# Patient Record
Sex: MALE | Race: WHITE | Employment: OTHER | URBAN - METROPOLITAN AREA
[De-identification: names, ages, dates, MRNs, and addresses within clinical notes are randomized per-mention and may not be internally consistent; named-entity substitution may affect disease eponyms.]

---

## 2017-01-01 ENCOUNTER — ALLIED HEALTH/NURSE VISIT (OUTPATIENT)
Dept: CARDIOLOGY | Facility: CLINIC | Age: 82
End: 2017-01-01
Payer: MEDICARE

## 2017-01-01 ENCOUNTER — DOCUMENTATION ONLY (OUTPATIENT)
Dept: CARDIOLOGY | Facility: CLINIC | Age: 82
End: 2017-01-01

## 2017-01-01 ENCOUNTER — OFFICE VISIT (OUTPATIENT)
Dept: UROLOGY | Facility: CLINIC | Age: 82
End: 2017-01-01
Payer: MEDICARE

## 2017-01-01 ENCOUNTER — OFFICE VISIT (OUTPATIENT)
Dept: CARDIOLOGY | Facility: CLINIC | Age: 82
End: 2017-01-01
Payer: MEDICARE

## 2017-01-01 VITALS
BODY MASS INDEX: 24.62 KG/M2 | HEIGHT: 70 IN | SYSTOLIC BLOOD PRESSURE: 112 MMHG | DIASTOLIC BLOOD PRESSURE: 60 MMHG | WEIGHT: 172 LBS | HEART RATE: 60 BPM

## 2017-01-01 DIAGNOSIS — I49.5 SINOATRIAL NODE DYSFUNCTION (H): Primary | ICD-10-CM

## 2017-01-01 DIAGNOSIS — R33.9 URINARY RETENTION: Primary | ICD-10-CM

## 2017-01-01 DIAGNOSIS — Z95.0 CARDIAC PACEMAKER IN SITU: Primary | ICD-10-CM

## 2017-01-01 PROCEDURE — 99212 OFFICE O/P EST SF 10 MIN: CPT | Mod: 25 | Performed by: UROLOGY

## 2017-01-01 PROCEDURE — 99214 OFFICE O/P EST MOD 30 MIN: CPT | Performed by: INTERNAL MEDICINE

## 2017-01-01 PROCEDURE — 51702 INSERT TEMP BLADDER CATH: CPT | Performed by: UROLOGY

## 2017-01-01 PROCEDURE — 93293 PM PHONE R-STRIP DEVICE EVAL: CPT | Performed by: INTERNAL MEDICINE

## 2017-01-01 RX ORDER — CLINDAMYCIN PHOSPHATE 900 MG/50ML
900 INJECTION, SOLUTION INTRAVENOUS
Status: CANCELLED | OUTPATIENT
Start: 2017-01-01

## 2017-01-01 RX ORDER — SODIUM CHLORIDE 450 MG/100ML
INJECTION, SOLUTION INTRAVENOUS CONTINUOUS
Status: CANCELLED | OUTPATIENT
Start: 2017-01-01

## 2017-01-01 RX ORDER — LIDOCAINE 40 MG/G
CREAM TOPICAL
Status: CANCELLED | OUTPATIENT
Start: 2017-01-01

## 2017-01-05 ENCOUNTER — OFFICE VISIT (OUTPATIENT)
Dept: UROLOGY | Facility: CLINIC | Age: 82
End: 2017-01-05
Payer: MEDICARE

## 2017-01-05 DIAGNOSIS — N39.41 URGE INCONTINENCE OF URINE: Primary | ICD-10-CM

## 2017-01-05 PROCEDURE — 99212 OFFICE O/P EST SF 10 MIN: CPT | Mod: 25 | Performed by: UROLOGY

## 2017-01-05 PROCEDURE — 51702 INSERT TEMP BLADDER CATH: CPT | Performed by: UROLOGY

## 2017-01-05 NOTE — PROGRESS NOTES
Edison is a 89-year-old gentleman with severe urgency incontinence and a spastic bladder.  His bladder is managed with an indwelling Christianson that is changed monthly.  He has a history of prostate cancer and was treated with combination radiation therapy many years ago.  This was the cause of his bladder spasticity and urge incontinence.patient underwent video cystoscopy and injection of Botox several weeks ago and has had good relief of his bladder spasticity.  Past medical history:prostate cancer, osteoporosis, esophageal reflux, hypertension, CVA, sleep apnea, asthma, arthritis, atrial fibrillation, hyperlipidemia, pacemaker, former smoker  Medications:reviewed  Allergies: Ceftriaxone, sulfa, Levaquin, Zithromax, Macrodantin  Exam: Normal appearance, normal vital signs.  Alert and oriented, normocephalic.  Uncircumcised phallus with no lesions.  Christianson catheter change with sterile technique.  Bladder irrigates clear.  4 cc of saline and Christianson balloon.  Christianson placed to close gravity drainage  Assessment: Spastic bladder with severe urge incontinence, prostate cancer  Plan: See me monthly for catheter change.  Periodic Botox injections..  Patient wishes to have no further PSA testing

## 2017-01-05 NOTE — Clinical Note
1/5/2017       RE: Edison Boss  [address]    [facility] [address]     Dear Colleague,    Thank you for referring your patient, Edison Boss, to the [facility] UROLOGY CLINIC [place] at [facility]. Please see a copy of my visit note below.    Edison is a 89-year-old gentleman with severe urgency incontinence and a spastic bladder.  His bladder is managed with an indwelling Christianson that is changed monthly.  He has a history of prostate cancer and was treated with combination radiation therapy many years ago.  This was the cause of his bladder spasticity and urge incontinence.patient underwent video cystoscopy and injection of Botox several weeks ago and has had good relief of his bladder spasticity.  Past medical history:prostate cancer, osteoporosis, esophageal reflux, hypertension, CVA, sleep apnea, asthma, arthritis, atrial fibrillation, hyperlipidemia, pacemaker, former smoker  Medications:reviewed  Allergies: Ceftriaxone, sulfa, Levaquin, Zithromax, Macrodantin  Exam: Normal appearance, normal vital signs.  Alert and oriented, normocephalic.  Uncircumcised phallus with no lesions.  Christianson catheter change with sterile technique.  Bladder irrigates clear.  4 cc of saline and Christianson balloon.  Christianson placed to close gravity drainage  Assessment: Spastic bladder with severe urge incontinence, prostate cancer  Plan: See me monthly for catheter change.  Periodic Botox injections..  Patient wishes to have no further PSA testing    Again, thank you for allowing me to participate in the care of your patient.      Sincerely,    Michael Lilly MD

## 2017-01-17 DIAGNOSIS — F41.1 GAD (GENERALIZED ANXIETY DISORDER): Primary | ICD-10-CM

## 2017-01-17 RX ORDER — PAROXETINE 30 MG/1
15 TABLET, FILM COATED ORAL AT BEDTIME
Qty: 15 TABLET | Refills: 1 | Status: CANCELLED | OUTPATIENT
Start: 2017-01-17

## 2017-01-17 NOTE — TELEPHONE ENCOUNTER
paroxetine     Last Written Prescription Date: 10/26/16  Last Fill Quantity: 15, # refills: 1  Last Office Visit with [facility] primary care provider:  10/20/16        Last PHQ-9 score on record=   PHQ-9 SCORE 11/23/2016   Total Score -   Total Score 8

## 2017-01-19 DIAGNOSIS — F41.1 GAD (GENERALIZED ANXIETY DISORDER): Primary | ICD-10-CM

## 2017-01-19 RX ORDER — PAROXETINE 30 MG/1
15 TABLET, FILM COATED ORAL AT BEDTIME
Qty: 15 TABLET | Refills: 3 | Status: SHIPPED | OUTPATIENT
Start: 2017-01-19 | End: 2017-05-16

## 2017-01-19 NOTE — PROGRESS NOTES
Daughter, Red, requesting refill.  Refill auth for this month plus 3 refills sent to pharmacy.    Future refill authorization should go through PCP, , who originally prescribed this.  Thank you,    Viridiana Morales, Pharm.D.,CGP

## 2017-01-26 ENCOUNTER — OFFICE VISIT (OUTPATIENT)
Dept: CARDIOLOGY | Facility: CLINIC | Age: 82
End: 2017-01-26

## 2017-01-26 DIAGNOSIS — Z95.0 CARDIAC PACEMAKER IN SITU: Primary | ICD-10-CM

## 2017-01-26 NOTE — PROGRESS NOTES
Phone Teletrace    Intrinsic Rhythm at time of check. Magnet response WNL.  F/U scheduled q 1 month.   Courtesy check, no charge.

## 2017-02-02 ENCOUNTER — OFFICE VISIT (OUTPATIENT)
Dept: UROLOGY | Facility: CLINIC | Age: 82
End: 2017-02-02
Payer: MEDICARE

## 2017-02-02 DIAGNOSIS — N39.41 URGENCY INCONTINENCE: Primary | ICD-10-CM

## 2017-02-02 PROCEDURE — 99212 OFFICE O/P EST SF 10 MIN: CPT | Mod: 25 | Performed by: UROLOGY

## 2017-02-02 PROCEDURE — 51702 INSERT TEMP BLADDER CATH: CPT | Performed by: UROLOGY

## 2017-02-02 NOTE — PROGRESS NOTES
Edison is a 89-year-old gentleman with a   History of prostate cancer treated with radiation therapy and resulting urgency incontinence.  His bladder is managed with a  Christianson catheter that is changed monthly.  Patient does not wish to have any  Further PSA testing.    He occasionally requires Injections of Botox to control his severe bladder spasms.  His last injection was 2 months ago.  Exam: Normocephalic, alert and oriented             Normal external genitalia  Christianson catheter change with sterile technique  Assessment: History of prostate cancer, severe bladder urgency  Plan: Follow-up every 4 weeks for catheter  change

## 2017-02-02 NOTE — Clinical Note
2/2/2017       RE: Edison Boss  [address]    [facility] [address]     Dear Colleague,    Thank you for referring your patient, Edison Boss, to the [facility] UROLOGY CLINIC [place] at [facility]. Please see a copy of my visit note below.    Edison is a 89-year-old gentleman with a   History of prostate cancer treated with radiation therapy and resulting urgency incontinence.  His bladder is managed with a  Christianson catheter that is changed monthly.  Patient does not wish to have any  Further PSA testing.    He occasionally requires Injections of Botox to control his severe bladder spasms.  His last injection was 2 months ago.  Exam: Normocephalic, alert and oriented             Normal external genitalia  Christianson catheter change with sterile technique  Assessment: History of prostate cancer, severe bladder urgency  Plan: Follow-up every 4 weeks for catheter  change    Again, thank you for allowing me to participate in the care of your patient.      Sincerely,    Michael Lilly MD

## 2017-02-02 NOTE — NURSING NOTE
UCO with ease. Betadine prep done and sterile draped. New catheter was connected to leg bag. Twila Rocha LPN

## 2017-03-02 ENCOUNTER — ALLIED HEALTH/NURSE VISIT (OUTPATIENT)
Dept: CARDIOLOGY | Facility: CLINIC | Age: 82
End: 2017-03-02
Payer: MEDICARE

## 2017-03-02 DIAGNOSIS — Z95.0 CARDIAC PACEMAKER IN SITU: Primary | ICD-10-CM

## 2017-03-02 PROCEDURE — 93293 PM PHONE R-STRIP DEVICE EVAL: CPT | Performed by: INTERNAL MEDICINE

## 2017-03-02 NOTE — MR AVS SNAPSHOT
After Visit Summary   3/2/2017    Edison Boss    MRN: [id number]           Patient Information     Date Of Birth          10/19/1924        Visit Information        Provider Department      3/2/2017 1:30 PM JOCY BALDWIN [facility]        Today's Diagnoses     Cardiac pacemaker in situ    -  1       Follow-ups after your visit        Your next 10 appointments already scheduled     Mar 07, 2017 10:40 AM CST   Return Visit with Michael Lilly MD   [facility] [place] ([facility] [place])    [address]  [address]  [facility] [address]   [phone]            Apr 04, 2017 11:00 AM CDT   Return Visit with Michael Lilly MD   [facility] ([facility] [place])    [address]  [address]  [facility] [address]   [phone]            Apr 06, 2017  2:30 PM CDT   Phone Device Check with JOCY BALDWIN   [facility] ([facility])    [address] [place], [place] [address]  [facility] [id number] [phone]              Who to contact     If you have questions or need follow up information about today's clinic visit or your schedule please contact [facility] directly at [phone].  Normal or non-critical lab and imaging results will be communicated to you by MyChart, letter or phone within 4 business days after the clinic has received the results. If you do not hear from us within 7 days, please contact the clinic through MyChart or phone. If you have a critical or abnormal lab result, we will notify you by phone as soon as possible.  Submit refill requests through Spyder Lynk or call your pharmacy and they will forward the refill request to us. Please allow 3 business days for your refill to be completed.          Additional Information About Your  Visit        Kumohar Information     BlueSprig gives you secure access to your electronic health record. If you see a primary care provider, you can also send messages to your care team and make appointments. If you have questions, please call your primary care clinic.  If you do not have a primary care provider, please call [phone] and they will assist you.        Care EveryWhere ID     This is your Care EveryWhere ID. This could be used by other organizations to access your [place] medical records  [id number]         Blood Pressure from Last 3 Encounters:   12/08/16 147/88   12/06/16 128/70   10/20/16 112/60    Weight from Last 3 Encounters:   12/08/16 74.8 kg (165 lb)   12/06/16 75.3 kg (166 lb)   11/14/16 72.6 kg (160 lb)              We Performed the Following     PM PHONE R STRIP EVAL UP TO 90 DAYS ([id number])        Primary Care Provider Office Phone # Fax #    Porfirio Terrell [phone]       [facility] [address]  [facility] [address]        Thank you!     Thank you for choosing [facility] [place]  for your care. Our goal is always to provide you with excellent care. Hearing back from our patients is one way we can continue to improve our services. Please take a few minutes to complete the written survey that you may receive in the mail after your visit with us. Thank you!             Your Updated Medication List - Protect others around you: Learn how to safely use, store and throw away your medicines at www.disposemymeds.org.          This list is accurate as of: 3/2/17  1:38 PM.  Always use your most recent med list.                   Brand Name Dispense Instructions for use    albuterol 108 (90 BASE) MCG/ACT Inhaler    PROAIR HFA/PROVENTIL HFA/VENTOLIN HFA    1 Inhaler    Inhale 2 puffs into the lungs every 6 hours as needed for shortness of breath / dyspnea.       amLODIPine 5 MG tablet    NORVASC    30 tablet    Take 1 tablet  (5 mg) by mouth daily       AVEENO ECZEMA THERAPY 1 % Crea   Generic drug:  Colloidal Oatmeal      Externally apply topically daily       cetirizine 10 MG tablet    zyrTEC     Take 10 mg by mouth daily as needed       cholecalciferol 5000 UNITS Caps      Take 1 capsule by mouth daily. Takes 5000 units in the summer and 27326 units in the winter       * ciprofloxacin 500 MG tablet    CIPRO    12 tablet    Take 1 tablet (500 mg) by mouth once as needed Take one tablet after each procedure.       * ciprofloxacin 500 MG tablet    CIPRO    3 tablet    Take 1 tablet (500 mg) by mouth every 12 hours       clobetasol 0.05 % ointment    TEMOVATE         COLON CLEANSER PO      Take 1 capsule by mouth daily Advanced Colon Care II       D-Mannose Powd      500 mg Pt takes 2 pills daily.       EYE VITAMINS Caps      I cap 2 tabs in am, 2 tabs in pm       lidocaine 4 % Crea cream    LMX4     Apply topically daily as needed       nebulizer nebulization     1    Use as directed       OMEPRAZOLE PO      Take 20 mg by mouth every morning       oxyCODONE-acetaminophen 5-325 MG per tablet    PERCOCET    30 tablet    Take 1 tablet by mouth every 4 hours as needed for pain for pain.       PARoxetine 30 MG tablet    PAXIL    15 tablet    Take 0.5 tablets (15 mg) by mouth At Bedtime       VITAMIN C PO      Take by mouth 2 times daily 500 mg take 1 daily       * Notice:  This list has 2 medication(s) that are the same as other medications prescribed for you. Read the directions carefully, and ask your doctor or other care provider to review them with you.

## 2017-03-02 NOTE — NURSING NOTE
PHONE TELETRACE    Intrinsic Rhythm at time of check. Magnet response WNL.  F/U scheduled q 1 month.  Bill this time.     I

## 2017-03-07 ENCOUNTER — OFFICE VISIT (OUTPATIENT)
Dept: UROLOGY | Facility: CLINIC | Age: 82
End: 2017-03-07
Payer: MEDICARE

## 2017-03-07 DIAGNOSIS — N39.41 URGENCY INCONTINENCE: Primary | ICD-10-CM

## 2017-03-07 PROCEDURE — 51702 INSERT TEMP BLADDER CATH: CPT | Performed by: UROLOGY

## 2017-03-07 NOTE — PROGRESS NOTES
Edison is here for his monthly catheter change. He has been having no bladder spasms.  16 [place] coudé placed with sterile technique. 4 cc in Christianson balloon and irrigates clear.  Assessment: History of prostate cancer, severe urinary urgency with urge incontinence  Plan: Follow up one month for catheter change. No PSAs in the future

## 2017-03-07 NOTE — LETTER
3/7/2017       RE: Edison Boss  [address]    [place] [facility] [address]     Dear Colleague,    Thank you for referring your patient, Edison Boss, to the [facility] [place] at [facility]. Please see a copy of my visit note below.    Edison is here for his monthly catheter change. He has been having no bladder spasms.  16 Burundian coudé placed with sterile technique. 4 cc in Christianson balloon and irrigates clear.  Assessment: History of prostate cancer, severe urinary urgency with urge incontinence  Plan: Follow up one month for catheter change. No PSAs in the future    Again, thank you for allowing me to participate in the care of your patient.      Sincerely,    Michael Lilly MD

## 2017-03-07 NOTE — MR AVS SNAPSHOT
After Visit Summary   3/7/2017    Edison Boss    MRN: [id number]           Patient Information     Date Of Birth          10/19/1924        Visit Information        Provider Department      3/7/2017 10:40 AM Michael Lilly MD [facility]        Today's Diagnoses     Urgency incontinence    -  1       Follow-ups after your visit        Your next 10 appointments already scheduled     Apr 04, 2017 11:00 AM CDT   Return Visit with Michael Lilly MD   [facility] [place] ([facility] [place])    [address]  [address]  [facility] [address]   [phone]            Apr 06, 2017  2:30 PM CDT   Phone Device Check with SANDRA TECH2   [facility] [place] ([facility])    [address] [place] [address] [address]  [facility] [address] [phone]            May 02, 2017 10:30 AM CDT   Return Visit with Michael Lilly MD   [facility] ([facility] [place])    [address]  [address]  [facility] [address] [phone]              Who to contact     If you have questions or need follow up information about today's clinic visit or your schedule please contact [facility] directly at [phone].  Normal or non-critical lab and imaging results will be communicated to you by MyChart, letter or phone within 4 business days after the clinic has received the results. If you do not hear from us within 7 days, please contact the clinic through MyChart or phone. If you have a critical or abnormal lab result, we will notify you by phone as soon as possible.  Submit refill requests through Barcheyacht or call your pharmacy and they will forward the refill request to us. Please allow 3 business days for your refill to be completed.          Additional Information  About Your Visit        Helpful TechnologiesharWorldEscape Information     besomebody. gives you secure access to your electronic health record. If you see a primary care provider, you can also send messages to your care team and make appointments. If you have questions, please call your primary care clinic.  If you do not have a primary care provider, please call [phone] and they will assist you.        Care EveryWhere ID     This is your Care EveryWhere ID. This could be used by other organizations to access your [place] medical records  [id number]         Blood Pressure from Last 3 Encounters:   12/08/16 147/88   12/06/16 128/70   10/20/16 112/60    Weight from Last 3 Encounters:   12/08/16 74.8 kg (165 lb)   12/06/16 75.3 kg (166 lb)   11/14/16 72.6 kg (160 lb)              We Performed the Following     INSERT BLADDER CATH, TEMP INDWELL SIMPLE (ARAMBULA) ([id number])        Primary Care Provider Office Phone # Fax #    Porfirio Terrell [phone]       [facility] [address] [facility] [address]  [facility] [address]        Thank you!     Thank you for choosing [facility]OLOG[facility] CLINIC [place]  for your care. Our goal is always to provide you with excellent care. Hearing back from our patients is one way we can continue to improve our services. Please take a few minutes to complete the written survey that you may receive in the mail after your visit with us. Thank you!             Your Updated Medication List - Protect others around you: Learn how to safely use, store and throw away your medicines at www.disposemymeds.org.          This list is accurate as of: 3/7/17 11:58 AM.  Always use your most recent med list.                   Brand Name Dispense Instructions for use    albuterol 108 (90 BASE) MCG/ACT Inhaler    PROAIR HFA/PROVENTIL HFA/VENTOLIN HFA    1 Inhaler    Inhale 2 puffs into the lungs every 6 hours as needed for shortness of breath / dyspnea.       amLODIPine 5 MG tablet    NORVASC     30 tablet    Take 1 tablet (5 mg) by mouth daily       AVEENO ECZEMA THERAPY 1 % Crea   Generic drug:  Colloidal Oatmeal      Externally apply topically daily       cetirizine 10 MG tablet    zyrTEC     Take 10 mg by mouth daily as needed       cholecalciferol 5000 UNITS Caps      Take 1 capsule by mouth daily. Takes 5000 units in the summer and 89972 units in the winter       * ciprofloxacin 500 MG tablet    CIPRO    12 tablet    Take 1 tablet (500 mg) by mouth once as needed Take one tablet after each procedure.       * ciprofloxacin 500 MG tablet    CIPRO    3 tablet    Take 1 tablet (500 mg) by mouth every 12 hours       clobetasol 0.05 % ointment    TEMOVATE         COLON CLEANSER PO      Take 1 capsule by mouth daily Advanced Colon Care II       D-Mannose Powd      500 mg Pt takes 2 pills daily.       EYE VITAMINS Caps      I cap 2 tabs in am, 2 tabs in pm       lidocaine 4 % Crea cream    LMX4     Apply topically daily as needed       nebulizer nebulization     1    Use as directed       OMEPRAZOLE PO      Take 20 mg by mouth every morning       oxyCODONE-acetaminophen 5-325 MG per tablet    PERCOCET    30 tablet    Take 1 tablet by mouth every 4 hours as needed for pain for pain.       PARoxetine 30 MG tablet    PAXIL    15 tablet    Take 0.5 tablets (15 mg) by mouth At Bedtime       VITAMIN C PO      Take by mouth 2 times daily 500 mg take 1 daily       * Notice:  This list has 2 medication(s) that are the same as other medications prescribed for you. Read the directions carefully, and ask your doctor or other care provider to review them with you.

## 2017-04-04 ENCOUNTER — OFFICE VISIT (OUTPATIENT)
Dept: UROLOGY | Facility: CLINIC | Age: 82
End: 2017-04-04
Payer: MEDICARE

## 2017-04-04 DIAGNOSIS — N39.41 URGENCY INCONTINENCE: Primary | ICD-10-CM

## 2017-04-04 PROCEDURE — 99211 OFF/OP EST MAY X REQ PHY/QHP: CPT | Mod: 25 | Performed by: UROLOGY

## 2017-04-04 PROCEDURE — 51702 INSERT TEMP BLADDER CATH: CPT | Performed by: UROLOGY

## 2017-04-04 NOTE — MR AVS SNAPSHOT
After Visit Summary   4/4/2017    Edison Boss    MRN: [id number]           Patient Information     Date Of Birth          10/19/1924        Visit Information        Provider Department      4/4/2017 11:00 AM Michael Lilly MD [facility]        Today's Diagnoses     Urgency incontinence    -  1       Follow-ups after your visit        Your next 10 appointments already scheduled     Apr 06, 2017  2:30 PM CDT   Phone Device Check with SANDRA TECH2   [facility] [place] ([facility] Clin[facility])    [address] [facility] [address]  [facility] [address] [phone]            May 02, 2017 10:30 AM CDT   Return Visit with Michael Lilly MD   [facility] ([facility] [place])    [address]  [address]  [facility] [address] [phone]              Who to contact     If you have questions or need follow up information about today's clinic visit or your schedule please contact [facility] directly at [phone].  Normal or non-critical lab and imaging results will be communicated to you by MedPAC Technologieshart, letter or phone within 4 business days after the clinic has received the results. If you do not hear from us within 7 days, please contact the clinic through MedPAC Technologieshart or phone. If you have a critical or abnormal lab result, we will notify you by phone as soon as possible.  Submit refill requests through Aicent or call your pharmacy and they will forward the refill request to us. Please allow 3 business days for your refill to be completed.          Additional Information About Your Visit        MyChart Information     Aicent gives you secure access to your electronic health record. If you see a primary care provider, you can also send messages to your care team and make appointments. If you have questions,  please call your primary care clinic.  If you do not have a primary care provider, please call [phone] and they will assist you.        Care EveryWhere ID     This is your Care EveryWhere ID. This could be used by other organizations to access your [place] medical records  [id number]         Blood Pressure from Last 3 Encounters:   12/08/16 147/88   12/06/16 128/70   10/20/16 112/60    Weight from Last 3 Encounters:   12/08/16 74.8 kg (165 lb)   12/06/16 75.3 kg (166 lb)   11/14/16 72.6 kg (160 lb)              We Performed the Following     INSERT BLADDER CATH, TEMP INDWELL SIMPLE (ARAMBULA) ([id number])        Primary Care Provider Office Phone # Fax #    Porfirio Terrell [phone]       [facility] [address]   [facility] [address]        Thank you!     Thank you for choosing [facility]IC [place]  for your care. Our goal is always to provide you with excellent care. Hearing back from our patients is one way we can continue to improve our services. Please take a few minutes to complete the written survey that you may receive in the mail after your visit with us. Thank you!             Your Updated Medication List - Protect others around you: Learn how to safely use, store and throw away your medicines at www.disposemymeds.org.          This list is accurate as of: 4/4/17 11:24 AM.  Always use your most recent med list.                   Brand Name Dispense Instructions for use    albuterol 108 (90 BASE) MCG/ACT Inhaler    PROAIR HFA/PROVENTIL HFA/VENTOLIN HFA    1 Inhaler    Inhale 2 puffs into the lungs every 6 hours as needed for shortness of breath / dyspnea.       amLODIPine 5 MG tablet    NORVASC    30 tablet    Take 1 tablet (5 mg) by mouth daily       AVEENO ECZEMA THERAPY 1 % Crea   Generic drug:  Colloidal Oatmeal      Externally apply topically daily       cetirizine 10 MG tablet    zyrTEC     Take 10 mg by mouth daily as needed        cholecalciferol 5000 UNITS Caps      Take 1 capsule by mouth daily. Takes 5000 units in the summer and [address]16 units in the winter       * ciprofloxacin 500 MG tablet    CIPRO    12 tablet    Take 1 tablet (500 mg) by mouth once as needed Take one tablet after each procedure.       * ciprofloxacin 500 MG tablet    CIPRO    3 tablet    Take 1 tablet (500 mg) by mouth every 12 hours       clobetasol 0.05 % ointment    TEMOVATE         COLON CLEANSER PO      Take 1 capsule by mouth daily Advanced Colon Care II       D-Mannose Powd      500 mg Pt takes 2 pills daily.       EYE VITAMINS Caps      I cap 2 tabs in am, 2 tabs in pm       lidocaine 4 % Crea cream    LMX4     Apply topically daily as needed       nebulizer nebulization     1    Use as directed       OMEPRAZOLE PO      Take 20 mg by mouth every morning       oxyCODONE-acetaminophen 5-325 MG per tablet    PERCOCET    30 tablet    Take 1 tablet by mouth every 4 hours as needed for pain for pain.       PARoxetine 30 MG tablet    PAXIL    15 tablet    Take 0.5 tablets (15 mg) by mouth At Bedtime       VITAMIN C PO      Take by mouth 2 times daily 500 mg take 1 daily       * Notice:  This list has 2 medication(s) that are the same as other medications prescribed for you. Read the directions carefully, and ask your doctor or other care provider to review them with you.

## 2017-04-04 NOTE — NURSING NOTE
UCO with ease , Betadine prep done and draped with sterile drape. 2% Lidocaine jelly instilled into urethra. Twila Rocha LPN

## 2017-04-04 NOTE — LETTER
4/4/2017       RE: Edison Boss  [address]    [facility] [address]     Dear Colleague,    Thank you for referring your patient, Edison Boss, to the [facility] UROLOGY CLINIC [place] at [facility]. Please see a copy of my visit note below.    Edison Teague is a 89-year-old gentleman who has severe urinary incontinence after radiation for prostate cancer. PSA receives periodic Botox for spastic bladder-this is been well controlled.  Past medical history, medications and allergies reviewed  Assessment: Prostate cancer, urgency incontinence, severe bladder spasticity  16 English coudé Christianson changed under sterile technique and irrigated with clear returns.  4-1/2 cc in Christianson balloon  Plan: See me monthly for Christianson catheter change, Botox injections p.r.n. No further PSA testing    Again, thank you for allowing me to participate in the care of your patient.      Sincerely,    Michael Lilly MD

## 2017-04-04 NOTE — PROGRESS NOTES
Edison Teague is a 89-year-old gentleman who has severe urinary incontinence after radiation for prostate cancer. PSA receives periodic Botox for spastic bladder-this is been well controlled.  Past medical history, medications and allergies reviewed  Assessment: Prostate cancer, urgency incontinence, severe bladder spasticity  16 Upper sorbian coudé Christianson changed under sterile technique and irrigated with clear returns.  4-1/2 cc in Christianson balloon  Plan: See me monthly for Christianson catheter change, Botox injections p.r.n. No further PSA testing

## 2017-04-06 ENCOUNTER — ALLIED HEALTH/NURSE VISIT (OUTPATIENT)
Dept: CARDIOLOGY | Facility: CLINIC | Age: 82
End: 2017-04-06
Payer: MEDICARE

## 2017-04-06 DIAGNOSIS — Z95.0 CARDIAC PACEMAKER IN SITU: Primary | ICD-10-CM

## 2017-04-06 PROCEDURE — 99207 ZZC NO CHARGE LOS: CPT

## 2017-04-06 NOTE — MR AVS SNAPSHOT
After Visit Summary   4/6/2017    Edison Boss    MRN: [id number]           Patient Information     Date Of Birth          10/19/1924        Visit Information        Provider Department      4/6/2017 2:30 PM SANDRA TECH2 [facility] HEART AT [place]        Today's Diagnoses     Cardiac pacemaker in situ    -  1       Follow-ups after your visit        Your next 10 appointments already scheduled     May 02, 2017 10:30 AM CDT   Return Visit with Michael Lilly MD   [facility])    [address]  [address]  [facility] [id number] [phone]            May 11, 2017  2:30 PM CDT   Phone Device Check with SANDRA TECH2   [facility] ([facility])    [address] [place] [address] [address]  [facility] [id number] [phone]            May 30, 2017 10:30 AM CDT   Return Visit with Michael Lilly MD   [facility] ([facility])    [address]  [address]  [facility] [id number] [phone]              Who to contact     If you have questions or need follow up information about today's clinic visit or your schedule please contact [facility] directly at [phone].  Normal or non-critical lab and imaging results will be communicated to you by MyChart, letter or phone within 4 business days after the clinic has received the results. If you do not hear from us within 7 days, please contact the clinic through MyChart or phone. If you have a critical or abnormal lab result, we will notify you by phone as soon as possible.  Submit refill requests through [employer] or call your pharmacy and they will forward the refill request to us. Please allow 3 business days for your refill to be completed.          Additional Information About Your  Visit        [employer] Information     [employer] gives you secure access to your electronic health record. If you see a primary care provider, you can also send messages to your care team and make appointments. If you have questions, please call your primary care clinic.  If you do not have a primary care provider, please call [phone] and they will assist you.        Care EveryWhere ID     This is your Care EveryWhere ID. This could be used by other organizations to access your [place] medical records  [id number]         Blood Pressure from Last 3 Encounters:   12/08/16 147/88   12/06/16 128/70   10/20/16 112/60    Weight from Last 3 Encounters:   12/08/16 74.8 kg (165 lb)   12/06/16 75.3 kg (166 lb)   11/14/16 72.6 kg (160 lb)              Today, you had the following     No orders found for display       Primary Care Provider Office Phone # Fax #    Porfirio Terrell [phone]       [facility] [address] [place] [address]        Thank you!     Thank you for choosing [facility] PHYSICIANS HEART AT [place]  for your care. Our goal is always to provide you with excellent care. Hearing back from our patients is one way we can continue to improve our services. Please take a few minutes to complete the written survey that you may receive in the mail after your visit with us. Thank you!             Your Updated Medication List - Protect others around you: Learn how to safely use, store and throw away your medicines at www.disposemymeds.org.          This list is accurate as of: 4/6/17  2:34 PM.  Always use your most recent med list.                   Brand Name Dispense Instructions for use    albuterol 108 (90 BASE) MCG/ACT Inhaler    PROAIR HFA/PROVENTIL HFA/VENTOLIN HFA    1 Inhaler    Inhale 2 puffs into the lungs every 6 hours as needed for shortness of breath / dyspnea.       amLODIPine 5 MG tablet    NORVASC    30 tablet    Take 1 tablet (5 mg) by  mouth daily       AVEENO ECZEMA THERAPY 1 % Crea   Generic drug:  Colloidal Oatmeal      Externally apply topically daily       cetirizine 10 MG tablet    zyrTEC     Take 10 mg by mouth daily as needed       cholecalciferol 5000 UNITS Caps      Take 1 capsule by mouth daily. Takes 5000 units in the summer and 10389 units in the winter       * ciprofloxacin 500 MG tablet    CIPRO    12 tablet    Take 1 tablet (500 mg) by mouth once as needed Take one tablet after each procedure.       * ciprofloxacin 500 MG tablet    CIPRO    3 tablet    Take 1 tablet (500 mg) by mouth every 12 hours       clobetasol 0.05 % ointment    TEMOVATE         COLON CLEANSER PO      Take 1 capsule by mouth daily Advanced Colon Care II       D-Mannose Powd      500 mg Pt takes 2 pills daily.       EYE VITAMINS Caps      I cap 2 tabs in am, 2 tabs in pm       lidocaine 4 % Crea cream    LMX4     Apply topically daily as needed       nebulizer nebulization     1    Use as directed       OMEPRAZOLE PO      Take 20 mg by mouth every morning       oxyCODONE-acetaminophen 5-325 MG per tablet    PERCOCET    30 tablet    Take 1 tablet by mouth every 4 hours as needed for pain for pain.       PARoxetine 30 MG tablet    PAXIL    15 tablet    Take 0.5 tablets (15 mg) by mouth At Bedtime       VITAMIN C PO      Take by mouth 2 times daily 500 mg take 1 daily       * Notice:  This list has 2 medication(s) that are the same as other medications prescribed for you. Read the directions carefully, and ask your doctor or other care provider to review them with you.

## 2017-04-26 ENCOUNTER — FCC EXTENDED DOCUMENTATION (OUTPATIENT)
Dept: BEHAVIORAL HEALTH | Facility: CLINIC | Age: 82
End: 2017-04-26

## 2017-04-26 NOTE — Clinical Note
FYI, Client has been discharged.  Goals were met and they have not utilized services for last five months.  JENNA Parra, [facility] Outpatient Clinic Therapist [facility] [phone]

## 2017-04-26 NOTE — LETTER
4/26/2017      Edison Boss  [address]    [facility] [address]        Greetings Edison and daughter, Red,    Your last date of service at [facility] was 11/23/17.  Per from our last visit, if you are not needing to access services for the 90 days, you will be discharged.  In the future, should you desire to seek services again through our clinic, please do not hesitate to call us.      Sincerely,      JENNA Parra, [facility]  Outpatient Clinic Therapist  [facility]  [phone]

## 2017-04-26 NOTE — PROGRESS NOTES
Discharge Summary  Multiple Sessions    Client Name: Edison Boss MRN#: [id number] YOB: 1924      Intake / Discharge Date: 8/10/16-  11/23/16      DSM5 Diagnoses: (Sustained by DSM5 Criteria Listed Above)  Diagnoses: 300.02 (F41.1) Generalized Anxiety Disorder; Mild Cognitive Impairment  Psychosocial & Contextual Factors: Moderate-Health issues, Limited mobility  WHODAS 2.0 (12 item) Score: 40          Presenting Concern:  Client was brought in by daughter to address a flareup with anxiety symptoms as a result of a fall.        Reason for Discharge:  Client is satisfied with progress and Goals completed      Disposition at Time of Last Encounter:   Comments:   Client was doing well at least session.  Anxiety had decreased, client was more mobile and cheerful.  Family was pleased with how client was doing.       Risk Management:   Client denies a history of suicidal ideation, suicide attempts, self-injurious behavior, homicidal ideation, homicidal behavior and and other safety concerns  A safety and risk management plan has not been developed at this time, however client was given the after-hours number / 911 should there be a change in any of these risk factors.      Referred To:  SIMRAN Clemons, [facility]   4/26/2017

## 2017-05-02 ENCOUNTER — OFFICE VISIT (OUTPATIENT)
Dept: UROLOGY | Facility: CLINIC | Age: 82
End: 2017-05-02
Payer: MEDICARE

## 2017-05-02 VITALS — WEIGHT: 165.6 LBS | HEIGHT: 70 IN | BODY MASS INDEX: 23.71 KG/M2

## 2017-05-02 DIAGNOSIS — R33.9 URINARY RETENTION: Primary | ICD-10-CM

## 2017-05-02 PROCEDURE — 99211 OFF/OP EST MAY X REQ PHY/QHP: CPT | Mod: 25 | Performed by: UROLOGY

## 2017-05-02 PROCEDURE — 51702 INSERT TEMP BLADDER CATH: CPT | Performed by: UROLOGY

## 2017-05-02 NOTE — LETTER
5/2/2017       RE: Edison Boss  [address]    [facility] [address]     Dear Colleague,    Thank you for referring your patient, Edison Boss, to the [facility] UROLOGY CLINIC [place] at [facility]. Please see a copy of my visit note below.    Edison Boss is a pleasant 89-year-old male with a history of prostate cancer. His bladder is managed monthly with Christianson catheter changes. Foleys are indicated for urinary retention. Patient also has severe bladder spasticity and every 6-12 months he needs injection of the bladder with Botox. Patient's had no recurrence of his prostate cancer and wishes no further PSAs.  Medications: Reviewed  Allergies: Ceftriaxone, sulfa, Levaquin, Zithromax, Macrodantin  Exam: Normal appearance, normal vital signs, alert and oriented, normocephalic, normal respirations. Christianson catheter changed easily with sterile technique and bladder irrigated with clear returns. Patient has very small bladder capacity  Assessment: History of prostate cancer, urinary retention, severe bladder spasticity  Plan: Monthly catheter change, Botox p.r.n.    Again, thank you for allowing me to participate in the care of your patient.      Sincerely,    Michael Lilly MD

## 2017-05-02 NOTE — MR AVS SNAPSHOT
After Visit Summary   5/2/2017    Edison Boss    MRN: [id number]           Patient Information     Date Of Birth          10/19/1924        Visit Information        Provider Department      5/2/2017 10:30 AM Michael Lilly MD [facility] Urology Clinic [place]        Today's Diagnoses     Urinary retention    -  1       Follow-ups after your visit        Follow-up notes from your care team     Return in about 4 weeks (around 5/30/2017) for tolliver.      Your next 10 appointments already scheduled     May 11, 2017  2:30 PM CDT   Phone Device Check with SANDRA TECH2   [facility] PHYSICIANS [facility] AT [place] ([facility] PSA Clinics)    [address] [facility] [address]  [facility] [id number] [phone]            May 30, 2017 10:30 AM CDT   Return Visit with Michael Lilly MD   [facility] Urology [facility] ([facility] [place])    [address]  [address]  [facility] [address] [phone]              Who to contact     If you have questions or need follow up information about today's clinic visit or your schedule please contact [facility] UROLOGY [facility] directly at [phone].  Normal or non-critical lab and imaging results will be communicated to you by MyChart, letter or phone within 4 business days after the clinic has received the results. If you do not hear from us within 7 days, please contact the clinic through [email or web address] or phone. If you have a critical or abnormal lab result, we will notify you by phone as soon as possible.  Submit refill requests through [employer] or call your pharmacy and they will forward the refill request to us. Please allow 3 business days for your refill to be completed.          Additional Information About Your Visit        MyChart Information     [employer] gives you secure access to your electronic health record. If you see a primary care provider,  "you can also send messages to your care team and make appointments. If you have questions, please call your primary care clinic.  If you do not have a primary care provider, please call [phone] and they will assist you.        Care EveryWhere ID     This is your Care EveryWhere ID. This could be used by other organizations to access your [place] medical records  [id number]        Your Vitals Were     Height BMI (Body Mass Index)                1.765 m (5' 9.5\") 24.1 kg/m2           Blood Pressure from Last 3 Encounters:   12/08/16 147/88   12/06/16 128/70   10/20/16 112/60    Weight from Last 3 Encounters:   05/02/17 75.1 kg (165 lb 9.6 oz)   12/08/16 74.8 kg (165 lb)   12/06/16 75.3 kg (166 lb)              We Performed the Following     INSERT BLADDER CATH, TEMP INDWELL SIMPLE (ARAMBULA) ([id number])        Primary Care Provider Office Phone # Fax #    Porfirio Terrell [phone]       [facility] [address]  [facility] [address]        Thank you!     Thank you for choosing [facility] UROLOGY CLINIC [place]  for your care. Our goal is always to provide you with excellent care. Hearing back from our patients is one way we can continue to improve our services. Please take a few minutes to complete the written survey that you may receive in the mail after your visit with us. Thank you!             Your Updated Medication List - Protect others around you: Learn how to safely use, store and throw away your medicines at www.disposemymeds.org.          This list is accurate as of: 5/2/17 11:22 AM.  Always use your most recent med list.                   Brand Name Dispense Instructions for use    albuterol 108 (90 BASE) MCG/ACT Inhaler    PROAIR HFA/PROVENTIL HFA/VENTOLIN HFA    1 Inhaler    Inhale 2 puffs into the lungs every 6 hours as needed for shortness of breath / dyspnea.       amLODIPine 5 MG tablet    NORVASC    30 tablet    Take 1 tablet (5 mg) by " mouth daily       AVEENO ECZEMA THERAPY 1 % Crea   Generic drug:  Colloidal Oatmeal      Externally apply topically daily       cetirizine 10 MG tablet    zyrTEC     Take 10 mg by mouth daily as needed       cholecalciferol 5000 UNITS Caps      Take 1 capsule by mouth daily. Takes 5000 units in the summer and 37390 units in the winter       * ciprofloxacin 500 MG tablet    CIPRO    12 tablet    Take 1 tablet (500 mg) by mouth once as needed Take one tablet after each procedure.       * ciprofloxacin 500 MG tablet    CIPRO    3 tablet    Take 1 tablet (500 mg) by mouth every 12 hours       clobetasol 0.05 % ointment    TEMOVATE         COLON CLEANSER PO      Take 1 capsule by mouth daily Advanced Colon Care II       D-Mannose Powd      500 mg Pt takes 2 pills daily.       EYE VITAMINS Caps      I cap 2 tabs in am, 2 tabs in pm       lidocaine 4 % Crea cream    LMX4     Apply topically daily as needed       nebulizer nebulization     1    Use as directed       OMEPRAZOLE PO      Take 20 mg by mouth every morning       oxyCODONE-acetaminophen 5-325 MG per tablet    PERCOCET    30 tablet    Take 1 tablet by mouth every 4 hours as needed for pain for pain.       PARoxetine 30 MG tablet    PAXIL    15 tablet    Take 0.5 tablets (15 mg) by mouth At Bedtime       VITAMIN C PO      Take by mouth 2 times daily 500 mg take 1 daily       * Notice:  This list has 2 medication(s) that are the same as other medications prescribed for you. Read the directions carefully, and ask your doctor or other care provider to review them with you.

## 2017-05-02 NOTE — PROGRESS NOTES
Edison Boss is a pleasant 89-year-old male with a history of prostate cancer. His bladder is managed monthly with Christianson catheter changes. Foleys are indicated for urinary retention. Patient also has severe bladder spasticity and every 6-12 months he needs injection of the bladder with Botox. Patient's had no recurrence of his prostate cancer and wishes no further PSAs.  Medications: Reviewed  Allergies: Ceftriaxone, sulfa, Levaquin, Zithromax, Macrodantin  Exam: Normal appearance, normal vital signs, alert and oriented, normocephalic, normal respirations. Christianson catheter changed easily with sterile technique and bladder irrigated with clear returns. Patient has very small bladder capacity  Assessment: History of prostate cancer, urinary retention, severe bladder spasticity  Plan: Monthly catheter change, Botox p.r.n.

## 2017-05-11 ENCOUNTER — ALLIED HEALTH/NURSE VISIT (OUTPATIENT)
Dept: CARDIOLOGY | Facility: CLINIC | Age: 82
End: 2017-05-11
Payer: MEDICARE

## 2017-05-11 DIAGNOSIS — Z95.0 CARDIAC PACEMAKER IN SITU: Primary | ICD-10-CM

## 2017-05-11 PROCEDURE — 99207 ZZC NO CHARGE LOS: CPT | Performed by: INTERNAL MEDICINE

## 2017-05-11 NOTE — MR AVS SNAPSHOT
After Visit Summary   5/11/2017    Edison Boss    MRN: [id number]           Patient Information     Date Of Birth          10/19/1924        Visit Information        Provider Department      5/11/2017 2:30 PM SANDRA TECH2 [facility] HEART [facility]        Today's Diagnoses     Cardiac pacemaker in situ    -  1       Follow-ups after your visit        Your next 10 appointments already scheduled     May 30, 2017 10:30 AM CDT   Return Visit with Michael Lilly MD   [facility]rology [facility] [place] ([facility] [place])    [address]  [address]  [facility] [id number] [phone]            Daniel 15, 2017  2:30 PM CDT   Phone Device Check with SANDRA TECH2   [facility] ([facility])    [address] [place] [address] [address]  [facility] [address] [phone]            Jun 27, 2017 10:30 AM CDT   Return Visit with Michael Lilly MD   [facility] [place] ([facility] [place])    [address]  [address]  [facility] [address] [phone]              Who to contact     If you have questions or need follow up information about today's clinic visit or your schedule please contact [facility] directly at [phone].  Normal or non-critical lab and imaging results will be communicated to you by MyChart, letter or phone within 4 business days after the clinic has received the results. If you do not hear from us within 7 days, please contact the clinic through MyChart or phone. If you have a critical or abnormal lab result, we will notify you by phone as soon as possible.  Submit refill requests through [employer] or call your pharmacy and they will forward the refill request to us. Please allow 3 business days for your refill to be completed.          Additional Information About Your  Visit        [employer] Information     [employer] gives you secure access to your electronic health record. If you see a primary care provider, you can also send messages to your care team and make appointments. If you have questions, please call your primary care clinic.  If you do not have a primary care provider, please call [phone] and they will assist you.        Care EveryWhere ID     This is your Care EveryWhere ID. This could be used by other organizations to access your [place] medical records  [id number]         Blood Pressure from Last 3 Encounters:   12/08/16 147/88   12/06/16 128/70   10/20/16 112/60    Weight from Last 3 Encounters:   05/02/17 75.1 kg (165 lb 9.6 oz)   12/08/16 74.8 kg (165 lb)   12/06/16 75.3 kg (166 lb)              Today, you had the following     No orders found for display       Primary Care Provider Office Phone # Fax #    Porfirio Terrell [phone]       [facility] [address] [place] [address]        Thank you!     Thank you for choosing [facility] PHYSICIANS HEART AT [place]  for your care. Our goal is always to provide you with excellent care. Hearing back from our patients is one way we can continue to improve our services. Please take a few minutes to complete the written survey that you may receive in the mail after your visit with us. Thank you!             Your Updated Medication List - Protect others around you: Learn how to safely use, store and throw away your medicines at www.disposemymeds.org.          This list is accurate as of: 5/11/17  2:41 PM.  Always use your most recent med list.                   Brand Name Dispense Instructions for use    albuterol 108 (90 BASE) MCG/ACT Inhaler    PROAIR HFA/PROVENTIL HFA/VENTOLIN HFA    1 Inhaler    Inhale 2 puffs into the lungs every 6 hours as needed for shortness of breath / dyspnea.       amLODIPine 5 MG tablet    NORVASC    30 tablet    Take 1 tablet (5 mg)  by mouth daily       AVEENO ECZEMA THERAPY 1 % Crea   Generic drug:  Colloidal Oatmeal      Externally apply topically daily       cetirizine 10 MG tablet    zyrTEC     Take 10 mg by mouth daily as needed       cholecalciferol 5000 UNITS Caps      Take 1 capsule by mouth daily. Takes 5000 units in the summer and 73848 units in the winter       * ciprofloxacin 500 MG tablet    CIPRO    12 tablet    Take 1 tablet (500 mg) by mouth once as needed Take one tablet after each procedure.       * ciprofloxacin 500 MG tablet    CIPRO    3 tablet    Take 1 tablet (500 mg) by mouth every 12 hours       clobetasol 0.05 % ointment    TEMOVATE         COLON CLEANSER PO      Take 1 capsule by mouth daily Advanced Colon Care II       D-Mannose Powd      500 mg Pt takes 2 pills daily.       EYE VITAMINS Caps      I cap 2 tabs in am, 2 tabs in pm       lidocaine 4 % Crea cream    LMX4     Apply topically daily as needed       nebulizer nebulization     1    Use as directed       OMEPRAZOLE PO      Take 20 mg by mouth every morning       oxyCODONE-acetaminophen 5-325 MG per tablet    PERCOCET    30 tablet    Take 1 tablet by mouth every 4 hours as needed for pain for pain.       PARoxetine 30 MG tablet    PAXIL    15 tablet    Take 0.5 tablets (15 mg) by mouth At Bedtime       VITAMIN C PO      Take by mouth 2 times daily 500 mg take 1 daily       * Notice:  This list has 2 medication(s) that are the same as other medications prescribed for you. Read the directions carefully, and ask your doctor or other care provider to review them with you.

## 2017-05-16 DIAGNOSIS — F41.1 GAD (GENERALIZED ANXIETY DISORDER): ICD-10-CM

## 2017-05-16 RX ORDER — PAROXETINE 30 MG/1
15 TABLET, FILM COATED ORAL AT BEDTIME
Qty: 15 TABLET | Refills: 2 | Status: SHIPPED | OUTPATIENT
Start: 2017-05-16 | End: 2017-08-08

## 2017-05-16 RX ORDER — PAROXETINE 30 MG/1
TABLET, FILM COATED ORAL
Qty: 45 TABLET | Refills: 2 | OUTPATIENT
Start: 2017-05-16

## 2017-05-16 NOTE — PROGRESS NOTES
Daughter, Red called to request refill for Paroxetine.  Paroxetine was originally prescribed by , so ok'd one month plus 2 refills per CPA.  Future refill requests for Paroxetine should go to .    Viridiana Morales Pharm.D.,[facility]  Board Certified Geriatric Pharmacist  Medication Therapy Management Pharmacist  [phone]

## 2017-05-30 ENCOUNTER — OFFICE VISIT (OUTPATIENT)
Dept: UROLOGY | Facility: CLINIC | Age: 82
End: 2017-05-30
Payer: MEDICARE

## 2017-05-30 DIAGNOSIS — R33.9 URINARY RETENTION: Primary | ICD-10-CM

## 2017-05-30 PROCEDURE — 51702 INSERT TEMP BLADDER CATH: CPT | Performed by: UROLOGY

## 2017-05-30 PROCEDURE — 99212 OFFICE O/P EST SF 10 MIN: CPT | Mod: 25 | Performed by: UROLOGY

## 2017-05-30 NOTE — MR AVS SNAPSHOT
After Visit Summary   5/30/2017    Edison Boss    MRN: [id number]           Patient Information     Date Of Birth          10/19/1924        Visit Information        Provider Department      5/30/2017 10:30 AM Michael Lilly MD [facility]        Today's Diagnoses     Urinary retention    -  1       Follow-ups after your visit        Follow-up notes from your care team     Return in about 4 weeks (around 6/27/2017) for tolliver change.      Your next 10 appointments already scheduled     Daniel 15, 2017  2:30 PM CDT   Phone Device Check with SANDRA TECH2   [facility] ([facility])    [address]  [facility] [address]   [phone]            Jun 27, 2017 10:40 AM CDT   Return Visit with Michael Lilly MD   [facility] ([facility])    [address]  [address]  [facility] [address] [phone]            Jul 25, 2017 10:10 AM CDT   Return Visit with Michael Lilly MD   [facility] ([facility])    [address]  [address]  [facility] [address] [phone]            Aug 04, 2017  1:10 PM CDT   [facility] EP RETURN with JYOTI Kimbrough CNP   [facility] ([facility])    [address]  [facility] [address] [phone]            Aug 22, 2017 11:00 AM CDT   Return Visit with Michael Lilly MD   [facility] ([facility])    [address]  [address]  [facility] [address] [phone]              Who to contact     If you have questions or need follow up information about today's clinic visit or your schedule please contact [facility]OLOGY [facility] directly  at [phone].  Normal or non-critical lab and imaging results will be communicated to you by MyChart, letter or phone within 4 business days after the clinic has received the results. If you do not hear from us within 7 days, please contact the clinic through ColdSparkhart or phone. If you have a critical or abnormal lab result, we will notify you by phone as soon as possible.  Submit refill requests through Weimi or call your pharmacy and they will forward the refill request to us. Please allow 3 business days for your refill to be completed.          Additional Information About Your Visit        ColdSparkhart Information     Weimi gives you secure access to your electronic health record. If you see a primary care provider, you can also send messages to your care team and make appointments. If you have questions, please call your primary care clinic.  If you do not have a primary care provider, please call [phone] and they will assist you.        Care EveryWhere ID     This is your Care EveryWhere ID. This could be used by other organizations to access your [place] medical records  [id number]         Blood Pressure from Last 3 Encounters:   12/08/16 147/88   12/06/16 128/70   10/20/16 112/60    Weight from Last 3 Encounters:   05/02/17 75.1 kg (165 lb 9.6 oz)   12/08/16 74.8 kg (165 lb)   12/06/16 75.3 kg (166 lb)              We Performed the Following     INSERT BLADDER CATH, TEMP INDWELL SIMPLE (ARAMBULA) ([id number])        Primary Care Provider Office Phone # Fax #    Porfirio Terrell [phone]       [facility] [address] [place] [address]        Thank you!     Thank you for choosing [facility]OLOGY CLINIC [place]  for your care. Our goal is always to provide you with excellent care. Hearing back from our patients is one way we can continue to improve our services. Please take a few minutes to complete the written survey that you may receive  in the mail after your visit with us. Thank you!             Your Updated Medication List - Protect others around you: Learn how to safely use, store and throw away your medicines at www.disposemymeds.org.          This list is accurate as of: 5/30/17 11:19 AM.  Always use your most recent med list.                   Brand Name Dispense Instructions for use    albuterol 108 (90 BASE) MCG/ACT Inhaler    PROAIR HFA/PROVENTIL HFA/VENTOLIN HFA    1 Inhaler    Inhale 2 puffs into the lungs every 6 hours as needed for shortness of breath / dyspnea.       amLODIPine 5 MG tablet    NORVASC    30 tablet    Take 1 tablet (5 mg) by mouth daily       AVEENO ECZEMA THERAPY 1 % Crea   Generic drug:  Colloidal Oatmeal      Externally apply topically daily       cetirizine 10 MG tablet    zyrTEC     Take 10 mg by mouth daily as needed       cholecalciferol 5000 UNITS Caps      Take 1 capsule by mouth daily. Takes 5000 units in the summer and 73485 units in the winter       * ciprofloxacin 500 MG tablet    CIPRO    12 tablet    Take 1 tablet (500 mg) by mouth once as needed Take one tablet after each procedure.       * ciprofloxacin 500 MG tablet    CIPRO    3 tablet    Take 1 tablet (500 mg) by mouth every 12 hours       clobetasol 0.05 % ointment    TEMOVATE         COLON CLEANSER PO      Take 1 capsule by mouth daily Advanced Colon Care II       D-Mannose Powd      500 mg Pt takes 2 pills daily.       EYE VITAMINS Caps      I cap 2 tabs in am, 2 tabs in pm       lidocaine 4 % Crea cream    LMX4     Apply topically daily as needed       nebulizer nebulization     1    Use as directed       OMEPRAZOLE PO      Take 20 mg by mouth every morning       oxyCODONE-acetaminophen 5-325 MG per tablet    PERCOCET    30 tablet    Take 1 tablet by mouth every 4 hours as needed for pain for pain.       PARoxetine 30 MG tablet    PAXIL    15 tablet    Take 0.5 tablets (15 mg) by mouth At Bedtime       VITAMIN C PO      Take by mouth 2 times  daily 500 mg take 1 daily       * Notice:  This list has 2 medication(s) that are the same as other medications prescribed for you. Read the directions carefully, and ask your doctor or other care provider to review them with you.

## 2017-05-30 NOTE — PROGRESS NOTES
Edison Boss is a 89-year-old male with a history of prostate cancer and urinary retention. He also has severe bladder spasticity and is treated occasionally with Botox injections. He does not wish to have his PSA followed in the future  Other past medical history reviewed elsewhere  Medications: No new medications  Allergies: Cephalosporin, sulfa, Levaquin, Zithromax, Macrodantin  Review of systems: Chronic discomfort at tip of penis, no severe bladder spasms  Christianson catheter change with sterile technique, very small bladder capacity  Assessment: History of prostate cancer, urinary retention  Plan: Change Christianson monthly, Botox injections every 9-12 months p.r.n.

## 2017-05-30 NOTE — LETTER
5/30/2017       RE: Edison Boss  [address]    [facility] [address]     Dear Colleague,    Thank you for referring your patient, Edison Boss, to the [facility] [place] at [facility]. Please see a copy of my visit note below.    Edison Boss is a 89-year-old male with a history of prostate cancer and urinary retention. He also has severe bladder spasticity and is treated occasionally with Botox injections. He does not wish to have his PSA followed in the future  Other past medical history reviewed elsewhere  Medications: No new medications  Allergies: Cephalosporin, sulfa, Levaquin, Zithromax, Macrodantin  Review of systems: Chronic discomfort at tip of penis, no severe bladder spasms  Christianson catheter change with sterile technique, very small bladder capacity  Assessment: History of prostate cancer, urinary retention  Plan: Change Christianson monthly, Botox injections every 9-12 months p.r.n.    Again, thank you for allowing me to participate in the care of your patient.      Sincerely,    Michael Lilly MD

## 2017-06-15 ENCOUNTER — ALLIED HEALTH/NURSE VISIT (OUTPATIENT)
Dept: CARDIOLOGY | Facility: CLINIC | Age: 82
End: 2017-06-15
Payer: MEDICARE

## 2017-06-15 DIAGNOSIS — Z95.0 CARDIAC PACEMAKER IN SITU: Primary | ICD-10-CM

## 2017-06-15 PROCEDURE — 93293 PM PHONE R-STRIP DEVICE EVAL: CPT | Performed by: INTERNAL MEDICINE

## 2017-06-15 NOTE — MR AVS SNAPSHOT
After Visit Summary   6/15/2017    Edison Boss    MRN: [id number]           Patient Information     Date Of Birth          10/19/1924        Visit Information        Provider Department      6/15/2017 2:30 PM SANDRA TECH2 [facility]ART [facility]        Today's Diagnoses     Cardiac pacemaker in situ    -  1       Follow-ups after your visit        Your next 10 appointments already scheduled     Jun 27, 2017 10:40 AM CDT   Return Visit with Michael Lilly MD   [facility] ([facility] [place])    [address] [facility] [address]  [address]  [facility] [address]   [phone]            Jul 21, 2017  2:30 PM CDT   Phone Device Check with SANDRA TECH1   [facility] ([facility])    [address] [place] [address] [address]  [facility] [address] [phone]            Jul 25, 2017 10:10 AM CDT   Return Visit with Michael Lilly MD   [facility] ([facility] [place])    [address] [facility]  [address]  [facility] [address]   [phone]            Aug 04, 2017  1:10 PM CDT   [facility] EP RETURN with JYOTI Kimbrough CNP   [facility] ([facility])    [address]  [facility] [address] [phone]            Aug 09, 2017 10:20 AM CDT   Office Visit with Porfirio Terrell MD   [facility] ([facility])    [address] [place]  [facility] [address]   [phone]           Bring a current list of meds and any records pertaining to this visit.  For Physicals, please bring immunization records and any forms needing to be filled out.  Please arrive 10 minutes early to complete paperwork.            Aug 22, 2017 11:00 AM CDT   Return Visit with Michael Lilly MD   [facility]  ([facility] [place])    [address]  [address]  [facility] [address] [phone]              Who to contact     If you have questions or need follow up information about today's clinic visit or your schedule please contact [facility] [place] directly at [phone].  Normal or non-critical lab and imaging results will be communicated to you by MyChart, letter or phone within 4 business days after the clinic has received the results. If you do not hear from us within 7 days, please contact the clinic through MyChart or phone. If you have a critical or abnormal lab result, we will notify you by phone as soon as possible.  Submit refill requests through Beatpacking or call your pharmacy and they will forward the refill request to us. Please allow 3 business days for your refill to be completed.          Additional Information About Your Visit        YoPro Globalhart Information     Beatpacking gives you secure access to your electronic health record. If you see a primary care provider, you can also send messages to your care team and make appointments. If you have questions, please call your primary care clinic.  If you do not have a primary care provider, please call [phone] and they will assist you.        Care EveryWhere ID     This is your Care EveryWhere ID. This could be used by other organizations to access your [place] medical records  [id number]         Blood Pressure from Last 3 Encounters:   12/08/16 147/88   12/06/16 128/70   10/20/16 112/60    Weight from Last 3 Encounters:   05/02/17 75.1 kg (165 lb 9.6 oz)   12/08/16 74.8 kg (165 lb)   12/06/16 75.3 kg (166 lb)              We Performed the Following     PM PHONE R STRIP EVAL UP TO 90 DAYS ([id number])        Primary Care Provider Office Phone # Fax #    Porfirio Terrell [phone]       [facility] [address]  [facility] [address]        Thank you!     Thank you for  choosing [facility] [place]  for your care. Our goal is always to provide you with excellent care. Hearing back from our patients is one way we can continue to improve our services. Please take a few minutes to complete the written survey that you may receive in the mail after your visit with us. Thank you!             Your Updated Medication List - Protect others around you: Learn how to safely use, store and throw away your medicines at www.disposemymeds.org.          This list is accurate as of: 6/15/17  2:36 PM.  Always use your most recent med list.                   Brand Name Dispense Instructions for use    albuterol 108 (90 BASE) MCG/ACT Inhaler    PROAIR HFA/PROVENTIL HFA/VENTOLIN HFA    1 Inhaler    Inhale 2 puffs into the lungs every 6 hours as needed for shortness of breath / dyspnea.       amLODIPine 5 MG tablet    NORVASC    30 tablet    Take 1 tablet (5 mg) by mouth daily       AVEENO ECZEMA THERAPY 1 % Crea   Generic drug:  Colloidal Oatmeal      Externally apply topically daily       cetirizine 10 MG tablet    zyrTEC     Take 10 mg by mouth daily as needed       cholecalciferol 5000 UNITS Caps      Take 1 capsule by mouth daily. Takes 5000 units in the summer and 41867 units in the winter       * ciprofloxacin 500 MG tablet    CIPRO    12 tablet    Take 1 tablet (500 mg) by mouth once as needed Take one tablet after each procedure.       * ciprofloxacin 500 MG tablet    CIPRO    3 tablet    Take 1 tablet (500 mg) by mouth every 12 hours       clobetasol 0.05 % ointment    TEMOVATE         COLON CLEANSER PO      Take 1 capsule by mouth daily Advanced Colon Care II       D-Mannose Powd      500 mg Pt takes 2 pills daily.       EYE VITAMINS Caps      I cap 2 tabs in am, 2 tabs in pm       lidocaine 4 % Crea cream    LMX4     Apply topically daily as needed       nebulizer nebulization     1    Use as directed       OMEPRAZOLE PO      Take 20 mg by mouth every morning        oxyCODONE-acetaminophen 5-325 MG per tablet    PERCOCET    30 tablet    Take 1 tablet by mouth every 4 hours as needed for pain for pain.       PARoxetine 30 MG tablet    PAXIL    15 tablet    Take 0.5 tablets (15 mg) by mouth At Bedtime       VITAMIN C PO      Take by mouth 2 times daily 500 mg take 1 daily       * Notice:  This list has 2 medication(s) that are the same as other medications prescribed for you. Read the directions carefully, and ask your doctor or other care provider to review them with you.

## 2017-06-15 NOTE — NURSING NOTE
PHONE TELETRACE    Intrinsic Rhythm at time of check. Magnet response WNL.  F/U scheduled q 1 month.  Bill today

## 2017-06-27 ENCOUNTER — OFFICE VISIT (OUTPATIENT)
Dept: UROLOGY | Facility: CLINIC | Age: 82
End: 2017-06-27
Payer: MEDICARE

## 2017-06-27 DIAGNOSIS — R33.9 URINARY RETENTION: Primary | ICD-10-CM

## 2017-06-27 PROCEDURE — 99211 OFF/OP EST MAY X REQ PHY/QHP: CPT | Mod: 25 | Performed by: UROLOGY

## 2017-06-27 PROCEDURE — 51702 INSERT TEMP BLADDER CATH: CPT | Performed by: UROLOGY

## 2017-06-27 NOTE — LETTER
6/27/2017       RE: Edison Boss  [address]    [place] [address]     Dear Colleague,    Thank you for referring your patient, Edison Boss, to the [facility]OLOGY CLINIC [place] at [facility]. Please see a copy of my visit note below.    Edison Boss is a 89-year-old gentleman with a history of prostate cancer and urinary retention. He also has severe bladder spasticity. His bladder is managed with an indwelling Christianson that is changed monthly  Exam: Normal external genitalia, urine clear.  Christianson catheter change was sterile technique, 4 cc  In Christianson balloon  Past medical history: Previously reviewed, no changes  Assessment: Urinary retention, history of prostate cancer, bladder wall spasticity-will need periodic Botox injections  Plan: See me monthly for catheter change    Again, thank you for allowing me to participate in the care of your patient.      Sincerely,    Michael Lilly MD

## 2017-06-27 NOTE — PROGRESS NOTES
Edison Boss is a 89-year-old gentleman with a history of prostate cancer and urinary retention. He also has severe bladder spasticity. His bladder is managed with an indwelling Christianson that is changed monthly  Exam: Normal external genitalia, urine clear.  Christianson catheter change was sterile technique, 4 cc  In Christianson balloon  Past medical history: Previously reviewed, no changes  Assessment: Urinary retention, history of prostate cancer, bladder wall spasticity-will need periodic Botox injections  Plan: See me monthly for catheter change

## 2017-06-27 NOTE — MR AVS SNAPSHOT
After Visit Summary   6/27/2017    Edison Boss    MRN: [id number]           Patient Information     Date Of Birth          10/19/1924        Visit Information        Provider Department      6/27/2017 10:40 AM Michael Lilly MD [facility]        Today's Diagnoses     Urinary retention    -  1       Follow-ups after your visit        Follow-up notes from your care team     Return in about 4 weeks (around 7/25/2017) for tolliver.      Your next 10 appointments already scheduled     Jul 21, 2017  2:30 PM CDT   Phone Device Check with SANDRA TECH1   [facility] ([facility])    [address]  [facility] [address]   [phone]            Jul 25, 2017 10:20 AM CDT   Return Visit with Michael Lilly MD   [facility] ([facility] [place])    [address] [employer]  [address] [place] [id number] [phone]            Aug 04, 2017  1:10 PM CDT   [facility] EP RETURN with JYOTI Kimbrough CNP   [facility] ([facility])    [address]  [facility] [address]   [phone]            Aug 09, 2017 10:20 AM CDT   Office Visit with Porfirio Terrell MD   [facility] ([facility])    [address]  [facility] [address] [phone]           Bring a current list of meds and any records pertaining to this visit.  For Physicals, please bring immunization records and any forms needing to be filled out.  Please arrive 10 minutes early to complete paperwork.            Aug 22, 2017 11:00 AM CDT   Return Visit with Michael Lilly MD   [facility] ([facility] [place])    [address]  [address]  [facility] [address] [phone]              Who to contact      If you have questions or need follow up information about today's clinic visit or your schedule please contact [facility]OLOG[facility] CLINIC [place] directly at [phone].  Normal or non-critical lab and imaging results will be communicated to you by MyChart, letter or phone within 4 business days after the clinic has received the results. If you do not hear from us within 7 days, please contact the clinic through Manpackshart or phone. If you have a critical or abnormal lab result, we will notify you by phone as soon as possible.  Submit refill requests through BabyGlowz or call your pharmacy and they will forward the refill request to us. Please allow 3 business days for your refill to be completed.          Additional Information About Your Visit        [email or web address] Information     BabyGlowz gives you secure access to your electronic health record. If you see a primary care provider, you can also send messages to your care team and make appointments. If you have questions, please call your primary care clinic.  If you do not have a primary care provider, please call [phone] and they will assist you.        Care EveryWhere ID     This is your Care EveryWhere ID. This could be used by other organizations to access your [place] medical records  [id number]         Blood Pressure from Last 3 Encounters:   12/08/16 147/88   12/06/16 128/70   10/20/16 112/60    Weight from Last 3 Encounters:   05/02/17 75.1 kg (165 lb 9.6 oz)   12/08/16 74.8 kg (165 lb)   12/06/16 75.3 kg (166 lb)              We Performed the Following     INSERT BLADDER CATH, TEMP INDWELL SIMPLE (ARAMBULA) ([id number])        Primary Care Provider Office Phone # Fax #    Porfirio Terrell [phone]       [facility] [address]   [facility] [address]        Equal Access to Services     EMERITA CADE : Shellie Denson, wajeannetteda gladis, qaybta kaalmiguel chamberlain, yony ibrahim  laluis e nunn. So [facility] [phone].    ATENCIÓN: Si habla eduardo, tiene a lauren disposición servicios gratuitos de asistencia lingüística. Lesley cervantes [phone].    We comply with applicable federal civil rights laws and [place] laws. We do not discriminate on the basis of race, color, national origin, age, disability sex, sexual orientation or gender identity.            Thank you!     Thank you for choosing [facility] UROLOGY CLINIC [place]  for your care. Our goal is always to provide you with excellent care. Hearing back from our patients is one way we can continue to improve our services. Please take a few minutes to complete the written survey that you may receive in the mail after your visit with us. Thank you!             Your Updated Medication List - Protect others around you: Learn how to safely use, store and throw away your medicines at www.disposemymeds.org.          This list is accurate as of: 6/27/17 11:36 AM.  Always use your most recent med list.                   Brand Name Dispense Instructions for use Diagnosis    albuterol 108 (90 BASE) MCG/ACT Inhaler    PROAIR HFA/PROVENTIL HFA/VENTOLIN HFA    1 Inhaler    Inhale 2 puffs into the lungs every 6 hours as needed for shortness of breath / dyspnea.    Mild persistent asthma       amLODIPine 5 MG tablet    NORVASC    30 tablet    Take 1 tablet (5 mg) by mouth daily    Essential hypertension with goal blood pressure less than 140/90       AVEENO ECZEMA THERAPY 1 % Crea   Generic drug:  Colloidal Oatmeal      Externally apply topically daily        cetirizine 10 MG tablet    zyrTEC     Take 10 mg by mouth daily as needed        cholecalciferol 5000 UNITS Caps      Take 1 capsule by mouth daily. Takes 5000 units in the summer and 33566 units in the winter        * ciprofloxacin 500 MG tablet    CIPRO    12 tablet    Take 1 tablet (500 mg) by mouth once as needed Take one tablet after each procedure.    Urinary catheter (Christianson)  change required       * ciprofloxacin 500 MG tablet    CIPRO    3 tablet    Take 1 tablet (500 mg) by mouth every 12 hours    Urge incontinence       clobetasol 0.05 % ointment    TEMOVATE          COLON CLEANSER PO      Take 1 capsule by mouth daily Advanced Colon Care II        D-Mannose Powd      500 mg Pt takes 2 pills daily.        EYE VITAMINS Caps      I cap 2 tabs in am, 2 tabs in pm        lidocaine 4 % Crea cream    LMX4     Apply topically daily as needed        nebulizer nebulization     1    Use as directed    Mild persistent asthma       OMEPRAZOLE PO      Take 20 mg by mouth every morning        oxyCODONE-acetaminophen 5-325 MG per tablet    PERCOCET    30 tablet    Take 1 tablet by mouth every 4 hours as needed for pain for pain.    Cervicalgia, Acute upper back pain       PARoxetine 30 MG tablet    PAXIL    15 tablet    Take 0.5 tablets (15 mg) by mouth At Bedtime    RAMÓN (generalized anxiety disorder)       VITAMIN C PO      Take by mouth 2 times daily 500 mg take 1 daily        * Notice:  This list has 2 medication(s) that are the same as other medications prescribed for you. Read the directions carefully, and ask your doctor or other care provider to review them with you.

## 2017-07-21 ENCOUNTER — ALLIED HEALTH/NURSE VISIT (OUTPATIENT)
Dept: CARDIOLOGY | Facility: CLINIC | Age: 82
End: 2017-07-21

## 2017-07-21 DIAGNOSIS — Z95.0 CARDIAC PACEMAKER IN SITU: Primary | ICD-10-CM

## 2017-07-21 NOTE — MR AVS SNAPSHOT
After Visit Summary   7/21/2017    Edison Boss    MRN: [id number]           Patient Information     Date Of Birth          10/19/1924        Visit Information        Provider Department      7/21/2017 2:30 PM SANDRA TECH1 [facility] HEART AT [place]        Today's Diagnoses     Cardiac pacemaker in situ    -  1       Follow-ups after your visit        Your next 10 appointments already scheduled     Jul 25, 2017 10:20 AM CDT   Return Visit with Michael Lilly MD   [facility] ([facility])    [address]  [address]  [facility] [address]   [phone]            Aug 09, 2017 10:20 AM CDT   Office Visit with Porfirio Terrell MD   [facility]    [address] [facility] [id number] [phone]           Bring a current list of meds and any records pertaining to this visit.  For Physicals, please bring immunization records and any forms needing to be filled out.  Please arrive 10 minutes early to complete paperwork.            Aug 22, 2017 11:00 AM CDT   Return Visit with Michael Lilly MD   [facility] ([facility])    [address]  [address]  [facility] [id number] [phone]            Aug 30, 2017  1:00 PM CDT   Pacemaker Check with SANDRA DCR2   [facility] ([facility])    [address]  [facility] [id number] [phone]            Aug 30, 2017  1:50 PM CDT   [facility] EP RETURN with JYOTI Kimbrough CNP   [facility] ([facility])    [address]  [facility] [id number] [phone]              Who to contact     If you have questions or need follow up information about today's clinic visit or your schedule please contact  [facility] AT [place] directly at [phone].  Normal or non-critical lab and imaging results will be communicated to you by MyChart, letter or phone within 4 business days after the clinic has received the results. If you do not hear from us within 7 days, please contact the clinic through Briefcasehart or phone. If you have a critical or abnormal lab result, we will notify you by phone as soon as possible.  Submit refill requests through Message[employer]er or call your pharmacy and they will forward the refill request to us. Please allow 3 business days for your refill to be completed.          Additional Information About Your Visit        Briefcasehart Information     Message[employer] gives you secure access to your electronic health record. If you see a primary care provider, you can also send messages to your care team and make appointments. If you have questions, please call your primary care clinic.  If you do not have a primary care provider, please call [phone] and they will assist you.        Care EveryWhere ID     This is your Care EveryWhere ID. This could be used by other organizations to access your [place] medical records  [id number]         Blood Pressure from Last 3 Encounters:   12/08/16 147/88   12/06/16 128/70   10/20/16 112/60    Weight from Last 3 Encounters:   05/02/17 75.1 kg (165 lb 9.6 oz)   12/08/16 74.8 kg (165 lb)   12/06/16 75.3 kg (166 lb)              Today, you had the following     No orders found for display       Primary Care Provider Office Phone # Fax #    Porfirio Terrell [phone]       [facility] [address]  [facility] [address]        Equal Access to Services     [facility] : Hadii vanessa jennings hadashdione Soiliana, waaxda luqadaha, qaybta kaalmada yony chamberlain. So [facility] [phone].    ATENCIÓN: Si habla español, tiene a lauren disposición servicios gratuitos de asistencia lingüística. Llame al  [phone].    We comply with applicable federal civil rights laws and [place] laws. We do not discriminate on the basis of race, color, national origin, age, disability sex, sexual orientation or gender identity.            Thank you!     Thank you for choosing [facility] PHYSICIANS HEART AT [place]  for your care. Our goal is always to provide you with excellent care. Hearing back from our patients is one way we can continue to improve our services. Please take a few minutes to complete the written survey that you may receive in the mail after your visit with us. Thank you!             Your Updated Medication List - Protect others around you: Learn how to safely use, store and throw away your medicines at www.disposemymeds.org.          This list is accurate as of: 7/21/17  2:41 PM.  Always use your most recent med list.                   Brand Name Dispense Instructions for use Diagnosis    albuterol 108 (90 BASE) MCG/ACT Inhaler    PROAIR HFA/PROVENTIL HFA/VENTOLIN HFA    1 Inhaler    Inhale 2 puffs into the lungs every 6 hours as needed for shortness of breath / dyspnea.    Mild persistent asthma       amLODIPine 5 MG tablet    NORVASC    30 tablet    Take 1 tablet (5 mg) by mouth daily    Essential hypertension with goal blood pressure less than 140/90       AVEENO ECZEMA THERAPY 1 % Crea   Generic drug:  Colloidal Oatmeal      Externally apply topically daily        cetirizine 10 MG tablet    zyrTEC     Take 10 mg by mouth daily as needed        cholecalciferol 5000 UNITS Caps      Take 1 capsule by mouth daily. Takes 5000 units in the summer and 42952 units in the winter        * ciprofloxacin 500 MG tablet    CIPRO    12 tablet    Take 1 tablet (500 mg) by mouth once as needed Take one tablet after each procedure.    Urinary catheter (Christianson) change required       * ciprofloxacin 500 MG tablet    CIPRO    3 tablet    Take 1 tablet (500 mg) by mouth every 12 hours    Urge incontinence        clobetasol 0.05 % ointment    TEMOVATE          COLON CLEANSER PO      Take 1 capsule by mouth daily Advanced Colon Care II        D-Mannose Powd      500 mg Pt takes 2 pills daily.        EYE VITAMINS Caps      I cap 2 tabs in am, 2 tabs in pm        lidocaine 4 % Crea cream    LMX4     Apply topically daily as needed        nebulizer nebulization     1    Use as directed    Mild persistent asthma       OMEPRAZOLE PO      Take 20 mg by mouth every morning        oxyCODONE-acetaminophen 5-325 MG per tablet    PERCOCET    30 tablet    Take 1 tablet by mouth every 4 hours as needed for pain for pain.    Cervicalgia, Acute upper back pain       PARoxetine 30 MG tablet    PAXIL    15 tablet    Take 0.5 tablets (15 mg) by mouth At Bedtime    RAMÓN (generalized anxiety disorder)       VITAMIN C PO      Take by mouth 2 times daily 500 mg take 1 daily        * Notice:  This list has 2 medication(s) that are the same as other medications prescribed for you. Read the directions carefully, and ask your doctor or other care provider to review them with you.

## 2017-07-21 NOTE — PROGRESS NOTES
~ 30 day phone teletrace / NO CHARGE ~  AP/VS at time of check. Magnet response WNL. Annual threshold and OV scheduled in August. Constance CHINCHILLA

## 2017-07-25 ENCOUNTER — OFFICE VISIT (OUTPATIENT)
Dept: UROLOGY | Facility: CLINIC | Age: 82
End: 2017-07-25
Payer: MEDICARE

## 2017-07-25 DIAGNOSIS — R33.9 URINARY RETENTION: Primary | ICD-10-CM

## 2017-07-25 PROCEDURE — 99211 OFF/OP EST MAY X REQ PHY/QHP: CPT | Mod: 25 | Performed by: UROLOGY

## 2017-07-25 PROCEDURE — 51702 INSERT TEMP BLADDER CATH: CPT | Performed by: UROLOGY

## 2017-07-25 NOTE — MR AVS SNAPSHOT
After Visit Summary   7/25/2017    Edison Boss    MRN: [id number]           Patient Information     Date Of Birth          10/19/1924        Visit Information        Provider Department      7/25/2017 10:20 AM Michael Lilly MD [facility]        Today's Diagnoses     Urinary retention    -  1       Follow-ups after your visit        Follow-up notes from your care team     Return in about 4 weeks (around 8/22/2017) for sharee.      Your next 10 appointments already scheduled     Aug 09, 2017 10:20 AM CDT   Office Visit with Porfirio Terrell MD   [facility]    [address] [place]  [facility] [address]   [phone]           Bring a current list of meds and any records pertaining to this visit.  For Physicals, please bring immunization records and any forms needing to be filled out.  Please arrive 10 minutes early to complete paperwork.            Aug 22, 2017 11:00 AM CDT   Return Visit with Michael Lilly MD   [facility] ([facility] [place])    [address]  [address]  [facility] [id number] [phone]            Aug 30, 2017  1:00 PM CDT   Pacemaker Check with SANDRA DCR2   [facility] ([facility])    [address] [place] [address] [address]  [facility] [id number] [phone]            Aug 30, 2017  1:50 PM CDT   [facility] EP RETURN with JYOTI Kimbrough CNP   [facility] ([facility])    [address] [place] [address] [address]  [facility] [id number] [phone]              Who to contact     If you have questions or need follow up information about today's clinic visit or your schedule please contact [facility] directly at [phone].  Normal or non-critical lab and imaging results will be  communicated to you by Sec[employer] Technologieshart, letter or phone within 4 business days after the clinic has received the results. If you do not hear from us within 7 days, please contact the clinic through [employer] or phone. If you have a critical or abnormal lab result, we will notify you by phone as soon as possible.  Submit refill requests through [employer] or call your pharmacy and they will forward the refill request to us. Please allow 3 business days for your refill to be completed.          Additional Information About Your Visit        [employer] Information     [employer]oho gives you secure access to your electronic health record. If you see a primary care provider, you can also send messages to your care team and make appointments. If you have questions, please call your primary care clinic.  If you do not have a primary care provider, please call [phone] and they will assist you.        Care EveryWhere ID     This is your Care EveryWhere ID. This could be used by other organizations to access your [place] medical records  [id number]         Blood Pressure from Last 3 Encounters:   12/08/16 147/88   12/06/16 128/70   10/20/16 112/60    Weight from Last 3 Encounters:   05/02/17 75.1 kg (165 lb 9.6 oz)   12/08/16 74.8 kg (165 lb)   12/06/16 75.3 kg (166 lb)              We Performed the Following     INSERT BLADDER CATH, TEMP INDWELL SIMPLE (ARAMBULA) ([id number])        Primary Care Provider Office Phone # Fax #    Porfirio Terrell [phone]       [facility] [address]  [facility] [address]        Equal Access to Services     [facility]: Hadii aad ku hadasho Soomaali, waaxda luqadaha, qaybta kaalmada adeegyakan, yony mcclure . So [facility] [phone].    ATENCIÓN: Si habla español, tiene a lauren disposición servicios gratuitos de asistencia lingüística. Llame al [phone].    We comply with applicable federal civil rights laws and [place] laws. We do not  discriminate on the basis of race, color, national origin, age, disability sex, sexual orientation or gender identity.            Thank you!     Thank you for choosing [facility] [place]  for your care. Our goal is always to provide you with excellent care. Hearing back from our patients is one way we can continue to improve our services. Please take a few minutes to complete the written survey that you may receive in the mail after your visit with us. Thank you!             Your Updated Medication List - Protect others around you: Learn how to safely use, store and throw away your medicines at www.disposemymeds.org.          This list is accurate as of: 7/25/17 10:58 AM.  Always use your most recent med list.                   Brand Name Dispense Instructions for use Diagnosis    albuterol 108 (90 BASE) MCG/ACT Inhaler    PROAIR HFA/PROVENTIL HFA/VENTOLIN HFA    1 Inhaler    Inhale 2 puffs into the lungs every 6 hours as needed for shortness of breath / dyspnea.    Mild persistent asthma       amLODIPine 5 MG tablet    NORVASC    30 tablet    Take 1 tablet (5 mg) by mouth daily    Essential hypertension with goal blood pressure less than 140/90       AVEENO ECZEMA THERAPY 1 % Crea   Generic drug:  Colloidal Oatmeal      Externally apply topically daily        cetirizine 10 MG tablet    zyrTEC     Take 10 mg by mouth daily as needed        cholecalciferol 5000 UNITS Caps      Take 1 capsule by mouth daily. Takes 5000 units in the summer and 96642 units in the winter        * ciprofloxacin 500 MG tablet    CIPRO    12 tablet    Take 1 tablet (500 mg) by mouth once as needed Take one tablet after each procedure.    Urinary catheter (Christianson) change required       * ciprofloxacin 500 MG tablet    CIPRO    3 tablet    Take 1 tablet (500 mg) by mouth every 12 hours    Urge incontinence       clobetasol 0.05 % ointment    TEMOVATE          COLON CLEANSER PO      Take 1 capsule by mouth  daily Advanced Colon Care II        D-Mannose Powd      500 mg Pt takes 2 pills daily.        EYE VITAMINS Caps      I cap 2 tabs in am, 2 tabs in pm        lidocaine 4 % Crea cream    LMX4     Apply topically daily as needed        nebulizer nebulization     1    Use as directed    Mild persistent asthma       OMEPRAZOLE PO      Take 20 mg by mouth every morning        oxyCODONE-acetaminophen 5-325 MG per tablet    PERCOCET    30 tablet    Take 1 tablet by mouth every 4 hours as needed for pain for pain.    Cervicalgia, Acute upper back pain       PARoxetine 30 MG tablet    PAXIL    15 tablet    Take 0.5 tablets (15 mg) by mouth At Bedtime    RAMÓN (generalized anxiety disorder)       VITAMIN C PO      Take by mouth 2 times daily 500 mg take 1 daily        * Notice:  This list has 2 medication(s) that are the same as other medications prescribed for you. Read the directions carefully, and ask your doctor or other care provider to review them with you.

## 2017-07-25 NOTE — PROGRESS NOTES
Edison is a 89-year-old gentleman with a history of prostate cancer treated with radiation many years ago. He has an indwelling Christianson catheter that is changed monthly because of urinary retention. He also has severe spastic bladder disease that occasionally requires Botox injections.  Past medical history: Arthritis, atrial fibrillation, GERD, hyperlipidemia, hypertension, asthma, osteoporosis, pacemaker, sinus node dysfunction, sleep apnea, TIA, former smoker  Medications: Long list reviewed  Allergies: Reviewed  Exam: Normal external genitalia. Christianson catheter replaced with sterile technique and irrigates clear.  Assessment: History of prostate cancer, urinary retention, severe bladder spasticity  Plan: Christianson change every month. No future PSA testing, Botox injections as needed

## 2017-08-08 DIAGNOSIS — F41.1 GAD (GENERALIZED ANXIETY DISORDER): ICD-10-CM

## 2017-08-08 NOTE — TELEPHONE ENCOUNTER
PAXIL     Last Written Prescription Date: 05/16/17  Last Fill Quantity: 15, # refills: 2  Last Office Visit with [facility] primary care provider:  12/06/16   Next 5 appointments (look out 90 days)     Aug 10, 2017 10:20 AM CDT   PHYSICAL with Porfirio Terrell MD   [facility]    [address] [place]  [facility] [address]   [phone]                   Last PHQ-9 score on record=   PHQ-9 SCORE 11/23/2016   Total Score -   Total Score 8

## 2017-08-09 RX ORDER — PAROXETINE 30 MG/1
TABLET, FILM COATED ORAL
Qty: 15 TABLET | Refills: 0 | Status: SHIPPED | OUTPATIENT
Start: 2017-08-09 | End: 2017-08-10

## 2017-08-10 ENCOUNTER — MYC MEDICAL ADVICE (OUTPATIENT)
Dept: INTERNAL MEDICINE | Facility: CLINIC | Age: 82
End: 2017-08-10

## 2017-08-10 ENCOUNTER — OFFICE VISIT (OUTPATIENT)
Dept: INTERNAL MEDICINE | Facility: CLINIC | Age: 82
End: 2017-08-10
Payer: MEDICARE

## 2017-08-10 VITALS
TEMPERATURE: 97.8 F | DIASTOLIC BLOOD PRESSURE: 70 MMHG | HEIGHT: 70 IN | BODY MASS INDEX: 25.08 KG/M2 | HEART RATE: 60 BPM | WEIGHT: 175.2 LBS | OXYGEN SATURATION: 92 % | SYSTOLIC BLOOD PRESSURE: 130 MMHG

## 2017-08-10 DIAGNOSIS — M54.9 ACUTE UPPER BACK PAIN: ICD-10-CM

## 2017-08-10 DIAGNOSIS — E55.9 VITAMIN D DEFICIENCY: ICD-10-CM

## 2017-08-10 DIAGNOSIS — I10 ESSENTIAL HYPERTENSION WITH GOAL BLOOD PRESSURE LESS THAN 140/90: Primary | ICD-10-CM

## 2017-08-10 DIAGNOSIS — I48.20 CHRONIC ATRIAL FIBRILLATION (H): ICD-10-CM

## 2017-08-10 DIAGNOSIS — E78.5 HYPERLIPIDEMIA LDL GOAL <100: ICD-10-CM

## 2017-08-10 DIAGNOSIS — M54.2 CERVICALGIA: ICD-10-CM

## 2017-08-10 DIAGNOSIS — G31.84 MCI (MILD COGNITIVE IMPAIRMENT): ICD-10-CM

## 2017-08-10 DIAGNOSIS — F41.1 GAD (GENERALIZED ANXIETY DISORDER): ICD-10-CM

## 2017-08-10 DIAGNOSIS — K21.9 GASTROESOPHAGEAL REFLUX DISEASE WITHOUT ESOPHAGITIS: ICD-10-CM

## 2017-08-10 LAB
ALBUMIN SERPL-MCNC: 3.9 G/DL (ref 3.4–5)
ALP SERPL-CCNC: 78 U/L (ref 40–150)
ALT SERPL W P-5'-P-CCNC: 18 U/L (ref 0–70)
ANION GAP SERPL CALCULATED.3IONS-SCNC: 4 MMOL/L (ref 3–14)
AST SERPL W P-5'-P-CCNC: 16 U/L (ref 0–45)
BILIRUB SERPL-MCNC: 0.6 MG/DL (ref 0.2–1.3)
BUN SERPL-MCNC: 18 MG/DL (ref 7–30)
CALCIUM SERPL-MCNC: 9.5 MG/DL (ref 8.5–10.1)
CHLORIDE SERPL-SCNC: 107 MMOL/L (ref 94–109)
CHOLEST SERPL-MCNC: 199 MG/DL
CO2 SERPL-SCNC: 32 MMOL/L (ref 20–32)
CREAT SERPL-MCNC: 1.07 MG/DL (ref 0.66–1.25)
ERYTHROCYTE [DISTWIDTH] IN BLOOD BY AUTOMATED COUNT: 16.4 % (ref 10–15)
GFR SERPL CREATININE-BSD FRML MDRD: 65 ML/MIN/1.7M2
GLUCOSE SERPL-MCNC: 79 MG/DL (ref 70–99)
HCT VFR BLD AUTO: 44.3 % (ref 40–53)
HDLC SERPL-MCNC: 40 MG/DL
HGB BLD-MCNC: 14.4 G/DL (ref 13.3–17.7)
LDLC SERPL CALC-MCNC: 138 MG/DL
MCH RBC QN AUTO: 29.4 PG (ref 26.5–33)
MCHC RBC AUTO-ENTMCNC: 32.5 G/DL (ref 31.5–36.5)
MCV RBC AUTO: 90 FL (ref 78–100)
NONHDLC SERPL-MCNC: 159 MG/DL
PLATELET # BLD AUTO: 234 10E9/L (ref 150–450)
POTASSIUM SERPL-SCNC: 4.6 MMOL/L (ref 3.4–5.3)
PROT SERPL-MCNC: 7.6 G/DL (ref 6.8–8.8)
RBC # BLD AUTO: 4.9 10E12/L (ref 4.4–5.9)
SODIUM SERPL-SCNC: 143 MMOL/L (ref 133–144)
TRIGL SERPL-MCNC: 104 MG/DL
TSH SERPL DL<=0.005 MIU/L-ACNC: 3.74 MU/L (ref 0.4–4)
WBC # BLD AUTO: 8.3 10E9/L (ref 4–11)

## 2017-08-10 PROCEDURE — 85027 COMPLETE CBC AUTOMATED: CPT | Performed by: INTERNAL MEDICINE

## 2017-08-10 PROCEDURE — 84443 ASSAY THYROID STIM HORMONE: CPT | Performed by: INTERNAL MEDICINE

## 2017-08-10 PROCEDURE — 99214 OFFICE O/P EST MOD 30 MIN: CPT | Performed by: INTERNAL MEDICINE

## 2017-08-10 PROCEDURE — 36415 COLL VENOUS BLD VENIPUNCTURE: CPT | Performed by: INTERNAL MEDICINE

## 2017-08-10 PROCEDURE — 80061 LIPID PANEL: CPT | Performed by: INTERNAL MEDICINE

## 2017-08-10 PROCEDURE — 82306 VITAMIN D 25 HYDROXY: CPT | Performed by: INTERNAL MEDICINE

## 2017-08-10 PROCEDURE — 80053 COMPREHEN METABOLIC PANEL: CPT | Performed by: INTERNAL MEDICINE

## 2017-08-10 RX ORDER — AMLODIPINE BESYLATE 5 MG/1
5 TABLET ORAL DAILY
Qty: 90 TABLET | Refills: 3 | Status: SHIPPED | OUTPATIENT
Start: 2017-08-10 | End: 2018-01-01

## 2017-08-10 RX ORDER — PAROXETINE 30 MG/1
TABLET, FILM COATED ORAL
Qty: 45 TABLET | Refills: 3 | Status: SHIPPED | OUTPATIENT
Start: 2017-08-10 | End: 2018-01-01

## 2017-08-10 RX ORDER — OXYCODONE AND ACETAMINOPHEN 5; 325 MG/1; MG/1
1 TABLET ORAL EVERY 4 HOURS PRN
Qty: 30 TABLET | Refills: 0 | Status: ON HOLD | OUTPATIENT
Start: 2017-08-10 | End: 2018-01-01

## 2017-08-10 NOTE — PATIENT INSTRUCTIONS
Refills of needed meds sent to your pharmacy (except written prescriptions provided for Oxycodone and Omeprazole).    Stop by [address] for labs--I'll get back to you soon with results.     See me in 6-12 months, sooner if any problems at all.

## 2017-08-10 NOTE — NURSING NOTE
"Chief Complaint   Patient presents with     RECHECK       Initial /70  Pulse 60  Temp 97.8  F (36.6  C) (Oral)  Ht 5' 9.5\" (1.765 m)  Wt 175 lb 3.2 oz (79.5 kg)  SpO2 92%  BMI 25.5 kg/m2 Estimated body mass index is 25.5 kg/(m^2) as calculated from the following:    Height as of this encounter: 5' 9.5\" (1.765 m).    Weight as of this encounter: 175 lb 3.2 oz (79.5 kg).  Medication Reconciliation: complete    "

## 2017-08-10 NOTE — PROGRESS NOTES
SUBJECTIVE:                                                    Edison Boss is a 89 year old male who presents to clinic today for the following health issues:  Hypertension, hyperlipidemia, chronic suprapubic pain from bladder spasm, urinary retention, chronic neck and upper back pain, anxiety/depression, GERD, Mild cognitive impairment.    Hyperlipidemia Follow-Up      Rate your low fat/cholesterol diet?: good    Taking statin?  No    Other lipid medications/supplements?:  none    Hypertension Follow-up      Outpatient blood pressures are not being checked.    Low Salt Diet: not monitoring salt      Amount of exercise or physical activity: 6-7 days/week for an average of 15-30 minutes    Problems taking medications regularly: No    Medication side effects: none    Diet: regular (no restrictions)      Urinary retention  He has an indwelling Christianson catheter that is changed monthly due to urinary retention. Additionally, he requires occasional Botox injection for severe spastic bladder disease. Takes Percocet in advance of catheter change and with increasing bladder spasms prior to yearly Botox injections of bladder. Percocet was filled in February 2016, and patient notes he has a couple of pills left yet. He reports experiencing bladder spasms and dysuria for one month. He denies dysuria.    RAMÓN  He notes anxiety is stable. He takes 15 mg paxil at bed time, and is tolerating it well. He was not switched to Celexa by [facility] as he has experienced hallucinations in the past with medication changes. He has not followed up with gerontology recently.     GERD   Controlled with omeprazole. Symptoms recur if he discontinues omeprazole.  He denies dysphagia or odynophagia.    Past/recent records reviewed and discussed for:  -History of small bowel obstruction   -Macular degeneration, no recent changes     Problem list and histories reviewed & adjusted, as indicated.  Additional history: as  "documented    Reviewed and updated as needed this visit by clinical staffTobacco  Allergies  Meds  Problems       Reviewed and updated as needed this visit by Provider         ROS:  No dyspnea or cough. No chest discomfort, dizziness or palpitations. No diarrhea, abdominal pain or rectal bleeding.   No acute problems with vision or speech, lateralizing weakness or paresthesias.    ROS: as above or negative for Respiratory, CV, GI, , endocrine, psych, neuro systems.      This document serves as a record of the services and decisions personally performed and made by Porfirio Terrell MD. It was created on his behalf by Shanell Vázquez, a trained medical scribe. The creation of this document is based on the provider's statements to the medical scribe.  Shanell Vázquez August 10, 2017 11:17 AM       OBJECTIVE:   /70  Pulse 60  Temp 97.8  F (36.6  C) (Oral)  Ht 1.765 m (5' 9.5\")  Wt 79.5 kg (175 lb 3.2 oz)  SpO2 92%  BMI 25.5 kg/m2  Body mass index is 25.5 kg/(m^2).  GENERAL: healthy, alert and no distress  NECK: no adenopathy, no asymmetry, masses, or scars and thyroid normal to palpation  RESP: lungs clear to auscultation - no rales, rhonchi or wheezes  CV: regular rate and rhythm, normal S1 S2, no S3 or S4, no murmur, click or rub, no peripheral edema and peripheral pulses strong  ABDOMEN: soft, nontender, no hepatosplenomegaly, no masses and bowel sounds normal  MS: no gross musculoskeletal defects noted, no edema  NEURO: Normal strength and tone, mentation intact and speech normal  PSYCH: mentation appears normal, affect normal/bright    Diagnostic Test Results:  none     ASSESSMENT/PLAN:   (I10) Essential hypertension with goal blood pressure less than 140/90  (primary encounter diagnosis)  Comment: BP at target. Continue current meds.   Plan: amLODIPine (NORVASC) 5 MG tablet        Follow up in 6-12 months     (M54.2) Cervicalgia  (M54.9) Acute upper back pain  Comment: Patient has been using " percocet sparingly. Refill provided.   Plan: oxyCODONE-acetaminophen (PERCOCET) 5-325 MG per        tablet          (F41.1) RAMÓN (generalized anxiety disorder)  Comment: Improved. Continue current meds   Plan: PARoxetine (PAXIL) 30 MG tablet          (K21.9) Gastroesophageal reflux disease without esophagitis  Comment: Controlled with omeprazole. Continue current meds   Plan: omeprazole (PRILOSEC) 20 MG CR capsule          (E55.9) Vitamin D deficiency  Comment: Takes daily supplement.   Plan: Vitamin D Deficiency          (E78.5) Hyperlipidemia LDL goal <100  Comment: Updating lipids. Continue current meds.   Plan: Comprehensive metabolic panel, Lipid panel         reflex to direct LDL          (I48.2) Chronic atrial fibrillation (H)  Comment: Patient has not used chronic oral anticoagulation. Follows regularly with cardiology. Check TSH.   Plan: TSH with free T4 reflex          (G31.84) MCI (mild cognitive impairment)  Comment: F[facility]u with [facility] as they advise..   Plan: CBC with platelets              Patient Instructions   Refills of needed meds sent to your pharmacy (except written prescriptions provided for Oxycodone and Omeprazole).    Stop by [address] for labs--I'll get back to you soon with results.     See me in 6-12 months, sooner if any problems at all.       The information in this document, created by the medical scribe for me, accurately reflects the services I personally performed and the decisions made by me. I have reviewed and approved this document for accuracy prior to leaving the patient care area.  August 10, 2017 10:52 AM    Porfirio Terrell MD  [facility]

## 2017-08-10 NOTE — MR AVS SNAPSHOT
After Visit Summary   8/10/2017    Edison Boss    MRN: [id number]           Patient Information     Date Of Birth          10/19/1924        Visit Information        Provider Department      8/10/2017 10:20 AM Porfirio Terrell MD [facility]        Today's Diagnoses     Essential hypertension with goal blood pressure less than 140/90    -  1    Cervicalgia        Acute upper back pain        RAMÓN (generalized anxiety disorder)        Gastroesophageal reflux disease without esophagitis        Vitamin D deficiency        Hyperlipidemia LDL goal <100        Chronic atrial fibrillation (H)        MCI (mild cognitive impairment)          Care Instructions    Refills of needed meds sent to your pharmacy (except written prescriptions provided for Oxycodone and Omeprazole).    Stop by [address] for labs--I'll get back to you soon with results.     See me in 6-12 months, sooner if any problems at all.           Follow-ups after your visit        Your next 10 appointments already scheduled     Aug 22, 2017 11:00 AM CDT   Return Visit with Michael Lilly MD   [facility] ([facility])    [address] [facility]  [address]  [facility] [id number] [phone]            Aug 30, 2017  1:00 PM CDT   Pacemaker Check with SANDRA DCR2   [facility] [place] ([facility])    [address]  [facility] [id number] [phone]            Aug 30, 2017  1:50 PM CDT   [facility] EP RETURN with JYOTI Kimbrough CNP   [facility] [place] ([facility])    [address]  [facility] [id number] [phone]              Who to contact     If you have questions or need follow up information about today's clinic visit or your schedule please contact [facility] directly at [phone].  Normal or non-critical lab and  "imaging results will be communicated to you by MyChart, letter or phone within 4 business days after the clinic has received the results. If you do not hear from us within 7 days, please contact the clinic through [employer] or phone. If you have a critical or abnormal lab result, we will notify you by phone as soon as possible.  Submit refill requests through [employer] or call your pharmacy and they will forward the refill request to us. Please allow 3 business days for your refill to be completed.          Additional Information About Your Visit        [email or web address] Information     [employer] gives you secure access to your electronic health record. If you see a primary care provider, you can also send messages to your care team and make appointments. If you have questions, please call your primary care clinic.  If you do not have a primary care provider, please call [phone] and they will assist you.        Care EveryWhere ID     This is your Care EveryWhere ID. This could be used by other organizations to access your [facility] medical records  [id number]        Your Vitals Were     Pulse Temperature Height Pulse Oximetry BMI (Body Mass Index)       60 97.8  F (36.6  C) (Oral) 5' 9.5\" (1.765 m) 92% 25.5 kg/m2        Blood Pressure from Last 3 Encounters:   08/10/17 130/70   12/08/16 147/88   12/06/16 128/70    Weight from Last 3 Encounters:   08/10/17 175 lb 3.2 oz (79.5 kg)   05/02/17 165 lb 9.6 oz (75.1 kg)   12/08/16 165 lb (74.8 kg)              We Performed the Following     CBC with platelets     Comprehensive metabolic panel     Lipid panel reflex to direct LDL     TSH with free T4 reflex     Vitamin D Deficiency          Today's Medication Changes          These changes are accurate as of: 8/10/17 11:19 AM.  If you have any questions, ask your nurse or doctor.               These medicines have changed or have updated prescriptions.        Dose/Directions    * OMEPRAZOLE PO   This may have changed:  Another " medication with the same name was added. Make sure you understand how and when to take each.        Dose:  20 mg   Take 20 mg by mouth every morning   Refills:  0       * omeprazole 20 MG CR capsule   Commonly known as:  priLOSEC   This may have changed:  You were already taking a medication with the same name, and this prescription was added. Make sure you understand how and when to take each.   Used for:  Gastroesophageal reflux disease without esophagitis   Changed by:  Porfirio Terrell MD        Dose:  20 mg   Take 1 capsule (20 mg) by mouth daily   Quantity:  90 capsule   Refills:  3       PARoxetine 30 MG tablet   Commonly known as:  PAXIL   This may have changed:  See the new instructions.   Used for:  RAMÓN (generalized anxiety disorder)   Changed by:  Porfirio Terrell MD        TAKE 1/2 TABLET BY MOUTH AT BEDTIME   Quantity:  45 tablet   Refills:  3       * Notice:  This list has 2 medication(s) that are the same as other medications prescribed for you. Read the directions carefully, and ask your doctor or other care provider to review them with you.         Where to get your medicines      [facility] [address] [place] [address]  AT [address] [facility] [address]     Phone:  [phone]     amLODIPine 5 MG tablet    PARoxetine 30 MG tablet         Some of these will need a paper prescription and others can be bought over the counter.  Ask your nurse if you have questions.     Bring a paper prescription for each of these medications     omeprazole 20 MG CR capsule    oxyCODONE-acetaminophen 5-325 MG per tablet                Primary Care Provider Office Phone # Fax #    Porfirio Terrell [phone]       [address]  [facility] [address]        Equal Access to Services     EMERITA CADE AH: Shellie Denson, darek griffith, yony gilliam  yelenaemma nicolasmarek la'aan ah. So [facility] [phone].    ATENCIÓN: Si libbyla eduardo, tiene a lauren disposición servicios gratuitos de asistencia lingüística. Lesley cervantes [phone].    We comply with applicable federal civil rights laws and [place] laws. We do not discriminate on the basis of race, color, national origin, age, disability sex, sexual orientation or gender identity.            Thank you!     Thank you for choosing [facility]  for your care. Our goal is always to provide you with excellent care. Hearing back from our patients is one way we can continue to improve our services. Please take a few minutes to complete the written survey that you may receive in the mail after your visit with us. Thank you!             Your Updated Medication List - Protect others around you: Learn how to safely use, store and throw away your medicines at www.disposemymeds.org.          This list is accurate as of: 8/10/17 11:20 AM.  Always use your most recent med list.                   Brand Name Dispense Instructions for use Diagnosis    albuterol 108 (90 BASE) MCG/ACT Inhaler    PROAIR HFA/PROVENTIL HFA/VENTOLIN HFA    1 Inhaler    Inhale 2 puffs into the lungs every 6 hours as needed for shortness of breath / dyspnea.    Mild persistent asthma       amLODIPine 5 MG tablet    NORVASC    90 tablet    Take 1 tablet (5 mg) by mouth daily    Essential hypertension with goal blood pressure less than 140/90       AVEENO ECZEMA THERAPY 1 % Crea   Generic drug:  Colloidal Oatmeal      Externally apply topically daily        cetirizine 10 MG tablet    zyrTEC     Take 10 mg by mouth daily as needed        cholecalciferol 5000 UNITS Caps      Take 1 capsule by mouth daily. Takes 5000 units in the summer and 34438 units in the winter        CIPROFLOXACIN PO      Take 500 mg by mouth 1 tablet by mouth once as needed. Take one tablet after each procedere        clobetasol 0.05 % ointment    TEMOVATE          COLON CLEANSER PO       Take 1 capsule by mouth daily Advanced Colon Care II        D-Mannose Powd      500 mg Pt takes 2 pills daily.        EYE VITAMINS Caps      I cap 2 tabs in am, 2 tabs in pm        lidocaine 4 % Crea cream    LMX4     Apply topically daily as needed        nebulizer nebulization     1    Use as directed    Mild persistent asthma       * OMEPRAZOLE PO      Take 20 mg by mouth every morning        * omeprazole 20 MG CR capsule    priLOSEC    90 capsule    Take 1 capsule (20 mg) by mouth daily    Gastroesophageal reflux disease without esophagitis       oxyCODONE-acetaminophen 5-325 MG per tablet    PERCOCET    30 tablet    Take 1 tablet by mouth every 4 hours as needed for pain for pain.    Cervicalgia, Acute upper back pain       PARoxetine 30 MG tablet    PAXIL    45 tablet    TAKE 1/2 TABLET BY MOUTH AT BEDTIME    RAMÓN (generalized anxiety disorder)       VITAMIN C PO      Take by mouth 2 times daily 500 mg take 1 daily        * Notice:  This list has 2 medication(s) that are the same as other medications prescribed for you. Read the directions carefully, and ask your doctor or other care provider to review them with you.

## 2017-08-11 LAB — DEPRECATED CALCIDIOL+CALCIFEROL SERPL-MC: 76 UG/L (ref 20–75)

## 2017-08-20 ENCOUNTER — MYC MEDICAL ADVICE (OUTPATIENT)
Dept: INTERNAL MEDICINE | Facility: CLINIC | Age: 82
End: 2017-08-20

## 2017-08-22 ENCOUNTER — OFFICE VISIT (OUTPATIENT)
Dept: UROLOGY | Facility: CLINIC | Age: 82
End: 2017-08-22
Payer: MEDICARE

## 2017-08-22 DIAGNOSIS — R33.9 URINARY RETENTION: Primary | ICD-10-CM

## 2017-08-22 PROCEDURE — 99212 OFFICE O/P EST SF 10 MIN: CPT | Mod: 25 | Performed by: UROLOGY

## 2017-08-22 PROCEDURE — 51702 INSERT TEMP BLADDER CATH: CPT | Performed by: UROLOGY

## 2017-08-22 NOTE — MR AVS SNAPSHOT
After Visit Summary   8/22/2017    Edison Boss    MRN: [id number]           Patient Information     Date Of Birth          10/19/1924        Visit Information        Provider Department      8/22/2017 11:10 AM Michael Lilly MD [facility]        Today's Diagnoses     Urinary retention    -  1       Follow-ups after your visit        Your next 10 appointments already scheduled     Aug 30, 2017  1:00 PM CDT   Pacemaker Check with SANDRA DCR2   [facility] [place] ([facility])    [address]  [facility] [address]   [phone]            Aug 30, 2017  1:50 PM CDT   [facility] EP RETURN with JYOTI Kimbrough CNP   [facility] [place] ([facility])    [address]  [facility] [address] [phone]            Sep 19, 2017 11:10 AM CDT   Return Visit with Michael Lilly MD   [facility] ([facility])    [address]  [address]  [facility] [id number] [phone]            Oct 19, 2017 11:00 AM CDT   Return Visit with Michael Lilly MD   [facility] ([facility])    [address]  [address] [place] [id number] [phone]            Nov 16, 2017 11:00 AM CST   Return Visit with Michael Lilly MD   [facility] ([facility])    [address]  [address] [place] [id number] [phone]              Who to contact     If you have questions or need follow up information about today's clinic visit or your schedule please contact [facility] directly at [phone].  Normal or non-critical lab and imaging results will be communicated to you by MyChart, letter  or phone within 4 business days after the clinic has received the results. If you do not hear from us within 7 days, please contact the clinic through [employer] or phone. If you have a critical or abnormal lab result, we will notify you by phone as soon as possible.  Submit refill requests through [employer] or call your pharmacy and they will forward the refill request to us. Please allow 3 business days for your refill to be completed.          Additional Information About Your Visit        [employer] Information     [employer] gives you secure access to your electronic health record. If you see a primary care provider, you can also send messages to your care team and make appointments. If you have questions, please call your primary care clinic.  If you do not have a primary care provider, please call [phone] and they will assist you.        Care EveryWhere ID     This is your Care EveryWhere ID. This could be used by other organizations to access your [facility] medical records  [id number]         Blood Pressure from Last 3 Encounters:   08/10/17 130/70   12/08/16 147/88   12/06/16 128/70    Weight from Last 3 Encounters:   08/10/17 79.5 kg (175 lb 3.2 oz)   05/02/17 75.1 kg (165 lb 9.6 oz)   12/08/16 74.8 kg (165 lb)              We Performed the Following     INSERT BLADDER CATH, TEMP INDWELL SIMPLE (ARAMBULA) ([id number])        Primary Care Provider Office Phone # Fax #    Porfirio Terrell [phone]       [address] [facility] [address]  [facility] [address]        Equal Access to Services     [facility]: Hadii aad ku hadasho Soomaali, waaxda luqadaha, qaybta kaalmada adeegyada, waxay ofeliain hayjazmyn mcclure . So [facility] [phone].    ATENCIÓN: Si habla español, tiene a lauren disposición servicios gratuitos de asistencia lingüística. Llame al [phone].    We comply with applicable federal civil rights laws and [place] laws. We do not discriminate on the basis of race, color, national origin, age,  disability sex, sexual orientation or gender identity.            Thank you!     Thank you for choosing [facility] [place]  for your care. Our goal is always to provide you with excellent care. Hearing back from our patients is one way we can continue to improve our services. Please take a few minutes to complete the written survey that you may receive in the mail after your visit with us. Thank you!             Your Updated Medication List - Protect others around you: Learn how to safely use, store and throw away your medicines at www.disposemymeds.org.          This list is accurate as of: 8/22/17 11:45 AM.  Always use your most recent med list.                   Brand Name Dispense Instructions for use Diagnosis    albuterol 108 (90 BASE) MCG/ACT Inhaler    PROAIR HFA/PROVENTIL HFA/VENTOLIN HFA    1 Inhaler    Inhale 2 puffs into the lungs every 6 hours as needed for shortness of breath / dyspnea.    Mild persistent asthma       amLODIPine 5 MG tablet    NORVASC    90 tablet    Take 1 tablet (5 mg) by mouth daily    Essential hypertension with goal blood pressure less than 140/90       AVEENO ECZEMA THERAPY 1 % Crea   Generic drug:  Colloidal Oatmeal      Externally apply topically daily        cetirizine 10 MG tablet    zyrTEC     Take 10 mg by mouth daily as needed        cholecalciferol 5000 UNITS Caps      Take 1 capsule by mouth daily. Takes 5000 units in the summer and [address]231 units in the winter        CIPROFLOXACIN PO      Take 500 mg by mouth 1 tablet by mouth once as needed. Take one tablet after each procedere        clobetasol 0.05 % ointment    TEMOVATE          COLON CLEANSER PO      Take 1 capsule by mouth daily Advanced Colon Care II        D-Mannose Powd      500 mg Pt takes 2 pills daily.        EYE VITAMINS Caps      I cap 2 tabs in am, 2 tabs in pm        lidocaine 4 % Crea cream    LMX4     Apply topically daily as needed        nebulizer nebulization     1     Use as directed    Mild persistent asthma       * OMEPRAZOLE PO      Take 20 mg by mouth every morning        * omeprazole 20 MG CR capsule    priLOSEC    90 capsule    Take 1 capsule (20 mg) by mouth daily    Gastroesophageal reflux disease without esophagitis       oxyCODONE-acetaminophen 5-325 MG per tablet    PERCOCET    30 tablet    Take 1 tablet by mouth every 4 hours as needed for pain for pain.    Cervicalgia, Acute upper back pain       PARoxetine 30 MG tablet    PAXIL    45 tablet    TAKE 1/2 TABLET BY MOUTH AT BEDTIME    RAMÓN (generalized anxiety disorder)       VITAMIN C PO      Take by mouth 2 times daily 500 mg take 1 daily        * Notice:  This list has 2 medication(s) that are the same as other medications prescribed for you. Read the directions carefully, and ask your doctor or other care provider to review them with you.

## 2017-08-22 NOTE — LETTER
8/22/2017       RE: Edison Boss  [address]    [facility] [address]     Dear Colleague,    Thank you for referring your patient, Edison Boss, to the [facility] [place] [facility]. Please see a copy of my visit note below.    Edison Boss is a pleasant 89-year-old male with a history of prostate cancer and an indwelling Christianson because of urinary retention. He has spasticity of the bladder that occasionally requires Botox injections. This past month he's had more discomfort in the right groin area.  Other past medical history, medications and allergies reviewed  Exam: Normal appearance, alert and oriented, normocephalic. Christianson catheter change with sterile technique. 4-1/2 cc in Christianson balloon and Christianson irrigated clear. Right inguinal area reveals no hernia or adenopathy. No tenderness today  Review of systems: No hematuria, bowel movements usually daily  Assessment: Urinary retention, prostate cancer. No evidence for right inguinal hernia or right inguinal adenopathy  Plan: See me monthly for catheter change. No future PSA testing. Botox p.r.n.    Again, thank you for allowing me to participate in the care of your patient.      Sincerely,    Michael Lilly MD

## 2017-08-22 NOTE — PROGRESS NOTES
Edison Boss is a pleasant 89-year-old male with a history of prostate cancer and an indwelling Christianson because of urinary retention. He has spasticity of the bladder that occasionally requires Botox injections. This past month he's had more discomfort in the right groin area.  Other past medical history, medications and allergies reviewed  Exam: Normal appearance, alert and oriented, normocephalic. Christianson catheter change with sterile technique. 4-1/2 cc in Christianson balloon and Christianson irrigated clear. Right inguinal area reveals no hernia or adenopathy. No tenderness today  Review of systems: No hematuria, bowel movements usually daily  Assessment: Urinary retention, prostate cancer. No evidence for right inguinal hernia or right inguinal adenopathy  Plan: See me monthly for catheter change. No future PSA testing. Botox p.r.n.

## 2017-08-23 NOTE — NURSING NOTE
Here with daughter for tolliver catheter change by MD. Edison states he has been having some discomfort in the suprapubic/inguinal are this month. VS not done at this visit. Twila Rocha

## 2017-08-30 ENCOUNTER — ALLIED HEALTH/NURSE VISIT (OUTPATIENT)
Dept: CARDIOLOGY | Facility: CLINIC | Age: 82
End: 2017-08-30
Payer: MEDICARE

## 2017-08-30 ENCOUNTER — OFFICE VISIT (OUTPATIENT)
Dept: CARDIOLOGY | Facility: CLINIC | Age: 82
End: 2017-08-30
Attending: INTERNAL MEDICINE
Payer: MEDICARE

## 2017-08-30 VITALS
SYSTOLIC BLOOD PRESSURE: 158 MMHG | BODY MASS INDEX: 25.41 KG/M2 | WEIGHT: 177.5 LBS | DIASTOLIC BLOOD PRESSURE: 72 MMHG | HEIGHT: 70 IN | HEART RATE: 68 BPM

## 2017-08-30 DIAGNOSIS — I49.5 SSS (SICK SINUS SYNDROME) (H): ICD-10-CM

## 2017-08-30 DIAGNOSIS — I10 ESSENTIAL HYPERTENSION WITH GOAL BLOOD PRESSURE LESS THAN 140/90: Primary | ICD-10-CM

## 2017-08-30 DIAGNOSIS — Z95.0 CARDIAC PACEMAKER IN SITU: Primary | ICD-10-CM

## 2017-08-30 PROCEDURE — 93280 PM DEVICE PROGR EVAL DUAL: CPT | Performed by: INTERNAL MEDICINE

## 2017-08-30 PROCEDURE — 99213 OFFICE O/P EST LOW 20 MIN: CPT | Performed by: NURSE PRACTITIONER

## 2017-08-30 NOTE — PROGRESS NOTES
[employer] Altrua (D) Pacemaker Device Check  AP: 97 % : 0 %  Mode: DDDR 60/130        Underlying Rhythm: SB, rate at 40 BPM  Heart Rate: Stable with minimal variability this matches activity level and pt has no c/o exercise intolerance  Sensing: WNL    Pacing Threshold: WNL   Impedance: WNL  Battery Status: Estimated at < 5 months remaining   Atrial Arrhythmia: 5 brief mode switches- EGM's; show 1-3 second bursts of PAT. NO A- FIB.  Ventricular Arrhythmia: 0  Setting Change: None    Care Plan: Patient to see Viridiana WHEELER for annual exam today. Return to  monthly phone checks due to age of battery. TREASURE Emanuel

## 2017-08-30 NOTE — PROGRESS NOTES
HPI and Plan: #[id number]  See dictation    No orders of the defined types were placed in this encounter.      No orders of the defined types were placed in this encounter.      Medications Discontinued During This Encounter   Medication Reason     OMEPRAZOLE PO Dose adjustment         Encounter Diagnosis   Name Primary?     Chronic atrial fibrillation (H)        CURRENT MEDICATIONS:  Current Outpatient Prescriptions   Medication Sig Dispense Refill     CIPROFLOXACIN PO Take 500 mg by mouth 1 tablet by mouth once as needed. Take one tablet after each procedere       amLODIPine (NORVASC) 5 MG tablet Take 1 tablet (5 mg) by mouth daily 90 tablet 3     oxyCODONE-acetaminophen (PERCOCET) 5-325 MG per tablet Take 1 tablet by mouth every 4 hours as needed for pain for pain. 30 tablet 0     omeprazole (PRILOSEC) 20 MG CR capsule Take 1 capsule (20 mg) by mouth daily 90 capsule 3     PARoxetine (PAXIL) 30 MG tablet TAKE 1/2 TABLET BY MOUTH AT BEDTIME (Patient taking differently: 15 mg TAKE 1/2 TABLET BY MOUTH AT BEDTIME) 45 tablet 3     Colloidal Oatmeal (AVEENO ECZEMA THERAPY) 1 % CREA Externally apply topically daily       cetirizine (ZYRTEC) 10 MG tablet Take 10 mg by mouth daily as needed       lidocaine (LMX4) 4 % CREA 4% topical cream Apply topically daily as needed       Misc Natural Products (COLON CLEANSER PO) Take 1 capsule by mouth daily Advanced Colon Care II       clobetasol (TEMOVATE) 0.05 % ointment daily as needed   2     albuterol (PROVENTIL HFA: VENTOLIN HFA) 108 (90 BASE) MCG/ACT inhaler Inhale 2 puffs into the lungs every 6 hours as needed for shortness of breath / dyspnea. 1 Inhaler 1     cholecalciferol 5000 UNITS CAPS Take 1 capsule by mouth daily. Takes 5000 units in the summer and 89688 units in the winter       Ascorbic Acid (VITAMIN C PO) Take by mouth 2 times daily 500 mg take 1 daily       D-MANNOSE POWD daily 500 mg Pt takes 2 pills daily       EYE VITAMINS OR CAPS one tablet twice daily        NEBULIZER MISC Use as directed 1 0       ALLERGIES     Allergies   Allergen Reactions     Ceftriaxone Itching     Bactrim Hives     Levaquin Diarrhea     Oral only, can tolerate IV     Zithromax [Azithromycin]      Macrodantin [Nitrofuran Derivatives] Rash     Took recently with no problems       PAST MEDICAL HISTORY:  Past Medical History:   Diagnosis Date     Antiplatelet or antithrombotic long-term use      Arthritis      Atrial fibrillation (H)     paroxysmal     Calculus of kidney     in dwelling [place]     Chronic infection     UTI     Chronic pain     lower pain, perineum     Esophageal reflux      Hyperlipidaemia      Hypertension      Malignant neoplasm of prostate (H) 8/14/2002    Brachytherapy, then external radiation. chronic indwelling catheter     Mild persistent asthma     with URI     Osteoporosis, unspecified     On Fosamax     Other specified disorder of skin 9/1/2004     Pacemaker     [employer]     Palpitations      Personal history of other diseases of circulatory system 2002    Aphasia and right hemiparesis with minimal residual     Sinus node dysfunction (H)     sp DDD PM     Sleep apnea     ?   snores     Unspecified cerebral artery occlusion with cerebral infarction        PAST SURGICAL HISTORY:  Past Surgical History:   Procedure Laterality Date     C NONSPECIFIC PROCEDURE  1980's    Kidney stone surgery     C NONSPECIFIC PROCEDURE  1985, 5/2005    TURP x 2     C NONSPECIFIC PROCEDURE  1/2002    Brachytherapy     C NONSPECIFIC PROCEDURE  ~1999    Left rotator cuff repair     C NONSPECIFIC PROCEDURE      Bilateral cataract surgery     C NONSPECIFIC PROCEDURE      EGD/dilation twice     CYSTOSCOPY       CYSTOSCOPY, INJECT COLLAGEN, COMBINED  6/6/2012    Procedure:COMBINED CYSTOSCOPY, INJECT BULKING AGENT; VIDEO CYSTOSCOPY WITH BOTOX INJECTIONS  ; Surgeon:BRIAN ADAN; Location: OR     CYSTOSCOPY, INJECT COLLAGEN, COMBINED  3/22/2013    Procedure: COMBINED CYSTOSCOPY, INJECT BULKING  AGENT;  VIDEO CYSTOSCOPY, BOTOX INJECTION;  Surgeon: Michael Lilly MD;  Location: [facility] OR     CYSTOSCOPY, INJECT COLLAGEN, COMBINED  2014    Procedure: COMBINED CYSTOSCOPY, INJECT BULKING AGENT;  Surgeon: Michael Lilly MD;  Location: [facility] OR     CYSTOSCOPY, INJECT COLLAGEN, COMBINED N/A 2015    Procedure: COMBINED CYSTOSCOPY, INJECT BULKING AGENT;  Surgeon: Michael Lilly MD;  Location: [facility] OR     CYSTOSCOPY, INJECT COLLAGEN, COMBINED N/A 2016    Procedure: COMBINED CYSTOSCOPY, INJECT BULKING AGENT;  Surgeon: Michael Lilly MD;  Location: [facility] OR     HC REMOVE TONSILS/ADENOIDS,<11 Y/O  ~1928    T & A <12y.o.     IMPLANT PACEMAKER       PENIS SURGERY       PROSTATE SURGERY         FAMILY HISTORY:  Family History   Problem Relation Age of Onset     Family History Negative Father       age 91     HEART DISEASE Mother      pacemaker     Family History Negative Mother       in her sleep 103     CANCER Brother      oldest brother - lung cancer,  age 74     CANCER Brother      3rd brother - lymphoma,  age 76     CANCER Brother      2nd brother (Carrington)- prostate cancer, born .      CEREBROVASCULAR DISEASE Brother      Brother (Carrington), born in        SOCIAL HISTORY:  Social History     Social History     Marital status:      Spouse name: Alysa     Number of children: 8     Years of education: N/A     Occupational History      Retired     Social History Main Topics     Smoking status: Former Smoker     Packs/day: 1.00     Years: 40.00     Smokeless tobacco: Never Used      Comment: QUIT      Alcohol use No     Drug use: No     Sexual activity: No     Other Topics Concern     Parent/Sibling W/ Cabg, Mi Or Angioplasty Before 65f 55m? No     Social History Narrative    .Living situation (location, living with others?):Lives with wife in [facility]    Activities of daily living (e.g., dressing, eating, walking):Independent        Instrumental activities of daily  "living (e.g., finance management, housekeeping, meal planning/ prep): Wife and kids help with preparing meals, shopping, driving, climbing stairs, complex decisions                 Meaningful Activities (e.g., hobbies, work): loves music, uses the computer, likes crossword         Support:Wife and daughter        Community Resources Used/ Interested in: Referred to Herb       Review of Systems:  Skin:  Positive for rash;itching eczema   Eyes:  Positive for   dry eye R eye, gets injections in L eye  ENT:  Positive for hearing loss    Respiratory:  Negative       Cardiovascular:  Negative;palpitations;chest pain;edema;syncope or near-syncope Positive for;exercise intolerance;dizziness;lightheadedness;fatigue    Gastroenterology: Negative      Genitourinary:  Positive for prostate problem tolliver catheter  Musculoskeletal:  Positive for back pain;arthritis;joint pain    Neurologic:  Positive for stroke    Psychiatric:  Positive for sleep disturbances;anxiety    Heme/Lymph/Imm:  Positive for allergies    Endocrine:  Negative        Physical Exam:  Vitals: /72 (BP Location: Right arm, Cuff Size: Adult Regular)  Pulse 68  Ht 1.765 m (5' 9.5\")  Wt 80.5 kg (177 lb 8 oz)  BMI 25.84 kg/m2    Constitutional:  cooperative, alert and oriented, well developed, well nourished, in no acute distress        Skin:  warm and dry to the touch, no apparent skin lesions or masses noted        Head:  normocephalic, no masses or lesions        Eyes:  pupils equal and round, conjunctivae and lids unremarkable, sclera white, no xanthalasma, EOMS intact, no nystagmus        ENT:  no pallor or cyanosis, dentition good        Neck:  carotid pulses are full and equal bilaterally, JVP normal, no carotid bruit, no thyromegaly        Chest:  normal breath sounds, clear to auscultation, normal A-P diameter, normal symmetry, normal respiratory excursion, no use of accessory muscles   pacemaker incision in the left infraclavicular area was " well-healed      Cardiac: regular rhythm;normal S1 and S2;no S3 or S4;no murmurs, gallops or rubs detected                  Abdomen:  abdomen soft;BS normoactive        Vascular: not assessed this visit                                        Extremities and Back:  no deformities, clubbing, cyanosis, erythema observed;no edema              Neurological:  affect appropriate, oriented to time, person and place              CC  Jose Dumont MD  [address]  [address]  [place] [id number]

## 2017-08-30 NOTE — MR AVS SNAPSHOT
After Visit Summary   8/30/2017    Edison Boss    MRN: [id number]           Patient Information     Date Of Birth          10/19/1924        Visit Information        Provider Department      8/30/2017 1:50 PM Viridiana Arellano APRN CNP [facility] [place]        Today's Diagnoses     Essential hypertension with goal blood pressure less than 140/90    -  1    SSS (sick sinus syndrome) (H)          Care Instructions    Omron wrist cuff-spot check blood pressure  Recheck battery in 1 month              Follow-ups after your visit        Your next 10 appointments already scheduled     Sep 19, 2017 11:10 AM CDT   Return Visit with Michael Lilly MD   [facility] ([facility])    [address]  [address]  [facility] [id number] [phone]            Sep 28, 2017 11:15 AM CDT   Phone Device Check with SANDRA TECH2   [facility] ([facility])    [address] [place], [place] [id number]  [facility] [address] [phone]            Oct 19, 2017 11:00 AM CDT   Return Visit with Michael Lilly MD   [facility] ([facility])    [address]  [address]  [facility] [id number] [phone]            Nov 16, 2017 11:00 AM CST   Return Visit with Michael Lilly MD   [facility] ([facility])    [address]  [address]  [facility] [id number] [phone]              Who to contact     If you have questions or need follow up information about today's clinic visit or your schedule please contact [facility] directly at [phone].  Normal or non-critical lab and imaging results will be communicated to you by MyChart, letter or phone within 4 business days after the  "clinic has received the results. If you do not hear from us within 7 days, please contact the clinic through [employer] or phone. If you have a critical or abnormal lab result, we will notify you by phone as soon as possible.  Submit refill requests through [employer] or call your pharmacy and they will forward the refill request to us. Please allow 3 business days for your refill to be completed.          Additional Information About Your Visit        [facility] [employer] Information     [employer] gives you secure access to your electronic health record. If you see a primary care provider, you can also send messages to your care team and make appointments. If you have questions, please call your primary care clinic.  If you do not have a primary care provider, please call [phone] and they will assist you.        Care EveryWhere ID     This is your Care EveryWhere ID. This could be used by other organizations to access your [facility] medical records  [id number]        Your Vitals Were     Pulse Height BMI (Body Mass Index)             68 1.765 m (5' 9.5\") 25.84 kg/m2          Blood Pressure from Last 3 Encounters:   08/30/17 158/72   08/10/17 130/70   12/08/16 147/88    Weight from Last 3 Encounters:   08/30/17 80.5 kg (177 lb 8 oz)   08/10/17 79.5 kg (175 lb 3.2 oz)   05/02/17 75.1 kg (165 lb 9.6 oz)              We Performed the Following     Follow-Up with Cardiac Advanced Practice Provider          Today's Medication Changes          These changes are accurate as of: 8/30/17  2:23 PM.  If you have any questions, ask your nurse or doctor.               These medicines have changed or have updated prescriptions.        Dose/Directions    omeprazole 20 MG CR capsule   Commonly known as:  priLOSEC   This may have changed:  Another medication with the same name was removed. Continue taking this medication, and follow the directions you see here.   Used for:  Gastroesophageal reflux disease without esophagitis   Changed by:  Nidhi, " Porfirio HERNANDEZ MD        Dose:  20 mg   Take 1 capsule (20 mg) by mouth daily   Quantity:  90 capsule   Refills:  3       PARoxetine 30 MG tablet   Commonly known as:  PAXIL   This may have changed:    - how much to take  - additional instructions   Used for:  RAMÓN (generalized anxiety disorder)        TAKE 1/2 TABLET BY MOUTH AT BEDTIME   Quantity:  45 tablet   Refills:  3                Primary Care Provider Office Phone # Fax #    Porfirio Terrell [phone]       [address] [address]        Equal Access to Services     [facility]: Hadii aad ku hadasho Soomaali, waaxda luqadaha, qaybta kaalmada adeegyada, waxay idiin hayaan adeeg kharajesse mcclure . So [facility] [phone].    ATENCIÓN: Si habla español, tiene a lauren disposición servicios gratuitos de asistencia lingüística. Ll[facility] [phone].    We comply with applicable federal civil rights laws and [place] laws. We do not discriminate on the basis of race, color, national origin, age, disability sex, sexual orientation or gender identity.            Thank you!     Thank you for choosing [facility] [place]  for your care. Our goal is always to provide you with excellent care. Hearing back from our patients is one way we can continue to improve our services. Please take a few minutes to complete the written survey that you may receive in the mail after your visit with us. Thank you!             Your Updated Medication List - Protect others around you: Learn how to safely use, store and throw away your medicines at www.disposemymeds.org.          This list is accurate as of: 8/30/17  2:23 PM.  Always use your most recent med list.                   Brand Name Dispense Instructions for use Diagnosis    albuterol 108 (90 BASE) MCG/ACT Inhaler    PROAIR HFA/PROVENTIL HFA/VENTOLIN HFA    1 Inhaler    Inhale 2 puffs into the lungs every 6 hours as needed for shortness of breath / dyspnea.    Mild  persistent asthma       amLODIPine 5 MG tablet    NORVASC    90 tablet    Take 1 tablet (5 mg) by mouth daily    Essential hypertension with goal blood pressure less than 140/90       AVEENO ECZEMA THERAPY 1 % Crea   Generic drug:  Colloidal Oatmeal      Externally apply topically daily        cetirizine 10 MG tablet    zyrTEC     Take 10 mg by mouth daily as needed        cholecalciferol 5000 UNITS Caps      Take 1 capsule by mouth daily. Takes 5000 units in the summer and 36579 units in the winter        CIPROFLOXACIN PO      Take 500 mg by mouth 1 tablet by mouth once as needed. Take one tablet after each procedere        clobetasol 0.05 % ointment    TEMOVATE     daily as needed        COLON CLEANSER PO      Take 1 capsule by mouth daily Advanced Colon Care II        D-Mannose Powd      daily 500 mg Pt takes 2 pills daily        EYE VITAMINS Caps      one tablet twice daily        lidocaine 4 % Crea cream    LMX4     Apply topically daily as needed        nebulizer nebulization     1    Use as directed    Mild persistent asthma       omeprazole 20 MG CR capsule    priLOSEC    90 capsule    Take 1 capsule (20 mg) by mouth daily    Gastroesophageal reflux disease without esophagitis       oxyCODONE-acetaminophen 5-325 MG per tablet    PERCOCET    30 tablet    Take 1 tablet by mouth every 4 hours as needed for pain for pain.    Cervicalgia, Acute upper back pain       PARoxetine 30 MG tablet    PAXIL    45 tablet    TAKE 1/2 TABLET BY MOUTH AT BEDTIME    RAMÓN (generalized anxiety disorder)       VITAMIN C PO      Take by mouth 2 times daily 500 mg take 1 daily

## 2017-08-30 NOTE — PROGRESS NOTES
HISTORY OF PRESENT ILLNESS:  Mr. Boss is a delightful 89-year-old gentleman that I am having the pleasure of meeting today.  He is here today with his daughter.  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  His device is nearing JOCELYN.  Device interrogation today showed 97% A paced, 0% V paced, intrinsic rhythm was sinus grzegorz at 40 beats per minute.  The patient has no complaints of heart rate intolerance.  He did have 5 brief mode switches for paroxysmal atrial tachycardia.  He had no episodes of atrial fibrillation noted.  Battery longevity is approximately 5 months.      He denies chest pain, shortness of breath, lightheadedness or dizziness.  He is uncertain of his gait.  He states that he fell and has been a little uncertain ever since.  He is now walking with a walker.  Blood pressure was initially elevated at 158/72 and reassessed at 142/70.  He does have a history of hypertension which has been stable with amlodipine.  All other review of systems and past medical history are noted below.      ASSESSMENT AND PLAN:  Mr. Boss is a delightful 89-year-old gentleman here today for followup.  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  He is 97% A paced.  Intrinsic is sinus grzegorz in the 40s.      He has had some episodes of atrial tachycardia, no atrial fibrillation was noted on this device check.  He was on flecainide in the past for AFib.  Anticoagulation was discussed previously with Dr. Dumont and Mr. Boss is not interested in anticoagulation therapy.  He is willing to take the small risk of stroke versus the risk for bleeding.      His next device check will be in 1 month.  If there are issues or concerns in the interim, I have asked that they please contact us.  I did review generator change with the patient and his daughter once he reaches JOCELYN.  This should be within the next several months.      Thank you as always  for including us in his care.         WILDER HUANG NP             D: 2017 14:19   T: 2017 16:08   MT: GUILLERMO      Name:     JOSELIN VAN   MRN:      [id number]        Account:      [id number]   :      10/19/1924           Service Date: 2017      Document: [id number]

## 2017-08-30 NOTE — MR AVS SNAPSHOT
After Visit Summary   8/30/2017    Edison Boss    MRN: [id number]           Patient Information     Date Of Birth          10/19/1924        Visit Information        Provider Department      8/30/2017 1:00 PM SANDRA DCR2 [facility] HEART [facility]        Today's Diagnoses     Cardiac pacemaker in situ    -  1    SSS (sick sinus syndrome) (H)           Follow-ups after your visit        Your next 10 appointments already scheduled     Sep 19, 2017 11:10 AM CDT   Return Visit with Michael Lilly MD   [facility] [place] ([facility])    [address]  [address]  [facility] [id number] [phone]            Sep 28, 2017 11:15 AM CDT   Phone Device Check with SANDRA TECH2   [facility] ([facility] PSA [facility])    [address] [place] [address] [address]  [facility] [id number] [phone]            Oct 19, 2017 11:00 AM CDT   Return Visit with Michael Lilly MD   [facility] ([facility])    [address]  [address]  [facility] [id number] [phone]            Nov 16, 2017 11:00 AM CST   Return Visit with Michael Lilly MD   [facility] ([facility])    [address] [facility]  [address]  [facility] [id number] [phone]              Who to contact     If you have questions or need follow up information about today's clinic visit or your schedule please contact [facility] directly at [phone].  Normal or non-critical lab and imaging results will be communicated to you by MyChart, letter or phone within 4 business days after the clinic has received the results. If you do not hear from us within 7 days, please contact the clinic through MyChart or phone. If you have a critical or abnormal  lab result, we will notify you by phone as soon as possible.  Submit refill requests through [employer] or call your pharmacy and they will forward the refill request to us. Please allow 3 business days for your refill to be completed.          Additional Information About Your Visit        PIERIS [employer] Information     [employer] gives you secure access to your electronic health record. If you see a primary care provider, you can also send messages to your care team and make appointments. If you have questions, please call your primary care clinic.  If you do not have a primary care provider, please call [phone] and they will assist you.        Care EveryWhere ID     This is your Care EveryWhere ID. This could be used by other organizations to access your [place] medical records  [id number]         Blood Pressure from Last 3 Encounters:   08/30/17 158/72   08/10/17 130/70   12/08/16 147/88    Weight from Last 3 Encounters:   08/30/17 80.5 kg (177 lb 8 oz)   08/10/17 79.5 kg (175 lb 3.2 oz)   05/02/17 75.1 kg (165 lb 9.6 oz)              We Performed the Following     PM DEVICE PROGRAMMING EVAL, DUAL LEAD PACER (74570)          Today's Medication Changes          These changes are accurate as of: 8/30/17  2:00 PM.  If you have any questions, ask your nurse or doctor.               These medicines have changed or have updated prescriptions.        Dose/Directions    omeprazole 20 MG CR capsule   Commonly known as:  priLOSEC   This may have changed:  Another medication with the same name was removed. Continue taking this medication, and follow the directions you see here.   Used for:  Gastroesophageal reflux disease without esophagitis   Changed by:  Porfirio Terrell MD        Dose:  20 mg   Take 1 capsule (20 mg) by mouth daily   Quantity:  90 capsule   Refills:  3       PARoxetine 30 MG tablet   Commonly known as:  PAXIL   This may have changed:    - how much to take  - additional instructions   Used for:  RAMÓN  (generalized anxiety disorder)        TAKE 1/2 TABLET BY MOUTH AT BEDTIME   Quantity:  45 tablet   Refills:  3                Primary Care Provider Office Phone # Fax #    Porfirio Terrell [phone]       Telma VALENTINESUNIL [facility] [address]  [facility] [address]        Equal Access to Services     EMERITA CADE : Hadii aad ku hadasho Soomaali, waaxda luqadaha, qaybta kaalmada adeegyada, waxay idiin hayaan adeeg khrash lacarlosn ah. So [facility] [phone].    ATENCIÓN: Si habla español, tiene a lauren disposición servicios gratuitos de asistencia lingüística. Manjulaame al [phone].    We comply with applicable federal civil rights laws and [place] laws. We do not discriminate on the basis of race, color, national origin, age, disability sex, sexual orientation or gender identity.            Thank you!     Thank you for choosing [facility] AT [place]  for your care. Our goal is always to provide you with excellent care. Hearing back from our patients is one way we can continue to improve our services. Please take a few minutes to complete the written survey that you may receive in the mail after your visit with us. Thank you!             Your Updated Medication List - Protect others around you: Learn how to safely use, store and throw away your medicines at www.disposemymeds.org.          This list is accurate as of: 8/30/17  2:00 PM.  Always use your most recent med list.                   Brand Name Dispense Instructions for use Diagnosis    albuterol 108 (90 BASE) MCG/ACT Inhaler    PROAIR HFA/PROVENTIL HFA/VENTOLIN HFA    1 Inhaler    Inhale 2 puffs into the lungs every 6 hours as needed for shortness of breath / dyspnea.    Mild persistent asthma       amLODIPine 5 MG tablet    NORVASC    90 tablet    Take 1 tablet (5 mg) by mouth daily    Essential hypertension with goal blood pressure less than 140/90       AVEENO ECZEMA THERAPY 1 % Crea   Generic drug:  Colloidal Oatmeal       Externally apply topically daily        cetirizine 10 MG tablet    zyrTEC     Take 10 mg by mouth daily as needed        cholecalciferol 5000 UNITS Caps      Take 1 capsule by mouth daily. Takes 5000 units in the summer and 28104 units in the winter        CIPROFLOXACIN PO      Take 500 mg by mouth 1 tablet by mouth once as needed. Take one tablet after each procedere        clobetasol 0.05 % ointment    TEMOVATE     daily as needed        COLON CLEANSER PO      Take 1 capsule by mouth daily Advanced Colon Care II        D-Mannose Powd      daily 500 mg Pt takes 2 pills daily        EYE VITAMINS Caps      one tablet twice daily        lidocaine 4 % Crea cream    LMX4     Apply topically daily as needed        nebulizer nebulization     1    Use as directed    Mild persistent asthma       omeprazole 20 MG CR capsule    priLOSEC    90 capsule    Take 1 capsule (20 mg) by mouth daily    Gastroesophageal reflux disease without esophagitis       oxyCODONE-acetaminophen 5-325 MG per tablet    PERCOCET    30 tablet    Take 1 tablet by mouth every 4 hours as needed for pain for pain.    Cervicalgia, Acute upper back pain       PARoxetine 30 MG tablet    PAXIL    45 tablet    TAKE 1/2 TABLET BY MOUTH AT BEDTIME    RAMÓN (generalized anxiety disorder)       VITAMIN C PO      Take by mouth 2 times daily 500 mg take 1 daily

## 2017-08-30 NOTE — LETTER
8/30/2017    Porfirio Terrell MD  [address] [facility] [address]  [facility] [address]    RE: Edison Boss       Dear Colleague,    I had the pleasure of seeing Edison Boss in the [facility].    Mr. Boss is a delightful 89-year-old gentleman that I am having the pleasure of meeting today.  He is here today with his daughter.  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  His device is nearing JOCELYN.  Device interrogation today showed 97% A paced, 0% V paced, intrinsic rhythm was sinus grzegorz at 40 beats per minute.  The patient has no complaints of heart rate intolerance.  He did have 5 brief mode switches for paroxysmal atrial tachycardia.  He had no episodes of atrial fibrillation noted.  Battery longevity is approximately 5 months.      He denies chest pain, shortness of breath, lightheadedness or dizziness.  He is uncertain of his gait.  He states that he fell and has been a little uncertain ever since.  He is now walking with a walker.  Blood pressure was initially elevated at 158/72 and reassessed at 142/70.  He does have a history of hypertension which has been stable with amlodipine.  All other review of systems and past medical history are noted below.     Outpatient Encounter Prescriptions as of 8/30/2017   Medication Sig Dispense Refill     CIPROFLOXACIN PO Take 500 mg by mouth 1 tablet by mouth once as needed. Take one tablet after each procedere       amLODIPine (NORVASC) 5 MG tablet Take 1 tablet (5 mg) by mouth daily 90 tablet 3     oxyCODONE-acetaminophen (PERCOCET) 5-325 MG per tablet Take 1 tablet by mouth every 4 hours as needed for pain for pain. 30 tablet 0     omeprazole (PRILOSEC) 20 MG CR capsule Take 1 capsule (20 mg) by mouth daily 90 capsule 3     PARoxetine (PAXIL) 30 MG tablet TAKE 1/2 TABLET BY MOUTH AT BEDTIME (Patient taking differently: 15 mg TAKE 1/2 TABLET BY MOUTH AT BEDTIME) 45 tablet 3      Colloidal Oatmeal (AVEENO ECZEMA THERAPY) 1 % CREA Externally apply topically daily       cetirizine (ZYRTEC) 10 MG tablet Take 10 mg by mouth daily as needed       lidocaine (LMX4) 4 % CREA 4% topical cream Apply topically daily as needed       Misc Natural Products (COLON CLEANSER PO) Take 1 capsule by mouth daily Advanced Colon Care II       clobetasol (TEMOVATE) 0.05 % ointment daily as needed   2     albuterol (PROVENTIL HFA: VENTOLIN HFA) 108 (90 BASE) MCG/ACT inhaler Inhale 2 puffs into the lungs every 6 hours as needed for shortness of breath / dyspnea. 1 Inhaler 1     cholecalciferol 5000 UNITS CAPS Take 1 capsule by mouth daily. Takes 5000 units in the summer and 39966 units in the winter       Ascorbic Acid (VITAMIN C PO) Take by mouth 2 times daily 500 mg take 1 daily       D-MANNOSE POWD daily 500 mg Pt takes 2 pills daily       EYE VITAMINS OR CAPS one tablet twice daily       NEBULIZER MISC Use as directed 1 0     [DISCONTINUED] OMEPRAZOLE PO Take 20 mg by mouth every morning       No facility-administered encounter medications on file as of 8/30/2017.       ASSESSMENT AND PLAN:  Mr. Boss is a delightful 89-year-old gentleman here today for followup.  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  He is 97% A paced.  Intrinsic is sinus grzegorz in the 40s.      He has had some episodes of atrial tachycardia, no atrial fibrillation was noted on this device check.  He was on flecainide in the past for AFib.  Anticoagulation was discussed previously with Dr. Dumont and Mr. Boss is not interested in anticoagulation therapy.  He is willing to take the small risk of stroke versus the risk for bleeding.      His next device check will be in 1 month.  If there are issues or concerns in the interim, I have asked that they please contact us.  I did review generator change with the patient and his daughter once he reaches [facility].  This should be within the next several  months.      Thank you as always for including us in his care.     Sincerely,    Viridiana Arellano NP, APRN [facility]

## 2017-09-19 ENCOUNTER — OFFICE VISIT (OUTPATIENT)
Dept: UROLOGY | Facility: CLINIC | Age: 82
End: 2017-09-19
Payer: MEDICARE

## 2017-09-19 DIAGNOSIS — R33.9 URINARY RETENTION: Primary | ICD-10-CM

## 2017-09-19 PROCEDURE — 51702 INSERT TEMP BLADDER CATH: CPT | Performed by: UROLOGY

## 2017-09-19 PROCEDURE — 99212 OFFICE O/P EST SF 10 MIN: CPT | Mod: 25 | Performed by: UROLOGY

## 2017-09-19 NOTE — MR AVS SNAPSHOT
After Visit Summary   9/19/2017    Edison Boss    MRN: [id number]           Patient Information     Date Of Birth          10/19/1924        Visit Information        Provider Department      9/19/2017 11:10 AM Michael Lilly MD [facility]        Today's Diagnoses     Urinary retention    -  1       Follow-ups after your visit        Follow-up notes from your care team     Return in about 4 weeks (around 10/17/2017) for catheter change.      Your next 10 appointments already scheduled     Sep 28, 2017 11:15 AM CDT   Phone Device Check with SANDRA TECH2   [facility] AT [place] ([email or web address] Clinics)    [address] [facility] [address]  [facility] [address]   [phone]            Oct 19, 2017 11:00 AM CDT   Return Visit with Michael Lilly MD   [facility] ([facility] [place])    [address]  [address]  [facility] [address] [phone]            Nov 16, 2017 11:00 AM CST   Return Visit with Michael Lilly MD   [facility] ([facility] [place])    [address]  [address]  [facility] [address] [phone]              Who to contact     If you have questions or need follow up information about today's clinic visit or your schedule please contact [facility] UROLOG[facility] directly at [phone].  Normal or non-critical lab and imaging results will be communicated to you by MyChart, letter or phone within 4 business days after the clinic has received the results. If you do not hear from us within 7 days, please contact the clinic through MyChart or phone. If you have a critical or abnormal lab result, we will notify you by phone as soon as possible.  Submit refill requests through [employer] or call your pharmacy and they will forward the refill  request to us. Please allow 3 business days for your refill to be completed.          Additional Information About Your Visit        [employer] Information     [employer] gives you secure access to your electronic health record. If you see a primary care provider, you can also send messages to your care team and make appointments. If you have questions, please call your primary care clinic.  If you do not have a primary care provider, please call [phone] and they will assist you.        Care EveryWhere ID     This is your Care EveryWhere ID. This could be used by other organizations to access your [place] medical records  [id number]         Blood Pressure from Last 3 Encounters:   08/30/17 158/72   08/10/17 130/70   12/08/16 147/88    Weight from Last 3 Encounters:   08/30/17 80.5 kg (177 lb 8 oz)   08/10/17 79.5 kg (175 lb 3.2 oz)   05/02/17 75.1 kg (165 lb 9.6 oz)              We Performed the Following     INSERT BLADDER CATH, TEMP INDWELL SIMPLE (ARAMBULA) (60710)          Today's Medication Changes          These changes are accurate as of: 9/19/17 11:48 AM.  If you have any questions, ask your nurse or doctor.               These medicines have changed or have updated prescriptions.        Dose/Directions    PARoxetine 30 MG tablet   Commonly known as:  PAXIL   This may have changed:    - how much to take  - additional instructions   Used for:  RAMÓN (generalized anxiety disorder)        TAKE 1/2 TABLET BY MOUTH AT BEDTIME   Quantity:  45 tablet   Refills:  3                Primary Care Provider Office Phone # Fax #    Porfirio Terrell [phone]       [address] [address]        Equal Access to Services     [facility]: Hadii vanessa ku hadasho Soomaali, waaxda luqadaha, qaybta kaalmada bulmaro, yony nunn. So [facility] [phone].    ATENCIÓN: Si habla español, tiene a lauren disposición servicios gratuitos de asistencia lingüística. Llame al  [phone].    We comply with applicable federal civil rights laws and [place] laws. We do not discriminate on the basis of race, color, national origin, age, disability sex, sexual orientation or gender identity.            Thank you!     Thank you for choosing [facility] [place]  for your care. Our goal is always to provide you with excellent care. Hearing back from our patients is one way we can continue to improve our services. Please take a few minutes to complete the written survey that you may receive in the mail after your visit with us. Thank you!             Your Updated Medication List - Protect others around you: Learn how to safely use, store and throw away your medicines at www.disposemymeds.org.          This list is accurate as of: 9/19/17 11:48 AM.  Always use your most recent med list.                   Brand Name Dispense Instructions for use Diagnosis    albuterol 108 (90 BASE) MCG/ACT Inhaler    PROAIR HFA/PROVENTIL HFA/VENTOLIN HFA    1 Inhaler    Inhale 2 puffs into the lungs every 6 hours as needed for shortness of breath / dyspnea.    Mild persistent asthma       amLODIPine 5 MG tablet    NORVASC    90 tablet    Take 1 tablet (5 mg) by mouth daily    Essential hypertension with goal blood pressure less than 140/90       AVEENO ECZEMA THERAPY 1 % Crea   Generic drug:  Colloidal Oatmeal      Externally apply topically daily        cetirizine 10 MG tablet    zyrTEC     Take 10 mg by mouth daily as needed        cholecalciferol 5000 UNITS Caps      Take 1 capsule by mouth daily. Takes 5000 units in the summer and [address]244 units in the winter        CIPROFLOXACIN PO      Take 500 mg by mouth 1 tablet by mouth once as needed. Take one tablet after each procedere        clobetasol 0.05 % ointment    TEMOVATE     daily as needed        COLON CLEANSER PO      Take 1 capsule by mouth daily Advanced Colon Care II        D-Mannose Powd      daily 500 mg Pt takes 2  pills daily        EYE VITAMINS Caps      one tablet twice daily        lidocaine 4 % Crea cream    LMX4     Apply topically daily as needed        nebulizer nebulization     1    Use as directed    Mild persistent asthma       omeprazole 20 MG CR capsule    priLOSEC    90 capsule    Take 1 capsule (20 mg) by mouth daily    Gastroesophageal reflux disease without esophagitis       oxyCODONE-acetaminophen 5-325 MG per tablet    PERCOCET    30 tablet    Take 1 tablet by mouth every 4 hours as needed for pain for pain.    Cervicalgia, Acute upper back pain       PARoxetine 30 MG tablet    PAXIL    45 tablet    TAKE 1/2 TABLET BY MOUTH AT BEDTIME    RAMÓN (generalized anxiety disorder)       VITAMIN C PO      Take by mouth 2 times daily 500 mg take 1 daily

## 2017-09-19 NOTE — PROGRESS NOTES
Edison Boss is a pleasant 89-year-old male who has a history of prostate cancer and urinary retention. He has a Christianson catheter that is changed monthly. He occasionally needs Botox injections to control severe bladder spasticity. The past month has been good with a few spasms.  Christianson catheter change with sterile technique, 4 cc in Christianson balloon and Christianson irrigates well  Assessment: History of prostate cancer, urinary retention  Plan: Monthly catheter change. Botox p.r.n. No PSA testing

## 2017-09-19 NOTE — LETTER
9/19/2017       RE: Edison Boss  [address]    [facility] [address]     Dear Colleague,    Thank you for referring your patient, Edison Boss, to the [facility]OLOGY [facility]IC [place] at [facility]. Please see a copy of my visit note below.    Edison Boss is a pleasant 89-year-old male who has a history of prostate cancer and urinary retention. He has a Christianson catheter that is changed monthly. He occasionally needs Botox injections to control severe bladder spasticity. The past month has been good with a few spasms.  Christianson catheter change with sterile technique, 4 cc in Christianson balloon and Christianson irrigates well  Assessment: History of prostate cancer, urinary retention  Plan: Monthly catheter change. Botox p.r.n. No PSA testing    Again, thank you for allowing me to participate in the care of your patient.      Sincerely,    Michael Lilly MD

## 2017-09-28 ENCOUNTER — ALLIED HEALTH/NURSE VISIT (OUTPATIENT)
Dept: CARDIOLOGY | Facility: CLINIC | Age: 82
End: 2017-09-28

## 2017-09-28 DIAGNOSIS — Z95.0 CARDIAC PACEMAKER IN SITU: Primary | ICD-10-CM

## 2017-09-28 NOTE — NURSING NOTE
~ 30 day phone teletrace / NO CHARGE ~  AP/VS and AP/  at time of check. Magnet response WNL. FU 1 mo.

## 2017-09-28 NOTE — MR AVS SNAPSHOT
After Visit Summary   9/28/2017    Edison Boss    MRN: [id number]           Patient Information     Date Of Birth          10/19/1924        Visit Information        Provider Department      9/28/2017 11:15 AM SANDRA TECH2 [facility] HEART [facility]        Today's Diagnoses     Cardiac pacemaker in situ    -  1       Follow-ups after your visit        Your next 10 appointments already scheduled     Oct 19, 2017 11:00 AM CDT   Return Visit with Michael Lilly MD   [facility] ([facility])    [address]  [address]  [facility] [id number] [phone]            Oct 31, 2017 11:00 AM CDT   Phone Device Check with SANDRA TECH1   [facility] ([facility])    [address]  [facility] [id number] [phone]            Nov 16, 2017 11:00 AM CST   Return Visit with Michael Lilly MD   [facility] ([facility])    [address]  [address]  [facility] [id number] [phone]              Who to contact     If you have questions or need follow up information about today's clinic visit or your schedule please contact [facility] directly at [phone].  Normal or non-critical lab and imaging results will be communicated to you by MyChart, letter or phone within 4 business days after the clinic has received the results. If you do not hear from us within 7 days, please contact the clinic through MyChart or phone. If you have a critical or abnormal lab result, we will notify you by phone as soon as possible.  Submit refill requests through [employer] or call your pharmacy and they will forward the refill request to us. Please allow 3 business days for your refill to be completed.          Additional Information About Your  Visit        [email or web address] Information     [employer] gives you secure access to your electronic health record. If you see a primary care provider, you can also send messages to your care team and make appointments. If you have questions, please call your primary care clinic.  If you do not have a primary care provider, please call [phone] and they will assist you.        Care EveryWhere ID     This is your Care EveryWhere ID. This could be used by other organizations to access your [place] medical records  [id number]         Blood Pressure from Last 3 Encounters:   08/30/17 158/72   08/10/17 130/70   12/08/16 147/88    Weight from Last 3 Encounters:   08/30/17 80.5 kg (177 lb 8 oz)   08/10/17 79.5 kg (175 lb 3.2 oz)   05/02/17 75.1 kg (165 lb 9.6 oz)              Today, you had the following     No orders found for display         Today's Medication Changes          These changes are accurate as of: 9/28/17 11:20 AM.  If you have any questions, ask your nurse or doctor.               These medicines have changed or have updated prescriptions.        Dose/Directions    PARoxetine 30 MG tablet   Commonly known as:  PAXIL   This may have changed:    - how much to take  - additional instructions   Used for:  RAMÓN (generalized anxiety disorder)        TAKE 1/2 TABLET BY MOUTH AT BEDTIME   Quantity:  45 tablet   Refills:  3                Primary Care Provider Office Phone # Fax #    Porfirio Terrell [phone]       [address]  [facility] [address]        Equal Access to Services     EMERITA CADE AH: Hadii aad ku hadasho Soomaali, waaxda luqadaha, qaybta kaalmada adeegyada, waxrober aditya mcclure . So [facility] [phone].    ATENCIÓN: Si habla español, tiene a lauren disposición servicios gratuitos de asistencia lingüística. Llame al [phone].    We comply with applicable federal civil rights laws and [place] laws. We do not discriminate on the basis of race, color, national  origin, age, disability sex, sexual orientation or gender identity.            Thank you!     Thank you for choosing [facility] [place]  for your care. Our goal is always to provide you with excellent care. Hearing back from our patients is one way we can continue to improve our services. Please take a few minutes to complete the written survey that you may receive in the mail after your visit with us. Thank you!             Your Updated Medication List - Protect others around you: Learn how to safely use, store and throw away your medicines at www.disposemymeds.org.          This list is accurate as of: 9/28/17 11:20 AM.  Always use your most recent med list.                   Brand Name Dispense Instructions for use Diagnosis    albuterol 108 (90 BASE) MCG/ACT Inhaler    PROAIR HFA/PROVENTIL HFA/VENTOLIN HFA    1 Inhaler    Inhale 2 puffs into the lungs every 6 hours as needed for shortness of breath / dyspnea.    Mild persistent asthma       amLODIPine 5 MG tablet    NORVASC    90 tablet    Take 1 tablet (5 mg) by mouth daily    Essential hypertension with goal blood pressure less than 140/90       AVEENO ECZEMA THERAPY 1 % Crea   Generic drug:  Colloidal Oatmeal      Externally apply topically daily        cetirizine 10 MG tablet    zyrTEC     Take 10 mg by mouth daily as needed        cholecalciferol 5000 UNITS Caps      Take 1 capsule by mouth daily. Takes 5000 units in the summer and 51107 units in the winter        CIPROFLOXACIN PO      Take 500 mg by mouth 1 tablet by mouth once as needed. Take one tablet after each procedere        clobetasol 0.05 % ointment    TEMOVATE     daily as needed        COLON CLEANSER PO      Take 1 capsule by mouth daily Advanced Colon Care II        D-Mannose Powd      daily 500 mg Pt takes 2 pills daily        EYE VITAMINS Caps      one tablet twice daily        lidocaine 4 % Crea cream    LMX4     Apply topically daily as needed         nebulizer nebulization     1    Use as directed    Mild persistent asthma       omeprazole 20 MG CR capsule    priLOSEC    90 capsule    Take 1 capsule (20 mg) by mouth daily    Gastroesophageal reflux disease without esophagitis       oxyCODONE-acetaminophen 5-325 MG per tablet    PERCOCET    30 tablet    Take 1 tablet by mouth every 4 hours as needed for pain for pain.    Cervicalgia, Acute upper back pain       PARoxetine 30 MG tablet    PAXIL    45 tablet    TAKE 1/2 TABLET BY MOUTH AT BEDTIME    RAMÓN (generalized anxiety disorder)       VITAMIN C PO      Take by mouth 2 times daily 500 mg take 1 daily

## 2017-10-21 ENCOUNTER — HOSPITAL ENCOUNTER (EMERGENCY)
Facility: CLINIC | Age: 82
Discharge: HOME | End: 2017-10-21
Attending: EMERGENCY MEDICINE | Admitting: EMERGENCY MEDICINE
Payer: MEDICARE

## 2017-10-21 VITALS
DIASTOLIC BLOOD PRESSURE: 83 MMHG | OXYGEN SATURATION: 94 % | TEMPERATURE: 98.7 F | RESPIRATION RATE: 16 BRPM | SYSTOLIC BLOOD PRESSURE: 208 MMHG

## 2017-10-21 DIAGNOSIS — Z46.6 ENCOUNTER FOR FOLEY CATHETER REPLACEMENT: ICD-10-CM

## 2017-10-21 PROCEDURE — 51702 INSERT TEMP BLADDER CATH: CPT

## 2017-10-21 PROCEDURE — 99283 EMERGENCY DEPT VISIT LOW MDM: CPT | Mod: 25

## 2017-10-21 ASSESSMENT — ENCOUNTER SYMPTOMS
DIAPHORESIS: 0
CHILLS: 0
FEVER: 0

## 2017-10-21 NOTE — ED AVS SNAPSHOT
[facility] Emergency Department    [address]    [facility] [address]    Phone:  [phone]    Fax:  [phone]                                       Edison Boss   MRN: [id number]    Department:  [facility] Emergency Department   Date of Visit:  10/21/2017           After Visit Summary Signature Page     I have received my discharge instructions, and my questions have been answered. I have discussed any challenges I see with this plan with the nurse or doctor.    ..........................................................................................................................................  Patient/Patient Representative Signature      ..........................................................................................................................................  Patient Representative Print Name and Relationship to Patient    ..................................................               ................................................  Date                                            Time    ..........................................................................................................................................  Reviewed by Signature/Title    ...................................................              ..............................................  Date                                                            Time

## 2017-10-21 NOTE — ED AVS SNAPSHOT
[facility] Emergency Department    [address]    [facility] [address]    Phone:  [phone]    Fax:  [phone]                                       Edison Boss   MRN: [id number]    Department:  [facility] Emergency Department   Date of Visit:  10/21/2017           Patient Information     Date Of Birth          10/19/1924        Your diagnoses for this visit were:     Encounter for Arambula catheter replacement        You were seen by Gerard Tejeda MD.      Follow-up Information     Follow up with Porfirio Terrell MD. Call in 1 week.    Specialty:  Internal Medicine    Contact information:    [address]  [facility] [address]  [phone]        Discharge References/Attachments     ARAMBULA CATHETER, CARE (ENGLISH)      Future Appointments        Provider Department Dept Phone Center    10/26/2017 8:50 AM Michael Lilly MD [facility] [place] [phone] UB BIPIN [facility]    10/31/2017 11:00 AM SANDRA [facility] [place] [phone] [facility]    11/16/2017 11:00 AM Michael Lilly MD [facility] [phone] [facility] PHY [facility]      24 Hour Appointment Hotline       To make an appointment at any [facility], call [phone] ([phone]). If you don't have a family doctor or clinic, we will help you find one. [facility] are conveniently located to serve the needs of you and your family.             Review of your medicines      Our records show that you are taking the medicines listed below. If these are incorrect, please call your family doctor or clinic.        Dose / Directions Last dose taken    albuterol 108 (90 BASE) MCG/ACT Inhaler   Commonly known as:  PROAIR HFA/PROVENTIL HFA/VENTOLIN HFA   Dose:  2 puff   Quantity:  1 Inhaler        Inhale 2 puffs into the lungs every 6 hours as needed for shortness  of breath / dyspnea.   Refills:  1        amLODIPine 5 MG tablet   Commonly known as:  NORVASC   Dose:  5 mg   Quantity:  90 tablet        Take 1 tablet (5 mg) by mouth daily   Refills:  3        AVEENO ECZEMA THERAPY 1 % Crea   Generic drug:  Colloidal Oatmeal        Externally apply topically daily   Refills:  0        cetirizine 10 MG tablet   Commonly known as:  zyrTEC   Dose:  10 mg        Take 10 mg by mouth daily as needed   Refills:  0        cholecalciferol 5000 UNITS Caps   Dose:  1 capsule        Take 1 capsule by mouth daily. Takes 5000 units in the summer and 55341 units in the winter   Refills:  0        CIPROFLOXACIN PO   Dose:  500 mg        Take 500 mg by mouth 1 tablet by mouth once as needed. Take one tablet after each procedere   Refills:  0        clobetasol 0.05 % ointment   Commonly known as:  TEMOVATE        daily as needed   Refills:  2        COLON CLEANSER PO   Dose:  1 capsule        Take 1 capsule by mouth daily Advanced Colon Care II   Refills:  0        D-Mannose Powd        daily 500 mg Pt takes 2 pills daily   Refills:  0        EYE VITAMINS Caps        one tablet twice daily   Refills:  0        lidocaine 4 % Crea cream   Commonly known as:  LMX4        Apply topically daily as needed   Refills:  0        nebulizer nebulization   Quantity:  1        Use as directed   Refills:  0        omeprazole 20 MG CR capsule   Commonly known as:  priLOSEC   Dose:  20 mg   Quantity:  90 capsule        Take 1 capsule (20 mg) by mouth daily   Refills:  3        oxyCODONE-acetaminophen 5-325 MG per tablet   Commonly known as:  PERCOCET   Dose:  1 tablet   Quantity:  30 tablet        Take 1 tablet by mouth every 4 hours as needed for pain for pain.   Refills:  0        PARoxetine 30 MG tablet   Commonly known as:  PAXIL   Quantity:  45 tablet        TAKE 1/2 TABLET BY MOUTH AT BEDTIME   Refills:  3        VITAMIN C PO        Take by mouth 2 times daily 500 mg take 1 daily   Refills:  0                 Procedures and tests performed during your visit     Continue indwelling urinary catheter ([place])      Orders Needing Specimen Collection     None      Pending Results     No orders found from 10/19/2017 to 10/22/2017.            Pending Culture Results     No orders found from 10/19/2017 to 10/22/2017.            Pending Results Instructions     If you had any lab results that were not finalized at the time of your Discharge, you can call the ED Lab Result RN at [phone]. You will be contacted by this team for any positive Lab results or changes in treatment. The nurses are available 7 days a week from 10A to 6:30P.  You can leave a message 24 hours per day and they will return your call.        Test Results From Your Hospital Stay               Clinical Quality Measure: Blood Pressure Screening     Your blood pressure was checked while you were in the emergency department today. The last reading we obtained was  BP: (!) 208/83 . Please read the guidelines below about what these numbers mean and what you should do about them.  If your systolic blood pressure (the top number) is less than 120 and your diastolic blood pressure (the bottom number) is less than 80, then your blood pressure is normal. There is nothing more that you need to do about it.  If your systolic blood pressure (the top number) is 120-139 or your diastolic blood pressure (the bottom number) is 80-89, your blood pressure may be higher than it should be. You should have your blood pressure rechecked within a year by a primary care provider.  If your systolic blood pressure (the top number) is 140 or greater or your diastolic blood pressure (the bottom number) is 90 or greater, you may have high blood pressure. High blood pressure is treatable, but if left untreated over time it can put you at risk for heart attack, stroke, or kidney failure. You should have your blood pressure rechecked by a primary care provider within the next 4 weeks.  If  your provider in the emergency department today gave you specific instructions to follow-up with your doctor or provider even sooner than that, you should follow that instruction and not wait for up to 4 weeks for your follow-up visit.        Thank you for choosing [facility]       Thank you for choosing [facility] for your care. Our goal is always to provide you with excellent care. Hearing back from our patients is one way we can continue to improve our services. Please take a few minutes to complete the written survey that you may receive in the mail after you visit with us. Thank you!        [employer] Information     [employer] gives you secure access to your electronic health record. If you see a primary care provider, you can also send messages to your care team and make appointments. If you have questions, please call your primary care clinic.  If you do not have a primary care provider, please call [phone] and they will assist you.        Care EveryWhere ID     This is your Care EveryWhere ID. This could be used by other organizations to access your [facility] medical records  [id number]        Equal Access to Services     EMERITA CADE : Hadii vanessa camarilloo Soiliana, waaxda luqadaha, qaybta kaalmada bulmaro, yony mcclure . So [facility] [phone].    ATENCIÓN: Si habla español, tiene a lauren disposición servicios gratuitos de asistencia lingüística. Llame al [phone].    We comply with applicable federal civil rights laws and [place] laws. We do not discriminate on the basis of race, color, national origin, age, disability, sex, sexual orientation, or gender identity.            After Visit Summary       This is your record. Keep this with you and show to your community pharmacist(s) and doctor(s) at your next visit.

## 2017-10-21 NOTE — ED NOTES
Patient presents to ED due catheter problem. States waking up with catheter not in place and is here for it to be replaced .

## 2017-10-21 NOTE — ED PROVIDER NOTES
History     Chief Complaint:  Catheter Problem     HPI   Edison Boss is a 89 year old male who presents with his daughter to the ED for evaluation of catheter problem. The patient reports his tolliver catheter fell out today, and he would like it replaced. The patient denies any fevers, chills, diaphoresis, pain, or other physical symptoms. Of note, the patient's daughter reports the patient's catheter was scheduled to be replaced last week; however, the appointment was canceled by the physician.        Allergies:  Ceftriaxone  Bactrim  Levaquin  Zithromax  Macrodantin       Medications:   CIPROFLOXACIN PO   amLODIPine (NORVASC) 5 MG tablet   oxyCODONE-acetaminophen (PERCOCET) 5-325 MG per tablet   omeprazole (PRILOSEC) 20 MG CR capsule   PARoxetine (PAXIL) 30 MG tablet   Colloidal Oatmeal (AVEENO ECZEMA THERAPY) 1 % CREA   cetirizine (ZYRTEC) 10 MG tablet   lidocaine (LMX4) 4 % CREA 4% topical cream   Misc Natural Products (COLON CLEANSER PO)   clobetasol (TEMOVATE) 0.05 % ointment   albuterol (PROVENTIL HFA: VENTOLIN HFA) 108 (90 BASE) MCG/ACT inhaler   cholecalciferol 5000 UNITS CAPS   Ascorbic Acid (VITAMIN C PO)   D-MANNOSE POWD   EYE VITAMINS OR CAPS   NEBULIZER MISC     Past Medical History:    Cognitive impairment  SBO  RLS  Arthritis  A-fib  Kidney calculus  Esophageal reflux  HLD  HTN  Prostate neoplasm   Asthma  Osteoporosis  Circulatory system disease  Skin disorder  Pacemaker  Sinus node dysfunction  Sleep apnea  Cerebral artery occlusion w/ infarction    Past Surgical History:    Lithotripsy   TURP x2  Left rotator cuff repair   Brachytherapy  T&A  Implant Pacemaker  Penis surgery  Prostatectomy   Bilateral cataract surgery    Family History:    Heart disease  Lung cancer  Lymphoma  Prostate cancer  Cerebrovascular disease    Social History:  Smoking status: Former smoker, Quit 1978  Alcohol use: No  Presents to ED with daughter   Marital Status:   [2]     Review of Systems    Constitutional: Negative for chills, diaphoresis and fever.   All other systems reviewed and are negative.    Physical Exam     Patient Vitals for the past 24 hrs:   BP Temp Temp src Heart Rate Resp SpO2   10/21/17 0245 (!) 208/83 - - - - -   10/21/17 0141 158/80 98.7  F (37.1  C) Temporal 61 16 94 %     Physical Exam  Constitutional:  Oriented to person, place, and time.   HENT:   Head:    Normocephalic.   Mouth/Throat:   Oropharynx is clear and moist.   Eyes:    EOM are normal. Pupils are equal, round, and reactive to light.   Neck:    Neck supple.   Cardiovascular:  Normal rate, regular rhythm and normal heart sounds.      Exam reveals no gallop and no friction rub.       No murmur heard.  Pulmonary/Chest:  Effort normal and breath sounds normal.      No respiratory distress. No wheezes. No rales.      No reproducible chest wall pain.  Abdominal:   Soft. No distension. No tenderness. No rebound and no guarding.   Musculoskeletal:  Normal range of motion.   Neurological:   Alert and oriented to person, place, and time.           Moves all 4 extremities spontaneously    Skin:    No rash noted. No pallor.     Emergency Department Course     Emergency Department Course:  Past medical records, nursing notes, and vitals reviewed.  0155: I performed an exam of the patient and obtained history, as documented above.    86384: I rechecked the patient. Findings and plan explained to the Patient and daughter. Patient discharged home with instructions regarding supportive care, medications, and reasons to return. The importance of close follow-up was reviewed.     Impression & Plan      Medical Decision Making:  Mr. Boss, 89 year old male, who's here for tolliver catheter placement that fell out accidentally. No other concerns. Tolliver catheter placed without complication. I see no reason to check for UA as he's simply here for tolliver catheter replacement. Told to follow-up with his urologist and primary doctor.      Diagnosis:    ICD-10-CM   1. Encounter for Christianson catheter replacement Z46.6     Disposition: Home. Follow-up with PMD.     Peyton Chicas  10/21/2017   [facility] EMERGENCY DEPARTMENT    I, Peyton Chicas, am serving as a scribe at 1:55 AM on 10/21/2017 to document services personally performed by Gerard Tejeda MD based on my observations and the provider's statements to me.        Gerard Tejeda MD  10/21/17 0513

## 2017-10-25 DIAGNOSIS — Z79.2 PROPHYLACTIC ANTIBIOTIC: Primary | ICD-10-CM

## 2017-10-25 RX ORDER — CIPROFLOXACIN 500 MG/1
500 TABLET, FILM COATED ORAL ONCE
Qty: 12 TABLET | Refills: 0 | Status: SHIPPED | OUTPATIENT
Start: 2017-10-25 | End: 2017-10-25

## 2017-10-30 ENCOUNTER — OFFICE VISIT (OUTPATIENT)
Dept: INTERNAL MEDICINE | Facility: CLINIC | Age: 82
End: 2017-10-30
Payer: MEDICARE

## 2017-10-30 VITALS
OXYGEN SATURATION: 93 % | BODY MASS INDEX: 24.91 KG/M2 | SYSTOLIC BLOOD PRESSURE: 152 MMHG | DIASTOLIC BLOOD PRESSURE: 86 MMHG | HEIGHT: 70 IN | HEART RATE: 60 BPM | WEIGHT: 174 LBS | TEMPERATURE: 97.8 F

## 2017-10-30 DIAGNOSIS — M25.511 ACUTE PAIN OF RIGHT SHOULDER: Primary | ICD-10-CM

## 2017-10-30 PROCEDURE — 99213 OFFICE O/P EST LOW 20 MIN: CPT | Performed by: NURSE PRACTITIONER

## 2017-10-30 NOTE — PROGRESS NOTES
SUBJECTIVE:   Edison Boss is a 89 year old male who presents to clinic today for the following health issues:      Musculoskeletal problem/pain      Duration: 1 week    Description  Location: R shoulder dull-sharp pain with reaching out or up    Intensity:  moderate    Accompanying signs and symptoms: radiation of pain to R neck    History  Previous similar problem: YES- 10 years ago L shoulder rotator cuff repair  Previous evaluation:  none    Precipitating or alleviating factors:  Trauma or overuse: YES- does home stretching exercises  Aggravating factors include: none    Therapies tried and outcome: tylenol, percocet           Patient Active Problem List   Diagnosis     Malignant neoplasm of prostate (H)     Other specified disorder of skin     Personal history of other diseases of circulatory system     Osteoporosis     Esophageal reflux     Essential hypertension with goal blood pressure less than 140/90     Mild persistent asthma     Insomnia     Restless legs syndrome (RLS)     HYPERLIPIDEMIA LDL GOAL <100     Chronic atrial fibrillation (H)     SBO (small bowel obstruction)     Cerebral infarction (H)     Sinus node dysfunction (H)     Pacemaker     RAMÓN (generalized anxiety disorder)     MCI (mild cognitive impairment)     Past Surgical History:   Procedure Laterality Date     C NONSPECIFIC PROCEDURE  1980's    Kidney stone surgery     C NONSPECIFIC PROCEDURE  1985, 5/2005    TURP x 2     C NONSPECIFIC PROCEDURE  1/2002    Brachytherapy     C NONSPECIFIC PROCEDURE  ~1999    Left rotator cuff repair     C NONSPECIFIC PROCEDURE      Bilateral cataract surgery     C NONSPECIFIC PROCEDURE      EGD/dilation twice     CYSTOSCOPY       CYSTOSCOPY, INJECT COLLAGEN, COMBINED  6/6/2012    Procedure:COMBINED CYSTOSCOPY, INJECT BULKING AGENT; VIDEO CYSTOSCOPY WITH BOTOX INJECTIONS  ; Surgeon:BRIAN ADAN; Location: OR     CYSTOSCOPY, INJECT COLLAGEN, COMBINED  3/22/2013    Procedure: COMBINED  CYSTOSCOPY, INJECT BULKING AGENT;  VIDEO CYSTOSCOPY, BOTOX INJECTION;  Surgeon: Michael Lilly MD;  Location: [facility] OR     CYSTOSCOPY, INJECT COLLAGEN, COMBINED  2014    Procedure: COMBINED CYSTOSCOPY, INJECT BULKING AGENT;  Surgeon: Michael Lilly MD;  Location: [facility] OR     CYSTOSCOPY, INJECT COLLAGEN, COMBINED N/A 2015    Procedure: COMBINED CYSTOSCOPY, INJECT BULKING AGENT;  Surgeon: Michael Lilly MD;  Location: [facility] OR     CYSTOSCOPY, INJECT COLLAGEN, COMBINED N/A 2016    Procedure: COMBINED CYSTOSCOPY, INJECT BULKING AGENT;  Surgeon: Michael Lilly MD;  Location: [facility] OR     HC REMOVE TONSILS/ADENOIDS,<13 Y/O  ~1928    T & A <12y.o.     IMPLANT PACEMAKER       PENIS SURGERY       PROSTATE SURGERY         Social History   Substance Use Topics     Smoking status: Former Smoker     Packs/day: 1.00     Years: 40.00     Smokeless tobacco: Never Used      Comment: QUIT      Alcohol use No     Family History   Problem Relation Age of Onset     Family History Negative Father       age 91     HEART DISEASE Mother      pacemaker     Family History Negative Mother       in her sleep 103     CANCER Brother      oldest brother - lung cancer,  age 74     CANCER Brother      3rd brother - lymphoma,  age 76     CANCER Brother      2nd brother (Carrington)- prostate cancer, born [birth year].      CEREBROVASCULAR DISEASE Brother      Brother (Carrington), born in [birth year]         Current Outpatient Prescriptions   Medication Sig Dispense Refill     CIPROFLOXACIN PO Take 500 mg by mouth 1 tablet by mouth once as needed. Take one tablet after each procedere       amLODIPine (NORVASC) 5 MG tablet Take 1 tablet (5 mg) by mouth daily 90 tablet 3     oxyCODONE-acetaminophen (PERCOCET) 5-325 MG per tablet Take 1 tablet by mouth every 4 hours as needed for pain for pain. 30 tablet 0     omeprazole (PRILOSEC) 20 MG CR capsule Take 1 capsule (20 mg) by mouth daily 90 capsule 3     PARoxetine (PAXIL) 30 MG tablet TAKE  "1/2 TABLET BY MOUTH AT BEDTIME (Patient taking differently: 15 mg TAKE 1/2 TABLET BY MOUTH AT BEDTIME) 45 tablet 3     Colloidal Oatmeal (AVEENO ECZEMA THERAPY) 1 % CREA Externally apply topically daily       cetirizine (ZYRTEC) 10 MG tablet Take 10 mg by mouth daily as needed       lidocaine (LMX4) 4 % CREA 4% topical cream Apply topically daily as needed       Misc Natural Products (COLON CLEANSER PO) Take 1 capsule by mouth daily Advanced Colon Care II       clobetasol (TEMOVATE) 0.05 % ointment daily as needed   2     albuterol (PROVENTIL HFA: VENTOLIN HFA) 108 (90 BASE) MCG/ACT inhaler Inhale 2 puffs into the lungs every 6 hours as needed for shortness of breath / dyspnea. 1 Inhaler 1     cholecalciferol 5000 UNITS CAPS Take 1 capsule by mouth daily. Takes 5000 units in the summer and 75061 units in the winter       Ascorbic Acid (VITAMIN C PO) Take by mouth 2 times daily 500 mg take 1 daily       D-MANNOSE POWD daily 500 mg Pt takes 2 pills daily       EYE VITAMINS OR CAPS one tablet twice daily       NEBULIZER MISC Use as directed 1 0     BP Readings from Last 3 Encounters:   10/30/17 152/86   10/21/17 (!) 208/83   08/30/17 158/72    Wt Readings from Last 3 Encounters:   10/30/17 174 lb (78.9 kg)   08/30/17 177 lb 8 oz (80.5 kg)   08/10/17 175 lb 3.2 oz (79.5 kg)                        Reviewed and updated as needed this visit by clinical staffTobacco  Allergies  Meds  Med Hx  Surg Hx  Fam Hx  Soc Hx      Reviewed and updated as needed this visit by Provider         ROS:  Constitutional, HEENT, cardiovascular, pulmonary, gi and gu systems are negative, except as otherwise noted.      OBJECTIVE:   /86  Pulse 60  Temp 97.8  F (36.6  C) (Oral)  Ht 5' 9.5\" (1.765 m)  Wt 174 lb (78.9 kg)  SpO2 93%  BMI 25.33 kg/m2  Body mass index is 25.33 kg/(m^2).  GENERAL: no distress, frail and elderly male  MS: R shoulder decreased d/t pain.        ASSESSMENT/PLAN:               ICD-10-CM    1. Acute pain of " right shoulder M25.511 PIPE PT, HAND, AND CHIROPRACTIC REFERRAL       Discussed with pt and daughter, PT x 2 weeks, consider MRI.  NSAIDs, percocet prn.  Avoid immobilization.    Brenda Grajeda NP  [facility]

## 2017-10-30 NOTE — NURSING NOTE
"Chief Complaint   Patient presents with     Shoulder Pain     right shoulder is stiff x3-4days at times pain radiates to the neck        Initial /86  Pulse 60  Temp 97.8  F (36.6  C) (Oral)  Ht 5' 9.5\" (1.765 m)  Wt 174 lb (78.9 kg)  SpO2 93%  BMI 25.33 kg/m2 Estimated body mass index is 25.33 kg/(m^2) as calculated from the following:    Height as of this encounter: 5' 9.5\" (1.765 m).    Weight as of this encounter: 174 lb (78.9 kg).  Medication Reconciliation: complete   Deng Guido MA      "

## 2017-10-30 NOTE — MR AVS SNAPSHOT
After Visit Summary   10/30/2017    Edison Boss    MRN: [id number]           Patient Information     Date Of Birth          10/19/1924        Visit Information        Provider Department      10/30/2017 2:00 PM Brenda Grajeda NP [facility]        Today's Diagnoses     Acute pain of right shoulder    -  1       Follow-ups after your visit        Additional Services     PIPE PT, HAND, AND CHIROPRACTIC REFERRAL       **This order will print in the [facility] Scheduling Office**    Physical Therapy, Hand Therapy and Chiropractic Care are available through:    *[facility]  *[facility]  *[facility]    Call one number to schedule at any of the above locations: [phone].    Your provider has referred you to: Physical Therapy at [facility] or [facility]    Indication/Reason for Referral: Shoulder Pain  Onset of Illness: 1 week  Therapy Orders: Evaluate and Treat  Special Programs: None  Special Request:     Ivette Steele      Additional Comments for the Therapist or Chiropractor:     Please be aware that coverage of these services is subject to the terms and limitations of your health insurance plan.  Call member services at your health plan with any benefit or coverage questions.      Please bring the following to your appointment:    *Your personal calendar for scheduling future appointments  *Comfortable clothing                  Your next 10 appointments already scheduled     Oct 31, 2017 11:00 AM CDT   Phone Device Check with SANDRA TECH1   [facility] [place] ([facility])    [address] [place] [address] [address]  [facility] [id number] [phone]            Nov 16, 2017 11:00 AM CST   Return Visit with Michael Lilly MD   [facility] ([facility])    [address]  [address]  [facility] [address] [phone]          "     Who to contact     If you have questions or need follow up information about today's clinic visit or your schedule please contact [facility] directly at [phone].  Normal or non-critical lab and imaging results will be communicated to you by MyChart, letter or phone within 4 business days after the clinic has received the results. If you do not hear from us within 7 days, please contact the clinic through [employer] or phone. If you have a critical or abnormal lab result, we will notify you by phone as soon as possible.  Submit refill requests through [employer] or call your pharmacy and they will forward the refill request to us. Please allow 3 business days for your refill to be completed.          Additional Information About Your Visit        [employer] Information     [employer] gives you secure access to your electronic health record. If you see a primary care provider, you can also send messages to your care team and make appointments. If you have questions, please call your primary care clinic.  If you do not have a primary care provider, please call [phone] and they will assist you.        Care EveryWhere ID     This is your Care EveryWhere ID. This could be used by other organizations to access your [place] medical records  [id number]        Your Vitals Were     Pulse Temperature Height Pulse Oximetry BMI (Body Mass Index)       60 97.8  F (36.6  C) (Oral) 5' 9.5\" (1.765 m) 93% 25.33 kg/m2        Blood Pressure from Last 3 Encounters:   10/30/17 152/86   10/21/17 (!) 208/83   08/30/17 158/72    Weight from Last 3 Encounters:   10/30/17 174 lb (78.9 kg)   08/30/17 177 lb 8 oz (80.5 kg)   08/10/17 175 lb 3.2 oz (79.5 kg)              We Performed the Following     PIPE PT, HAND, AND CHIROPRACTIC REFERRAL          Today's Medication Changes          These changes are accurate as of: 10/30/17  2:20 PM.  If you have any questions, ask your nurse or doctor.               These medicines have " changed or have updated prescriptions.        Dose/Directions    PARoxetine 30 MG tablet   Commonly known as:  PAXIL   This may have changed:    - how much to take  - additional instructions   Used for:  RAMÓN (generalized anxiety disorder)        TAKE 1/2 TABLET BY MOUTH AT BEDTIME   Quantity:  45 tablet   Refills:  3                Primary Care Provider Office Phone # Fax #    Porfirio Terrell [phone]       [address] [facility] [address]  [facility] [address]        Equal Access to Services     [facility] Center[facility] : Hadii aad ku hadasho Soomaali, waaxda luqadaha, qaybta kaalmada adeegyada, waxay idiin hayaan adeeg kharash la'aan . So [facility] [phone].    ATENCIÓN: Si habla español, tiene a lauren disposición servicios gratuitos de asistencia lingüística. [facility] [phone].    We comply with applicable federal civil rights laws and [place] laws. We do not discriminate on the basis of race, color, national origin, age, disability, sex, sexual orientation, or gender identity.            Thank you!     Thank you for choosing [facility]  for your care. Our goal is always to provide you with excellent care. Hearing back from our patients is one way we can continue to improve our services. Please take a few minutes to complete the written survey that you may receive in the mail after your visit with us. Thank you!             Your Updated Medication List - Protect others around you: Learn how to safely use, store and throw away your medicines at www.disposemymeds.org.          This list is accurate as of: 10/30/17  2:20 PM.  Always use your most recent med list.                   Brand Name Dispense Instructions for use Diagnosis    albuterol 108 (90 BASE) MCG/ACT Inhaler    PROAIR HFA/PROVENTIL HFA/VENTOLIN HFA    1 Inhaler    Inhale 2 puffs into the lungs every 6 hours as needed for shortness of breath / dyspnea.    Mild persistent asthma       amLODIPine 5 MG tablet    NORVASC    90 tablet     Take 1 tablet (5 mg) by mouth daily    Essential hypertension with goal blood pressure less than 140/90       AVEENO ECZEMA THERAPY 1 % Crea   Generic drug:  Colloidal Oatmeal      Externally apply topically daily        cetirizine 10 MG tablet    zyrTEC     Take 10 mg by mouth daily as needed        cholecalciferol 5000 UNITS Caps      Take 1 capsule by mouth daily. Takes 5000 units in the summer and 61298 units in the winter        CIPROFLOXACIN PO      Take 500 mg by mouth 1 tablet by mouth once as needed. Take one tablet after each procedere        clobetasol 0.05 % ointment    TEMOVATE     daily as needed        COLON CLEANSER PO      Take 1 capsule by mouth daily Advanced Colon Care II        D-Mannose Powd      daily 500 mg Pt takes 2 pills daily        EYE VITAMINS Caps      one tablet twice daily        lidocaine 4 % Crea cream    LMX4     Apply topically daily as needed        nebulizer nebulization     1    Use as directed    Mild persistent asthma       omeprazole 20 MG CR capsule    priLOSEC    90 capsule    Take 1 capsule (20 mg) by mouth daily    Gastroesophageal reflux disease without esophagitis       oxyCODONE-acetaminophen 5-325 MG per tablet    PERCOCET    30 tablet    Take 1 tablet by mouth every 4 hours as needed for pain for pain.    Cervicalgia, Acute upper back pain       PARoxetine 30 MG tablet    PAXIL    45 tablet    TAKE 1/2 TABLET BY MOUTH AT BEDTIME    RAMÓN (generalized anxiety disorder)       VITAMIN C PO      Take by mouth 2 times daily 500 mg take 1 daily

## 2017-10-31 ENCOUNTER — ALLIED HEALTH/NURSE VISIT (OUTPATIENT)
Dept: CARDIOLOGY | Facility: CLINIC | Age: 82
End: 2017-10-31
Payer: MEDICARE

## 2017-10-31 ENCOUNTER — THERAPY VISIT (OUTPATIENT)
Dept: PHYSICAL THERAPY | Facility: CLINIC | Age: 82
End: 2017-10-31
Payer: MEDICARE

## 2017-10-31 DIAGNOSIS — Z95.0 CARDIAC PACEMAKER IN SITU: Primary | ICD-10-CM

## 2017-10-31 DIAGNOSIS — M25.511 ACUTE PAIN OF RIGHT SHOULDER: Primary | ICD-10-CM

## 2017-10-31 PROCEDURE — 99207 ZZC NO CHARGE LOS: CPT

## 2017-10-31 PROCEDURE — 97110 THERAPEUTIC EXERCISES: CPT | Mod: GP | Performed by: PHYSICAL THERAPIST

## 2017-10-31 PROCEDURE — G8985 CARRY GOAL STATUS: HCPCS | Mod: GP | Performed by: PHYSICAL THERAPIST

## 2017-10-31 PROCEDURE — 97162 PT EVAL MOD COMPLEX 30 MIN: CPT | Mod: GP | Performed by: PHYSICAL THERAPIST

## 2017-10-31 PROCEDURE — G8984 CARRY CURRENT STATUS: HCPCS | Mod: GP | Performed by: PHYSICAL THERAPIST

## 2017-10-31 NOTE — PROGRESS NOTES
Subjective:    Patient is a 89 year old male presenting with rehab right shoulder hpi. The history is provided by the patient. No  was used.   Edison Boss is a 89 year old male with a right shoulder condition.  Condition occurred with:  Repetition/overuse.  Condition occurred: at home.  This is a new condition  Onset of right shoulder pain 5 days ago secondary to performing bridging exercise. Pt referred by MD for physical therapy on 10-30-17.    Patient reports pain:  Anterior and posterior.  Radiates to:  Shoulder, cervical and upper arm.  Pain is described as stabbing and aching and is intermittent and reported as 10/10.  Associated symptoms:  Loss of motion/stiffness and loss of strength. Pain is the same all the time.  Symptoms are exacerbated by using arm overhead, using arm behind back, lifting, carrying and lying on extremity (leaving arm unsupported) and relieved by rest and analgesics (support under the arm).  Since onset symptoms are unchanged.  Special testing: none.  Previous treatment: none.      Pertinent medical history includes:  High blood pressure, osteoarthritis, stroke, rheumatoid arthritis, osteoporosis and heart problems.  Medical allergies: yes (see snapshot).  Other surgeries include:  Cancer surgery and orthopedic surgery (Turp, left shoulder repair).  Medication history: see snapshot.  Current occupation is retired.            Red flags:  Pain at rest/night (dizziness, weakness, history of falls).                        Objective:    Standing Alignment:      Shoulder/upper extremity deviations alignment: forward shoulder, thoracic kyphosis, difficulty holding head up.                  Flexibility/Screens:     Upper Extremity:    Decreased left upper extremity flexibility at:  Pectoralis Major    Decreased right upper extremity flexibility present at:  Pectoralis Major                      Cervical/Thoracic Evaluation    AROM:  AROM Cervical:    Flexion:             70% pulls on neck  Extension:       40%  Rotation:         Left: 50%     Right: 50%  Side Bend:      Left: 30%     Right:  30%    Strength: weak scapular stabilizers                           Shoulder Evaluation:  ROM:  AROM:    Flexion:  Right:  95  Extension: Right: 50  Abduction:  Right:  70                      PROM:    Flexion:  Right: 100    Extension:  Right:  60  Abduction:  Right:  75                          Strength:    Flexion: Right: 4-/5     Pain:   Extension:  Right: 4/5    Pain:  Abduction:  Right: 4-/5     Pain:  Adduction:  Right: 5/5     Pain:  Internal Rotation:  Right: 4/5     Pain:  External Rotation:   Right:4-/5     Pain:              Special Tests:      Right shoulder positive for the following special tests:Impingement  Palpation:      Right shoulder tenderness present at: Supraspinatus and Upper Trap  Mobility Tests:    Glenohumeral anterior right:  Hypermobile  Glenohumeral posterior right:  Hypomobile  Glenohumeral inferior right:  Hypermobile                                             General     ROS    Assessment/Plan:      Patient is a 89 year old male with right side shoulder complaints.    Patient has the following significant findings with corresponding treatment plan.                Diagnosis 1:  Right shoulder pain  Pain -  hot/cold therapy, manual therapy, self management, education and home program  Decreased ROM/flexibility - manual therapy, therapeutic exercise, therapeutic activity and home program  Decreased strength - therapeutic exercise, therapeutic activities and home program    Therapy Evaluation Codes:   1) History comprised of:   Personal factors that impact the plan of care:      Age.    Comorbidity factors that impact the plan of care are:      Cancer, Dizziness, Heart problems, High blood pressure, Numbness/tingling, Osteoarthritis, Pain at night/rest, Rheumatoid arthritis, Stroke and Weakness.     Medications impacting care: see snapshot.  2) Examination  of Body Systems comprised of:   Body structures and functions that impact the plan of care:      Cervical spine, Shoulder and Thoracic Spine.   Activity limitations that impact the plan of care are:      Bathing, Dressing, Lifting, Reading/Computer work, Sitting, Walking, Sleeping and Laying down.  3) Clinical presentation characteristics are:   Evolving/Changing.  4) Decision-Making    Moderate complexity using standardized patient assessment instrument and/or measureable assessment of functional outcome.  Cumulative Therapy Evaluation is: Moderate complexity.    Previous and current functional limitations:  (See Goal Flow Sheet for this information)    Short term and Long term goals: (See Goal Flow Sheet for this information)     Communication ability:  Patient appears to be able to clearly communicate and understand verbal and written communication and follow directions correctly.  Treatment Explanation - The following has been discussed with the patient:   RX ordered/plan of care  Anticipated outcomes  Possible risks and side effects  This patient would benefit from PT intervention to resume normal activities.   Rehab potential is good.    Frequency:  1   X week, once daily  Duration:  for 6 weeks  Discharge Plan:  Achieve all LTG.  Independent in home treatment program.  Reach maximal therapeutic benefit.    Please refer to the daily flowsheet for treatment today, total treatment time and time spent performing 1:1 timed codes.

## 2017-10-31 NOTE — PROGRESS NOTES
~30 day phone teletrace/ no charge  Intrinsic rhythm at time of check.  Magnet response WNL. F/U scheduled q 1 month. FREDDIE DiazT

## 2017-10-31 NOTE — LETTER
DEPARTMENT OF HEALTH AND HUMAN SERVICES  CENTERS FOR MEDICARE & MEDICAID SERVICES    PLAN/UPDATED PLAN OF PROGRESS FOR OUTPATIENT REHABILITATION    PATIENTS NAME:  Edison Boss   : 10/19/1924  PROVIDER NUMBER:    [id number]  [id number]  PROVIDER NAME: [facility]  MEDICAL RECORD NUMBER: [id number]   START OF CARE DATE:  SOC Date: 10/31/17   TYPE:  PT  PRIMARY/TREATMENT DIAGNOSIS: (Pertinent Medical Diagnosis)  Acute pain of right shoulder    VISITS FROM START OF CARE:  Rxs Used: 1     EVALUATION SUMMARY    Subjective:    Patient is a 89 year old male presenting with rehab right shoulder hpi. The history is provided by the patient. No  was used.   Edison Boss is a 89 year old male with a right shoulder condition.  Condition occurred with:  Repetition/overuse.  Condition occurred: at home.  This is a new condition  Onset of right shoulder pain 5 days ago secondary to performing bridging exercise. Pt referred by MD for physical therapy on 10-30-17.  Patient reports pain:  Anterior and posterior.  Radiates to:  Shoulder, cervical and upper arm.  Pain is described as stabbing and aching and is intermittent and reported as 10/10.  Associated symptoms:  Loss of motion/stiffness and loss of strength. Pain is the same all the time.  Symptoms are exacerbated by using arm overhead, using arm behind back, lifting, carrying and lying on extremity (leaving arm unsupported) and relieved by rest and analgesics (support under the arm).  Since onset symptoms are unchanged.  Special testing: none.  Previous treatment: none.      Pertinent medical history includes:  High blood pressure, osteoarthritis, stroke, rheumatoid arthritis, osteoporosis and heart problems.  Medical allergies: yes (see snapshot).  Other surgeries include:  Cancer surgery and orthopedic surgery (Turp, left shoulder repair).  Medication history: see snapshot.  Current occupation is retired.   Red flags:  Pain  at rest/night (dizziness, weakness, history of falls).    Objective:    Standing Alignment:      Shoulder/upper extremity deviations alignment: forward shoulder, thoracic kyphosis, difficulty holding head up.  Flexibility/Screens:   Upper Extremity:    Decreased left upper extremity flexibility at:  Pectoralis Major  Decreased right upper extremity flexibility present at:  Pectoralis Major      Cervical/Thoracic Evaluation    AROM:  AROM Cervical:  Flexion:            70% pulls on neck  Extension:       40%  Rotation:         Left: 50%     Right: 50%  Side Bend:      Left: 30%     Right:  30%  Strength: weak scapular stabilizers  PATIENTS NAME:  Edison Boss   : 10/19/1924    Shoulder Evaluation:  ROM:  AROM:    Flexion:  Right:  95  Extension: Right: 50  Abduction:  Right:  70  PROM:    Flexion:  Right: 100    Extension:  Right:  60  Abduction:  Right:  75  Strength:    Flexion: Right: 4-/5     Pain:   Extension:  Right: 4/5    Pain:  Abduction:  Right: 4-/5     Pain:  Adduction:  Right: 5/5     Pain:  Internal Rotation:  Right: 4/5     Pain:  External Rotation:   Right:4-/5     Pain:    Special Tests:    Right shoulder positive for the following special tests:Impingement  Palpation:    Right shoulder tenderness present at: Supraspinatus and Upper Trap  Mobility Tests:    Glenohumeral anterior right:  Hypermobile  Glenohumeral posterior right:  Hypomobile  Glenohumeral inferior right:  Hypermobile      Assessment/Plan:      Patient is a 89 year old male with right side shoulder complaints.    Patient has the following significant findings with corresponding treatment plan.                Diagnosis 1:  Right shoulder pain  Pain -  hot/cold therapy, manual therapy, self management, education and home program  Decreased ROM/flexibility - manual therapy, therapeutic exercise, therapeutic activity and home program  Decreased strength - therapeutic exercise, therapeutic activities and home program    Therapy  Evaluation Codes:   1) History comprised of:   Personal factors that impact the plan of care:      Age.    Comorbidity factors that impact the plan of care are:      Cancer, Dizziness, Heart problems, High blood pressure, Numbness/tingling, Osteoarthritis, Pain at night/rest, Rheumatoid arthritis, Stroke and Weakness.     Medications impacting care: see snapshot.  2) Examination of Body Systems comprised of:   Body structures and functions that impact the plan of care:      Cervical spine, Shoulder and Thoracic Spine.   Activity limitations that impact the plan of care are:      Bathing, Dressing, Lifting, Reading/Computer work, Sitting, Walking, Sleeping and Laying down.  3) Clinical presentation characteristics are:   Evolving/Changing.  4) Decision-Making    Moderate complexity using standardized patient assessment instrument and/or measureable assessment of functional outcome.  PATIENTS NAME:  Edison Boss   : 10/19/1924      Cumulative Therapy Evaluation is: Moderate complexity.    Previous and current functional limitations:  (See Goal Flow Sheet for this information)    Short term and Long term goals: (See Goal Flow Sheet for this information)     Communication ability:  Patient appears to be able to clearly communicate and understand verbal and written communication and follow directions correctly.  Treatment Explanation - The following has been discussed with the patient:   RX ordered/plan of care  Anticipated outcomes  Possible risks and side effects  This patient would benefit from PT intervention to resume normal activities.   Rehab potential is good.    Frequency:  1 X week, once daily  Duration:  for 6 weeks  Discharge Plan:  Achieve all LTG.  Independent in home treatment program.  Reach maximal therapeutic benefit.              Caregiver Signature/Credentials _____________________________ Date ________      Treating Provider: Jeremy Vazquez, PT   I have reviewed and certified the need for  "these services and plan of treatment while under my care.        PHYSICIAN'S SIGNATURE:   ____________________________________ Date___________     Brenda Grajeda NP    Certification period:  Beginning of Cert date period: 10/31/17 to  End of Cert period date: 01/28/18     Functional Level Progress Report: Please see attached \"Goal Flow sheet for Functional level.\"    ____X____ Continue Services or       ________ DC Services                Service dates: From  SOC Date: 10/31/17 date to present                         "

## 2017-10-31 NOTE — MR AVS SNAPSHOT
After Visit Summary   10/31/2017    Edison Boss    MRN: [id number]           Patient Information     Date Of Birth          10/19/1924        Visit Information        Provider Department      10/31/2017 11:00 AM JOCY SPEAR [facility]        Today's Diagnoses     Cardiac pacemaker in situ    -  1       Follow-ups after your visit        Your next 10 appointments already scheduled     Oct 31, 2017  2:40 PM CDT   (Arrive by 2:25 PM)   [facility] Extremity with Jeremy Vazquez, PT   [facility] ([facility] [place]  )    [address]  [address]  [facility] [address]   [phone]            Nov 16, 2017 11:00 AM CST   Return Visit with Michael Lilly MD   [facility] ([facility])    [address]  [address]  [facility] [id number] [phone]            Dec 05, 2017 11:00 AM CST   Phone Device Check with JOCY SPEAR   [facility] ([email or web address])    [address]  [facility] [id number] [phone]              Who to contact     If you have questions or need follow up information about today's clinic visit or your schedule please contact [facility] directly at [phone].  Normal or non-critical lab and imaging results will be communicated to you by MyChart, letter or phone within 4 business days after the clinic has received the results. If you do not hear from us within 7 days, please contact the clinic through MyChart or phone. If you have a critical or abnormal lab result, we will notify you by phone as soon as possible.  Submit refill requests through [employer] or call your pharmacy and they will forward the refill request to us. Please allow 3 business days for your refill to be completed.          Additional Information About Your Visit        MyChart Information     [employer]  gives you secure access to your electronic health record. If you see a primary care provider, you can also send messages to your care team and make appointments. If you have questions, please call your primary care clinic.  If you do not have a primary care provider, please call [phone] and they will assist you.        Care EveryWhere ID     This is your Care EveryWhere ID. This could be used by other organizations to access your [facility] medical records  [id number]         Blood Pressure from Last 3 Encounters:   10/30/17 152/86   10/21/17 (!) 208/83   08/30/17 158/72    Weight from Last 3 Encounters:   10/30/17 78.9 kg (174 lb)   08/30/17 80.5 kg (177 lb 8 oz)   08/10/17 79.5 kg (175 lb 3.2 oz)              Today, you had the following     No orders found for display         Today's Medication Changes          These changes are accurate as of: 10/31/17 11:08 AM.  If you have any questions, ask your nurse or doctor.               These medicines have changed or have updated prescriptions.        Dose/Directions    PARoxetine 30 MG tablet   Commonly known as:  PAXIL   This may have changed:    - how much to take  - additional instructions   Used for:  RAMÓN (generalized anxiety disorder)        TAKE 1/2 TABLET BY MOUTH AT BEDTIME   Quantity:  45 tablet   Refills:  3                Primary Care Provider Office Phone # Fax #    Porfirio Terrell [phone]       [address] [facility] [address]  [place] [id number]        Equal Access to Services     [facility]: Hadii vanessa camarilloo Soiliana, waaxda luqadaha, qaybta kaalmada simbayada, yony nunn. So [facility] [phone].    ATENCIÓN: Si habla español, tiene a lauren disposición servicios gratuitos de asistencia lingüística. Llame al [phone].    We comply with applicable federal civil rights laws and [place] laws. We do not discriminate on the basis of race, color, national origin, age, disability, sex, sexual orientation,  or gender identity.            Thank you!     Thank you for choosing [facility]  for your care. Our goal is always to provide you with excellent care. Hearing back from our patients is one way we can continue to improve our services. Please take a few minutes to complete the written survey that you may receive in the mail after your visit with us. Thank you!             Your Updated Medication List - Protect others around you: Learn how to safely use, store and throw away your medicines at www.disposemymeds.org.          This list is accurate as of: 10/31/17 11:08 AM.  Always use your most recent med list.                   Brand Name Dispense Instructions for use Diagnosis    albuterol 108 (90 BASE) MCG/ACT Inhaler    PROAIR HFA/PROVENTIL HFA/VENTOLIN HFA    1 Inhaler    Inhale 2 puffs into the lungs every 6 hours as needed for shortness of breath / dyspnea.    Mild persistent asthma       amLODIPine 5 MG tablet    NORVASC    90 tablet    Take 1 tablet (5 mg) by mouth daily    Essential hypertension with goal blood pressure less than 140/90       AVEENO ECZEMA THERAPY 1 % Crea   Generic drug:  Colloidal Oatmeal      Externally apply topically daily        cetirizine 10 MG tablet    zyrTEC     Take 10 mg by mouth daily as needed        cholecalciferol 5000 UNITS Caps      Take 1 capsule by mouth daily. Takes 5000 units in the summer and 95339 units in the winter        CIPROFLOXACIN PO      Take 500 mg by mouth 1 tablet by mouth once as needed. Take one tablet after each procedere        clobetasol 0.05 % ointment    TEMOVATE     daily as needed        COLON CLEANSER PO      Take 1 capsule by mouth daily Advanced Colon Care II        D-Mannose Powd      daily 500 mg Pt takes 2 pills daily        EYE VITAMINS Caps      one tablet twice daily        lidocaine 4 % Crea cream    LMX4     Apply topically daily as needed        nebulizer nebulization     1    Use as directed    Mild  persistent asthma       omeprazole 20 MG CR capsule    priLOSEC    90 capsule    Take 1 capsule (20 mg) by mouth daily    Gastroesophageal reflux disease without esophagitis       oxyCODONE-acetaminophen 5-325 MG per tablet    PERCOCET    30 tablet    Take 1 tablet by mouth every 4 hours as needed for pain for pain.    Cervicalgia, Acute upper back pain       PARoxetine 30 MG tablet    PAXIL    45 tablet    TAKE 1/2 TABLET BY MOUTH AT BEDTIME    RAMÓN (generalized anxiety disorder)       VITAMIN C PO      Take by mouth 2 times daily 500 mg take 1 daily

## 2017-11-07 ENCOUNTER — THERAPY VISIT (OUTPATIENT)
Dept: PHYSICAL THERAPY | Facility: CLINIC | Age: 82
End: 2017-11-07
Payer: MEDICARE

## 2017-11-07 DIAGNOSIS — M25.511 ACUTE PAIN OF RIGHT SHOULDER: ICD-10-CM

## 2017-11-07 PROCEDURE — 97110 THERAPEUTIC EXERCISES: CPT | Mod: GP | Performed by: PHYSICAL THERAPIST

## 2017-11-07 NOTE — PROGRESS NOTES
Subjective:    HPI                    Objective:    System    Physical Exam    General     ROS    Assessment/Plan:      SUBJECTIVE  Subjective changes as noted by pt:  Pt notes improvement in pain     Current pain level: 8/10     Changes in function:  Pt still notes pain when arm hangs from side  Adverse reaction to treatment or activity:  None    OBJECTIVE  Changes in objective findings:  Progressed to PROM of shoulder. Improved strength posterior deltoid        ASSESSMENT  Edison continues to require intervention to meet STG and LTG's: PT  Patient's symptoms are resolving.  Response to therapy has shown an improvement in  pain level and strength  Progress made towards STG/LTG?  Yes,     PLAN  Current treatment program is being advanced to more complex exercises.    PTA/ATC plan:  N/A    Please refer to the daily flowsheet for treatment today, total treatment time and time spent performing 1:1 timed codes.

## 2017-11-14 ENCOUNTER — THERAPY VISIT (OUTPATIENT)
Dept: PHYSICAL THERAPY | Facility: CLINIC | Age: 82
End: 2017-11-14
Payer: MEDICARE

## 2017-11-14 DIAGNOSIS — M25.511 ACUTE PAIN OF RIGHT SHOULDER: ICD-10-CM

## 2017-11-14 PROCEDURE — 97110 THERAPEUTIC EXERCISES: CPT | Mod: GP | Performed by: PHYSICAL THERAPIST

## 2017-11-14 NOTE — PROGRESS NOTES
Subjective:    HPI                    Objective:    System    Physical Exam    General     ROS    Assessment/Plan:      SUBJECTIVE  Subjective changes as noted by pt:  Pt still notes intermittent pain. Pt notes increased mobility     Current pain level: 6/10 Current Pain level: 6/10   Changes in function:  Pt notes increased ease with reaching to shoulder height.      Adverse reaction to treatment or activity:  None    OBJECTIVE  Changes in objective findings:  Improved strength external rotators and anterior deltoid. Progressed to isotonic strengthening.         ASSESSMENT  Edison continues to require intervention to meet STG and LTG's: PT  Patient's symptoms are resolving.  Response to therapy has shown an improvement in  pain level, strength and function  Progress made towards STG/LTG?  Yes,     PLAN  Current treatment program is being advanced to more complex exercises.    PTA/ATC plan:  N/A    Please refer to the daily flowsheet for treatment today, total treatment time and time spent performing 1:1 timed codes.

## 2017-11-16 ENCOUNTER — OFFICE VISIT (OUTPATIENT)
Dept: UROLOGY | Facility: CLINIC | Age: 82
End: 2017-11-16
Payer: MEDICARE

## 2017-11-16 DIAGNOSIS — R33.9 URINARY RETENTION: Primary | ICD-10-CM

## 2017-11-16 PROCEDURE — 51702 INSERT TEMP BLADDER CATH: CPT | Performed by: UROLOGY

## 2017-11-16 PROCEDURE — 99212 OFFICE O/P EST SF 10 MIN: CPT | Mod: 25 | Performed by: UROLOGY

## 2017-11-16 NOTE — PROGRESS NOTES
Edison Boss is a 89-year-old gentleman with a history of prostate cancer treated with combination radiation. He has urinary retention which is managed with an indwelling Christianson change monthly. He can develop severe bladder spasticity that is been treated in the past with Botox injections.  His Christianson catheter was changed 4 weeks ago, fell out 3 weeks ago and was replaced in the emergency room. He's been having some bladder spasticity with leakage around the catheter.  Other past medical history: Atrial fibrillation, arthritis, urolithiasis, chronic pain, GERD, hyperlipidemia, hypertension, asthma, osteoporosis, pacemaker, sinus node dysfunction, sleep apnea, CVA, former smoker  Medications: Reviewed  Allergies: Reviewed  Exam: Normal appearance, normal vital signs, alert and oriented, normocephalic, normal respirations, neuro grossly intact  Christianson catheter changed easily-4 cc of saline placed in Christianson balloon and placed to close gravity drainage  Assessment: History of adenocarcinoma of the prostate, urinary retention, bladder spasticity  Plan: Change Christianson every 4 weeks, no future PSAs by patient request. Botox p.r.n.

## 2017-11-16 NOTE — LETTER
11/16/2017       RE: Edison Boss  [address]    [place] [facility] [address]     Dear Colleague,    Thank you for referring your patient, Edison Boss, to the [facility] [place] [facility]. Please see a copy of my visit note below.    Edison Boss is a 89-year-old gentleman with a history of prostate cancer treated with combination radiation. He has urinary retention which is managed with an indwelling Christianson change monthly. He can develop severe bladder spasticity that is been treated in the past with Botox injections.  His Christainson catheter was changed 4 weeks ago, fell out 3 weeks ago and was replaced in the emergency room. He's been having some bladder spasticity with leakage around the catheter.  Other past medical history: Atrial fibrillation, arthritis, urolithiasis, chronic pain, GERD, hyperlipidemia, hypertension, asthma, osteoporosis, pacemaker, sinus node dysfunction, sleep apnea, CVA, former smoker  Medications: Reviewed  Allergies: Reviewed  Exam: Normal appearance, normal vital signs, alert and oriented, normocephalic, normal respirations, neuro grossly intact  Christianson catheter changed easily-4 cc of saline placed in Christianson balloon and placed to close gravity drainage  Assessment: History of adenocarcinoma of the prostate, urinary retention, bladder spasticity  Plan: Change Christianson every 4 weeks, no future PSAs by patient request. Botox p.r.n.    Again, thank you for allowing me to participate in the care of your patient.      Sincerely,    Michael Lilly MD

## 2017-11-16 NOTE — NURSING NOTE
Here with daughter to have tolliver exchanged. Indwelling tolliver removed with ease after balloon deflation.Betadine prepped and draped in sterile fashion. 2 % lidocaine gel inserted into the urethra. Has antibiotic to take at home today. Twila Rocha LPN

## 2017-11-16 NOTE — MR AVS SNAPSHOT
After Visit Summary   11/16/2017    Edison Boss    MRN: [id number]           Patient Information     Date Of Birth          10/19/1924        Visit Information        Provider Department      11/16/2017 11:10 AM Michael Lilly MD [facility]        Today's Diagnoses     Urinary retention    -  1       Follow-ups after your visit        Follow-up notes from your care team     Return in about 4 weeks (around 12/14/2017) for tolliver change.      Your next 10 appointments already scheduled     Nov 22, 2017  2:00 PM CST   PIPE Extremity with Jeremy Vazquez PT   [facility]    [address]  [address]  [facility] [place] [address]   [phone]            Nov 28, 2017  2:00 PM CST   PIPE Extremity with Jeremy Vazquez PT   [facility]    [address]  [address]  [place] [place] [address]   [phone]            Dec 05, 2017 11:00 AM CST   Phone Device Check with SANDRA TECH1   [facility] ([facility])    [address] [place] [id number] [phone]            Dec 14, 2017 10:10 AM CST   Return Visit with Michael Lilly MD   [facility] ([facility])    [address]  [address] [place] [id number] [phone]            Jan 11, 2018 11:00 AM CST   Return Visit with Michael Lilly MD   [facility] ([facility])    [address]  [address] [place] [id number] [phone]              Who to contact     If you have questions or need follow up information about today's clinic visit or your schedule please contact [facility] directly at [phone].  Normal or non-critical lab and imaging results will be communicated to you by  MyChart, letter or phone within 4 business days after the clinic has received the results. If you do not hear from us within 7 days, please contact the clinic through [employer] or phone. If you have a critical or abnormal lab result, we will notify you by phone as soon as possible.  Submit refill requests through [employer] or call your pharmacy and they will forward the refill request to us. Please allow 3 business days for your refill to be completed.          Additional Information About Your Visit        [employer] Information     [employer] gives you secure access to your electronic health record. If you see a primary care provider, you can also send messages to your care team and make appointments. If you have questions, please call your primary care clinic.  If you do not have a primary care provider, please call [phone] and they will assist you.        Care EveryWhere ID     This is your Care EveryWhere ID. This could be used by other organizations to access your [place] medical records  [id number]         Blood Pressure from Last 3 Encounters:   10/30/17 152/86   10/21/17 (!) 208/83   08/30/17 158/72    Weight from Last 3 Encounters:   10/30/17 78.9 kg (174 lb)   08/30/17 80.5 kg (177 lb 8 oz)   08/10/17 79.5 kg (175 lb 3.2 oz)              We Performed the Following     INSERT BLADDER CATH, TEMP INDWELL SIMPLE (ARAMBULA) (69222)          Today's Medication Changes          These changes are accurate as of: 11/16/17 11:52 AM.  If you have any questions, ask your nurse or doctor.               These medicines have changed or have updated prescriptions.        Dose/Directions    PARoxetine 30 MG tablet   Commonly known as:  PAXIL   This may have changed:    - how much to take  - additional instructions   Used for:  RAMÓN (generalized anxiety disorder)        TAKE 1/2 TABLET BY MOUTH AT BEDTIME   Quantity:  45 tablet   Refills:  3                Primary Care Provider Office Phone # Fax #    Porfirio Terrell MD  [phone]       [address]   [facility] [address]        Equal Access to Services     EMERITA CADE : Hadii vanessa ku frank Denson, waaxda luqadaha, qaybta kaalmada bulmaro, yony richardsonjosué edouard. So [facility] [phone].    ATENCIÓN: Si habla español, tiene a lauren disposición servicios gratuitos de asistencia lingüística. Manjulaame al [phone].    We comply with applicable federal civil rights laws and [place] laws. We do not discriminate on the basis of race, color, national origin, age, disability, sex, sexual orientation, or gender identity.            Thank you!     Thank you for choosing [facility] [place]  for your care. Our goal is always to provide you with excellent care. Hearing back from our patients is one way we can continue to improve our services. Please take a few minutes to complete the written survey that you may receive in the mail after your visit with us. Thank you!             Your Updated Medication List - Protect others around you: Learn how to safely use, store and throw away your medicines at www.disposemymeds.org.          This list is accurate as of: 11/16/17 11:52 AM.  Always use your most recent med list.                   Brand Name Dispense Instructions for use Diagnosis    albuterol 108 (90 BASE) MCG/ACT Inhaler    PROAIR HFA/PROVENTIL HFA/VENTOLIN HFA    1 Inhaler    Inhale 2 puffs into the lungs every 6 hours as needed for shortness of breath / dyspnea.    Mild persistent asthma       amLODIPine 5 MG tablet    NORVASC    90 tablet    Take 1 tablet (5 mg) by mouth daily    Essential hypertension with goal blood pressure less than 140/90       AVEENO ECZEMA THERAPY 1 % Crea   Generic drug:  Colloidal Oatmeal      Externally apply topically daily        cetirizine 10 MG tablet    zyrTEC     Take 10 mg by mouth daily as needed        cholecalciferol 5000 UNITS Caps      Take 1 capsule by mouth daily.  Takes 5000 units in the summer and 22862 units in the winter        CIPROFLOXACIN PO      Take 500 mg by mouth 1 tablet by mouth once as needed. Take one tablet after each procedere        clobetasol 0.05 % ointment    TEMOVATE     daily as needed        COLON CLEANSER PO      Take 1 capsule by mouth daily Advanced Colon Care II        D-Mannose Powd      daily 500 mg Pt takes 2 pills daily        EYE VITAMINS Caps      one tablet twice daily        lidocaine 4 % Crea cream    LMX4     Apply topically daily as needed        nebulizer nebulization     1    Use as directed    Mild persistent asthma       omeprazole 20 MG CR capsule    priLOSEC    90 capsule    Take 1 capsule (20 mg) by mouth daily    Gastroesophageal reflux disease without esophagitis       oxyCODONE-acetaminophen 5-325 MG per tablet    PERCOCET    30 tablet    Take 1 tablet by mouth every 4 hours as needed for pain for pain.    Cervicalgia, Acute upper back pain       PARoxetine 30 MG tablet    PAXIL    45 tablet    TAKE 1/2 TABLET BY MOUTH AT BEDTIME    RAMÓN (generalized anxiety disorder)       VITAMIN C PO      Take by mouth 2 times daily 500 mg take 1 daily

## 2017-11-20 ENCOUNTER — TELEPHONE (OUTPATIENT)
Dept: INTERNAL MEDICINE | Facility: CLINIC | Age: 82
End: 2017-11-20

## 2017-11-22 ENCOUNTER — THERAPY VISIT (OUTPATIENT)
Dept: PHYSICAL THERAPY | Facility: CLINIC | Age: 82
End: 2017-11-22
Payer: MEDICARE

## 2017-11-22 DIAGNOSIS — M25.511 ACUTE PAIN OF RIGHT SHOULDER: ICD-10-CM

## 2017-11-22 PROCEDURE — 97110 THERAPEUTIC EXERCISES: CPT | Mod: GP | Performed by: PHYSICAL THERAPIST

## 2017-11-22 NOTE — PROGRESS NOTES
Subjective:    HPI                    Objective:    Systempt     Physical Exam    General     ROS    Assessment/Plan:      SUBJECTIVE  Subjective changes as noted by pt:  Pt reports shoulder improving, pt notes decreased pain and increased strength   Current pain level: 1/10     Changes in function:  Pt notes increased ease with dressing and reaching above the shoulder     Adverse reaction to treatment or activity:  None    OBJECTIVE  Changes in objective findings:  Improved strength anterior/ posterior and mid deltoids. Decreased scapular tipping with flexion        ASSESSMENT  Edison continues to require intervention to meet STG and LTG's: PT  Patient's symptoms are resolving.  Response to therapy has shown an improvement in  pain level and strength  Progress made towards STG/LTG?  Yes,     PLAN  Current treatment program is being advanced to more complex exercises.    PTA/ATC plan:  N/A    Please refer to the daily flowsheet for treatment today, total treatment time and time spent performing 1:1 timed codes.

## 2017-11-28 ENCOUNTER — THERAPY VISIT (OUTPATIENT)
Dept: PHYSICAL THERAPY | Facility: CLINIC | Age: 82
End: 2017-11-28
Payer: MEDICARE

## 2017-11-28 DIAGNOSIS — M25.511 ACUTE PAIN OF RIGHT SHOULDER: ICD-10-CM

## 2017-11-28 PROCEDURE — G8985 CARRY GOAL STATUS: HCPCS | Mod: GP | Performed by: PHYSICAL THERAPIST

## 2017-11-28 PROCEDURE — 97110 THERAPEUTIC EXERCISES: CPT | Mod: GP | Performed by: PHYSICAL THERAPIST

## 2017-11-28 PROCEDURE — G8986 CARRY D/C STATUS: HCPCS | Mod: GP | Performed by: PHYSICAL THERAPIST

## 2017-11-28 NOTE — PROGRESS NOTES
Subjective:    HPI                    Objective:    System    Physical Exam    General     ROS    Assessment/Plan:      DISCHARGE REPORT    Progress reporting period is from 10-31-17 to 11-28-17.       SUBJECTIVE  Subjective changes noted by patient:  Pt notes decreased pain and increased strength mobility in the right shoulder.       Current pain level is 1/10  .     Previous pain level was  10/10  .   Changes in function:  Pt notes increased ease with reaching, dressing and brushing teeth.  Adverse reaction to treatment or activity: None    OBJECTIVE  Changes noted in objective findings:  Pt denies point tenderness with palpation of the right supraspinatus tendon and A/C joint.         ASSESSMENT/PLAN  Updated problem list and treatment plan: Diagnosis 1:  Right shoulder pain  Pain -  hot/cold therapy, self management, education and home program  Decreased ROM/flexibility - therapeutic exercise, therapeutic activity and home program  Decreased strength - therapeutic exercise, therapeutic activities and home program  STG/LTGs have been met or progress has been made towards goals:  Yes,   Assessment of Progress: The patient's condition is improving.  Self Management Plans:  Patient has been instructed in a home treatment program.  I have re-evaluated this patient and find that the nature, scope, duration and intensity of the therapy is appropriate for the medical condition of the patient.  Edison continues to require the following intervention to meet STG and LTG's:  PT intervention is no longer required to meet STG/LTG.    Recommendations:  This patient is ready to be discharged from therapy and continue their home treatment program.    Please refer to the daily flowsheet for treatment today, total treatment time and time spent performing 1:1 timed codes.

## 2017-12-05 NOTE — MR AVS SNAPSHOT
After Visit Summary   12/5/2017    Edison Boss    MRN: [id number]           Patient Information     Date Of Birth          10/19/1924        Visit Information        Provider Department      12/5/2017 11:00 AM SANDRA TECH1 [facility]        Today's Diagnoses     Cardiac pacemaker in situ    -  1       Follow-ups after your visit        Your next 10 appointments already scheduled     Dec 14, 2017 10:10 AM CST   Return Visit with Michael Lilly MD   [facility] [place] ([facility])    [address]  [address]  [facility] [address]   [phone]            Dec 28, 2017  1:45 PM CST   P EP RETURN with Jose Dumont MD   [facility] ([facility])    [address] [place] [address] [address]  [facility] [address] [phone]            Jan 11, 2018 11:00 AM CST   Return Visit with Michael Lilly MD   [facility] [place] ([facility])    [address]  [address]  [facility] [id number] [phone]              Who to contact     If you have questions or need follow up information about today's clinic visit or your schedule please contact [facility] directly at [phone].  Normal or non-critical lab and imaging results will be communicated to you by MyChart, letter or phone within 4 business days after the clinic has received the results. If you do not hear from us within 7 days, please contact the clinic through MyChart or phone. If you have a critical or abnormal lab result, we will notify you by phone as soon as possible.  Submit refill requests through [employer] or call your pharmacy and they will forward the refill request to us. Please allow 3 business days for your refill to be completed.          Additional Information About Your Visit         [employer] Information     [employer] gives you secure access to your electronic health record. If you see a primary care provider, you can also send messages to your care team and make appointments. If you have questions, please call your primary care clinic.  If you do not have a primary care provider, please call [phone] and they will assist you.        Care EveryWhere ID     This is your Care EveryWhere ID. This could be used by other organizations to access your [place] medical records  [id number]         Blood Pressure from Last 3 Encounters:   10/30/17 152/86   10/21/17 (!) 208/83   08/30/17 158/72    Weight from Last 3 Encounters:   10/30/17 78.9 kg (174 lb)   08/30/17 80.5 kg (177 lb 8 oz)   08/10/17 79.5 kg (175 lb 3.2 oz)              We Performed the Following     PM PHONE R STRIP EVAL UP TO 90 DAYS ([id number])          Today's Medication Changes          These changes are accurate as of: 12/5/17 11:23 AM.  If you have any questions, ask your nurse or doctor.               These medicines have changed or have updated prescriptions.        Dose/Directions    PARoxetine 30 MG tablet   Commonly known as:  PAXIL   This may have changed:    - how much to take  - additional instructions   Used for:  RAMÓN (generalized anxiety disorder)        TAKE 1/2 TABLET BY MOUTH AT BEDTIME   Quantity:  45 tablet   Refills:  3                Primary Care Provider Office Phone # Fax #    Porfirio Terrell [phone]       [address] [place] [id number]        Equal Access to Services     EMERITA CADE AH: Hadii aad ku hadasho Soomaali, waaxda luqadaha, qaybta kaalmada adeegyada, waxay aditya mcclure . So [facility] [phone].    ATENCIÓN: Si habla español, tiene a lauren disposición servicios gratuitos de asistencia lingüística. Llame al [phone].    We comply with applicable federal civil rights laws and [place] laws. We do not discriminate on the basis of race, color, national  origin, age, disability, sex, sexual orientation, or gender identity.            Thank you!     Thank you for choosing [facility]  for your care. Our goal is always to provide you with excellent care. Hearing back from our patients is one way we can continue to improve our services. Please take a few minutes to complete the written survey that you may receive in the mail after your visit with us. Thank you!             Your Updated Medication List - Protect others around you: Learn how to safely use, store and throw away your medicines at www.disposemymeds.org.          This list is accurate as of: 12/5/17 11:23 AM.  Always use your most recent med list.                   Brand Name Dispense Instructions for use Diagnosis    albuterol 108 (90 BASE) MCG/ACT Inhaler    PROAIR HFA/PROVENTIL HFA/VENTOLIN HFA    1 Inhaler    Inhale 2 puffs into the lungs every 6 hours as needed for shortness of breath / dyspnea.    Mild persistent asthma       amLODIPine 5 MG tablet    NORVASC    90 tablet    Take 1 tablet (5 mg) by mouth daily    Essential hypertension with goal blood pressure less than 140/90       AVEENO ECZEMA THERAPY 1 % Crea   Generic drug:  Colloidal Oatmeal      Externally apply topically daily        cetirizine 10 MG tablet    zyrTEC     Take 10 mg by mouth daily as needed        cholecalciferol 5000 UNITS Caps      Take 1 capsule by mouth daily. Takes 5000 units in the summer and 40207 units in the winter        CIPROFLOXACIN PO      Take 500 mg by mouth 1 tablet by mouth once as needed. Take one tablet after each procedere        clobetasol 0.05 % ointment    TEMOVATE     daily as needed        COLON CLEANSER PO      Take 1 capsule by mouth daily Advanced Colon Care II        D-Mannose Powd      daily 500 mg Pt takes 2 pills daily        EYE VITAMINS Caps      one tablet twice daily        lidocaine 4 % Crea cream    LMX4     Apply topically daily as needed         nebulizer nebulization     1    Use as directed    Mild persistent asthma       omeprazole 20 MG CR capsule    priLOSEC    90 capsule    Take 1 capsule (20 mg) by mouth daily    Gastroesophageal reflux disease without esophagitis       oxyCODONE-acetaminophen 5-325 MG per tablet    PERCOCET    30 tablet    Take 1 tablet by mouth every 4 hours as needed for pain for pain.    Cervicalgia, Acute upper back pain       PARoxetine 30 MG tablet    PAXIL    45 tablet    TAKE 1/2 TABLET BY MOUTH AT BEDTIME    RAMÓN (generalized anxiety disorder)       VITAMIN C PO      Take by mouth 2 times daily 500 mg take 1 daily

## 2017-12-05 NOTE — PROGRESS NOTES
~ 30 day phone teletrace ~  Magnet response indicates JOCELYN. H/P visit is scheduled on 12/28/17 with Dr. uAra FRAZIERT

## 2017-12-14 NOTE — LETTER
12/14/2017       RE: Edison Boss  [address]    [facility] [address]     Dear Colleague,    Thank you for referring your patient, Edison Boss, to the [facility] [place] at [facility]. Please see a copy of my visit note below.    Edison is a 89-year-old gentleman with a history of prostate cancer treated with combination radiation. He developed urethral stricture disease and urinary retention and urinary incontinence. His bladder is managed with an indwelling Christianson that is changed monthly. He no longer wishes to be followed with PSA testing. In the past his PSAs of been undetectable. He also has severe spasticity of the bladder that requires occasional injection of Botox  Other past medical history reviewed  Medications: Noted change  Allergies: Cephalosporins, sulfa, Levaquin, Zithromax, Macrodantin  Exam: Normal appearance, normal vital signs, alert and oriented, normocephalic  Christianson change with sterile technique. 5 cc in Christianson balloon and bladder irrigates with clear returns. Christianson placed to close gravity drainage  Assessment: Urinary retention, bladder spasticity, history of prostate cancer  Plan: Monthly Christianson change. Botox injections p.r.n.    Again, thank you for allowing me to participate in the care of your patient.      Sincerely,    Michael Lilly MD

## 2017-12-14 NOTE — MR AVS SNAPSHOT
After Visit Summary   12/14/2017    Edison Boss    MRN: [id number]           Patient Information     Date Of Birth          10/19/1924        Visit Information        Provider Department      12/14/2017 10:10 AM Michael Lilly MD [facility] [place]        Today's Diagnoses     Urinary retention    -  1       Follow-ups after your visit        Follow-up notes from your care team     Return in about 4 weeks (around 1/11/2018) for tolliver change.      Your next 10 appointments already scheduled     Dec 28, 2017  1:45 PM CST   P EP RETURN with Jose Dumont MD   [facility] ([facility])    [address]  [facility] [address]   [phone]            Jan 02, 2018  1:00 PM CST   Ep 90 Minute with SHCVR3   [facility] ([facility])    [address]  [facility] [address]   [phone]            Jan 11, 2018 11:00 AM CST   Return Visit with Michael Lilly MD   [facility] [place] ([facility])    [address]  [address]  [facility] [id number] [phone]              Who to contact     If you have questions or need follow up information about today's clinic visit or your schedule please contact [facility] directly at [phone].  Normal or non-critical lab and imaging results will be communicated to you by MyChart, letter or phone within 4 business days after the clinic has received the results. If you do not hear from us within 7 days, please contact the clinic through MyChart or phone. If you have a critical or abnormal lab result, we will notify you by phone as soon as possible.  Submit refill requests through [employer] or call your pharmacy and they will forward the refill request to us. Please allow 3 business days for  your refill to be completed.          Additional Information About Your Visit        [employer] Information     [employer] gives you secure access to your electronic health record. If you see a primary care provider, you can also send messages to your care team and make appointments. If you have questions, please call your primary care clinic.  If you do not have a primary care provider, please call [phone] and they will assist you.        Care EveryWhere ID     This is your Care EveryWhere ID. This could be used by other organizations to access your [place] medical records  [id number]         Blood Pressure from Last 3 Encounters:   10/30/17 152/86   10/21/17 (!) 208/83   08/30/17 158/72    Weight from Last 3 Encounters:   10/30/17 78.9 kg (174 lb)   08/30/17 80.5 kg (177 lb 8 oz)   08/10/17 79.5 kg (175 lb 3.2 oz)              We Performed the Following     INSERT BLADDER CATH, TEMP INDWELL SIMPLE (ARAMBULA) (33637)          Today's Medication Changes          These changes are accurate as of: 12/14/17 10:56 AM.  If you have any questions, ask your nurse or doctor.               These medicines have changed or have updated prescriptions.        Dose/Directions    PARoxetine 30 MG tablet   Commonly known as:  PAXIL   This may have changed:    - how much to take  - additional instructions   Used for:  RAMÓN (generalized anxiety disorder)        TAKE 1/2 TABLET BY MOUTH AT BEDTIME   Quantity:  45 tablet   Refills:  3                Primary Care Provider Office Phone # Fax #    Porfirio Terrell [phone]       [address] [facility] [address]  [facility] [address]        Equal Access to Services     : Hadii vanessa camarilloo Soiliana, waaxda luqadaha, qaybta kaalmada yony chamberlain . So [facility] [phone].    ATENCIÓN: Si habla español, tiene a lauren disposición servicios gratuitos de asistencia lingüística. Llame al [phone].    We comply with applicable federal  civil rights laws and [place] laws. We do not discriminate on the basis of race, color, national origin, age, disability, sex, sexual orientation, or gender identity.            Thank you!     Thank you for choosing [facility] [place]  for your care. Our goal is always to provide you with excellent care. Hearing back from our patients is one way we can continue to improve our services. Please take a few minutes to complete the written survey that you may receive in the mail after your visit with us. Thank you!             Your Updated Medication List - Protect others around you: Learn how to safely use, store and throw away your medicines at www.disposemymeds.org.          This list is accurate as of: 12/14/17 10:56 AM.  Always use your most recent med list.                   Brand Name Dispense Instructions for use Diagnosis    albuterol 108 (90 BASE) MCG/ACT Inhaler    PROAIR HFA/PROVENTIL HFA/VENTOLIN HFA    1 Inhaler    Inhale 2 puffs into the lungs every 6 hours as needed for shortness of breath / dyspnea.    Mild persistent asthma       amLODIPine 5 MG tablet    NORVASC    90 tablet    Take 1 tablet (5 mg) by mouth daily    Essential hypertension with goal blood pressure less than 140/90       AVEENO ECZEMA THERAPY 1 % Crea   Generic drug:  Colloidal Oatmeal      Externally apply topically daily        cetirizine 10 MG tablet    zyrTEC     Take 10 mg by mouth daily as needed        cholecalciferol 5000 UNITS Caps      Take 1 capsule by mouth daily. Takes 5000 units in the summer and 09466 units in the winter        CIPROFLOXACIN PO      Take 500 mg by mouth 1 tablet by mouth once as needed. Take one tablet after each procedere        clobetasol 0.05 % ointment    TEMOVATE     daily as needed        COLON CLEANSER PO      Take 1 capsule by mouth daily Advanced Colon Care II        D-Mannose Powd      daily 500 mg Pt takes 2 pills daily        EYE VITAMINS Caps      one  tablet twice daily        lidocaine 4 % Crea cream    LMX4     Apply topically daily as needed        nebulizer nebulization     1    Use as directed    Mild persistent asthma       omeprazole 20 MG CR capsule    priLOSEC    90 capsule    Take 1 capsule (20 mg) by mouth daily    Gastroesophageal reflux disease without esophagitis       oxyCODONE-acetaminophen 5-325 MG per tablet    PERCOCET    30 tablet    Take 1 tablet by mouth every 4 hours as needed for pain for pain.    Cervicalgia, Acute upper back pain       PARoxetine 30 MG tablet    PAXIL    45 tablet    TAKE 1/2 TABLET BY MOUTH AT BEDTIME    RAMÓN (generalized anxiety disorder)       VITAMIN C PO      Take by mouth 2 times daily 500 mg take 1 daily

## 2017-12-14 NOTE — PROGRESS NOTES
Edison is a 89-year-old gentleman with a history of prostate cancer treated with combination radiation. He developed urethral stricture disease and urinary retention and urinary incontinence. His bladder is managed with an indwelling Christianson that is changed monthly. He no longer wishes to be followed with PSA testing. In the past his PSAs of been undetectable. He also has severe spasticity of the bladder that requires occasional injection of Botox  Other past medical history reviewed  Medications: Noted change  Allergies: Cephalosporins, sulfa, Levaquin, Zithromax, Macrodantin  Exam: Normal appearance, normal vital signs, alert and oriented, normocephalic  Christianson change with sterile technique. 5 cc in Christianson balloon and bladder irrigates with clear returns. Christianson placed to close gravity drainage  Assessment: Urinary retention, bladder spasticity, history of prostate cancer  Plan: Monthly Christianson change. Botox injections p.r.n.

## 2017-12-28 NOTE — MR AVS SNAPSHOT
After Visit Summary   12/28/2017    Edison Boss    MRN: [id number]           Patient Information     Date Of Birth          10/19/1924        Visit Information        Provider Department      12/28/2017 1:45 PM Jose Dumont MD [facility]        Today's Diagnoses     Sinoatrial node dysfunction (H)    -  1       Follow-ups after your visit        Your next 10 appointments already scheduled     Jan 02, 2018  1:00 PM CST   Ep 90 Minute with SHCVR3   [facility] ([facility])    [address]  [place] [address]   [phone]            Jan 11, 2018 11:00 AM CST   Return Visit with Michael Lilly MD   [facility] [place] ([facility])    [address]  [address]  [facility] [id number] [phone]            Feb 08, 2018 10:40 AM CST   Return Visit with Michael Lilly MD   [facility] [place] ([facility])    [address]  [address]  [facility] [address] [phone]              Who to contact     If you have questions or need follow up information about today's clinic visit or your schedule please contact [facility] directly at [phone].  Normal or non-critical lab and imaging results will be communicated to you by MyChart, letter or phone within 4 business days after the clinic has received the results. If you do not hear from us within 7 days, please contact the clinic through MyChart or phone. If you have a critical or abnormal lab result, we will notify you by phone as soon as possible.  Submit refill requests through [employer] or call your pharmacy and they will forward the refill request to us. Please allow 3 business days for your refill to be completed.          Additional Information About Your Visit       "  [employer] Media[employer] Information     [employer] gives you secure access to your electronic health record. If you see a primary care provider, you can also send messages to your care team and make appointments. If you have questions, please call your primary care clinic.  If you do not have a primary care provider, please call [phone] and they will assist you.        Care EveryWhere ID     This is your Care EveryWhere ID. This could be used by other organizations to access your [place] medical records  [id number]        Your Vitals Were     Pulse Height BMI (Body Mass Index)             60 1.765 m (5' 9.5\") 25.04 kg/m2          Blood Pressure from Last 3 Encounters:   12/28/17 112/60   10/30/17 152/86   10/21/17 (!) 208/83    Weight from Last 3 Encounters:   12/28/17 78 kg (172 lb)   10/30/17 78.9 kg (174 lb)   08/30/17 80.5 kg (177 lb 8 oz)              Today, you had the following     No orders found for display         Today's Medication Changes          These changes are accurate as of: 12/28/17  2:06 PM.  If you have any questions, ask your nurse or doctor.               These medicines have changed or have updated prescriptions.        Dose/Directions    PARoxetine 30 MG tablet   Commonly known as:  PAXIL   This may have changed:    - how much to take  - additional instructions   Used for:  RAMÓN (generalized anxiety disorder)        TAKE 1/2 TABLET BY MOUTH AT BEDTIME   Quantity:  45 tablet   Refills:  3                Primary Care Provider Office Phone # Fax #    Porfirio Terrell [phone]       [address] [address]        Equal Access to Services     [facility] AH: Hadii vanessa jennings hadashdione Soiliana, waaxda luqadaha, qaybta kaalmayony hutchison . So [facility] [phone].    ATENCIÓN: Si habla español, tiene a lauren disposición servicios gratuitos de asistencia lingüística. Llame al [phone].    We comply with applicable federal civil rights " laws and [place] laws. We do not discriminate on the basis of race, color, national origin, age, disability, sex, sexual orientation, or gender identity.            Thank you!     Thank you for choosing [facility]  for your care. Our goal is always to provide you with excellent care. Hearing back from our patients is one way we can continue to improve our services. Please take a few minutes to complete the written survey that you may receive in the mail after your visit with us. Thank you!             Your Updated Medication List - Protect others around you: Learn how to safely use, store and throw away your medicines at www.disposemymeds.org.          This list is accurate as of: 12/28/17  2:06 PM.  Always use your most recent med list.                   Brand Name Dispense Instructions for use Diagnosis    albuterol 108 (90 BASE) MCG/ACT Inhaler    PROAIR HFA/PROVENTIL HFA/VENTOLIN HFA    1 Inhaler    Inhale 2 puffs into the lungs every 6 hours as needed for shortness of breath / dyspnea.    Mild persistent asthma       amLODIPine 5 MG tablet    NORVASC    90 tablet    Take 1 tablet (5 mg) by mouth daily    Essential hypertension with goal blood pressure less than 140/90       AVEENO ECZEMA THERAPY 1 % Crea   Generic drug:  Colloidal Oatmeal      Externally apply topically daily        cetirizine 10 MG tablet    zyrTEC     Take 10 mg by mouth daily as needed        cholecalciferol 5000 UNITS Caps      Take 1 capsule by mouth daily. Takes 5000 units in the summer and 37455 units in the winter        CIPROFLOXACIN PO      Take 500 mg by mouth 1 tablet by mouth once as needed. Take one tablet after each procedere        clobetasol 0.05 % ointment    TEMOVATE     daily as needed        COLON CLEANSER PO      Take 1 capsule by mouth daily Advanced Colon Care II        D-Mannose Powd      daily 500 mg Pt takes 2 pills daily        EYE VITAMINS Caps      one tablet twice daily         lidocaine 4 % Crea cream    LMX4     Apply topically daily as needed        nebulizer nebulization     1    Use as directed    Mild persistent asthma       omeprazole 20 MG CR capsule    priLOSEC    90 capsule    Take 1 capsule (20 mg) by mouth daily    Gastroesophageal reflux disease without esophagitis       oxyCODONE-acetaminophen 5-325 MG per tablet    PERCOCET    30 tablet    Take 1 tablet by mouth every 4 hours as needed for pain for pain.    Cervicalgia, Acute upper back pain       PARoxetine 30 MG tablet    PAXIL    45 tablet    TAKE 1/2 TABLET BY MOUTH AT BEDTIME    RAMÓN (generalized anxiety disorder)       VITAMIN C PO      Take by mouth 2 times daily 500 mg take 1 daily

## 2017-12-28 NOTE — PROGRESS NOTES
2017             Porfirio Terrell MD   [facility] [address] [place] [address]       RE: Joselin Van   MRN: [id number]   : 10/19/1924      Dear Dr. Terrell:      I saw Mr. Van for a history and physical examination before pacemaker replacement.  He is a 89-year-old white male who received a [employer] dual-chamber pacemaker on 2009.  His pacemaker function has been normal.  The battery, however, has reached JOCELYN recently.  He requires nearly 100% atrial pacing with intact AV conduction.  It is my understanding that the lead functions are normal up to this point.  Symptomatically, he denies chest pain, shortness of breath, palpitations or dizziness.        His other medical conditions include a history of a stroke, sleep apnea, asthma, prostate cancer, hypertension, hyperlipidemia and GI reflux.        He was previously known to have atrial fibrillation, and that has not posed any major problems recently, according to the pacemaker interrogation.  He is not taking anticoagulation because of a high risk of falls.        PHYSICAL EXAMINATION:  Blood pressure 112/60, heart rate 60 beats per minute, body weight 172 pounds.  The pacemaker site looked good.  The lungs were clear.  Cardiac rhythm was regular, and heart sounds were normal without murmur.       ASSESSMENT AND RECOMMENDATION:  Mr. Van is doing well from the Cardiology point of view.  He is pacemaker dependent, and therefore I agree for pulse generator replacement.      The patient currently is suffering a cold, but no fever or chills.  Hopefully, he will have fully recovered by the time he undergoes pacemaker replacement.      Sincerely,      MD DELISA Poole MD             D: 2017 14:08   T: 2017 17:33   MT: key      Name:     JOSELIN VAN   MRN:      [id number]        Account:      [id number]   :      10/19/1924           Service Date: 2017       Document: [id number]

## 2017-12-28 NOTE — LETTER
2017      Porfirio Terrell MD, MD  [address] [facility] [address]  [facility] [address]      RE: Edison Boss       Dear Colleague,    I had the pleasure of seeing Edison Boss in the [facility].    2017             Porfirio Terrell MD   [facility]    [address] [place] [address]       RE: Edison Boss   MRN: [id number]   : 10/19/1924      Dear Dr. Terrell:      I saw Mr. Boss for a history and physical examination before pacemaker replacement.  He is a 89-year-old white male who received a [employer] dual-chamber pacemaker on 2009.  His pacemaker function has been normal.  The battery, however, has reached JOCELYN recently.  He requires nearly 100% atrial pacing with intact AV conduction.  It is my understanding that the lead functions are normal up to this point.  Symptomatically, he denies chest pain, shortness of breath, palpitations or dizziness.        His other medical conditions include a history of a stroke, sleep apnea, asthma, prostate cancer, hypertension, hyperlipidemia and GI reflux.        He was previously known to have atrial fibrillation, and that has not posed any major problems recently, according to the pacemaker interrogation.  He is not taking anticoagulation because of a high risk of falls.        PHYSICAL EXAMINATION:  Blood pressure 112/60, heart rate 60 beats per minute, body weight 172 pounds.  The pacemaker site looked good.  The lungs were clear.  Cardiac rhythm was regular, and heart sounds were normal without murmur.       ASSESSMENT AND RECOMMENDATION:  Mr. Boss is doing well from the Cardiology point of view.  He is pacemaker dependent, and therefore I agree for pulse generator replacement.      The patient currently is suffering a cold, but no fever or chills.  Hopefully, he will have fully recovered by the time he undergoes pacemaker replacement.     Outpatient  Encounter Prescriptions as of 12/28/2017   Medication Sig Dispense Refill     CIPROFLOXACIN PO Take 500 mg by mouth 1 tablet by mouth once as needed. Take one tablet after each procedere       amLODIPine (NORVASC) 5 MG tablet Take 1 tablet (5 mg) by mouth daily 90 tablet 3     oxyCODONE-acetaminophen (PERCOCET) 5-325 MG per tablet Take 1 tablet by mouth every 4 hours as needed for pain for pain. 30 tablet 0     omeprazole (PRILOSEC) 20 MG CR capsule Take 1 capsule (20 mg) by mouth daily 90 capsule 3     PARoxetine (PAXIL) 30 MG tablet TAKE 1/2 TABLET BY MOUTH AT BEDTIME (Patient taking differently: 15 mg TAKE 1/2 TABLET BY MOUTH AT BEDTIME) 45 tablet 3     Colloidal Oatmeal (AVEENO ECZEMA THERAPY) 1 % CREA Externally apply topically daily       cetirizine (ZYRTEC) 10 MG tablet Take 10 mg by mouth daily as needed       lidocaine (LMX4) 4 % CREA 4% topical cream Apply topically daily as needed       Misc Natural Products (COLON CLEANSER PO) Take 1 capsule by mouth daily Advanced Colon Care II       clobetasol (TEMOVATE) 0.05 % ointment daily as needed   2     albuterol (PROVENTIL HFA: VENTOLIN HFA) 108 (90 BASE) MCG/ACT inhaler Inhale 2 puffs into the lungs every 6 hours as needed for shortness of breath / dyspnea. 1 Inhaler 1     cholecalciferol 5000 UNITS CAPS Take 1 capsule by mouth daily. Takes 5000 units in the summer and 98715 units in the winter       Ascorbic Acid (VITAMIN C PO) Take by mouth 2 times daily 500 mg take 1 daily       D-MANNOSE POWD daily 500 mg Pt takes 2 pills daily       EYE VITAMINS OR CAPS one tablet twice daily       NEBULIZER MISC Use as directed 1 0     No facility-administered encounter medications on file as of 12/28/2017.      Again, thank you for allowing me to participate in the care of your patient.      Sincerely,    Jose Dumont MD     [facility]

## 2017-12-28 NOTE — PROGRESS NOTES
HPI and Plan:   See dictation    No orders of the defined types were placed in this encounter.      No orders of the defined types were placed in this encounter.      There are no discontinued medications.      No diagnosis found.    CURRENT MEDICATIONS:  Current Outpatient Prescriptions   Medication Sig Dispense Refill     CIPROFLOXACIN PO Take 500 mg by mouth 1 tablet by mouth once as needed. Take one tablet after each procedere       amLODIPine (NORVASC) 5 MG tablet Take 1 tablet (5 mg) by mouth daily 90 tablet 3     oxyCODONE-acetaminophen (PERCOCET) 5-325 MG per tablet Take 1 tablet by mouth every 4 hours as needed for pain for pain. 30 tablet 0     omeprazole (PRILOSEC) 20 MG CR capsule Take 1 capsule (20 mg) by mouth daily 90 capsule 3     PARoxetine (PAXIL) 30 MG tablet TAKE 1/2 TABLET BY MOUTH AT BEDTIME (Patient taking differently: 15 mg TAKE 1/2 TABLET BY MOUTH AT BEDTIME) 45 tablet 3     Colloidal Oatmeal (AVEENO ECZEMA THERAPY) 1 % CREA Externally apply topically daily       cetirizine (ZYRTEC) 10 MG tablet Take 10 mg by mouth daily as needed       lidocaine (LMX4) 4 % CREA 4% topical cream Apply topically daily as needed       Misc Natural Products (COLON CLEANSER PO) Take 1 capsule by mouth daily Advanced Colon Care II       clobetasol (TEMOVATE) 0.05 % ointment daily as needed   2     albuterol (PROVENTIL HFA: VENTOLIN HFA) 108 (90 BASE) MCG/ACT inhaler Inhale 2 puffs into the lungs every 6 hours as needed for shortness of breath / dyspnea. 1 Inhaler 1     cholecalciferol 5000 UNITS CAPS Take 1 capsule by mouth daily. Takes 5000 units in the summer and 20935 units in the winter       Ascorbic Acid (VITAMIN C PO) Take by mouth 2 times daily 500 mg take 1 daily       D-MANNOSE POWD daily 500 mg Pt takes 2 pills daily       EYE VITAMINS OR CAPS one tablet twice daily       NEBULIZER MISC Use as directed 1 0       ALLERGIES     Allergies   Allergen Reactions     Ceftriaxone Itching     Bactrim Hives      Levaquin Diarrhea     Oral only, can tolerate IV     Zithromax [Azithromycin]      Macrodantin [Nitrofuran Derivatives] Rash     Took recently with no problems       PAST MEDICAL HISTORY:  Past Medical History:   Diagnosis Date     Antiplatelet or antithrombotic long-term use      Arthritis      Atrial fibrillation (H)     paroxysmal     Calculus of kidney     in dwelling [place]     Chronic infection     UTI     Chronic pain     lower pain, perineum     Esophageal reflux      Hyperlipidaemia      Hypertension      Malignant neoplasm of prostate (H) 8/14/2002    Brachytherapy, then external radiation. chronic indwelling catheter     Mild persistent asthma     with URI     Osteoporosis, unspecified     On Fosamax     Other specified disorder of skin 9/1/2004     Pacemaker     [employer]     Palpitations      Personal history of other diseases of circulatory system 2002    Aphasia and right hemiparesis with minimal residual     Sinus node dysfunction (H)     sp DDD PM     Sleep apnea     ?   snores     Unspecified cerebral artery occlusion with cerebral infarction        PAST SURGICAL HISTORY:  Past Surgical History:   Procedure Laterality Date     C NONSPECIFIC PROCEDURE  1980's    Kidney stone surgery     C NONSPECIFIC PROCEDURE  1985, 5/2005    TURP x 2     C NONSPECIFIC PROCEDURE  1/2002    Brachytherapy     C NONSPECIFIC PROCEDURE  ~1999    Left rotator cuff repair     C NONSPECIFIC PROCEDURE      Bilateral cataract surgery     C NONSPECIFIC PROCEDURE      EGD/dilation twice     CYSTOSCOPY       CYSTOSCOPY, INJECT COLLAGEN, COMBINED  6/6/2012    Procedure:COMBINED CYSTOSCOPY, INJECT BULKING AGENT; VIDEO CYSTOSCOPY WITH BOTOX INJECTIONS  ; Surgeon:BRIAN ADAN; Location: OR     CYSTOSCOPY, INJECT COLLAGEN, COMBINED  3/22/2013    Procedure: COMBINED CYSTOSCOPY, INJECT BULKING AGENT;  VIDEO CYSTOSCOPY, BOTOX INJECTION;  Surgeon: Brian Adan MD;  Location:  OR     CYSTOSCOPY, INJECT COLLAGEN,  COMBINED  2014    Procedure: COMBINED CYSTOSCOPY, INJECT BULKING AGENT;  Surgeon: Michael Lilly MD;  Location: [facility] OR     CYSTOSCOPY, INJECT COLLAGEN, COMBINED N/A 2015    Procedure: COMBINED CYSTOSCOPY, INJECT BULKING AGENT;  Surgeon: Michael Lilly MD;  Location: [facility] OR     CYSTOSCOPY, INJECT COLLAGEN, COMBINED N/A 2016    Procedure: COMBINED CYSTOSCOPY, INJECT BULKING AGENT;  Surgeon: Michael Lilly MD;  Location: [facility] OR     HC REMOVE TONSILS/ADENOIDS,<13 Y/O  ~1928    T & A <12y.o.     IMPLANT PACEMAKER       PENIS SURGERY       PROSTATE SURGERY         FAMILY HISTORY:  Family History   Problem Relation Age of Onset     Family History Negative Father       age 91     HEART DISEASE Mother      pacemaker     Family History Negative Mother       in her sleep 89     CANCER Brother      oldest brother - lung cancer,  age 74     CANCER Brother      3rd brother - lymphoma,  age 76     CANCER Brother      2nd brother (Carrington)- prostate cancer, born .      CEREBROVASCULAR DISEASE Brother      Brother (Carrington), born in        SOCIAL HISTORY:  Social History     Social History     Marital status:      Spouse name: Alysa     Number of children: 8     Years of education: N/A     Occupational History      Retired     Social History Main Topics     Smoking status: Former Smoker     Packs/day: 1.00     Years: 40.00     Smokeless tobacco: Never Used      Comment: QUIT      Alcohol use No     Drug use: No     Sexual activity: No     Other Topics Concern     Parent/Sibling W/ Cabg, Mi Or Angioplasty Before 65f 55m? No     Social History Narrative    .Living situation (location, living with others?):Lives with wife [facility]    Activities of daily living (e.g., dressing, eating, walking):Independent        Instrumental activities of daily living (e.g., finance management, housekeeping, meal planning/ prep): Wife and kids help with preparing meals, shopping,  "driving, climbing stairs, complex decisions                 Meaningful Activities (e.g., hobbies, work): loves music, uses the computer, likes crossword         Support:Wife and daughter        Community Resources Used/ Interested in: Referred to Herb       Review of Systems:  Skin:  Positive for rash;itching eczema   Eyes:  Positive for   dry eye R eye, gets injections in L eye  ENT:  Positive for hearing loss    Respiratory:  Negative       Cardiovascular:  Negative;palpitations;chest pain;edema;syncope or near-syncope Positive for;exercise intolerance;dizziness;lightheadedness;fatigue    Gastroenterology: Negative      Genitourinary:  Positive for prostate problem tolliver catheter  Musculoskeletal:  Positive for back pain;arthritis;joint pain    Neurologic:  Positive for stroke    Psychiatric:  Positive for sleep disturbances;anxiety    Heme/Lymph/Imm:  Positive for allergies    Endocrine:  Negative        Physical Exam:  Vitals: /60  Pulse 60  Ht 1.765 m (5' 9.5\")  Wt 78 kg (172 lb)  BMI 25.04 kg/m2    Constitutional:  cooperative, alert and oriented, well developed, well nourished, in no acute distress        Skin:  warm and dry to the touch, no apparent skin lesions or masses noted   pacemaker incision in the left infraclavicular area was well-healed      Head:  normocephalic, no masses or lesions        Eyes:  pupils equal and round, conjunctivae and lids unremarkable, sclera white, no xanthalasma, EOMS intact, no nystagmus        Lymph:      ENT:  no pallor or cyanosis, dentition good        Neck:           Respiratory:  normal breath sounds, clear to auscultation, normal A-P diameter, normal symmetry, normal respiratory excursion, no use of accessory muscles         Cardiac: regular rhythm;normal S1 and S2;no S3 or S4;no murmurs, gallops or rubs detected                not assessed this visit                                        GI:  abdomen soft;BS normoactive        Extremities and Muscular " Skeletal:  no deformities, clubbing, cyanosis, erythema observed;no edema              Neurological:           Psych:           CC  Porfirio Terrell MD  [address]  [place] [address]

## 2018-01-01 ENCOUNTER — TELEPHONE (OUTPATIENT)
Dept: GERIATRICS | Facility: CLINIC | Age: 83
End: 2018-01-01

## 2018-01-01 ENCOUNTER — ALLIED HEALTH/NURSE VISIT (OUTPATIENT)
Dept: CARDIOLOGY | Facility: CLINIC | Age: 83
End: 2018-01-01
Payer: MEDICARE

## 2018-01-01 ENCOUNTER — APPOINTMENT (OUTPATIENT)
Dept: OCCUPATIONAL THERAPY | Facility: CLINIC | Age: 83
DRG: 872 | End: 2018-01-01
Attending: INTERNAL MEDICINE
Payer: MEDICARE

## 2018-01-01 ENCOUNTER — APPOINTMENT (OUTPATIENT)
Dept: PHYSICAL THERAPY | Facility: CLINIC | Age: 83
DRG: 853 | End: 2018-01-01
Payer: MEDICARE

## 2018-01-01 ENCOUNTER — NURSING HOME VISIT (OUTPATIENT)
Dept: GERIATRICS | Facility: CLINIC | Age: 83
End: 2018-01-01
Payer: MEDICARE

## 2018-01-01 ENCOUNTER — APPOINTMENT (OUTPATIENT)
Dept: ULTRASOUND IMAGING | Facility: CLINIC | Age: 83
DRG: 853 | End: 2018-01-01
Attending: INTERNAL MEDICINE
Payer: MEDICARE

## 2018-01-01 ENCOUNTER — ANESTHESIA EVENT (OUTPATIENT)
Dept: SURGERY | Facility: CLINIC | Age: 83
DRG: 853 | End: 2018-01-01
Payer: MEDICARE

## 2018-01-01 ENCOUNTER — SURGERY (OUTPATIENT)
Age: 83
End: 2018-01-01

## 2018-01-01 ENCOUNTER — HOSPITAL LABORATORY (OUTPATIENT)
Dept: OTHER | Facility: CLINIC | Age: 83
End: 2018-01-01

## 2018-01-01 ENCOUNTER — OFFICE VISIT (OUTPATIENT)
Dept: UROLOGY | Facility: CLINIC | Age: 83
End: 2018-01-01
Payer: MEDICARE

## 2018-01-01 ENCOUNTER — APPOINTMENT (OUTPATIENT)
Dept: GENERAL RADIOLOGY | Facility: CLINIC | Age: 83
DRG: 194 | End: 2018-01-01
Attending: EMERGENCY MEDICINE
Payer: MEDICARE

## 2018-01-01 ENCOUNTER — APPOINTMENT (OUTPATIENT)
Dept: CT IMAGING | Facility: CLINIC | Age: 83
End: 2018-01-01
Attending: EMERGENCY MEDICINE
Payer: MEDICARE

## 2018-01-01 ENCOUNTER — ANESTHESIA (OUTPATIENT)
Dept: SURGERY | Facility: CLINIC | Age: 83
DRG: 853 | End: 2018-01-01
Payer: MEDICARE

## 2018-01-01 ENCOUNTER — PATIENT OUTREACH (OUTPATIENT)
Dept: CARE COORDINATION | Facility: CLINIC | Age: 83
End: 2018-01-01

## 2018-01-01 ENCOUNTER — TELEPHONE (OUTPATIENT)
Dept: INTERNAL MEDICINE | Facility: CLINIC | Age: 83
End: 2018-01-01

## 2018-01-01 ENCOUNTER — HOSPITAL ENCOUNTER (EMERGENCY)
Facility: CLINIC | Age: 83
Discharge: HOME | End: 2018-08-28
Attending: EMERGENCY MEDICINE | Admitting: EMERGENCY MEDICINE
Payer: MEDICARE

## 2018-01-01 ENCOUNTER — TELEPHONE (OUTPATIENT)
Dept: CARDIOLOGY | Facility: CLINIC | Age: 83
End: 2018-01-01

## 2018-01-01 ENCOUNTER — APPOINTMENT (OUTPATIENT)
Dept: SPEECH THERAPY | Facility: CLINIC | Age: 83
DRG: 194 | End: 2018-01-01
Attending: HOSPITALIST
Payer: MEDICARE

## 2018-01-01 ENCOUNTER — MYC MEDICAL ADVICE (OUTPATIENT)
Dept: INTERNAL MEDICINE | Facility: CLINIC | Age: 83
End: 2018-01-01

## 2018-01-01 ENCOUNTER — APPOINTMENT (OUTPATIENT)
Dept: PHYSICAL THERAPY | Facility: CLINIC | Age: 83
DRG: 853 | End: 2018-01-01
Attending: INTERNAL MEDICINE
Payer: MEDICARE

## 2018-01-01 ENCOUNTER — HOSPITAL ENCOUNTER (EMERGENCY)
Facility: CLINIC | Age: 83
Discharge: HOME | End: 2018-08-25
Attending: EMERGENCY MEDICINE | Admitting: EMERGENCY MEDICINE
Payer: MEDICARE

## 2018-01-01 ENCOUNTER — APPOINTMENT (OUTPATIENT)
Dept: CARDIOLOGY | Facility: CLINIC | Age: 83
End: 2018-01-01
Attending: INTERNAL MEDICINE
Payer: MEDICARE

## 2018-01-01 ENCOUNTER — APPOINTMENT (OUTPATIENT)
Dept: CT IMAGING | Facility: CLINIC | Age: 83
DRG: 194 | End: 2018-01-01
Attending: INTERNAL MEDICINE
Payer: MEDICARE

## 2018-01-01 ENCOUNTER — APPOINTMENT (OUTPATIENT)
Dept: GENERAL RADIOLOGY | Facility: CLINIC | Age: 83
DRG: 194 | End: 2018-01-01
Attending: INTERNAL MEDICINE
Payer: MEDICARE

## 2018-01-01 ENCOUNTER — HOSPITAL ENCOUNTER (EMERGENCY)
Facility: CLINIC | Age: 83
Discharge: HOME | End: 2018-11-08
Attending: EMERGENCY MEDICINE | Admitting: EMERGENCY MEDICINE
Payer: MEDICARE

## 2018-01-01 ENCOUNTER — APPOINTMENT (OUTPATIENT)
Dept: PHYSICAL THERAPY | Facility: CLINIC | Age: 83
DRG: 194 | End: 2018-01-01
Payer: MEDICARE

## 2018-01-01 ENCOUNTER — APPOINTMENT (OUTPATIENT)
Dept: GENERAL RADIOLOGY | Facility: CLINIC | Age: 83
DRG: 853 | End: 2018-01-01
Attending: INTERNAL MEDICINE
Payer: MEDICARE

## 2018-01-01 ENCOUNTER — APPOINTMENT (OUTPATIENT)
Dept: CT IMAGING | Facility: CLINIC | Age: 83
DRG: 853 | End: 2018-01-01
Attending: INTERNAL MEDICINE
Payer: MEDICARE

## 2018-01-01 ENCOUNTER — TELEPHONE (OUTPATIENT)
Dept: UROLOGY | Facility: CLINIC | Age: 83
End: 2018-01-01

## 2018-01-01 ENCOUNTER — HOSPITAL ENCOUNTER (INPATIENT)
Facility: CLINIC | Age: 83
LOS: 14 days | Discharge: HOSPICE | DRG: 100 | End: 2018-11-24
Attending: EMERGENCY MEDICINE | Admitting: INTERNAL MEDICINE
Payer: MEDICARE

## 2018-01-01 ENCOUNTER — HOSPITAL ENCOUNTER (INPATIENT)
Facility: CLINIC | Age: 83
LOS: 7 days | Discharge: NURSING, REHAB OR LONG-TERM CARE | DRG: 872 | End: 2018-10-24
Attending: EMERGENCY MEDICINE | Admitting: INTERNAL MEDICINE
Payer: MEDICARE

## 2018-01-01 ENCOUNTER — APPOINTMENT (OUTPATIENT)
Dept: NUCLEAR MEDICINE | Facility: CLINIC | Age: 83
DRG: 853 | End: 2018-01-01
Attending: INTERNAL MEDICINE
Payer: MEDICARE

## 2018-01-01 ENCOUNTER — APPOINTMENT (OUTPATIENT)
Dept: CARDIOLOGY | Facility: CLINIC | Age: 83
DRG: 853 | End: 2018-01-01
Attending: INTERNAL MEDICINE
Payer: MEDICARE

## 2018-01-01 ENCOUNTER — HOSPITAL ENCOUNTER (OUTPATIENT)
Facility: CLINIC | Age: 83
End: 2018-01-01
Attending: UROLOGY | Admitting: UROLOGY
Payer: MEDICARE

## 2018-01-01 ENCOUNTER — APPOINTMENT (OUTPATIENT)
Dept: CT IMAGING | Facility: CLINIC | Age: 83
DRG: 100 | End: 2018-01-01
Attending: INTERNAL MEDICINE
Payer: MEDICARE

## 2018-01-01 ENCOUNTER — APPOINTMENT (OUTPATIENT)
Dept: OCCUPATIONAL THERAPY | Facility: CLINIC | Age: 83
DRG: 853 | End: 2018-01-01
Payer: MEDICARE

## 2018-01-01 ENCOUNTER — APPOINTMENT (OUTPATIENT)
Dept: GENERAL RADIOLOGY | Facility: CLINIC | Age: 83
End: 2018-01-01
Attending: UROLOGY
Payer: MEDICARE

## 2018-01-01 ENCOUNTER — APPOINTMENT (OUTPATIENT)
Dept: SPEECH THERAPY | Facility: CLINIC | Age: 83
DRG: 853 | End: 2018-01-01
Payer: MEDICARE

## 2018-01-01 ENCOUNTER — APPOINTMENT (OUTPATIENT)
Dept: OCCUPATIONAL THERAPY | Facility: CLINIC | Age: 83
DRG: 872 | End: 2018-01-01
Payer: MEDICARE

## 2018-01-01 ENCOUNTER — APPOINTMENT (OUTPATIENT)
Dept: SPEECH THERAPY | Facility: CLINIC | Age: 83
DRG: 853 | End: 2018-01-01
Attending: INTERNAL MEDICINE
Payer: MEDICARE

## 2018-01-01 ENCOUNTER — DOCUMENTATION ONLY (OUTPATIENT)
Dept: OTHER | Facility: CLINIC | Age: 83
End: 2018-01-01

## 2018-01-01 ENCOUNTER — APPOINTMENT (OUTPATIENT)
Dept: PHYSICAL THERAPY | Facility: CLINIC | Age: 83
DRG: 872 | End: 2018-01-01
Attending: INTERNAL MEDICINE
Payer: MEDICARE

## 2018-01-01 ENCOUNTER — HOSPITAL ENCOUNTER (OUTPATIENT)
Facility: CLINIC | Age: 83
Discharge: HOME | End: 2018-10-30
Attending: UROLOGY | Admitting: UROLOGY
Payer: MEDICARE

## 2018-01-01 ENCOUNTER — TELEPHONE (OUTPATIENT)
Dept: EMERGENCY MEDICINE | Facility: CLINIC | Age: 83
End: 2018-01-01

## 2018-01-01 ENCOUNTER — APPOINTMENT (OUTPATIENT)
Dept: GENERAL RADIOLOGY | Facility: CLINIC | Age: 83
DRG: 872 | End: 2018-01-01
Attending: EMERGENCY MEDICINE
Payer: MEDICARE

## 2018-01-01 ENCOUNTER — APPOINTMENT (OUTPATIENT)
Dept: GENERAL RADIOLOGY | Facility: CLINIC | Age: 83
DRG: 100 | End: 2018-01-01
Attending: INTERNAL MEDICINE
Payer: MEDICARE

## 2018-01-01 ENCOUNTER — ANESTHESIA (OUTPATIENT)
Dept: SURGERY | Facility: CLINIC | Age: 83
End: 2018-01-01
Payer: MEDICARE

## 2018-01-01 ENCOUNTER — OFFICE VISIT (OUTPATIENT)
Dept: INTERNAL MEDICINE | Facility: CLINIC | Age: 83
End: 2018-01-01
Payer: MEDICARE

## 2018-01-01 ENCOUNTER — APPOINTMENT (OUTPATIENT)
Dept: PHYSICAL THERAPY | Facility: CLINIC | Age: 83
DRG: 194 | End: 2018-01-01
Attending: INTERNAL MEDICINE
Payer: MEDICARE

## 2018-01-01 ENCOUNTER — APPOINTMENT (OUTPATIENT)
Dept: GENERAL RADIOLOGY | Facility: CLINIC | Age: 83
DRG: 853 | End: 2018-01-01
Attending: EMERGENCY MEDICINE
Payer: MEDICARE

## 2018-01-01 ENCOUNTER — APPOINTMENT (OUTPATIENT)
Dept: CT IMAGING | Facility: CLINIC | Age: 83
DRG: 194 | End: 2018-01-01
Attending: EMERGENCY MEDICINE
Payer: MEDICARE

## 2018-01-01 ENCOUNTER — APPOINTMENT (OUTPATIENT)
Dept: GENERAL RADIOLOGY | Facility: CLINIC | Age: 83
DRG: 100 | End: 2018-01-01
Attending: PHYSICIAN ASSISTANT
Payer: MEDICARE

## 2018-01-01 ENCOUNTER — APPOINTMENT (OUTPATIENT)
Dept: CT IMAGING | Facility: CLINIC | Age: 83
DRG: 872 | End: 2018-01-01
Attending: EMERGENCY MEDICINE
Payer: MEDICARE

## 2018-01-01 ENCOUNTER — HOSPITAL ENCOUNTER (INPATIENT)
Facility: CLINIC | Age: 83
LOS: 10 days | Discharge: NURSING, REHAB OR LONG-TERM CARE | DRG: 853 | End: 2018-10-04
Attending: EMERGENCY MEDICINE | Admitting: INTERNAL MEDICINE
Payer: MEDICARE

## 2018-01-01 ENCOUNTER — HOSPITAL ENCOUNTER (OUTPATIENT)
Facility: CLINIC | Age: 83
Discharge: HOME | End: 2018-01-02
Attending: INTERNAL MEDICINE | Admitting: INTERNAL MEDICINE
Payer: MEDICARE

## 2018-01-01 ENCOUNTER — HOSPITAL ENCOUNTER (INPATIENT)
Facility: CLINIC | Age: 83
LOS: 3 days | Discharge: HOME HEALTH | DRG: 194 | End: 2018-09-19
Attending: EMERGENCY MEDICINE | Admitting: INTERNAL MEDICINE
Payer: MEDICARE

## 2018-01-01 ENCOUNTER — ANESTHESIA EVENT (OUTPATIENT)
Dept: SURGERY | Facility: CLINIC | Age: 83
End: 2018-01-01
Payer: MEDICARE

## 2018-01-01 VITALS
RESPIRATION RATE: 16 BRPM | HEIGHT: 69 IN | WEIGHT: 181 LBS | OXYGEN SATURATION: 95 % | DIASTOLIC BLOOD PRESSURE: 60 MMHG | BODY MASS INDEX: 26.81 KG/M2 | HEART RATE: 60 BPM | SYSTOLIC BLOOD PRESSURE: 173 MMHG | TEMPERATURE: 96.1 F

## 2018-01-01 VITALS
DIASTOLIC BLOOD PRESSURE: 96 MMHG | RESPIRATION RATE: 22 BRPM | SYSTOLIC BLOOD PRESSURE: 235 MMHG | OXYGEN SATURATION: 97 % | TEMPERATURE: 97.4 F

## 2018-01-01 VITALS
RESPIRATION RATE: 16 BRPM | BODY MASS INDEX: 25.34 KG/M2 | SYSTOLIC BLOOD PRESSURE: 136 MMHG | TEMPERATURE: 98.3 F | OXYGEN SATURATION: 90 % | HEART RATE: 58 BPM | DIASTOLIC BLOOD PRESSURE: 79 MMHG | WEIGHT: 176.6 LBS

## 2018-01-01 VITALS
DIASTOLIC BLOOD PRESSURE: 92 MMHG | HEART RATE: 60 BPM | RESPIRATION RATE: 18 BRPM | BODY MASS INDEX: 25.07 KG/M2 | SYSTOLIC BLOOD PRESSURE: 168 MMHG | TEMPERATURE: 98.7 F | HEIGHT: 66 IN | OXYGEN SATURATION: 94 % | WEIGHT: 156 LBS

## 2018-01-01 VITALS
HEIGHT: 68 IN | DIASTOLIC BLOOD PRESSURE: 58 MMHG | HEART RATE: 60 BPM | SYSTOLIC BLOOD PRESSURE: 145 MMHG | WEIGHT: 154.6 LBS | BODY MASS INDEX: 23.43 KG/M2 | TEMPERATURE: 97.4 F | OXYGEN SATURATION: 94 % | RESPIRATION RATE: 18 BRPM

## 2018-01-01 VITALS
OXYGEN SATURATION: 91 % | HEART RATE: 60 BPM | SYSTOLIC BLOOD PRESSURE: 154 MMHG | HEIGHT: 70 IN | BODY MASS INDEX: 22.19 KG/M2 | DIASTOLIC BLOOD PRESSURE: 67 MMHG | RESPIRATION RATE: 30 BRPM | TEMPERATURE: 99.6 F | WEIGHT: 155 LBS

## 2018-01-01 VITALS
BODY MASS INDEX: 25.07 KG/M2 | TEMPERATURE: 97.9 F | DIASTOLIC BLOOD PRESSURE: 84 MMHG | RESPIRATION RATE: 18 BRPM | HEIGHT: 66 IN | SYSTOLIC BLOOD PRESSURE: 132 MMHG | WEIGHT: 156 LBS | HEART RATE: 61 BPM | OXYGEN SATURATION: 93 %

## 2018-01-01 VITALS
HEIGHT: 69 IN | WEIGHT: 158 LBS | RESPIRATION RATE: 18 BRPM | BODY MASS INDEX: 23.4 KG/M2 | OXYGEN SATURATION: 95 % | TEMPERATURE: 97.4 F | DIASTOLIC BLOOD PRESSURE: 73 MMHG | SYSTOLIC BLOOD PRESSURE: 150 MMHG

## 2018-01-01 VITALS
DIASTOLIC BLOOD PRESSURE: 65 MMHG | HEIGHT: 70 IN | HEART RATE: 64 BPM | OXYGEN SATURATION: 94 % | RESPIRATION RATE: 17 BRPM | BODY MASS INDEX: 22.19 KG/M2 | WEIGHT: 155 LBS | SYSTOLIC BLOOD PRESSURE: 123 MMHG | TEMPERATURE: 98.7 F

## 2018-01-01 VITALS
RESPIRATION RATE: 18 BRPM | OXYGEN SATURATION: 92 % | HEIGHT: 70 IN | SYSTOLIC BLOOD PRESSURE: 150 MMHG | WEIGHT: 172.5 LBS | BODY MASS INDEX: 24.69 KG/M2 | DIASTOLIC BLOOD PRESSURE: 77 MMHG | HEART RATE: 60 BPM | TEMPERATURE: 98.2 F

## 2018-01-01 VITALS
SYSTOLIC BLOOD PRESSURE: 191 MMHG | DIASTOLIC BLOOD PRESSURE: 86 MMHG | OXYGEN SATURATION: 99 % | TEMPERATURE: 97.7 F | HEART RATE: 60 BPM | RESPIRATION RATE: 18 BRPM

## 2018-01-01 VITALS
BODY MASS INDEX: 25.07 KG/M2 | OXYGEN SATURATION: 95 % | RESPIRATION RATE: 18 BRPM | HEART RATE: 66 BPM | WEIGHT: 156 LBS | TEMPERATURE: 97.9 F | HEIGHT: 66 IN | DIASTOLIC BLOOD PRESSURE: 71 MMHG | SYSTOLIC BLOOD PRESSURE: 138 MMHG

## 2018-01-01 VITALS
TEMPERATURE: 97.7 F | RESPIRATION RATE: 12 BRPM | SYSTOLIC BLOOD PRESSURE: 132 MMHG | HEIGHT: 70 IN | OXYGEN SATURATION: 94 % | DIASTOLIC BLOOD PRESSURE: 60 MMHG | HEART RATE: 59 BPM | BODY MASS INDEX: 24.2 KG/M2 | WEIGHT: 169 LBS

## 2018-01-01 VITALS — BODY MASS INDEX: 24.62 KG/M2 | OXYGEN SATURATION: 95 % | HEART RATE: 70 BPM | WEIGHT: 172 LBS | HEIGHT: 70 IN

## 2018-01-01 VITALS
WEIGHT: 156 LBS | SYSTOLIC BLOOD PRESSURE: 153 MMHG | BODY MASS INDEX: 25.07 KG/M2 | DIASTOLIC BLOOD PRESSURE: 74 MMHG | TEMPERATURE: 98 F | HEART RATE: 62 BPM | RESPIRATION RATE: 18 BRPM | HEIGHT: 66 IN | OXYGEN SATURATION: 97 %

## 2018-01-01 VITALS
BODY MASS INDEX: 22.19 KG/M2 | HEART RATE: 64 BPM | TEMPERATURE: 98.7 F | RESPIRATION RATE: 17 BRPM | HEIGHT: 70 IN | DIASTOLIC BLOOD PRESSURE: 66 MMHG | OXYGEN SATURATION: 94 % | SYSTOLIC BLOOD PRESSURE: 143 MMHG | WEIGHT: 155 LBS

## 2018-01-01 VITALS
HEART RATE: 60 BPM | RESPIRATION RATE: 18 BRPM | WEIGHT: 154.3 LBS | TEMPERATURE: 97.9 F | BODY MASS INDEX: 24.9 KG/M2 | DIASTOLIC BLOOD PRESSURE: 79 MMHG | SYSTOLIC BLOOD PRESSURE: 164 MMHG

## 2018-01-01 VITALS
DIASTOLIC BLOOD PRESSURE: 76 MMHG | RESPIRATION RATE: 18 BRPM | WEIGHT: 168.1 LBS | SYSTOLIC BLOOD PRESSURE: 166 MMHG | TEMPERATURE: 97.6 F | HEART RATE: 60 BPM | BODY MASS INDEX: 24.12 KG/M2 | OXYGEN SATURATION: 94 %

## 2018-01-01 VITALS
DIASTOLIC BLOOD PRESSURE: 66 MMHG | SYSTOLIC BLOOD PRESSURE: 122 MMHG | OXYGEN SATURATION: 95 % | WEIGHT: 167.7 LBS | HEART RATE: 66 BPM | TEMPERATURE: 97.6 F | BODY MASS INDEX: 24.01 KG/M2 | HEIGHT: 70 IN | RESPIRATION RATE: 20 BRPM

## 2018-01-01 VITALS
OXYGEN SATURATION: 95 % | SYSTOLIC BLOOD PRESSURE: 148 MMHG | WEIGHT: 157.4 LBS | BODY MASS INDEX: 22.58 KG/M2 | RESPIRATION RATE: 16 BRPM | HEART RATE: 68 BPM | DIASTOLIC BLOOD PRESSURE: 76 MMHG | TEMPERATURE: 97.6 F

## 2018-01-01 VITALS — OXYGEN SATURATION: 96 % | WEIGHT: 172 LBS | HEART RATE: 60 BPM | HEIGHT: 70 IN | BODY MASS INDEX: 24.62 KG/M2

## 2018-01-01 VITALS
SYSTOLIC BLOOD PRESSURE: 138 MMHG | RESPIRATION RATE: 14 BRPM | OXYGEN SATURATION: 96 % | TEMPERATURE: 97.4 F | DIASTOLIC BLOOD PRESSURE: 66 MMHG | HEART RATE: 61 BPM

## 2018-01-01 VITALS
HEIGHT: 69 IN | RESPIRATION RATE: 14 BRPM | HEART RATE: 61 BPM | BODY MASS INDEX: 22.87 KG/M2 | OXYGEN SATURATION: 92 % | DIASTOLIC BLOOD PRESSURE: 77 MMHG | SYSTOLIC BLOOD PRESSURE: 163 MMHG | WEIGHT: 154.4 LBS | TEMPERATURE: 98.3 F

## 2018-01-01 DIAGNOSIS — N28.9 RENAL INSUFFICIENCY: ICD-10-CM

## 2018-01-01 DIAGNOSIS — M10.9 ACUTE GOUTY ARTHRITIS: ICD-10-CM

## 2018-01-01 DIAGNOSIS — Z01.818 PREOPERATIVE EXAMINATION: Primary | ICD-10-CM

## 2018-01-01 DIAGNOSIS — F32.0 CURRENT MILD EPISODE OF MAJOR DEPRESSIVE DISORDER, UNSPECIFIED WHETHER RECURRENT (H): ICD-10-CM

## 2018-01-01 DIAGNOSIS — A41.9 SEPSIS, DUE TO UNSPECIFIED ORGANISM: Primary | ICD-10-CM

## 2018-01-01 DIAGNOSIS — E86.0 DEHYDRATION: ICD-10-CM

## 2018-01-01 DIAGNOSIS — N17.9 AKI (ACUTE KIDNEY INJURY) (H): ICD-10-CM

## 2018-01-01 DIAGNOSIS — F32.A DEPRESSION, UNSPECIFIED DEPRESSION TYPE: ICD-10-CM

## 2018-01-01 DIAGNOSIS — G40.109 PARTIAL SEIZURE, MOTOR (H): ICD-10-CM

## 2018-01-01 DIAGNOSIS — R09.02 HYPOXIA: ICD-10-CM

## 2018-01-01 DIAGNOSIS — I48.0 PAROXYSMAL ATRIAL FIBRILLATION (H): ICD-10-CM

## 2018-01-01 DIAGNOSIS — Z53.9 DIAGNOSIS NOT YET DEFINED: Primary | ICD-10-CM

## 2018-01-01 DIAGNOSIS — N32.89 SPASTIC BLADDER: Primary | ICD-10-CM

## 2018-01-01 DIAGNOSIS — J18.9 PNEUMONIA DUE TO INFECTIOUS ORGANISM, UNSPECIFIED LATERALITY, UNSPECIFIED PART OF LUNG: Primary | ICD-10-CM

## 2018-01-01 DIAGNOSIS — I10 ESSENTIAL HYPERTENSION WITH GOAL BLOOD PRESSURE LESS THAN 140/90: ICD-10-CM

## 2018-01-01 DIAGNOSIS — R82.81 PYURIA: ICD-10-CM

## 2018-01-01 DIAGNOSIS — N20.1 URETERAL STONE: ICD-10-CM

## 2018-01-01 DIAGNOSIS — G45.8 OTHER SPECIFIED TRANSIENT CEREBRAL ISCHEMIAS: ICD-10-CM

## 2018-01-01 DIAGNOSIS — D72.829 LEUKOCYTOSIS, UNSPECIFIED TYPE: ICD-10-CM

## 2018-01-01 DIAGNOSIS — I10 BENIGN ESSENTIAL HYPERTENSION: ICD-10-CM

## 2018-01-01 DIAGNOSIS — H35.30 MACULAR DEGENERATION: Primary | ICD-10-CM

## 2018-01-01 DIAGNOSIS — R29.898 RIGHT LEG WEAKNESS: ICD-10-CM

## 2018-01-01 DIAGNOSIS — R53.81 PHYSICAL DECONDITIONING: ICD-10-CM

## 2018-01-01 DIAGNOSIS — R10.31 RLQ ABDOMINAL PAIN: ICD-10-CM

## 2018-01-01 DIAGNOSIS — C61 MALIGNANT NEOPLASM OF PROSTATE (H): ICD-10-CM

## 2018-01-01 DIAGNOSIS — I21.4 NSTEMI (NON-ST ELEVATED MYOCARDIAL INFARCTION) (H): ICD-10-CM

## 2018-01-01 DIAGNOSIS — T83.511D URINARY TRACT INFECTION ASSOCIATED WITH INDWELLING URETHRAL CATHETER, SUBSEQUENT ENCOUNTER: ICD-10-CM

## 2018-01-01 DIAGNOSIS — N20.0 CALCULUS OF LEFT KIDNEY: Primary | ICD-10-CM

## 2018-01-01 DIAGNOSIS — M79.604 PAIN OF RIGHT LOWER EXTREMITY: Primary | ICD-10-CM

## 2018-01-01 DIAGNOSIS — R31.9 HEMATURIA, UNSPECIFIED TYPE: ICD-10-CM

## 2018-01-01 DIAGNOSIS — R33.9 INCOMPLETE BLADDER EMPTYING: Primary | ICD-10-CM

## 2018-01-01 DIAGNOSIS — F41.1 GAD (GENERALIZED ANXIETY DISORDER): ICD-10-CM

## 2018-01-01 DIAGNOSIS — B37.9 CANDIDA INFECTION: Primary | ICD-10-CM

## 2018-01-01 DIAGNOSIS — I49.5 SINOATRIAL NODE DYSFUNCTION (H): ICD-10-CM

## 2018-01-01 DIAGNOSIS — R40.4 RECURRENT EPISODES OF UNRESPONSIVENESS: Primary | ICD-10-CM

## 2018-01-01 DIAGNOSIS — B37.7 CANDIDEMIA (H): ICD-10-CM

## 2018-01-01 DIAGNOSIS — R33.9 URINARY RETENTION: Primary | ICD-10-CM

## 2018-01-01 DIAGNOSIS — Z95.0 PACEMAKER: ICD-10-CM

## 2018-01-01 DIAGNOSIS — N39.0 URINARY TRACT INFECTION ASSOCIATED WITH INDWELLING URETHRAL CATHETER, SUBSEQUENT ENCOUNTER: ICD-10-CM

## 2018-01-01 DIAGNOSIS — R56.9 SEIZURE (H): ICD-10-CM

## 2018-01-01 DIAGNOSIS — R32 URINARY INCONTINENCE, UNSPECIFIED TYPE: Primary | ICD-10-CM

## 2018-01-01 DIAGNOSIS — J45.30 MILD PERSISTENT ASTHMA WITHOUT COMPLICATION: ICD-10-CM

## 2018-01-01 DIAGNOSIS — R06.02 SOB (SHORTNESS OF BREATH): ICD-10-CM

## 2018-01-01 DIAGNOSIS — R10.30 LOWER ABDOMINAL PAIN: ICD-10-CM

## 2018-01-01 DIAGNOSIS — K40.90 RIGHT INGUINAL HERNIA: ICD-10-CM

## 2018-01-01 DIAGNOSIS — G31.84 MCI (MILD COGNITIVE IMPAIRMENT): Primary | ICD-10-CM

## 2018-01-01 DIAGNOSIS — K21.9 GASTROESOPHAGEAL REFLUX DISEASE WITHOUT ESOPHAGITIS: ICD-10-CM

## 2018-01-01 DIAGNOSIS — G93.40 ACUTE ENCEPHALOPATHY: ICD-10-CM

## 2018-01-01 DIAGNOSIS — Z51.5 HOSPICE CARE PATIENT: Primary | ICD-10-CM

## 2018-01-01 DIAGNOSIS — M51.9 LUMBAR DISC DISORDER: ICD-10-CM

## 2018-01-01 DIAGNOSIS — R33.9 URINARY RETENTION: ICD-10-CM

## 2018-01-01 DIAGNOSIS — R60.0 HAND EDEMA: Primary | ICD-10-CM

## 2018-01-01 DIAGNOSIS — N20.0 CALCULUS OF KIDNEY: Primary | ICD-10-CM

## 2018-01-01 DIAGNOSIS — J18.9 HCAP (HEALTHCARE-ASSOCIATED PNEUMONIA): ICD-10-CM

## 2018-01-01 DIAGNOSIS — Z53.9 ERRONEOUS ENCOUNTER--DISREGARD: Primary | ICD-10-CM

## 2018-01-01 DIAGNOSIS — I10 BENIGN ESSENTIAL HYPERTENSION: Primary | ICD-10-CM

## 2018-01-01 DIAGNOSIS — R25.1 TREMOR: ICD-10-CM

## 2018-01-01 DIAGNOSIS — J96.01 ACUTE RESPIRATORY FAILURE WITH HYPOXIA (H): ICD-10-CM

## 2018-01-01 DIAGNOSIS — M79.604 PAIN OF RIGHT LOWER EXTREMITY: ICD-10-CM

## 2018-01-01 DIAGNOSIS — Z95.0 CARDIAC PACEMAKER IN SITU: Primary | ICD-10-CM

## 2018-01-01 DIAGNOSIS — I48.20 CHRONIC ATRIAL FIBRILLATION (H): ICD-10-CM

## 2018-01-01 DIAGNOSIS — B37.9 CANDIDA INFECTION: ICD-10-CM

## 2018-01-01 DIAGNOSIS — Z95.0 CARDIAC PACEMAKER IN SITU: ICD-10-CM

## 2018-01-01 DIAGNOSIS — M62.81 GENERALIZED MUSCLE WEAKNESS: ICD-10-CM

## 2018-01-01 DIAGNOSIS — G25.3 MYOCLONUS: ICD-10-CM

## 2018-01-01 DIAGNOSIS — N39.0 COMPLICATED UTI (URINARY TRACT INFECTION): ICD-10-CM

## 2018-01-01 DIAGNOSIS — R29.898 RIGHT LEG WEAKNESS: Primary | ICD-10-CM

## 2018-01-01 DIAGNOSIS — M79.642 PAIN OF LEFT HAND: ICD-10-CM

## 2018-01-01 DIAGNOSIS — N39.0 UTI (URINARY TRACT INFECTION) WITH PYURIA: ICD-10-CM

## 2018-01-01 DIAGNOSIS — R55 SYNCOPE, NEAR: ICD-10-CM

## 2018-01-01 DIAGNOSIS — N39.41 URGE INCONTINENCE: Primary | ICD-10-CM

## 2018-01-01 DIAGNOSIS — N32.89 SPASTIC BLADDER: ICD-10-CM

## 2018-01-01 DIAGNOSIS — R68.2 DRY MOUTH: ICD-10-CM

## 2018-01-01 DIAGNOSIS — E87.70 HYPERVOLEMIA, UNSPECIFIED HYPERVOLEMIA TYPE: ICD-10-CM

## 2018-01-01 DIAGNOSIS — Z09 FOLLOW UP: Primary | ICD-10-CM

## 2018-01-01 DIAGNOSIS — N20.0 CALCULUS OF LEFT KIDNEY: ICD-10-CM

## 2018-01-01 DIAGNOSIS — K46.9 ABDOMINAL HERNIA WITHOUT OBSTRUCTION AND WITHOUT GANGRENE, RECURRENCE NOT SPECIFIED, UNSPECIFIED HERNIA TYPE: ICD-10-CM

## 2018-01-01 DIAGNOSIS — J18.9 PNEUMONIA DUE TO INFECTIOUS ORGANISM, UNSPECIFIED LATERALITY, UNSPECIFIED PART OF LUNG: ICD-10-CM

## 2018-01-01 DIAGNOSIS — N39.0 URINARY TRACT INFECTION WITHOUT HEMATURIA, SITE UNSPECIFIED: ICD-10-CM

## 2018-01-01 DIAGNOSIS — N39.0 URINARY TRACT INFECTION: ICD-10-CM

## 2018-01-01 LAB
ALBUMIN SERPL-MCNC: 1.7 G/DL (ref 3.4–5)
ALBUMIN SERPL-MCNC: 1.9 G/DL (ref 3.4–5)
ALBUMIN SERPL-MCNC: 2.1 G/DL (ref 3.4–5)
ALBUMIN SERPL-MCNC: 2.2 G/DL (ref 3.4–5)
ALBUMIN SERPL-MCNC: 2.7 G/DL (ref 3.4–5)
ALBUMIN SERPL-MCNC: 3.2 G/DL (ref 3.4–5)
ALBUMIN SERPL-MCNC: 3.8 G/DL (ref 3.4–5)
ALBUMIN UR-MCNC: 100 MG/DL
ALBUMIN UR-MCNC: NEGATIVE MG/DL
ALBUMIN UR-MCNC: NEGATIVE MG/DL
ALP SERPL-CCNC: 112 U/L (ref 40–150)
ALP SERPL-CCNC: 118 U/L (ref 40–150)
ALP SERPL-CCNC: 122 U/L (ref 40–150)
ALP SERPL-CCNC: 136 U/L (ref 40–150)
ALP SERPL-CCNC: 146 U/L (ref 40–150)
ALP SERPL-CCNC: 96 U/L (ref 40–150)
ALP SERPL-CCNC: 96 U/L (ref 40–150)
ALT SERPL W P-5'-P-CCNC: 19 U/L (ref 0–70)
ALT SERPL W P-5'-P-CCNC: 25 U/L (ref 0–70)
ALT SERPL W P-5'-P-CCNC: 27 U/L (ref 0–70)
ALT SERPL W P-5'-P-CCNC: 29 U/L (ref 0–70)
ALT SERPL W P-5'-P-CCNC: 31 U/L (ref 0–70)
ALT SERPL W P-5'-P-CCNC: 33 U/L (ref 0–70)
ALT SERPL W P-5'-P-CCNC: 36 U/L (ref 0–70)
ANION GAP SERPL CALCULATED.3IONS-SCNC: 10 MMOL/L (ref 3–14)
ANION GAP SERPL CALCULATED.3IONS-SCNC: 2 MMOL/L (ref 3–14)
ANION GAP SERPL CALCULATED.3IONS-SCNC: 3 MMOL/L (ref 3–14)
ANION GAP SERPL CALCULATED.3IONS-SCNC: 3 MMOL/L (ref 3–14)
ANION GAP SERPL CALCULATED.3IONS-SCNC: 4 MMOL/L (ref 3–14)
ANION GAP SERPL CALCULATED.3IONS-SCNC: 5 MMOL/L (ref 3–14)
ANION GAP SERPL CALCULATED.3IONS-SCNC: 6 MMOL/L (ref 3–14)
ANION GAP SERPL CALCULATED.3IONS-SCNC: 7 MMOL/L (ref 3–14)
ANION GAP SERPL CALCULATED.3IONS-SCNC: 8 MMOL/L (ref 3–14)
ANION GAP SERPL CALCULATED.3IONS-SCNC: 8 MMOL/L (ref 3–14)
ANION GAP SERPL CALCULATED.3IONS-SCNC: 9 MMOL/L (ref 3–14)
ANISOCYTOSIS BLD QL SMEAR: SLIGHT
APPEARANCE CSF: CLEAR
APPEARANCE UR: ABNORMAL
APTT PPP: 32 SEC (ref 22–37)
AST SERPL W P-5'-P-CCNC: 18 U/L (ref 0–45)
AST SERPL W P-5'-P-CCNC: 22 U/L (ref 0–45)
AST SERPL W P-5'-P-CCNC: 23 U/L (ref 0–45)
AST SERPL W P-5'-P-CCNC: 28 U/L (ref 0–45)
AST SERPL W P-5'-P-CCNC: 34 U/L (ref 0–45)
AST SERPL W P-5'-P-CCNC: 44 U/L (ref 0–45)
AST SERPL W P-5'-P-CCNC: 45 U/L (ref 0–45)
BACTERIA #/AREA URNS HPF: ABNORMAL /HPF
BACTERIA SPEC CULT: ABNORMAL
BACTERIA SPEC CULT: NO GROWTH
BACTERIA SPEC CULT: NORMAL
BASOPHILS # BLD AUTO: 0 10E9/L (ref 0–0.2)
BASOPHILS # BLD AUTO: 0.1 10E9/L (ref 0–0.2)
BASOPHILS # BLD AUTO: 0.2 10E9/L (ref 0–0.2)
BASOPHILS # BLD AUTO: 0.2 10E9/L (ref 0–0.2)
BASOPHILS NFR BLD AUTO: 0.1 %
BASOPHILS NFR BLD AUTO: 0.2 %
BASOPHILS NFR BLD AUTO: 0.3 %
BASOPHILS NFR BLD AUTO: 0.3 %
BASOPHILS NFR BLD AUTO: 0.4 %
BASOPHILS NFR BLD AUTO: 0.6 %
BASOPHILS NFR BLD AUTO: 0.7 %
BASOPHILS NFR BLD AUTO: 0.7 %
BASOPHILS NFR BLD AUTO: 0.8 %
BASOPHILS NFR BLD AUTO: 1 %
BASOPHILS NFR BLD AUTO: 2 %
BASOPHILS NFR BLD AUTO: 2 %
BILIRUB SERPL-MCNC: 0.4 MG/DL (ref 0.2–1.3)
BILIRUB SERPL-MCNC: 0.5 MG/DL (ref 0.2–1.3)
BILIRUB SERPL-MCNC: 0.5 MG/DL (ref 0.2–1.3)
BILIRUB SERPL-MCNC: 0.6 MG/DL (ref 0.2–1.3)
BILIRUB SERPL-MCNC: 0.6 MG/DL (ref 0.2–1.3)
BILIRUB SERPL-MCNC: 0.9 MG/DL (ref 0.2–1.3)
BILIRUB SERPL-MCNC: 1 MG/DL (ref 0.2–1.3)
BILIRUB UR QL STRIP: NEGATIVE
BUN SERPL-MCNC: 12 MG/DL (ref 7–30)
BUN SERPL-MCNC: 12 MG/DL (ref 7–30)
BUN SERPL-MCNC: 13 MG/DL (ref 7–30)
BUN SERPL-MCNC: 14 MG/DL (ref 7–30)
BUN SERPL-MCNC: 15 MG/DL (ref 7–30)
BUN SERPL-MCNC: 15 MG/DL (ref 7–30)
BUN SERPL-MCNC: 16 MG/DL (ref 7–30)
BUN SERPL-MCNC: 16 MG/DL (ref 7–30)
BUN SERPL-MCNC: 17 MG/DL (ref 7–30)
BUN SERPL-MCNC: 18 MG/DL (ref 7–30)
BUN SERPL-MCNC: 19 MG/DL (ref 7–30)
BUN SERPL-MCNC: 20 MG/DL (ref 7–30)
BUN SERPL-MCNC: 21 MG/DL (ref 7–30)
BUN SERPL-MCNC: 23 MG/DL (ref 7–30)
BUN SERPL-MCNC: 23 MG/DL (ref 7–30)
BUN SERPL-MCNC: 24 MG/DL (ref 7–30)
BUN SERPL-MCNC: 25 MG/DL (ref 7–30)
BUN SERPL-MCNC: 25 MG/DL (ref 7–30)
BUN SERPL-MCNC: 26 MG/DL (ref 7–30)
BUN SERPL-MCNC: 28 MG/DL (ref 7–30)
BUN SERPL-MCNC: 29 MG/DL (ref 7–30)
BUN SERPL-MCNC: 30 MG/DL (ref 7–30)
BUN SERPL-MCNC: 32 MG/DL (ref 7–30)
BUN SERPL-MCNC: 33 MG/DL (ref 7–30)
BUN SERPL-MCNC: 9 MG/DL (ref 7–30)
BUN SERPL-MCNC: 9 MG/DL (ref 7–30)
BURR CELLS BLD QL SMEAR: SLIGHT
CALCIUM SERPL-MCNC: 8.1 MG/DL (ref 8.5–10.1)
CALCIUM SERPL-MCNC: 8.2 MG/DL (ref 8.5–10.1)
CALCIUM SERPL-MCNC: 8.3 MG/DL (ref 8.5–10.1)
CALCIUM SERPL-MCNC: 8.3 MG/DL (ref 8.5–10.1)
CALCIUM SERPL-MCNC: 8.4 MG/DL (ref 8.5–10.1)
CALCIUM SERPL-MCNC: 8.4 MG/DL (ref 8.5–10.1)
CALCIUM SERPL-MCNC: 8.5 MG/DL (ref 8.5–10.1)
CALCIUM SERPL-MCNC: 8.6 MG/DL (ref 8.5–10.1)
CALCIUM SERPL-MCNC: 8.7 MG/DL (ref 8.5–10.1)
CALCIUM SERPL-MCNC: 8.8 MG/DL (ref 8.5–10.1)
CALCIUM SERPL-MCNC: 8.9 MG/DL (ref 8.5–10.1)
CALCIUM SERPL-MCNC: 9 MG/DL (ref 8.5–10.1)
CALCIUM SERPL-MCNC: 9.1 MG/DL (ref 8.5–10.1)
CALCIUM SERPL-MCNC: 9.1 MG/DL (ref 8.5–10.1)
CALCIUM SERPL-MCNC: 9.2 MG/DL (ref 8.5–10.1)
CALCIUM SERPL-MCNC: 9.4 MG/DL (ref 8.5–10.1)
CALCIUM SERPL-MCNC: 9.5 MG/DL (ref 8.5–10.1)
CALCIUM SERPL-MCNC: 9.7 MG/DL (ref 8.5–10.1)
CAOX CRY #/AREA URNS HPF: ABNORMAL /HPF
CHLORIDE SERPL-SCNC: 100 MMOL/L (ref 94–109)
CHLORIDE SERPL-SCNC: 102 MMOL/L (ref 94–109)
CHLORIDE SERPL-SCNC: 103 MMOL/L (ref 94–109)
CHLORIDE SERPL-SCNC: 104 MMOL/L (ref 94–109)
CHLORIDE SERPL-SCNC: 104 MMOL/L (ref 94–109)
CHLORIDE SERPL-SCNC: 105 MMOL/L (ref 94–109)
CHLORIDE SERPL-SCNC: 106 MMOL/L (ref 94–109)
CHLORIDE SERPL-SCNC: 107 MMOL/L (ref 94–109)
CHLORIDE SERPL-SCNC: 108 MMOL/L (ref 94–109)
CHLORIDE SERPL-SCNC: 109 MMOL/L (ref 94–109)
CHLORIDE SERPL-SCNC: 110 MMOL/L (ref 94–109)
CHLORIDE SERPL-SCNC: 111 MMOL/L (ref 94–109)
CHLORIDE SERPL-SCNC: 111 MMOL/L (ref 94–109)
CHLORIDE SERPL-SCNC: 112 MMOL/L (ref 94–109)
CHLORIDE SERPL-SCNC: 114 MMOL/L (ref 94–109)
CHOLEST SERPL-MCNC: 96 MG/DL
CK SERPL-CCNC: 366 U/L (ref 30–300)
CK SERPL-CCNC: 557 U/L (ref 30–300)
CO2 BLDCOV-SCNC: 27 MMOL/L (ref 21–28)
CO2 SERPL-SCNC: 22 MMOL/L (ref 20–32)
CO2 SERPL-SCNC: 23 MMOL/L (ref 20–32)
CO2 SERPL-SCNC: 24 MMOL/L (ref 20–32)
CO2 SERPL-SCNC: 24 MMOL/L (ref 20–32)
CO2 SERPL-SCNC: 25 MMOL/L (ref 20–32)
CO2 SERPL-SCNC: 26 MMOL/L (ref 20–32)
CO2 SERPL-SCNC: 27 MMOL/L (ref 20–32)
CO2 SERPL-SCNC: 28 MMOL/L (ref 20–32)
CO2 SERPL-SCNC: 29 MMOL/L (ref 20–32)
CO2 SERPL-SCNC: 29 MMOL/L (ref 20–32)
CO2 SERPL-SCNC: 30 MMOL/L (ref 20–32)
CO2 SERPL-SCNC: 34 MMOL/L (ref 20–32)
CO2 SERPL-SCNC: 34 MMOL/L (ref 20–32)
COLOR CSF: COLORLESS
COLOR UR AUTO: ABNORMAL
COLOR UR AUTO: ABNORMAL
COLOR UR AUTO: YELLOW
CREAT SERPL-MCNC: 0.86 MG/DL (ref 0.66–1.25)
CREAT SERPL-MCNC: 0.89 MG/DL (ref 0.66–1.25)
CREAT SERPL-MCNC: 0.89 MG/DL (ref 0.66–1.25)
CREAT SERPL-MCNC: 0.94 MG/DL (ref 0.66–1.25)
CREAT SERPL-MCNC: 0.95 MG/DL (ref 0.66–1.25)
CREAT SERPL-MCNC: 0.97 MG/DL (ref 0.66–1.25)
CREAT SERPL-MCNC: 0.97 MG/DL (ref 0.66–1.25)
CREAT SERPL-MCNC: 0.98 MG/DL (ref 0.66–1.25)
CREAT SERPL-MCNC: 0.98 MG/DL (ref 0.66–1.25)
CREAT SERPL-MCNC: 1.01 MG/DL (ref 0.66–1.25)
CREAT SERPL-MCNC: 1.02 MG/DL (ref 0.66–1.25)
CREAT SERPL-MCNC: 1.03 MG/DL (ref 0.66–1.25)
CREAT SERPL-MCNC: 1.06 MG/DL (ref 0.66–1.25)
CREAT SERPL-MCNC: 1.07 MG/DL (ref 0.66–1.25)
CREAT SERPL-MCNC: 1.08 MG/DL (ref 0.66–1.25)
CREAT SERPL-MCNC: 1.1 MG/DL (ref 0.66–1.25)
CREAT SERPL-MCNC: 1.14 MG/DL (ref 0.66–1.25)
CREAT SERPL-MCNC: 1.15 MG/DL (ref 0.66–1.25)
CREAT SERPL-MCNC: 1.17 MG/DL (ref 0.66–1.25)
CREAT SERPL-MCNC: 1.18 MG/DL (ref 0.66–1.25)
CREAT SERPL-MCNC: 1.19 MG/DL (ref 0.66–1.25)
CREAT SERPL-MCNC: 1.37 MG/DL (ref 0.66–1.25)
CREAT SERPL-MCNC: 1.4 MG/DL (ref 0.66–1.25)
CREAT SERPL-MCNC: 1.45 MG/DL (ref 0.66–1.25)
CREAT SERPL-MCNC: 1.65 MG/DL (ref 0.66–1.25)
CREAT SERPL-MCNC: 1.67 MG/DL (ref 0.66–1.25)
CREAT SERPL-MCNC: 1.75 MG/DL (ref 0.66–1.25)
CREAT SERPL-MCNC: 1.84 MG/DL (ref 0.66–1.25)
CREAT SERPL-MCNC: 2.02 MG/DL (ref 0.66–1.25)
CRP SERPL-MCNC: 168 MG/L (ref 0–8)
D DIMER PPP FEU-MCNC: 9.2 UG/ML FEU (ref 0–0.5)
DIFFERENTIAL METHOD BLD: ABNORMAL
EOSINOPHIL # BLD AUTO: 0 10E9/L (ref 0–0.7)
EOSINOPHIL # BLD AUTO: 0.1 10E9/L (ref 0–0.7)
EOSINOPHIL # BLD AUTO: 0.1 10E9/L (ref 0–0.7)
EOSINOPHIL # BLD AUTO: 0.2 10E9/L (ref 0–0.7)
EOSINOPHIL # BLD AUTO: 0.3 10E9/L (ref 0–0.7)
EOSINOPHIL # BLD AUTO: 0.4 10E9/L (ref 0–0.7)
EOSINOPHIL # BLD AUTO: 0.6 10E9/L (ref 0–0.7)
EOSINOPHIL # BLD AUTO: 0.6 10E9/L (ref 0–0.7)
EOSINOPHIL # BLD AUTO: 0.9 10E9/L (ref 0–0.7)
EOSINOPHIL NFR BLD AUTO: 0 %
EOSINOPHIL NFR BLD AUTO: 0.1 %
EOSINOPHIL NFR BLD AUTO: 0.1 %
EOSINOPHIL NFR BLD AUTO: 0.2 %
EOSINOPHIL NFR BLD AUTO: 0.2 %
EOSINOPHIL NFR BLD AUTO: 0.5 %
EOSINOPHIL NFR BLD AUTO: 0.7 %
EOSINOPHIL NFR BLD AUTO: 2 %
EOSINOPHIL NFR BLD AUTO: 2.2 %
EOSINOPHIL NFR BLD AUTO: 2.3 %
EOSINOPHIL NFR BLD AUTO: 3 %
EOSINOPHIL NFR BLD AUTO: 3.6 %
EOSINOPHIL NFR BLD AUTO: 3.7 %
EOSINOPHIL NFR BLD AUTO: 3.8 %
EOSINOPHIL NFR BLD AUTO: 4 %
EOSINOPHIL NFR BLD AUTO: 4.5 %
EOSINOPHIL NFR BLD AUTO: 4.5 %
ERYTHROCYTE [DISTWIDTH] IN BLOOD BY AUTOMATED COUNT: 15.9 % (ref 10–15)
ERYTHROCYTE [DISTWIDTH] IN BLOOD BY AUTOMATED COUNT: 16.5 % (ref 10–15)
ERYTHROCYTE [DISTWIDTH] IN BLOOD BY AUTOMATED COUNT: 16.7 % (ref 10–15)
ERYTHROCYTE [DISTWIDTH] IN BLOOD BY AUTOMATED COUNT: 16.8 % (ref 10–15)
ERYTHROCYTE [DISTWIDTH] IN BLOOD BY AUTOMATED COUNT: 16.9 % (ref 10–15)
ERYTHROCYTE [DISTWIDTH] IN BLOOD BY AUTOMATED COUNT: 16.9 % (ref 10–15)
ERYTHROCYTE [DISTWIDTH] IN BLOOD BY AUTOMATED COUNT: 17 % (ref 10–15)
ERYTHROCYTE [DISTWIDTH] IN BLOOD BY AUTOMATED COUNT: 17 % (ref 10–15)
ERYTHROCYTE [DISTWIDTH] IN BLOOD BY AUTOMATED COUNT: 17.1 % (ref 10–15)
ERYTHROCYTE [DISTWIDTH] IN BLOOD BY AUTOMATED COUNT: 17.1 % (ref 10–15)
ERYTHROCYTE [DISTWIDTH] IN BLOOD BY AUTOMATED COUNT: 17.2 % (ref 10–15)
ERYTHROCYTE [DISTWIDTH] IN BLOOD BY AUTOMATED COUNT: 17.2 % (ref 10–15)
ERYTHROCYTE [DISTWIDTH] IN BLOOD BY AUTOMATED COUNT: 17.3 % (ref 10–15)
ERYTHROCYTE [DISTWIDTH] IN BLOOD BY AUTOMATED COUNT: 17.4 % (ref 10–15)
ERYTHROCYTE [DISTWIDTH] IN BLOOD BY AUTOMATED COUNT: 17.5 % (ref 10–15)
ERYTHROCYTE [DISTWIDTH] IN BLOOD BY AUTOMATED COUNT: 17.5 % (ref 10–15)
ERYTHROCYTE [DISTWIDTH] IN BLOOD BY AUTOMATED COUNT: 17.6 % (ref 10–15)
ERYTHROCYTE [DISTWIDTH] IN BLOOD BY AUTOMATED COUNT: 17.7 % (ref 10–15)
ERYTHROCYTE [DISTWIDTH] IN BLOOD BY AUTOMATED COUNT: 18 % (ref 10–15)
ERYTHROCYTE [DISTWIDTH] IN BLOOD BY AUTOMATED COUNT: 18.2 % (ref 10–15)
ERYTHROCYTE [DISTWIDTH] IN BLOOD BY AUTOMATED COUNT: 18.3 % (ref 10–15)
ERYTHROCYTE [DISTWIDTH] IN BLOOD BY AUTOMATED COUNT: 18.3 % (ref 10–15)
ERYTHROCYTE [DISTWIDTH] IN BLOOD BY AUTOMATED COUNT: 18.5 % (ref 10–15)
ERYTHROCYTE [DISTWIDTH] IN BLOOD BY AUTOMATED COUNT: 18.6 % (ref 10–15)
ERYTHROCYTE [DISTWIDTH] IN BLOOD BY AUTOMATED COUNT: 18.7 % (ref 10–15)
ERYTHROCYTE [SEDIMENTATION RATE] IN BLOOD BY WESTERGREN METHOD: 47 MM/H (ref 0–20)
EV RNA SPEC QL NAA+PROBE: NEGATIVE
FLUAV+FLUBV AG SPEC QL: NEGATIVE
FLUAV+FLUBV AG SPEC QL: NEGATIVE
GFR SERPL CREATININE-BSD FRML MDRD: 31 ML/MIN/1.7M2
GFR SERPL CREATININE-BSD FRML MDRD: 34 ML/MIN/1.7M2
GFR SERPL CREATININE-BSD FRML MDRD: 36 ML/MIN/1.7M2
GFR SERPL CREATININE-BSD FRML MDRD: 39 ML/MIN/1.7M2
GFR SERPL CREATININE-BSD FRML MDRD: 39 ML/MIN/1.7M2
GFR SERPL CREATININE-BSD FRML MDRD: 45 ML/MIN/1.7M2
GFR SERPL CREATININE-BSD FRML MDRD: 47 ML/MIN/1.7M2
GFR SERPL CREATININE-BSD FRML MDRD: 48 ML/MIN/1.7M2
GFR SERPL CREATININE-BSD FRML MDRD: 57 ML/MIN/1.7M2
GFR SERPL CREATININE-BSD FRML MDRD: 57 ML/MIN/1.7M2
GFR SERPL CREATININE-BSD FRML MDRD: 58 ML/MIN/1.7M2
GFR SERPL CREATININE-BSD FRML MDRD: 59 ML/MIN/1.7M2
GFR SERPL CREATININE-BSD FRML MDRD: 60 ML/MIN/1.7M2
GFR SERPL CREATININE-BSD FRML MDRD: 62 ML/MIN/1.7M2
GFR SERPL CREATININE-BSD FRML MDRD: 64 ML/MIN/1.7M2
GFR SERPL CREATININE-BSD FRML MDRD: 64 ML/MIN/1.7M2
GFR SERPL CREATININE-BSD FRML MDRD: 65 ML/MIN/1.7M2
GFR SERPL CREATININE-BSD FRML MDRD: 67 ML/MIN/1.7M2
GFR SERPL CREATININE-BSD FRML MDRD: 68 ML/MIN/1.7M2
GFR SERPL CREATININE-BSD FRML MDRD: 69 ML/MIN/1.7M2
GFR SERPL CREATININE-BSD FRML MDRD: 71 ML/MIN/1.7M2
GFR SERPL CREATININE-BSD FRML MDRD: 71 ML/MIN/1.7M2
GFR SERPL CREATININE-BSD FRML MDRD: 72 ML/MIN/1.7M2
GFR SERPL CREATININE-BSD FRML MDRD: 72 ML/MIN/1.7M2
GFR SERPL CREATININE-BSD FRML MDRD: 74 ML/MIN/1.7M2
GFR SERPL CREATININE-BSD FRML MDRD: 75 ML/MIN/1.7M2
GFR SERPL CREATININE-BSD FRML MDRD: 79 ML/MIN/1.7M2
GFR SERPL CREATININE-BSD FRML MDRD: 80 ML/MIN/1.7M2
GFR SERPL CREATININE-BSD FRML MDRD: 83 ML/MIN/1.7M2
GLUCOSE BLDC GLUCOMTR-MCNC: 112 MG/DL (ref 70–99)
GLUCOSE BLDC GLUCOMTR-MCNC: 118 MG/DL (ref 70–99)
GLUCOSE BLDC GLUCOMTR-MCNC: 85 MG/DL (ref 70–99)
GLUCOSE CSF-MCNC: 52 MG/DL (ref 40–70)
GLUCOSE SERPL-MCNC: 100 MG/DL (ref 70–99)
GLUCOSE SERPL-MCNC: 101 MG/DL (ref 70–99)
GLUCOSE SERPL-MCNC: 103 MG/DL (ref 70–99)
GLUCOSE SERPL-MCNC: 104 MG/DL (ref 70–99)
GLUCOSE SERPL-MCNC: 106 MG/DL (ref 70–99)
GLUCOSE SERPL-MCNC: 108 MG/DL (ref 70–99)
GLUCOSE SERPL-MCNC: 108 MG/DL (ref 70–99)
GLUCOSE SERPL-MCNC: 110 MG/DL (ref 70–99)
GLUCOSE SERPL-MCNC: 111 MG/DL (ref 70–99)
GLUCOSE SERPL-MCNC: 111 MG/DL (ref 70–99)
GLUCOSE SERPL-MCNC: 113 MG/DL (ref 70–99)
GLUCOSE SERPL-MCNC: 114 MG/DL (ref 70–99)
GLUCOSE SERPL-MCNC: 115 MG/DL (ref 70–99)
GLUCOSE SERPL-MCNC: 117 MG/DL (ref 70–99)
GLUCOSE SERPL-MCNC: 136 MG/DL (ref 70–99)
GLUCOSE SERPL-MCNC: 71 MG/DL (ref 70–99)
GLUCOSE SERPL-MCNC: 82 MG/DL (ref 70–99)
GLUCOSE SERPL-MCNC: 83 MG/DL (ref 70–99)
GLUCOSE SERPL-MCNC: 85 MG/DL (ref 70–99)
GLUCOSE SERPL-MCNC: 87 MG/DL (ref 70–99)
GLUCOSE SERPL-MCNC: 88 MG/DL (ref 70–99)
GLUCOSE SERPL-MCNC: 92 MG/DL (ref 70–99)
GLUCOSE SERPL-MCNC: 94 MG/DL (ref 70–99)
GLUCOSE SERPL-MCNC: 96 MG/DL (ref 70–99)
GLUCOSE SERPL-MCNC: 98 MG/DL (ref 70–99)
GLUCOSE SERPL-MCNC: 99 MG/DL (ref 70–99)
GLUCOSE UR STRIP-MCNC: NEGATIVE MG/DL
GRAM STN SPEC: NORMAL
HCT VFR BLD AUTO: 32.9 % (ref 40–53)
HCT VFR BLD AUTO: 33.3 % (ref 40–53)
HCT VFR BLD AUTO: 34.2 % (ref 40–53)
HCT VFR BLD AUTO: 34.5 % (ref 40–53)
HCT VFR BLD AUTO: 35.6 % (ref 40–53)
HCT VFR BLD AUTO: 36 % (ref 40–53)
HCT VFR BLD AUTO: 36.1 % (ref 40–53)
HCT VFR BLD AUTO: 36.2 % (ref 40–53)
HCT VFR BLD AUTO: 36.3 % (ref 40–53)
HCT VFR BLD AUTO: 36.4 % (ref 40–53)
HCT VFR BLD AUTO: 36.7 % (ref 40–53)
HCT VFR BLD AUTO: 36.7 % (ref 40–53)
HCT VFR BLD AUTO: 36.8 % (ref 40–53)
HCT VFR BLD AUTO: 37.1 % (ref 40–53)
HCT VFR BLD AUTO: 37.4 % (ref 40–53)
HCT VFR BLD AUTO: 37.6 % (ref 40–53)
HCT VFR BLD AUTO: 37.6 % (ref 40–53)
HCT VFR BLD AUTO: 37.8 % (ref 40–53)
HCT VFR BLD AUTO: 38.5 % (ref 40–53)
HCT VFR BLD AUTO: 38.7 % (ref 40–53)
HCT VFR BLD AUTO: 38.8 % (ref 40–53)
HCT VFR BLD AUTO: 39.5 % (ref 40–53)
HCT VFR BLD AUTO: 39.8 % (ref 40–53)
HCT VFR BLD AUTO: 40.8 % (ref 40–53)
HCT VFR BLD AUTO: 40.9 % (ref 40–53)
HCT VFR BLD AUTO: 41.5 % (ref 40–53)
HCT VFR BLD AUTO: 44.2 % (ref 40–53)
HCT VFR BLD AUTO: 44.5 % (ref 40–53)
HCT VFR BLD AUTO: 44.6 % (ref 40–53)
HCT VFR BLD AUTO: 48.7 % (ref 40–53)
HDLC SERPL-MCNC: 19 MG/DL
HGB BLD-MCNC: 10.4 G/DL (ref 13.3–17.7)
HGB BLD-MCNC: 10.7 G/DL (ref 13.3–17.7)
HGB BLD-MCNC: 10.7 G/DL (ref 13.3–17.7)
HGB BLD-MCNC: 10.9 G/DL (ref 13.3–17.7)
HGB BLD-MCNC: 11.1 G/DL (ref 13.3–17.7)
HGB BLD-MCNC: 11.2 G/DL (ref 13.3–17.7)
HGB BLD-MCNC: 11.4 G/DL (ref 13.3–17.7)
HGB BLD-MCNC: 11.4 G/DL (ref 13.3–17.7)
HGB BLD-MCNC: 11.5 G/DL (ref 13.3–17.7)
HGB BLD-MCNC: 11.6 G/DL (ref 13.3–17.7)
HGB BLD-MCNC: 11.7 G/DL (ref 13.3–17.7)
HGB BLD-MCNC: 11.7 G/DL (ref 13.3–17.7)
HGB BLD-MCNC: 11.8 G/DL (ref 13.3–17.7)
HGB BLD-MCNC: 11.9 G/DL (ref 13.3–17.7)
HGB BLD-MCNC: 12.1 G/DL (ref 13.3–17.7)
HGB BLD-MCNC: 12.2 G/DL (ref 13.3–17.7)
HGB BLD-MCNC: 12.4 G/DL (ref 13.3–17.7)
HGB BLD-MCNC: 12.9 G/DL (ref 13.3–17.7)
HGB BLD-MCNC: 13 G/DL (ref 13.3–17.7)
HGB BLD-MCNC: 13 G/DL (ref 13.3–17.7)
HGB BLD-MCNC: 13.1 G/DL (ref 13.3–17.7)
HGB BLD-MCNC: 13.3 G/DL (ref 13.3–17.7)
HGB BLD-MCNC: 13.5 G/DL (ref 13.3–17.7)
HGB BLD-MCNC: 14.6 G/DL (ref 13.3–17.7)
HGB BLD-MCNC: 14.7 G/DL (ref 13.3–17.7)
HGB BLD-MCNC: 14.9 G/DL (ref 13.3–17.7)
HGB BLD-MCNC: 16.3 G/DL (ref 13.3–17.7)
HGB UR QL STRIP: ABNORMAL
HGB UR QL STRIP: NEGATIVE
HYALINE CASTS #/AREA URNS LPF: 4 /LPF (ref 0–2)
IMM GRANULOCYTES # BLD: 0.1 10E9/L (ref 0–0.4)
IMM GRANULOCYTES # BLD: 0.2 10E9/L (ref 0–0.4)
IMM GRANULOCYTES # BLD: 0.4 10E9/L (ref 0–0.4)
IMM GRANULOCYTES # BLD: 0.4 10E9/L (ref 0–0.4)
IMM GRANULOCYTES # BLD: 0.6 10E9/L (ref 0–0.4)
IMM GRANULOCYTES # BLD: 0.8 10E9/L (ref 0–0.4)
IMM GRANULOCYTES NFR BLD: 0.4 %
IMM GRANULOCYTES NFR BLD: 0.4 %
IMM GRANULOCYTES NFR BLD: 0.5 %
IMM GRANULOCYTES NFR BLD: 0.6 %
IMM GRANULOCYTES NFR BLD: 0.7 %
IMM GRANULOCYTES NFR BLD: 0.8 %
IMM GRANULOCYTES NFR BLD: 0.9 %
IMM GRANULOCYTES NFR BLD: 1 %
IMM GRANULOCYTES NFR BLD: 2.5 %
IMM GRANULOCYTES NFR BLD: 3.3 %
IMM GRANULOCYTES NFR BLD: 4.1 %
IMM GRANULOCYTES NFR BLD: 4.2 %
INR PPP: 1.09 (ref 0.86–1.14)
INTERPRETATION ECG - MUSE: NORMAL
KETONES UR STRIP-MCNC: 20 MG/DL
KETONES UR STRIP-MCNC: 5 MG/DL
KETONES UR STRIP-MCNC: 5 MG/DL
KETONES UR STRIP-MCNC: NEGATIVE MG/DL
L PNEUMO1 AG UR QL IA: NORMAL
LACTATE BLD-SCNC: 0.8 MMOL/L (ref 0.7–2)
LACTATE BLD-SCNC: 0.9 MMOL/L (ref 0.7–2.1)
LACTATE BLD-SCNC: 1.3 MMOL/L (ref 0.7–2)
LDLC SERPL CALC-MCNC: 54 MG/DL
LEUKOCYTE ESTERASE UR QL STRIP: ABNORMAL
LEVETIRACETAM SERPL-MCNC: 28 UG/ML (ref 12–46)
LEVETIRACETAM SERPL-MCNC: 56 UG/ML (ref 12–46)
LIPASE SERPL-CCNC: 160 U/L (ref 73–393)
LIPASE SERPL-CCNC: 184 U/L (ref 73–393)
LYMPHOCYTES # BLD AUTO: 0.4 10E9/L (ref 0.8–5.3)
LYMPHOCYTES # BLD AUTO: 0.5 10E9/L (ref 0.8–5.3)
LYMPHOCYTES # BLD AUTO: 0.6 10E9/L (ref 0.8–5.3)
LYMPHOCYTES # BLD AUTO: 0.6 10E9/L (ref 0.8–5.3)
LYMPHOCYTES # BLD AUTO: 0.7 10E9/L (ref 0.8–5.3)
LYMPHOCYTES # BLD AUTO: 0.8 10E9/L (ref 0.8–5.3)
LYMPHOCYTES # BLD AUTO: 0.8 10E9/L (ref 0.8–5.3)
LYMPHOCYTES # BLD AUTO: 0.9 10E9/L (ref 0.8–5.3)
LYMPHOCYTES # BLD AUTO: 1 10E9/L (ref 0.8–5.3)
LYMPHOCYTES # BLD AUTO: 1.2 10E9/L (ref 0.8–5.3)
LYMPHOCYTES # BLD AUTO: 1.4 10E9/L (ref 0.8–5.3)
LYMPHOCYTES # BLD AUTO: 1.5 10E9/L (ref 0.8–5.3)
LYMPHOCYTES # BLD AUTO: 1.7 10E9/L (ref 0.8–5.3)
LYMPHOCYTES NFR BLD AUTO: 1.7 %
LYMPHOCYTES NFR BLD AUTO: 10 %
LYMPHOCYTES NFR BLD AUTO: 11.2 %
LYMPHOCYTES NFR BLD AUTO: 11.6 %
LYMPHOCYTES NFR BLD AUTO: 13 %
LYMPHOCYTES NFR BLD AUTO: 13.1 %
LYMPHOCYTES NFR BLD AUTO: 16 %
LYMPHOCYTES NFR BLD AUTO: 16.3 %
LYMPHOCYTES NFR BLD AUTO: 3 %
LYMPHOCYTES NFR BLD AUTO: 3 %
LYMPHOCYTES NFR BLD AUTO: 3.7 %
LYMPHOCYTES NFR BLD AUTO: 3.8 %
LYMPHOCYTES NFR BLD AUTO: 4.1 %
LYMPHOCYTES NFR BLD AUTO: 5 %
LYMPHOCYTES NFR BLD AUTO: 5.8 %
LYMPHOCYTES NFR BLD AUTO: 7 %
LYMPHOCYTES NFR BLD AUTO: 8 %
Lab: ABNORMAL
Lab: NORMAL
MAGNESIUM SERPL-MCNC: 1.7 MG/DL (ref 1.6–2.3)
MAGNESIUM SERPL-MCNC: 1.9 MG/DL (ref 1.6–2.3)
MAGNESIUM SERPL-MCNC: 2 MG/DL (ref 1.6–2.3)
MAGNESIUM SERPL-MCNC: 2.2 MG/DL (ref 1.6–2.3)
MAGNESIUM SERPL-MCNC: 2.3 MG/DL (ref 1.6–2.3)
MAGNESIUM SERPL-MCNC: 2.6 MG/DL (ref 1.6–2.3)
MCH RBC QN AUTO: 28.6 PG (ref 26.5–33)
MCH RBC QN AUTO: 28.7 PG (ref 26.5–33)
MCH RBC QN AUTO: 28.7 PG (ref 26.5–33)
MCH RBC QN AUTO: 28.8 PG (ref 26.5–33)
MCH RBC QN AUTO: 28.9 PG (ref 26.5–33)
MCH RBC QN AUTO: 29 PG (ref 26.5–33)
MCH RBC QN AUTO: 29.2 PG (ref 26.5–33)
MCH RBC QN AUTO: 29.3 PG (ref 26.5–33)
MCH RBC QN AUTO: 29.4 PG (ref 26.5–33)
MCH RBC QN AUTO: 29.4 PG (ref 26.5–33)
MCH RBC QN AUTO: 29.5 PG (ref 26.5–33)
MCH RBC QN AUTO: 29.6 PG (ref 26.5–33)
MCH RBC QN AUTO: 29.7 PG (ref 26.5–33)
MCH RBC QN AUTO: 29.7 PG (ref 26.5–33)
MCH RBC QN AUTO: 29.8 PG (ref 26.5–33)
MCH RBC QN AUTO: 30 PG (ref 26.5–33)
MCH RBC QN AUTO: 30 PG (ref 26.5–33)
MCH RBC QN AUTO: 30.1 PG (ref 26.5–33)
MCH RBC QN AUTO: 30.2 PG (ref 26.5–33)
MCH RBC QN AUTO: 30.3 PG (ref 26.5–33)
MCH RBC QN AUTO: 30.3 PG (ref 26.5–33)
MCH RBC QN AUTO: 30.5 PG (ref 26.5–33)
MCH RBC QN AUTO: 30.6 PG (ref 26.5–33)
MCHC RBC AUTO-ENTMCNC: 30.9 G/DL (ref 31.5–36.5)
MCHC RBC AUTO-ENTMCNC: 30.9 G/DL (ref 31.5–36.5)
MCHC RBC AUTO-ENTMCNC: 31 G/DL (ref 31.5–36.5)
MCHC RBC AUTO-ENTMCNC: 31 G/DL (ref 31.5–36.5)
MCHC RBC AUTO-ENTMCNC: 31.1 G/DL (ref 31.5–36.5)
MCHC RBC AUTO-ENTMCNC: 31.2 G/DL (ref 31.5–36.5)
MCHC RBC AUTO-ENTMCNC: 31.3 G/DL (ref 31.5–36.5)
MCHC RBC AUTO-ENTMCNC: 31.4 G/DL (ref 31.5–36.5)
MCHC RBC AUTO-ENTMCNC: 31.5 G/DL (ref 31.5–36.5)
MCHC RBC AUTO-ENTMCNC: 31.6 G/DL (ref 31.5–36.5)
MCHC RBC AUTO-ENTMCNC: 31.6 G/DL (ref 31.5–36.5)
MCHC RBC AUTO-ENTMCNC: 31.7 G/DL (ref 31.5–36.5)
MCHC RBC AUTO-ENTMCNC: 32 G/DL (ref 31.5–36.5)
MCHC RBC AUTO-ENTMCNC: 32 G/DL (ref 31.5–36.5)
MCHC RBC AUTO-ENTMCNC: 32.1 G/DL (ref 31.5–36.5)
MCHC RBC AUTO-ENTMCNC: 32.1 G/DL (ref 31.5–36.5)
MCHC RBC AUTO-ENTMCNC: 32.2 G/DL (ref 31.5–36.5)
MCHC RBC AUTO-ENTMCNC: 32.3 G/DL (ref 31.5–36.5)
MCHC RBC AUTO-ENTMCNC: 32.4 G/DL (ref 31.5–36.5)
MCHC RBC AUTO-ENTMCNC: 32.5 G/DL (ref 31.5–36.5)
MCHC RBC AUTO-ENTMCNC: 32.5 G/DL (ref 31.5–36.5)
MCHC RBC AUTO-ENTMCNC: 32.7 G/DL (ref 31.5–36.5)
MCHC RBC AUTO-ENTMCNC: 32.7 G/DL (ref 31.5–36.5)
MCHC RBC AUTO-ENTMCNC: 32.8 G/DL (ref 31.5–36.5)
MCHC RBC AUTO-ENTMCNC: 33 G/DL (ref 31.5–36.5)
MCHC RBC AUTO-ENTMCNC: 33 G/DL (ref 31.5–36.5)
MCHC RBC AUTO-ENTMCNC: 33.2 G/DL (ref 31.5–36.5)
MCHC RBC AUTO-ENTMCNC: 33.4 G/DL (ref 31.5–36.5)
MCHC RBC AUTO-ENTMCNC: 33.5 G/DL (ref 31.5–36.5)
MCHC RBC AUTO-ENTMCNC: 33.6 G/DL (ref 31.5–36.5)
MCHC RBC AUTO-ENTMCNC: 33.6 G/DL (ref 31.5–36.5)
MCV RBC AUTO: 89 FL (ref 78–100)
MCV RBC AUTO: 89 FL (ref 78–100)
MCV RBC AUTO: 90 FL (ref 78–100)
MCV RBC AUTO: 91 FL (ref 78–100)
MCV RBC AUTO: 92 FL (ref 78–100)
MCV RBC AUTO: 93 FL (ref 78–100)
MCV RBC AUTO: 94 FL (ref 78–100)
MCV RBC AUTO: 94 FL (ref 78–100)
METAMYELOCYTES # BLD: 0.4 10E9/L
METAMYELOCYTES NFR BLD MANUAL: 4 %
MICROCYTES BLD QL SMEAR: PRESENT
MONOCYTES # BLD AUTO: 0.3 10E9/L (ref 0–1.3)
MONOCYTES # BLD AUTO: 0.7 10E9/L (ref 0–1.3)
MONOCYTES # BLD AUTO: 0.8 10E9/L (ref 0–1.3)
MONOCYTES # BLD AUTO: 0.9 10E9/L (ref 0–1.3)
MONOCYTES # BLD AUTO: 1.1 10E9/L (ref 0–1.3)
MONOCYTES # BLD AUTO: 1.2 10E9/L (ref 0–1.3)
MONOCYTES # BLD AUTO: 1.3 10E9/L (ref 0–1.3)
MONOCYTES # BLD AUTO: 1.3 10E9/L (ref 0–1.3)
MONOCYTES # BLD AUTO: 1.6 10E9/L (ref 0–1.3)
MONOCYTES # BLD AUTO: 1.7 10E9/L (ref 0–1.3)
MONOCYTES # BLD AUTO: 1.7 10E9/L (ref 0–1.3)
MONOCYTES # BLD AUTO: 1.9 10E9/L (ref 0–1.3)
MONOCYTES # BLD AUTO: 2 10E9/L (ref 0–1.3)
MONOCYTES # BLD AUTO: 2 10E9/L (ref 0–1.3)
MONOCYTES # BLD AUTO: 2.1 10E9/L (ref 0–1.3)
MONOCYTES # BLD AUTO: 2.1 10E9/L (ref 0–1.3)
MONOCYTES # BLD AUTO: 2.2 10E9/L (ref 0–1.3)
MONOCYTES NFR BLD AUTO: 10 %
MONOCYTES NFR BLD AUTO: 10.3 %
MONOCYTES NFR BLD AUTO: 11.6 %
MONOCYTES NFR BLD AUTO: 11.7 %
MONOCYTES NFR BLD AUTO: 12.2 %
MONOCYTES NFR BLD AUTO: 12.4 %
MONOCYTES NFR BLD AUTO: 13.3 %
MONOCYTES NFR BLD AUTO: 13.3 %
MONOCYTES NFR BLD AUTO: 13.6 %
MONOCYTES NFR BLD AUTO: 15.8 %
MONOCYTES NFR BLD AUTO: 3 %
MONOCYTES NFR BLD AUTO: 6.1 %
MONOCYTES NFR BLD AUTO: 7 %
MONOCYTES NFR BLD AUTO: 7 %
MONOCYTES NFR BLD AUTO: 8 %
MONOCYTES NFR BLD AUTO: 8.3 %
MONOCYTES NFR BLD AUTO: 8.7 %
MUCOUS THREADS #/AREA URNS LPF: PRESENT /LPF
MYELOCYTES # BLD: 0.1 10E9/L
MYELOCYTES NFR BLD MANUAL: 1 %
NEUTROPHILS # BLD AUTO: 10.3 10E9/L (ref 1.6–8.3)
NEUTROPHILS # BLD AUTO: 10.5 10E9/L (ref 1.6–8.3)
NEUTROPHILS # BLD AUTO: 11.3 10E9/L (ref 1.6–8.3)
NEUTROPHILS # BLD AUTO: 12.1 10E9/L (ref 1.6–8.3)
NEUTROPHILS # BLD AUTO: 13.6 10E9/L (ref 1.6–8.3)
NEUTROPHILS # BLD AUTO: 14.9 10E9/L (ref 1.6–8.3)
NEUTROPHILS # BLD AUTO: 15.7 10E9/L (ref 1.6–8.3)
NEUTROPHILS # BLD AUTO: 17.3 10E9/L (ref 1.6–8.3)
NEUTROPHILS # BLD AUTO: 20.1 10E9/L (ref 1.6–8.3)
NEUTROPHILS # BLD AUTO: 21.2 10E9/L (ref 1.6–8.3)
NEUTROPHILS # BLD AUTO: 5.7 10E9/L (ref 1.6–8.3)
NEUTROPHILS # BLD AUTO: 6.2 10E9/L (ref 1.6–8.3)
NEUTROPHILS # BLD AUTO: 6.7 10E9/L (ref 1.6–8.3)
NEUTROPHILS # BLD AUTO: 6.8 10E9/L (ref 1.6–8.3)
NEUTROPHILS # BLD AUTO: 7.7 10E9/L (ref 1.6–8.3)
NEUTROPHILS # BLD AUTO: 8.5 10E9/L (ref 1.6–8.3)
NEUTROPHILS # BLD AUTO: 8.9 10E9/L (ref 1.6–8.3)
NEUTROPHILS NFR BLD AUTO: 61.2 %
NEUTROPHILS NFR BLD AUTO: 66 %
NEUTROPHILS NFR BLD AUTO: 69.6 %
NEUTROPHILS NFR BLD AUTO: 70 %
NEUTROPHILS NFR BLD AUTO: 71.6 %
NEUTROPHILS NFR BLD AUTO: 73 %
NEUTROPHILS NFR BLD AUTO: 76.6 %
NEUTROPHILS NFR BLD AUTO: 79.6 %
NEUTROPHILS NFR BLD AUTO: 81.5 %
NEUTROPHILS NFR BLD AUTO: 81.7 %
NEUTROPHILS NFR BLD AUTO: 82 %
NEUTROPHILS NFR BLD AUTO: 82.4 %
NEUTROPHILS NFR BLD AUTO: 82.5 %
NEUTROPHILS NFR BLD AUTO: 83.2 %
NEUTROPHILS NFR BLD AUTO: 84.1 %
NEUTROPHILS NFR BLD AUTO: 85.7 %
NEUTROPHILS NFR BLD AUTO: 86.4 %
NITRATE UR QL: NEGATIVE
NONHDLC SERPL-MCNC: 77 MG/DL
NRBC # BLD AUTO: 0 10*3/UL
NRBC BLD AUTO-RTO: 0 /100
NT-PROBNP SERPL-MCNC: 7974 PG/ML (ref 0–1800)
OVALOCYTES BLD QL SMEAR: SLIGHT
PCO2 BLDV: 37 MM HG (ref 40–50)
PH BLDV: 7.46 PH (ref 7.32–7.43)
PH UR STRIP: 5 PH (ref 5–7)
PH UR STRIP: 7 PH (ref 5–7)
PH UR STRIP: 8 PH (ref 5–7)
PHOSPHATE SERPL-MCNC: 1.4 MG/DL (ref 2.5–4.5)
PHOSPHATE SERPL-MCNC: 1.5 MG/DL (ref 2.5–4.5)
PHOSPHATE SERPL-MCNC: 2.1 MG/DL (ref 2.5–4.5)
PHOSPHATE SERPL-MCNC: 2.3 MG/DL (ref 2.5–4.5)
PHOSPHATE SERPL-MCNC: 2.5 MG/DL (ref 2.5–4.5)
PHOSPHATE SERPL-MCNC: 2.7 MG/DL (ref 2.5–4.5)
PHOSPHATE SERPL-MCNC: 2.9 MG/DL (ref 2.5–4.5)
PHOSPHATE SERPL-MCNC: 3.1 MG/DL (ref 2.5–4.5)
PLATELET # BLD AUTO: 225 10E9/L (ref 150–450)
PLATELET # BLD AUTO: 230 10E9/L (ref 150–450)
PLATELET # BLD AUTO: 245 10E9/L (ref 150–450)
PLATELET # BLD AUTO: 250 10E9/L (ref 150–450)
PLATELET # BLD AUTO: 256 10E9/L (ref 150–450)
PLATELET # BLD AUTO: 259 10E9/L (ref 150–450)
PLATELET # BLD AUTO: 264 10E9/L (ref 150–450)
PLATELET # BLD AUTO: 268 10E9/L (ref 150–450)
PLATELET # BLD AUTO: 283 10E9/L (ref 150–450)
PLATELET # BLD AUTO: 288 10E9/L (ref 150–450)
PLATELET # BLD AUTO: 291 10E9/L (ref 150–450)
PLATELET # BLD AUTO: 298 10E9/L (ref 150–450)
PLATELET # BLD AUTO: 307 10E9/L (ref 150–450)
PLATELET # BLD AUTO: 312 10E9/L (ref 150–450)
PLATELET # BLD AUTO: 324 10E9/L (ref 150–450)
PLATELET # BLD AUTO: 326 10E9/L (ref 150–450)
PLATELET # BLD AUTO: 332 10E9/L (ref 150–450)
PLATELET # BLD AUTO: 336 10E9/L (ref 150–450)
PLATELET # BLD AUTO: 344 10E9/L (ref 150–450)
PLATELET # BLD AUTO: 360 10E9/L (ref 150–450)
PLATELET # BLD AUTO: 362 10E9/L (ref 150–450)
PLATELET # BLD AUTO: 365 10E9/L (ref 150–450)
PLATELET # BLD AUTO: 368 10E9/L (ref 150–450)
PLATELET # BLD AUTO: 389 10E9/L (ref 150–450)
PLATELET # BLD AUTO: 392 10E9/L (ref 150–450)
PLATELET # BLD AUTO: 406 10E9/L (ref 150–450)
PLATELET # BLD AUTO: 411 10E9/L (ref 150–450)
PLATELET # BLD AUTO: 441 10E9/L (ref 150–450)
PLATELET # BLD AUTO: 460 10E9/L (ref 150–450)
PLATELET # BLD AUTO: 463 10E9/L (ref 150–450)
PLATELET # BLD AUTO: 490 10E9/L (ref 150–450)
PLATELET # BLD AUTO: 491 10E9/L (ref 150–450)
PLATELET # BLD AUTO: 525 10E9/L (ref 150–450)
PLATELET # BLD EST: ABNORMAL 10*3/UL
PO2 BLDV: 48 MM HG (ref 25–47)
POTASSIUM SERPL-SCNC: 3.1 MMOL/L (ref 3.4–5.3)
POTASSIUM SERPL-SCNC: 3.6 MMOL/L (ref 3.4–5.3)
POTASSIUM SERPL-SCNC: 3.7 MMOL/L (ref 3.4–5.3)
POTASSIUM SERPL-SCNC: 3.7 MMOL/L (ref 3.4–5.3)
POTASSIUM SERPL-SCNC: 3.8 MMOL/L (ref 3.4–5.3)
POTASSIUM SERPL-SCNC: 3.8 MMOL/L (ref 3.4–5.3)
POTASSIUM SERPL-SCNC: 3.9 MMOL/L (ref 3.4–5.3)
POTASSIUM SERPL-SCNC: 3.9 MMOL/L (ref 3.4–5.3)
POTASSIUM SERPL-SCNC: 4 MMOL/L (ref 3.4–5.3)
POTASSIUM SERPL-SCNC: 4.1 MMOL/L (ref 3.4–5.3)
POTASSIUM SERPL-SCNC: 4.2 MMOL/L (ref 3.4–5.3)
POTASSIUM SERPL-SCNC: 4.3 MMOL/L (ref 3.4–5.3)
POTASSIUM SERPL-SCNC: 4.4 MMOL/L (ref 3.4–5.3)
POTASSIUM SERPL-SCNC: 4.4 MMOL/L (ref 3.4–5.3)
POTASSIUM SERPL-SCNC: 4.5 MMOL/L (ref 3.4–5.3)
POTASSIUM SERPL-SCNC: 4.6 MMOL/L (ref 3.4–5.3)
POTASSIUM SERPL-SCNC: 4.8 MMOL/L (ref 3.4–5.3)
POTASSIUM SERPL-SCNC: 4.9 MMOL/L (ref 3.4–5.3)
POTASSIUM SERPL-SCNC: 5.3 MMOL/L (ref 3.4–5.3)
PROCALCITONIN SERPL-MCNC: 0.25 NG/ML
PROCALCITONIN SERPL-MCNC: 0.8 NG/ML
PROCALCITONIN SERPL-MCNC: 0.97 NG/ML
PROCALCITONIN SERPL-MCNC: <0.05 NG/ML
PROMYELOCYTES # BLD MANUAL: 0.2 10E9/L
PROMYELOCYTES NFR BLD MANUAL: 2 %
PROT CSF-MCNC: 40 MG/DL (ref 15–60)
PROT SERPL-MCNC: 6.2 G/DL (ref 6.8–8.8)
PROT SERPL-MCNC: 6.6 G/DL (ref 6.8–8.8)
PROT SERPL-MCNC: 6.8 G/DL (ref 6.8–8.8)
PROT SERPL-MCNC: 6.8 G/DL (ref 6.8–8.8)
PROT SERPL-MCNC: 7.7 G/DL (ref 6.8–8.8)
PROT SERPL-MCNC: 7.8 G/DL (ref 6.8–8.8)
PROT SERPL-MCNC: 7.9 G/DL (ref 6.8–8.8)
RBC # BLD AUTO: 3.54 10E12/L (ref 4.4–5.9)
RBC # BLD AUTO: 3.69 10E12/L (ref 4.4–5.9)
RBC # BLD AUTO: 3.7 10E12/L (ref 4.4–5.9)
RBC # BLD AUTO: 3.73 10E12/L (ref 4.4–5.9)
RBC # BLD AUTO: 3.83 10E12/L (ref 4.4–5.9)
RBC # BLD AUTO: 3.89 10E12/L (ref 4.4–5.9)
RBC # BLD AUTO: 3.91 10E12/L (ref 4.4–5.9)
RBC # BLD AUTO: 3.91 10E12/L (ref 4.4–5.9)
RBC # BLD AUTO: 3.94 10E12/L (ref 4.4–5.9)
RBC # BLD AUTO: 3.95 10E12/L (ref 4.4–5.9)
RBC # BLD AUTO: 3.99 10E12/L (ref 4.4–5.9)
RBC # BLD AUTO: 4 10E12/L (ref 4.4–5.9)
RBC # BLD AUTO: 4.01 10E12/L (ref 4.4–5.9)
RBC # BLD AUTO: 4.02 10E12/L (ref 4.4–5.9)
RBC # BLD AUTO: 4.03 10E12/L (ref 4.4–5.9)
RBC # BLD AUTO: 4.04 10E12/L (ref 4.4–5.9)
RBC # BLD AUTO: 4.06 10E12/L (ref 4.4–5.9)
RBC # BLD AUTO: 4.09 10E12/L (ref 4.4–5.9)
RBC # BLD AUTO: 4.18 10E12/L (ref 4.4–5.9)
RBC # BLD AUTO: 4.22 10E12/L (ref 4.4–5.9)
RBC # BLD AUTO: 4.25 10E12/L (ref 4.4–5.9)
RBC # BLD AUTO: 4.26 10E12/L (ref 4.4–5.9)
RBC # BLD AUTO: 4.28 10E12/L (ref 4.4–5.9)
RBC # BLD AUTO: 4.32 10E12/L (ref 4.4–5.9)
RBC # BLD AUTO: 4.48 10E12/L (ref 4.4–5.9)
RBC # BLD AUTO: 4.48 10E12/L (ref 4.4–5.9)
RBC # BLD AUTO: 4.53 10E12/L (ref 4.4–5.9)
RBC # BLD AUTO: 4.86 10E12/L (ref 4.4–5.9)
RBC # BLD AUTO: 4.87 10E12/L (ref 4.4–5.9)
RBC # BLD AUTO: 4.92 10E12/L (ref 4.4–5.9)
RBC # BLD AUTO: 5.38 10E12/L (ref 4.4–5.9)
RBC # CSF MANUAL: 1 /UL (ref 0–2)
RBC # CSF MANUAL: NORMAL /UL (ref 0–2)
RBC #/AREA URNS AUTO: 175 /HPF (ref 0–2)
RBC #/AREA URNS AUTO: 40 /HPF (ref 0–2)
RBC #/AREA URNS AUTO: 5 /HPF (ref 0–2)
RBC #/AREA URNS AUTO: 5 /HPF (ref 0–2)
RBC #/AREA URNS AUTO: 52 /HPF (ref 0–2)
RBC #/AREA URNS AUTO: 55 /HPF (ref 0–2)
RBC #/AREA URNS AUTO: 86 /HPF (ref 0–2)
SAO2 % BLDV FROM PO2: 86 %
SODIUM SERPL-SCNC: 134 MMOL/L (ref 133–144)
SODIUM SERPL-SCNC: 136 MMOL/L (ref 133–144)
SODIUM SERPL-SCNC: 137 MMOL/L (ref 133–144)
SODIUM SERPL-SCNC: 138 MMOL/L (ref 133–144)
SODIUM SERPL-SCNC: 139 MMOL/L (ref 133–144)
SODIUM SERPL-SCNC: 140 MMOL/L (ref 133–144)
SODIUM SERPL-SCNC: 141 MMOL/L (ref 133–144)
SODIUM SERPL-SCNC: 141 MMOL/L (ref 133–144)
SODIUM SERPL-SCNC: 142 MMOL/L (ref 133–144)
SODIUM SERPL-SCNC: 143 MMOL/L (ref 133–144)
SODIUM SERPL-SCNC: 143 MMOL/L (ref 133–144)
SODIUM SERPL-SCNC: 145 MMOL/L (ref 133–144)
SOURCE: ABNORMAL
SP GR UR STRIP: 1.01 (ref 1–1.03)
SP GR UR STRIP: 1.02 (ref 1–1.03)
SPECIMEN SOURCE: ABNORMAL
SPECIMEN SOURCE: NORMAL
SQUAMOUS #/AREA URNS AUTO: 1 /HPF (ref 0–1)
SQUAMOUS #/AREA URNS AUTO: 1 /HPF (ref 0–1)
SQUAMOUS #/AREA URNS AUTO: <1 /HPF (ref 0–1)
SQUAMOUS #/AREA URNS AUTO: <1 /HPF (ref 0–1)
TRIGL SERPL-MCNC: 113 MG/DL
TROPONIN I SERPL-MCNC: 0.02 UG/L (ref 0–0.04)
TROPONIN I SERPL-MCNC: 0.06 UG/L (ref 0–0.04)
TROPONIN I SERPL-MCNC: 0.06 UG/L (ref 0–0.04)
TROPONIN I SERPL-MCNC: <0.015 UG/L (ref 0–0.04)
TUBE # CSF: 4 #
URATE SERPL-MCNC: 4.8 MG/DL (ref 3.5–7.2)
UROBILINOGEN UR STRIP-MCNC: 0 MG/DL (ref 0–2)
UROBILINOGEN UR STRIP-MCNC: 4 MG/DL (ref 0–2)
VANCOMYCIN SERPL-MCNC: 12.6 MG/L
VANCOMYCIN SERPL-MCNC: 12.9 MG/L
WBC # BLD AUTO: 10.2 10E9/L (ref 4–11)
WBC # BLD AUTO: 10.3 10E9/L (ref 4–11)
WBC # BLD AUTO: 10.4 10E9/L (ref 4–11)
WBC # BLD AUTO: 10.6 10E9/L (ref 4–11)
WBC # BLD AUTO: 11 10E9/L (ref 4–11)
WBC # BLD AUTO: 11.6 10E9/L (ref 4–11)
WBC # BLD AUTO: 12.7 10E9/L (ref 4–11)
WBC # BLD AUTO: 13.1 10E9/L (ref 4–11)
WBC # BLD AUTO: 13.7 10E9/L (ref 4–11)
WBC # BLD AUTO: 13.7 10E9/L (ref 4–11)
WBC # BLD AUTO: 14.8 10E9/L (ref 4–11)
WBC # BLD AUTO: 16.6 10E9/L (ref 4–11)
WBC # BLD AUTO: 17.7 10E9/L (ref 4–11)
WBC # BLD AUTO: 17.8 10E9/L (ref 4–11)
WBC # BLD AUTO: 18.4 10E9/L (ref 4–11)
WBC # BLD AUTO: 18.7 10E9/L (ref 4–11)
WBC # BLD AUTO: 18.9 10E9/L (ref 4–11)
WBC # BLD AUTO: 20.2 10E9/L (ref 4–11)
WBC # BLD AUTO: 24 10E9/L (ref 4–11)
WBC # BLD AUTO: 24.4 10E9/L (ref 4–11)
WBC # BLD AUTO: 24.6 10E9/L (ref 4–11)
WBC # BLD AUTO: 25.7 10E9/L (ref 4–11)
WBC # BLD AUTO: 34.4 10E9/L (ref 4–11)
WBC # BLD AUTO: 6.7 10E9/L (ref 4–11)
WBC # BLD AUTO: 7.3 10E9/L (ref 4–11)
WBC # BLD AUTO: 7.5 10E9/L (ref 4–11)
WBC # BLD AUTO: 7.6 10E9/L (ref 4–11)
WBC # BLD AUTO: 8.2 10E9/L (ref 4–11)
WBC # BLD AUTO: 8.7 10E9/L (ref 4–11)
WBC # BLD AUTO: 8.9 10E9/L (ref 4–11)
WBC # BLD AUTO: 9.3 10E9/L (ref 4–11)
WBC # BLD AUTO: 9.5 10E9/L (ref 4–11)
WBC # BLD AUTO: 9.9 10E9/L (ref 4–11)
WBC # CSF MANUAL: 2 /UL (ref 0–5)
WBC # CSF MANUAL: NORMAL /UL (ref 0–5)
WBC #/AREA URNS AUTO: 15 /HPF (ref 0–5)
WBC #/AREA URNS AUTO: 33 /HPF (ref 0–5)
WBC #/AREA URNS AUTO: 41 /HPF (ref 0–5)
WBC #/AREA URNS AUTO: 49 /HPF (ref 0–5)
WBC #/AREA URNS AUTO: >182 /HPF (ref 0–5)
WBC CLUMPS #/AREA URNS HPF: PRESENT /HPF
YEAST #/AREA URNS HPF: ABNORMAL /HPF
YEAST #/AREA URNS HPF: ABNORMAL /HPF

## 2018-01-01 PROCEDURE — 25000125 ZZHC RX 250: Performed by: INTERNAL MEDICINE

## 2018-01-01 PROCEDURE — 25000128 H RX IP 250 OP 636: Performed by: INTERNAL MEDICINE

## 2018-01-01 PROCEDURE — 85027 COMPLETE CBC AUTOMATED: CPT | Performed by: PHYSICIAN ASSISTANT

## 2018-01-01 PROCEDURE — 94640 AIRWAY INHALATION TREATMENT: CPT | Mod: 76

## 2018-01-01 PROCEDURE — 82565 ASSAY OF CREATININE: CPT | Performed by: INTERNAL MEDICINE

## 2018-01-01 PROCEDURE — 87205 SMEAR GRAM STAIN: CPT | Performed by: UROLOGY

## 2018-01-01 PROCEDURE — 40000133 ZZH STATISTIC OT WARD VISIT

## 2018-01-01 PROCEDURE — C9254 INJECTION, LACOSAMIDE: HCPCS | Performed by: INTERNAL MEDICINE

## 2018-01-01 PROCEDURE — 99233 SBSQ HOSP IP/OBS HIGH 50: CPT | Performed by: INTERNAL MEDICINE

## 2018-01-01 PROCEDURE — 71046 X-RAY EXAM CHEST 2 VIEWS: CPT

## 2018-01-01 PROCEDURE — 74420 UROGRAPHY RTRGR +-KUB: CPT | Mod: 26 | Performed by: UROLOGY

## 2018-01-01 PROCEDURE — 25000132 ZZH RX MED GY IP 250 OP 250 PS 637: Mod: GY | Performed by: INTERNAL MEDICINE

## 2018-01-01 PROCEDURE — 0T778DZ DILATION OF LEFT URETER WITH INTRALUMINAL DEVICE, VIA NATURAL OR ARTIFICIAL OPENING ENDOSCOPIC: ICD-10-PCS | Performed by: UROLOGY

## 2018-01-01 PROCEDURE — A9270 NON-COVERED ITEM OR SERVICE: HCPCS | Mod: GY | Performed by: INTERNAL MEDICINE

## 2018-01-01 PROCEDURE — 25000132 ZZH RX MED GY IP 250 OP 250 PS 637: Mod: GY | Performed by: EMERGENCY MEDICINE

## 2018-01-01 PROCEDURE — 84145 PROCALCITONIN (PCT): CPT | Performed by: INTERNAL MEDICINE

## 2018-01-01 PROCEDURE — 40000133 ZZH STATISTIC OT WARD VISIT: Performed by: REHABILITATION PRACTITIONER

## 2018-01-01 PROCEDURE — 52332 CYSTOSCOPY AND TREATMENT: CPT | Mod: LT | Performed by: UROLOGY

## 2018-01-01 PROCEDURE — 37000008 ZZH ANESTHESIA TECHNICAL FEE, 1ST 30 MIN: Performed by: UROLOGY

## 2018-01-01 PROCEDURE — 80048 BASIC METABOLIC PNL TOTAL CA: CPT | Performed by: INTERNAL MEDICINE

## 2018-01-01 PROCEDURE — 12000007 ZZH R&B INTERMEDIATE

## 2018-01-01 PROCEDURE — 99222 1ST HOSP IP/OBS MODERATE 55: CPT | Mod: 57 | Performed by: UROLOGY

## 2018-01-01 PROCEDURE — 36415 COLL VENOUS BLD VENIPUNCTURE: CPT | Performed by: INTERNAL MEDICINE

## 2018-01-01 PROCEDURE — C2617 STENT, NON-COR, TEM W/O DEL: HCPCS | Performed by: UROLOGY

## 2018-01-01 PROCEDURE — C9254 INJECTION, LACOSAMIDE: HCPCS | Performed by: PSYCHIATRY & NEUROLOGY

## 2018-01-01 PROCEDURE — 85027 COMPLETE CBC AUTOMATED: CPT | Performed by: INTERNAL MEDICINE

## 2018-01-01 PROCEDURE — A9567 TECHNETIUM TC-99M AEROSOL: HCPCS | Performed by: INTERNAL MEDICINE

## 2018-01-01 PROCEDURE — 87086 URINE CULTURE/COLONY COUNT: CPT | Performed by: EMERGENCY MEDICINE

## 2018-01-01 PROCEDURE — 85025 COMPLETE CBC W/AUTO DIFF WBC: CPT | Performed by: INTERNAL MEDICINE

## 2018-01-01 PROCEDURE — 72131 CT LUMBAR SPINE W/O DYE: CPT

## 2018-01-01 PROCEDURE — 25000128 H RX IP 250 OP 636: Performed by: PSYCHIATRY & NEUROLOGY

## 2018-01-01 PROCEDURE — 40000193 ZZH STATISTIC PT WARD VISIT: Performed by: PHYSICAL THERAPY ASSISTANT

## 2018-01-01 PROCEDURE — 80053 COMPREHEN METABOLIC PANEL: CPT | Performed by: INTERNAL MEDICINE

## 2018-01-01 PROCEDURE — 99153 MOD SED SAME PHYS/QHP EA: CPT

## 2018-01-01 PROCEDURE — 70460 CT HEAD/BRAIN W/DYE: CPT

## 2018-01-01 PROCEDURE — A9270 NON-COVERED ITEM OR SERVICE: HCPCS | Performed by: INTERNAL MEDICINE

## 2018-01-01 PROCEDURE — 99310 SBSQ NF CARE HIGH MDM 45: CPT | Performed by: NURSE PRACTITIONER

## 2018-01-01 PROCEDURE — 71000027 ZZH RECOVERY PHASE 2 EACH 15 MINS: Performed by: UROLOGY

## 2018-01-01 PROCEDURE — C1874 STENT, COATED/COV W/DEL SYS: HCPCS | Performed by: UROLOGY

## 2018-01-01 PROCEDURE — 40000065 ZZH STATISTIC EKG NON-CHARGEABLE

## 2018-01-01 PROCEDURE — 97530 THERAPEUTIC ACTIVITIES: CPT | Mod: GO | Performed by: REHABILITATION PRACTITIONER

## 2018-01-01 PROCEDURE — 12000000 ZZH R&B MED SURG/OB

## 2018-01-01 PROCEDURE — 97530 THERAPEUTIC ACTIVITIES: CPT | Mod: GP

## 2018-01-01 PROCEDURE — 82550 ASSAY OF CK (CPK): CPT | Performed by: EMERGENCY MEDICINE

## 2018-01-01 PROCEDURE — 40000274 ZZH STATISTIC RCP CONSULT EA 30 MIN

## 2018-01-01 PROCEDURE — 36415 COLL VENOUS BLD VENIPUNCTURE: CPT | Performed by: EMERGENCY MEDICINE

## 2018-01-01 PROCEDURE — 92507 TX SP LANG VOICE COMM INDIV: CPT | Mod: GN | Performed by: SPEECH-LANGUAGE PATHOLOGIST

## 2018-01-01 PROCEDURE — A9270 NON-COVERED ITEM OR SERVICE: HCPCS | Mod: GY | Performed by: PHYSICIAN ASSISTANT

## 2018-01-01 PROCEDURE — 25800025 ZZH RX 258: Performed by: INTERNAL MEDICINE

## 2018-01-01 PROCEDURE — 40000852 ZZH STATISTIC HEART CATH LAB OR EP LAB

## 2018-01-01 PROCEDURE — 74177 CT ABD & PELVIS W/CONTRAST: CPT

## 2018-01-01 PROCEDURE — 92526 ORAL FUNCTION THERAPY: CPT | Mod: GN | Performed by: SPEECH-LANGUAGE PATHOLOGIST

## 2018-01-01 PROCEDURE — 84100 ASSAY OF PHOSPHORUS: CPT | Performed by: INTERNAL MEDICINE

## 2018-01-01 PROCEDURE — 87186 SC STD MICRODIL/AGAR DIL: CPT | Performed by: INTERNAL MEDICINE

## 2018-01-01 PROCEDURE — 99232 SBSQ HOSP IP/OBS MODERATE 35: CPT | Performed by: INTERNAL MEDICINE

## 2018-01-01 PROCEDURE — 40000275 ZZH STATISTIC RCP TIME EA 10 MIN

## 2018-01-01 PROCEDURE — 82550 ASSAY OF CK (CPK): CPT | Performed by: PHYSICIAN ASSISTANT

## 2018-01-01 PROCEDURE — 25000132 ZZH RX MED GY IP 250 OP 250 PS 637: Performed by: NURSE PRACTITIONER

## 2018-01-01 PROCEDURE — 97110 THERAPEUTIC EXERCISES: CPT | Mod: GP | Performed by: PHYSICAL THERAPY ASSISTANT

## 2018-01-01 PROCEDURE — 84484 ASSAY OF TROPONIN QUANT: CPT | Performed by: INTERNAL MEDICINE

## 2018-01-01 PROCEDURE — 51702 INSERT TEMP BLADDER CATH: CPT | Performed by: UROLOGY

## 2018-01-01 PROCEDURE — 84157 ASSAY OF PROTEIN OTHER: CPT | Performed by: INTERNAL MEDICINE

## 2018-01-01 PROCEDURE — 99239 HOSP IP/OBS DSCHRG MGMT >30: CPT | Performed by: INTERNAL MEDICINE

## 2018-01-01 PROCEDURE — 99212 OFFICE O/P EST SF 10 MIN: CPT | Mod: 25 | Performed by: UROLOGY

## 2018-01-01 PROCEDURE — 99305 1ST NF CARE MODERATE MDM 35: CPT | Performed by: INTERNAL MEDICINE

## 2018-01-01 PROCEDURE — 96375 TX/PRO/DX INJ NEW DRUG ADDON: CPT

## 2018-01-01 PROCEDURE — 80177 DRUG SCRN QUAN LEVETIRACETAM: CPT | Performed by: INTERNAL MEDICINE

## 2018-01-01 PROCEDURE — 87102 FUNGUS ISOLATION CULTURE: CPT | Performed by: INTERNAL MEDICINE

## 2018-01-01 PROCEDURE — G0426 INPT/ED TELECONSULT50: HCPCS | Performed by: PSYCHIATRY & NEUROLOGY

## 2018-01-01 PROCEDURE — 99222 1ST HOSP IP/OBS MODERATE 55: CPT | Performed by: NURSE PRACTITIONER

## 2018-01-01 PROCEDURE — A9270 NON-COVERED ITEM OR SERVICE: HCPCS | Mod: GY | Performed by: EMERGENCY MEDICINE

## 2018-01-01 PROCEDURE — 72126 CT NECK SPINE W/DYE: CPT

## 2018-01-01 PROCEDURE — 93306 TTE W/DOPPLER COMPLETE: CPT

## 2018-01-01 PROCEDURE — 33228 REMV&REPLC PM GEN DUAL LEAD: CPT | Performed by: INTERNAL MEDICINE

## 2018-01-01 PROCEDURE — 92526 ORAL FUNCTION THERAPY: CPT | Mod: GN

## 2018-01-01 PROCEDURE — 84145 PROCALCITONIN (PCT): CPT | Performed by: EMERGENCY MEDICINE

## 2018-01-01 PROCEDURE — 87070 CULTURE OTHR SPECIMN AEROBIC: CPT | Performed by: UROLOGY

## 2018-01-01 PROCEDURE — 0DHA7UZ INSERTION OF FEEDING DEVICE INTO JEJUNUM, VIA NATURAL OR ARTIFICIAL OPENING: ICD-10-PCS | Performed by: RADIOLOGY

## 2018-01-01 PROCEDURE — 83735 ASSAY OF MAGNESIUM: CPT | Performed by: INTERNAL MEDICINE

## 2018-01-01 PROCEDURE — 99207 ZZC CDG-CODE INCORRECT PER BILLING BASED ON TIME: CPT | Performed by: INTERNAL MEDICINE

## 2018-01-01 PROCEDURE — C9113 INJ PANTOPRAZOLE SODIUM, VIA: HCPCS | Performed by: INTERNAL MEDICINE

## 2018-01-01 PROCEDURE — 99207 ZZC CDG-CHARGE REQUIRED MANUAL ENTRY: CPT | Performed by: INTERNAL MEDICINE

## 2018-01-01 PROCEDURE — 81001 URINALYSIS AUTO W/SCOPE: CPT | Performed by: EMERGENCY MEDICINE

## 2018-01-01 PROCEDURE — 92610 EVALUATE SWALLOWING FUNCTION: CPT | Mod: GN | Performed by: SPEECH-LANGUAGE PATHOLOGIST

## 2018-01-01 PROCEDURE — 80053 COMPREHEN METABOLIC PANEL: CPT | Performed by: EMERGENCY MEDICINE

## 2018-01-01 PROCEDURE — 25000128 H RX IP 250 OP 636: Performed by: NURSE ANESTHETIST, CERTIFIED REGISTERED

## 2018-01-01 PROCEDURE — 80048 BASIC METABOLIC PNL TOTAL CA: CPT | Performed by: EMERGENCY MEDICINE

## 2018-01-01 PROCEDURE — 25000132 ZZH RX MED GY IP 250 OP 250 PS 637: Performed by: INTERNAL MEDICINE

## 2018-01-01 PROCEDURE — 80202 ASSAY OF VANCOMYCIN: CPT | Performed by: INTERNAL MEDICINE

## 2018-01-01 PROCEDURE — 97116 GAIT TRAINING THERAPY: CPT | Mod: GP | Performed by: PHYSICAL THERAPY ASSISTANT

## 2018-01-01 PROCEDURE — 96365 THER/PROPH/DIAG IV INF INIT: CPT

## 2018-01-01 PROCEDURE — 87040 BLOOD CULTURE FOR BACTERIA: CPT | Performed by: INTERNAL MEDICINE

## 2018-01-01 PROCEDURE — 83605 ASSAY OF LACTIC ACID: CPT | Performed by: EMERGENCY MEDICINE

## 2018-01-01 PROCEDURE — 87106 FUNGI IDENTIFICATION YEAST: CPT | Performed by: EMERGENCY MEDICINE

## 2018-01-01 PROCEDURE — 87086 URINE CULTURE/COLONY COUNT: CPT | Performed by: INTERNAL MEDICINE

## 2018-01-01 PROCEDURE — 85025 COMPLETE CBC W/AUTO DIFF WBC: CPT | Performed by: PHYSICIAN ASSISTANT

## 2018-01-01 PROCEDURE — 99221 1ST HOSP IP/OBS SF/LOW 40: CPT | Performed by: SURGERY

## 2018-01-01 PROCEDURE — 93005 ELECTROCARDIOGRAM TRACING: CPT

## 2018-01-01 PROCEDURE — BT1F1ZZ FLUOROSCOPY OF LEFT KIDNEY, URETER AND BLADDER USING LOW OSMOLAR CONTRAST: ICD-10-PCS | Performed by: UROLOGY

## 2018-01-01 PROCEDURE — 96360 HYDRATION IV INFUSION INIT: CPT | Mod: 59

## 2018-01-01 PROCEDURE — 36415 COLL VENOUS BLD VENIPUNCTURE: CPT | Performed by: PHYSICIAN ASSISTANT

## 2018-01-01 PROCEDURE — 97110 THERAPEUTIC EXERCISES: CPT | Mod: GP

## 2018-01-01 PROCEDURE — 99232 SBSQ HOSP IP/OBS MODERATE 35: CPT | Performed by: UROLOGY

## 2018-01-01 PROCEDURE — 97161 PT EVAL LOW COMPLEX 20 MIN: CPT | Mod: GP

## 2018-01-01 PROCEDURE — 70450 CT HEAD/BRAIN W/O DYE: CPT

## 2018-01-01 PROCEDURE — 87181 SC STD AGAR DILUTION PER AGT: CPT | Performed by: EMERGENCY MEDICINE

## 2018-01-01 PROCEDURE — 87040 BLOOD CULTURE FOR BACTERIA: CPT | Performed by: EMERGENCY MEDICINE

## 2018-01-01 PROCEDURE — 99285 EMERGENCY DEPT VISIT HI MDM: CPT | Mod: 25

## 2018-01-01 PROCEDURE — 40000916 ZZH STATISTIC SITTER, NIGHT HOURS

## 2018-01-01 PROCEDURE — 97116 GAIT TRAINING THERAPY: CPT | Mod: GP

## 2018-01-01 PROCEDURE — 99232 SBSQ HOSP IP/OBS MODERATE 35: CPT | Performed by: PHYSICIAN ASSISTANT

## 2018-01-01 PROCEDURE — 27210795 ZZH PAD DEFIB QUICK CR4

## 2018-01-01 PROCEDURE — 93294 REM INTERROG EVL PM/LDLS PM: CPT | Performed by: INTERNAL MEDICINE

## 2018-01-01 PROCEDURE — 93970 EXTREMITY STUDY: CPT

## 2018-01-01 PROCEDURE — A9270 NON-COVERED ITEM OR SERVICE: HCPCS | Performed by: NURSE PRACTITIONER

## 2018-01-01 PROCEDURE — 40000915 ZZH STATISTIC SITTER, EVENING HOURS

## 2018-01-01 PROCEDURE — 71000012 ZZH RECOVERY PHASE 1 LEVEL 1 FIRST HR: Performed by: UROLOGY

## 2018-01-01 PROCEDURE — 94640 AIRWAY INHALATION TREATMENT: CPT

## 2018-01-01 PROCEDURE — 25000132 ZZH RX MED GY IP 250 OP 250 PS 637: Mod: GY | Performed by: PHYSICIAN ASSISTANT

## 2018-01-01 PROCEDURE — 99207 ZZC CDG-MDM COMPONENT: MEETS LOW - DOWN CODED: CPT | Performed by: INTERNAL MEDICINE

## 2018-01-01 PROCEDURE — 74019 RADEX ABDOMEN 2 VIEWS: CPT

## 2018-01-01 PROCEDURE — 27211289 ZZ H KIT CATH IV 4FR 8 CM OR 10 CM, POWERWAND QUICK XL

## 2018-01-01 PROCEDURE — 87186 SC STD MICRODIL/AGAR DIL: CPT | Performed by: EMERGENCY MEDICINE

## 2018-01-01 PROCEDURE — 99308 SBSQ NF CARE LOW MDM 20: CPT | Performed by: NURSE PRACTITIONER

## 2018-01-01 PROCEDURE — 71250 CT THORAX DX C-: CPT

## 2018-01-01 PROCEDURE — 40000225 ZZH STATISTIC SLP WARD VISIT: Performed by: SPEECH-LANGUAGE PATHOLOGIST

## 2018-01-01 PROCEDURE — 80061 LIPID PANEL: CPT | Performed by: INTERNAL MEDICINE

## 2018-01-01 PROCEDURE — 40000257 ZZH STATISTIC CONSULT NO CHARGE VASC ACCESS

## 2018-01-01 PROCEDURE — 25000128 H RX IP 250 OP 636: Performed by: EMERGENCY MEDICINE

## 2018-01-01 PROCEDURE — 99152 MOD SED SAME PHYS/QHP 5/>YRS: CPT | Performed by: INTERNAL MEDICINE

## 2018-01-01 PROCEDURE — 51702 INSERT TEMP BLADDER CATH: CPT

## 2018-01-01 PROCEDURE — 92610 EVALUATE SWALLOWING FUNCTION: CPT | Mod: GN

## 2018-01-01 PROCEDURE — 97530 THERAPEUTIC ACTIVITIES: CPT | Mod: GO

## 2018-01-01 PROCEDURE — 40000225 ZZH STATISTIC SLP WARD VISIT

## 2018-01-01 PROCEDURE — 83690 ASSAY OF LIPASE: CPT | Performed by: EMERGENCY MEDICINE

## 2018-01-01 PROCEDURE — 00000146 ZZHCL STATISTIC GLUCOSE BY METER IP

## 2018-01-01 PROCEDURE — 40000895 ZZH STATISTIC SLP IP EVAL DEFER

## 2018-01-01 PROCEDURE — 85610 PROTHROMBIN TIME: CPT | Performed by: EMERGENCY MEDICINE

## 2018-01-01 PROCEDURE — 85025 COMPLETE CBC W/AUTO DIFF WBC: CPT | Performed by: EMERGENCY MEDICINE

## 2018-01-01 PROCEDURE — 87075 CULTR BACTERIA EXCEPT BLOOD: CPT | Performed by: UROLOGY

## 2018-01-01 PROCEDURE — 40000133 ZZH STATISTIC OT WARD VISIT: Performed by: STUDENT IN AN ORGANIZED HEALTH CARE EDUCATION/TRAINING PROGRAM

## 2018-01-01 PROCEDURE — 27210784 ZZH KIT PACEMAKER CR8

## 2018-01-01 PROCEDURE — 99221 1ST HOSP IP/OBS SF/LOW 40: CPT | Mod: AI | Performed by: INTERNAL MEDICINE

## 2018-01-01 PROCEDURE — 25000566 ZZH SEVOFLURANE, EA 15 MIN: Performed by: UROLOGY

## 2018-01-01 PROCEDURE — 36000093 ZZH SURGERY LEVEL 4 1ST 30 MIN: Performed by: UROLOGY

## 2018-01-01 PROCEDURE — 97165 OT EVAL LOW COMPLEX 30 MIN: CPT | Mod: GO | Performed by: REHABILITATION PRACTITIONER

## 2018-01-01 PROCEDURE — 36000052 ZZH SURGERY LEVEL 2 EA 15 ADDTL MIN: Performed by: UROLOGY

## 2018-01-01 PROCEDURE — 25000125 ZZHC RX 250: Performed by: NURSE ANESTHETIST, CERTIFIED REGISTERED

## 2018-01-01 PROCEDURE — 97530 THERAPEUTIC ACTIVITIES: CPT | Mod: GO | Performed by: STUDENT IN AN ORGANIZED HEALTH CARE EDUCATION/TRAINING PROGRAM

## 2018-01-01 PROCEDURE — 25000128 H RX IP 250 OP 636: Performed by: STUDENT IN AN ORGANIZED HEALTH CARE EDUCATION/TRAINING PROGRAM

## 2018-01-01 PROCEDURE — 96368 THER/DIAG CONCURRENT INF: CPT

## 2018-01-01 PROCEDURE — 70496 CT ANGIOGRAPHY HEAD: CPT

## 2018-01-01 PROCEDURE — 40000277 XR SURGERY CARM FLUORO LESS THAN 5 MIN W STILLS

## 2018-01-01 PROCEDURE — G0463 HOSPITAL OUTPT CLINIC VISIT: HCPCS

## 2018-01-01 PROCEDURE — 95819 EEG AWAKE AND ASLEEP: CPT

## 2018-01-01 PROCEDURE — 009U3ZX DRAINAGE OF SPINAL CANAL, PERCUTANEOUS APPROACH, DIAGNOSTIC: ICD-10-PCS | Performed by: RADIOLOGY

## 2018-01-01 PROCEDURE — 96361 HYDRATE IV INFUSION ADD-ON: CPT

## 2018-01-01 PROCEDURE — 87498 ENTEROVIRUS PROBE&REVRS TRNS: CPT | Performed by: INTERNAL MEDICINE

## 2018-01-01 PROCEDURE — 27210429 ZZH NUTRITION PRODUCT INTERMEDIATE LITER

## 2018-01-01 PROCEDURE — 83605 ASSAY OF LACTIC ACID: CPT | Performed by: INTERNAL MEDICINE

## 2018-01-01 PROCEDURE — 97535 SELF CARE MNGMENT TRAINING: CPT | Mod: GO | Performed by: STUDENT IN AN ORGANIZED HEALTH CARE EDUCATION/TRAINING PROGRAM

## 2018-01-01 PROCEDURE — 77003 FLUOROGUIDE FOR SPINE INJECT: CPT

## 2018-01-01 PROCEDURE — 99232 SBSQ HOSP IP/OBS MODERATE 35: CPT | Performed by: NURSE PRACTITIONER

## 2018-01-01 PROCEDURE — 25000128 H RX IP 250 OP 636: Performed by: UROLOGY

## 2018-01-01 PROCEDURE — 25000125 ZZHC RX 250: Performed by: UROLOGY

## 2018-01-01 PROCEDURE — C1785 PMKR, DUAL, RATE-RESP: HCPCS

## 2018-01-01 PROCEDURE — 89050 BODY FLUID CELL COUNT: CPT | Performed by: INTERNAL MEDICINE

## 2018-01-01 PROCEDURE — A9540 TC99M MAA: HCPCS | Performed by: INTERNAL MEDICINE

## 2018-01-01 PROCEDURE — 97530 THERAPEUTIC ACTIVITIES: CPT | Mod: GP | Performed by: PHYSICAL THERAPIST

## 2018-01-01 PROCEDURE — 99309 SBSQ NF CARE MODERATE MDM 30: CPT | Performed by: NURSE PRACTITIONER

## 2018-01-01 PROCEDURE — 99233 SBSQ HOSP IP/OBS HIGH 50: CPT | Performed by: NURSE PRACTITIONER

## 2018-01-01 PROCEDURE — 40000914 ZZH STATISTIC SITTER, DAY HOURS

## 2018-01-01 PROCEDURE — 72125 CT NECK SPINE W/O DYE: CPT

## 2018-01-01 PROCEDURE — G0180 MD CERTIFICATION HHA PATIENT: HCPCS | Performed by: INTERNAL MEDICINE

## 2018-01-01 PROCEDURE — 85379 FIBRIN DEGRADATION QUANT: CPT | Performed by: EMERGENCY MEDICINE

## 2018-01-01 PROCEDURE — 82945 GLUCOSE OTHER FLUID: CPT | Performed by: INTERNAL MEDICINE

## 2018-01-01 PROCEDURE — 40000306 ZZH STATISTIC PRE PROC ASSESS II: Performed by: UROLOGY

## 2018-01-01 PROCEDURE — 87899 AGENT NOS ASSAY W/OPTIC: CPT | Performed by: INTERNAL MEDICINE

## 2018-01-01 PROCEDURE — 37000009 ZZH ANESTHESIA TECHNICAL FEE, EACH ADDTL 15 MIN: Performed by: UROLOGY

## 2018-01-01 PROCEDURE — 99239 HOSP IP/OBS DSCHRG MGMT >30: CPT | Performed by: HOSPITALIST

## 2018-01-01 PROCEDURE — 99223 1ST HOSP IP/OBS HIGH 75: CPT | Mod: AI | Performed by: INTERNAL MEDICINE

## 2018-01-01 PROCEDURE — 27210794 ZZH OR GENERAL SUPPLY STERILE: Performed by: UROLOGY

## 2018-01-01 PROCEDURE — 25800025 ZZH RX 258: Performed by: UROLOGY

## 2018-01-01 PROCEDURE — 99213 OFFICE O/P EST LOW 20 MIN: CPT | Mod: 25 | Performed by: UROLOGY

## 2018-01-01 PROCEDURE — 97535 SELF CARE MNGMENT TRAINING: CPT | Mod: GO | Performed by: REHABILITATION PRACTITIONER

## 2018-01-01 PROCEDURE — 71045 X-RAY EXAM CHEST 1 VIEW: CPT

## 2018-01-01 PROCEDURE — 93010 ELECTROCARDIOGRAM REPORT: CPT | Performed by: INTERNAL MEDICINE

## 2018-01-01 PROCEDURE — 50590 FRAGMENTING OF KIDNEY STONE: CPT | Mod: LT | Performed by: UROLOGY

## 2018-01-01 PROCEDURE — 84484 ASSAY OF TROPONIN QUANT: CPT | Performed by: EMERGENCY MEDICINE

## 2018-01-01 PROCEDURE — 99214 OFFICE O/P EST MOD 30 MIN: CPT | Performed by: PHYSICIAN ASSISTANT

## 2018-01-01 PROCEDURE — 80048 BASIC METABOLIC PNL TOTAL CA: CPT | Performed by: PHYSICIAN ASSISTANT

## 2018-01-01 PROCEDURE — 84132 ASSAY OF SERUM POTASSIUM: CPT | Performed by: INTERNAL MEDICINE

## 2018-01-01 PROCEDURE — 97166 OT EVAL MOD COMPLEX 45 MIN: CPT | Mod: GO

## 2018-01-01 PROCEDURE — 25000125 ZZHC RX 250

## 2018-01-01 PROCEDURE — 40000193 ZZH STATISTIC PT WARD VISIT

## 2018-01-01 PROCEDURE — 34300033 ZZH RX 343: Performed by: INTERNAL MEDICINE

## 2018-01-01 PROCEDURE — 99285 EMERGENCY DEPT VISIT HI MDM: CPT

## 2018-01-01 PROCEDURE — 78582 LUNG VENTILAT&PERFUS IMAGING: CPT

## 2018-01-01 PROCEDURE — 83605 ASSAY OF LACTIC ACID: CPT

## 2018-01-01 PROCEDURE — 99207 ZZC CDG-HISTORY COMP: MEETS EXP. PROB FOCUSED- DOWN CODED LACK OF PFSH: CPT | Performed by: INTERNAL MEDICINE

## 2018-01-01 PROCEDURE — 81001 URINALYSIS AUTO W/SCOPE: CPT | Performed by: INTERNAL MEDICINE

## 2018-01-01 PROCEDURE — 97535 SELF CARE MNGMENT TRAINING: CPT | Mod: GO

## 2018-01-01 PROCEDURE — 40000305 ZZH STATISTIC PRE PROC ASSESS I: Performed by: UROLOGY

## 2018-01-01 PROCEDURE — 36415 COLL VENOUS BLD VENIPUNCTURE: CPT

## 2018-01-01 PROCEDURE — 40000193 ZZH STATISTIC PT WARD VISIT: Performed by: PHYSICAL THERAPIST

## 2018-01-01 PROCEDURE — 27210995 ZZH RX 272: Performed by: INTERNAL MEDICINE

## 2018-01-01 PROCEDURE — 74340 X-RAY GUIDE FOR GI TUBE: CPT

## 2018-01-01 PROCEDURE — 83735 ASSAY OF MAGNESIUM: CPT | Performed by: PHYSICIAN ASSISTANT

## 2018-01-01 PROCEDURE — 36569 INSJ PICC 5 YR+ W/O IMAGING: CPT

## 2018-01-01 PROCEDURE — 87088 URINE BACTERIA CULTURE: CPT | Performed by: EMERGENCY MEDICINE

## 2018-01-01 PROCEDURE — 86140 C-REACTIVE PROTEIN: CPT | Performed by: EMERGENCY MEDICINE

## 2018-01-01 PROCEDURE — 85730 THROMBOPLASTIN TIME PARTIAL: CPT | Performed by: EMERGENCY MEDICINE

## 2018-01-01 PROCEDURE — 93280 PM DEVICE PROGR EVAL DUAL: CPT | Performed by: INTERNAL MEDICINE

## 2018-01-01 PROCEDURE — 97116 GAIT TRAINING THERAPY: CPT | Mod: GP | Performed by: PHYSICAL THERAPIST

## 2018-01-01 PROCEDURE — 96367 TX/PROPH/DG ADDL SEQ IV INF: CPT

## 2018-01-01 PROCEDURE — 99231 SBSQ HOSP IP/OBS SF/LOW 25: CPT | Performed by: NURSE PRACTITIONER

## 2018-01-01 PROCEDURE — 40000895 ZZH STATISTIC SLP IP EVAL DEFER: Performed by: SPEECH-LANGUAGE PATHOLOGIST

## 2018-01-01 PROCEDURE — 33228 REMV&REPLC PM GEN DUAL LEAD: CPT

## 2018-01-01 PROCEDURE — 87088 URINE BACTERIA CULTURE: CPT | Performed by: INTERNAL MEDICINE

## 2018-01-01 PROCEDURE — 83880 ASSAY OF NATRIURETIC PEPTIDE: CPT | Performed by: EMERGENCY MEDICINE

## 2018-01-01 PROCEDURE — 99223 1ST HOSP IP/OBS HIGH 75: CPT | Performed by: NURSE PRACTITIONER

## 2018-01-01 PROCEDURE — 85049 AUTOMATED PLATELET COUNT: CPT | Performed by: INTERNAL MEDICINE

## 2018-01-01 PROCEDURE — 99306 1ST NF CARE HIGH MDM 50: CPT | Performed by: INTERNAL MEDICINE

## 2018-01-01 PROCEDURE — 87205 SMEAR GRAM STAIN: CPT | Performed by: INTERNAL MEDICINE

## 2018-01-01 PROCEDURE — 82803 BLOOD GASES ANY COMBINATION: CPT

## 2018-01-01 PROCEDURE — 93306 TTE W/DOPPLER COMPLETE: CPT | Mod: 26 | Performed by: INTERNAL MEDICINE

## 2018-01-01 PROCEDURE — 25000128 H RX IP 250 OP 636: Performed by: ANESTHESIOLOGY

## 2018-01-01 PROCEDURE — 90662 IIV NO PRSV INCREASED AG IM: CPT | Performed by: INTERNAL MEDICINE

## 2018-01-01 PROCEDURE — 73523 X-RAY EXAM HIPS BI 5/> VIEWS: CPT

## 2018-01-01 PROCEDURE — 99152 MOD SED SAME PHYS/QHP 5/>YRS: CPT

## 2018-01-01 PROCEDURE — 93296 REM INTERROG EVL PM/IDS: CPT | Performed by: INTERNAL MEDICINE

## 2018-01-01 PROCEDURE — 36000050 ZZH SURGERY LEVEL 2 1ST 30 MIN: Performed by: UROLOGY

## 2018-01-01 PROCEDURE — 97530 THERAPEUTIC ACTIVITIES: CPT | Mod: GP | Performed by: PHYSICAL THERAPY ASSISTANT

## 2018-01-01 PROCEDURE — 87070 CULTURE OTHR SPECIMN AEROBIC: CPT | Performed by: INTERNAL MEDICINE

## 2018-01-01 PROCEDURE — 99214 OFFICE O/P EST MOD 30 MIN: CPT | Performed by: FAMILY MEDICINE

## 2018-01-01 PROCEDURE — 36000063 ZZH SURGERY LEVEL 4 EA 15 ADDTL MIN: Performed by: UROLOGY

## 2018-01-01 PROCEDURE — 97161 PT EVAL LOW COMPLEX 20 MIN: CPT | Mod: GP | Performed by: PHYSICAL THERAPIST

## 2018-01-01 PROCEDURE — 25500064 ZZH RX 255 OP 636: Performed by: UROLOGY

## 2018-01-01 PROCEDURE — 25500064 ZZH RX 255 OP 636: Performed by: INTERNAL MEDICINE

## 2018-01-01 PROCEDURE — 87106 FUNGI IDENTIFICATION YEAST: CPT | Performed by: UROLOGY

## 2018-01-01 DEVICE — STENT URETERAL POLARIS ULTRA 6FRX26CM M0061921330: Type: IMPLANTABLE DEVICE | Site: URETER | Status: FUNCTIONAL

## 2018-01-01 RX ORDER — LIDOCAINE HYDROCHLORIDE AND EPINEPHRINE 10; 10 MG/ML; UG/ML
10-30 INJECTION, SOLUTION INFILTRATION; PERINEURAL
Status: DISCONTINUED | OUTPATIENT
Start: 2018-01-01 | End: 2018-01-01 | Stop reason: HOSPADM

## 2018-01-01 RX ORDER — POTASSIUM CHLORIDE 1.5 G/1.58G
20-40 POWDER, FOR SOLUTION ORAL
Status: DISCONTINUED | OUTPATIENT
Start: 2018-01-01 | End: 2018-01-01

## 2018-01-01 RX ORDER — ONDANSETRON 2 MG/ML
4 INJECTION INTRAMUSCULAR; INTRAVENOUS EVERY 6 HOURS PRN
Status: DISCONTINUED | OUTPATIENT
Start: 2018-01-01 | End: 2018-01-01 | Stop reason: HOSPADM

## 2018-01-01 RX ORDER — HYDRALAZINE HYDROCHLORIDE 20 MG/ML
2.5-5 INJECTION INTRAMUSCULAR; INTRAVENOUS EVERY 10 MIN PRN
Status: DISCONTINUED | OUTPATIENT
Start: 2018-01-01 | End: 2018-01-01 | Stop reason: HOSPADM

## 2018-01-01 RX ORDER — LEVETIRACETAM 15 MG/ML
1500 INJECTION INTRAVASCULAR EVERY 12 HOURS
Status: DISCONTINUED | OUTPATIENT
Start: 2018-01-01 | End: 2018-01-01

## 2018-01-01 RX ORDER — LIDOCAINE HYDROCHLORIDE 10 MG/ML
INJECTION, SOLUTION INFILTRATION; PERINEURAL PRN
Status: DISCONTINUED | OUTPATIENT
Start: 2018-01-01 | End: 2018-01-01

## 2018-01-01 RX ORDER — SODIUM CHLORIDE 450 MG/100ML
INJECTION, SOLUTION INTRAVENOUS CONTINUOUS
Status: DISCONTINUED | OUTPATIENT
Start: 2018-01-01 | End: 2018-01-01 | Stop reason: HOSPADM

## 2018-01-01 RX ORDER — KETOROLAC TROMETHAMINE 30 MG/ML
15 INJECTION, SOLUTION INTRAMUSCULAR; INTRAVENOUS
Status: DISCONTINUED | OUTPATIENT
Start: 2018-01-01 | End: 2018-01-01 | Stop reason: HOSPADM

## 2018-01-01 RX ORDER — PROTAMINE SULFATE 10 MG/ML
5-40 INJECTION, SOLUTION INTRAVENOUS EVERY 10 MIN PRN
Status: DISCONTINUED | OUTPATIENT
Start: 2018-01-01 | End: 2018-01-01 | Stop reason: HOSPADM

## 2018-01-01 RX ORDER — LIDOCAINE HYDROCHLORIDE 10 MG/ML
10-30 INJECTION, SOLUTION EPIDURAL; INFILTRATION; INTRACAUDAL; PERINEURAL
Status: COMPLETED | OUTPATIENT
Start: 2018-01-01 | End: 2018-01-01

## 2018-01-01 RX ORDER — FENTANYL CITRATE 50 UG/ML
25-50 INJECTION, SOLUTION INTRAMUSCULAR; INTRAVENOUS
Status: DISCONTINUED | OUTPATIENT
Start: 2018-01-01 | End: 2018-01-01 | Stop reason: HOSPADM

## 2018-01-01 RX ORDER — MORPHINE SULFATE 2 MG/ML
1-2 INJECTION, SOLUTION INTRAMUSCULAR; INTRAVENOUS EVERY 5 MIN PRN
Status: DISCONTINUED | OUTPATIENT
Start: 2018-01-01 | End: 2018-01-01 | Stop reason: HOSPADM

## 2018-01-01 RX ORDER — POTASSIUM CHLORIDE 1.5 G/1.58G
20-40 POWDER, FOR SOLUTION ORAL
Status: DISCONTINUED | OUTPATIENT
Start: 2018-01-01 | End: 2018-01-01 | Stop reason: HOSPADM

## 2018-01-01 RX ORDER — HALOPERIDOL 5 MG/ML
2 INJECTION INTRAMUSCULAR EVERY 6 HOURS PRN
Status: DISCONTINUED | OUTPATIENT
Start: 2018-01-01 | End: 2018-01-01

## 2018-01-01 RX ORDER — ALBUTEROL SULFATE 90 UG/1
2 AEROSOL, METERED RESPIRATORY (INHALATION) EVERY 6 HOURS PRN
Status: DISCONTINUED | OUTPATIENT
Start: 2018-01-01 | End: 2018-01-01 | Stop reason: HOSPADM

## 2018-01-01 RX ORDER — ACETAMINOPHEN 325 MG/1
650 TABLET ORAL EVERY 4 HOURS PRN
Status: DISCONTINUED | OUTPATIENT
Start: 2018-01-01 | End: 2018-01-01

## 2018-01-01 RX ORDER — CIPROFLOXACIN 500 MG/1
500 TABLET, FILM COATED ORAL EVERY 12 HOURS
Qty: 20 TABLET | Refills: 0 | DISCHARGE
Start: 2018-01-01 | End: 2018-01-01

## 2018-01-01 RX ORDER — FUROSEMIDE 10 MG/ML
40 INJECTION INTRAMUSCULAR; INTRAVENOUS ONCE
Status: COMPLETED | OUTPATIENT
Start: 2018-01-01 | End: 2018-01-01

## 2018-01-01 RX ORDER — ONDANSETRON 2 MG/ML
4 INJECTION INTRAMUSCULAR; INTRAVENOUS EVERY 30 MIN PRN
Status: DISCONTINUED | OUTPATIENT
Start: 2018-01-01 | End: 2018-01-01 | Stop reason: HOSPADM

## 2018-01-01 RX ORDER — LIDOCAINE 40 MG/G
CREAM TOPICAL
Status: DISCONTINUED | OUTPATIENT
Start: 2018-01-01 | End: 2018-01-01 | Stop reason: HOSPADM

## 2018-01-01 RX ORDER — AMOXICILLIN 250 MG
2 CAPSULE ORAL 2 TIMES DAILY PRN
Status: DISCONTINUED | OUTPATIENT
Start: 2018-01-01 | End: 2018-01-01 | Stop reason: HOSPADM

## 2018-01-01 RX ORDER — METOPROLOL TARTRATE 25 MG/1
25 TABLET, FILM COATED ORAL 2 TIMES DAILY
Status: DISCONTINUED | OUTPATIENT
Start: 2018-01-01 | End: 2018-01-01 | Stop reason: HOSPADM

## 2018-01-01 RX ORDER — IOPAMIDOL 755 MG/ML
500 INJECTION, SOLUTION INTRAVASCULAR ONCE
Status: COMPLETED | OUTPATIENT
Start: 2018-01-01 | End: 2018-01-01

## 2018-01-01 RX ORDER — ASPIRIN 325 MG
325 TABLET ORAL ONCE
Status: COMPLETED | OUTPATIENT
Start: 2018-01-01 | End: 2018-01-01

## 2018-01-01 RX ORDER — ACETAMINOPHEN 325 MG/1
975 TABLET ORAL 3 TIMES DAILY
Status: DISCONTINUED | OUTPATIENT
Start: 2018-01-01 | End: 2018-01-01 | Stop reason: HOSPADM

## 2018-01-01 RX ORDER — CEFAZOLIN SODIUM 1 G/50ML
1750 SOLUTION INTRAVENOUS
Status: DISCONTINUED | OUTPATIENT
Start: 2018-01-01 | End: 2018-01-01 | Stop reason: DRUGHIGH

## 2018-01-01 RX ORDER — ACETAMINOPHEN 650 MG/1
650 SUPPOSITORY RECTAL EVERY 4 HOURS PRN
Status: DISCONTINUED | OUTPATIENT
Start: 2018-01-01 | End: 2018-01-01

## 2018-01-01 RX ORDER — IOPAMIDOL 755 MG/ML
50 INJECTION, SOLUTION INTRAVASCULAR ONCE
Status: COMPLETED | OUTPATIENT
Start: 2018-01-01 | End: 2018-01-01

## 2018-01-01 RX ORDER — FLUCONAZOLE 2 MG/ML
400 INJECTION, SOLUTION INTRAVENOUS EVERY 24 HOURS
Status: DISCONTINUED | OUTPATIENT
Start: 2018-01-01 | End: 2018-01-01

## 2018-01-01 RX ORDER — NALOXONE HYDROCHLORIDE 0.4 MG/ML
.1-.4 INJECTION, SOLUTION INTRAMUSCULAR; INTRAVENOUS; SUBCUTANEOUS
Status: ACTIVE | OUTPATIENT
Start: 2018-01-01 | End: 2018-01-01

## 2018-01-01 RX ORDER — NITROGLYCERIN 0.4 MG/1
0.4 TABLET SUBLINGUAL EVERY 5 MIN PRN
Status: DISCONTINUED | OUTPATIENT
Start: 2018-01-01 | End: 2018-01-01 | Stop reason: HOSPADM

## 2018-01-01 RX ORDER — BISACODYL 5 MG
5 TABLET, DELAYED RELEASE (ENTERIC COATED) ORAL DAILY PRN
Status: DISCONTINUED | OUTPATIENT
Start: 2018-01-01 | End: 2018-01-01 | Stop reason: HOSPADM

## 2018-01-01 RX ORDER — FLUCONAZOLE 100 MG/1
200 TABLET ORAL DAILY
Status: DISCONTINUED | OUTPATIENT
Start: 2018-01-01 | End: 2018-01-01 | Stop reason: HOSPADM

## 2018-01-01 RX ORDER — LEVOFLOXACIN 750 MG/1
750 TABLET, FILM COATED ORAL DAILY
Qty: 5 TABLET | Refills: 1 | Status: SHIPPED | OUTPATIENT
Start: 2018-01-01 | End: 2018-01-01

## 2018-01-01 RX ORDER — DIAZEPAM 2.5 MG/.5ML
5-10 GEL RECTAL EVERY 30 MIN PRN
Qty: 20 SUPPOSITORY | Refills: 0 | Status: SHIPPED | OUTPATIENT
Start: 2018-01-01

## 2018-01-01 RX ORDER — LOPERAMIDE HCL 2 MG
2 CAPSULE ORAL 4 TIMES DAILY PRN
Status: DISCONTINUED | OUTPATIENT
Start: 2018-01-01 | End: 2018-01-01 | Stop reason: HOSPADM

## 2018-01-01 RX ORDER — ONDANSETRON 2 MG/ML
4 INJECTION INTRAMUSCULAR; INTRAVENOUS ONCE
Status: COMPLETED | OUTPATIENT
Start: 2018-01-01 | End: 2018-01-01

## 2018-01-01 RX ORDER — LEVETIRACETAM 10 MG/ML
1000 INJECTION INTRAVASCULAR EVERY 12 HOURS
Status: DISCONTINUED | OUTPATIENT
Start: 2018-01-01 | End: 2018-01-01

## 2018-01-01 RX ORDER — PROCHLORPERAZINE 25 MG
12.5 SUPPOSITORY, RECTAL RECTAL EVERY 12 HOURS PRN
Status: DISCONTINUED | OUTPATIENT
Start: 2018-01-01 | End: 2018-01-01 | Stop reason: HOSPADM

## 2018-01-01 RX ORDER — DIMENHYDRINATE 50 MG/ML
25 INJECTION, SOLUTION INTRAMUSCULAR; INTRAVENOUS
Status: DISCONTINUED | OUTPATIENT
Start: 2018-01-01 | End: 2018-01-01 | Stop reason: HOSPADM

## 2018-01-01 RX ORDER — BISACODYL 10 MG
10 SUPPOSITORY, RECTAL RECTAL DAILY PRN
Status: DISCONTINUED | OUTPATIENT
Start: 2018-01-01 | End: 2018-01-01 | Stop reason: HOSPADM

## 2018-01-01 RX ORDER — NALOXONE HYDROCHLORIDE 0.4 MG/ML
.1-.4 INJECTION, SOLUTION INTRAMUSCULAR; INTRAVENOUS; SUBCUTANEOUS
Status: DISCONTINUED | OUTPATIENT
Start: 2018-01-01 | End: 2018-01-01 | Stop reason: HOSPADM

## 2018-01-01 RX ORDER — PAROXETINE 30 MG/1
15 TABLET, FILM COATED ORAL EVERY MORNING
Qty: 45 TABLET | Refills: 0 | Status: SHIPPED | OUTPATIENT
Start: 2018-01-01 | End: 2018-01-01

## 2018-01-01 RX ORDER — POTASSIUM CL/LIDO/0.9 % NACL 10MEQ/0.1L
10 INTRAVENOUS SOLUTION, PIGGYBACK (ML) INTRAVENOUS
Status: DISCONTINUED | OUTPATIENT
Start: 2018-01-01 | End: 2018-01-01

## 2018-01-01 RX ORDER — LIDOCAINE HYDROCHLORIDE 10 MG/ML
INJECTION, SOLUTION EPIDURAL; INFILTRATION; INTRACAUDAL; PERINEURAL
Status: DISPENSED
Start: 2018-01-01 | End: 2018-01-01

## 2018-01-01 RX ORDER — DEXTROSE MONOHYDRATE, SODIUM CHLORIDE, AND POTASSIUM CHLORIDE 50; 1.49; 9 G/1000ML; G/1000ML; G/1000ML
INJECTION, SOLUTION INTRAVENOUS CONTINUOUS
Status: DISCONTINUED | OUTPATIENT
Start: 2018-01-01 | End: 2018-01-01

## 2018-01-01 RX ORDER — OXYCODONE AND ACETAMINOPHEN 5; 325 MG/1; MG/1
1 TABLET ORAL EVERY 4 HOURS PRN
Status: DISCONTINUED | OUTPATIENT
Start: 2018-01-01 | End: 2018-01-01

## 2018-01-01 RX ORDER — EPHEDRINE SULFATE 50 MG/ML
INJECTION, SOLUTION INTRAVENOUS PRN
Status: DISCONTINUED | OUTPATIENT
Start: 2018-01-01 | End: 2018-01-01

## 2018-01-01 RX ORDER — DOXYCYCLINE 100 MG/10ML
100 INJECTION, POWDER, LYOPHILIZED, FOR SOLUTION INTRAVENOUS EVERY 12 HOURS
Status: DISCONTINUED | OUTPATIENT
Start: 2018-01-01 | End: 2018-01-01

## 2018-01-01 RX ORDER — PAROXETINE 30 MG/1
15 TABLET, FILM COATED ORAL AT BEDTIME
Status: ON HOLD | COMMUNITY
End: 2018-01-01

## 2018-01-01 RX ORDER — AMOXICILLIN 250 MG
1 CAPSULE ORAL 2 TIMES DAILY PRN
Status: DISCONTINUED | OUTPATIENT
Start: 2018-01-01 | End: 2018-01-01 | Stop reason: HOSPADM

## 2018-01-01 RX ORDER — NALOXONE HYDROCHLORIDE 0.4 MG/ML
0.4 INJECTION, SOLUTION INTRAMUSCULAR; INTRAVENOUS; SUBCUTANEOUS EVERY 5 MIN PRN
Status: DISCONTINUED | OUTPATIENT
Start: 2018-01-01 | End: 2018-01-01 | Stop reason: HOSPADM

## 2018-01-01 RX ORDER — LACOSAMIDE 10 MG/ML
200 SOLUTION ORAL 2 TIMES DAILY
Status: DISCONTINUED | OUTPATIENT
Start: 2018-01-01 | End: 2018-01-01

## 2018-01-01 RX ORDER — LORAZEPAM 2 MG/ML
0.5 INJECTION INTRAMUSCULAR
Status: DISCONTINUED | OUTPATIENT
Start: 2018-01-01 | End: 2018-01-01

## 2018-01-01 RX ORDER — SODIUM CHLORIDE 9 MG/ML
INJECTION, SOLUTION INTRAVENOUS CONTINUOUS
Status: DISCONTINUED | OUTPATIENT
Start: 2018-01-01 | End: 2018-01-01

## 2018-01-01 RX ORDER — MAGNESIUM SULFATE HEPTAHYDRATE 40 MG/ML
4 INJECTION, SOLUTION INTRAVENOUS EVERY 4 HOURS PRN
Status: DISCONTINUED | OUTPATIENT
Start: 2018-01-01 | End: 2018-01-01

## 2018-01-01 RX ORDER — LEVETIRACETAM 500 MG/1
1500 TABLET ORAL 2 TIMES DAILY
Status: DISCONTINUED | OUTPATIENT
Start: 2018-01-01 | End: 2018-01-01

## 2018-01-01 RX ORDER — ASPIRIN 81 MG/1
81 TABLET, CHEWABLE ORAL DAILY
Status: DISCONTINUED | OUTPATIENT
Start: 2018-01-01 | End: 2018-01-01 | Stop reason: HOSPADM

## 2018-01-01 RX ORDER — ACETAMINOPHEN 650 MG/1
650 SUPPOSITORY RECTAL ONCE
Status: COMPLETED | OUTPATIENT
Start: 2018-01-01 | End: 2018-01-01

## 2018-01-01 RX ORDER — PROPOFOL 10 MG/ML
INJECTION, EMULSION INTRAVENOUS PRN
Status: DISCONTINUED | OUTPATIENT
Start: 2018-01-01 | End: 2018-01-01

## 2018-01-01 RX ORDER — HALOPERIDOL 5 MG/ML
.5-1 INJECTION INTRAMUSCULAR
Status: DISCONTINUED | OUTPATIENT
Start: 2018-01-01 | End: 2018-01-01 | Stop reason: HOSPADM

## 2018-01-01 RX ORDER — LEVETIRACETAM 100 MG/ML
1500 SOLUTION ORAL 2 TIMES DAILY
Status: DISCONTINUED | OUTPATIENT
Start: 2018-01-01 | End: 2018-01-01

## 2018-01-01 RX ORDER — ALBUTEROL SULFATE 0.83 MG/ML
2.5 SOLUTION RESPIRATORY (INHALATION)
Status: DISCONTINUED | OUTPATIENT
Start: 2018-01-01 | End: 2018-01-01 | Stop reason: HOSPADM

## 2018-01-01 RX ORDER — METOPROLOL TARTRATE 25 MG/1
25 TABLET, FILM COATED ORAL 2 TIMES DAILY
Qty: 60 TABLET | DISCHARGE
Start: 2018-01-01 | End: 2018-01-01

## 2018-01-01 RX ORDER — HYDROMORPHONE HYDROCHLORIDE 1 MG/ML
0.2 INJECTION, SOLUTION INTRAMUSCULAR; INTRAVENOUS; SUBCUTANEOUS
Status: DISCONTINUED | OUTPATIENT
Start: 2018-01-01 | End: 2018-01-01 | Stop reason: HOSPADM

## 2018-01-01 RX ORDER — AMLODIPINE BESYLATE 5 MG/1
5 TABLET ORAL DAILY
Status: DISCONTINUED | OUTPATIENT
Start: 2018-01-01 | End: 2018-01-01 | Stop reason: HOSPADM

## 2018-01-01 RX ORDER — LINEZOLID 2 MG/ML
600 INJECTION, SOLUTION INTRAVENOUS EVERY 12 HOURS
Status: DISCONTINUED | OUTPATIENT
Start: 2018-01-01 | End: 2018-01-01

## 2018-01-01 RX ORDER — ACETAMINOPHEN 325 MG/1
650 TABLET ORAL EVERY 4 HOURS PRN
Status: DISCONTINUED | OUTPATIENT
Start: 2018-01-01 | End: 2018-01-01 | Stop reason: HOSPADM

## 2018-01-01 RX ORDER — ACETAMINOPHEN 500 MG
1000 TABLET ORAL 3 TIMES DAILY PRN
Status: DISCONTINUED | OUTPATIENT
Start: 2018-01-01 | End: 2018-01-01

## 2018-01-01 RX ORDER — VIT A/VIT C/VIT E/ZINC/COPPER 4296-226
1 CAPSULE ORAL
Qty: 180 CAPSULE | Refills: 3 | Status: ON HOLD | OUTPATIENT
Start: 2018-01-01 | End: 2018-01-01

## 2018-01-01 RX ORDER — LEVOFLOXACIN 5 MG/ML
750 INJECTION, SOLUTION INTRAVENOUS
Status: DISCONTINUED | OUTPATIENT
Start: 2018-01-01 | End: 2018-01-01 | Stop reason: HOSPADM

## 2018-01-01 RX ORDER — ACETAMINOPHEN 500 MG
1000 TABLET ORAL EVERY 8 HOURS SCHEDULED
Status: DISCONTINUED | OUTPATIENT
Start: 2018-01-01 | End: 2018-01-01

## 2018-01-01 RX ORDER — AMLODIPINE BESYLATE 5 MG/1
TABLET ORAL
Qty: 90 TABLET | Refills: 3 | Status: SHIPPED | OUTPATIENT
Start: 2018-01-01

## 2018-01-01 RX ORDER — PAROXETINE 30 MG/1
15 TABLET, FILM COATED ORAL AT BEDTIME
Status: ON HOLD | COMMUNITY
End: 2018-01-01 | Stop reason: SINTOL

## 2018-01-01 RX ORDER — LORAZEPAM 0.5 MG/1
0.5 TABLET ORAL EVERY 8 HOURS PRN
Status: DISCONTINUED | OUTPATIENT
Start: 2018-01-01 | End: 2018-01-01

## 2018-01-01 RX ORDER — MORPHINE SULFATE 4 MG/ML
4 INJECTION, SOLUTION INTRAMUSCULAR; INTRAVENOUS ONCE
Status: COMPLETED | OUTPATIENT
Start: 2018-01-01 | End: 2018-01-01

## 2018-01-01 RX ORDER — POTASSIUM CHLORIDE 7.45 MG/ML
10 INJECTION INTRAVENOUS
Status: DISCONTINUED | OUTPATIENT
Start: 2018-01-01 | End: 2018-01-01

## 2018-01-01 RX ORDER — ACETAMINOPHEN 650 MG/1
650 SUPPOSITORY RECTAL EVERY 4 HOURS PRN
Qty: 12 SUPPOSITORY | Refills: 1 | Status: SHIPPED | OUTPATIENT
Start: 2018-01-01

## 2018-01-01 RX ORDER — POTASSIUM CHLORIDE 29.8 MG/ML
20 INJECTION INTRAVENOUS
Status: DISCONTINUED | OUTPATIENT
Start: 2018-01-01 | End: 2018-01-01 | Stop reason: HOSPADM

## 2018-01-01 RX ORDER — ASPIRIN 81 MG/1
81 TABLET ORAL DAILY
Status: DISCONTINUED | OUTPATIENT
Start: 2018-01-01 | End: 2018-01-01

## 2018-01-01 RX ORDER — DIAZEPAM 10 MG/2ML
2.5 INJECTION, SOLUTION INTRAMUSCULAR; INTRAVENOUS ONCE
Status: COMPLETED | OUTPATIENT
Start: 2018-01-01 | End: 2018-01-01

## 2018-01-01 RX ORDER — LIDOCAINE 40 MG/G
CREAM TOPICAL
Status: DISCONTINUED | OUTPATIENT
Start: 2018-01-01 | End: 2018-01-01

## 2018-01-01 RX ORDER — BUPIVACAINE HYDROCHLORIDE 2.5 MG/ML
10-30 INJECTION, SOLUTION EPIDURAL; INFILTRATION; INTRACAUDAL
Status: DISCONTINUED | OUTPATIENT
Start: 2018-01-01 | End: 2018-01-01 | Stop reason: HOSPADM

## 2018-01-01 RX ORDER — LEVETIRACETAM 1000 MG/1
1000 TABLET ORAL 2 TIMES DAILY
Qty: 60 TABLET | Refills: 0 | Status: SHIPPED | OUTPATIENT
Start: 2018-01-01

## 2018-01-01 RX ORDER — ACETAMINOPHEN 650 MG/1
650 SUPPOSITORY RECTAL EVERY 8 HOURS
Status: DISCONTINUED | OUTPATIENT
Start: 2018-01-01 | End: 2018-01-01 | Stop reason: HOSPADM

## 2018-01-01 RX ORDER — ASPIRIN 81 MG/1
81 TABLET, CHEWABLE ORAL DAILY
Status: DISCONTINUED | OUTPATIENT
Start: 2018-01-01 | End: 2018-01-01

## 2018-01-01 RX ORDER — PAROXETINE 30 MG/1
15 TABLET, FILM COATED ORAL AT BEDTIME
Qty: 30 TABLET
Start: 2018-01-01

## 2018-01-01 RX ORDER — IBUTILIDE FUMARATE 1 MG/10ML
0.01 INJECTION, SOLUTION INTRAVENOUS
Status: DISCONTINUED | OUTPATIENT
Start: 2018-01-01 | End: 2018-01-01 | Stop reason: HOSPADM

## 2018-01-01 RX ORDER — METOPROLOL TARTRATE 1 MG/ML
5 INJECTION, SOLUTION INTRAVENOUS EVERY 6 HOURS
Status: DISCONTINUED | OUTPATIENT
Start: 2018-01-01 | End: 2018-01-01

## 2018-01-01 RX ORDER — ONDANSETRON 4 MG/1
4 TABLET, ORALLY DISINTEGRATING ORAL EVERY 6 HOURS PRN
Status: DISCONTINUED | OUTPATIENT
Start: 2018-01-01 | End: 2018-01-01 | Stop reason: HOSPADM

## 2018-01-01 RX ORDER — DIAZEPAM 10 MG/2ML
2.5 INJECTION, SOLUTION INTRAMUSCULAR; INTRAVENOUS EVERY 4 HOURS PRN
Status: DISCONTINUED | OUTPATIENT
Start: 2018-01-01 | End: 2018-01-01

## 2018-01-01 RX ORDER — HYDROMORPHONE HYDROCHLORIDE 1 MG/ML
1-2 SOLUTION ORAL
Qty: 30 ML | Refills: 0 | Status: SHIPPED | OUTPATIENT
Start: 2018-01-01

## 2018-01-01 RX ORDER — FLUCONAZOLE 100 MG/1
100 TABLET ORAL DAILY
Qty: 15 TABLET | Refills: 0 | Status: ON HOLD | DISCHARGE
Start: 2018-01-01 | End: 2018-01-01

## 2018-01-01 RX ORDER — SODIUM CHLORIDE, SODIUM LACTATE, POTASSIUM CHLORIDE, CALCIUM CHLORIDE 600; 310; 30; 20 MG/100ML; MG/100ML; MG/100ML; MG/100ML
INJECTION, SOLUTION INTRAVENOUS CONTINUOUS
Status: DISCONTINUED | OUTPATIENT
Start: 2018-01-01 | End: 2018-01-01 | Stop reason: HOSPADM

## 2018-01-01 RX ORDER — LEVETIRACETAM 5 MG/ML
500 INJECTION INTRAVASCULAR ONCE
Status: COMPLETED | OUTPATIENT
Start: 2018-01-01 | End: 2018-01-01

## 2018-01-01 RX ORDER — HEPARIN SODIUM 1000 [USP'U]/ML
1000-10000 INJECTION, SOLUTION INTRAVENOUS; SUBCUTANEOUS EVERY 5 MIN PRN
Status: DISCONTINUED | OUTPATIENT
Start: 2018-01-01 | End: 2018-01-01 | Stop reason: HOSPADM

## 2018-01-01 RX ORDER — POLYETHYLENE GLYCOL 3350 17 G/17G
17 POWDER, FOR SOLUTION ORAL DAILY PRN
Status: DISCONTINUED | OUTPATIENT
Start: 2018-01-01 | End: 2018-01-01 | Stop reason: HOSPADM

## 2018-01-01 RX ORDER — HYDROMORPHONE HYDROCHLORIDE 1 MG/ML
.3-.5 INJECTION, SOLUTION INTRAMUSCULAR; INTRAVENOUS; SUBCUTANEOUS EVERY 5 MIN PRN
Status: DISCONTINUED | OUTPATIENT
Start: 2018-01-01 | End: 2018-01-01 | Stop reason: HOSPADM

## 2018-01-01 RX ORDER — LANOLIN ALCOHOL/MO/W.PET/CERES
3 CREAM (GRAM) TOPICAL AT BEDTIME
Status: DISCONTINUED | OUTPATIENT
Start: 2018-01-01 | End: 2018-01-01 | Stop reason: HOSPADM

## 2018-01-01 RX ORDER — FLUMAZENIL 0.1 MG/ML
0.2 INJECTION, SOLUTION INTRAVENOUS
Status: DISCONTINUED | OUTPATIENT
Start: 2018-01-01 | End: 2018-01-01 | Stop reason: HOSPADM

## 2018-01-01 RX ORDER — BUPIVACAINE HYDROCHLORIDE 2.5 MG/ML
10-30 INJECTION, SOLUTION EPIDURAL; INFILTRATION; INTRACAUDAL
Status: COMPLETED | OUTPATIENT
Start: 2018-01-01 | End: 2018-01-01

## 2018-01-01 RX ORDER — CIPROFLOXACIN 500 MG/1
500 TABLET, FILM COATED ORAL EVERY 12 HOURS SCHEDULED
Status: DISCONTINUED | OUTPATIENT
Start: 2018-01-01 | End: 2018-01-01 | Stop reason: HOSPADM

## 2018-01-01 RX ORDER — ATROPINE SULFATE 0.1 MG/ML
.5-1 INJECTION INTRAVENOUS
Status: DISCONTINUED | OUTPATIENT
Start: 2018-01-01 | End: 2018-01-01 | Stop reason: HOSPADM

## 2018-01-01 RX ORDER — LORAZEPAM 0.5 MG/1
0.5 TABLET ORAL EVERY 6 HOURS PRN
Status: DISCONTINUED | OUTPATIENT
Start: 2018-01-01 | End: 2018-01-01

## 2018-01-01 RX ORDER — LACOSAMIDE 200 MG/1
200 TABLET ORAL 2 TIMES DAILY
Status: DISCONTINUED | OUTPATIENT
Start: 2018-01-01 | End: 2018-01-01 | Stop reason: HOSPADM

## 2018-01-01 RX ORDER — FUROSEMIDE 10 MG/ML
40 INJECTION INTRAMUSCULAR; INTRAVENOUS 3 TIMES DAILY
Status: COMPLETED | OUTPATIENT
Start: 2018-01-01 | End: 2018-01-01

## 2018-01-01 RX ORDER — VIT A/VIT C/VIT E/ZINC/COPPER 4296-226
1 CAPSULE ORAL
Status: DISCONTINUED | OUTPATIENT
Start: 2018-01-01 | End: 2018-01-01 | Stop reason: HOSPADM

## 2018-01-01 RX ORDER — PROCHLORPERAZINE MALEATE 5 MG
5 TABLET ORAL EVERY 6 HOURS PRN
Status: DISCONTINUED | OUTPATIENT
Start: 2018-01-01 | End: 2018-01-01 | Stop reason: HOSPADM

## 2018-01-01 RX ORDER — ONDANSETRON 2 MG/ML
INJECTION INTRAMUSCULAR; INTRAVENOUS PRN
Status: DISCONTINUED | OUTPATIENT
Start: 2018-01-01 | End: 2018-01-01

## 2018-01-01 RX ORDER — METOPROLOL TARTRATE 25 MG/1
12.5 TABLET, FILM COATED ORAL 2 TIMES DAILY
Qty: 30 TABLET | Refills: 0 | Status: SHIPPED | OUTPATIENT
Start: 2018-01-01

## 2018-01-01 RX ORDER — OXYCODONE HYDROCHLORIDE 5 MG/1
5 TABLET ORAL EVERY 8 HOURS PRN
Qty: 6 TABLET | Refills: 0 | Status: SHIPPED | DISCHARGE
Start: 2018-01-01 | End: 2018-01-01

## 2018-01-01 RX ORDER — DIPHENHYDRAMINE HYDROCHLORIDE 50 MG/ML
25-50 INJECTION INTRAMUSCULAR; INTRAVENOUS
Status: DISCONTINUED | OUTPATIENT
Start: 2018-01-01 | End: 2018-01-01 | Stop reason: HOSPADM

## 2018-01-01 RX ORDER — ACETAMINOPHEN 650 MG/1
975 SUPPOSITORY RECTAL EVERY 8 HOURS SCHEDULED
Status: DISCONTINUED | OUTPATIENT
Start: 2018-01-01 | End: 2018-01-01

## 2018-01-01 RX ORDER — PROTAMINE SULFATE 10 MG/ML
1-5 INJECTION, SOLUTION INTRAVENOUS
Status: DISCONTINUED | OUTPATIENT
Start: 2018-01-01 | End: 2018-01-01 | Stop reason: HOSPADM

## 2018-01-01 RX ORDER — FLUCONAZOLE 50 MG/1
100 TABLET ORAL DAILY
Status: DISCONTINUED | OUTPATIENT
Start: 2018-01-01 | End: 2018-01-01 | Stop reason: CLARIF

## 2018-01-01 RX ORDER — ATROPINE SULFATE 10 MG/ML
2-4 SOLUTION/ DROPS OPHTHALMIC
Qty: 5 ML | Refills: 1 | Status: SHIPPED | OUTPATIENT
Start: 2018-01-01

## 2018-01-01 RX ORDER — IPRATROPIUM BROMIDE AND ALBUTEROL SULFATE 2.5; .5 MG/3ML; MG/3ML
3 SOLUTION RESPIRATORY (INHALATION)
Status: DISCONTINUED | OUTPATIENT
Start: 2018-01-01 | End: 2018-01-01 | Stop reason: HOSPADM

## 2018-01-01 RX ORDER — HALOPERIDOL 2 MG/ML
.5-1 SOLUTION ORAL EVERY 6 HOURS PRN
Qty: 30 ML | Refills: 1 | Status: SHIPPED | OUTPATIENT
Start: 2018-01-01

## 2018-01-01 RX ORDER — ALBUTEROL SULFATE 0.83 MG/ML
2.5 SOLUTION RESPIRATORY (INHALATION) EVERY 4 HOURS PRN
Status: DISCONTINUED | OUTPATIENT
Start: 2018-01-01 | End: 2018-01-01 | Stop reason: HOSPADM

## 2018-01-01 RX ORDER — GUAR GUM
1 PACKET (EA) ORAL 3 TIMES DAILY
Status: DISCONTINUED | OUTPATIENT
Start: 2018-01-01 | End: 2018-01-01

## 2018-01-01 RX ORDER — AMOXICILLIN AND CLAVULANATE POTASSIUM 400; 57 MG/5ML; MG/5ML
875 POWDER, FOR SUSPENSION ORAL 2 TIMES DAILY
Status: DISCONTINUED | OUTPATIENT
Start: 2018-01-01 | End: 2018-01-01

## 2018-01-01 RX ORDER — DOXYCYCLINE HYCLATE 50 MG/1
100 CAPSULE ORAL ONCE
Status: DISCONTINUED | OUTPATIENT
Start: 2018-01-01 | End: 2018-01-01

## 2018-01-01 RX ORDER — FLUCONAZOLE 200 MG/1
200 TABLET ORAL DAILY
Qty: 12 TABLET | Refills: 0 | Status: ON HOLD | OUTPATIENT
Start: 2018-01-01 | End: 2018-01-01

## 2018-01-01 RX ORDER — ONDANSETRON 2 MG/ML
4 INJECTION INTRAMUSCULAR; INTRAVENOUS EVERY 4 HOURS PRN
Status: DISCONTINUED | OUTPATIENT
Start: 2018-01-01 | End: 2018-01-01 | Stop reason: HOSPADM

## 2018-01-01 RX ORDER — LIDOCAINE HYDROCHLORIDE 10 MG/ML
10-30 INJECTION, SOLUTION EPIDURAL; INFILTRATION; INTRACAUDAL; PERINEURAL
Status: DISCONTINUED | OUTPATIENT
Start: 2018-01-01 | End: 2018-01-01 | Stop reason: HOSPADM

## 2018-01-01 RX ORDER — CEFAZOLIN SODIUM 1 G/50ML
1750 SOLUTION INTRAVENOUS ONCE
Status: COMPLETED | OUTPATIENT
Start: 2018-01-01 | End: 2018-01-01

## 2018-01-01 RX ORDER — POTASSIUM CL/LIDO/0.9 % NACL 10MEQ/0.1L
10 INTRAVENOUS SOLUTION, PIGGYBACK (ML) INTRAVENOUS
Status: DISCONTINUED | OUTPATIENT
Start: 2018-01-01 | End: 2018-01-01 | Stop reason: HOSPADM

## 2018-01-01 RX ORDER — DOXYCYCLINE HYCLATE 100 MG
100 TABLET ORAL ONCE
Status: COMPLETED | OUTPATIENT
Start: 2018-01-01 | End: 2018-01-01

## 2018-01-01 RX ORDER — OXYCODONE AND ACETAMINOPHEN 5; 325 MG/1; MG/1
1 TABLET ORAL EVERY 4 HOURS PRN
Status: DISCONTINUED | OUTPATIENT
Start: 2018-01-01 | End: 2018-01-01 | Stop reason: HOSPADM

## 2018-01-01 RX ORDER — POTASSIUM CHLORIDE 7.45 MG/ML
10 INJECTION INTRAVENOUS
Status: DISCONTINUED | OUTPATIENT
Start: 2018-01-01 | End: 2018-01-01 | Stop reason: HOSPADM

## 2018-01-01 RX ORDER — FENTANYL CITRATE 50 UG/ML
INJECTION, SOLUTION INTRAMUSCULAR; INTRAVENOUS PRN
Status: DISCONTINUED | OUTPATIENT
Start: 2018-01-01 | End: 2018-01-01

## 2018-01-01 RX ORDER — AMLODIPINE BESYLATE 5 MG/1
5 TABLET ORAL DAILY
Status: DISCONTINUED | OUTPATIENT
Start: 2018-01-01 | End: 2018-01-01

## 2018-01-01 RX ORDER — FUROSEMIDE 10 MG/ML
20 INJECTION INTRAMUSCULAR; INTRAVENOUS ONCE
Status: COMPLETED | OUTPATIENT
Start: 2018-01-01 | End: 2018-01-01

## 2018-01-01 RX ORDER — BISACODYL 10 MG
SUPPOSITORY, RECTAL RECTAL
Qty: 12 SUPPOSITORY | Refills: 1 | Status: SHIPPED | OUTPATIENT
Start: 2018-01-01

## 2018-01-01 RX ORDER — METHYLPREDNISOLONE 4 MG
TABLET, DOSE PACK ORAL
Qty: 21 TABLET | Refills: 0 | Status: ON HOLD | OUTPATIENT
Start: 2018-01-01 | End: 2018-01-01

## 2018-01-01 RX ORDER — AMOXICILLIN 250 MG
1 CAPSULE ORAL 2 TIMES DAILY PRN
COMMUNITY

## 2018-01-01 RX ORDER — ONDANSETRON 4 MG/1
4 TABLET, ORALLY DISINTEGRATING ORAL EVERY 30 MIN PRN
Status: DISCONTINUED | OUTPATIENT
Start: 2018-01-01 | End: 2018-01-01 | Stop reason: HOSPADM

## 2018-01-01 RX ORDER — LANOLIN ALCOHOL/MO/W.PET/CERES
3 CREAM (GRAM) TOPICAL
Status: DISCONTINUED | OUTPATIENT
Start: 2018-01-01 | End: 2018-01-01

## 2018-01-01 RX ORDER — ACETAMINOPHEN 650 MG/1
975 SUPPOSITORY RECTAL EVERY 8 HOURS PRN
Status: DISCONTINUED | OUTPATIENT
Start: 2018-01-01 | End: 2018-01-01 | Stop reason: HOSPADM

## 2018-01-01 RX ORDER — DOBUTAMINE HYDROCHLORIDE 200 MG/100ML
5-40 INJECTION INTRAVENOUS CONTINUOUS PRN
Status: DISCONTINUED | OUTPATIENT
Start: 2018-01-01 | End: 2018-01-01 | Stop reason: HOSPADM

## 2018-01-01 RX ORDER — LEVETIRACETAM 500 MG/1
1000 TABLET ORAL 2 TIMES DAILY
Status: DISCONTINUED | OUTPATIENT
Start: 2018-01-01 | End: 2018-01-01 | Stop reason: HOSPADM

## 2018-01-01 RX ORDER — BISACODYL 5 MG
10 TABLET, DELAYED RELEASE (ENTERIC COATED) ORAL DAILY PRN
Status: DISCONTINUED | OUTPATIENT
Start: 2018-01-01 | End: 2018-01-01 | Stop reason: HOSPADM

## 2018-01-01 RX ORDER — LACOSAMIDE 200 MG/1
200 TABLET ORAL 2 TIMES DAILY
Qty: 60 TABLET | Refills: 0 | Status: SHIPPED | OUTPATIENT
Start: 2018-01-01 | End: 2018-01-01

## 2018-01-01 RX ORDER — PROMETHAZINE HYDROCHLORIDE 25 MG/ML
6.25-25 INJECTION, SOLUTION INTRAMUSCULAR; INTRAVENOUS EVERY 4 HOURS PRN
Status: DISCONTINUED | OUTPATIENT
Start: 2018-01-01 | End: 2018-01-01 | Stop reason: HOSPADM

## 2018-01-01 RX ORDER — DIAZEPAM 2.5 MG/.5ML
5-10 GEL RECTAL EVERY 30 MIN PRN
Status: DISCONTINUED | OUTPATIENT
Start: 2018-01-01 | End: 2018-01-01 | Stop reason: HOSPADM

## 2018-01-01 RX ORDER — CIPROFLOXACIN 2 MG/ML
400 INJECTION, SOLUTION INTRAVENOUS EVERY 12 HOURS
Status: DISCONTINUED | OUTPATIENT
Start: 2018-01-01 | End: 2018-01-01

## 2018-01-01 RX ORDER — SALIVA STIMULANT COMB. NO.3
1-2 SPRAY, NON-AEROSOL (ML) MUCOUS MEMBRANE
Qty: 1 BOTTLE | Refills: 0 | Status: SHIPPED | OUTPATIENT
Start: 2018-01-01

## 2018-01-01 RX ORDER — ADENOSINE 3 MG/ML
6-12 INJECTION, SOLUTION INTRAVENOUS EVERY 5 MIN PRN
Status: DISCONTINUED | OUTPATIENT
Start: 2018-01-01 | End: 2018-01-01 | Stop reason: HOSPADM

## 2018-01-01 RX ORDER — ACETAMINOPHEN 325 MG/1
650 TABLET ORAL EVERY 8 HOURS
Status: DISCONTINUED | OUTPATIENT
Start: 2018-01-01 | End: 2018-01-01 | Stop reason: HOSPADM

## 2018-01-01 RX ORDER — FLUCONAZOLE 200 MG/1
400 TABLET ORAL DAILY
Qty: 28 TABLET | Refills: 0 | Status: ON HOLD | DISCHARGE
Start: 2018-01-01 | End: 2018-01-01

## 2018-01-01 RX ORDER — FUROSEMIDE 10 MG/ML
20 INJECTION INTRAMUSCULAR; INTRAVENOUS EVERY 8 HOURS
Status: COMPLETED | OUTPATIENT
Start: 2018-01-01 | End: 2018-01-01

## 2018-01-01 RX ORDER — CLINDAMYCIN PHOSPHATE 900 MG/50ML
900 INJECTION, SOLUTION INTRAVENOUS
Status: COMPLETED | OUTPATIENT
Start: 2018-01-01 | End: 2018-01-01

## 2018-01-01 RX ORDER — HEPARIN SODIUM 5000 [USP'U]/.5ML
5000 INJECTION, SOLUTION INTRAVENOUS; SUBCUTANEOUS EVERY 12 HOURS
Status: DISCONTINUED | OUTPATIENT
Start: 2018-01-01 | End: 2018-01-01 | Stop reason: HOSPADM

## 2018-01-01 RX ORDER — IBUTILIDE FUMARATE 1 MG/10ML
1 INJECTION, SOLUTION INTRAVENOUS
Status: DISCONTINUED | OUTPATIENT
Start: 2018-01-01 | End: 2018-01-01 | Stop reason: HOSPADM

## 2018-01-01 RX ORDER — METOPROLOL TARTRATE 25 MG/1
25 TABLET, FILM COATED ORAL 2 TIMES DAILY
Status: DISCONTINUED | OUTPATIENT
Start: 2018-01-01 | End: 2018-01-01

## 2018-01-01 RX ORDER — ASCORBIC ACID 500 MG
1000 TABLET ORAL DAILY
Status: DISCONTINUED | OUTPATIENT
Start: 2018-01-01 | End: 2018-01-01 | Stop reason: HOSPADM

## 2018-01-01 RX ORDER — OMEPRAZOLE
20 KIT
Status: DISCONTINUED | OUTPATIENT
Start: 2018-01-01 | End: 2018-01-01

## 2018-01-01 RX ORDER — OXYCODONE HYDROCHLORIDE 5 MG/1
5 TABLET ORAL ONCE
Status: COMPLETED | OUTPATIENT
Start: 2018-01-01 | End: 2018-01-01

## 2018-01-01 RX ORDER — ACETAMINOPHEN 650 MG/1
650 SUPPOSITORY RECTAL EVERY 4 HOURS PRN
Status: DISCONTINUED | OUTPATIENT
Start: 2018-01-01 | End: 2018-01-01 | Stop reason: HOSPADM

## 2018-01-01 RX ORDER — METOPROLOL TARTRATE 50 MG
50 TABLET ORAL 2 TIMES DAILY
Status: DISCONTINUED | OUTPATIENT
Start: 2018-01-01 | End: 2018-01-01

## 2018-01-01 RX ORDER — FUROSEMIDE 10 MG/ML
20-100 INJECTION INTRAMUSCULAR; INTRAVENOUS
Status: DISCONTINUED | OUTPATIENT
Start: 2018-01-01 | End: 2018-01-01 | Stop reason: HOSPADM

## 2018-01-01 RX ORDER — DEXAMETHASONE SODIUM PHOSPHATE 4 MG/ML
INJECTION, SOLUTION INTRA-ARTICULAR; INTRALESIONAL; INTRAMUSCULAR; INTRAVENOUS; SOFT TISSUE PRN
Status: DISCONTINUED | OUTPATIENT
Start: 2018-01-01 | End: 2018-01-01

## 2018-01-01 RX ORDER — LORAZEPAM 2 MG/ML
.5-2 INJECTION INTRAMUSCULAR EVERY 10 MIN PRN
Status: DISCONTINUED | OUTPATIENT
Start: 2018-01-01 | End: 2018-01-01 | Stop reason: HOSPADM

## 2018-01-01 RX ORDER — POTASSIUM CHLORIDE 1500 MG/1
20-40 TABLET, EXTENDED RELEASE ORAL
Status: DISCONTINUED | OUTPATIENT
Start: 2018-01-01 | End: 2018-01-01 | Stop reason: HOSPADM

## 2018-01-01 RX ORDER — LACOSAMIDE 200 MG/1
200 TABLET ORAL 2 TIMES DAILY
Qty: 60 TABLET | Refills: 0 | Status: SHIPPED | OUTPATIENT
Start: 2018-01-01

## 2018-01-01 RX ORDER — HEPARIN SODIUM,PORCINE 10 UNIT/ML
2-5 VIAL (ML) INTRAVENOUS
Status: DISCONTINUED | OUTPATIENT
Start: 2018-01-01 | End: 2018-01-01

## 2018-01-01 RX ORDER — LABETALOL HYDROCHLORIDE 5 MG/ML
10 INJECTION, SOLUTION INTRAVENOUS
Status: DISCONTINUED | OUTPATIENT
Start: 2018-01-01 | End: 2018-01-01 | Stop reason: HOSPADM

## 2018-01-01 RX ORDER — HYDROMORPHONE HYDROCHLORIDE 1 MG/ML
.1-.2 INJECTION, SOLUTION INTRAMUSCULAR; INTRAVENOUS; SUBCUTANEOUS
Status: DISCONTINUED | OUTPATIENT
Start: 2018-01-01 | End: 2018-01-01

## 2018-01-01 RX ORDER — FLUCONAZOLE 200 MG/1
400 TABLET ORAL DAILY
Status: DISCONTINUED | OUTPATIENT
Start: 2018-01-01 | End: 2018-01-01 | Stop reason: HOSPADM

## 2018-01-01 RX ORDER — LEVETIRACETAM 1000 MG/1
1000 TABLET ORAL 2 TIMES DAILY
Qty: 60 TABLET | Refills: 0 | Status: SHIPPED | OUTPATIENT
Start: 2018-01-01 | End: 2018-01-01

## 2018-01-01 RX ORDER — GLYCOPYRROLATE 0.2 MG/ML
INJECTION, SOLUTION INTRAMUSCULAR; INTRAVENOUS PRN
Status: DISCONTINUED | OUTPATIENT
Start: 2018-01-01 | End: 2018-01-01

## 2018-01-01 RX ORDER — LEVOFLOXACIN 5 MG/ML
750 INJECTION, SOLUTION INTRAVENOUS
Status: DISCONTINUED | OUTPATIENT
Start: 2018-01-01 | End: 2018-01-01 | Stop reason: CLARIF

## 2018-01-01 RX ORDER — AZTREONAM 2 G/1
2 INJECTION, POWDER, LYOPHILIZED, FOR SOLUTION INTRAMUSCULAR; INTRAVENOUS ONCE
Status: COMPLETED | OUTPATIENT
Start: 2018-01-01 | End: 2018-01-01

## 2018-01-01 RX ORDER — POTASSIUM CHLORIDE 29.8 MG/ML
20 INJECTION INTRAVENOUS
Status: DISCONTINUED | OUTPATIENT
Start: 2018-01-01 | End: 2018-01-01

## 2018-01-01 RX ORDER — SODIUM CHLORIDE, SODIUM LACTATE, POTASSIUM CHLORIDE, CALCIUM CHLORIDE 600; 310; 30; 20 MG/100ML; MG/100ML; MG/100ML; MG/100ML
INJECTION, SOLUTION INTRAVENOUS CONTINUOUS
Status: DISCONTINUED | OUTPATIENT
Start: 2018-01-01 | End: 2018-01-01

## 2018-01-01 RX ORDER — BISACODYL 5 MG
15 TABLET, DELAYED RELEASE (ENTERIC COATED) ORAL DAILY PRN
Status: DISCONTINUED | OUTPATIENT
Start: 2018-01-01 | End: 2018-01-01 | Stop reason: HOSPADM

## 2018-01-01 RX ORDER — OXYCODONE AND ACETAMINOPHEN 5; 325 MG/1; MG/1
1 TABLET ORAL ONCE
Status: COMPLETED | OUTPATIENT
Start: 2018-01-01 | End: 2018-01-01

## 2018-01-01 RX ORDER — FLUCONAZOLE 100 MG/1
100 TABLET ORAL DAILY
Qty: 10 TABLET | Refills: 0 | DISCHARGE
Start: 2018-01-01 | End: 2018-01-01

## 2018-01-01 RX ORDER — MEPERIDINE HYDROCHLORIDE 25 MG/ML
12.5 INJECTION INTRAMUSCULAR; INTRAVENOUS; SUBCUTANEOUS
Status: DISCONTINUED | OUTPATIENT
Start: 2018-01-01 | End: 2018-01-01 | Stop reason: HOSPADM

## 2018-01-01 RX ORDER — DEXTROSE MONOHYDRATE, SODIUM CHLORIDE, AND POTASSIUM CHLORIDE 50; 1.49; 4.5 G/1000ML; G/1000ML; G/1000ML
INJECTION, SOLUTION INTRAVENOUS CONTINUOUS
Status: DISCONTINUED | OUTPATIENT
Start: 2018-01-01 | End: 2018-01-01

## 2018-01-01 RX ORDER — MULTIPLE VITAMINS W/ MINERALS TAB 9MG-400MCG
1 TAB ORAL DAILY
Status: DISCONTINUED | OUTPATIENT
Start: 2018-01-01 | End: 2018-01-01 | Stop reason: HOSPADM

## 2018-01-01 RX ORDER — POTASSIUM CHLORIDE 1500 MG/1
20-40 TABLET, EXTENDED RELEASE ORAL
Status: DISCONTINUED | OUTPATIENT
Start: 2018-01-01 | End: 2018-01-01

## 2018-01-01 RX ORDER — FUROSEMIDE 10 MG/ML
60 INJECTION INTRAMUSCULAR; INTRAVENOUS ONCE
Status: COMPLETED | OUTPATIENT
Start: 2018-01-01 | End: 2018-01-01

## 2018-01-01 RX ORDER — SALIVA STIMULANT COMB. NO.3
1-2 SPRAY, NON-AEROSOL (ML) MUCOUS MEMBRANE
Status: DISCONTINUED | OUTPATIENT
Start: 2018-01-01 | End: 2018-01-01 | Stop reason: HOSPADM

## 2018-01-01 RX ORDER — HYDROMORPHONE HYDROCHLORIDE 1 MG/ML
.3-.5 INJECTION, SOLUTION INTRAMUSCULAR; INTRAVENOUS; SUBCUTANEOUS
Status: DISCONTINUED | OUTPATIENT
Start: 2018-01-01 | End: 2018-01-01

## 2018-01-01 RX ORDER — IOPAMIDOL 755 MG/ML
500 INJECTION, SOLUTION INTRAVASCULAR ONCE
Status: DISCONTINUED | OUTPATIENT
Start: 2018-01-01 | End: 2018-01-01 | Stop reason: HOSPADM

## 2018-01-01 RX ORDER — ASPIRIN 81 MG/1
81 TABLET ORAL DAILY
Status: DISCONTINUED | OUTPATIENT
Start: 2018-01-01 | End: 2018-01-01 | Stop reason: HOSPADM

## 2018-01-01 RX ORDER — FLUCONAZOLE 100 MG/1
100 TABLET ORAL EVERY 24 HOURS
Status: DISCONTINUED | OUTPATIENT
Start: 2018-01-01 | End: 2018-01-01 | Stop reason: HOSPADM

## 2018-01-01 RX ORDER — FLUCONAZOLE 2 MG/ML
200 INJECTION, SOLUTION INTRAVENOUS ONCE
Status: COMPLETED | OUTPATIENT
Start: 2018-01-01 | End: 2018-01-01

## 2018-01-01 RX ORDER — ASPIRIN 300 MG/1
300 SUPPOSITORY RECTAL DAILY
Status: DISCONTINUED | OUTPATIENT
Start: 2018-01-01 | End: 2018-01-01

## 2018-01-01 RX ORDER — SODIUM CHLORIDE, SODIUM LACTATE, POTASSIUM CHLORIDE, CALCIUM CHLORIDE 600; 310; 30; 20 MG/100ML; MG/100ML; MG/100ML; MG/100ML
INJECTION, SOLUTION INTRAVENOUS CONTINUOUS PRN
Status: DISCONTINUED | OUTPATIENT
Start: 2018-01-01 | End: 2018-01-01

## 2018-01-01 RX ORDER — DOXYCYCLINE 100 MG/1
100 CAPSULE ORAL 2 TIMES DAILY
Qty: 20 CAPSULE | Refills: 0 | Status: SHIPPED | OUTPATIENT
Start: 2018-01-01 | End: 2018-01-01 | Stop reason: ALTCHOICE

## 2018-01-01 RX ORDER — LEVOFLOXACIN 5 MG/ML
500 INJECTION, SOLUTION INTRAVENOUS ONCE
Status: COMPLETED | OUTPATIENT
Start: 2018-01-01 | End: 2018-01-01

## 2018-01-01 RX ORDER — QUETIAPINE FUMARATE 25 MG/1
25 TABLET, FILM COATED ORAL EVERY 12 HOURS PRN
Status: DISCONTINUED | OUTPATIENT
Start: 2018-01-01 | End: 2018-01-01

## 2018-01-01 RX ORDER — OXYCODONE HYDROCHLORIDE 5 MG/1
5 TABLET ORAL
Status: DISCONTINUED | OUTPATIENT
Start: 2018-01-01 | End: 2018-01-01 | Stop reason: HOSPADM

## 2018-01-01 RX ORDER — MAGNESIUM SULFATE HEPTAHYDRATE 40 MG/ML
4 INJECTION, SOLUTION INTRAVENOUS EVERY 4 HOURS PRN
Status: DISCONTINUED | OUTPATIENT
Start: 2018-01-01 | End: 2018-01-01 | Stop reason: HOSPADM

## 2018-01-01 RX ORDER — LINEZOLID 2 MG/ML
600 INJECTION, SOLUTION INTRAVENOUS ONCE
Status: COMPLETED | OUTPATIENT
Start: 2018-01-01 | End: 2018-01-01

## 2018-01-01 RX ADMIN — LORAZEPAM 0.5 MG: 2 INJECTION INTRAMUSCULAR; INTRAVENOUS at 18:16

## 2018-01-01 RX ADMIN — IPRATROPIUM BROMIDE AND ALBUTEROL SULFATE 3 ML: .5; 3 SOLUTION RESPIRATORY (INHALATION) at 15:13

## 2018-01-01 RX ADMIN — SODIUM CHLORIDE 70 ML: 9 INJECTION, SOLUTION INTRAVENOUS at 10:23

## 2018-01-01 RX ADMIN — TAZOBACTAM SODIUM AND PIPERACILLIN SODIUM 3.38 G: 375; 3 INJECTION, SOLUTION INTRAVENOUS at 05:37

## 2018-01-01 RX ADMIN — LINEZOLID 600 MG: 600 INJECTION, SOLUTION INTRAVENOUS at 18:01

## 2018-01-01 RX ADMIN — FUROSEMIDE 40 MG: 10 INJECTION, SOLUTION INTRAVENOUS at 18:18

## 2018-01-01 RX ADMIN — Medication 12.5 MG: at 21:29

## 2018-01-01 RX ADMIN — TAZOBACTAM SODIUM AND PIPERACILLIN SODIUM 3.38 G: 375; 3 INJECTION, SOLUTION INTRAVENOUS at 19:49

## 2018-01-01 RX ADMIN — AMLODIPINE BESYLATE 5 MG: 5 TABLET ORAL at 08:06

## 2018-01-01 RX ADMIN — POTASSIUM CHLORIDE, DEXTROSE MONOHYDRATE AND SODIUM CHLORIDE: 150; 5; 450 INJECTION, SOLUTION INTRAVENOUS at 07:03

## 2018-01-01 RX ADMIN — TAZOBACTAM SODIUM AND PIPERACILLIN SODIUM 3.38 G: 375; 3 INJECTION, SOLUTION INTRAVENOUS at 22:06

## 2018-01-01 RX ADMIN — LINEZOLID 600 MG: 600 INJECTION, SOLUTION INTRAVENOUS at 12:17

## 2018-01-01 RX ADMIN — AMLODIPINE BESYLATE 5 MG: 5 TABLET ORAL at 17:50

## 2018-01-01 RX ADMIN — ACETAMINOPHEN 975 MG: 325 SOLUTION ORAL at 08:01

## 2018-01-01 RX ADMIN — SODIUM CHLORIDE: 9 INJECTION, SOLUTION INTRAVENOUS at 18:26

## 2018-01-01 RX ADMIN — TAZOBACTAM SODIUM AND PIPERACILLIN SODIUM 3.38 G: 375; 3 INJECTION, SOLUTION INTRAVENOUS at 20:43

## 2018-01-01 RX ADMIN — IPRATROPIUM BROMIDE AND ALBUTEROL SULFATE 3 ML: .5; 3 SOLUTION RESPIRATORY (INHALATION) at 11:16

## 2018-01-01 RX ADMIN — METOPROLOL TARTRATE 25 MG: 25 TABLET ORAL at 21:34

## 2018-01-01 RX ADMIN — POTASSIUM CHLORIDE, DEXTROSE MONOHYDRATE AND SODIUM CHLORIDE: 150; 5; 900 INJECTION, SOLUTION INTRAVENOUS at 15:08

## 2018-01-01 RX ADMIN — ACETAMINOPHEN 975 MG: 325 SOLUTION ORAL at 19:34

## 2018-01-01 RX ADMIN — ACETAMINOPHEN 650 MG: 325 TABLET, FILM COATED ORAL at 08:10

## 2018-01-01 RX ADMIN — ACETAMINOPHEN 650 MG: 325 TABLET, FILM COATED ORAL at 18:10

## 2018-01-01 RX ADMIN — FLUCONAZOLE 200 MG: 100 TABLET ORAL at 14:14

## 2018-01-01 RX ADMIN — FLUCONAZOLE 200 MG: 2 INJECTION, SOLUTION INTRAVENOUS at 21:05

## 2018-01-01 RX ADMIN — VANCOMYCIN HYDROCHLORIDE 1500 MG: 10 INJECTION, POWDER, LYOPHILIZED, FOR SOLUTION INTRAVENOUS at 21:42

## 2018-01-01 RX ADMIN — IPRATROPIUM BROMIDE AND ALBUTEROL SULFATE 3 ML: .5; 3 SOLUTION RESPIRATORY (INHALATION) at 07:49

## 2018-01-01 RX ADMIN — FENTANYL CITRATE 50 MCG: 50 INJECTION, SOLUTION INTRAMUSCULAR; INTRAVENOUS at 14:57

## 2018-01-01 RX ADMIN — Medication 15 MG: at 08:02

## 2018-01-01 RX ADMIN — OMEPRAZOLE 20 MG: 20 CAPSULE, DELAYED RELEASE ORAL at 06:39

## 2018-01-01 RX ADMIN — OMEPRAZOLE 20 MG: 20 CAPSULE, DELAYED RELEASE ORAL at 06:41

## 2018-01-01 RX ADMIN — LINEZOLID 600 MG: 600 INJECTION, SOLUTION INTRAVENOUS at 00:07

## 2018-01-01 RX ADMIN — ASPIRIN 81 MG 81 MG: 81 TABLET ORAL at 09:01

## 2018-01-01 RX ADMIN — POTASSIUM CHLORIDE, DEXTROSE MONOHYDRATE AND SODIUM CHLORIDE: 150; 5; 450 INJECTION, SOLUTION INTRAVENOUS at 15:50

## 2018-01-01 RX ADMIN — AMLODIPINE BESYLATE 5 MG: 5 TABLET ORAL at 07:50

## 2018-01-01 RX ADMIN — VANCOMYCIN HYDROCHLORIDE 1500 MG: 10 INJECTION, POWDER, LYOPHILIZED, FOR SOLUTION INTRAVENOUS at 15:56

## 2018-01-01 RX ADMIN — METOPROLOL TARTRATE 25 MG: 25 TABLET, FILM COATED ORAL at 08:46

## 2018-01-01 RX ADMIN — PHENYLEPHRINE HYDROCHLORIDE 100 MCG: 10 INJECTION, SOLUTION INTRAMUSCULAR; INTRAVENOUS; SUBCUTANEOUS at 15:50

## 2018-01-01 RX ADMIN — OMEPRAZOLE 20 MG: 20 CAPSULE, DELAYED RELEASE ORAL at 09:29

## 2018-01-01 RX ADMIN — Medication 15 MG: at 08:01

## 2018-01-01 RX ADMIN — ACETAMINOPHEN 975 MG: 325 SOLUTION ORAL at 14:39

## 2018-01-01 RX ADMIN — AMOXICILLIN AND CLAVULANATE POTASSIUM 875 MG: 400; 57 POWDER, FOR SUSPENSION ORAL at 09:00

## 2018-01-01 RX ADMIN — SODIUM CHLORIDE, POTASSIUM CHLORIDE, SODIUM LACTATE AND CALCIUM CHLORIDE: 600; 310; 30; 20 INJECTION, SOLUTION INTRAVENOUS at 15:42

## 2018-01-01 RX ADMIN — HYDROMORPHONE HYDROCHLORIDE 0.3 MG: 1 INJECTION, SOLUTION INTRAMUSCULAR; INTRAVENOUS; SUBCUTANEOUS at 05:37

## 2018-01-01 RX ADMIN — PANTOPRAZOLE SODIUM 40 MG: 40 INJECTION, POWDER, FOR SOLUTION INTRAVENOUS at 08:15

## 2018-01-01 RX ADMIN — TAZOBACTAM SODIUM AND PIPERACILLIN SODIUM 3.38 G: 375; 3 INJECTION, SOLUTION INTRAVENOUS at 19:10

## 2018-01-01 RX ADMIN — ATROPA BELLADONNA AND OPIUM 1 SUPPOSITORY: 16.2; 3 SUPPOSITORY RECTAL at 10:35

## 2018-01-01 RX ADMIN — SODIUM CHLORIDE 500 ML: 9 INJECTION, SOLUTION INTRAVENOUS at 16:09

## 2018-01-01 RX ADMIN — ACETAMINOPHEN 650 MG: 325 TABLET, FILM COATED ORAL at 08:53

## 2018-01-01 RX ADMIN — FUROSEMIDE 20 MG: 10 INJECTION, SOLUTION INTRAMUSCULAR; INTRAVENOUS at 04:29

## 2018-01-01 RX ADMIN — METOPROLOL TARTRATE 25 MG: 25 TABLET, FILM COATED ORAL at 21:08

## 2018-01-01 RX ADMIN — LORAZEPAM 0.5 MG: 2 INJECTION INTRAMUSCULAR; INTRAVENOUS at 11:49

## 2018-01-01 RX ADMIN — HEPARIN SODIUM 5000 UNITS: 5000 INJECTION, SOLUTION INTRAVENOUS; SUBCUTANEOUS at 17:57

## 2018-01-01 RX ADMIN — Medication 2.5 MG: at 20:09

## 2018-01-01 RX ADMIN — TAZOBACTAM SODIUM AND PIPERACILLIN SODIUM 3.38 G: 375; 3 INJECTION, SOLUTION INTRAVENOUS at 06:13

## 2018-01-01 RX ADMIN — TAZOBACTAM SODIUM AND PIPERACILLIN SODIUM 3.38 G: 375; 3 INJECTION, SOLUTION INTRAVENOUS at 08:06

## 2018-01-01 RX ADMIN — METOPROLOL TARTRATE 5 MG: 5 INJECTION, SOLUTION INTRAVENOUS at 00:34

## 2018-01-01 RX ADMIN — OMEPRAZOLE 20 MG: 20 CAPSULE, DELAYED RELEASE ORAL at 08:17

## 2018-01-01 RX ADMIN — LINEZOLID 600 MG: 600 INJECTION, SOLUTION INTRAVENOUS at 23:44

## 2018-01-01 RX ADMIN — LIDOCAINE HYDROCHLORIDE 1 TUBE: 20 JELLY TOPICAL at 13:39

## 2018-01-01 RX ADMIN — OMEPRAZOLE 20 MG: 20 CAPSULE, DELAYED RELEASE ORAL at 17:20

## 2018-01-01 RX ADMIN — FLUCONAZOLE 400 MG: 2 INJECTION, SOLUTION INTRAVENOUS at 19:26

## 2018-01-01 RX ADMIN — FUROSEMIDE 40 MG: 10 INJECTION, SOLUTION INTRAVENOUS at 09:06

## 2018-01-01 RX ADMIN — LACOSAMIDE 200 MG: 200 TABLET, FILM COATED ORAL at 09:10

## 2018-01-01 RX ADMIN — MELATONIN TAB 3 MG 3 MG: 3 TAB at 21:16

## 2018-01-01 RX ADMIN — ACETAMINOPHEN 650 MG: 325 TABLET, FILM COATED ORAL at 08:16

## 2018-01-01 RX ADMIN — OMEPRAZOLE 20 MG: 20 CAPSULE, DELAYED RELEASE ORAL at 09:20

## 2018-01-01 RX ADMIN — LACOSAMIDE 200 MG: 200 TABLET, FILM COATED ORAL at 22:07

## 2018-01-01 RX ADMIN — ACETAMINOPHEN 650 MG: 325 TABLET, FILM COATED ORAL at 08:06

## 2018-01-01 RX ADMIN — TAZOBACTAM SODIUM AND PIPERACILLIN SODIUM 3.38 G: 375; 3 INJECTION, SOLUTION INTRAVENOUS at 05:47

## 2018-01-01 RX ADMIN — POTASSIUM CHLORIDE, DEXTROSE MONOHYDRATE AND SODIUM CHLORIDE: 150; 5; 450 INJECTION, SOLUTION INTRAVENOUS at 17:31

## 2018-01-01 RX ADMIN — Medication 12.5 MG: at 09:11

## 2018-01-01 RX ADMIN — IPRATROPIUM BROMIDE AND ALBUTEROL SULFATE 3 ML: .5; 3 SOLUTION RESPIRATORY (INHALATION) at 07:08

## 2018-01-01 RX ADMIN — TAZOBACTAM SODIUM AND PIPERACILLIN SODIUM 3.38 G: 375; 3 INJECTION, SOLUTION INTRAVENOUS at 14:33

## 2018-01-01 RX ADMIN — ATROPA BELLADONNA AND OPIUM 1 SUPPOSITORY: 16.2; 3 SUPPOSITORY RECTAL at 09:49

## 2018-01-01 RX ADMIN — METOPROLOL TARTRATE 5 MG: 5 INJECTION, SOLUTION INTRAVENOUS at 17:34

## 2018-01-01 RX ADMIN — SODIUM CHLORIDE 200 MG: 9 INJECTION, SOLUTION INTRAVENOUS at 21:18

## 2018-01-01 RX ADMIN — LEVETIRACETAM 1000 MG: 10 INJECTION INTRAVENOUS at 19:45

## 2018-01-01 RX ADMIN — DOXYCYCLINE 100 MG: 100 INJECTION, POWDER, LYOPHILIZED, FOR SOLUTION INTRAVENOUS at 13:20

## 2018-01-01 RX ADMIN — VANCOMYCIN HYDROCHLORIDE 1500 MG: 10 INJECTION, POWDER, LYOPHILIZED, FOR SOLUTION INTRAVENOUS at 21:52

## 2018-01-01 RX ADMIN — IPRATROPIUM BROMIDE AND ALBUTEROL SULFATE 3 ML: .5; 3 SOLUTION RESPIRATORY (INHALATION) at 15:23

## 2018-01-01 RX ADMIN — Medication 15 MG: at 02:30

## 2018-01-01 RX ADMIN — OXYCODONE HYDROCHLORIDE 5 MG: 5 TABLET ORAL at 00:07

## 2018-01-01 RX ADMIN — MULTIPLE VITAMINS W/ MINERALS TAB 1 TABLET: TAB at 07:58

## 2018-01-01 RX ADMIN — IPRATROPIUM BROMIDE AND ALBUTEROL SULFATE 3 ML: .5; 3 SOLUTION RESPIRATORY (INHALATION) at 07:58

## 2018-01-01 RX ADMIN — ACETAMINOPHEN 650 MG: 325 TABLET, FILM COATED ORAL at 10:50

## 2018-01-01 RX ADMIN — OMEPRAZOLE 20 MG: KIT at 08:02

## 2018-01-01 RX ADMIN — Medication 1 CAPSULE: at 09:33

## 2018-01-01 RX ADMIN — ATROPA BELLADONNA AND OPIUM 1 SUPPOSITORY: 16.2; 3 SUPPOSITORY RECTAL at 19:53

## 2018-01-01 RX ADMIN — OXYCODONE HYDROCHLORIDE AND ACETAMINOPHEN 1 TABLET: 5; 325 TABLET ORAL at 07:46

## 2018-01-01 RX ADMIN — Medication 15 MG: at 08:27

## 2018-01-01 RX ADMIN — SODIUM CHLORIDE 100 MG: 9 INJECTION, SOLUTION INTRAVENOUS at 17:30

## 2018-01-01 RX ADMIN — ACETAMINOPHEN 650 MG: 325 TABLET, FILM COATED ORAL at 09:32

## 2018-01-01 RX ADMIN — ACETAMINOPHEN 650 MG: 325 TABLET, FILM COATED ORAL at 20:59

## 2018-01-01 RX ADMIN — TAZOBACTAM SODIUM AND PIPERACILLIN SODIUM 3.38 G: 375; 3 INJECTION, SOLUTION INTRAVENOUS at 04:18

## 2018-01-01 RX ADMIN — TAZOBACTAM SODIUM AND PIPERACILLIN SODIUM 3.38 G: 375; 3 INJECTION, SOLUTION INTRAVENOUS at 05:02

## 2018-01-01 RX ADMIN — TAZOBACTAM SODIUM AND PIPERACILLIN SODIUM 3.38 G: 375; 3 INJECTION, SOLUTION INTRAVENOUS at 13:41

## 2018-01-01 RX ADMIN — METOPROLOL TARTRATE 25 MG: 25 TABLET, FILM COATED ORAL at 20:56

## 2018-01-01 RX ADMIN — IPRATROPIUM BROMIDE AND ALBUTEROL SULFATE 3 ML: .5; 3 SOLUTION RESPIRATORY (INHALATION) at 07:16

## 2018-01-01 RX ADMIN — ACETAMINOPHEN 975 MG: 325 SOLUTION ORAL at 09:18

## 2018-01-01 RX ADMIN — TAZOBACTAM SODIUM AND PIPERACILLIN SODIUM 3.38 G: 375; 3 INJECTION, SOLUTION INTRAVENOUS at 12:13

## 2018-01-01 RX ADMIN — PROPOFOL 50 MG: 10 INJECTION, EMULSION INTRAVENOUS at 15:06

## 2018-01-01 RX ADMIN — DOXYCYCLINE HYCLATE 100 MG: 100 TABLET, FILM COATED ORAL at 15:09

## 2018-01-01 RX ADMIN — ASPIRIN 81 MG: 81 TABLET, COATED ORAL at 08:25

## 2018-01-01 RX ADMIN — SODIUM CHLORIDE 1000 ML: 9 INJECTION, SOLUTION INTRAVENOUS at 13:01

## 2018-01-01 RX ADMIN — AMLODIPINE BESYLATE 5 MG: 5 TABLET ORAL at 08:00

## 2018-01-01 RX ADMIN — IPRATROPIUM BROMIDE AND ALBUTEROL SULFATE 3 ML: .5; 3 SOLUTION RESPIRATORY (INHALATION) at 15:31

## 2018-01-01 RX ADMIN — MULTIPLE VITAMINS W/ MINERALS TAB 1 TABLET: TAB at 09:20

## 2018-01-01 RX ADMIN — ACETAMINOPHEN 650 MG: 325 TABLET, FILM COATED ORAL at 05:51

## 2018-01-01 RX ADMIN — CLINDAMYCIN PHOSPHATE 900 MG: 18 INJECTION, SOLUTION INTRAVENOUS at 12:55

## 2018-01-01 RX ADMIN — Medication 2.5 MG: at 03:31

## 2018-01-01 RX ADMIN — IPRATROPIUM BROMIDE AND ALBUTEROL SULFATE 3 ML: .5; 3 SOLUTION RESPIRATORY (INHALATION) at 08:08

## 2018-01-01 RX ADMIN — SENNOSIDES AND DOCUSATE SODIUM 1 TABLET: 8.6; 5 TABLET ORAL at 18:25

## 2018-01-01 RX ADMIN — POTASSIUM CHLORIDE 20 MEQ: 1500 TABLET, EXTENDED RELEASE ORAL at 10:47

## 2018-01-01 RX ADMIN — MICAFUNGIN SODIUM 100 MG: 20 INJECTION, POWDER, LYOPHILIZED, FOR SOLUTION INTRAVENOUS at 19:18

## 2018-01-01 RX ADMIN — EPHEDRINE SULFATE 5 MG: 50 INJECTION, SOLUTION INTRAVENOUS at 15:06

## 2018-01-01 RX ADMIN — METOPROLOL TARTRATE 5 MG: 5 INJECTION, SOLUTION INTRAVENOUS at 06:13

## 2018-01-01 RX ADMIN — FUROSEMIDE 20 MG: 10 INJECTION, SOLUTION INTRAMUSCULAR; INTRAVENOUS at 20:28

## 2018-01-01 RX ADMIN — IPRATROPIUM BROMIDE AND ALBUTEROL SULFATE 3 ML: .5; 3 SOLUTION RESPIRATORY (INHALATION) at 11:42

## 2018-01-01 RX ADMIN — ASPIRIN 81 MG: 81 TABLET, COATED ORAL at 09:10

## 2018-01-01 RX ADMIN — OXYCODONE HYDROCHLORIDE AND ACETAMINOPHEN 1 TABLET: 5; 325 TABLET ORAL at 17:50

## 2018-01-01 RX ADMIN — ACETAMINOPHEN 650 MG: 325 TABLET, FILM COATED ORAL at 16:30

## 2018-01-01 RX ADMIN — LIDOCAINE HYDROCHLORIDE 50 MG: 10 INJECTION, SOLUTION INFILTRATION; PERINEURAL at 14:42

## 2018-01-01 RX ADMIN — TAZOBACTAM SODIUM AND PIPERACILLIN SODIUM 3.38 G: 375; 3 INJECTION, SOLUTION INTRAVENOUS at 15:19

## 2018-01-01 RX ADMIN — LORAZEPAM 0.5 MG: 2 INJECTION INTRAMUSCULAR; INTRAVENOUS at 11:33

## 2018-01-01 RX ADMIN — Medication 3 MILLICURIE: at 19:05

## 2018-01-01 RX ADMIN — LINEZOLID 600 MG: 600 INJECTION, SOLUTION INTRAVENOUS at 00:29

## 2018-01-01 RX ADMIN — LIDOCAINE HYDROCHLORIDE 100 MG: 10 INJECTION, SOLUTION EPIDURAL; INFILTRATION; INTRACAUDAL; PERINEURAL at 13:22

## 2018-01-01 RX ADMIN — ASPIRIN 81 MG: 81 TABLET, COATED ORAL at 09:41

## 2018-01-01 RX ADMIN — METOPROLOL TARTRATE 5 MG: 5 INJECTION, SOLUTION INTRAVENOUS at 00:01

## 2018-01-01 RX ADMIN — IPRATROPIUM BROMIDE AND ALBUTEROL SULFATE 3 ML: .5; 3 SOLUTION RESPIRATORY (INHALATION) at 11:46

## 2018-01-01 RX ADMIN — ACETAMINOPHEN 1000 MG: 325 SOLUTION ORAL at 10:57

## 2018-01-01 RX ADMIN — OMEPRAZOLE 20 MG: 20 CAPSULE, DELAYED RELEASE ORAL at 16:02

## 2018-01-01 RX ADMIN — WATER 300 ML: 100 IRRIGANT IRRIGATION at 15:16

## 2018-01-01 RX ADMIN — IPRATROPIUM BROMIDE AND ALBUTEROL SULFATE 3 ML: .5; 3 SOLUTION RESPIRATORY (INHALATION) at 19:36

## 2018-01-01 RX ADMIN — HYDROMORPHONE HYDROCHLORIDE 0.3 MG: 1 INJECTION, SOLUTION INTRAMUSCULAR; INTRAVENOUS; SUBCUTANEOUS at 15:06

## 2018-01-01 RX ADMIN — MORPHINE SULFATE 4 MG: 4 INJECTION INTRAVENOUS at 16:10

## 2018-01-01 RX ADMIN — Medication 15 MG: at 09:18

## 2018-01-01 RX ADMIN — LEVETIRACETAM 1500 MG: 100 SOLUTION ORAL at 19:52

## 2018-01-01 RX ADMIN — POTASSIUM PHOSPHATE, MONOBASIC AND POTASSIUM PHOSPHATE, DIBASIC 20 MMOL: 224; 236 INJECTION, SOLUTION, CONCENTRATE INTRAVENOUS at 06:37

## 2018-01-01 RX ADMIN — POTASSIUM CHLORIDE, DEXTROSE MONOHYDRATE AND SODIUM CHLORIDE: 150; 5; 900 INJECTION, SOLUTION INTRAVENOUS at 23:11

## 2018-01-01 RX ADMIN — LEVETIRACETAM 1500 MG: 100 SOLUTION ORAL at 09:00

## 2018-01-01 RX ADMIN — HYDROMORPHONE HYDROCHLORIDE 0.3 MG: 1 INJECTION, SOLUTION INTRAMUSCULAR; INTRAVENOUS; SUBCUTANEOUS at 01:45

## 2018-01-01 RX ADMIN — ACETAMINOPHEN 650 MG: 325 TABLET, FILM COATED ORAL at 01:11

## 2018-01-01 RX ADMIN — PHENYLEPHRINE HYDROCHLORIDE 100 MCG: 10 INJECTION, SOLUTION INTRAMUSCULAR; INTRAVENOUS; SUBCUTANEOUS at 15:11

## 2018-01-01 RX ADMIN — ACETAMINOPHEN 975 MG: 325 SOLUTION ORAL at 20:28

## 2018-01-01 RX ADMIN — ASPIRIN 81 MG: 81 TABLET, COATED ORAL at 10:09

## 2018-01-01 RX ADMIN — TAZOBACTAM SODIUM AND PIPERACILLIN SODIUM 3.38 G: 375; 3 INJECTION, SOLUTION INTRAVENOUS at 21:35

## 2018-01-01 RX ADMIN — OXYCODONE HYDROCHLORIDE 5 MG: 5 TABLET ORAL at 21:11

## 2018-01-01 RX ADMIN — SODIUM CHLORIDE 200 MG: 9 INJECTION, SOLUTION INTRAVENOUS at 20:16

## 2018-01-01 RX ADMIN — ATROPA BELLADONNA AND OPIUM 1 SUPPOSITORY: 16.2; 3 SUPPOSITORY RECTAL at 18:41

## 2018-01-01 RX ADMIN — AMLODIPINE BESYLATE 5 MG: 5 TABLET ORAL at 08:25

## 2018-01-01 RX ADMIN — AMLODIPINE BESYLATE 5 MG: 5 TABLET ORAL at 08:18

## 2018-01-01 RX ADMIN — FLUCONAZOLE 400 MG: 200 TABLET ORAL at 16:14

## 2018-01-01 RX ADMIN — TAZOBACTAM SODIUM AND PIPERACILLIN SODIUM 3.38 G: 375; 3 INJECTION, SOLUTION INTRAVENOUS at 06:19

## 2018-01-01 RX ADMIN — SODIUM CHLORIDE 200 MG: 9 INJECTION, SOLUTION INTRAVENOUS at 20:23

## 2018-01-01 RX ADMIN — ACETAMINOPHEN 975 MG: 325 SOLUTION ORAL at 13:47

## 2018-01-01 RX ADMIN — ACETAMINOPHEN 650 MG: 325 TABLET, FILM COATED ORAL at 17:44

## 2018-01-01 RX ADMIN — TAZOBACTAM SODIUM AND PIPERACILLIN SODIUM 3.38 G: 375; 3 INJECTION, SOLUTION INTRAVENOUS at 13:49

## 2018-01-01 RX ADMIN — ACETAMINOPHEN 650 MG: 325 TABLET, FILM COATED ORAL at 10:09

## 2018-01-01 RX ADMIN — IPRATROPIUM BROMIDE AND ALBUTEROL SULFATE 3 ML: .5; 3 SOLUTION RESPIRATORY (INHALATION) at 20:00

## 2018-01-01 RX ADMIN — METOPROLOL TARTRATE 25 MG: 25 TABLET ORAL at 21:18

## 2018-01-01 RX ADMIN — AMLODIPINE BESYLATE 5 MG: 5 TABLET ORAL at 08:30

## 2018-01-01 RX ADMIN — HYDROMORPHONE HYDROCHLORIDE 0.5 MG: 1 INJECTION, SOLUTION INTRAMUSCULAR; INTRAVENOUS; SUBCUTANEOUS at 08:14

## 2018-01-01 RX ADMIN — OMEPRAZOLE 20 MG: 20 CAPSULE, DELAYED RELEASE ORAL at 08:06

## 2018-01-01 RX ADMIN — FLUCONAZOLE IN SODIUM CHLORIDE 400 MG: 2 INJECTION, SOLUTION INTRAVENOUS at 17:51

## 2018-01-01 RX ADMIN — Medication 2 SPRAY: at 04:30

## 2018-01-01 RX ADMIN — Medication 15 MG: at 19:47

## 2018-01-01 RX ADMIN — Medication 12.5 MG: at 13:47

## 2018-01-01 RX ADMIN — AMLODIPINE BESYLATE 5 MG: 5 TABLET ORAL at 10:09

## 2018-01-01 RX ADMIN — TAZOBACTAM SODIUM AND PIPERACILLIN SODIUM 3.38 G: 375; 3 INJECTION, SOLUTION INTRAVENOUS at 14:29

## 2018-01-01 RX ADMIN — LEVETIRACETAM 1500 MG: 15 INJECTION INTRAVENOUS at 19:52

## 2018-01-01 RX ADMIN — Medication 1 CAPSULE: at 16:19

## 2018-01-01 RX ADMIN — FLUCONAZOLE 200 MG: 100 TABLET ORAL at 07:57

## 2018-01-01 RX ADMIN — TAZOBACTAM SODIUM AND PIPERACILLIN SODIUM 3.38 G: 375; 3 INJECTION, SOLUTION INTRAVENOUS at 08:42

## 2018-01-01 RX ADMIN — HYDROMORPHONE HYDROCHLORIDE 0.5 MG: 1 INJECTION, SOLUTION INTRAMUSCULAR; INTRAVENOUS; SUBCUTANEOUS at 09:55

## 2018-01-01 RX ADMIN — ACETAMINOPHEN 650 MG: 325 TABLET, FILM COATED ORAL at 00:07

## 2018-01-01 RX ADMIN — Medication 2 SPRAY: at 17:01

## 2018-01-01 RX ADMIN — Medication 12.5 MG: at 21:52

## 2018-01-01 RX ADMIN — OMEPRAZOLE 20 MG: KIT at 06:11

## 2018-01-01 RX ADMIN — TAZOBACTAM SODIUM AND PIPERACILLIN SODIUM 3.38 G: 375; 3 INJECTION, SOLUTION INTRAVENOUS at 01:38

## 2018-01-01 RX ADMIN — Medication 12.5 MG: at 22:02

## 2018-01-01 RX ADMIN — ASPIRIN 81 MG 81 MG: 81 TABLET ORAL at 09:50

## 2018-01-01 RX ADMIN — SODIUM CHLORIDE: 9 INJECTION, SOLUTION INTRAVENOUS at 04:26

## 2018-01-01 RX ADMIN — LINEZOLID 600 MG: 600 INJECTION, SOLUTION INTRAVENOUS at 12:04

## 2018-01-01 RX ADMIN — TAZOBACTAM SODIUM AND PIPERACILLIN SODIUM 3.38 G: 375; 3 INJECTION, SOLUTION INTRAVENOUS at 21:44

## 2018-01-01 RX ADMIN — OMEPRAZOLE 20 MG: 20 CAPSULE, DELAYED RELEASE ORAL at 07:10

## 2018-01-01 RX ADMIN — OMEPRAZOLE 20 MG: 20 CAPSULE, DELAYED RELEASE ORAL at 09:32

## 2018-01-01 RX ADMIN — IPRATROPIUM BROMIDE AND ALBUTEROL SULFATE 3 ML: .5; 3 SOLUTION RESPIRATORY (INHALATION) at 07:56

## 2018-01-01 RX ADMIN — POTASSIUM PHOSPHATE, MONOBASIC AND POTASSIUM PHOSPHATE, DIBASIC 20 MMOL: 224; 236 INJECTION, SOLUTION, CONCENTRATE INTRAVENOUS at 10:44

## 2018-01-01 RX ADMIN — ACETAMINOPHEN 650 MG: 325 TABLET, FILM COATED ORAL at 00:05

## 2018-01-01 RX ADMIN — IPRATROPIUM BROMIDE AND ALBUTEROL SULFATE 3 ML: .5; 3 SOLUTION RESPIRATORY (INHALATION) at 12:09

## 2018-01-01 RX ADMIN — SODIUM CHLORIDE, POTASSIUM CHLORIDE, SODIUM LACTATE AND CALCIUM CHLORIDE: 600; 310; 30; 20 INJECTION, SOLUTION INTRAVENOUS at 14:35

## 2018-01-01 RX ADMIN — POTASSIUM CHLORIDE, DEXTROSE MONOHYDRATE AND SODIUM CHLORIDE: 150; 5; 450 INJECTION, SOLUTION INTRAVENOUS at 21:17

## 2018-01-01 RX ADMIN — TAZOBACTAM SODIUM AND PIPERACILLIN SODIUM 3.38 G: 375; 3 INJECTION, SOLUTION INTRAVENOUS at 21:39

## 2018-01-01 RX ADMIN — LACOSAMIDE 200 MG: 10 SOLUTION ORAL at 19:57

## 2018-01-01 RX ADMIN — Medication 1 CAPSULE: at 09:10

## 2018-01-01 RX ADMIN — CEFTAZIDIME 2 G: 2 INJECTION, POWDER, FOR SOLUTION INTRAVENOUS at 13:49

## 2018-01-01 RX ADMIN — SODIUM CHLORIDE: 9 INJECTION, SOLUTION INTRAVENOUS at 09:33

## 2018-01-01 RX ADMIN — HYDROMORPHONE HYDROCHLORIDE 0.3 MG: 1 INJECTION, SOLUTION INTRAMUSCULAR; INTRAVENOUS; SUBCUTANEOUS at 23:01

## 2018-01-01 RX ADMIN — TAZOBACTAM SODIUM AND PIPERACILLIN SODIUM 3.38 G: 375; 3 INJECTION, SOLUTION INTRAVENOUS at 12:45

## 2018-01-01 RX ADMIN — METOPROLOL TARTRATE 25 MG: 25 TABLET ORAL at 08:25

## 2018-01-01 RX ADMIN — Medication 1 SPRAY: at 05:27

## 2018-01-01 RX ADMIN — LEVETIRACETAM 1500 MG: 15 INJECTION INTRAVENOUS at 10:33

## 2018-01-01 RX ADMIN — LEVETIRACETAM 1500 MG: 15 INJECTION INTRAVENOUS at 19:35

## 2018-01-01 RX ADMIN — TAZOBACTAM SODIUM AND PIPERACILLIN SODIUM 3.38 G: 375; 3 INJECTION, SOLUTION INTRAVENOUS at 21:58

## 2018-01-01 RX ADMIN — LEVETIRACETAM 1500 MG: 100 SOLUTION ORAL at 09:18

## 2018-01-01 RX ADMIN — ACETAMINOPHEN 975 MG: 325 TABLET, FILM COATED ORAL at 20:06

## 2018-01-01 RX ADMIN — PANTOPRAZOLE SODIUM 40 MG: 40 INJECTION, POWDER, FOR SOLUTION INTRAVENOUS at 14:07

## 2018-01-01 RX ADMIN — OXYCODONE HYDROCHLORIDE AND ACETAMINOPHEN 1 TABLET: 5; 325 TABLET ORAL at 01:41

## 2018-01-01 RX ADMIN — ASPIRIN 81 MG: 81 TABLET, COATED ORAL at 08:01

## 2018-01-01 RX ADMIN — LACOSAMIDE 200 MG: 200 TABLET, FILM COATED ORAL at 21:29

## 2018-01-01 RX ADMIN — Medication 12.5 MG: at 08:02

## 2018-01-01 RX ADMIN — FENTANYL CITRATE 100 MCG: 50 INJECTION, SOLUTION INTRAMUSCULAR; INTRAVENOUS at 14:42

## 2018-01-01 RX ADMIN — IPRATROPIUM BROMIDE AND ALBUTEROL SULFATE 3 ML: .5; 3 SOLUTION RESPIRATORY (INHALATION) at 07:54

## 2018-01-01 RX ADMIN — Medication 15 MG: at 21:21

## 2018-01-01 RX ADMIN — OMEPRAZOLE 20 MG: 20 CAPSULE, DELAYED RELEASE ORAL at 08:01

## 2018-01-01 RX ADMIN — TAZOBACTAM SODIUM AND PIPERACILLIN SODIUM 3.38 G: 375; 3 INJECTION, SOLUTION INTRAVENOUS at 11:09

## 2018-01-01 RX ADMIN — METOPROLOL TARTRATE 25 MG: 25 TABLET ORAL at 08:06

## 2018-01-01 RX ADMIN — INFLUENZA A VIRUS A/MICHIGAN/45/2015 X-275 (H1N1) ANTIGEN (FORMALDEHYDE INACTIVATED), INFLUENZA A VIRUS A/SINGAPORE/INFIMH-16-0019/2016 IVR-186 (H3N2) ANTIGEN (FORMALDEHYDE INACTIVATED), AND INFLUENZA B VIRUS B/MARYLAND/15/2016 BX-69A (A B/COLORADO/6/2017-LIKE VIRUS) ANTIGEN (FORMALDEHYDE INACTIVATED) 0.5 ML: 60; 60; 60 INJECTION, SUSPENSION INTRAMUSCULAR at 09:33

## 2018-01-01 RX ADMIN — ASPIRIN 300 MG: 300 SUPPOSITORY RECTAL at 17:19

## 2018-01-01 RX ADMIN — METOPROLOL TARTRATE 25 MG: 25 TABLET, FILM COATED ORAL at 07:51

## 2018-01-01 RX ADMIN — LORAZEPAM 0.5 MG: 2 INJECTION INTRAMUSCULAR; INTRAVENOUS at 02:55

## 2018-01-01 RX ADMIN — AMLODIPINE BESYLATE 5 MG: 5 TABLET ORAL at 08:53

## 2018-01-01 RX ADMIN — METOPROLOL TARTRATE 5 MG: 5 INJECTION, SOLUTION INTRAVENOUS at 01:01

## 2018-01-01 RX ADMIN — LACOSAMIDE 200 MG: 10 SOLUTION ORAL at 21:52

## 2018-01-01 RX ADMIN — TAZOBACTAM SODIUM AND PIPERACILLIN SODIUM 3.38 G: 375; 3 INJECTION, SOLUTION INTRAVENOUS at 22:02

## 2018-01-01 RX ADMIN — Medication 1 CAPSULE: at 17:58

## 2018-01-01 RX ADMIN — Medication 1 CAPSULE: at 08:06

## 2018-01-01 RX ADMIN — POTASSIUM PHOSPHATE, MONOBASIC AND POTASSIUM PHOSPHATE, DIBASIC 15 MMOL: 224; 236 INJECTION, SOLUTION INTRAVENOUS at 08:56

## 2018-01-01 RX ADMIN — TAZOBACTAM SODIUM AND PIPERACILLIN SODIUM 3.38 G: 375; 3 INJECTION, SOLUTION INTRAVENOUS at 15:04

## 2018-01-01 RX ADMIN — LEVETIRACETAM 1500 MG: 100 SOLUTION ORAL at 21:52

## 2018-01-01 RX ADMIN — LEVETIRACETAM 1000 MG: 10 INJECTION INTRAVENOUS at 11:30

## 2018-01-01 RX ADMIN — ACETAMINOPHEN 650 MG: 325 TABLET, FILM COATED ORAL at 20:27

## 2018-01-01 RX ADMIN — LEVETIRACETAM 1500 MG: 500 TABLET, FILM COATED ORAL at 22:01

## 2018-01-01 RX ADMIN — LINEZOLID 600 MG: 600 INJECTION, SOLUTION INTRAVENOUS at 12:19

## 2018-01-01 RX ADMIN — LEVOFLOXACIN 500 MG: 5 INJECTION, SOLUTION INTRAVENOUS at 23:05

## 2018-01-01 RX ADMIN — OMEPRAZOLE 20 MG: 20 CAPSULE, DELAYED RELEASE ORAL at 06:10

## 2018-01-01 RX ADMIN — LEVETIRACETAM 1500 MG: 15 INJECTION INTRAVENOUS at 08:16

## 2018-01-01 RX ADMIN — ACETAMINOPHEN 650 MG: 325 TABLET, FILM COATED ORAL at 09:48

## 2018-01-01 RX ADMIN — Medication 15 MG: at 20:05

## 2018-01-01 RX ADMIN — Medication 2.5 MG: at 21:09

## 2018-01-01 RX ADMIN — TAZOBACTAM SODIUM AND PIPERACILLIN SODIUM 3.38 G: 375; 3 INJECTION, SOLUTION INTRAVENOUS at 22:58

## 2018-01-01 RX ADMIN — ACETAMINOPHEN 650 MG: 325 TABLET, FILM COATED ORAL at 21:11

## 2018-01-01 RX ADMIN — ASPIRIN 81 MG: 81 TABLET, COATED ORAL at 08:16

## 2018-01-01 RX ADMIN — Medication 1 CAPSULE: at 16:14

## 2018-01-01 RX ADMIN — PROPOFOL 100 MG: 10 INJECTION, EMULSION INTRAVENOUS at 14:42

## 2018-01-01 RX ADMIN — Medication 1 CAPSULE: at 17:51

## 2018-01-01 RX ADMIN — CIPROFLOXACIN 400 MG: 2 INJECTION, SOLUTION INTRAVENOUS at 02:03

## 2018-01-01 RX ADMIN — ACETAMINOPHEN 975 MG: 325 SOLUTION ORAL at 09:01

## 2018-01-01 RX ADMIN — AMLODIPINE BESYLATE 5 MG: 5 TABLET ORAL at 09:20

## 2018-01-01 RX ADMIN — TAZOBACTAM SODIUM AND PIPERACILLIN SODIUM 3.38 G: 375; 3 INJECTION, SOLUTION INTRAVENOUS at 18:27

## 2018-01-01 RX ADMIN — LEVETIRACETAM 1000 MG: 10 INJECTION INTRAVENOUS at 08:08

## 2018-01-01 RX ADMIN — ASPIRIN 81 MG 81 MG: 81 TABLET ORAL at 08:02

## 2018-01-01 RX ADMIN — ACETAMINOPHEN 650 MG: 325 TABLET, FILM COATED ORAL at 17:57

## 2018-01-01 RX ADMIN — Medication 15 MG: at 21:34

## 2018-01-01 RX ADMIN — AMLODIPINE BESYLATE 5 MG: 5 TABLET ORAL at 09:47

## 2018-01-01 RX ADMIN — Medication 1 CAPSULE: at 09:47

## 2018-01-01 RX ADMIN — HEPARIN SODIUM 5000 UNITS: 5000 INJECTION, SOLUTION INTRAVENOUS; SUBCUTANEOUS at 06:11

## 2018-01-01 RX ADMIN — LORAZEPAM 0.5 MG: 2 INJECTION INTRAMUSCULAR; INTRAVENOUS at 13:52

## 2018-01-01 RX ADMIN — TAZOBACTAM SODIUM AND PIPERACILLIN SODIUM 3.38 G: 375; 3 INJECTION, SOLUTION INTRAVENOUS at 02:25

## 2018-01-01 RX ADMIN — LORAZEPAM 0.5 MG: 2 INJECTION INTRAMUSCULAR; INTRAVENOUS at 04:03

## 2018-01-01 RX ADMIN — HALOPERIDOL LACTATE 2 MG: 5 INJECTION, SOLUTION INTRAMUSCULAR at 10:12

## 2018-01-01 RX ADMIN — GLYCOPYRROLATE 0.1 MG: 0.2 INJECTION, SOLUTION INTRAMUSCULAR; INTRAVENOUS at 14:42

## 2018-01-01 RX ADMIN — LEVETIRACETAM 1500 MG: 15 INJECTION INTRAVENOUS at 08:19

## 2018-01-01 RX ADMIN — FLUCONAZOLE 400 MG: 2 INJECTION, SOLUTION INTRAVENOUS at 17:19

## 2018-01-01 RX ADMIN — ACETAMINOPHEN 650 MG: 325 TABLET, FILM COATED ORAL at 12:31

## 2018-01-01 RX ADMIN — HEPARIN SODIUM 5000 UNITS: 5000 INJECTION, SOLUTION INTRAVENOUS; SUBCUTANEOUS at 05:47

## 2018-01-01 RX ADMIN — HYDROMORPHONE HYDROCHLORIDE 0.3 MG: 1 INJECTION, SOLUTION INTRAMUSCULAR; INTRAVENOUS; SUBCUTANEOUS at 17:16

## 2018-01-01 RX ADMIN — WATER 10 ML GIVEN: 100 IRRIGANT IRRIGATION at 15:16

## 2018-01-01 RX ADMIN — POTASSIUM CHLORIDE, DEXTROSE MONOHYDRATE AND SODIUM CHLORIDE: 150; 5; 450 INJECTION, SOLUTION INTRAVENOUS at 05:00

## 2018-01-01 RX ADMIN — TAZOBACTAM SODIUM AND PIPERACILLIN SODIUM 3.38 G: 375; 3 INJECTION, SOLUTION INTRAVENOUS at 04:16

## 2018-01-01 RX ADMIN — ACETAMINOPHEN 975 MG: 325 SOLUTION ORAL at 19:59

## 2018-01-01 RX ADMIN — HYDROMORPHONE HYDROCHLORIDE 0.3 MG: 1 INJECTION, SOLUTION INTRAMUSCULAR; INTRAVENOUS; SUBCUTANEOUS at 05:22

## 2018-01-01 RX ADMIN — FLUCONAZOLE 400 MG: 2 INJECTION, SOLUTION INTRAVENOUS at 18:47

## 2018-01-01 RX ADMIN — FLUCONAZOLE 100 MG: 100 TABLET ORAL at 15:50

## 2018-01-01 RX ADMIN — METOPROLOL TARTRATE 5 MG: 5 INJECTION, SOLUTION INTRAVENOUS at 18:19

## 2018-01-01 RX ADMIN — ACETAMINOPHEN 650 MG: 325 TABLET, FILM COATED ORAL at 02:18

## 2018-01-01 RX ADMIN — ASPIRIN 300 MG: 300 SUPPOSITORY RECTAL at 08:15

## 2018-01-01 RX ADMIN — FLUCONAZOLE 100 MG: 100 TABLET ORAL at 16:00

## 2018-01-01 RX ADMIN — Medication 12.5 MG: at 20:57

## 2018-01-01 RX ADMIN — TAZOBACTAM SODIUM AND PIPERACILLIN SODIUM 3.38 G: 375; 3 INJECTION, SOLUTION INTRAVENOUS at 05:42

## 2018-01-01 RX ADMIN — FUROSEMIDE 60 MG: 10 INJECTION, SOLUTION INTRAMUSCULAR; INTRAVENOUS at 09:32

## 2018-01-01 RX ADMIN — OMEPRAZOLE 20 MG: 20 CAPSULE, DELAYED RELEASE ORAL at 06:43

## 2018-01-01 RX ADMIN — SODIUM CHLORIDE: 9 INJECTION, SOLUTION INTRAVENOUS at 04:57

## 2018-01-01 RX ADMIN — VANCOMYCIN HYDROCHLORIDE 1500 MG: 10 INJECTION, POWDER, LYOPHILIZED, FOR SOLUTION INTRAVENOUS at 22:52

## 2018-01-01 RX ADMIN — LEVETIRACETAM 1500 MG: 500 TABLET, FILM COATED ORAL at 09:25

## 2018-01-01 RX ADMIN — HYDROMORPHONE HYDROCHLORIDE 0.3 MG: 1 INJECTION, SOLUTION INTRAMUSCULAR; INTRAVENOUS; SUBCUTANEOUS at 03:06

## 2018-01-01 RX ADMIN — Medication 15 MG: at 09:25

## 2018-01-01 RX ADMIN — LEVETIRACETAM 1500 MG: 500 TABLET, FILM COATED ORAL at 20:50

## 2018-01-01 RX ADMIN — LACOSAMIDE 200 MG: 10 SOLUTION ORAL at 20:33

## 2018-01-01 RX ADMIN — LORAZEPAM 0.5 MG: 2 INJECTION INTRAMUSCULAR; INTRAVENOUS at 20:22

## 2018-01-01 RX ADMIN — OXYCODONE HYDROCHLORIDE AND ACETAMINOPHEN 1 TABLET: 5; 325 TABLET ORAL at 20:47

## 2018-01-01 RX ADMIN — HYDROMORPHONE HYDROCHLORIDE 0.3 MG: 1 INJECTION, SOLUTION INTRAMUSCULAR; INTRAVENOUS; SUBCUTANEOUS at 23:20

## 2018-01-01 RX ADMIN — Medication 2.5 MG: at 08:06

## 2018-01-01 RX ADMIN — CIPROFLOXACIN HYDROCHLORIDE 500 MG: 500 TABLET, FILM COATED ORAL at 08:53

## 2018-01-01 RX ADMIN — Medication 1 CAPSULE: at 15:44

## 2018-01-01 RX ADMIN — LEVETIRACETAM 1000 MG: 10 INJECTION INTRAVENOUS at 22:46

## 2018-01-01 RX ADMIN — OXYCODONE HYDROCHLORIDE AND ACETAMINOPHEN 1 TABLET: 5; 325 TABLET ORAL at 21:53

## 2018-01-01 RX ADMIN — FENTANYL CITRATE 25 MCG: 50 INJECTION, SOLUTION INTRAMUSCULAR; INTRAVENOUS at 13:24

## 2018-01-01 RX ADMIN — OMEPRAZOLE 20 MG: 20 CAPSULE, DELAYED RELEASE ORAL at 16:42

## 2018-01-01 RX ADMIN — ACETAMINOPHEN 650 MG: 325 TABLET, FILM COATED ORAL at 16:16

## 2018-01-01 RX ADMIN — Medication 2.5 MG: at 13:09

## 2018-01-01 RX ADMIN — METOPROLOL TARTRATE 25 MG: 25 TABLET, FILM COATED ORAL at 08:17

## 2018-01-01 RX ADMIN — METOPROLOL TARTRATE 25 MG: 25 TABLET, FILM COATED ORAL at 08:30

## 2018-01-01 RX ADMIN — EPHEDRINE SULFATE 5 MG: 50 INJECTION, SOLUTION INTRAVENOUS at 15:05

## 2018-01-01 RX ADMIN — IPRATROPIUM BROMIDE AND ALBUTEROL SULFATE 3 ML: .5; 3 SOLUTION RESPIRATORY (INHALATION) at 12:08

## 2018-01-01 RX ADMIN — ACETAMINOPHEN 650 MG: 325 TABLET, FILM COATED ORAL at 22:04

## 2018-01-01 RX ADMIN — MICAFUNGIN SODIUM 150 MG: 10 INJECTION, POWDER, LYOPHILIZED, FOR SOLUTION INTRAVENOUS at 19:03

## 2018-01-01 RX ADMIN — Medication 12.5 MG: at 09:18

## 2018-01-01 RX ADMIN — CIPROFLOXACIN 400 MG: 2 INJECTION, SOLUTION INTRAVENOUS at 14:08

## 2018-01-01 RX ADMIN — Medication 12.5 MG: at 09:00

## 2018-01-01 RX ADMIN — Medication 1 CAPSULE: at 16:01

## 2018-01-01 RX ADMIN — LIDOCAINE HYDROCHLORIDE 5 ML: 20 SOLUTION ORAL; TOPICAL at 11:57

## 2018-01-01 RX ADMIN — IPRATROPIUM BROMIDE AND ALBUTEROL SULFATE 3 ML: .5; 3 SOLUTION RESPIRATORY (INHALATION) at 11:38

## 2018-01-01 RX ADMIN — ASPIRIN 81 MG: 81 TABLET, COATED ORAL at 08:06

## 2018-01-01 RX ADMIN — AMLODIPINE BESYLATE 5 MG: 5 TABLET ORAL at 08:17

## 2018-01-01 RX ADMIN — Medication 15 MG: at 08:15

## 2018-01-01 RX ADMIN — MELATONIN TAB 3 MG 3 MG: 3 TAB at 20:06

## 2018-01-01 RX ADMIN — OXYCODONE HYDROCHLORIDE AND ACETAMINOPHEN 1000 MG: 500 TABLET ORAL at 07:57

## 2018-01-01 RX ADMIN — Medication 15 MG: at 09:49

## 2018-01-01 RX ADMIN — IPRATROPIUM BROMIDE AND ALBUTEROL SULFATE 3 ML: .5; 3 SOLUTION RESPIRATORY (INHALATION) at 15:16

## 2018-01-01 RX ADMIN — HEPARIN SODIUM 5000 UNITS: 5000 INJECTION, SOLUTION INTRAVENOUS; SUBCUTANEOUS at 06:09

## 2018-01-01 RX ADMIN — VANCOMYCIN HYDROCHLORIDE 1750 MG: 1 INJECTION, POWDER, LYOPHILIZED, FOR SOLUTION INTRAVENOUS at 15:05

## 2018-01-01 RX ADMIN — ACETAMINOPHEN 975 MG: 325 TABLET, FILM COATED ORAL at 08:18

## 2018-01-01 RX ADMIN — OMEPRAZOLE 20 MG: 20 CAPSULE, DELAYED RELEASE ORAL at 08:18

## 2018-01-01 RX ADMIN — IPRATROPIUM BROMIDE AND ALBUTEROL SULFATE 3 ML: .5; 3 SOLUTION RESPIRATORY (INHALATION) at 15:09

## 2018-01-01 RX ADMIN — AMLODIPINE BESYLATE 5 MG: 5 TABLET ORAL at 08:16

## 2018-01-01 RX ADMIN — IPRATROPIUM BROMIDE AND ALBUTEROL SULFATE 3 ML: .5; 3 SOLUTION RESPIRATORY (INHALATION) at 19:32

## 2018-01-01 RX ADMIN — ACETAMINOPHEN 650 MG: 325 TABLET, FILM COATED ORAL at 08:00

## 2018-01-01 RX ADMIN — TAZOBACTAM SODIUM AND PIPERACILLIN SODIUM 3.38 G: 375; 3 INJECTION, SOLUTION INTRAVENOUS at 17:57

## 2018-01-01 RX ADMIN — Medication 15 MG: at 21:57

## 2018-01-01 RX ADMIN — LACOSAMIDE 200 MG: 10 SOLUTION ORAL at 08:02

## 2018-01-01 RX ADMIN — OXYCODONE HYDROCHLORIDE 5 MG: 5 TABLET ORAL at 23:58

## 2018-01-01 RX ADMIN — ACETAMINOPHEN 650 MG: 325 TABLET, FILM COATED ORAL at 18:52

## 2018-01-01 RX ADMIN — Medication 1 CAPSULE: at 08:26

## 2018-01-01 RX ADMIN — ASPIRIN 81 MG 81 MG: 81 TABLET ORAL at 09:34

## 2018-01-01 RX ADMIN — PHENYLEPHRINE HYDROCHLORIDE 100 MCG: 10 INJECTION, SOLUTION INTRAMUSCULAR; INTRAVENOUS; SUBCUTANEOUS at 15:33

## 2018-01-01 RX ADMIN — SODIUM CHLORIDE, POTASSIUM CHLORIDE, SODIUM LACTATE AND CALCIUM CHLORIDE 2109 ML: 600; 310; 30; 20 INJECTION, SOLUTION INTRAVENOUS at 17:10

## 2018-01-01 RX ADMIN — ACETAMINOPHEN 650 MG: 325 TABLET, FILM COATED ORAL at 08:25

## 2018-01-01 RX ADMIN — Medication 1 CAPSULE: at 09:41

## 2018-01-01 RX ADMIN — LEVETIRACETAM 1500 MG: 15 INJECTION INTRAVENOUS at 22:57

## 2018-01-01 RX ADMIN — Medication 0.2 MG: at 01:06

## 2018-01-01 RX ADMIN — ACETAMINOPHEN 975 MG: 325 TABLET, FILM COATED ORAL at 20:46

## 2018-01-01 RX ADMIN — TAZOBACTAM SODIUM AND PIPERACILLIN SODIUM 3.38 G: 375; 3 INJECTION, SOLUTION INTRAVENOUS at 18:21

## 2018-01-01 RX ADMIN — METOPROLOL TARTRATE 25 MG: 25 TABLET, FILM COATED ORAL at 10:09

## 2018-01-01 RX ADMIN — Medication 15 MG: at 21:48

## 2018-01-01 RX ADMIN — DEXAMETHASONE SODIUM PHOSPHATE 4 MG: 4 INJECTION, SOLUTION INTRA-ARTICULAR; INTRALESIONAL; INTRAMUSCULAR; INTRAVENOUS; SOFT TISSUE at 14:42

## 2018-01-01 RX ADMIN — Medication 0.2 MG: at 17:16

## 2018-01-01 RX ADMIN — LINEZOLID 600 MG: 600 INJECTION, SOLUTION INTRAVENOUS at 19:51

## 2018-01-01 RX ADMIN — LEVETIRACETAM 1500 MG: 15 INJECTION INTRAVENOUS at 10:39

## 2018-01-01 RX ADMIN — CIPROFLOXACIN HYDROCHLORIDE 500 MG: 500 TABLET, FILM COATED ORAL at 07:50

## 2018-01-01 RX ADMIN — POTASSIUM CHLORIDE 40 MEQ: 1500 TABLET, EXTENDED RELEASE ORAL at 08:15

## 2018-01-01 RX ADMIN — OMEPRAZOLE 20 MG: 20 CAPSULE, DELAYED RELEASE ORAL at 08:25

## 2018-01-01 RX ADMIN — AMOXICILLIN AND CLAVULANATE POTASSIUM 875 MG: 400; 57 POWDER, FOR SUSPENSION ORAL at 21:52

## 2018-01-01 RX ADMIN — Medication 0.2 MG: at 21:39

## 2018-01-01 RX ADMIN — IOPAMIDOL 86 ML: 755 INJECTION, SOLUTION INTRAVENOUS at 21:30

## 2018-01-01 RX ADMIN — Medication 1 CAPSULE: at 08:18

## 2018-01-01 RX ADMIN — HEPARIN SODIUM 5000 UNITS: 5000 INJECTION, SOLUTION INTRAVENOUS; SUBCUTANEOUS at 17:52

## 2018-01-01 RX ADMIN — TAZOBACTAM SODIUM AND PIPERACILLIN SODIUM 3.38 G: 375; 3 INJECTION, SOLUTION INTRAVENOUS at 01:55

## 2018-01-01 RX ADMIN — ASPIRIN 81 MG 81 MG: 81 TABLET ORAL at 09:18

## 2018-01-01 RX ADMIN — AZTREONAM 2 G: 2 INJECTION, POWDER, LYOPHILIZED, FOR SOLUTION INTRAMUSCULAR; INTRAVENOUS at 14:15

## 2018-01-01 RX ADMIN — PHENYLEPHRINE HYDROCHLORIDE 100 MCG: 10 INJECTION, SOLUTION INTRAMUSCULAR; INTRAVENOUS; SUBCUTANEOUS at 15:15

## 2018-01-01 RX ADMIN — TAZOBACTAM SODIUM AND PIPERACILLIN SODIUM 3.38 G: 375; 3 INJECTION, SOLUTION INTRAVENOUS at 06:29

## 2018-01-01 RX ADMIN — CIPROFLOXACIN 400 MG: 2 INJECTION, SOLUTION INTRAVENOUS at 01:42

## 2018-01-01 RX ADMIN — Medication 1 CAPSULE: at 17:27

## 2018-01-01 RX ADMIN — IPRATROPIUM BROMIDE AND ALBUTEROL SULFATE 3 ML: .5; 3 SOLUTION RESPIRATORY (INHALATION) at 11:36

## 2018-01-01 RX ADMIN — OMEPRAZOLE 20 MG: KIT at 09:18

## 2018-01-01 RX ADMIN — IPRATROPIUM BROMIDE AND ALBUTEROL SULFATE 3 ML: .5; 3 SOLUTION RESPIRATORY (INHALATION) at 15:19

## 2018-01-01 RX ADMIN — IPRATROPIUM BROMIDE AND ALBUTEROL SULFATE 3 ML: .5; 3 SOLUTION RESPIRATORY (INHALATION) at 07:14

## 2018-01-01 RX ADMIN — SODIUM CHLORIDE 200 MG: 9 INJECTION, SOLUTION INTRAVENOUS at 23:44

## 2018-01-01 RX ADMIN — LACOSAMIDE 200 MG: 200 TABLET, FILM COATED ORAL at 22:02

## 2018-01-01 RX ADMIN — Medication 15 MG: at 09:01

## 2018-01-01 RX ADMIN — LEVETIRACETAM 1500 MG: 100 SOLUTION ORAL at 08:01

## 2018-01-01 RX ADMIN — AMLODIPINE BESYLATE 5 MG: 5 TABLET ORAL at 09:41

## 2018-01-01 RX ADMIN — LACOSAMIDE 200 MG: 10 SOLUTION ORAL at 09:18

## 2018-01-01 RX ADMIN — CIPROFLOXACIN 400 MG: 2 INJECTION, SOLUTION INTRAVENOUS at 02:04

## 2018-01-01 RX ADMIN — METOPROLOL TARTRATE 25 MG: 25 TABLET, FILM COATED ORAL at 21:52

## 2018-01-01 RX ADMIN — Medication 1 CAPSULE: at 10:09

## 2018-01-01 RX ADMIN — OMEPRAZOLE 20 MG: 20 CAPSULE, DELAYED RELEASE ORAL at 06:37

## 2018-01-01 RX ADMIN — OXYCODONE HYDROCHLORIDE 5 MG: 5 TABLET ORAL at 21:40

## 2018-01-01 RX ADMIN — Medication 15 MG: at 09:11

## 2018-01-01 RX ADMIN — FLUCONAZOLE IN SODIUM CHLORIDE 400 MG: 2 INJECTION, SOLUTION INTRAVENOUS at 16:21

## 2018-01-01 RX ADMIN — HEPARIN SODIUM 5000 UNITS: 5000 INJECTION, SOLUTION INTRAVENOUS; SUBCUTANEOUS at 05:41

## 2018-01-01 RX ADMIN — ACETAMINOPHEN 975 MG: 325 SOLUTION ORAL at 13:45

## 2018-01-01 RX ADMIN — ONDANSETRON 4 MG: 2 INJECTION INTRAMUSCULAR; INTRAVENOUS at 14:42

## 2018-01-01 RX ADMIN — IPRATROPIUM BROMIDE AND ALBUTEROL SULFATE 3 ML: .5; 3 SOLUTION RESPIRATORY (INHALATION) at 19:34

## 2018-01-01 RX ADMIN — TAZOBACTAM SODIUM AND PIPERACILLIN SODIUM 3.38 G: 375; 3 INJECTION, SOLUTION INTRAVENOUS at 16:01

## 2018-01-01 RX ADMIN — FUROSEMIDE 40 MG: 10 INJECTION, SOLUTION INTRAVENOUS at 09:45

## 2018-01-01 RX ADMIN — TAZOBACTAM SODIUM AND PIPERACILLIN SODIUM 3.38 G: 375; 3 INJECTION, SOLUTION INTRAVENOUS at 06:40

## 2018-01-01 RX ADMIN — LEVETIRACETAM 1000 MG: 10 INJECTION INTRAVENOUS at 08:29

## 2018-01-01 RX ADMIN — ACETAMINOPHEN 650 MG: 650 SUPPOSITORY RECTAL at 21:16

## 2018-01-01 RX ADMIN — METOPROLOL TARTRATE 5 MG: 5 INJECTION, SOLUTION INTRAVENOUS at 06:24

## 2018-01-01 RX ADMIN — OMEPRAZOLE 20 MG: 20 CAPSULE, DELAYED RELEASE ORAL at 08:10

## 2018-01-01 RX ADMIN — CEFTAZIDIME 2 G: 2 INJECTION, POWDER, FOR SOLUTION INTRAVENOUS at 13:38

## 2018-01-01 RX ADMIN — OMEPRAZOLE 20 MG: 20 CAPSULE, DELAYED RELEASE ORAL at 06:50

## 2018-01-01 RX ADMIN — SODIUM CHLORIDE 500 ML: 9 INJECTION, SOLUTION INTRAVENOUS at 22:30

## 2018-01-01 RX ADMIN — ASPIRIN 81 MG 81 MG: 81 TABLET ORAL at 09:11

## 2018-01-01 RX ADMIN — SODIUM CHLORIDE 200 MG: 9 INJECTION, SOLUTION INTRAVENOUS at 21:22

## 2018-01-01 RX ADMIN — IPRATROPIUM BROMIDE AND ALBUTEROL SULFATE 3 ML: .5; 3 SOLUTION RESPIRATORY (INHALATION) at 11:03

## 2018-01-01 RX ADMIN — BACITRACIN 25 ML: 5000 INJECTION, POWDER, FOR SOLUTION INTRAMUSCULAR at 13:27

## 2018-01-01 RX ADMIN — PANTOPRAZOLE SODIUM 40 MG: 40 INJECTION, POWDER, FOR SOLUTION INTRAVENOUS at 08:09

## 2018-01-01 RX ADMIN — Medication 1 CAPSULE: at 15:33

## 2018-01-01 RX ADMIN — Medication 15 MG: at 21:11

## 2018-01-01 RX ADMIN — LORAZEPAM 0.5 MG: 2 INJECTION INTRAMUSCULAR; INTRAVENOUS at 14:03

## 2018-01-01 RX ADMIN — ATROPA BELLADONNA AND OPIUM 1 SUPPOSITORY: 16.2; 3 SUPPOSITORY RECTAL at 11:20

## 2018-01-01 RX ADMIN — LACOSAMIDE 200 MG: 200 TABLET, FILM COATED ORAL at 09:30

## 2018-01-01 RX ADMIN — OXYCODONE HYDROCHLORIDE AND ACETAMINOPHEN 1000 MG: 500 TABLET ORAL at 09:22

## 2018-01-01 RX ADMIN — TAZOBACTAM SODIUM AND PIPERACILLIN SODIUM 3.38 G: 375; 3 INJECTION, SOLUTION INTRAVENOUS at 14:17

## 2018-01-01 RX ADMIN — Medication 15 MG: at 21:35

## 2018-01-01 RX ADMIN — DIATRIZOATE MEGLUMINE AND DIATRIZOATE SODIUM 30 ML: 660; 100 SOLUTION ORAL; RECTAL at 13:39

## 2018-01-01 RX ADMIN — ACETAMINOPHEN 650 MG: 325 TABLET, FILM COATED ORAL at 09:22

## 2018-01-01 RX ADMIN — ACETAMINOPHEN 1000 MG: 325 SOLUTION ORAL at 11:04

## 2018-01-01 RX ADMIN — AMOXICILLIN AND CLAVULANATE POTASSIUM 875 MG: 400; 57 POWDER, FOR SUSPENSION ORAL at 09:18

## 2018-01-01 RX ADMIN — ACETAMINOPHEN 975 MG: 325 SOLUTION ORAL at 08:16

## 2018-01-01 RX ADMIN — SODIUM CHLORIDE 200 MG: 9 INJECTION, SOLUTION INTRAVENOUS at 12:22

## 2018-01-01 RX ADMIN — SODIUM CHLORIDE 200 MG: 9 INJECTION, SOLUTION INTRAVENOUS at 09:49

## 2018-01-01 RX ADMIN — TAZOBACTAM SODIUM AND PIPERACILLIN SODIUM 3.38 G: 375; 3 INJECTION, SOLUTION INTRAVENOUS at 21:07

## 2018-01-01 RX ADMIN — Medication 15 MG: at 20:46

## 2018-01-01 RX ADMIN — POTASSIUM CHLORIDE, DEXTROSE MONOHYDRATE AND SODIUM CHLORIDE: 150; 5; 900 INJECTION, SOLUTION INTRAVENOUS at 06:43

## 2018-01-01 RX ADMIN — TAZOBACTAM SODIUM AND PIPERACILLIN SODIUM 3.38 G: 375; 3 INJECTION, SOLUTION INTRAVENOUS at 05:39

## 2018-01-01 RX ADMIN — LEVETIRACETAM 1500 MG: 15 INJECTION INTRAVENOUS at 22:12

## 2018-01-01 RX ADMIN — PHENYLEPHRINE HYDROCHLORIDE 100 MCG: 10 INJECTION, SOLUTION INTRAMUSCULAR; INTRAVENOUS; SUBCUTANEOUS at 15:26

## 2018-01-01 RX ADMIN — OMEPRAZOLE 20 MG: 20 CAPSULE, DELAYED RELEASE ORAL at 09:48

## 2018-01-01 RX ADMIN — MIDAZOLAM 0.5 MG: 1 INJECTION INTRAMUSCULAR; INTRAVENOUS at 13:03

## 2018-01-01 RX ADMIN — LEVETIRACETAM 1000 MG: 500 TABLET, FILM COATED ORAL at 21:29

## 2018-01-01 RX ADMIN — LEVETIRACETAM 1500 MG: 500 TABLET, FILM COATED ORAL at 09:10

## 2018-01-01 RX ADMIN — SODIUM CHLORIDE 100 MG: 9 INJECTION, SOLUTION INTRAVENOUS at 10:50

## 2018-01-01 RX ADMIN — MELATONIN TAB 3 MG 3 MG: 3 TAB at 20:47

## 2018-01-01 RX ADMIN — SODIUM CHLORIDE 500 ML: 9 INJECTION, SOLUTION INTRAVENOUS at 11:22

## 2018-01-01 RX ADMIN — TAZOBACTAM SODIUM AND PIPERACILLIN SODIUM 3.38 G: 375; 3 INJECTION, SOLUTION INTRAVENOUS at 15:06

## 2018-01-01 RX ADMIN — ONDANSETRON 4 MG: 4 TABLET, ORALLY DISINTEGRATING ORAL at 19:28

## 2018-01-01 RX ADMIN — ASPIRIN 300 MG: 300 SUPPOSITORY RECTAL at 08:27

## 2018-01-01 RX ADMIN — METOPROLOL TARTRATE 5 MG: 5 INJECTION, SOLUTION INTRAVENOUS at 11:37

## 2018-01-01 RX ADMIN — FENTANYL CITRATE 25 MCG: 50 INJECTION, SOLUTION INTRAMUSCULAR; INTRAVENOUS at 13:02

## 2018-01-01 RX ADMIN — AMLODIPINE BESYLATE 5 MG: 5 TABLET ORAL at 08:46

## 2018-01-01 RX ADMIN — IOPAMIDOL 78 ML: 755 INJECTION, SOLUTION INTRAVENOUS at 17:24

## 2018-01-01 RX ADMIN — ACETAMINOPHEN 650 MG: 325 TABLET, FILM COATED ORAL at 05:47

## 2018-01-01 RX ADMIN — SODIUM CHLORIDE 100 MG: 9 INJECTION, SOLUTION INTRAVENOUS at 05:15

## 2018-01-01 RX ADMIN — METOPROLOL TARTRATE 5 MG: 5 INJECTION, SOLUTION INTRAVENOUS at 15:03

## 2018-01-01 RX ADMIN — ASPIRIN 300 MG: 300 SUPPOSITORY RECTAL at 08:18

## 2018-01-01 RX ADMIN — HUMAN ALBUMIN MICROSPHERES AND PERFLUTREN 3 ML: 10; .22 INJECTION, SOLUTION INTRAVENOUS at 11:00

## 2018-01-01 RX ADMIN — METOPROLOL TARTRATE 5 MG: 5 INJECTION, SOLUTION INTRAVENOUS at 12:15

## 2018-01-01 RX ADMIN — ACETAMINOPHEN 975 MG: 325 SOLUTION ORAL at 14:11

## 2018-01-01 RX ADMIN — ONDANSETRON 4 MG: 4 TABLET, ORALLY DISINTEGRATING ORAL at 09:22

## 2018-01-01 RX ADMIN — AMLODIPINE BESYLATE 5 MG: 5 TABLET ORAL at 09:10

## 2018-01-01 RX ADMIN — AMLODIPINE BESYLATE 5 MG: 5 TABLET ORAL at 09:32

## 2018-01-01 RX ADMIN — LACOSAMIDE 200 MG: 10 SOLUTION ORAL at 09:01

## 2018-01-01 RX ADMIN — KIT FOR THE PREPARATION OF TECHNETIUM TC 99M PENTETATE 64.8 MILLICURIE: 20 INJECTION, POWDER, LYOPHILIZED, FOR SOLUTION INTRAVENOUS; RESPIRATORY (INHALATION) at 19:20

## 2018-01-01 RX ADMIN — TAZOBACTAM SODIUM AND PIPERACILLIN SODIUM 3.38 G: 375; 3 INJECTION, SOLUTION INTRAVENOUS at 21:48

## 2018-01-01 RX ADMIN — HYDROMORPHONE HYDROCHLORIDE 0.3 MG: 1 INJECTION, SOLUTION INTRAMUSCULAR; INTRAVENOUS; SUBCUTANEOUS at 09:58

## 2018-01-01 RX ADMIN — ASPIRIN 81 MG: 81 TABLET, COATED ORAL at 08:00

## 2018-01-01 RX ADMIN — OMEPRAZOLE 20 MG: 20 CAPSULE, DELAYED RELEASE ORAL at 09:11

## 2018-01-01 RX ADMIN — PHENYLEPHRINE HYDROCHLORIDE 100 MCG: 10 INJECTION, SOLUTION INTRAMUSCULAR; INTRAVENOUS; SUBCUTANEOUS at 14:59

## 2018-01-01 RX ADMIN — TAZOBACTAM SODIUM AND PIPERACILLIN SODIUM 3.38 G: 375; 3 INJECTION, SOLUTION INTRAVENOUS at 13:53

## 2018-01-01 RX ADMIN — ACETAMINOPHEN 650 MG: 325 TABLET, FILM COATED ORAL at 02:42

## 2018-01-01 RX ADMIN — IOPAMIDOL 120 ML: 755 INJECTION, SOLUTION INTRAVENOUS at 10:24

## 2018-01-01 RX ADMIN — LACOSAMIDE 200 MG: 200 TABLET, FILM COATED ORAL at 09:49

## 2018-01-01 RX ADMIN — LEVETIRACETAM 1500 MG: 15 INJECTION INTRAVENOUS at 21:38

## 2018-01-01 RX ADMIN — POTASSIUM PHOSPHATE, MONOBASIC AND POTASSIUM PHOSPHATE, DIBASIC 15 MMOL: 224; 236 INJECTION, SOLUTION INTRAVENOUS at 08:58

## 2018-01-01 RX ADMIN — TAZOBACTAM SODIUM AND PIPERACILLIN SODIUM 3.38 G: 375; 3 INJECTION, SOLUTION INTRAVENOUS at 13:46

## 2018-01-01 RX ADMIN — TAZOBACTAM SODIUM AND PIPERACILLIN SODIUM 3.38 G: 375; 3 INJECTION, SOLUTION INTRAVENOUS at 10:36

## 2018-01-01 RX ADMIN — OXYCODONE HYDROCHLORIDE 5 MG: 5 TABLET ORAL at 21:08

## 2018-01-01 RX ADMIN — ACETAMINOPHEN 650 MG: 650 SUPPOSITORY RECTAL at 17:41

## 2018-01-01 RX ADMIN — ACETAMINOPHEN 650 MG: 325 TABLET, FILM COATED ORAL at 16:14

## 2018-01-01 RX ADMIN — ONDANSETRON 4 MG: 2 INJECTION INTRAMUSCULAR; INTRAVENOUS at 16:10

## 2018-01-01 RX ADMIN — PANTOPRAZOLE SODIUM 40 MG: 40 INJECTION, POWDER, FOR SOLUTION INTRAVENOUS at 08:27

## 2018-01-01 RX ADMIN — PHENOL 1 ML: 1.5 LIQUID ORAL at 08:16

## 2018-01-01 RX ADMIN — MULTIPLE VITAMINS W/ MINERALS TAB 1 TABLET: TAB at 08:18

## 2018-01-01 RX ADMIN — METOPROLOL TARTRATE 25 MG: 25 TABLET, FILM COATED ORAL at 08:08

## 2018-01-01 RX ADMIN — ACETAMINOPHEN 650 MG: 325 TABLET, FILM COATED ORAL at 19:16

## 2018-01-01 RX ADMIN — METOPROLOL TARTRATE 5 MG: 5 INJECTION, SOLUTION INTRAVENOUS at 19:51

## 2018-01-01 RX ADMIN — Medication 2.5 MG: at 00:14

## 2018-01-01 RX ADMIN — BUPIVACAINE HYDROCHLORIDE 25 MG: 2.5 INJECTION, SOLUTION EPIDURAL; INFILTRATION; INTRACAUDAL at 13:22

## 2018-01-01 RX ADMIN — SODIUM CHLORIDE 200 MG: 9 INJECTION, SOLUTION INTRAVENOUS at 09:58

## 2018-01-01 RX ADMIN — TAZOBACTAM SODIUM AND PIPERACILLIN SODIUM 3.38 G: 375; 3 INJECTION, SOLUTION INTRAVENOUS at 23:52

## 2018-01-01 RX ADMIN — SODIUM CHLORIDE, POTASSIUM CHLORIDE, SODIUM LACTATE AND CALCIUM CHLORIDE: 600; 310; 30; 20 INJECTION, SOLUTION INTRAVENOUS at 14:44

## 2018-01-01 RX ADMIN — IPRATROPIUM BROMIDE AND ALBUTEROL SULFATE 3 ML: .5; 3 SOLUTION RESPIRATORY (INHALATION) at 19:13

## 2018-01-01 RX ADMIN — LORAZEPAM 0.5 MG: 2 INJECTION INTRAMUSCULAR; INTRAVENOUS at 16:23

## 2018-01-01 RX ADMIN — ACETAMINOPHEN 975 MG: 325 SOLUTION ORAL at 21:39

## 2018-01-01 RX ADMIN — TAZOBACTAM SODIUM AND PIPERACILLIN SODIUM 3.38 G: 375; 3 INJECTION, SOLUTION INTRAVENOUS at 16:36

## 2018-01-01 RX ADMIN — PANTOPRAZOLE SODIUM 40 MG: 40 INJECTION, POWDER, FOR SOLUTION INTRAVENOUS at 08:20

## 2018-01-01 RX ADMIN — Medication 0.2 MG: at 08:52

## 2018-01-01 RX ADMIN — Medication 12.5 MG: at 09:29

## 2018-01-01 RX ADMIN — METOPROLOL TARTRATE 25 MG: 25 TABLET ORAL at 08:01

## 2018-01-01 RX ADMIN — CEFTAZIDIME 2 G: 2 INJECTION, POWDER, FOR SOLUTION INTRAVENOUS at 21:40

## 2018-01-01 RX ADMIN — TAZOBACTAM SODIUM AND PIPERACILLIN SODIUM 3.38 G: 375; 3 INJECTION, SOLUTION INTRAVENOUS at 21:57

## 2018-01-01 RX ADMIN — TAZOBACTAM SODIUM AND PIPERACILLIN SODIUM 3.38 G: 375; 3 INJECTION, SOLUTION INTRAVENOUS at 04:04

## 2018-01-01 RX ADMIN — SODIUM CHLORIDE 59 ML: 9 INJECTION, SOLUTION INTRAVENOUS at 17:24

## 2018-01-01 RX ADMIN — ACETAMINOPHEN 650 MG: 325 TABLET, FILM COATED ORAL at 18:38

## 2018-01-01 RX ADMIN — SODIUM CHLORIDE 1000 ML: 9 INJECTION, SOLUTION INTRAVENOUS at 23:58

## 2018-01-01 RX ADMIN — AMLODIPINE BESYLATE 5 MG: 5 TABLET ORAL at 08:08

## 2018-01-01 RX ADMIN — LEVETIRACETAM 1500 MG: 15 INJECTION INTRAVENOUS at 08:00

## 2018-01-01 RX ADMIN — CIPROFLOXACIN HYDROCHLORIDE 500 MG: 500 TABLET, FILM COATED ORAL at 19:56

## 2018-01-01 RX ADMIN — AMLODIPINE BESYLATE 5 MG: 5 TABLET ORAL at 07:58

## 2018-01-01 RX ADMIN — METOPROLOL TARTRATE 25 MG: 25 TABLET, FILM COATED ORAL at 08:53

## 2018-01-01 RX ADMIN — MICAFUNGIN SODIUM 100 MG: 20 INJECTION, POWDER, LYOPHILIZED, FOR SOLUTION INTRAVENOUS at 18:57

## 2018-01-01 RX ADMIN — TAZOBACTAM SODIUM AND PIPERACILLIN SODIUM 3.38 G: 375; 3 INJECTION, SOLUTION INTRAVENOUS at 16:02

## 2018-01-01 RX ADMIN — HEPARIN SODIUM 5000 UNITS: 5000 INJECTION, SOLUTION INTRAVENOUS; SUBCUTANEOUS at 16:43

## 2018-01-01 RX ADMIN — IOPAMIDOL 50 ML: 755 INJECTION, SOLUTION INTRAVENOUS at 17:17

## 2018-01-01 RX ADMIN — LEVOFLOXACIN 750 MG: 5 INJECTION, SOLUTION INTRAVENOUS at 22:06

## 2018-01-01 RX ADMIN — METOPROLOL TARTRATE 25 MG: 25 TABLET, FILM COATED ORAL at 20:27

## 2018-01-01 RX ADMIN — CEFTAZIDIME 2 G: 2 INJECTION, POWDER, FOR SOLUTION INTRAVENOUS at 05:43

## 2018-01-01 RX ADMIN — Medication 12.5 MG: at 20:03

## 2018-01-01 RX ADMIN — IPRATROPIUM BROMIDE AND ALBUTEROL SULFATE 3 ML: .5; 3 SOLUTION RESPIRATORY (INHALATION) at 19:37

## 2018-01-01 RX ADMIN — ACETAMINOPHEN 650 MG: 325 TABLET, FILM COATED ORAL at 08:30

## 2018-01-01 RX ADMIN — LEVETIRACETAM 1500 MG: 100 SOLUTION ORAL at 20:21

## 2018-01-01 RX ADMIN — TAZOBACTAM SODIUM AND PIPERACILLIN SODIUM 3.38 G: 375; 3 INJECTION, SOLUTION INTRAVENOUS at 04:27

## 2018-01-01 RX ADMIN — ASPIRIN 325 MG ORAL TABLET 325 MG: 325 PILL ORAL at 12:56

## 2018-01-01 RX ADMIN — SODIUM CHLORIDE 30 ML: 4.5 INJECTION, SOLUTION INTRAVENOUS at 11:23

## 2018-01-01 RX ADMIN — CIPROFLOXACIN 400 MG: 2 INJECTION, SOLUTION INTRAVENOUS at 14:06

## 2018-01-01 RX ADMIN — SODIUM CHLORIDE 200 MG: 9 INJECTION, SOLUTION INTRAVENOUS at 08:33

## 2018-01-01 RX ADMIN — HEPARIN SODIUM 5000 UNITS: 5000 INJECTION, SOLUTION INTRAVENOUS; SUBCUTANEOUS at 17:44

## 2018-01-01 RX ADMIN — CEFTAZIDIME 2 G: 2 INJECTION, POWDER, FOR SOLUTION INTRAVENOUS at 22:33

## 2018-01-01 RX ADMIN — PROPOFOL 100 MG: 10 INJECTION, EMULSION INTRAVENOUS at 14:50

## 2018-01-01 RX ADMIN — MELATONIN TAB 3 MG 3 MG: 3 TAB at 01:03

## 2018-01-01 RX ADMIN — TAZOBACTAM SODIUM AND PIPERACILLIN SODIUM 3.38 G: 375; 3 INJECTION, SOLUTION INTRAVENOUS at 10:09

## 2018-01-01 RX ADMIN — LINEZOLID 600 MG: 600 INJECTION, SOLUTION INTRAVENOUS at 00:17

## 2018-01-01 RX ADMIN — FUROSEMIDE 40 MG: 10 INJECTION, SOLUTION INTRAVENOUS at 21:10

## 2018-01-01 RX ADMIN — OMEPRAZOLE 20 MG: 20 CAPSULE, DELAYED RELEASE ORAL at 15:51

## 2018-01-01 RX ADMIN — CEFTAZIDIME 2 G: 2 INJECTION, POWDER, FOR SOLUTION INTRAVENOUS at 07:00

## 2018-01-01 RX ADMIN — LINEZOLID 600 MG: 600 INJECTION, SOLUTION INTRAVENOUS at 12:09

## 2018-01-01 RX ADMIN — ACETAMINOPHEN 650 MG: 325 TABLET, FILM COATED ORAL at 00:25

## 2018-01-01 RX ADMIN — ASPIRIN 81 MG: 81 TABLET, COATED ORAL at 09:32

## 2018-01-01 RX ADMIN — LEVETIRACETAM 500 MG: 5 INJECTION INTRAVENOUS at 19:47

## 2018-01-01 RX ADMIN — FUROSEMIDE 40 MG: 10 INJECTION, SOLUTION INTRAVENOUS at 12:18

## 2018-01-01 RX ADMIN — LIDOCAINE HYDROCHLORIDE 50 MG: 10 INJECTION, SOLUTION INFILTRATION; PERINEURAL at 14:50

## 2018-01-01 RX ADMIN — OXYCODONE HYDROCHLORIDE AND ACETAMINOPHEN 1 TABLET: 5; 325 TABLET ORAL at 08:01

## 2018-01-01 RX ADMIN — FLUCONAZOLE 400 MG: 200 TABLET ORAL at 08:06

## 2018-01-01 RX ADMIN — TAZOBACTAM SODIUM AND PIPERACILLIN SODIUM 3.38 G: 375; 3 INJECTION, SOLUTION INTRAVENOUS at 22:17

## 2018-01-01 RX ADMIN — METOPROLOL TARTRATE 25 MG: 25 TABLET, FILM COATED ORAL at 21:12

## 2018-01-01 RX ADMIN — METOPROLOL TARTRATE 5 MG: 5 INJECTION, SOLUTION INTRAVENOUS at 06:19

## 2018-01-01 RX ADMIN — METOPROLOL TARTRATE 5 MG: 5 INJECTION, SOLUTION INTRAVENOUS at 00:51

## 2018-01-01 RX ADMIN — ACETAMINOPHEN 650 MG: 325 TABLET, FILM COATED ORAL at 00:40

## 2018-01-01 RX ADMIN — DOXYCYCLINE 100 MG: 100 INJECTION, POWDER, LYOPHILIZED, FOR SOLUTION INTRAVENOUS at 01:20

## 2018-01-01 RX ADMIN — FUROSEMIDE 20 MG: 10 INJECTION, SOLUTION INTRAMUSCULAR; INTRAVENOUS at 12:05

## 2018-01-01 RX ADMIN — ATROPA BELLADONNA AND OPIUM 1 SUPPOSITORY: 16.2; 3 SUPPOSITORY RECTAL at 08:27

## 2018-01-01 RX ADMIN — BISACODYL 5 MG: 5 TABLET, COATED ORAL at 08:30

## 2018-01-01 RX ADMIN — OMEPRAZOLE 20 MG: 20 CAPSULE, DELAYED RELEASE ORAL at 06:53

## 2018-01-01 RX ADMIN — ACETAMINOPHEN 975 MG: 325 TABLET, FILM COATED ORAL at 07:58

## 2018-01-01 RX ADMIN — Medication 1 SPRAY: at 22:20

## 2018-01-01 RX ADMIN — ACETAMINOPHEN 975 MG: 325 TABLET, FILM COATED ORAL at 14:14

## 2018-01-01 RX ADMIN — ASPIRIN 81 MG 81 MG: 81 TABLET ORAL at 13:47

## 2018-01-01 RX ADMIN — IPRATROPIUM BROMIDE AND ALBUTEROL SULFATE 3 ML: .5; 3 SOLUTION RESPIRATORY (INHALATION) at 11:09

## 2018-01-01 RX ADMIN — AMOXICILLIN AND CLAVULANATE POTASSIUM 875 MG: 400; 57 POWDER, FOR SUSPENSION ORAL at 20:42

## 2018-01-01 RX ADMIN — MIDAZOLAM 0.5 MG: 1 INJECTION INTRAMUSCULAR; INTRAVENOUS at 13:24

## 2018-01-01 RX ADMIN — OMEPRAZOLE 20 MG: 20 CAPSULE, DELAYED RELEASE ORAL at 09:49

## 2018-01-01 RX ADMIN — FLUCONAZOLE 400 MG: 2 INJECTION, SOLUTION INTRAVENOUS at 17:57

## 2018-01-01 RX ADMIN — ASPIRIN 81 MG: 81 TABLET, COATED ORAL at 09:48

## 2018-01-01 RX ADMIN — OMEPRAZOLE 20 MG: 20 CAPSULE, DELAYED RELEASE ORAL at 06:46

## 2018-01-01 RX ADMIN — TAZOBACTAM SODIUM AND PIPERACILLIN SODIUM 3.38 G: 375; 3 INJECTION, SOLUTION INTRAVENOUS at 10:17

## 2018-01-01 RX ADMIN — LORAZEPAM 0.5 MG: 2 INJECTION INTRAMUSCULAR; INTRAVENOUS at 09:16

## 2018-01-01 RX ADMIN — OMEPRAZOLE 20 MG: 20 CAPSULE, DELAYED RELEASE ORAL at 16:00

## 2018-01-01 RX ADMIN — METOPROLOL TARTRATE 25 MG: 25 TABLET, FILM COATED ORAL at 19:56

## 2018-01-01 RX ADMIN — LORAZEPAM 0.5 MG: 2 INJECTION INTRAMUSCULAR; INTRAVENOUS at 10:31

## 2018-01-01 RX ADMIN — OMEPRAZOLE 20 MG: 20 CAPSULE, DELAYED RELEASE ORAL at 21:16

## 2018-01-01 RX ADMIN — QUETIAPINE FUMARATE 25 MG: 25 TABLET ORAL at 10:45

## 2018-01-01 RX ADMIN — ONDANSETRON 4 MG: 2 INJECTION INTRAMUSCULAR; INTRAVENOUS at 15:09

## 2018-01-01 RX ADMIN — TAZOBACTAM SODIUM AND PIPERACILLIN SODIUM 3.38 G: 375; 3 INJECTION, SOLUTION INTRAVENOUS at 06:10

## 2018-01-01 RX ADMIN — Medication 12.5 MG: at 09:50

## 2018-01-01 RX ADMIN — METOPROLOL TARTRATE 25 MG: 25 TABLET, FILM COATED ORAL at 00:01

## 2018-01-01 RX ADMIN — SODIUM CHLORIDE 200 MG: 9 INJECTION, SOLUTION INTRAVENOUS at 09:47

## 2018-01-01 RX ADMIN — METOPROLOL TARTRATE 5 MG: 5 INJECTION, SOLUTION INTRAVENOUS at 06:42

## 2018-01-01 RX ADMIN — LORAZEPAM 0.5 MG: 2 INJECTION INTRAMUSCULAR; INTRAVENOUS at 09:54

## 2018-01-01 RX ADMIN — LEVETIRACETAM 1000 MG: 500 TABLET, FILM COATED ORAL at 09:50

## 2018-01-01 RX ADMIN — OMEPRAZOLE 20 MG: 20 CAPSULE, DELAYED RELEASE ORAL at 07:58

## 2018-01-01 RX ADMIN — TAZOBACTAM SODIUM AND PIPERACILLIN SODIUM 3.38 G: 375; 3 INJECTION, SOLUTION INTRAVENOUS at 22:46

## 2018-01-01 RX ADMIN — SODIUM CHLORIDE 62 ML: 900 INJECTION, SOLUTION INTRAVENOUS at 21:30

## 2018-01-01 RX ADMIN — Medication 12.5 MG: at 20:50

## 2018-01-01 RX ADMIN — OXYCODONE HYDROCHLORIDE AND ACETAMINOPHEN 1000 MG: 500 TABLET ORAL at 08:18

## 2018-01-01 RX ADMIN — OMEPRAZOLE 20 MG: 20 CAPSULE, DELAYED RELEASE ORAL at 07:40

## 2018-01-01 RX ADMIN — MICAFUNGIN SODIUM 100 MG: 20 INJECTION, POWDER, LYOPHILIZED, FOR SOLUTION INTRAVENOUS at 19:16

## 2018-01-01 RX ADMIN — CIPROFLOXACIN HYDROCHLORIDE 500 MG: 500 TABLET, FILM COATED ORAL at 20:26

## 2018-01-01 RX ADMIN — Medication 1 CAPSULE: at 13:27

## 2018-01-01 RX ADMIN — ACETAMINOPHEN 650 MG: 325 TABLET, FILM COATED ORAL at 12:19

## 2018-01-01 RX ADMIN — FUROSEMIDE 20 MG: 10 INJECTION, SOLUTION INTRAMUSCULAR; INTRAVENOUS at 12:19

## 2018-01-01 ASSESSMENT — ACTIVITIES OF DAILY LIVING (ADL)
ADLS_ACUITY_SCORE: 14
ADLS_ACUITY_SCORE: 14
ADLS_ACUITY_SCORE: 16
ADLS_ACUITY_SCORE: 17
ADLS_ACUITY_SCORE: 26
ADLS_ACUITY_SCORE: 16
ADLS_ACUITY_SCORE: 14
COGNITION: 1 - ATTENTION OR MEMORY DEFICITS
ADLS_ACUITY_SCORE: 14
AMBULATION: 3-->ASSISTIVE EQUIPMENT AND PERSON
ADLS_ACUITY_SCORE: 14
ADLS_ACUITY_SCORE: 34
ADLS_ACUITY_SCORE: 28
ADLS_ACUITY_SCORE: 24
ADLS_ACUITY_SCORE: 32
ADLS_ACUITY_SCORE: 22
TRANSFERRING: 3-->ASSISTIVE EQUIPMENT AND PERSON
ADLS_ACUITY_SCORE: 30
ADLS_ACUITY_SCORE: 17
ADLS_ACUITY_SCORE: 22
ADLS_ACUITY_SCORE: 21
ADLS_ACUITY_SCORE: 26
ADLS_ACUITY_SCORE: 30
ADLS_ACUITY_SCORE: 15
ADLS_ACUITY_SCORE: 24
ADLS_ACUITY_SCORE: 25
ADLS_ACUITY_SCORE: 26
ADLS_ACUITY_SCORE: 25
BATHING: 2-->ASSISTIVE PERSON
ADLS_ACUITY_SCORE: 28
ADLS_ACUITY_SCORE: 14
ADLS_ACUITY_SCORE: 28
ADLS_ACUITY_SCORE: 23
ADLS_ACUITY_SCORE: 25
ADLS_ACUITY_SCORE: 15
ADLS_ACUITY_SCORE: 28
ADLS_ACUITY_SCORE: 19
ADLS_ACUITY_SCORE: 14
ADLS_ACUITY_SCORE: 16
ADLS_ACUITY_SCORE: 30
ADLS_ACUITY_SCORE: 26
ADLS_ACUITY_SCORE: 16
ADLS_ACUITY_SCORE: 14
ADLS_ACUITY_SCORE: 20
ADLS_ACUITY_SCORE: 14
ADLS_ACUITY_SCORE: 26
ADLS_ACUITY_SCORE: 30
ADLS_ACUITY_SCORE: 14
ADLS_ACUITY_SCORE: 29
ADLS_ACUITY_SCORE: 28
ADLS_ACUITY_SCORE: 14
ADLS_ACUITY_SCORE: 26
WHICH_OF_THE_ABOVE_FUNCTIONAL_RISKS_HAD_A_RECENT_ONSET_OR_CHANGE?: AMBULATION;TRANSFERRING;TOILETING;BATHING;DRESSING;EATING
ADLS_ACUITY_SCORE: 28
ADLS_ACUITY_SCORE: 26
ADLS_ACUITY_SCORE: 26
ADLS_ACUITY_SCORE: 20
ADLS_ACUITY_SCORE: 30
ADLS_ACUITY_SCORE: 22
ADLS_ACUITY_SCORE: 14
ADLS_ACUITY_SCORE: 30
ADLS_ACUITY_SCORE: 14
ADLS_ACUITY_SCORE: 16
ADLS_ACUITY_SCORE: 15
ADLS_ACUITY_SCORE: 25
ADLS_ACUITY_SCORE: 24
ADLS_ACUITY_SCORE: 26
ADLS_ACUITY_SCORE: 24
ADLS_ACUITY_SCORE: 14
ADLS_ACUITY_SCORE: 24
ADLS_ACUITY_SCORE: 29
ADLS_ACUITY_SCORE: 25
ADLS_ACUITY_SCORE: 30
ADLS_ACUITY_SCORE: 26
ADLS_ACUITY_SCORE: 14
ADLS_ACUITY_SCORE: 20
ADLS_ACUITY_SCORE: 15
ADLS_ACUITY_SCORE: 26
ADLS_ACUITY_SCORE: 24
ADLS_ACUITY_SCORE: 26
ADLS_ACUITY_SCORE: 21
ADLS_ACUITY_SCORE: 26
NUMBER_OF_TIMES_PATIENT_HAS_FALLEN_WITHIN_LAST_SIX_MONTHS: 3
ADLS_ACUITY_SCORE: 15
ADLS_ACUITY_SCORE: 15
ADLS_ACUITY_SCORE: 14
ADLS_ACUITY_SCORE: 17
ADLS_ACUITY_SCORE: 19
ADLS_ACUITY_SCORE: 22
ADLS_ACUITY_SCORE: 13
ADLS_ACUITY_SCORE: 25
ADLS_ACUITY_SCORE: 19
ADLS_ACUITY_SCORE: 23
ADLS_ACUITY_SCORE: 18
SWALLOWING: 0-->SWALLOWS FOODS/LIQUIDS WITHOUT DIFFICULTY
ADLS_ACUITY_SCORE: 34
ADLS_ACUITY_SCORE: 14
ADLS_ACUITY_SCORE: 16
ADLS_ACUITY_SCORE: 30
ADLS_ACUITY_SCORE: 14
ADLS_ACUITY_SCORE: 14
ADLS_ACUITY_SCORE: 18
DRESS: 2-->ASSISTIVE PERSON
FALL_HISTORY_WITHIN_LAST_SIX_MONTHS: YES
ADLS_ACUITY_SCORE: 14
TOILETING: 3-->ASSISTIVE EQUIPMENT AND PERSON
ADLS_ACUITY_SCORE: 26
ADLS_ACUITY_SCORE: 34
ADLS_ACUITY_SCORE: 34
ADLS_ACUITY_SCORE: 14
ADLS_ACUITY_SCORE: 14
RETIRED_COMMUNICATION: 0-->UNDERSTANDS/COMMUNICATES WITHOUT DIFFICULTY
ADLS_ACUITY_SCORE: 15
ADLS_ACUITY_SCORE: 15
ADLS_ACUITY_SCORE: 26
ADLS_ACUITY_SCORE: 14
ADLS_ACUITY_SCORE: 28
ADLS_ACUITY_SCORE: 26
ADLS_ACUITY_SCORE: 26
ADLS_ACUITY_SCORE: 14
ADLS_ACUITY_SCORE: 30
ADLS_ACUITY_SCORE: 26
ADLS_ACUITY_SCORE: 26
ADLS_ACUITY_SCORE: 28
ADLS_ACUITY_SCORE: 30
ADLS_ACUITY_SCORE: 14
RETIRED_EATING: 2-->ASSISTIVE PERSON
ADLS_ACUITY_SCORE: 22
ADLS_ACUITY_SCORE: 28
ADLS_ACUITY_SCORE: 30
ADLS_ACUITY_SCORE: 32
ADLS_ACUITY_SCORE: 15
ADLS_ACUITY_SCORE: 26
ADLS_ACUITY_SCORE: 34
ADLS_ACUITY_SCORE: 15
PREVIOUS_RESPONSIBILITIES: HOUSEKEEPING;MEDICATION MANAGEMENT
ADLS_ACUITY_SCORE: 26
ADLS_ACUITY_SCORE: 15
ADLS_ACUITY_SCORE: 14
ADLS_ACUITY_SCORE: 34
ADLS_ACUITY_SCORE: 14
ADLS_ACUITY_SCORE: 28
ADLS_ACUITY_SCORE: 26
ADLS_ACUITY_SCORE: 16
ADLS_ACUITY_SCORE: 14
ADLS_ACUITY_SCORE: 16
ADLS_ACUITY_SCORE: 30
ADLS_ACUITY_SCORE: 30
ADLS_ACUITY_SCORE: 14
ADLS_ACUITY_SCORE: 16
ADLS_ACUITY_SCORE: 26
ADLS_ACUITY_SCORE: 16
ADLS_ACUITY_SCORE: 15
ADLS_ACUITY_SCORE: 14
ADLS_ACUITY_SCORE: 15
ADLS_ACUITY_SCORE: 26
IADL_COMMENTS: COMPLETED BY FAMILY
ADLS_ACUITY_SCORE: 15
ADLS_ACUITY_SCORE: 30
ADLS_ACUITY_SCORE: 28
ADLS_ACUITY_SCORE: 30
ADLS_ACUITY_SCORE: 20
ADLS_ACUITY_SCORE: 15
ADLS_ACUITY_SCORE: 16
ADLS_ACUITY_SCORE: 14
ADLS_ACUITY_SCORE: 18
ADLS_ACUITY_SCORE: 14
ADLS_ACUITY_SCORE: 14
ADLS_ACUITY_SCORE: 34
ADLS_ACUITY_SCORE: 14
ADLS_ACUITY_SCORE: 24
ADLS_ACUITY_SCORE: 18
ADLS_ACUITY_SCORE: 26
ADLS_ACUITY_SCORE: 14
ADLS_ACUITY_SCORE: 26
ADLS_ACUITY_SCORE: 14
ADLS_ACUITY_SCORE: 34
ADLS_ACUITY_SCORE: 28
ADLS_ACUITY_SCORE: 13
ADLS_ACUITY_SCORE: 30
ADLS_ACUITY_SCORE: 14
ADLS_ACUITY_SCORE: 14
ADLS_ACUITY_SCORE: 22
ADLS_ACUITY_SCORE: 22
ADLS_ACUITY_SCORE: 14
ADLS_ACUITY_SCORE: 32
ADLS_ACUITY_SCORE: 14
ADLS_ACUITY_SCORE: 15
ADLS_ACUITY_SCORE: 14
ADLS_ACUITY_SCORE: 17
ADLS_ACUITY_SCORE: 18
ADLS_ACUITY_SCORE: 14

## 2018-01-01 ASSESSMENT — ANXIETY QUESTIONNAIRES
7. FEELING AFRAID AS IF SOMETHING AWFUL MIGHT HAPPEN: SEVERAL DAYS
GAD7 TOTAL SCORE: 8
6. BECOMING EASILY ANNOYED OR IRRITABLE: NOT AT ALL
GAD7 TOTAL SCORE: 8
IF YOU CHECKED OFF ANY PROBLEMS ON THIS QUESTIONNAIRE, HOW DIFFICULT HAVE THESE PROBLEMS MADE IT FOR YOU TO DO YOUR WORK, TAKE CARE OF THINGS AT HOME, OR GET ALONG WITH OTHER PEOPLE: SOMEWHAT DIFFICULT
5. BEING SO RESTLESS THAT IT IS HARD TO SIT STILL: NOT AT ALL
1. FEELING NERVOUS, ANXIOUS, OR ON EDGE: NEARLY EVERY DAY
3. WORRYING TOO MUCH ABOUT DIFFERENT THINGS: NEARLY EVERY DAY
2. NOT BEING ABLE TO STOP OR CONTROL WORRYING: SEVERAL DAYS

## 2018-01-01 ASSESSMENT — ENCOUNTER SYMPTOMS
DIAPHORESIS: 0
EYES NEGATIVE: 1
RESPIRATORY NEGATIVE: 1
APPETITE CHANGE: 1
DIZZINESS: 1
DYSURIA: 0
WEAKNESS: 1
APPETITE CHANGE: 1
CHILLS: 0
TREMORS: 1
LIGHT-HEADEDNESS: 1
COUGH: 1
WEAKNESS: 1
FATIGUE: 1
RHINORRHEA: 1
DYSRHYTHMIAS: 1
EYE DISCHARGE: 1
VOMITING: 1
WEAKNESS: 1
HEADACHES: 0
DIARRHEA: 0
SPEECH DIFFICULTY: 0
DIZZINESS: 0
FEVER: 1
BACK PAIN: 0
ABDOMINAL PAIN: 1
NAUSEA: 1
CHILLS: 0
MYALGIAS: 1
SHORTNESS OF BREATH: 0
NAUSEA: 1
STRIDOR: 0
ABDOMINAL PAIN: 1
VOMITING: 0
CONFUSION: 1
COUGH: 0
ACTIVITY CHANGE: 1
NUMBNESS: 0
WEAKNESS: 1
FEVER: 0

## 2018-01-01 ASSESSMENT — PAIN DESCRIPTION - DESCRIPTORS
DESCRIPTORS: ACHING
DESCRIPTORS: CONSTANT
DESCRIPTORS: SPASM
DESCRIPTORS: DISCOMFORT
DESCRIPTORS: SPASM
DESCRIPTORS: SHARP
DESCRIPTORS: ACHING
DESCRIPTORS: SPASM
DESCRIPTORS: ACHING

## 2018-01-01 ASSESSMENT — PATIENT HEALTH QUESTIONNAIRE - PHQ9
SUM OF ALL RESPONSES TO PHQ QUESTIONS 1-9: 7
5. POOR APPETITE OR OVEREATING: NOT AT ALL

## 2018-01-01 ASSESSMENT — PAIN SCALES - GENERAL
PAINLEVEL: NO PAIN (0)
PAINLEVEL: NO PAIN (1)

## 2018-01-01 ASSESSMENT — COPD QUESTIONNAIRES: COPD: 0

## 2018-01-02 NOTE — DISCHARGE INSTRUCTIONS
Pacemaker/ICD Generator Change Discharge Instructions    After you go home:      Have an adult stay with you until tomorrow.    You may resume your normal diet.       For 24 hours - due to the sedation you received:    Relax and take it easy.    Do NOT make any important or legal decisions.    Do NOT drive or operate machines at home or at work.    Do NOT drink alcohol.    Care of Chest Incision:      Keep the bandage on at least 3 days. You may remove the dressing mid afternoon on January 5. Change it only if it gets loose or soaked. If you need to change it, use 4x4-inch gauze and a large clear bandage.     If there is a pressure dressing (gauze & tape) - 24 hours after your procedure you may remove ONLY the top dressing. Leave the bottom dressing on.    Leave the strips of tape on. They will fall off on their own, or we will remove them at your first check-up.    Check your wound daily for signs of infection, such as increased redness, severe swelling or draining. Fever may also be a sign of infection. Call us if you see any of these signs.    If there are no signs of infection, you may shower after the bandage comes off in 3 days. If you take a tub bath, keep the wound dry.    No soaking the incision (swimming pool, bathtub, hot tub) for 2 weeks.    You may have mild to medium pain for 3 to 5 days. Take Acetaminophen (Tylenol) or Ibuprofen (Advil) for the pain. If the pain persists or is severe, call us.    Activity:      You can begin to use your arm as it feels comfortable to you.    No driving for one day & limit to necessary driving for one week.    Bleeding:      If you start bleeding from the incision site, sit down and press firmly on the site for 10 minutes.     Once bleeding stops, call [facility] as soon as you can.       Call 911 right away if you have heavy bleeding or bleeding that does not stop.      Medicines:      Take your medications, including blood thinners, unless your provider tells  you not to.    If you have stopped any other medicines, check with your provider about when to restart them.    Follow Up Appointments:      Follow up with Device Clinic at [facility] of patient preference in 7-10 days.    Call the clinic if:      You have a large or growing hard lump around the site.    The site is red, swollen, hot or tender.    Blood or fluid is draining from the site.    You have chills or a fever greater than 101 F (38 C).    You feel dizzy or light-headed.    Questions or concerns.      Telling others about your device:      Before you leave the hospital, you will receive a temporary ID card. A permanent card will be mailed to you about 6 to 8 weeks later. Always carry the ID card with you. It has important details about your device.    You may also get a Medical Alert bracelet or tag that says you have a pacemaker or a defibrillator (ICD).  Go to [email or web address].     Always tell doctors, dentists and other care providers that you have a device implanted in you.    Let us know before you plan any surgeries. Your care team must take special steps to keep you safe during certain procedures. These steps will depend on the type of device you have. Your provider will need to see your ID card. They may need to call us for instructions.    Device Safety:      Please refer to device  s booklet for further information.        [facility] [place]:    [phone] [facility] (7 days a week)

## 2018-01-02 NOTE — IP AVS SNAPSHOT
MRN:[id number]                      After Visit Summary   1/2/2018    Edison Boss    MRN: [id number]           Visit Information        Department      1/2/2018 10:26 AM [facility]          Review of your medicines      CONTINUE these medicines which may have CHANGED, or have new prescriptions. If we are uncertain of the size of tablets/capsules you have at home, strength may be listed as something that might have changed.        Dose / Directions    PARoxetine 30 MG tablet   Commonly known as:  PAXIL   This may have changed:    - how much to take  - additional instructions   Used for:  RAMÓN (generalized anxiety disorder)        TAKE 1/2 TABLET BY MOUTH AT BEDTIME   Quantity:  45 tablet   Refills:  3         CONTINUE these medicines which have NOT CHANGED        Dose / Directions    albuterol 108 (90 BASE) MCG/ACT Inhaler   Commonly known as:  PROAIR HFA/PROVENTIL HFA/VENTOLIN HFA   Used for:  Mild persistent asthma        Dose:  2 puff   Inhale 2 puffs into the lungs every 6 hours as needed for shortness of breath / dyspnea.   Quantity:  1 Inhaler   Refills:  1       amLODIPine 5 MG tablet   Commonly known as:  NORVASC   Used for:  Essential hypertension with goal blood pressure less than 140/90        Dose:  5 mg   Take 1 tablet (5 mg) by mouth daily   Quantity:  90 tablet   Refills:  3       AVEENO ECZEMA THERAPY 1 % Crea   Generic drug:  Colloidal Oatmeal        Externally apply topically daily   Refills:  0       cetirizine 10 MG tablet   Commonly known as:  zyrTEC        Dose:  10 mg   Take 10 mg by mouth daily as needed   Refills:  0       cholecalciferol 5000 UNITS Caps        Dose:  1 capsule   Take 1 capsule by mouth daily. Takes 5000 units in the summer and 05094 units in the winter   Refills:  0       CIPROFLOXACIN PO        Dose:  500 mg   Take 500 mg by mouth 1 tablet by mouth once as needed. Take one tablet after each procedere   Refills:  0        clobetasol 0.05 % ointment   Commonly known as:  TEMOVATE        daily as needed   Refills:  2       COLON CLEANSER PO        Dose:  1 capsule   Take 1 capsule by mouth daily Advanced Colon Care II   Refills:  0       D-Mannose Powd        daily 500 mg Pt takes 2 pills daily   Refills:  0       EYE VITAMINS Caps        one tablet twice daily   Refills:  0       lidocaine 4 % Crea cream   Commonly known as:  LMX4        Apply topically daily as needed   Refills:  0       nebulizer nebulization   Used for:  Mild persistent asthma        Use as directed   Quantity:  1   Refills:  0       omeprazole 20 MG CR capsule   Commonly known as:  priLOSEC   Used for:  Gastroesophageal reflux disease without esophagitis        Dose:  20 mg   Take 1 capsule (20 mg) by mouth daily   Quantity:  90 capsule   Refills:  3       oxyCODONE-acetaminophen 5-325 MG per tablet   Commonly known as:  PERCOCET   Used for:  Cervicalgia, Acute upper back pain        Dose:  1 tablet   Take 1 tablet by mouth every 4 hours as needed for pain for pain.   Quantity:  30 tablet   Refills:  0       VITAMIN C PO        Take by mouth 2 times daily 500 mg take 1 daily   Refills:  0                Protect others around you: Learn how to safely use, store and throw away your medicines at www.disposemymeds.org.         Follow-ups after your visit        Your next 10 appointments already scheduled     Jan 11, 2018 11:00 AM CST   Return Visit with Michael Lilly MD   [facility])    [address]  [address] [place] [id number]   [phone]            Feb 08, 2018 10:40 AM CST   Return Visit with Michael Lilly MD   [facility] ([facility])    [address]  [address] [place] [address]   [phone]               Care Instructions        After Care Instructions     Discharge Instructions -  Follow up with Device Check RN        Follow up with Device Check RN in 7-10 days.            Discharge Instructions - Keep incision dry for 72 hours       Keep incision dry for 72 hours (unless Derma Wiley was applied)                  Further instructions from your care team       Pacemaker/ICD Generator Change Discharge Instructions    After you go home:      Have an adult stay with you until tomorrow.    You may resume your normal diet.       For 24 hours - due to the sedation you received:    Relax and take it easy.    Do NOT make any important or legal decisions.    Do NOT drive or operate machines at home or at work.    Do NOT drink alcohol.    Care of Chest Incision:      Keep the bandage on at least 3 days. You may remove the dressing mid afternoon on January 5. Change it only if it gets loose or soaked. If you need to change it, use 4x4-inch gauze and a large clear bandage.     If there is a pressure dressing (gauze & tape) - 24 hours after your procedure you may remove ONLY the top dressing. Leave the bottom dressing on.    Leave the strips of tape on. They will fall off on their own, or we will remove them at your first check-up.    Check your wound daily for signs of infection, such as increased redness, severe swelling or draining. Fever may also be a sign of infection. Call us if you see any of these signs.    If there are no signs of infection, you may shower after the bandage comes off in 3 days. If you take a tub bath, keep the wound dry.    No soaking the incision (swimming pool, bathtub, hot tub) for 2 weeks.    You may have mild to medium pain for 3 to 5 days. Take Acetaminophen (Tylenol) or Ibuprofen (Advil) for the pain. If the pain persists or is severe, call us.    Activity:      You can begin to use your arm as it feels comfortable to you.    No driving for one day & limit to necessary driving for one week.    Bleeding:      If you start bleeding from the incision site, sit down and press  firmly on the site for 10 minutes.     Once bleeding stops, call [facility] as soon as you can.       Call 911 right away if you have heavy bleeding or bleeding that does not stop.      Medicines:      Take your medications, including blood thinners, unless your provider tells you not to.    If you have stopped any other medicines, check with your provider about when to restart them.    Follow Up Appointments:      Follow up with Device Clinic at [facility] of patient preference in 7-10 days.    Call the clinic if:      You have a large or growing hard lump around the site.    The site is red, swollen, hot or tender.    Blood or fluid is draining from the site.    You have chills or a fever greater than 101 F (38 C).    You feel dizzy or light-headed.    Questions or concerns.      Telling others about your device:      Before you leave the hospital, you will receive a temporary ID card. A permanent card will be mailed to you about 6 to 8 weeks later. Always carry the ID card with you. It has important details about your device.    You may also get a Medical Alert bracelet or tag that says you have a pacemaker or a defibrillator (ICD).  Go to [email or web address].     Always tell doctors, dentists and other care providers that you have a device implanted in you.    Let us know before you plan any surgeries. Your care team must take special steps to keep you safe during certain procedures. These steps will depend on the type of device you have. Your provider will need to see your ID card. They may need to call us for instructions.    Device Safety:      Please refer to device  s booklet for further information.        [facility] [place]:    [phone] [facility] (7 days a week)                 Additional Information About Your Visit        MyChart Information     [employer] gives you secure access to your electronic health record. If you see a primary care provider, you  "can also send messages to your care team and make appointments. If you have questions, please call your primary care clinic.  If you do not have a primary care provider, please call [phone] and they will assist you.        Care EveryWhere ID     This is your Care EveryWhere ID. This could be used by other organizations to access your [facility] medical records  [id number]        Your Vitals Were     Blood Pressure Pulse Temperature Respirations Height Weight    158/72 (BP Location: Left arm) 60 98.2  F (36.8  C) (Oral) 18 1.778 m (5' 10\") 78.2 kg (172 lb 8 oz)    Pulse Oximetry BMI (Body Mass Index)                96% 24.75 kg/m2           Primary Care Provider Office Phone # Fax #    Porfirio Terrell [phone]      Equal Access to Services     [facility]octors Medical CenterAILIN : Hadii vanessa jennings hadasho Soiliana, waaxda luqadaha, qaybta kaalmada adeegyada, yony mcclure . So [facility] [phone].    ATENCIÓN: Si habla español, tiene a lauren disposición servicios gratuitos de asistencia lingüística. [facility] [phone].    We comply with applicable federal civil rights laws and [place] laws. We do not discriminate on the basis of race, color, national origin, age, disability, sex, sexual orientation, or gender identity.            Thank you!     Thank you for choosing [facility] for your care. Our goal is always to provide you with excellent care. Hearing back from our patients is one way we can continue to improve our services. Please take a few minutes to complete the written survey that you may receive in the mail after you visit with us. Thank you!             Medication List: This is a list of all your medications and when to take them. Check marks below indicate your daily home schedule. Keep this list as a reference.      Medications           Morning Afternoon Evening Bedtime As Needed    albuterol 108 (90 BASE) MCG/ACT Inhaler   Commonly known as:  PROAIR HFA/PROVENTIL HFA/VENTOLIN HFA "   Inhale 2 puffs into the lungs every 6 hours as needed for shortness of breath / dyspnea.                                amLODIPine 5 MG tablet   Commonly known as:  NORVASC   Take 1 tablet (5 mg) by mouth daily                                AVEENO ECZEMA THERAPY 1 % Crea   Externally apply topically daily   Generic drug:  Colloidal Oatmeal                                cetirizine 10 MG tablet   Commonly known as:  zyrTEC   Take 10 mg by mouth daily as needed                                cholecalciferol 5000 UNITS Caps   Take 1 capsule by mouth daily. Takes 5000 units in the summer and 74147 units in the winter                                CIPROFLOXACIN PO   Take 500 mg by mouth 1 tablet by mouth once as needed. Take one tablet after each procedere                                clobetasol 0.05 % ointment   Commonly known as:  TEMOVATE   daily as needed                                COLON CLEANSER PO   Take 1 capsule by mouth daily Advanced Colon Care II                                D-Mannose Powd   daily 500 mg Pt takes 2 pills daily                                EYE VITAMINS Caps   one tablet twice daily                                lidocaine 4 % Crea cream   Commonly known as:  LMX4   Apply topically daily as needed                                nebulizer nebulization   Use as directed                                omeprazole 20 MG CR capsule   Commonly known as:  priLOSEC   Take 1 capsule (20 mg) by mouth daily                                oxyCODONE-acetaminophen 5-325 MG per tablet   Commonly known as:  PERCOCET   Take 1 tablet by mouth every 4 hours as needed for pain for pain.                                PARoxetine 30 MG tablet   Commonly known as:  PAXIL   TAKE 1/2 TABLET BY MOUTH AT BEDTIME                                VITAMIN C PO   Take by mouth 2 times daily 500 mg take 1 daily

## 2018-01-02 NOTE — IP AVS SNAPSHOT
[place] [address]    [place] [address]    Phone:  [phone]                                       After Visit Summary   1/2/2018    Edison Boss    MRN: [id number]           After Visit Summary Signature Page     I have received my discharge instructions, and my questions have been answered. I have discussed any challenges I see with this plan with the nurse or doctor.    ..........................................................................................................................................  Patient/Patient Representative Signature      ..........................................................................................................................................  Patient Representative Print Name and Relationship to Patient    ..................................................               ................................................  Date                                            Time    ..........................................................................................................................................  Reviewed by Signature/Title    ...................................................              ..............................................  Date                                                            Time

## 2018-01-02 NOTE — PROGRESS NOTES
Return to CS 16 at 1400.  Sleepy.  Dressing to left chest WDL.  No swelling.  VSS.  Reports some pain on return to room, then rates pain at 0/10.  Family at bedside, will cont to monitor, call light in reach.

## 2018-01-02 NOTE — PROGRESS NOTES
Pacemaker pulse generator replacement.  Pocket revision for a large pulse generator.  No complications.

## 2018-01-02 NOTE — PROGRESS NOTES
Site remains WDL.  D/c instructions given to pt and daughters.  Pt also has booklet and temp card to take home.  Device rep here now talking with pt and daughters.

## 2018-01-02 NOTE — PROGRESS NOTES
Pt here for pacemaker generator change.  Assessment complete.  Labs pending.  Awaiting MD to consent.  Call light in reach, family at bedside

## 2018-01-02 NOTE — PROGRESS NOTES
1528  Patient ambulated with assist of 1, tolerated well.  Voided.  PM site is WDL/dressing CDI/no hematoma or bleeding.  Patient was discharged per W/C with .

## 2018-01-03 NOTE — TELEPHONE ENCOUNTER
Post device implant discharge phone call.  Desti Accolade PPM Gen Change:    Reviewed the following with wife and daughter, Red:  Remove outer dressing 3 days after implant. May shower after outer dressing removed. Leave steri-strips in place, will be removed at 1 week device check  Watch for redness, drainage, warmth, or fever. Call device clinic if any signs of infection.     1 week device check scheduled: 1/10/18 at 1300.    Family states understanding of all instructions.  PRIYANKA Soni RN.

## 2018-01-04 NOTE — TELEPHONE ENCOUNTER
Pt's daughter Red sent Force-[employer] message requesting prescription for Preservision from Dr. Terrell. [facility] Dr[facility] Terrell orders this, then [facility]. This is OTC, but his insurance will pay for it with a prescription.     Sent message back to pt's daughter asking for dosing and pharmacy information.

## 2018-01-10 NOTE — MR AVS SNAPSHOT
After Visit Summary   1/10/2018    Edison Boss    MRN: [id number]           Patient Information     Date Of Birth          10/19/1924        Visit Information        Provider Department      1/10/2018 1:00 PM SANDRA DCR2 [facility]        Today's Diagnoses     Cardiac pacemaker in situ    -  1       Follow-ups after your visit        Your next 10 appointments already scheduled     Jan 11, 2018 11:00 AM CST   Return Visit with Michael Lilly MD   [facility] ([facility])    [address]  [address]  [facility] [address]   [phone]            Feb 08, 2018 10:40 AM CST   Return Visit with Michael Lilly MD   [facility] ([facility])    [address]  [address]  [facility] [id number] [phone]            Apr 19, 2018  1:45 PM CDT   Remote PPM Check with SANDRA TECH1   [facility] ([facility])    [address] [place] [address] [address]  [facility] [id number] [phone]           This appointment is for a remote check of your pacemaker.  This is not an appointment at the office.              Who to contact     If you have questions or need follow up information about today's clinic visit or your schedule please contact [facility] directly at [phone].  Normal or non-critical lab and imaging results will be communicated to you by MyChart, letter or phone within 4 business days after the clinic has received the results. If you do not hear from us within 7 days, please contact the clinic through MyChart or phone. If you have a critical or abnormal lab result, we will notify you by phone as soon as possible.  Submit refill requests through [employer] or call your pharmacy and they will forward the refill request to us. Please  allow 3 business days for your refill to be completed.          Additional Information About Your Visit        MyChart Information     [employer] gives you secure access to your electronic health record. If you see a primary care provider, you can also send messages to your care team and make appointments. If you have questions, please call your primary care clinic.  If you do not have a primary care provider, please call [phone] and they will assist you.        Care EveryWhere ID     This is your Care EveryWhere ID. This could be used by other organizations to access your [place] medical records  [id number]         Blood Pressure from Last 3 Encounters:   01/02/18 150/77   12/28/17 112/60   10/30/17 152/86    Weight from Last 3 Encounters:   01/02/18 78.2 kg (172 lb 8 oz)   12/28/17 78 kg (172 lb)   10/30/17 78.9 kg (174 lb)              We Performed the Following     PM DEVICE PROGRAMMING EVAL, DUAL LEAD PACER (97251)          Today's Medication Changes          These changes are accurate as of: 1/10/18  1:41 PM.  If you have any questions, ask your nurse or doctor.               These medicines have changed or have updated prescriptions.        Dose/Directions    PARoxetine 30 MG tablet   Commonly known as:  PAXIL   This may have changed:    - how much to take  - additional instructions   Used for:  RAMÓN (generalized anxiety disorder)        TAKE 1/2 TABLET BY MOUTH AT BEDTIME   Quantity:  45 tablet   Refills:  3                Primary Care Provider Office Phone # Fax #    Porfirio Terrell [phone]       [address] [place] [id number]        Equal Access to Services     [facility] : Hadii vanessa jennings hadmontezo Soezekielali, waaxda luqadaha, qaybta kaalmada adetamika, yony nunn. So [facility] [phone].    ATENCIÓN: Si habla español, tiene a lauren disposición servicios gratuitos de asistencia lingüística. Llame al [phone].    We comply with applicable  federal civil rights laws and [place] laws. We do not discriminate on the basis of race, color, national origin, age, disability, sex, sexual orientation, or gender identity.            Thank you!     Thank you for choosing [facility]  for your care. Our goal is always to provide you with excellent care. Hearing back from our patients is one way we can continue to improve our services. Please take a few minutes to complete the written survey that you may receive in the mail after your visit with us. Thank you!             Your Updated Medication List - Protect others around you: Learn how to safely use, store and throw away your medicines at www.disposemymeds.org.          This list is accurate as of: 1/10/18  1:41 PM.  Always use your most recent med list.                   Brand Name Dispense Instructions for use Diagnosis    albuterol 108 (90 BASE) MCG/ACT Inhaler    PROAIR HFA/PROVENTIL HFA/VENTOLIN HFA    1 Inhaler    Inhale 2 puffs into the lungs every 6 hours as needed for shortness of breath / dyspnea.    Mild persistent asthma       amLODIPine 5 MG tablet    NORVASC    90 tablet    Take 1 tablet (5 mg) by mouth daily    Essential hypertension with goal blood pressure less than 140/90       AVEENO ECZEMA THERAPY 1 % Crea   Generic drug:  Colloidal Oatmeal      Externally apply topically daily        cetirizine 10 MG tablet    zyrTEC     Take 10 mg by mouth daily as needed        cholecalciferol 5000 UNITS Caps      Take 1 capsule by mouth daily. Takes 5000 units in the summer and 46945 units in the winter        CIPROFLOXACIN PO      Take 500 mg by mouth 1 tablet by mouth once as needed. Take one tablet after each procedere        clobetasol 0.05 % ointment    TEMOVATE     daily as needed        COLON CLEANSER PO      Take 1 capsule by mouth daily Advanced Colon Care II        D-Mannose Powd      daily 500 mg Pt takes 2 pills daily        EYE VITAMINS Caps      one  tablet twice daily        lidocaine 4 % Crea cream    LMX4     Apply topically daily as needed        nebulizer nebulization     1    Use as directed    Mild persistent asthma       omeprazole 20 MG CR capsule    priLOSEC    90 capsule    Take 1 capsule (20 mg) by mouth daily    Gastroesophageal reflux disease without esophagitis       oxyCODONE-acetaminophen 5-325 MG per tablet    PERCOCET    30 tablet    Take 1 tablet by mouth every 4 hours as needed for pain for pain.    Cervicalgia, Acute upper back pain       PARoxetine 30 MG tablet    PAXIL    45 tablet    TAKE 1/2 TABLET BY MOUTH AT BEDTIME    RAMÓN (generalized anxiety disorder)       VITAMIN C PO      Take by mouth 2 times daily 500 mg take 1 daily

## 2018-01-10 NOTE — PROGRESS NOTES
Pidefarma Accolade PPM Device Check-One Week Post Gen Change  AP: 98 % : 0 %  Mode: DDDR 60        Underlying Rhythm: SB rates 40's bpm,  Heart Rate: Blunted at 60 bpm, but pt ambulates very slowly using a wheeled walker and denies any activity intolerance  Sensing: Stable    Pacing Threshold: Stable   Impedance: Stable  Battery Status: 15 yrs  Device Site: Steri strips removed and edges of incision well approximated without signs of infection.  Atrial Arrhythmia: None  Ventricular Arrhythmia: None  Setting Change: None    Care Plan: Pt has no complaints. Accompanied by daughter. Latitude monitor reviewed and first remote scheduled for April. PRIYANKA Soni RN.

## 2018-01-11 NOTE — LETTER
1/11/2018       RE: Edison Boss  [address]    [facility] [address]     Dear Colleague,    Thank you for referring your patient, Edison Boss, to the [facility] [place] at [facility]. Please see a copy of my visit note below.    Edison is a 89-year-old male with a history of prostate cancer, urinary retention and bladder spasticity. His bladder is managed with an indwelling Christianson. He no longer is followed with PSAs at his request. He occasionally requires Botox injections to treat his spastic bladder muscle  Exam: Normal appearance, alert and oriented, with daughter. Normal external genitalia  Christianson catheter change with sterile technique. 4 cc in Christianson balloon, Christianson irrigates clear, tiny bladder capacity  Assessment: Prostate cancer, bladder spasticity, urinary retention  Plan: Change Christianson monthly, Botox p.r.n.    Again, thank you for allowing me to participate in the care of your patient.      Sincerely,    Michael Lilly MD

## 2018-01-11 NOTE — PROGRESS NOTES
Edison is a 89-year-old male with a history of prostate cancer, urinary retention and bladder spasticity. His bladder is managed with an indwelling Christianson. He no longer is followed with PSAs at his request. He occasionally requires Botox injections to treat his spastic bladder muscle  Exam: Normal appearance, alert and oriented, with daughter. Normal external genitalia  Christianson catheter change with sterile technique. 4 cc in Christianson balloon, Christianson irrigates clear, tiny bladder capacity  Assessment: Prostate cancer, bladder spasticity, urinary retention  Plan: Change Christianson monthly, Botox p.r.n.

## 2018-01-11 NOTE — MR AVS SNAPSHOT
After Visit Summary   1/11/2018    Edison Boss    MRN: [id number]           Patient Information     Date Of Birth          10/19/1924        Visit Information        Provider Department      1/11/2018 11:00 AM Michael Lilly MD [facility]         Follow-ups after your visit        Your next 10 appointments already scheduled     Feb 08, 2018 10:40 AM CST   Return Visit with Michael Lilly MD   [facility] ([facility])    [address]  [address]  [facility] [id number] [phone]            Mar 08, 2018 11:00 AM CST   Return Visit with Michael Lilly MD   [facility] ([facility])    [address]  [address]  [facility] [id number] [phone]            Apr 19, 2018  1:45 PM CDT   Remote PPM Check with SANDRA TECH1   [facility] ([facility])    [address] [place] [address] [address]  [facility] [id number] [phone]           This appointment is for a remote check of your pacemaker.  This is not an appointment at the office.              Who to contact     If you have questions or need follow up information about today's clinic visit or your schedule please contact [facility] directly at [phone].  Normal or non-critical lab and imaging results will be communicated to you by MyChart, letter or phone within 4 business days after the clinic has received the results. If you do not hear from us within 7 days, please contact the clinic through MyChart or phone. If you have a critical or abnormal lab result, we will notify you by phone as soon as possible.  Submit refill requests through "[employer]" or call your pharmacy and they will forward the refill request to us. Please allow 3 business days for your  refill to be completed.          Additional Information About Your Visit        MyChart Information     [employer] gives you secure access to your electronic health record. If you see a primary care provider, you can also send messages to your care team and make appointments. If you have questions, please call your primary care clinic.  If you do not have a primary care provider, please call [phone] and they will assist you.        Care EveryWhere ID     This is your Care EveryWhere ID. This could be used by other organizations to access your [place] medical records  [id number]         Blood Pressure from Last 3 Encounters:   01/02/18 150/77   12/28/17 112/60   10/30/17 152/86    Weight from Last 3 Encounters:   01/02/18 78.2 kg (172 lb 8 oz)   12/28/17 78 kg (172 lb)   10/30/17 78.9 kg (174 lb)              Today, you had the following     No orders found for display         Today's Medication Changes          These changes are accurate as of: 1/11/18 11:28 AM.  If you have any questions, ask your nurse or doctor.               These medicines have changed or have updated prescriptions.        Dose/Directions    PARoxetine 30 MG tablet   Commonly known as:  PAXIL   This may have changed:    - how much to take  - additional instructions   Used for:  RAMÓN (generalized anxiety disorder)        TAKE 1/2 TABLET BY MOUTH AT BEDTIME   Quantity:  45 tablet   Refills:  3                Primary Care Provider Office Phone # Fax #    Porfirio Terrell [phone]       [address]  [facility] [address]        Equal Access to Services     [facility]: Hadii vanessa ku hadasho Soomaali, waaxda luqadaha, qaybta kaalmada adeegyada, yony mcclure . So [facility] [place] [phone].    ATENCIÓN: Si habla español, tiene a lauren disposición servicios gratuitos de asistencia lingüística. Llame al [phone].    We comply with applicable federal civil rights laws and [place] laws. We do not  discriminate on the basis of race, color, national origin, age, disability, sex, sexual orientation, or gender identity.            Thank you!     Thank you for choosing [facility] [place]  for your care. Our goal is always to provide you with excellent care. Hearing back from our patients is one way we can continue to improve our services. Please take a few minutes to complete the written survey that you may receive in the mail after your visit with us. Thank you!             Your Updated Medication List - Protect others around you: Learn how to safely use, store and throw away your medicines at www.disposemymeds.org.          This list is accurate as of: 1/11/18 11:28 AM.  Always use your most recent med list.                   Brand Name Dispense Instructions for use Diagnosis    albuterol 108 (90 BASE) MCG/ACT Inhaler    PROAIR HFA/PROVENTIL HFA/VENTOLIN HFA    1 Inhaler    Inhale 2 puffs into the lungs every 6 hours as needed for shortness of breath / dyspnea.    Mild persistent asthma       amLODIPine 5 MG tablet    NORVASC    90 tablet    Take 1 tablet (5 mg) by mouth daily    Essential hypertension with goal blood pressure less than 140/90       AVEENO ECZEMA THERAPY 1 % Crea   Generic drug:  Colloidal Oatmeal      Externally apply topically daily        cetirizine 10 MG tablet    zyrTEC     Take 10 mg by mouth daily as needed        cholecalciferol 5000 UNITS Caps      Take 1 capsule by mouth daily. Takes 5000 units in the summer and [address]25 units in the winter        CIPROFLOXACIN PO      Take 500 mg by mouth 1 tablet by mouth once as needed. Take one tablet after each procedere        clobetasol 0.05 % ointment    TEMOVATE     daily as needed        COLON CLEANSER PO      Take 1 capsule by mouth daily Advanced Colon Care II        D-Mannose Powd      daily 500 mg Pt takes 2 pills daily        EYE VITAMINS Caps      one tablet twice daily        lidocaine 4 % Crea cream     LMX4     Apply topically daily as needed        nebulizer nebulization     1    Use as directed    Mild persistent asthma       omeprazole 20 MG CR capsule    priLOSEC    90 capsule    Take 1 capsule (20 mg) by mouth daily    Gastroesophageal reflux disease without esophagitis       oxyCODONE-acetaminophen 5-325 MG per tablet    PERCOCET    30 tablet    Take 1 tablet by mouth every 4 hours as needed for pain for pain.    Cervicalgia, Acute upper back pain       PARoxetine 30 MG tablet    PAXIL    45 tablet    TAKE 1/2 TABLET BY MOUTH AT BEDTIME    RAMÓN (generalized anxiety disorder)       VITAMIN C PO      Take by mouth 2 times daily 500 mg take 1 daily

## 2018-01-17 NOTE — TELEPHONE ENCOUNTER
Crit[employer]It msg received below with dosing info.      From first msg:    Pt's daughter Red sent [employer] message requesting prescription for Preservision from Dr. Terrell. [facility] Dr. Terrell orders this, [facility]. This is OTC, but his insurance will pay for it with a prescription.      Sent message back to pt's daughter asking for dosing and pharmacy information.

## 2018-01-18 NOTE — TELEPHONE ENCOUNTER
See [employer] message. Rx pended. Please verify this is the correct one as it was not on pt med list.

## 2018-02-08 NOTE — LETTER
2/8/2018       RE: Edison Boss  [address]    [facility] [facility] [address]     Dear Colleague,    Thank you for referring your patient, Edison Boss, to the [facility] [place] at [facility]. Please see a copy of my visit note below.    Edison Boss is a 89-year-old gentleman with a history of prostate cancer and urinary retention. He sees me monthly for catheter changes. He has had no severe bladder spasticity this past month. Urine is clear.  He has, however, had some right lower quadrant discomfort intermittently. He tends toward constipation, has had no blood in his stool. He has recovered from a diarrheal illness a few weeks ago.  Other past medical history: History of urolithiasis, chronic pain, GERD, hyperlipidemia, hypertension, asthma, A. fib, sinus node dysfunction, sleep apnea, cerebral infarction, former smoker  Family history: Prostate cancer, lymphoma, lung cancer  Medications: Albuterol, Norvasc, vitamin C, Zyrtec, vitamin D, Temovate ointment, multiple vitamins, Percocet, Paxil  Allergies: Cephalosporins, sulfa, Levaquin, Zithromax, Macrodantin  Exam: Normal appearance, alert and oriented, normocephalic, normal respirations, neuro grossly intact. Normal external genitalia. Abdomen soft, slightly tender in right lower quadrant, atrophic testicles, normal spermatic cords, no inguinal adenopathy or hernia  Christianson catheter removed and new catheter inserted with sterile technique and lidocaine jelly. 4 cc in Christianson balloon, Christianson irrigated clear. Placed to close gravity drainage  Assessment: History of prostate cancer, severe spasticity or bladder requiring Botox injections periodically, no evidence for urinary tract infection. Suspect right lower quadrant discomfort is due to constipation, do not suspect appendicitis or ileitis  Plan: See me monthly for catheter change. No PSAs in the future. CT abdomen  and pelvis if right lower quadrant pain becomes more severe    Again, thank you for allowing me to participate in the care of your patient.      Sincerely,    Michael Lilly MD

## 2018-02-08 NOTE — PROGRESS NOTES
Edison Boss is a 89-year-old gentleman with a history of prostate cancer and urinary retention. He sees me monthly for catheter changes. He has had no severe bladder spasticity this past month. Urine is clear.  He has, however, had some right lower quadrant discomfort intermittently. He tends toward constipation, has had no blood in his stool. He has recovered from a diarrheal illness a few weeks ago.  Other past medical history: History of urolithiasis, chronic pain, GERD, hyperlipidemia, hypertension, asthma, A. fib, sinus node dysfunction, sleep apnea, cerebral infarction, former smoker  Family history: Prostate cancer, lymphoma, lung cancer  Medications: Albuterol, Norvasc, vitamin C, Zyrtec, vitamin D, Temovate ointment, multiple vitamins, Percocet, Paxil  Allergies: Cephalosporins, sulfa, Levaquin, Zithromax, Macrodantin  Exam: Normal appearance, alert and oriented, normocephalic, normal respirations, neuro grossly intact. Normal external genitalia. Abdomen soft, slightly tender in right lower quadrant, atrophic testicles, normal spermatic cords, no inguinal adenopathy or hernia  Christianson catheter removed and new catheter inserted with sterile technique and lidocaine jelly. 4 cc in Christianson balloon, Christianson irrigated clear. Placed to close gravity drainage  Assessment: History of prostate cancer, severe spasticity or bladder requiring Botox injections periodically, no evidence for urinary tract infection. Suspect right lower quadrant discomfort is due to constipation, do not suspect appendicitis or ileitis  Plan: See me monthly for catheter change. No PSAs in the future. CT abdomen and pelvis if right lower quadrant pain becomes more severe

## 2018-02-08 NOTE — MR AVS SNAPSHOT
After Visit Summary   2/8/2018    Edison Boss    MRN: [id number]           Patient Information     Date Of Birth          10/19/1924        Visit Information        Provider Department      2/8/2018 10:40 AM Michael Lilly MD [facility]        Today's Diagnoses     Incomplete bladder emptying    -  1    Malignant neoplasm of prostate (H)           Follow-ups after your visit        Your next 10 appointments already scheduled     Mar 08, 2018 11:00 AM CST   Return Visit with Michael Lilly MD   [facility] ([facility])    [address]  [address]  [facility] [id number] [phone]            Apr 05, 2018 11:00 AM CDT   Return Visit with Michael Lilly MD   [facility] ([facility])    [address]  [address]  [facility] [id number] [phone]            Apr 19, 2018  1:45 PM CDT   Remote PPM Check with SANDRA TECH1   [facility] ([facility])    [address] [place] [address] [address]  [facility] [id number] [phone]           This appointment is for a remote check of your pacemaker.  This is not an appointment at the office.              Who to contact     If you have questions or need follow up information about today's clinic visit or your schedule please contact [facility] directly at [phone].  Normal or non-critical lab and imaging results will be communicated to you by MyChart, letter or phone within 4 business days after the clinic has received the results. If you do not hear from us within 7 days, please contact the clinic through Geodelic [employer]shart or phone. If you have a critical or abnormal lab result, we will notify you by phone as soon as possible.  Submit refill requests through PalindromXt or call  "your pharmacy and they will forward the refill request to us. Please allow 3 business days for your refill to be completed.          Additional Information About Your Visit        Tower Visionhart Information     Stockpulse gives you secure access to your electronic health record. If you see a primary care provider, you can also send messages to your care team and make appointments. If you have questions, please call your primary care clinic.  If you do not have a primary care provider, please call [phone] and they will assist you.        Care EveryWhere ID     This is your Care EveryWhere ID. This could be used by other organizations to access your [place] medical records  [id number]        Your Vitals Were     Pulse Height Pulse Oximetry BMI (Body Mass Index)          70 1.778 m (5' 10\") 95% 24.68 kg/m2         Blood Pressure from Last 3 Encounters:   01/02/18 150/77   12/28/17 112/60   10/30/17 152/86    Weight from Last 3 Encounters:   02/08/18 78 kg (172 lb)   01/02/18 78.2 kg (172 lb 8 oz)   12/28/17 78 kg (172 lb)              We Performed the Following     INSERT BLADDER CATH, TEMP INDWELL SIMPLE (ARAMBULA) (79557)          Today's Medication Changes          These changes are accurate as of 2/8/18 11:43 AM.  If you have any questions, ask your nurse or doctor.               These medicines have changed or have updated prescriptions.        Dose/Directions    PARoxetine 30 MG tablet   Commonly known as:  PAXIL   This may have changed:    - how much to take  - additional instructions   Used for:  RAMÓN (generalized anxiety disorder)        TAKE 1/2 TABLET BY MOUTH AT BEDTIME   Quantity:  45 tablet   Refills:  3                Primary Care Provider Office Phone # Fax #    Porfirio Terrell [phone]       [address] [address]        Equal Access to Services     EMERITA CADE AH: Shellie Denson, darek griffith, gen chamberlain, yony cottrell " nicolasrajesse lopez'aan ah. So [facility] [phone].    ATENCIÓN: Si dasia clark, tiene a lauren disposición servicios gratuitos de asistencia lingüística. Lesley cervantes [phone].    We comply with applicable federal civil rights laws and [place] laws. We do not discriminate on the basis of race, color, national origin, age, disability, sex, sexual orientation, or gender identity.            Thank you!     Thank you for choosing [facility] [place]  for your care. Our goal is always to provide you with excellent care. Hearing back from our patients is one way we can continue to improve our services. Please take a few minutes to complete the written survey that you may receive in the mail after your visit with us. Thank you!             Your Updated Medication List - Protect others around you: Learn how to safely use, store and throw away your medicines at www.disposemymeds.org.          This list is accurate as of 2/8/18 11:43 AM.  Always use your most recent med list.                   Brand Name Dispense Instructions for use Diagnosis    albuterol 108 (90 BASE) MCG/ACT Inhaler    PROAIR HFA/PROVENTIL HFA/VENTOLIN HFA    1 Inhaler    Inhale 2 puffs into the lungs every 6 hours as needed for shortness of breath / dyspnea.    Mild persistent asthma       amLODIPine 5 MG tablet    NORVASC    90 tablet    Take 1 tablet (5 mg) by mouth daily    Essential hypertension with goal blood pressure less than 140/90       AVEENO ECZEMA THERAPY 1 % Crea   Generic drug:  Colloidal Oatmeal      Externally apply topically daily        cetirizine 10 MG tablet    zyrTEC     Take 10 mg by mouth daily as needed        cholecalciferol 5000 UNITS Caps      Take 1 capsule by mouth daily. Takes 5000 units in the summer and 62508 units in the winter        CIPROFLOXACIN PO      Take 500 mg by mouth 1 tablet by mouth once as needed. Take one tablet after each procedere        clobetasol 0.05 % ointment    TEMOVATE      daily as needed        COLON CLEANSER PO      Take 1 capsule by mouth daily Advanced Colon Care II        D-Mannose Powd      daily 500 mg Pt takes 2 pills daily        * EYE VITAMINS Caps      one tablet twice daily        * PRESERVISION AREDS Caps     180 capsule    Take 1 capsule by mouth 2 times daily    Macular degeneration       lidocaine 4 % Crea cream    LMX4     Apply topically daily as needed        nebulizer nebulization     1    Use as directed    Mild persistent asthma       omeprazole 20 MG CR capsule    priLOSEC    90 capsule    Take 1 capsule (20 mg) by mouth daily    Gastroesophageal reflux disease without esophagitis       oxyCODONE-acetaminophen 5-325 MG per tablet    PERCOCET    30 tablet    Take 1 tablet by mouth every 4 hours as needed for pain for pain.    Cervicalgia, Acute upper back pain       PARoxetine 30 MG tablet    PAXIL    45 tablet    TAKE 1/2 TABLET BY MOUTH AT BEDTIME    RAMÓN (generalized anxiety disorder)       VITAMIN C PO      Take by mouth 2 times daily 500 mg take 1 daily        * Notice:  This list has 2 medication(s) that are the same as other medications prescribed for you. Read the directions carefully, and ask your doctor or other care provider to review them with you.

## 2018-03-08 NOTE — MR AVS SNAPSHOT
After Visit Summary   3/8/2018    Edison Boss    MRN: [id number]           Patient Information     Date Of Birth          10/19/1924        Visit Information        Provider Department      3/8/2018 11:00 AM Michael Lilly MD [facility]        Today's Diagnoses     Incomplete bladder emptying    -  1       Follow-ups after your visit        Follow-up notes from your care team     Return in about 4 weeks (around 4/5/2018) for catheter change.      Your next 10 appointments already scheduled     Apr 05, 2018 11:00 AM CDT   Return Visit with Michael Lilly MD   [facility] ([facility])    [address]  [address]  [facility] [address] [phone]            Apr 19, 2018  1:45 PM CDT   Remote PPM Check with SANDRA TECH1   [facility] ([facility])    [address]  [facility] [address] [phone]           This appointment is for a remote check of your pacemaker.  This is not an appointment at the office.              Who to contact     If you have questions or need follow up information about today's clinic visit or your schedule please contact [facility] directly at [phone].  Normal or non-critical lab and imaging results will be communicated to you by MyChart, letter or phone within 4 business days after the clinic has received the results. If you do not hear from us within 7 days, please contact the clinic through MyChart or phone. If you have a critical or abnormal lab result, we will notify you by phone as soon as possible.  Submit refill requests through Vusion or call your pharmacy and they will forward the refill request to us. Please allow 3 business days for your refill to be completed.          Additional Information About Your Visit         115 network disks Information     [employer] gives you secure access to your electronic health record. If you see a primary care provider, you can also send messages to your care team and make appointments. If you have questions, please call your primary care clinic.  If you do not have a primary care provider, please call [phone] and they will assist you.        Care EveryWhere ID     This is your Care EveryWhere ID. This could be used by other organizations to access your [place] medical records  [id number]         Blood Pressure from Last 3 Encounters:   01/02/18 150/77   12/28/17 112/60   10/30/17 152/86    Weight from Last 3 Encounters:   02/08/18 78 kg (172 lb)   01/02/18 78.2 kg (172 lb 8 oz)   12/28/17 78 kg (172 lb)              We Performed the Following     INSERT BLADDER CATH, TEMP INDWELL SIMPLE (ARAMBULA) (33570)          Today's Medication Changes          These changes are accurate as of 3/8/18 11:28 AM.  If you have any questions, ask your nurse or doctor.               These medicines have changed or have updated prescriptions.        Dose/Directions    PARoxetine 30 MG tablet   Commonly known as:  PAXIL   This may have changed:    - how much to take  - additional instructions   Used for:  RAMÓN (generalized anxiety disorder)        TAKE 1/2 TABLET BY MOUTH AT BEDTIME   Quantity:  45 tablet   Refills:  3                Primary Care Provider Office Phone # Fax #    Porfirio Terrell [phone]       [address] [address]        Equal Access to Services     EMERITA CADE : Hadii vanessa jennings hadasho Soomaali, waaxda luqadaha, qaybta kaalmada bulmaro, yony mcclure . So [facility] [phone].    ATENCIÓN: Si habla español, tiene a lauren disposición servicios gratuitos de asistencia lingüística. Llame al [phone].    We comply with applicable federal civil rights laws and [place] laws. We do not discriminate on the basis of race, color, national  origin, age, disability, sex, sexual orientation, or gender identity.            Thank you!     Thank you for choosing [facility] [place]  for your care. Our goal is always to provide you with excellent care. Hearing back from our patients is one way we can continue to improve our services. Please take a few minutes to complete the written survey that you may receive in the mail after your visit with us. Thank you!             Your Updated Medication List - Protect others around you: Learn how to safely use, store and throw away your medicines at www.disposemymeds.org.          This list is accurate as of 3/8/18 11:28 AM.  Always use your most recent med list.                   Brand Name Dispense Instructions for use Diagnosis    albuterol 108 (90 BASE) MCG/ACT Inhaler    PROAIR HFA/PROVENTIL HFA/VENTOLIN HFA    1 Inhaler    Inhale 2 puffs into the lungs every 6 hours as needed for shortness of breath / dyspnea.    Mild persistent asthma       amLODIPine 5 MG tablet    NORVASC    90 tablet    Take 1 tablet (5 mg) by mouth daily    Essential hypertension with goal blood pressure less than 140/90       AVEENO ECZEMA THERAPY 1 % Crea   Generic drug:  Colloidal Oatmeal      Externally apply topically daily        cetirizine 10 MG tablet    zyrTEC     Take 10 mg by mouth daily as needed        cholecalciferol 5000 UNITS Caps      Take 1 capsule by mouth daily. Takes 5000 units in the summer and 83240 units in the winter        CIPROFLOXACIN PO      Take 500 mg by mouth 1 tablet by mouth once as needed. Take one tablet after each procedere        clobetasol 0.05 % ointment    TEMOVATE     daily as needed        COLON CLEANSER PO      Take 1 capsule by mouth daily Advanced Colon Care II        D-Mannose Powd      daily 500 mg Pt takes 2 pills daily        * EYE VITAMINS Caps      one tablet twice daily        * PRESERVISION AREDS Caps     180 capsule    Take 1 capsule by mouth 2 times  daily    Macular degeneration       lidocaine 4 % Crea cream    LMX4     Apply topically daily as needed        nebulizer nebulization     1    Use as directed    Mild persistent asthma       omeprazole 20 MG CR capsule    priLOSEC    90 capsule    Take 1 capsule (20 mg) by mouth daily    Gastroesophageal reflux disease without esophagitis       oxyCODONE-acetaminophen 5-325 MG per tablet    PERCOCET    30 tablet    Take 1 tablet by mouth every 4 hours as needed for pain for pain.    Cervicalgia, Acute upper back pain       PARoxetine 30 MG tablet    PAXIL    45 tablet    TAKE 1/2 TABLET BY MOUTH AT BEDTIME    RAMÓN (generalized anxiety disorder)       VITAMIN C PO      Take by mouth 2 times daily 500 mg take 1 daily        * Notice:  This list has 2 medication(s) that are the same as other medications prescribed for you. Read the directions carefully, and ask your doctor or other care provider to review them with you.

## 2018-03-08 NOTE — PROGRESS NOTES
Edison Boss is a pleasant 89-year-old male who has urinary retention and severe bladder spasticity. His bladder is managed with an indwelling Christianson this changed every 4 weeks. His prostate cancer was treated years ago with radiation therapy. He occasionally needs Botox injections for severe bladder spasticity. This past month has been good for him with no increase in his bladder spasms.  Other past medical history previously reviewed  Medications: No change  Allergies: Cephalosporin, sulfa, Levaquin, Zithromax, Macrodantin  Exam: Normal appearance, alert and oriented  Christianson catheter removed and new Christianson placed, 4 cc in Christianson balloon, bladder irrigates clear.  Assessment: History of prostate cancer, urinary retention and severe bladder spasticity  Plan: See me every 4 weeks for catheter change. Botox injections p.r.n.

## 2018-03-08 NOTE — LETTER
3/8/2018       RE: Edison Boss  [address]    [facility] [place] [facility] [place] [address]     Dear Colleague,    Thank you for referring your patient, Edison Boss, to the [facility]OLOG[facility] CL[facility] [place] at [facility]. Please see a copy of my visit note below.    Edison Boss is a pleasant 89-year-old male who has urinary retention and severe bladder spasticity. His bladder is managed with an indwelling Christianson this changed every 4 weeks. His prostate cancer was treated years ago with radiation therapy. He occasionally needs Botox injections for severe bladder spasticity. This past month has been good for him with no increase in his bladder spasms.  Other past medical history previously reviewed  Medications: No change  Allergies: Cephalosporin, sulfa, Levaquin, Zithromax, Macrodantin  Exam: Normal appearance, alert and oriented  Christianson catheter removed and new Christianson placed, 4 cc in Christianson balloon, bladder irrigates clear.  Assessment: History of prostate cancer, urinary retention and severe bladder spasticity  Plan: See me every 4 weeks for catheter change. Botox injections p.r.n.    Again, thank you for allowing me to participate in the care of your patient.      Sincerely,    Michael Lilly MD

## 2018-04-05 NOTE — PROGRESS NOTES
Edison Boss is a 89-year-old male with a history of prostate cancer and urinary retention. We change his Christianson catheter monthly. He has occasional Botox injections for severe bladder spasticity.  Unfortunately, Mrs. Calero  a few days ago.  Other past medical history: Urolithiasis, chronic pain, GERD, hyperlipidemia, hypertension, asthma, atrial fibrillation, sinus node dysfunction, sleep apnea, CVA, former smoker  Medications: No change  Allergies: No change  Exam: Normal appearance. Normal respirations. Normal external genitalia  Christianson catheter removed and new catheter inserted using sterile technique and lidocaine jelly. Christianson irrigated with clear returns  Assessment: History of prostate cancer, bladder spasticity, urinary retention  Plan: See monthly for catheter change

## 2018-04-05 NOTE — LETTER
2018       RE: Edison Boss  [address]    [place] [address]     Dear Colleague,    Thank you for referring your patient, Edison Boss, to the [facility]OLOG[facility] CL[facility] [place] at [facility]. Please see a copy of my visit note below.    Edison Boss is a 89-year-old male with a history of prostate cancer and urinary retention. We change his Christianson catheter monthly. He has occasional Botox injections for severe bladder spasticity.  Unfortunately, Mrs. Calero  a few days ago.  Other past medical history: Urolithiasis, chronic pain, GERD, hyperlipidemia, hypertension, asthma, atrial fibrillation, sinus node dysfunction, sleep apnea, CVA, former smoker  Medications: No change  Allergies: No change  Exam: Normal appearance. Normal respirations. Normal external genitalia  Christianson catheter removed and new catheter inserted using sterile technique and lidocaine jelly. Christianson irrigated with clear returns  Assessment: History of prostate cancer, bladder spasticity, urinary retention  Plan: See monthly for catheter change    Again, thank you for allowing me to participate in the care of your patient.      Sincerely,    Michael Lilly MD

## 2018-04-05 NOTE — NURSING NOTE
Pt here for cath change by wmk.  I removed cath with out any trouble and put 2 lido jets in and wmk came in and inserted the new cath.  PRIYANKA Juarez, CMA

## 2018-04-05 NOTE — MR AVS SNAPSHOT
After Visit Summary   4/5/2018    Edison Boss    MRN: [id number]           Patient Information     Date Of Birth          10/19/1924        Visit Information        Provider Department      4/5/2018 11:00 AM Michael Lilly MD [facility] [place]        Today's Diagnoses     Urinary retention    -  1       Follow-ups after your visit        Your next 10 appointments already scheduled     Apr 19, 2018  1:45 PM CDT   Remote PPM Check with SANDRA TECH1   [facility] ([facility])    [address]  [facility] [address] [phone] OPT 2           This appointment is for a remote check of your pacemaker.  This is not an appointment at the office.            May 03, 2018 11:00 AM CDT   Return Visit with Michael Lilly MD   [facility] ([facility])    [address]  [address]  [facility] [id number] [phone]            May 31, 2018 11:00 AM CDT   Return Visit with Michael Lilly MD   [facility] [place] ([facility])    [address]  [address]  [facility] [address] [phone]              Who to contact     If you have questions or need follow up information about today's clinic visit or your schedule please contact [facility] directly at [phone].  Normal or non-critical lab and imaging results will be communicated to you by MyChart, letter or phone within 4 business days after the clinic has received the results. If you do not hear from us within 7 days, please contact the clinic through MyChart or phone. If you have a critical or abnormal lab result, we will notify you by phone as soon as possible.  Submit refill requests through [employer] or call your pharmacy and they will forward the refill  "request to us. Please allow 3 business days for your refill to be completed.          Additional Information About Your Visit        latakoohart Information     [employer] gives you secure access to your electronic health record. If you see a primary care provider, you can also send messages to your care team and make appointments. If you have questions, please call your primary care clinic.  If you do not have a primary care provider, please call [phone] and they will assist you.        Care EveryWhere ID     This is your Care EveryWhere ID. This could be used by other organizations to access your [place] medical records  [id number]        Your Vitals Were     Pulse Height Pulse Oximetry BMI (Body Mass Index)          60 1.778 m (5' 10\") 96% 24.68 kg/m2         Blood Pressure from Last 3 Encounters:   01/02/18 150/77   12/28/17 112/60   10/30/17 152/86    Weight from Last 3 Encounters:   04/05/18 78 kg (172 lb)   02/08/18 78 kg (172 lb)   01/02/18 78.2 kg (172 lb 8 oz)              We Performed the Following     INSERT BLADDER CATH, TEMP INDWELL SIMPLE (ARAMBULA) (45840)          Today's Medication Changes          These changes are accurate as of 4/5/18 11:42 AM.  If you have any questions, ask your nurse or doctor.               These medicines have changed or have updated prescriptions.        Dose/Directions    PARoxetine 30 MG tablet   Commonly known as:  PAXIL   This may have changed:    - how much to take  - additional instructions   Used for:  RAMÓN (generalized anxiety disorder)        TAKE 1/2 TABLET BY MOUTH AT BEDTIME   Quantity:  45 tablet   Refills:  3                Primary Care Provider Office Phone # Fax #    Porfirio Terrell [phone]       [address] [facility] [address]  [facility] [address]        Equal Access to Services     [facility] ALYSSIA AH: Shellie Denson, wasalvador luaftab, qaybta kaalmiguel chamberlain, yony nunn. So wakatie " [phone].    ATENCIÓN: Si dasia clark, tiene a lauren disposición servicios gratuitos de asistencia lingüística. Lesley cervantes [phone].    We comply with applicable federal civil rights laws and [place] laws. We do not discriminate on the basis of race, color, national origin, age, disability, sex, sexual orientation, or gender identity.            Thank you!     Thank you for choosing [facility] [place]  for your care. Our goal is always to provide you with excellent care. Hearing back from our patients is one way we can continue to improve our services. Please take a few minutes to complete the written survey that you may receive in the mail after your visit with us. Thank you!             Your Updated Medication List - Protect others around you: Learn how to safely use, store and throw away your medicines at www.disposemymeds.org.          This list is accurate as of 4/5/18 11:42 AM.  Always use your most recent med list.                   Brand Name Dispense Instructions for use Diagnosis    albuterol 108 (90 BASE) MCG/ACT Inhaler    PROAIR HFA/PROVENTIL HFA/VENTOLIN HFA    1 Inhaler    Inhale 2 puffs into the lungs every 6 hours as needed for shortness of breath / dyspnea.    Mild persistent asthma       amLODIPine 5 MG tablet    NORVASC    90 tablet    Take 1 tablet (5 mg) by mouth daily    Essential hypertension with goal blood pressure less than 140/90       AVEENO ECZEMA THERAPY 1 % Crea   Generic drug:  Colloidal Oatmeal      Externally apply topically daily        cetirizine 10 MG tablet    zyrTEC     Take 10 mg by mouth daily as needed        cholecalciferol 5000 UNITS Caps      Take 1 capsule by mouth daily. Takes 5000 units in the summer and 16289 units in the winter        CIPROFLOXACIN PO      Take 500 mg by mouth 1 tablet by mouth once as needed. Take one tablet after each procedere        clobetasol 0.05 % ointment    TEMOVATE     daily as needed         COLON CLEANSER PO      Take 1 capsule by mouth daily Advanced Colon Care II        D-Mannose Powd      daily 500 mg Pt takes 2 pills daily        * EYE VITAMINS Caps      one tablet twice daily        * PRESERVISION AREDS Caps     180 capsule    Take 1 capsule by mouth 2 times daily    Macular degeneration       lidocaine 4 % Crea cream    LMX4     Apply topically daily as needed        nebulizer nebulization     1    Use as directed    Mild persistent asthma       omeprazole 20 MG CR capsule    priLOSEC    90 capsule    Take 1 capsule (20 mg) by mouth daily    Gastroesophageal reflux disease without esophagitis       oxyCODONE-acetaminophen 5-325 MG per tablet    PERCOCET    30 tablet    Take 1 tablet by mouth every 4 hours as needed for pain for pain.    Cervicalgia, Acute upper back pain       PARoxetine 30 MG tablet    PAXIL    45 tablet    TAKE 1/2 TABLET BY MOUTH AT BEDTIME    RAMÓN (generalized anxiety disorder)       VITAMIN C PO      Take by mouth 2 times daily 500 mg take 1 daily        * Notice:  This list has 2 medication(s) that are the same as other medications prescribed for you. Read the directions carefully, and ask your doctor or other care provider to review them with you.

## 2018-04-19 NOTE — MR AVS SNAPSHOT
After Visit Summary   4/19/2018    Edison Boss    MRN: [id number]           Patient Information     Date Of Birth          10/19/1924        Visit Information        Provider Department      4/19/2018 1:45 PM SANDRA TECH1 [facility]        Today's Diagnoses     Cardiac pacemaker in situ    -  1       Follow-ups after your visit        Your next 10 appointments already scheduled     Apr 19, 2018  1:45 PM CDT   Remote PPM Check with SANDRA TECH1   [facility] ([facility])    [address]  [facility] [address] [phone] OPT 2           This appointment is for a remote check of your pacemaker.  This is not an appointment at the office.            May 03, 2018 11:00 AM CDT   Return Visit with Michael Lilly MD   [facility] [place] ([facility])    [address]  [address]  [facility] [address] [phone]            May 31, 2018 11:00 AM CDT   Return Visit with Michael Lilly MD   [facility] [place] ([facility])    [address]  [address]  [facility] [address] [phone]            Jul 26, 2018  2:45 PM CDT   Remote PPM Check with SANDRA TECH1   [facility] ([facility])    [address]  [facility] [address] [phone] OPT 2           This appointment is for a remote check of your pacemaker.  This is not an appointment at the office.              Who to contact     If you have questions or need follow up information about today's clinic visit or your schedule please contact [facility] directly at [phone].  Normal or non-critical lab and imaging results will be communicated to you by MyChart, letter or phone within 4 business days after the  clinic has received the results. If you do not hear from us within 7 days, please contact the clinic through [employer] or phone. If you have a critical or abnormal lab result, we will notify you by phone as soon as possible.  Submit refill requests through [employer] or call your pharmacy and they will forward the refill request to us. Please allow 3 business days for your refill to be completed.          Additional Information About Your Visit        [employer] Information     [employer] gives you secure access to your electronic health record. If you see a primary care provider, you can also send messages to your care team and make appointments. If you have questions, please call your primary care clinic.  If you do not have a primary care provider, please call [phone] and they will assist you.        Care EveryWhere ID     This is your Care EveryWhere ID. This could be used by other organizations to access your [place] medical records  [id number]         Blood Pressure from Last 3 Encounters:   01/02/18 150/77   12/28/17 112/60   10/30/17 152/86    Weight from Last 3 Encounters:   04/05/18 78 kg (172 lb)   02/08/18 78 kg (172 lb)   01/02/18 78.2 kg (172 lb 8 oz)              We Performed the Following     INTERROGATION DEVICE EVAL REMOTE, PACER/ICD (95402)     PM DEVICE INTERROGATE REMOTE (12858)          Today's Medication Changes          These changes are accurate as of 4/19/18  8:57 AM.  If you have any questions, ask your nurse or doctor.               These medicines have changed or have updated prescriptions.        Dose/Directions    PARoxetine 30 MG tablet   Commonly known as:  PAXIL   This may have changed:    - how much to take  - additional instructions   Used for:  RAMÓN (generalized anxiety disorder)        TAKE 1/2 TABLET BY MOUTH AT BEDTIME   Quantity:  45 tablet   Refills:  3                Primary Care Provider Office Phone # Fax #    Porfirio Terrell [phone]       [address]  [facility] [address]        Equal Access to Services     EMERITA CADE : Hadii vanessa ku hadvangie Soezekielali, waaxda luqadaha, qaybta kaectorkan chamberlain, yony valenzuelarajesse nunn. So [facility] [phone].    ATENCIÓN: Si habla español, tiene a lauren disposición servicios gratuitos de asistencia lingüística. Llame al [phone].    We comply with applicable federal civil rights laws and [place] laws. We do not discriminate on the basis of race, color, national origin, age, disability, sex, sexual orientation, or gender identity.            Thank you!     Thank you for choosing [facility]  for your care. Our goal is always to provide you with excellent care. Hearing back from our patients is one way we can continue to improve our services. Please take a few minutes to complete the written survey that you may receive in the mail after your visit with us. Thank you!             Your Updated Medication List - Protect others around you: Learn how to safely use, store and throw away your medicines at www.disposemymeds.org.          This list is accurate as of 4/19/18  8:57 AM.  Always use your most recent med list.                   Brand Name Dispense Instructions for use Diagnosis    albuterol 108 (90 Base) MCG/ACT Inhaler    PROAIR HFA/PROVENTIL HFA/VENTOLIN HFA    1 Inhaler    Inhale 2 puffs into the lungs every 6 hours as needed for shortness of breath / dyspnea.    Mild persistent asthma       amLODIPine 5 MG tablet    NORVASC    90 tablet    Take 1 tablet (5 mg) by mouth daily    Essential hypertension with goal blood pressure less than 140/90       AVEENO ECZEMA THERAPY 1 % Crea   Generic drug:  Colloidal Oatmeal      Externally apply topically daily        cetirizine 10 MG tablet    zyrTEC     Take 10 mg by mouth daily as needed        cholecalciferol 5000 units Caps      Take 1 capsule by mouth daily. Takes 5000 units in the summer and 20463 units in the  winter        CIPROFLOXACIN PO      Take 500 mg by mouth 1 tablet by mouth once as needed. Take one tablet after each procedere        clobetasol 0.05 % ointment    TEMOVATE     daily as needed        COLON CLEANSER PO      Take 1 capsule by mouth daily Advanced Colon Care II        D-Mannose Powd      daily 500 mg Pt takes 2 pills daily        * EYE VITAMINS Caps      one tablet twice daily        * PRESERVISION AREDS Caps     180 capsule    Take 1 capsule by mouth 2 times daily    Macular degeneration       lidocaine 4 % Crea cream    LMX4     Apply topically daily as needed        nebulizer nebulization     1    Use as directed    Mild persistent asthma       omeprazole 20 MG CR capsule    priLOSEC    90 capsule    Take 1 capsule (20 mg) by mouth daily    Gastroesophageal reflux disease without esophagitis       oxyCODONE-acetaminophen 5-325 MG per tablet    PERCOCET    30 tablet    Take 1 tablet by mouth every 4 hours as needed for pain for pain.    Cervicalgia, Acute upper back pain       PARoxetine 30 MG tablet    PAXIL    45 tablet    TAKE 1/2 TABLET BY MOUTH AT BEDTIME    ARMÓN (generalized anxiety disorder)       VITAMIN C PO      Take by mouth 2 times daily 500 mg take 1 daily        * Notice:  This list has 2 medication(s) that are the same as other medications prescribed for you. Read the directions carefully, and ask your doctor or other care provider to review them with you.

## 2018-04-19 NOTE — PROGRESS NOTES
[employer] (D) Remote PPM Device Check  AP: 97 % : 0 %  Mode: DDDR        Presenting Rhythm: AP/VS  Heart Rate: Adequate rates per histogram  Sensing: Stable    Pacing Threshold: Stable    Impedance: Stable  Battery Status: 13.5 years  Atrial Arrhythmia: None  Ventricular Arrhythmia: 1 ventricular high rate. EGM shows As=Vs for PAT lasting 10 beats, rates 135-175bpm.     Care Plan: F/u PPM [place] q 3 months. Gave patient results over the phone. Joe,CVT

## 2018-05-03 NOTE — PROGRESS NOTES
Edison Boss is a 89-year-old gentleman with a history of prostate cancer and urinary retention. He also has severe spasticity of the bladder that has been treated with Botox injections.  He and his family are still grieving over the loss of his wife several weeks ago.  Other past medical history: Renal calculus, chronic pain, GERD, hyperlipidemia, hypertension, asthma, A. fib, sinus node dysfunction, sleep apnea, CVA, former smoker  Family history: Prostate cancer, cerebrovascular disease, lung cancer, heart disease  Medications: Albuterol, amlodipine, vitamin C, Zyrtec, vitamin D, d-mannose powder, Temovate ointment, colloidal male cream, eye vitamins, lidocaine topical cream, multivitamin, omeprazole, Percocet, Paxil  Allergies: Cephalosporins, sulfa, Levaquin, Zithromax, Macrodantin  Exam: Normal appearance, alert and oriented. Normal external genitalia. Christianson catheter change with sterile technique. 4-1/2 cc in Christianson balloon, catheter irrigates with clear returns. Very small bladder capacity  Assessment: Adenocarcinoma of the prostate, urinary retention, bladder spasticity  Plan: Christianson change monthly, Botox injections p.r.n., no future PSAs per patient request  
Statement Selected

## 2018-05-03 NOTE — LETTER
5/3/2018       RE: Edison Boss  [address]    [facility] [place] [facility] [address]     Dear Colleague,    Thank you for referring your patient, Edison Boss, to the [facility] [place] at [facility]. Please see a copy of my visit note below.    Edison Boss is a 89-year-old gentleman with a history of prostate cancer and urinary retention. He also has severe spasticity of the bladder that has been treated with Botox injections.  He and his family are still grieving over the loss of his wife several weeks ago.  Other past medical history: Renal calculus, chronic pain, GERD, hyperlipidemia, hypertension, asthma, A. fib, sinus node dysfunction, sleep apnea, CVA, former smoker  Family history: Prostate cancer, cerebrovascular disease, lung cancer, heart disease  Medications: Albuterol, amlodipine, vitamin C, Zyrtec, vitamin D, d-mannose powder, Temovate ointment, colloidal male cream, eye vitamins, lidocaine topical cream, multivitamin, omeprazole, Percocet, Paxil  Allergies: Cephalosporins, sulfa, Levaquin, Zithromax, Macrodantin  Exam: Normal appearance, alert and oriented. Normal external genitalia. Christianson catheter change with sterile technique. 4-1/2 cc in Christianson balloon, catheter irrigates with clear returns. Very small bladder capacity  Assessment: Adenocarcinoma of the prostate, urinary retention, bladder spasticity  Plan: Christianson change monthly, Botox injections p.r.n., no future PSAs per patient request    Again, thank you for allowing me to participate in the care of your patient.      Sincerely,    Michael Lilly MD

## 2018-05-03 NOTE — MR AVS SNAPSHOT
After Visit Summary   5/3/2018    Edison Boss    MRN: [id number]           Patient Information     Date Of Birth          10/19/1924        Visit Information        Provider Department      5/3/2018 11:00 AM Michael Lilly MD [facility]        Today's Diagnoses     Urinary retention    -  1       Follow-ups after your visit        Your next 10 appointments already scheduled     May 31, 2018 11:00 AM CDT   Return Visit with Michael Lilly MD   [facility] ([facility])    [address]  [address]  [facility] [id number] [phone]            Jul 26, 2018  2:45 PM CDT   Remote PPM Check with SANDRA TECH1   [facility] ([facility])    [address]  [facility] [id number] [phone] OPT 2           This appointment is for a remote check of your pacemaker.  This is not an appointment at the office.              Who to contact     If you have questions or need follow up information about today's clinic visit or your schedule please contact [facility] UROLOGY [facility] directly at [phone].  Normal or non-critical lab and imaging results will be communicated to you by MyChart, letter or phone within 4 business days after the clinic has received the results. If you do not hear from us within 7 days, please contact the clinic through [employer] or phone. If you have a critical or abnormal lab result, we will notify you by phone as soon as possible.  Submit refill requests through [employer] or call your pharmacy and they will forward the refill request to us. Please allow 3 business days for your refill to be completed.          Additional Information About Your Visit        MyChart Information     [employer] gives you secure access to your electronic health record. If you see a primary  care provider, you can also send messages to your care team and make appointments. If you have questions, please call your primary care clinic.  If you do not have a primary care provider, please call [phone] and they will assist you.        Care EveryWhere ID     This is your Care EveryWhere ID. This could be used by other organizations to access your [place] medical records  [id number]         Blood Pressure from Last 3 Encounters:   01/02/18 150/77   12/28/17 112/60   10/30/17 152/86    Weight from Last 3 Encounters:   04/05/18 78 kg (172 lb)   02/08/18 78 kg (172 lb)   01/02/18 78.2 kg (172 lb 8 oz)              We Performed the Following     INSERT BLADDER CATH, TEMP INDWELL SIMPLE (ARAMBULA) (04759)          Today's Medication Changes          These changes are accurate as of 5/3/18 11:30 AM.  If you have any questions, ask your nurse or doctor.               These medicines have changed or have updated prescriptions.        Dose/Directions    PARoxetine 30 MG tablet   Commonly known as:  PAXIL   This may have changed:    - how much to take  - additional instructions   Used for:  RAMÓN (generalized anxiety disorder)        TAKE 1/2 TABLET BY MOUTH AT BEDTIME   Quantity:  45 tablet   Refills:  3                Primary Care Provider Office Phone # Fax #    Porfirio Terrell [phone]       [address] [address]        Equal Access to Services     [facility]ADIA [facility] Hospital[facility]: Hadnoel marie Soiliana, waaxda luqadaha, qaybta kaalmakan chamberlain, yony mcclure . So [facility] [phone].    ATENCIÓN: Si habla español, tiene a lauren disposición servicios gratuitos de asistencia lingüística. Lesley al [phone].    We comply with applicable federal civil rights laws and [place] laws. We do not discriminate on the basis of race, color, national origin, age, disability, sex, sexual orientation, or gender identity.            Thank you!     Thank you for choosing  [facility] UROLOGY CLINIC [place]  for your care. Our goal is always to provide you with excellent care. Hearing back from our patients is one way we can continue to improve our services. Please take a few minutes to complete the written survey that you may receive in the mail after your visit with us. Thank you!             Your Updated Medication List - Protect others around you: Learn how to safely use, store and throw away your medicines at www.disposemymeds.org.          This list is accurate as of 5/3/18 11:30 AM.  Always use your most recent med list.                   Brand Name Dispense Instructions for use Diagnosis    albuterol 108 (90 Base) MCG/ACT Inhaler    PROAIR HFA/PROVENTIL HFA/VENTOLIN HFA    1 Inhaler    Inhale 2 puffs into the lungs every 6 hours as needed for shortness of breath / dyspnea.    Mild persistent asthma       amLODIPine 5 MG tablet    NORVASC    90 tablet    Take 1 tablet (5 mg) by mouth daily    Essential hypertension with goal blood pressure less than 140/90       AVEENO ECZEMA THERAPY 1 % Crea   Generic drug:  Colloidal Oatmeal      Externally apply topically daily        cetirizine 10 MG tablet    zyrTEC     Take 10 mg by mouth daily as needed        cholecalciferol 5000 units Caps      Take 1 capsule by mouth daily. Takes 5000 units in the summer and 83658 units in the winter        CIPROFLOXACIN PO      Take 500 mg by mouth 1 tablet by mouth once as needed. Take one tablet after each procedere        clobetasol 0.05 % ointment    TEMOVATE     daily as needed        COLON CLEANSER PO      Take 1 capsule by mouth daily Advanced Colon Care II        D-Mannose Powd      daily 500 mg Pt takes 2 pills daily        * EYE VITAMINS Caps      one tablet twice daily        * PRESERVISION AREDS Caps     180 capsule    Take 1 capsule by mouth 2 times daily    Macular degeneration       lidocaine 4 % Crea cream    LMX4     Apply topically daily as needed         nebulizer nebulization     1    Use as directed    Mild persistent asthma       omeprazole 20 MG CR capsule    priLOSEC    90 capsule    Take 1 capsule (20 mg) by mouth daily    Gastroesophageal reflux disease without esophagitis       oxyCODONE-acetaminophen 5-325 MG per tablet    PERCOCET    30 tablet    Take 1 tablet by mouth every 4 hours as needed for pain for pain.    Cervicalgia, Acute upper back pain       PARoxetine 30 MG tablet    PAXIL    45 tablet    TAKE 1/2 TABLET BY MOUTH AT BEDTIME    RAMÓN (generalized anxiety disorder)       VITAMIN C PO      Take by mouth 2 times daily 500 mg take 1 daily        * Notice:  This list has 2 medication(s) that are the same as other medications prescribed for you. Read the directions carefully, and ask your doctor or other care provider to review them with you.

## 2018-05-31 NOTE — PROGRESS NOTES
Edison Boss is a 89-year-old male with a history of adenocarcinoma of the prostate treated with radiation. He has urinary retention that is managed with an indwelling Christianson catheter. He has a severely spastic bladder that is treated with Botox injections periodically. He's noticed a few more bladder spasms this past month-discussed follow-up Botox injections at [facility] in the next month or 2 if needed.  Other past medical history: Reviewed  Medications and allergies reviewed  Exam: Normal appearance, alert and oriented, normocephalic. Christianson catheter changed with sterile technique  Assessment: Urinary retention, history of prostate cancer, severe bladder spasticity  Plan: Monthly catheter change. Botox injections p.r.n.

## 2018-05-31 NOTE — LETTER
5/31/2018     RE: Edison Boss  [address]  [address]  [facility] [address]     Dear Colleague,    Thank you for referring your patient, Edison Boss, to the [facility] UROLOGY CLINIC [place] at [facility]. Please see a copy of my visit note below.    Edison Boss is a 89-year-old male with a history of adenocarcinoma of the prostate treated with radiation. He has urinary retention that is managed with an indwelling Christianson catheter. He has a severely spastic bladder that is treated with Botox injections periodically. He's noticed a few more bladder spasms this past month-discussed follow-up Botox injections at [facility] in the next month or 2 if needed.  Other past medical history: Reviewed  Medications and allergies reviewed  Exam: Normal appearance, alert and oriented, normocephalic. Christianson catheter changed with sterile technique  Assessment: Urinary retention, history of prostate cancer, severe bladder spasticity  Plan: Monthly catheter change. Botox injections p.r.n.    Again, thank you for allowing me to participate in the care of your patient.      Sincerely,    Michael Lilly MD

## 2018-05-31 NOTE — MR AVS SNAPSHOT
After Visit Summary   5/31/2018    Edison Boss    MRN: [id number]           Patient Information     Date Of Birth          10/19/1924        Visit Information        Provider Department      5/31/2018 11:00 AM Michael Lilly MD [facility]        Today's Diagnoses     Urinary retention    -  1       Follow-ups after your visit        Your next 10 appointments already scheduled     Jun 28, 2018 11:10 AM CDT   Return Visit with Michael Lilly MD   [facility] ([facility])    [address]  [address]  [facility] [address]   [phone]            Jul 26, 2018 11:00 AM CDT   Return Visit with Michael Lilly MD   [facility] ([facility])    [address]  [address]  [facility] [id number] [phone]            Jul 26, 2018  2:45 PM CDT   Remote PPM Check with SANDRA TECH1   [facility] ([facility])    [address] [place], [place] [id number]  [facility] [id number] [phone] OPT 2           This appointment is for a remote check of your pacemaker.  This is not an appointment at the office.            Aug 28, 2018  3:15 PM CDT   [facility] EP RETURN with Jose Dumont MD   [facility] ([facility])    [address] [place] [address] [address] [place] [id number] [phone] OPT 2              Who to contact     If you have questions or need follow up information about today's clinic visit or your schedule please contact [facility]OLOG[facility] directly at [phone].  Normal or non-critical lab and imaging results will be communicated to you by MyChart, letter or phone within 4 business days after the clinic has received the results. If you do not hear from us within 7 days, please contact the  clinic through [employer] or phone. If you have a critical or abnormal lab result, we will notify you by phone as soon as possible.  Submit refill requests through [employer] or call your pharmacy and they will forward the refill request to us. Please allow 3 business days for your refill to be completed.          Additional Information About Your Visit        [employer] Information     [employer] gives you secure access to your electronic health record. If you see a primary care provider, you can also send messages to your care team and make appointments. If you have questions, please call your primary care clinic.  If you do not have a primary care provider, please call [phone] and they will assist you.        Care EveryWhere ID     This is your Care EveryWhere ID. This could be used by other organizations to access your [place] medical records  [id number]         Blood Pressure from Last 3 Encounters:   01/02/18 150/77   12/28/17 112/60   10/30/17 152/86    Weight from Last 3 Encounters:   04/05/18 78 kg (172 lb)   02/08/18 78 kg (172 lb)   01/02/18 78.2 kg (172 lb 8 oz)              We Performed the Following     INSERT BLADDER CATH, TEMP INDWELL SIMPLE (ARAMBULA) ([address])          Today's Medication Changes          These changes are accurate as of 5/31/18 11:44 AM.  If you have any questions, ask your nurse or doctor.               These medicines have changed or have updated prescriptions.        Dose/Directions    PARoxetine 30 MG tablet   Commonly known as:  PAXIL   This may have changed:    - how much to take  - additional instructions   Used for:  RAMÓN (generalized anxiety disorder)        TAKE 1/2 TABLET BY MOUTH AT BEDTIME   Quantity:  45 tablet   Refills:  3                Primary Care Provider Office Phone # Fax #    Porfirio Terrell [phone]       [address] [address]        Equal Access to Services     EMERITA CADE : darek [place],  yony gilliam ah. So [facility] [phone].    ATENCIÓN: Si dasia clark, tiene a laruen disposición servicios gratuitos de asistencia lingüística. Lesley al [phone].    We comply with applicable federal civil rights laws and [place] laws. We do not discriminate on the basis of race, color, national origin, age, disability, sex, sexual orientation, or gender identity.            Thank you!     Thank you for choosing [facility]OLOGY CLINIC [place]  for your care. Our goal is always to provide you with excellent care. Hearing back from our patients is one way we can continue to improve our services. Please take a few minutes to complete the written survey that you may receive in the mail after your visit with us. Thank you!             Your Updated Medication List - Protect others around you: Learn how to safely use, store and throw away your medicines at www.disposemymeds.org.          This list is accurate as of 5/31/18 11:44 AM.  Always use your most recent med list.                   Brand Name Dispense Instructions for use Diagnosis    albuterol 108 (90 Base) MCG/ACT Inhaler    PROAIR HFA/PROVENTIL HFA/VENTOLIN HFA    1 Inhaler    Inhale 2 puffs into the lungs every 6 hours as needed for shortness of breath / dyspnea.    Mild persistent asthma       amLODIPine 5 MG tablet    NORVASC    90 tablet    Take 1 tablet (5 mg) by mouth daily    Essential hypertension with goal blood pressure less than 140/90       AVEENO ECZEMA THERAPY 1 % Crea   Generic drug:  Colloidal Oatmeal      Externally apply topically daily        cetirizine 10 MG tablet    zyrTEC     Take 10 mg by mouth daily as needed        cholecalciferol 5000 units Caps      Take 1 capsule by mouth daily. Takes 5000 units in the summer and [address]187 units in the winter        CIPROFLOXACIN PO      Take 500 mg by mouth 1 tablet by mouth once as needed. Take one tablet after each  procedere        clobetasol 0.05 % ointment    TEMOVATE     daily as needed        COLON CLEANSER PO      Take 1 capsule by mouth daily Advanced Colon Care II        D-Mannose Powd      daily 500 mg Pt takes 2 pills daily        * EYE VITAMINS Caps      one tablet twice daily        * PRESERVISION AREDS Caps     180 capsule    Take 1 capsule by mouth 2 times daily    Macular degeneration       lidocaine 4 % Crea cream    LMX4     Apply topically daily as needed        nebulizer nebulization     1    Use as directed    Mild persistent asthma       omeprazole 20 MG CR capsule    priLOSEC    90 capsule    Take 1 capsule (20 mg) by mouth daily    Gastroesophageal reflux disease without esophagitis       oxyCODONE-acetaminophen 5-325 MG per tablet    PERCOCET    30 tablet    Take 1 tablet by mouth every 4 hours as needed for pain for pain.    Cervicalgia, Acute upper back pain       PARoxetine 30 MG tablet    PAXIL    45 tablet    TAKE 1/2 TABLET BY MOUTH AT BEDTIME    RAMÓN (generalized anxiety disorder)       VITAMIN C PO      Take by mouth 2 times daily 500 mg take 1 daily        * Notice:  This list has 2 medication(s) that are the same as other medications prescribed for you. Read the directions carefully, and ask your doctor or other care provider to review them with you.

## 2018-06-28 NOTE — LETTER
6/28/2018       RE: Edison Boss  [address]  [facility] [address]     Dear Colleague,    Thank you for referring your patient, Edison Boss, to the [facility]OLOG[facility] CL[facility]IC [place] at [facility]. Please see a copy of my visit note below.    Edison is a 89-year-old male with a history of prostate cancer, bladder spasticity and urinary retention. His spasms have not been bad the past 4 weeks. He is here for catheter replacement. We are not planning any future PSAs.  Review of systems: Low back pain-he has had physical therapy for this in the past several times and it has helped.  Other past medical history, medications, allergies reviewed  Exam: Alert and oriented, normocephalic, normal external genitalia. Christianson catheter changed with sterile technique and 2% lidocaine jelly. Christianson irrigated with clear returns. 4 cc of saline in Christianson balloon.  Assessment: Urinary retention  Plan: See me in 4 weeks for catheter change. Botox injections p.r.n., physical therapy for back pain as needed    Again, thank you for allowing me to participate in the care of your patient.      Sincerely,    Michael Lilly MD

## 2018-06-28 NOTE — MR AVS SNAPSHOT
After Visit Summary   6/28/2018    Edison Boss    MRN: [id number]           Patient Information     Date Of Birth          10/19/1924        Visit Information        Provider Department      6/28/2018 11:10 AM Michael Lilly MD [facility]        Today's Diagnoses     Urinary retention    -  1       Follow-ups after your visit        Follow-up notes from your care team     Return in about 4 weeks (around 7/26/2018) for catheter change.      Your next 10 appointments already scheduled     Jul 26, 2018 11:00 AM CDT   Return Visit with Michael Lilly MD   [facility] ([facility] [place])    [address]  [address]  [facility] [id number] [phone]            Jul 26, 2018  2:45 PM CDT   Remote PPM Check with SANDRA TECH1   [facility] ([facility])    [address]  [facility] [id number] [phone] OPT 2           This appointment is for a remote check of your pacemaker.  This is not an appointment at the office.            Aug 28, 2018  3:15 PM CDT   [facility] EP RETURN with Jose Dumont MD   [facility] ([facility])    [address]  [facility] [id number] [phone] OPT 2              Who to contact     If you have questions or need follow up information about today's clinic visit or your schedule please contact [facility] directly at [phone].  Normal or non-critical lab and imaging results will be communicated to you by MyChart, letter or phone within 4 business days after the clinic has received the results. If you do not hear from us within 7 days, please contact the clinic through MyChart or phone. If you have a critical or abnormal lab result, we will notify you by phone as soon as  possible.  Submit refill requests through CogniFit or call your pharmacy and they will forward the refill request to us. Please allow 3 business days for your refill to be completed.          Additional Information About Your Visit        [employer] Information     C[employer] gives you secure access to your electronic health record. If you see a primary care provider, you can also send messages to your care team and make appointments. If you have questions, please call your primary care clinic.  If you do not have a primary care provider, please call [phone] and they will assist you.        Care EveryWhere ID     This is your Care EveryWhere ID. This could be used by other organizations to access your [place] medical records  [id number]         Blood Pressure from Last 3 Encounters:   01/02/18 150/77   12/28/17 112/60   10/30/17 152/86    Weight from Last 3 Encounters:   04/05/18 78 kg (172 lb)   02/08/18 78 kg (172 lb)   01/02/18 78.2 kg (172 lb 8 oz)              We Performed the Following     INSERT BLADDER CATH, TEMP INDWELL SIMPLE (ARAMBULA) (34310)          Today's Medication Changes          These changes are accurate as of 6/28/18 11:33 AM.  If you have any questions, ask your nurse or doctor.               These medicines have changed or have updated prescriptions.        Dose/Directions    PARoxetine 30 MG tablet   Commonly known as:  PAXIL   This may have changed:    - how much to take  - additional instructions   Used for:  RAMÓN (generalized anxiety disorder)        TAKE 1/2 TABLET BY MOUTH AT BEDTIME   Quantity:  45 tablet   Refills:  3                Primary Care Provider Office Phone # Fax #    Porfirio Terrell [phone]       [address] [facility] [address]  [facility] [address]        Equal Access to Services     [facility] : Shellie Denson, darek griffith, yony gilliam. So [facility] [phone].    ATENCIÓN: Juan forman  español, tiene a lauren disposición servicios gratuitos de asistencia lingüística. Lesley cervantes [phone].    We comply with applicable federal civil rights laws and [place] laws. We do not discriminate on the basis of race, color, national origin, age, disability, sex, sexual orientation, or gender identity.            Thank you!     Thank you for choosing [facility] [place]  for your care. Our goal is always to provide you with excellent care. Hearing back from our patients is one way we can continue to improve our services. Please take a few minutes to complete the written survey that you may receive in the mail after your visit with us. Thank you!             Your Updated Medication List - Protect others around you: Learn how to safely use, store and throw away your medicines at www.disposemymeds.org.          This list is accurate as of 6/28/18 11:33 AM.  Always use your most recent med list.                   Brand Name Dispense Instructions for use Diagnosis    albuterol 108 (90 Base) MCG/ACT Inhaler    PROAIR HFA/PROVENTIL HFA/VENTOLIN HFA    1 Inhaler    Inhale 2 puffs into the lungs every 6 hours as needed for shortness of breath / dyspnea.    Mild persistent asthma       amLODIPine 5 MG tablet    NORVASC    90 tablet    Take 1 tablet (5 mg) by mouth daily    Essential hypertension with goal blood pressure less than 140/90       AVEENO ECZEMA THERAPY 1 % Crea   Generic drug:  Colloidal Oatmeal      Externally apply topically daily        cetirizine 10 MG tablet    zyrTEC     Take 10 mg by mouth daily as needed        cholecalciferol 5000 units Caps      Take 1 capsule by mouth daily. Takes 5000 units in the summer and 89696 units in the winter        CIPROFLOXACIN PO      Take 500 mg by mouth 1 tablet by mouth once as needed. Take one tablet after each procedere        clobetasol 0.05 % ointment    TEMOVATE     daily as needed        COLON CLEANSER PO      Take 1 capsule  by mouth daily Advanced Colon Care II        D-Mannose Powd      daily 500 mg Pt takes 2 pills daily        * EYE VITAMINS Caps      one tablet twice daily        * PRESERVISION AREDS Caps     180 capsule    Take 1 capsule by mouth 2 times daily    Macular degeneration       lidocaine 4 % Crea cream    LMX4     Apply topically daily as needed        nebulizer nebulization     1    Use as directed    Mild persistent asthma       omeprazole 20 MG CR capsule    priLOSEC    90 capsule    Take 1 capsule (20 mg) by mouth daily    Gastroesophageal reflux disease without esophagitis       oxyCODONE-acetaminophen 5-325 MG per tablet    PERCOCET    30 tablet    Take 1 tablet by mouth every 4 hours as needed for pain for pain.    Cervicalgia, Acute upper back pain       PARoxetine 30 MG tablet    PAXIL    45 tablet    TAKE 1/2 TABLET BY MOUTH AT BEDTIME    RAMÓN (generalized anxiety disorder)       VITAMIN C PO      Take by mouth 2 times daily 500 mg take 1 daily        * Notice:  This list has 2 medication(s) that are the same as other medications prescribed for you. Read the directions carefully, and ask your doctor or other care provider to review them with you.

## 2018-07-26 NOTE — MR AVS SNAPSHOT
After Visit Summary   7/26/2018    Edison Boss    MRN: [id number]           Patient Information     Date Of Birth          10/19/1924        Visit Information        Provider Department      7/26/2018 11:00 AM Michael Lilly MD [facility]        Today's Diagnoses     Urinary incontinence, unspecified type    -  1       Follow-ups after your visit        Your next 10 appointments already scheduled     Jul 26, 2018  2:45 PM CDT   Remote PPM Check with SANDRA TECH1   [facility] ([facility])    [address]  [facility] [address] [phone] OPT 2           This appointment is for a remote check of your pacemaker.  This is not an appointment at the office.            Aug 28, 2018  3:15 PM CDT   [facility] EP RETURN with Jose Dumont MD   [facility] ([facility])    [address]  [facility] [address] [phone] OPT 2            Aug 30, 2018 11:00 AM CDT   Return Visit with Michael Lilly MD   [facility] ([facility] [place])    [address]  [address]  [facility] [address] [phone]            Sep 25, 2018  1:40 PM CDT   Return Visit with Michael Lilly MD   [facility] ([facility] [place])    [address]  [address]  [facility] [id number] [phone]              Who to contact     If you have questions or need follow up information about today's clinic visit or your schedule please contact [facility] directly at [phone].  Normal or non-critical lab and imaging results will be communicated to you by MyChart, letter or phone within 4 business days after the clinic has received the results. If you do not hear from us within 7  days, please contact the clinic through [employer]isRo[employer] or phone. If you have a critical or abnormal lab result, we will notify you by phone as soon as possible.  Submit refill requests through [employer]isRooms or call your pharmacy and they will forward the refill request to us. Please allow 3 business days for your refill to be completed.          Additional Information About Your Visit        [employer] Information     [employer] gives you secure access to your electronic health record. If you see a primary care provider, you can also send messages to your care team and make appointments. If you have questions, please call your primary care clinic.  If you do not have a primary care provider, please call [phone] and they will assist you.        Care EveryWhere ID     This is your Care EveryWhere ID. This could be used by other organizations to access your [place] medical records  [id number]         Blood Pressure from Last 3 Encounters:   01/02/18 150/77   12/28/17 112/60   10/30/17 152/86    Weight from Last 3 Encounters:   04/05/18 78 kg (172 lb)   02/08/18 78 kg (172 lb)   01/02/18 78.2 kg (172 lb 8 oz)              We Performed the Following     INSERT BLADDER CATH, TEMP INDWELL SIMPLE (ARAMBULA) (31230)          Today's Medication Changes          These changes are accurate as of 7/26/18 11:38 AM.  If you have any questions, ask your nurse or doctor.               These medicines have changed or have updated prescriptions.        Dose/Directions    PARoxetine 30 MG tablet   Commonly known as:  PAXIL   This may have changed:    - how much to take  - additional instructions   Used for:  RAMÓN (generalized anxiety disorder)        TAKE 1/2 TABLET BY MOUTH AT BEDTIME   Quantity:  45 tablet   Refills:  3                Primary Care Provider Office Phone # Fax #    Porfirio Terrell [phone]       [address] [facility] [address]  [facility] [address]        Equal Access to Services     EMERITA CADE AH: Shellie marie  Soomaali, waaxda luqadaha, qaybta kaalmada bulmaro, yony richardsonjosué edouard. So [facility] [phone].    ATENCIÓN: Si dasia clark, tiene a lauren disposición servicios gratuitos de asistencia lingüística. Lesley al [phone].    We comply with applicable federal civil rights laws and [place] laws. We do not discriminate on the basis of race, color, national origin, age, disability, sex, sexual orientation, or gender identity.            Thank you!     Thank you for choosing [facility]OLOG[facility] [place]  for your care. Our goal is always to provide you with excellent care. Hearing back from our patients is one way we can continue to improve our services. Please take a few minutes to complete the written survey that you may receive in the mail after your visit with us. Thank you!             Your Updated Medication List - Protect others around you: Learn how to safely use, store and throw away your medicines at www.disposemymeds.org.          This list is accurate as of 7/26/18 11:38 AM.  Always use your most recent med list.                   Brand Name Dispense Instructions for use Diagnosis    albuterol 108 (90 Base) MCG/ACT Inhaler    PROAIR HFA/PROVENTIL HFA/VENTOLIN HFA    1 Inhaler    Inhale 2 puffs into the lungs every 6 hours as needed for shortness of breath / dyspnea.    Mild persistent asthma       amLODIPine 5 MG tablet    NORVASC    90 tablet    Take 1 tablet (5 mg) by mouth daily    Essential hypertension with goal blood pressure less than 140/90       AVEENO ECZEMA THERAPY 1 % Crea   Generic drug:  Colloidal Oatmeal      Externally apply topically daily        cetirizine 10 MG tablet    zyrTEC     Take 10 mg by mouth daily as needed        cholecalciferol 5000 units Caps      Take 1 capsule by mouth daily. Takes 5000 units in the summer and 99314 units in the winter        CIPROFLOXACIN PO      Take 500 mg by mouth 1 tablet by mouth once as needed. Take  one tablet after each procedere        clobetasol 0.05 % ointment    TEMOVATE     daily as needed        COLON CLEANSER PO      Take 1 capsule by mouth daily Advanced Colon Care II        D-Mannose Powd      daily 500 mg Pt takes 2 pills daily        * EYE VITAMINS Caps      one tablet twice daily        * PRESERVISION AREDS Caps     180 capsule    Take 1 capsule by mouth 2 times daily    Macular degeneration       lidocaine 4 % Crea cream    LMX4     Apply topically daily as needed        nebulizer nebulization     1    Use as directed    Mild persistent asthma       omeprazole 20 MG CR capsule    priLOSEC    90 capsule    Take 1 capsule (20 mg) by mouth daily    Gastroesophageal reflux disease without esophagitis       oxyCODONE-acetaminophen 5-325 MG per tablet    PERCOCET    30 tablet    Take 1 tablet by mouth every 4 hours as needed for pain for pain.    Cervicalgia, Acute upper back pain       PARoxetine 30 MG tablet    PAXIL    45 tablet    TAKE 1/2 TABLET BY MOUTH AT BEDTIME    RAMÓN (generalized anxiety disorder)       VITAMIN C PO      Take by mouth 2 times daily 500 mg take 1 daily        * Notice:  This list has 2 medication(s) that are the same as other medications prescribed for you. Read the directions carefully, and ask your doctor or other care provider to review them with you.

## 2018-07-26 NOTE — PROGRESS NOTES
Edison Boss is a 89-year-old male with a history of prostate cancer and urinary incontinence after prostate cancer treatment. He sees me monthly for catheter change. He occasionally needs Botox injections because of severe bladder spasticity. This past month has gone well.  Medications: No change  Allergies: Reviewed  Exam: Alert and oriented, with daughter. Normocephalic, normal respirations, neuro grossly intact. Normal external genitalia. Christianson change with sterile technique. 4cc in Christianson balloon and Christianson irrigates clear  Assessment: Urinary incontinence, prostate cancer-patient does not want PSA follow-ups  Plan: Christianson change every 4 weeks. Botox injections p.r.n.

## 2018-07-26 NOTE — LETTER
7/26/2018       RE: Edison Boss  [address]  [facility] [address]     Dear Colleague,    Thank you for referring your patient, Edison Boss, to the [facility] [place] at [facility]. Please see a copy of my visit note below.    Edison Boss is a 89-year-old male with a history of prostate cancer and urinary incontinence after prostate cancer treatment. He sees me monthly for catheter change. He occasionally needs Botox injections because of severe bladder spasticity. This past month has gone well.  Medications: No change  Allergies: Reviewed  Exam: Alert and oriented, with daughter. Normocephalic, normal respirations, neuro grossly intact. Normal external genitalia. Christianson change with sterile technique. 4cc in Christianson balloon and Christianson irrigates clear  Assessment: Urinary incontinence, prostate cancer-patient does not want PSA follow-ups  Plan: Christianson change every 4 weeks. Botox injections p.r.n.    Again, thank you for allowing me to participate in the care of your patient.      Sincerely,    Michael Lilly MD

## 2018-07-30 NOTE — MR AVS SNAPSHOT
After Visit Summary   7/30/2018    Edison Boss    MRN: [id number]           Patient Information     Date Of Birth          10/19/1924        Visit Information        Provider Department      7/30/2018 4:30 PM JOCY SPEAR [facility]        Today's Diagnoses     Cardiac pacemaker in situ    -  1       Follow-ups after your visit        Your next 10 appointments already scheduled     Jul 30, 2018  4:30 PM CDT   Remote PPM Check with JOCY SPEAR   [facility] ([facility])    [address]  [facility] [address] [phone] OPT 2           This appointment is for a remote check of your pacemaker.  This is not an appointment at the office.            Aug 28, 2018  3:15 PM CDT   [facility] EP RETURN with Jose Dumont MD   [facility] ([facility])    [address]  [facility] [address] [phone] OPT 2            Aug 30, 2018 11:00 AM CDT   Return Visit with Michael Lilly MD   [facility] [place] ([facility])    [address]  [address]  [facility] [id number] [phone]            Sep 25, 2018  1:40 PM CDT   Return Visit with Michael Lilly MD   [facility] [place] ([facility])    [address]  [address]  [facility] [address] [phone]              Who to contact     If you have questions or need follow up information about today's clinic visit or your schedule please contact [facility] directly at [phone].  Normal or non-critical lab and imaging results will be communicated to you by MyChart, letter or phone within 4 business days after the clinic has received the results. If you do not hear from us within 7 days, please contact the clinic through  Ipsumt or phone. If you have a critical or abnormal lab result, we will notify you by phone as soon as possible.  Submit refill requests through Reffpedia or call your pharmacy and they will forward the refill request to us. Please allow 3 business days for your refill to be completed.          Additional Information About Your Visit        [employer] Information     [employer] gives you secure access to your electronic health record. If you see a primary care provider, you can also send messages to your care team and make appointments. If you have questions, please call your primary care clinic.  If you do not have a primary care provider, please call [phone] and they will assist you.        Care EveryWhere ID     This is your Care EveryWhere ID. This could be used by other organizations to access your [place] medical records  [id number]         Blood Pressure from Last 3 Encounters:   01/02/18 150/77   12/28/17 112/60   10/30/17 152/86    Weight from Last 3 Encounters:   04/05/18 78 kg (172 lb)   02/08/18 78 kg (172 lb)   01/02/18 78.2 kg (172 lb 8 oz)              We Performed the Following     INTERROGATION DEVICE EVAL REMOTE, PACER/ICD (95932)     PM DEVICE INTERROGATE REMOTE (35460)          Today's Medication Changes          These changes are accurate as of 7/30/18  9:39 AM.  If you have any questions, ask your nurse or doctor.               These medicines have changed or have updated prescriptions.        Dose/Directions    PARoxetine 30 MG tablet   Commonly known as:  PAXIL   This may have changed:    - how much to take  - additional instructions   Used for:  RAMÓN (generalized anxiety disorder)        TAKE 1/2 TABLET BY MOUTH AT BEDTIME   Quantity:  45 tablet   Refills:  3                Primary Care Provider Office Phone # Fax #    Porfirio Terrell [phone]       [address] [address]        Equal Access to Services     EMERITA CADE AH: Shellie marie  Soezekielali, waaxda luqadaha, qaybta kaalmada bulmaro, yony cottrell nicolasmarek richardsonjosué edouard. So [facility] [phone].    ATENCIÓN: Si dasia clark, tiene a lauren disposición servicios gratuitos de asistencia lingüística. Lesley al [phone].    We comply with applicable federal civil rights laws and [place] laws. We do not discriminate on the basis of race, color, national origin, age, disability, sex, sexual orientation, or gender identity.            Thank you!     Thank you for choosing [facility]  for your care. Our goal is always to provide you with excellent care. Hearing back from our patients is one way we can continue to improve our services. Please take a few minutes to complete the written survey that you may receive in the mail after your visit with us. Thank you!             Your Updated Medication List - Protect others around you: Learn how to safely use, store and throw away your medicines at www.disposemymeds.org.          This list is accurate as of 7/30/18  9:39 AM.  Always use your most recent med list.                   Brand Name Dispense Instructions for use Diagnosis    albuterol 108 (90 Base) MCG/ACT Inhaler    PROAIR HFA/PROVENTIL HFA/VENTOLIN HFA    1 Inhaler    Inhale 2 puffs into the lungs every 6 hours as needed for shortness of breath / dyspnea.    Mild persistent asthma       amLODIPine 5 MG tablet    NORVASC    90 tablet    Take 1 tablet (5 mg) by mouth daily    Essential hypertension with goal blood pressure less than 140/90       AVEENO ECZEMA THERAPY 1 % Crea   Generic drug:  Colloidal Oatmeal      Externally apply topically daily        cetirizine 10 MG tablet    zyrTEC     Take 10 mg by mouth daily as needed        cholecalciferol 5000 units Caps      Take 1 capsule by mouth daily. Takes 5000 units in the summer and 96248 units in the winter        CIPROFLOXACIN PO      Take 500 mg by mouth 1 tablet by mouth once as needed. Take one  tablet after each procedere        clobetasol 0.05 % ointment    TEMOVATE     daily as needed        COLON CLEANSER PO      Take 1 capsule by mouth daily Advanced Colon Care II        D-Mannose Powd      daily 500 mg Pt takes 2 pills daily        * EYE VITAMINS Caps      one tablet twice daily        * PRESERVISION AREDS Caps     180 capsule    Take 1 capsule by mouth 2 times daily    Macular degeneration       lidocaine 4 % Crea cream    LMX4     Apply topically daily as needed        nebulizer nebulization     1    Use as directed    Mild persistent asthma       omeprazole 20 MG CR capsule    priLOSEC    90 capsule    Take 1 capsule (20 mg) by mouth daily    Gastroesophageal reflux disease without esophagitis       oxyCODONE-acetaminophen 5-325 MG per tablet    PERCOCET    30 tablet    Take 1 tablet by mouth every 4 hours as needed for pain for pain.    Cervicalgia, Acute upper back pain       PARoxetine 30 MG tablet    PAXIL    45 tablet    TAKE 1/2 TABLET BY MOUTH AT BEDTIME    RAMÓN (generalized anxiety disorder)       VITAMIN C PO      Take by mouth 2 times daily 500 mg take 1 daily        * Notice:  This list has 2 medication(s) that are the same as other medications prescribed for you. Read the directions carefully, and ask your doctor or other care provider to review them with you.

## 2018-07-30 NOTE — PROGRESS NOTES
[employer] (D) Remote PPM Device Check  AP: 96 % : 0 %  Mode: DDDR        Presenting Rhythm: AP/VS with PVCs  Heart Rate: Adequate rates per histogram  Sensing: Stable    Pacing Threshold: Stable    Impedance: Stable  Battery Status: 14.5 years  Atrial Arrhythmia: 2 mode switch episodes for PAT lasting 3-8 seconds.  Ventricular Arrhythmia: 505,522 total PVCs since 1/10/18.      Care Plan: F/u PPM Latitude q 3 months. Gave patient results over the phone. Joe,CVT

## 2018-08-25 NOTE — ED AVS SNAPSHOT
[facility] Emergency Department    [address]    [facility] [address]    Phone:  [phone]    Fax:  [phone]                                       Edison Boss   MRN: [id number]    Department:  [facility] Emergency Department   Date of Visit:  8/25/2018           Patient Information     Date Of Birth          10/19/1924        Your diagnoses for this visit were:     RLQ abdominal pain     Right inguinal hernia     Pyuria        You were seen by Cristiano Estevez MD.      Follow-up Information     Follow up with Porfirio Terrell MD In 3 days.    Specialty:  Internal Medicine    Contact information:    [address]  [facility] [address]  [phone]          Follow up with Michael Lilly MD In 5 days.    Specialty:  Urology    Why:  as scheduled    Contact information:    [address]  [facility] [id number] [phone]          Follow up with Boubacar Means MD In 3 days.    Specialty:  General Surgery    Contact information:    [address] [facility] [address]  [facility] [address]  [phone]          Discharge Instructions         Hernia (Adult)    A hernia can happen when there is a weakness or defect in the wall of the abdomen or groin. Intestines or nearby tissues may move from their usual location and push through the weakness in the wall. This can cause a hernia (bulge) you may see or feel.  Causes and risk factors   A hernia may be present at birth. Or it may be caused by the wear and tear of daily living. Certain factors can make a hernia more likely. These can include:    Heavy lifting    Straining, whether from lifting, movement, or constipation    Chronic cough    Injury to the abdominal wall    Excess weight    Pregnancy    Prior surgery    Older age    Family history of hernia  Symptoms  Symptoms of a hernia may come on suddenly. Or they may appear slowly over time. Some common symptoms include:    Bulge in the groin  area, around the navel, or in the scrotum (the bulge may get bigger when you stand and go away when you lie down)    Pain or pressure around the bulge    Pain during activities such as lifting, coughing, or sneezing    A feeling of weakness or pressure in the groin    Pain or swelling in the scrotum  Types of hernias  There are different types of hernia. The type you have depends on its location:    Inguinal. This type is in the groin or scrotum. It is more common in men.    Femoral. This type is in the groin, upper thigh (where the leg bends), or labia. It is more common in women.    Ventral. This type is in the abdominal wall.    Umbilical. This type occurs around the navel (belly button).    Incisional. This type occurs at the site of a previous surgery.  The condition of the hernia can help determine how urgently it needs to be treated.    Reducible. It goes back in by itself, or it can be pushed back in.    Irreducible. It can t be pushed back in.    Incarcerated/strangulated. The intestine is trapped (incarcerated). If this happens, you won t be able to push the bulge back in. If the incarcerated hernia isn t treated, it may become strangulated. This means the area loses blood supply and the tissue may die. This requires emergency surgery. You need treatment right away.  In most cases, a hernia will not heal on its own. Surgery is usually needed to repair the defect in the abdominal wall or groin. You ll be told more about surgery, if needed.  If your symptoms are not severe, treatment may sometimes be delayed. In such cases, regular follow-up visits with the provider will be needed. You ll be asked to keep track of your symptoms and to watch for signs of more serious problems. You may also be given guidelines similar to the home care instructions below.  Home care  To help keep a hernia from getting worse, you may be advised to:    Avoid heavy lifting and straining as directed.    Take steps to prevent  constipation, such as eating more fiber and drinking more water. This may help reduce straining that can occur when having a bowel movement. Reducing straining may help keep your symptoms from getting worse.    Maintain a healthy weight or lose excess weight. This can help reduce strain on abdominal muscles and tissues.    Stop smoking. This can help prevent coughing that may also strain abdominal muscles and tissues.  Follow-up care  Follow up with your healthcare provider, or as directed. If imaging tests were done, they will be reviewed a doctor. You will be told the results and any new findings that may affect your care.  When to seek medical advice  Call your healthcare provider right away if any of these occur:    Hernia hardens, swells, or grows larger    Hernia can no longer be pushed back in    Pain moves to the lower right abdomen (just below the waistline), or spreads to the back  Call 911  Call 911 if any of these occur:    Nausea and vomiting    Severe pain, redness, or tenderness in the area near the hernia    Pain worsens quickly and doesn t get better    Inability to have a bowel movement or pass gas    Fever of 100.4 F (38 C) or higher, or as directed by your healthcare provider    Trouble breathing    Fainting    Rapid heart rate    Vomiting blood    Large amounts of blood in stool  Date Last Reviewed: 6/9/2015 2000-2017 [employer]. [address] [place] [address], [place] [address]. All rights reserved. This information is not intended as a substitute for professional medical care. Always follow your healthcare professional's instructions.          Your next 10 appointments already scheduled     Aug 28, 2018  3:15 PM CDT   [facility] EP RETURN with Jose Dumont MD   [facility] ([facility])    [address] [place] [id number]   [phone] OPT 2            Aug 30, 2018 11:00 AM CDT   Return Visit with Michael Lilly MD    [facility] ([facility])    [address]  [address]  [facility] [id number] [phone]            Sep 25, 2018  1:40 PM CDT   Return Visit with Michael Lilly MD   [facility] ([facility])    [address]  [address]  [facility] [id number] [phone]            Nov 05, 2018  1:45 PM CST   Remote PPM Check with SANDRA TECH1   [facility] ([facility])    [address] [facility] [address]  [facility] [id number] [phone] OPT 2           This appointment is for a remote check of your pacemaker.  This is not an appointment at the office.              24 Hour Appointment Hotline       To make an appointment at any [facility], call [phone] ([phone]). If you don't have a family doctor or clinic, we will help you find one. [facility] are conveniently located to serve the needs of you and your family.             Review of your medicines      Our records show that you are taking the medicines listed below. If these are incorrect, please call your family doctor or clinic.        Dose / Directions Last dose taken    albuterol 108 (90 Base) MCG/ACT inhaler   Commonly known as:  PROAIR HFA/PROVENTIL HFA/VENTOLIN HFA   Dose:  2 puff   Quantity:  1 Inhaler        Inhale 2 puffs into the lungs every 6 hours as needed for shortness of breath / dyspnea.   Refills:  1        amLODIPine 5 MG tablet   Commonly known as:  NORVASC   Dose:  5 mg   Quantity:  90 tablet        Take 1 tablet (5 mg) by mouth daily   Refills:  3        AVEENO ECZEMA THERAPY 1 % Crea   Generic drug:  Colloidal Oatmeal        Externally apply topically daily   Refills:  0        cetirizine 10 MG tablet   Commonly known as:  zyrTEC   Dose:  10 mg        Take 10 mg by mouth daily as needed   Refills:  0        cholecalciferol 5000 units  Caps   Dose:  1 capsule        Take 1 capsule by mouth daily. Takes 5000 units in the summer and 88769 units in the winter   Refills:  0        CIPROFLOXACIN PO   Dose:  500 mg        Take 500 mg by mouth 1 tablet by mouth once as needed. Take one tablet after each procedere   Refills:  0        clobetasol 0.05 % ointment   Commonly known as:  TEMOVATE        daily as needed   Refills:  2        COLON CLEANSER PO   Dose:  1 capsule        Take 1 capsule by mouth daily Advanced Colon Care II   Refills:  0        D-Mannose Powd        daily 500 mg Pt takes 2 pills daily   Refills:  0        * EYE VITAMINS Caps        one tablet twice daily   Refills:  0        * PRESERVISION AREDS Caps   Dose:  1 capsule   Quantity:  180 capsule        Take 1 capsule by mouth 2 times daily   Refills:  3        lidocaine 4 % Crea cream   Commonly known as:  LMX4        Apply topically daily as needed   Refills:  0        nebulizer nebulization   Quantity:  1        Use as directed   Refills:  0        omeprazole 20 MG CR capsule   Commonly known as:  priLOSEC   Dose:  20 mg   Quantity:  90 capsule        Take 1 capsule (20 mg) by mouth daily   Refills:  3        oxyCODONE-acetaminophen 5-325 MG per tablet   Commonly known as:  PERCOCET   Dose:  1 tablet   Quantity:  30 tablet        Take 1 tablet by mouth every 4 hours as needed for pain for pain.   Refills:  0        PARoxetine 30 MG tablet   Commonly known as:  PAXIL   Quantity:  45 tablet        TAKE 1/2 TABLET BY MOUTH AT BEDTIME   Refills:  3        VITAMIN C PO        Take by mouth 2 times daily 500 mg take 1 daily   Refills:  0        * Notice:  This list has 2 medication(s) that are the same as other medications prescribed for you. Read the directions carefully, and ask your doctor or other care provider to review them with you.            Procedures and tests performed during your visit     CBC + differential    CT Abdomen Pelvis w Contrast    Comprehensive metabolic panel     ISTAT Chem 8    Lipase    UA with Microscopic    Urine Culture Aerobic Bacterial      Orders Needing Specimen Collection     None      Pending Results     Date and Time Order Name Status Description    8/25/2018 2234 Urine Culture Aerobic Bacterial In process             Pending Culture Results     Date and Time Order Name Status Description    8/25/2018 2234 Urine Culture Aerobic Bacterial In process             Pending Results Instructions     If you had any lab results that were not finalized at the time of your Discharge, you can call the ED Lab Result RN at [phone]. You will be contacted by this team for any positive Lab results or changes in treatment. The nurses are available 7 days a week from 10A to 6:30P.  You can leave a message 24 hours per day and they will return your call.        Test Results From Your Hospital Stay        8/25/2018  8:53 PM      Component Results     Component Value Ref Range & Units Status    WBC 13.7 (H) 4.0 - 11.0 10e9/L Final    RBC Count 5.38 4.4 - 5.9 10e12/L Final    Hemoglobin 16.3 13.3 - 17.7 g/dL Final    Hematocrit 48.7 40.0 - 53.0 % Final    MCV 91 78 - 100 fl Final    MCH 30.3 26.5 - 33.0 pg Final    MCHC 33.5 31.5 - 36.5 g/dL Final    RDW 17.3 (H) 10.0 - 15.0 % Final    Platelet Count 324 150 - 450 10e9/L Final    Diff Method Automated Method  Final    % Neutrophils 82.5 % Final    % Lymphocytes 7.0 % Final    % Monocytes 8.3 % Final    % Eosinophils 0.7 % Final    % Basophils 0.7 % Final    % Immature Granulocytes 0.8 % Final    Nucleated RBCs 0 0 /100 Final    Absolute Neutrophil 11.3 (H) 1.6 - 8.3 10e9/L Final    Absolute Lymphocytes 1.0 0.8 - 5.3 10e9/L Final    Absolute Monocytes 1.1 0.0 - 1.3 10e9/L Final    Absolute Eosinophils 0.1 0.0 - 0.7 10e9/L Final    Absolute Basophils 0.1 0.0 - 0.2 10e9/L Final    Abs Immature Granulocytes 0.1 0 - 0.4 10e9/L Final    Absolute Nucleated RBC 0.0  Final         8/25/2018  9:31 PM      Component Results     Component  Value Ref Range & Units Status    Sodium 142 133 - 144 mmol/L Final    Potassium 4.2 3.4 - 5.3 mmol/L Final    Chloride 107 94 - 109 mmol/L Final    Carbon Dioxide 25 20 - 32 mmol/L Final    Anion Gap 10 3 - 14 mmol/L Final    Glucose 114 (H) 70 - 99 mg/dL Final    Urea Nitrogen 20 7 - 30 mg/dL Final    Creatinine 1.17 0.66 - 1.25 mg/dL Final    GFR Estimate 58 (L) >60 mL/min/1.7m2 Final    Non  GFR Calc    GFR Estimate If Black 70 >60 mL/min/1.7m2 Final    African American GFR Calc    Calcium 9.5 8.5 - 10.1 mg/dL Final    Bilirubin Total 0.5 0.2 - 1.3 mg/dL Final    Albumin 3.8 3.4 - 5.0 g/dL Final    Protein Total 7.8 6.8 - 8.8 g/dL Final    Alkaline Phosphatase 96 40 - 150 U/L Final    ALT 19 0 - 70 U/L Final    AST 18 0 - 45 U/L Final         8/25/2018  9:11 PM      Component Results     Component Value Ref Range & Units Status    Lipase 184 73 - 393 U/L Final         8/25/2018  9:50 PM      Component Results     Component Value Ref Range & Units Status    Color Urine Emy  Final    Appearance Urine Cloudy  Final    Glucose Urine Negative NEG^Negative mg/dL Final    Bilirubin Urine Negative NEG^Negative Final    Ketones Urine 5 (A) NEG^Negative mg/dL Final    Specific Gravity Urine 1.023 1.003 - 1.035 Final    Blood Urine Negative NEG^Negative Final    pH Urine 5.0 5.0 - 7.0 pH Final    Protein Albumin Urine 100 (A) NEG^Negative mg/dL Final    Urobilinogen mg/dL 4.0 (H) 0.0 - 2.0 mg/dL Final    Nitrite Urine Negative NEG^Negative Final    Leukocyte Esterase Urine Small (A) NEG^Negative Final    Source Catheterized Urine  Final    WBC Urine >182 (H) 0 - 5 /HPF Final    RBC Urine 40 (H) 0 - 2 /HPF Final    WBC Clumps Present (A) NEG^Negative /HPF Final    Bacteria Urine Few (A) NEG^Negative /HPF Final    Yeast Urine Few (A) NEG^Negative /HPF Final    Mucous Urine Present (A) NEG^Negative /LPF Final    Hyaline Casts 4 (H) 0 - 2 /LPF Final         8/25/2018 10:35 PM      Narrative     CT ABDOMEN AND  PELVIS WITH CONTRAST 8/25/2018 9:39 PM     HISTORY: RLQ pain.    COMPARISON: May 21, 2013    TECHNIQUE: Volumetric helical acquisition of CT images from the lung  bases through the symphysis pubis after the administration of 86 mL  Isovue-370  intravenous contrast. Radiation dose for this scan was  reduced using automated exposure control, adjustment of the mA and/or  kV according to patient size, or iterative reconstruction technique.    FINDINGS: There is a right inguinal hernia containing the cecum,  terminal ileum, and appendix. The appendix does not appear dilated.  There is a significant amount of fluid and some inflammatory changes  of the terminal ileum are suggested, ischemia in this inguinal hernia  could be present. Left inguinal hernia containing fat. Multiple stones  are seen in the intrarenal collecting system on the left measuring 9,  13, and 8 mm. Smaller nonobstructing stones are seen in the calyces.  No stone in the renal pelvis or ureter. No right-sided stones. Stable  left adrenal nodule measuring 2.6 cm in long axis when measured in a  similar manner. Right adrenal gland, spleen, and liver demonstrate no  worrisome focal lesion. Minimal linear atelectasis and/or fibrosis at  the lung bases.        Impression     IMPRESSION:  1. Large right inguinal hernia containing the appendix, cecum, and  terminal ileum with associated fluid and stranding, ischemia could be  present.  2. Multiple intrarenal collecting system stones on the left, no renal  pelvis or ureteral stones.    PATRICIA LAWRENCE MD         8/25/2018 10:41 PM                Clinical Quality Measure: Blood Pressure Screening     Your blood pressure was checked while you were in the emergency department today. The last reading we obtained was  BP: (!) 235/96 . Please read the guidelines below about what these numbers mean and what you should do about them.  If your systolic blood pressure (the top number) is less than 120 and your diastolic  blood pressure (the bottom number) is less than 80, then your blood pressure is normal. There is nothing more that you need to do about it.  If your systolic blood pressure (the top number) is 120-139 or your diastolic blood pressure (the bottom number) is 80-89, your blood pressure may be higher than it should be. You should have your blood pressure rechecked within a year by a primary care provider.  If your systolic blood pressure (the top number) is 140 or greater or your diastolic blood pressure (the bottom number) is 90 or greater, you may have high blood pressure. High blood pressure is treatable, but if left untreated over time it can put you at risk for heart attack, stroke, or kidney failure. You should have your blood pressure rechecked by a primary care provider within the next 4 weeks.  If your provider in the emergency department today gave you specific instructions to follow-up with your doctor or provider even sooner than that, you should follow that instruction and not wait for up to 4 weeks for your follow-up visit.        Thank you for choosing [facility]       Thank you for choosing [facility] for your care. Our goal is always to provide you with excellent care. Hearing back from our patients is one way we can continue to improve our services. Please take a few minutes to complete the written survey that you may receive in the mail after you visit with us. Thank you!        [employer] Information     [employer] gives you secure access to your electronic health record. If you see a primary care provider, you can also send messages to your care team and make appointments. If you have questions, please call your primary care clinic.  If you do not have a primary care provider, please call [phone] and they will assist you.        Care EveryWhere ID     This is your Care EveryWhere ID. This could be used by other organizations to access your [facility] medical records  [id number]        Equal Access to  Services     [facility]: Shellie Denson, wajeannetteda luqadaha, qaybta kayony wilkes. So [facility] [phone].    ATENCIÓN: Si habla español, tiene a lauren disposición servicios gratuitos de asistencia lingüística. Llame al [phone].    We comply with applicable federal civil rights laws and [place] laws. We do not discriminate on the basis of race, color, national origin, age, disability, sex, sexual orientation, or gender identity.            After Visit Summary       This is your record. Keep this with you and show to your community pharmacist(s) and doctor(s) at your next visit.

## 2018-08-25 NOTE — ED AVS SNAPSHOT
[facility] Emergency Department    [address]    [facility] [address]    Phone:  [phone]    Fax:  [phone]                                       Edison Boss   MRN: [id number]    Department:  [facility] Emergency Department   Date of Visit:  8/25/2018           After Visit Summary Signature Page     I have received my discharge instructions, and my questions have been answered. I have discussed any challenges I see with this plan with the nurse or doctor.    ..........................................................................................................................................  Patient/Patient Representative Signature      ..........................................................................................................................................  Patient Representative Print Name and Relationship to Patient    ..................................................               ................................................  Date                                            Time    ..........................................................................................................................................  Reviewed by Signature/Title    ...................................................              ..............................................  Date                                                            Time          22EPIC Rev 08/18

## 2018-08-26 NOTE — DISCHARGE INSTRUCTIONS
Hernia (Adult)    A hernia can happen when there is a weakness or defect in the wall of the abdomen or groin. Intestines or nearby tissues may move from their usual location and push through the weakness in the wall. This can cause a hernia (bulge) you may see or feel.  Causes and risk factors   A hernia may be present at birth. Or it may be caused by the wear and tear of daily living. Certain factors can make a hernia more likely. These can include:    Heavy lifting    Straining, whether from lifting, movement, or constipation    Chronic cough    Injury to the abdominal wall    Excess weight    Pregnancy    Prior surgery    Older age    Family history of hernia  Symptoms  Symptoms of a hernia may come on suddenly. Or they may appear slowly over time. Some common symptoms include:    Bulge in the groin area, around the navel, or in the scrotum (the bulge may get bigger when you stand and go away when you lie down)    Pain or pressure around the bulge    Pain during activities such as lifting, coughing, or sneezing    A feeling of weakness or pressure in the groin    Pain or swelling in the scrotum  Types of hernias  There are different types of hernia. The type you have depends on its location:    Inguinal. This type is in the groin or scrotum. It is more common in men.    Femoral. This type is in the groin, upper thigh (where the leg bends), or labia. It is more common in women.    Ventral. This type is in the abdominal wall.    Umbilical. This type occurs around the navel (belly button).    Incisional. This type occurs at the site of a previous surgery.  The condition of the hernia can help determine how urgently it needs to be treated.    Reducible. It goes back in by itself, or it can be pushed back in.    Irreducible. It can t be pushed back in.    Incarcerated/strangulated. The intestine is trapped (incarcerated). If this happens, you won t be able to push the bulge back in. If the incarcerated hernia isn t  treated, it may become strangulated. This means the area loses blood supply and the tissue may die. This requires emergency surgery. You need treatment right away.  In most cases, a hernia will not heal on its own. Surgery is usually needed to repair the defect in the abdominal wall or groin. You ll be told more about surgery, if needed.  If your symptoms are not severe, treatment may sometimes be delayed. In such cases, regular follow-up visits with the provider will be needed. You ll be asked to keep track of your symptoms and to watch for signs of more serious problems. You may also be given guidelines similar to the home care instructions below.  Home care  To help keep a hernia from getting worse, you may be advised to:    Avoid heavy lifting and straining as directed.    Take steps to prevent constipation, such as eating more fiber and drinking more water. This may help reduce straining that can occur when having a bowel movement. Reducing straining may help keep your symptoms from getting worse.    Maintain a healthy weight or lose excess weight. This can help reduce strain on abdominal muscles and tissues.    Stop smoking. This can help prevent coughing that may also strain abdominal muscles and tissues.  Follow-up care  Follow up with your healthcare provider, or as directed. If imaging tests were done, they will be reviewed a doctor. You will be told the results and any new findings that may affect your care.  When to seek medical advice  Call your healthcare provider right away if any of these occur:    Hernia hardens, swells, or grows larger    Hernia can no longer be pushed back in    Pain moves to the lower right abdomen (just below the waistline), or spreads to the back  Call 911  Call 911 if any of these occur:    Nausea and vomiting    Severe pain, redness, or tenderness in the area near the hernia    Pain worsens quickly and doesn t get better    Inability to have a bowel movement or pass  gas    Fever of 100.4 F (38 C) or higher, or as directed by your healthcare provider    Trouble breathing    Fainting    Rapid heart rate    Vomiting blood    Large amounts of blood in stool  Date Last Reviewed: 6/9/2015 2000-2017 [employer]. [address] [place] [address], [place] [address]. All rights reserved. This information is not intended as a substitute for professional medical care. Always follow your healthcare professional's instructions.

## 2018-08-26 NOTE — ED PROVIDER NOTES
"  History     Chief Complaint:  Abdominal Pain    HPI   Edison Boss is a 89 year old male with a history of chronic bladder spasms, SBO, and GERD, who presents for evaluation of abdominal pain. The patient reports that he was at baseline this morning when he woke up. When he sat up, he states that a right lower quadrant abdominal pain \"came on full blast\". He states that this pain radiates into his back, but notes that he does have chronic back pain. He notes that this feels different than his past bouts with bowel obstruction, kidney stones, and bladder spasms. Patient took Tylenol today which did not alleviate his pain. Patient denies other complaint.     Allergies:  Ceftriaxone  Bactrim  Levaquin  Zithromax [Azithromycin]  Macrodantin [Nitrofuran Derivatives]     Medications:    Albuterol inhaler  Norvasc  Vitamin C  Zyrtec  Cholecalciferol  Prilosec  Percocet  Paxil    Past Medical History:    Mild cognitive impairment  RAMÓN  Pacemaker  Sinus node dysfunction  Cerebral infarction  SBO  Hyperlipidemia  Insomnia  Mild persistent asthma  GERD  Osteoporosis  Malignant neoplasm of prostate (2002)  Calculus of kidney  Chronic pain  Hypertension  Paroxysmal atrial fibrillation     Past Surgical History:    Kidney stone surgery  TURP x 2  Brachytherapy  Left rotor cuff repair  EGD combined  Implant pacemaker  Penis surgery  Prostate surgery     Family History:    CAD  Pacemaker  Cancer  CVD    Social History:  Presents with daughter   Tobacco use: Former smoker (1 ppd for 40 years)  Alcohol use: No  PCP: Porfirio Terrell MD    Marital Status:        Review of Systems   Gastrointestinal: Positive for abdominal pain.   All other systems reviewed and are negative.    Physical Exam     Patient Vitals for the past 24 hrs:   BP Temp Heart Rate Resp SpO2   08/25/18 2032 - 97.4  F (36.3  C) - - -   08/25/18 2027 (!) 235/96 - 60 22 97 %        Physical Exam  Constitutional: Alert, attentive  HENT:    Nose: Nose " normal.    Mouth/Throat: Oropharynx is clear, mucous membranes are moist  Eyes:  EOM are normal.    CV:  regular rate and rhythm; no murmurs, rubs or gallups  Chest: Effort normal and breath sounds normal.   GI:   Epigastrium - no tenderness, no guarding   RUQ - no tenderness or Marroquin's sign   RLQ - Mild tenderness, no guarding, no Rovsing's sign   Suprapubic area - no tenderness, no guarding    LLQ - no tenderness, no guarding   LUQ - no tenderness, no guarding   No distension. Normal bowel sounds   Moderate right inguinal hernia, soft tender; reduced with gentle pressure after CT  MSK: Normal range of motion.   Neurological: Alert, attentive  Skin: Skin is warm and dry.      Emergency Department Course     Imaging:  Radiographic findings were communicated with the patient and family who voiced understanding of the findings.    CT Abd/pelvis with contrast:  IMPRESSION:  1. Large right inguinal hernia containing the appendix, cecum, and terminal ileum with associated fluid and stranding, ischemia could be present.  2. Multiple intrarenal collecting system stones on the left, no renal pelvis or ureteral stones.    Imaging independently reviewed and agree with radiologist interpretation.      Laboratory:  CBC: WBC 13.7 (H) o/w WNL (HGB 16.3, )   CMP:  (H), GFR 58 (L) o/w WNL (Creatinine 1.17)   Lipase: 184    UA with micro: Albumin 100, Urobilinogen 4.0 (H), Ketones 5, LE small, WBC >182 (H), RBC 40 (H), WBC clumps present, Bacteria few, Yeast few, Mucous present, Hyaline casts 4 (H) o/w negative  Urine culture (aerobic bacteria): Pending      Interventions:  2140: Roxicodone 5 mg PO      Emergency Department Course:  Past medical records, nursing notes, and vitals reviewed.  2032: I performed an exam of the patient and obtained history, as documented above.    Above interventions provided.   IV inserted and blood drawn. Blood was sent to the lab for further testing, results above.  The patient provided  a urine sample here in the emergency department. This was sent for laboratory testing, findings above.  The patient was sent for a CT while in the emergency department, findings above.     2150: I rechecked the patient. Explained findings to the patient and family.     2240: I rechecked the patient. Findings and plan explained to the Patient and daughter. Patient is asymptomatic after hernia reduction. Patient discharged home with instructions regarding supportive care, medications, and reasons to return. The importance of close follow-up was reviewed.      Impression & Plan      Medical Decision Making:  Edison Boss is a 89 year old male with history of urinary retention and bladder spasms with chronic Christianson catheter placement who presents for evaluation of right lower quadrant pain.  Exam does reveal a hernia which has not previously been painful in the past.  Due to right lower quadrant abdominal pain the patient did undergo CT scan and labs prior to hernia reduction.  Labs show pyuria to the urine but this may be representative of colonization as he has no systemic symptoms of infection.  CT shows no acute abnormality but does show the hernia.  He does not have pain out of proportion to exam or severe pain to suggest ischemia and symptoms are entirely resolved after reduction of the hernia.  We will culture the urine at this point and avoid antibiotics as this again may represent colonization.  On recheck he is feeling significantly improved and I believe he is safe for discharge at this time.  He should follow-up with primary care in 2-3 days for recheck, with surgery regarding possible herniorrhaphy, and with urology for plan Christianson replacement Thursday.  Of note, they declined Christianson changes here given his history of bladder atrophy and spasms and urethral stricture.  He should return immediately for worse pain, inability to reduce the hernia, or any other concerns    Diagnosis:    ICD-10-CM   1.  RLQ abdominal pain R10.31   2. Right inguinal hernia K40.90   3. Pyuria N39.0       Disposition:  Discharged to home with plan as outlined.       Scribe Disclosure:  I, Agustin Sun, am serving as a scribe at 8:43 PM on 8/25/2018 to document services personally performed by Cristiano Estevez MD based on my observations and the provider's statements to me.   8/25/2018   [facility] EMERGENCY DEPARTMENT     Cristiano Estevez MD  08/25/18 0824

## 2018-08-28 NOTE — CONSULTS
[facility]     Stroke code Note      HPI  Edison Boss is a 89 year old male with pmhx of prior stroke, HTN, DLP pAfib, taken off his Warfarin and any antiplatelet by cardiology few years ago for risk of falls, has a pacemaker, spastic bladder s/p frequent botox inj, memeory isses Used to get frequent dizziness and falls > 1 year ago etiology unknown. mRS 3. He lives in a care facility, get minimal help to clean his home, he walks with a walker.  Today he was up changing his clothes (08:30) then suddenly developed dizziness/spinning sensation, had to hold on hand rail to bring himself down, was lightheaded, had nausea/vomiting, then was able to call his son and tell him he is not feeling well and he will miss his appointment, he was able to get up and walk to push the emergency button. He got better when he arrived in the ED. He denies blacking out. No ataxia, no diplopia, no headache, no focal signs (numbness or weakness), no dysarthria or dysphagia.   His symptoms resolved after the CT scan were over. He back to baseline, however he still feel nervous and scared that this might happen again. It was noted that his blood pressure is elevated in the ED SBP 180s.    The daughter said that his blood pressure what gradually increasing over the last few weeks and they were planning to see his cardiology for this today.    CT head: no acute stroke  CTA: Atherosclerosis both proximal verts, Left ICA mod stenosis, Multiple mild/mod intracranial stenosis.No LVO seen  CTP: look fine to me    Impression:    DX:  Dizziness/vertigo isolated for probably less than 1 hour. Vascular event is possible but I think its less likely, however he is a high risk patient due to multiple suboptimally treated risk factors including: pAfib, prior strokes, HTN, DLP, atherosclerosis in intracranial and extracranial cerebral vasculature, he is not on antiplatelet or blood thinners.      Other possibilities include  peripheral vertigo, presyncope, orthostatic hypotension,  hypertensive encephalopathy.     I discussed with family his high stroke risk, and small possibilities that the event today could be a TIA. I suggested optimizing his stroke care and complete stroke workup, including a brain MRI. His family not interested in antiplatelet or blood thinner options at the moment bcause of the fall risk. They are open in getting his blood pressure/LDL controlled and MRI.    Will manage as possible case of a TIA    Recommendations:  - ABCD2 Score: 4  - Neurochecks  - Orthostatic vitals  - Euthermia, Euglycemia  - Family not interested in antiplatelet or blood thinner at the moment  - Statin (LDL target < 70)  - Restart homme blood pressure medications  - MRI/MRA Stroke Protocol  - TTE with Bubble Study  - 24-hour Telemetry  - Bedside Glucose Monitoring  - A1c, Lipid Panel  - PT/OT/SLP  - Stroke Education      Adrian Crisostomo MD  Stroke Neurology  ____________________________________     STROKE DATA    Stroke Code:        Stroke code activated     09:41    First stroke provider response     09:44   Tele service began      09:54 attempted but no answer       09:59 got connected  I spoke with family and waited for pt to come back from CT   Tele service ended      10:50   Last known normal      08:30   Time of discovery (or onset of symptoms)       08:30   Head CT read by me      10:00   Was stroke code de-escalated? yes     Telestroke Service Details  Type of service telemedicine diagnostic assessment of acute neurological changes   Reason telemedicine is appropriate patient requires assessment with a specialist for diagnosis and treatment of neurological symptoms   Mode of transmission secure interactive audio and video communication per [employer]   Originating site (patient location) [facility]    Distant site (provider location) [facility]     TPA Treatment   Not given due to  stroke mimic: presyncope, minor / isolated / quickly resolving stroke symptoms.    Endovascular Treatment  Not initiated due to absence of proximal vessel occlusion     TPA treatment:    Not given due to stroke mimic: pre-syncope, minor / isolated / quickly resolving stroke symptoms.        ____________________________________    Physical Examination:  Vital Signs:  B/P: 180/80,  T: Data Unavailable,  P: Data Unavailable,  R: 16  Alert, awake, oriented  No aphasia or dysarthria    He was able to tell me most of the story but he doesn't remember all the details. His hearing loss probably effected his understanding of my questions.     Stroke Scales      National Institutes of Health Stroke Scale (at presentation)   NIHSS done at:  time patient seen      Score    Level of consciousness:  (0)   Alert, keenly responsive    LOC questions:  (0)   Answers both questions correctly    LOC commands:  (0)   Performs both tasks correctly    Best gaze:  (0)   Normal    Visual:  (0)   No visual loss    Facial palsy:  (0)   Normal symmetrical movements    Motor arm (left):  (0)   No drift    Motor arm (right):  (0)   No drift    Motor leg (left):  (0)   No drift    Motor leg (right):  (0)   No drift    Limb ataxia:  (0)   Absent    Sensory:  (0)   Normal- no sensory loss    Best language:  (0)   Normal- no aphasia    Dysarthria:  (0)   Normal    Extinction and inattention:  (0)   No abnormality        NIHSS Total Score:  0            Labs/Studies:    CBC     Recent Labs   Lab Test  08/28/18   0946   WBC  8.7   RBC  4.92   HGB  14.9   HCT  44.6   PLT  283       Basic Metabolic Panel  Recent Labs   Lab Test  08/28/18   0946   NA  142   POTASSIUM  3.9   CHLORIDE  110*   CO2  26   BUN  19   CR  1.08   GLC  92   ARYAN  8.4*       Liver panel  Recent Labs   Lab Test  08/25/18   2031   PROTTOTAL  7.8   ALBUMIN  3.8   BILITOTAL  0.5   ALKPHOS  96   AST  18   ALT  19       INR    Recent Labs   Lab Test  08/28/18   0946   INR  1.09         Lipid Profile  Recent Labs   Lab Test  08/10/17   1145   CHOL  199   HDL  40   LDL  138*   TRIG  104       A1C  Recent Labs   Lab Test  11/30/12   1311   A1C  5.5       Troponin I  Recent Labs   Lab Test  08/28/18   0946   TROPI  <0.015       Glucose  Lab Results   Component Value Date    GLC 92 08/28/2018       Imaging  Preliminary imaging:  Reviewed see HPI    Past Medical History   Past Medical History:   Diagnosis Date     Calculus of kidney     in dwelling [place]     Chronic infection     UTI     Chronic pain     lower pain, perineum     Esophageal reflux      Hyperlipidaemia      Hypertension      Malignant neoplasm of prostate (H) 8/14/2002    Brachytherapy, then external radiation. chronic indwelling catheter     Mild persistent asthma     with URI     Paroxysmal a-fib (H)     paroxysmal     Sinus node dysfunction (H)     sp DDD PM     Sleep apnea     ?   snores     Unspecified cerebral artery occlusion with cerebral infarction        Past Surgical History   Past Surgical History:   Procedure Laterality Date     C NONSPECIFIC PROCEDURE  1980's    Kidney stone surgery     C NONSPECIFIC PROCEDURE  1985, 5/2005    TURP x 2     C NONSPECIFIC PROCEDURE  1/2002    Brachytherapy     C NONSPECIFIC PROCEDURE  ~1999    Left rotator cuff repair     C NONSPECIFIC PROCEDURE      Bilateral cataract surgery     C NONSPECIFIC PROCEDURE      EGD/dilation twice     CYSTOSCOPY       CYSTOSCOPY, INJECT COLLAGEN, COMBINED  6/6/2012    Procedure:COMBINED CYSTOSCOPY, INJECT BULKING AGENT; VIDEO CYSTOSCOPY WITH BOTOX INJECTIONS  ; Surgeon:BRIAN ADAN; Location: OR     CYSTOSCOPY, INJECT COLLAGEN, COMBINED  3/22/2013    Procedure: COMBINED CYSTOSCOPY, INJECT BULKING AGENT;  VIDEO CYSTOSCOPY, BOTOX INJECTION;  Surgeon: Brian Adan MD;  Location:  OR     CYSTOSCOPY, INJECT COLLAGEN, COMBINED  8/8/2014    Procedure: COMBINED CYSTOSCOPY, INJECT BULKING AGENT;  Surgeon: Brian Adan MD;  Location:  OR      CYSTOSCOPY, INJECT COLLAGEN, COMBINED N/A 2015    Procedure: COMBINED CYSTOSCOPY, INJECT BULKING AGENT;  Surgeon: Michael Lilly MD;  Location: [facility] OR     CYSTOSCOPY, INJECT COLLAGEN, COMBINED N/A 2016    Procedure: COMBINED CYSTOSCOPY, INJECT BULKING AGENT;  Surgeon: Michael Lilly MD;  Location: [facility] OR     HC REMOVE TONSILS/ADENOIDS,<11 Y/O  ~1928    T & A <12y.o.     IMPLANT PACEMAKER       PENIS SURGERY       PROSTATE SURGERY         Family History   Family History   Problem Relation Age of Onset     Family History Negative Father       age 91     HEART DISEASE Mother      pacemaker     Family History Negative Mother       in her sleep 103     Cancer Brother      oldest brother - lung cancer,  age 74     Cancer Brother      3rd brother - lymphoma,  age 76     Cancer Brother      2nd brother (Carrington)- prostate cancer, born .      Cerebrovascular Disease Brother      Brother (Carrington), born in        Social History   Social History     Social History     Marital status:      Spouse name: Alysa     Number of children: 8     Years of education: N/A     Occupational History      Retired     Social History Main Topics     Smoking status: Former Smoker     Packs/day: 1.00     Years: 40.00     Smokeless tobacco: Never Used      Comment: QUIT      Alcohol use No     Drug use: No     Sexual activity: No     Other Topics Concern     Parent/Sibling W/ Cabg, Mi Or Angioplasty Before 65f 55m? No     Social History Narrative    .Living situation (location, living with others?):Lives with wife in [facility]    Activities of daily living (e.g., dressing, eating, walking):Independent        Instrumental activities of daily living (e.g., finance management, housekeeping, meal planning/ prep): Wife and kids help with preparing meals, shopping, driving, climbing stairs, complex decisions                 Meaningful Activities (e.g., hobbies, work): loves music, uses the  computer, likes crossword         Support:Wife and daughter        Community Resources Used/ Interested in: Referred to Estevez            Allergies   Allergen Reactions     Ceftriaxone Itching     Bactrim Hives     Levaquin Diarrhea     Oral only, can tolerate IV     Zithromax [Azithromycin]      Macrodantin [Nitrofuran Derivatives] Rash     Took recently with no problems       MedicationsCurrent Outpatient Prescriptions   Medication Sig Dispense Refill     albuterol (PROVENTIL HFA: VENTOLIN HFA) 108 (90 BASE) MCG/ACT inhaler Inhale 2 puffs into the lungs every 6 hours as needed for shortness of breath / dyspnea. 1 Inhaler 1     amLODIPine (NORVASC) 5 MG tablet Take 1 tablet (5 mg) by mouth daily 90 tablet 3     Ascorbic Acid (VITAMIN C PO) Take by mouth 2 times daily 500 mg take 1 daily       cetirizine (ZYRTEC) 10 MG tablet Take 10 mg by mouth daily as needed       cholecalciferol 5000 UNITS CAPS Take 1 capsule by mouth daily. Takes 5000 units in the summer and 52133 units in the winter       CIPROFLOXACIN PO Take 500 mg by mouth 1 tablet by mouth once as needed. Take one tablet after each procedere       clobetasol (TEMOVATE) 0.05 % ointment daily as needed   2     Colloidal Oatmeal (AVEENO ECZEMA THERAPY) 1 % CREA Externally apply topically daily       D-MANNOSE POWD daily 500 mg Pt takes 2 pills daily       EYE VITAMINS OR CAPS one tablet twice daily       lidocaine (LMX4) 4 % CREA 4% topical cream Apply topically daily as needed       Misc Natural Products (COLON CLEANSER PO) Take 1 capsule by mouth daily Advanced Colon Care II       Multiple Vitamins-Minerals (PRESERVISION AREDS) CAPS Take 1 capsule by mouth 2 times daily 180 capsule 3     NEBULIZER MISC Use as directed 1 0     omeprazole (PRILOSEC) 20 MG CR capsule Take 1 capsule (20 mg) by mouth daily 90 capsule 3     oxyCODONE-acetaminophen (PERCOCET) 5-325 MG per tablet Take 1 tablet by mouth every 4 hours as needed for pain for pain. 30 tablet 0      PARoxetine (PAXIL) 30 MG tablet TAKE 1/2 TABLET BY MOUTH AT BEDTIME (Patient taking differently: 15 mg TAKE 1/2 TABLET BY MOUTH AT BEDTIME) 45 tablet 3       Please contact the Stroke Service with any questions.  Thank you.    Adrian Crisostomo MD     I spent 70 minutes of critical care time supervising the patient's code stroke activation. I personally reviewed all relevant labs and neuroimaging.

## 2018-08-28 NOTE — DISCHARGE INSTRUCTIONS
Discharge Instructions  Dizziness (Lightheaded)  Today you were seen for dizziness.  Dizziness can be caused by many things.  At this time, your doctor has found no signs that your dizziness is due to a serious or life-threatening condition. However, sometimes there is a serious problem that does not show up right away, and it is important for you to follow up with your regular doctor as instructed.  The most common cause of dizziness is called a vasovagal reaction. This is the kind of dizziness people get when they have their blood drawn or see unpleasant things. This can also happen with dehydration, extreme emotion, nausea, severe pain and also sometimes with urinating or coughing.  This type of dizziness is not serious. It is more common to be lightheaded when you are warm, when you have been standing still for a long time, when you haven t eaten or had very much to drink, and many other factors. Many times there are several things all going on together that caused your spell today. As you get older, your blood vessels get more stiff, and it is common to have dizziness, especially when you first stand up.  If you have been lying down, be sure to sit for a few minutes before standing up, and always sit right down if you feel faint.  Other causes of dizziness include heart disease, irregular heartbeat, side effects from medications, effects of drugs and alcohol, blood pressure changes, infections, and blood loss (anemia). Some of these causes can be serious and even life threatening. Be sure to follow your doctor s discharge instructions for follow up with other doctors to further evaluate your problem.      Return to the Emergency Department if:      You pass out (fainting or falling out), especially during exercise.      You develop chest pain, chest pressure or difficulty breathing.    Your feel an irregular heartbeat.    You have excessive vaginal bleeding, or blood in your stool or vomit.    You have a high  fever.    Your symptoms get worse or more frequent.    If when you begin to feel dizzy, it is important to sit down or lay down immediately to prevent injury from falling.  If you were given a prescription for medicine here today, be sure to read all of the information (including the package insert) that comes with your prescription.  This will include important information about the medicine, its side effects, and any warnings that you need to know about.  The pharmacist who fills the prescription can provide more information and answer questions you may have about the medicine.  If you have questions or concerns that the pharmacist cannot address, please call or return to the Emergency Department.     Opioid Medication Information    Pain medications are among the most commonly prescribed medicines, so we are including this information for all our patients. If you did not receive pain medication or get a prescription for pain medicine, you can ignore it.     You may have been given a prescription for an opioid (narcotic) pain medicine and/or have received a pain medicine while here in the Emergency Department. These medicines can make you drowsy or impaired. You must not drive, operate dangerous equipment, or engage in any other dangerous activities while taking these medications. If you drive while taking these medications, you could be arrested for DUI, or driving under the influence. Do not drink any alcohol while you are taking these medications.     Opioid pain medications can cause addiction. If you have a history of chemical dependency of any type, you are at a higher risk of becoming addicted to pain medications.  Only take these prescribed medications to treat your pain when all other options have been tried. Take it for as short a time and as few doses as possible. Store your pain pills in a secure place, as they are frequently stolen and provide a dangerous opportunity for children or visitors in your  house to start abusing these powerful medications. We will not replace any lost or stolen medicine.  As soon as your pain is better, you should flush all your remaining medication.     Many prescription pain medications contain Tylenol  (acetaminophen), including Vicodin , Tylenol #3 , Norco , Lortab , and Percocet .  You should not take any extra pills of Tylenol  if you are using these prescription medications or you can get very sick.  Do not ever take more than 3000 mg of acetaminophen in any 24 hour period.    All opioids tend to cause constipation. Drink plenty of water and eat foods that have a lot of fiber, such as fruits, vegetables, prune juice, apple juice and high fiber cereal.  Take a laxative if you don t move your bowels at least every other day. Miralax , Milk of Magnesia, Colace , or Senna  can be used to keep you regular.      Remember that you can always come back to the Emergency Department if you are not able to see your regular doctor in the amount of time listed above, if you get any new symptoms, or if there is anything that worries you.            Discharge Instructions  Urinary Tract Infection  You have urinary tract infection, or UTI. The urinary tract includes the kidneys (which make urine), ureters (the tubes that carry urine from the kidneys to the bladder), the bladder (which stores urine), and urethra (the tube that carries urine out of the bladder).  Urinary tract infections occur when bacteria travel up the urethra into the bladder. We suspect a UTI based on chemical and microscopic findings in your urine, but if there is a question about your findings, we will do a culture to see if bacteria grow. A urine culture takes several days. You should always follow-up with your primary physician to find out about results of your culture if one was done.   Return to the Emergency Department if:    You have severe back pain.    You are vomiting so that you can t take your medicine, or have  signs of dehydration (such as urinating less than 3 times per day).    You have fever over 101.5 degrees F.    You have significant confusion or are very weak, or feel very ill.    Your child seems much more ill, won t wake up, won t respond right, or is crying for a long time and won t calm down.    Your child is showing signs of dehydration, Signs of dehydration can be:  o Your infant has had no wet diapers in 4-5 hours.  o Your older child has not passed urine in 6-8 hours.  o Your infant or child starts to have dry mouth and lips, or no saliva or tears.    Follow-up with your doctor:     Children under 24 months need to be seen by their regular doctor within one week after a diagnosis of a UTI. It may be necessary to do some imaging tests to look at the child s kidney or bladder.    You should begin to feel better within 24 - 48 hours of starting your antibiotic.  If you do not, you need to be seen again.      Treatment:     You will be treated with an antibiotic to kill the bacteria. We have to make an educated guess as to which antibiotic will work for your infection. In most healthy people, we can guess right almost all of the time. Sometimes a culture is done to show which antibiotics will work. This usually takes 2-3 days. When the culture is done, we may have to contact you to put you on a different antibiotic.    Take a pain medication such as Tylenol  (acetaminophen), Advil  (ibuprofen), Nuprin  (ibuprofen), or Aleve  (naproxen). If you have been given a narcotic such as Vicodin  (hydrocodone with acetaminophen), Percocet  (oxycodone with acetaminophen), or codeine, do not drive for four hours after you have taken it. If the narcotic contains Tylenol  (acetaminophen), do not take Tylenol  with it. All narcotics will cause constipation, so eat a high fiber diet.      Pyridium  (phenazopyridine) or Uristat  (phenazopyridine) is a prescription medication that numbs the bladder to reduce the burning pain of  "some UTIs.  The same medication is available in a non-prescription version called Azo-Standard  (phenazopyridine), Urodol  (phenazopyridine), or other brand names. This medication will change the color of the urine and tears (usually blue or orange). If you wear contacts, do not wear them while taking this medication as they may be stained by the medication.    Antibiotic Warning:     If you have been placed on antibiotics - watch for signs of allergic reaction.  These include rash, lip swelling, difficulty breathing, wheezing, and dizziness.  If you develop any of these symptoms, stop the antibiotic immediately and go to an emergency room or urgent care for evaluation.    Probiotics: If you have been given an antibiotic, you may want to also take a probiotic pill or eat yogurt with live cultures. Probiotics have \"good bacteria\" to help your intestines stay healthy. Studies have shown that probiotics help prevent diarrhea and other intestine problems (including C. diff infection) when you take antibiotics. You can buy these without a prescription in the pharmacy section of the store.   If you were given a prescription for medicine here today, be sure to read all of the information (including the package insert) that comes with your prescription.  This will include important information about the medicine, its side effects, and any warnings that you need to know about.  The pharmacist who fills the prescription can provide more information and answer questions you may have about the medicine.  If you have questions or concerns that the pharmacist cannot address, please call or return to the Emergency Department.   Opioid Medication Information    Pain medications are among the most commonly prescribed medicines, so we are including this information for all our patients. If you did not receive pain medication or get a prescription for pain medicine, you can ignore it.     You may have been given a prescription for an " opioid (narcotic) pain medicine and/or have received a pain medicine while here in the Emergency Department. These medicines can make you drowsy or impaired. You must not drive, operate dangerous equipment, or engage in any other dangerous activities while taking these medications. If you drive while taking these medications, you could be arrested for DUI, or driving under the influence. Do not drink any alcohol while you are taking these medications.     Opioid pain medications can cause addiction. If you have a history of chemical dependency of any type, you are at a higher risk of becoming addicted to pain medications.  Only take these prescribed medications to treat your pain when all other options have been tried. Take it for as short a time and as few doses as possible. Store your pain pills in a secure place, as they are frequently stolen and provide a dangerous opportunity for children or visitors in your house to start abusing these powerful medications. We will not replace any lost or stolen medicine.  As soon as your pain is better, you should flush all your remaining medication.     Many prescription pain medications contain Tylenol  (acetaminophen), including Vicodin , Tylenol #3 , Norco , Lortab , and Percocet .  You should not take any extra pills of Tylenol  if you are using these prescription medications or you can get very sick.  Do not ever take more than 3000 mg of acetaminophen in any 24 hour period.    All opioids tend to cause constipation. Drink plenty of water and eat foods that have a lot of fiber, such as fruits, vegetables, prune juice, apple juice and high fiber cereal.  Take a laxative if you don t move your bowels at least every other day. Miralax , Milk of Magnesia, Colace , or Senna  can be used to keep you regular.      Remember that you can always come back to the Emergency Department if you are not able to see your regular doctor in the amount of time listed above, if you get any  new symptoms, or if there is anything that worries you.

## 2018-08-28 NOTE — ED NOTES
Roadtest: pt am,bulated w/ assistance from walker.  Pt looked normal as per male guest in room with him at the time.  Pt walked to bathroom and emptied leg bag w/o assistance.  Pt stated he felt fine.

## 2018-08-28 NOTE — ED AVS SNAPSHOT
[facility] Emergency Department    [address]    [facility] [address]    Phone:  [phone]    Fax:  [phone]                                       Edison Boss   MRN: [id number]    Department:  [facility] Emergency Department   Date of Visit:  8/28/2018           Patient Information     Date Of Birth          10/19/1924        Your diagnoses for this visit were:     Other specified transient cerebral ischemias     Syncope, near     UTI (urinary tract infection) with pyuria     Abdominal hernia without obstruction and without gangrene, recurrence not specified, unspecified hernia type        You were seen by Rocio Tamez MD and Herrera Jiang MD.      Follow-up Information     Follow up with Porfirio Terrell MD. Schedule an appointment as soon as possible for a visit in 2 days.    Specialty:  Internal Medicine    Contact information:    [address]  [facility] [address]  [phone]          Discharge Instructions       Discharge Instructions  Dizziness (Lightheaded)  Today you were seen for dizziness.  Dizziness can be caused by many things.  At this time, your doctor has found no signs that your dizziness is due to a serious or life-threatening condition. However, sometimes there is a serious problem that does not show up right away, and it is important for you to follow up with your regular doctor as instructed.  The most common cause of dizziness is called a vasovagal reaction. This is the kind of dizziness people get when they have their blood drawn or see unpleasant things. This can also happen with dehydration, extreme emotion, nausea, severe pain and also sometimes with urinating or coughing.  This type of dizziness is not serious. It is more common to be lightheaded when you are warm, when you have been standing still for a long time, when you haven t eaten or had very much to drink, and many other factors. Many times  there are several things all going on together that caused your spell today. As you get older, your blood vessels get more stiff, and it is common to have dizziness, especially when you first stand up.  If you have been lying down, be sure to sit for a few minutes before standing up, and always sit right down if you feel faint.  Other causes of dizziness include heart disease, irregular heartbeat, side effects from medications, effects of drugs and alcohol, blood pressure changes, infections, and blood loss (anemia). Some of these causes can be serious and even life threatening. Be sure to follow your doctor s discharge instructions for follow up with other doctors to further evaluate your problem.      Return to the Emergency Department if:      You pass out (fainting or falling out), especially during exercise.      You develop chest pain, chest pressure or difficulty breathing.    Your feel an irregular heartbeat.    You have excessive vaginal bleeding, or blood in your stool or vomit.    You have a high fever.    Your symptoms get worse or more frequent.    If when you begin to feel dizzy, it is important to sit down or lay down immediately to prevent injury from falling.  If you were given a prescription for medicine here today, be sure to read all of the information (including the package insert) that comes with your prescription.  This will include important information about the medicine, its side effects, and any warnings that you need to know about.  The pharmacist who fills the prescription can provide more information and answer questions you may have about the medicine.  If you have questions or concerns that the pharmacist cannot address, please call or return to the Emergency Department.     Opioid Medication Information    Pain medications are among the most commonly prescribed medicines, so we are including this information for all our patients. If you did not receive pain medication or get a  prescription for pain medicine, you can ignore it.     You may have been given a prescription for an opioid (narcotic) pain medicine and/or have received a pain medicine while here in the Emergency Department. These medicines can make you drowsy or impaired. You must not drive, operate dangerous equipment, or engage in any other dangerous activities while taking these medications. If you drive while taking these medications, you could be arrested for DUI, or driving under the influence. Do not drink any alcohol while you are taking these medications.     Opioid pain medications can cause addiction. If you have a history of chemical dependency of any type, you are at a higher risk of becoming addicted to pain medications.  Only take these prescribed medications to treat your pain when all other options have been tried. Take it for as short a time and as few doses as possible. Store your pain pills in a secure place, as they are frequently stolen and provide a dangerous opportunity for children or visitors in your house to start abusing these powerful medications. We will not replace any lost or stolen medicine.  As soon as your pain is better, you should flush all your remaining medication.     Many prescription pain medications contain Tylenol  (acetaminophen), including Vicodin , Tylenol #3 , Norco , Lortab , and Percocet .  You should not take any extra pills of Tylenol  if you are using these prescription medications or you can get very sick.  Do not ever take more than 3000 mg of acetaminophen in any 24 hour period.    All opioids tend to cause constipation. Drink plenty of water and eat foods that have a lot of fiber, such as fruits, vegetables, prune juice, apple juice and high fiber cereal.  Take a laxative if you don t move your bowels at least every other day. Miralax , Milk of Magnesia, Colace , or Senna  can be used to keep you regular.      Remember that you can always come back to the Emergency  Department if you are not able to see your regular doctor in the amount of time listed above, if you get any new symptoms, or if there is anything that worries you.            Discharge Instructions  Urinary Tract Infection  You have urinary tract infection, or UTI. The urinary tract includes the kidneys (which make urine), ureters (the tubes that carry urine from the kidneys to the bladder), the bladder (which stores urine), and urethra (the tube that carries urine out of the bladder).  Urinary tract infections occur when bacteria travel up the urethra into the bladder. We suspect a UTI based on chemical and microscopic findings in your urine, but if there is a question about your findings, we will do a culture to see if bacteria grow. A urine culture takes several days. You should always follow-up with your primary physician to find out about results of your culture if one was done.   Return to the Emergency Department if:    You have severe back pain.    You are vomiting so that you can t take your medicine, or have signs of dehydration (such as urinating less than 3 times per day).    You have fever over 101.5 degrees F.    You have significant confusion or are very weak, or feel very ill.    Your child seems much more ill, won t wake up, won t respond right, or is crying for a long time and won t calm down.    Your child is showing signs of dehydration, Signs of dehydration can be:  o Your infant has had no wet diapers in 4-5 hours.  o Your older child has not passed urine in 6-8 hours.  o Your infant or child starts to have dry mouth and lips, or no saliva or tears.    Follow-up with your doctor:     Children under 24 months need to be seen by their regular doctor within one week after a diagnosis of a UTI. It may be necessary to do some imaging tests to look at the child s kidney or bladder.    You should begin to feel better within 24 - 48 hours of starting your antibiotic.  If you do not, you need to be  "seen again.      Treatment:     You will be treated with an antibiotic to kill the bacteria. We have to make an educated guess as to which antibiotic will work for your infection. In most healthy people, we can guess right almost all of the time. Sometimes a culture is done to show which antibiotics will work. This usually takes 2-3 days. When the culture is done, we may have to contact you to put you on a different antibiotic.    Take a pain medication such as Tylenol  (acetaminophen), Advil  (ibuprofen), Nuprin  (ibuprofen), or Aleve  (naproxen). If you have been given a narcotic such as Vicodin  (hydrocodone with acetaminophen), Percocet  (oxycodone with acetaminophen), or codeine, do not drive for four hours after you have taken it. If the narcotic contains Tylenol  (acetaminophen), do not take Tylenol  with it. All narcotics will cause constipation, so eat a high fiber diet.      Pyridium  (phenazopyridine) or Uristat  (phenazopyridine) is a prescription medication that numbs the bladder to reduce the burning pain of some UTIs.  The same medication is available in a non-prescription version called Azo-Standard  (phenazopyridine), Urodol  (phenazopyridine), or other brand names. This medication will change the color of the urine and tears (usually blue or orange). If you wear contacts, do not wear them while taking this medication as they may be stained by the medication.    Antibiotic Warning:     If you have been placed on antibiotics - watch for signs of allergic reaction.  These include rash, lip swelling, difficulty breathing, wheezing, and dizziness.  If you develop any of these symptoms, stop the antibiotic immediately and go to an emergency room or urgent care for evaluation.    Probiotics: If you have been given an antibiotic, you may want to also take a probiotic pill or eat yogurt with live cultures. Probiotics have \"good bacteria\" to help your intestines stay healthy. Studies have shown that " probiotics help prevent diarrhea and other intestine problems (including C. diff infection) when you take antibiotics. You can buy these without a prescription in the pharmacy section of the store.   If you were given a prescription for medicine here today, be sure to read all of the information (including the package insert) that comes with your prescription.  This will include important information about the medicine, its side effects, and any warnings that you need to know about.  The pharmacist who fills the prescription can provide more information and answer questions you may have about the medicine.  If you have questions or concerns that the pharmacist cannot address, please call or return to the Emergency Department.   Opioid Medication Information    Pain medications are among the most commonly prescribed medicines, so we are including this information for all our patients. If you did not receive pain medication or get a prescription for pain medicine, you can ignore it.     You may have been given a prescription for an opioid (narcotic) pain medicine and/or have received a pain medicine while here in the Emergency Department. These medicines can make you drowsy or impaired. You must not drive, operate dangerous equipment, or engage in any other dangerous activities while taking these medications. If you drive while taking these medications, you could be arrested for DUI, or driving under the influence. Do not drink any alcohol while you are taking these medications.     Opioid pain medications can cause addiction. If you have a history of chemical dependency of any type, you are at a higher risk of becoming addicted to pain medications.  Only take these prescribed medications to treat your pain when all other options have been tried. Take it for as short a time and as few doses as possible. Store your pain pills in a secure place, as they are frequently stolen and provide a dangerous opportunity for  children or visitors in your house to start abusing these powerful medications. We will not replace any lost or stolen medicine.  As soon as your pain is better, you should flush all your remaining medication.     Many prescription pain medications contain Tylenol  (acetaminophen), including Vicodin , Tylenol #3 , Norco , Lortab , and Percocet .  You should not take any extra pills of Tylenol  if you are using these prescription medications or you can get very sick.  Do not ever take more than 3000 mg of acetaminophen in any 24 hour period.    All opioids tend to cause constipation. Drink plenty of water and eat foods that have a lot of fiber, such as fruits, vegetables, prune juice, apple juice and high fiber cereal.  Take a laxative if you don t move your bowels at least every other day. Miralax , Milk of Magnesia, Colace , or Senna  can be used to keep you regular.      Remember that you can always come back to the Emergency Department if you are not able to see your regular doctor in the amount of time listed above, if you get any new symptoms, or if there is anything that worries you.        Your next 10 appointments already scheduled     Aug 30, 2018 11:00 AM CDT   Return Visit with Michael Lilly MD   [facility] ([facility])    [address]  [address]  [facility] [address]   [phone]            Sep 25, 2018  1:40 PM CDT   Return Visit with Michael Lilly MD   [facility] ([facility])    [address]  [address]  [facility] [address]   [phone]            Nov 05, 2018  1:45 PM CST   Remote PPM Check with SANDRA TECH1   [facility] ([facility])    [address]  [facility] [address] [phone] OPT [address]           This appointment is for a remote check of your pacemaker.  This is not an  appointment at the office.              24 Hour Appointment Hotline       To make an appointment at any [facility], call [phone] ([phone]). If you don't have a family doctor or clinic, we will help you find one. [facility] are conveniently located to serve the needs of you and your family.             Review of your medicines      START taking        Dose / Directions Last dose taken    doxycycline 100 MG capsule   Commonly known as:  VIBRAMYCIN   Dose:  100 mg   Quantity:  20 capsule        Take 1 capsule (100 mg) by mouth 2 times daily   Refills:  0          Our records show that you are taking the medicines listed below. If these are incorrect, please call your family doctor or clinic.        Dose / Directions Last dose taken    albuterol 108 (90 Base) MCG/ACT inhaler   Commonly known as:  PROAIR HFA/PROVENTIL HFA/VENTOLIN HFA   Dose:  2 puff   Quantity:  1 Inhaler        Inhale 2 puffs into the lungs every 6 hours as needed for shortness of breath / dyspnea.   Refills:  1        amLODIPine 5 MG tablet   Commonly known as:  NORVASC   Dose:  5 mg   Quantity:  90 tablet        Take 1 tablet (5 mg) by mouth daily   Refills:  3        AVEENO ECZEMA THERAPY 1 % Crea   Generic drug:  Colloidal Oatmeal        Externally apply topically daily   Refills:  0        cetirizine 10 MG tablet   Commonly known as:  zyrTEC   Dose:  10 mg        Take 10 mg by mouth daily as needed   Refills:  0        cholecalciferol 5000 units Caps   Dose:  1 capsule        Take 1 capsule by mouth daily. Takes 5000 units in the summer and 77425 units in the winter   Refills:  0        CIPROFLOXACIN PO   Dose:  500 mg        Take 500 mg by mouth 1 tablet by mouth once as needed. Take one tablet after each procedere   Refills:  0        clobetasol 0.05 % ointment   Commonly known as:  TEMOVATE        daily as needed   Refills:  2        COLON CLEANSER PO   Dose:  1 capsule        Take 1 capsule by mouth daily Advanced  Colon Care II   Refills:  0        D-Mannose Powd        daily 500 mg Pt takes 2 pills daily   Refills:  0        * EYE VITAMINS Caps        one tablet twice daily   Refills:  0        * PRESERVISION AREDS Caps   Dose:  1 capsule   Quantity:  180 capsule        Take 1 capsule by mouth 2 times daily   Refills:  3        lidocaine 4 % Crea cream   Commonly known as:  LMX4        Apply topically daily as needed   Refills:  0        nebulizer nebulization   Quantity:  1        Use as directed   Refills:  0        omeprazole 20 MG CR capsule   Commonly known as:  priLOSEC   Dose:  20 mg   Quantity:  90 capsule        Take 1 capsule (20 mg) by mouth daily   Refills:  3        oxyCODONE-acetaminophen 5-325 MG per tablet   Commonly known as:  PERCOCET   Dose:  1 tablet   Quantity:  30 tablet        Take 1 tablet by mouth every 4 hours as needed for pain for pain.   Refills:  0        PARoxetine 30 MG tablet   Commonly known as:  PAXIL   Quantity:  45 tablet        TAKE 1/2 TABLET BY MOUTH AT BEDTIME   Refills:  3        VITAMIN C PO        Take by mouth 2 times daily 500 mg take 1 daily   Refills:  0        * Notice:  This list has 2 medication(s) that are the same as other medications prescribed for you. Read the directions carefully, and ask your doctor or other care provider to review them with you.            Prescriptions were sent or printed at these locations (1 Prescription)                   Other Prescriptions                Printed at Department/Unit printer (1 of 1)         doxycycline (VIBRAMYCIN) 100 MG capsule                Procedures and tests performed during your visit     Basic metabolic panel    CBC with platelets differential    CT Abdomen Pelvis w Contrast    CT Head w Contrast    CT Head w/o Contrast    CTA Head Neck with Contrast    EKG 12 lead    Glucose by meter    Glucose monitor nursing POCT    INR    ISTAT troponin nursing POCT    Partial thromboplastin time    Pulse oximetry nursing     Troponin I    UA with Microscopic      Orders Needing Specimen Collection     None      Pending Results     No orders found from 8/26/2018 to 8/29/2018.            Pending Culture Results     No orders found from 8/26/2018 to 8/29/2018.            Pending Results Instructions     If you had any lab results that were not finalized at the time of your Discharge, you can call the ED Lab Result RN at [phone]. You will be contacted by this team for any positive Lab results or changes in treatment. The nurses are available 7 days a week from 10A to 6:30P.  You can leave a message 24 hours per day and they will return your call.        Test Results From Your Hospital Stay        8/28/2018 10:57 AM      Narrative     CT SCAN OF THE HEAD WITHOUT CONTRAST August 28, 2018 10:07 AM     HISTORY: Code Stroke.    TECHNIQUE: Axial images of the head and coronal reformations without  IV contrast material. Radiation dose for this scan was reduced using  automated exposure control, adjustment of the mA and/or kV according  to patient size, or iterative reconstruction technique.    COMPARISON: Head CT 4/4/2016.    FINDINGS: There is no evidence of intracranial hemorrhage, mass, or  anomaly. The ventricles are normal in size, shape and configuration.  Mild diffuse parenchymal volume loss. Mild patchy periventricular  white matter hypodensities which are nonspecific, but likely related  to chronic microvascular ischemic disease.     The visualized portions of the sinuses and mastoids appear normal. The  bony calvarium and bones of the skull base appear intact.         Impression     IMPRESSION:     1. No evidence of acute intracranial hemorrhage, mass, or herniation.  2. Mild diffuse parenchymal volume loss and white matter changes  likely due to chronic microvascular ischemic disease.     RICKEY LOEPZ MD         8/28/2018 10:57 AM      Narrative     CT ANGIOGRAM OF THE HEAD AND NECK WITH CONTRAST  8/28/2018 10:25 AM     HISTORY:  Code stroke.    TECHNIQUE:  CT angiography with an injection of 70 mL Isovue-370  (accession [id number]), 50 mL Isovue-370 (accession [id number]) IV with  scans through the head and neck. Images were transferred to a separate  3-D workstation where multiplanar reformations and 3-D images were  created. Estimates of carotid stenoses are made relative to the distal  internal carotid artery diameters except as noted. Radiation dose for  this scan was reduced using automated exposure control, adjustment of  the mA and/or kV according to patient size, or iterative  reconstruction technique. Perfusion scans were performed at three  levels with injection of additional IV nonionic contrast and saline  flush.  These images were processed on a separate 3-D workstation.     COMPARISON: None.     CT HEAD FINDINGS: No contrast enhancing lesions. CT perfusion images  demonstrate area of possible wedge-shaped perfusion abnormality in the  right parietal lobe with increase in time to drain and Tmax in this  area. No corresponding decrease in cerebral blood volume or cerebral  blood flow.     There is reflux of dense contrast material up the left internal  jugular vein and also extending to the left sigmoid and transverse  sinuses.    CT ANGIOGRAM HEAD FINDINGS:  The major intracranial arteries including  the proximal branches of the anterior cerebral, middle cerebral, and  posterior cerebral arteries appear patent without vascular cutoff. No  aneurysm identified.    Multifocal stenosis of the intracranial arteries likely due to  atherosclerotic disease. There is mild to moderate stenosis of the  right M1 segment and mild stenosis of the left M1 segment. Moderate to  severe stenosis of the right M2 branches and mild to moderate stenosis  of the left M2 branches. Scattered moderate to severe stenosis of the  A2 and A3 branches bilaterally. There is severe stenosis/near  occlusion of the left posterior communicating artery. The left  P1  segment is small in size or absent which may be congenital. There are  mild to moderate multifocal stenoses of the right P2 segment. There is  calcified atherosclerotic disease of the cavernous and supraclinoid  internal carotid arteries resulting in mild stenosis.    CT ANGIOGRAM NECK FINDINGS: Normal origin of the great vessels from  the aortic arch.     Right carotid artery: The right common and internal carotid arteries  are patent. No significant stenosis or atherosclerotic disease in the  carotid artery.     Left carotid artery: The left common and internal carotid arteries are  patent. There is soft and calcified plaque at the left carotid  bifurcation and proximal internal carotid artery resulting in 53%  stenosis by NASCET criteria.     Vertebral arteries: Vertebral arteries are patent without evidence of  dissection. No significant stenosis.     Other findings: Multilevel degenerative changes in the visualized  spine.        Impression     IMPRESSION:   1. Patent arteries in the neck without evidence of dissection.  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  2. Patent proximal major intracranial arteries without vascular  cutoff. Multifocal stenoses of the intracranial arteries as described  above likely due to atherosclerotic disease. No aneurysm identified.  3. CT perfusion images demonstrate possible area of ischemia within  the right parietal lobe. This could also be due to artifact and  quantum mottle. This would be better evaluated with brain MRI if the  patient is able.    Results discussed with Herrera Jiang at 10:32 AM on 8/28/2018.      IRCKEY LOPEZ MD         8/28/2018 10:57 AM      Narrative     CT ANGIOGRAM OF THE HEAD AND NECK WITH CONTRAST  8/28/2018 10:25 AM     HISTORY: Code stroke.    TECHNIQUE:  CT angiography with an injection of 70 mL Isovue-370  (accession [id number]), 50 mL Isovue-370 (accession [id number]) IV  with  scans through the head and neck. Images were transferred to a separate  3-D workstation where multiplanar reformations and 3-D images were  created. Estimates of carotid stenoses are made relative to the distal  internal carotid artery diameters except as noted. Radiation dose for  this scan was reduced using automated exposure control, adjustment of  the mA and/or kV according to patient size, or iterative  reconstruction technique. Perfusion scans were performed at three  levels with injection of additional IV nonionic contrast and saline  flush.  These images were processed on a separate 3-D workstation.     COMPARISON: None.     CT HEAD FINDINGS: No contrast enhancing lesions. CT perfusion images  demonstrate area of possible wedge-shaped perfusion abnormality in the  right parietal lobe with increase in time to drain and Tmax in this  area. No corresponding decrease in cerebral blood volume or cerebral  blood flow.     There is reflux of dense contrast material up the left internal  jugular vein and also extending to the left sigmoid and transverse  sinuses.    CT ANGIOGRAM HEAD FINDINGS:  The major intracranial arteries including  the proximal branches of the anterior cerebral, middle cerebral, and  posterior cerebral arteries appear patent without vascular cutoff. No  aneurysm identified.    Multifocal stenosis of the intracranial arteries likely due to  atherosclerotic disease. There is mild to moderate stenosis of the  right M1 segment and mild stenosis of the left M1 segment. Moderate to  severe stenosis of the right M2 branches and mild to moderate stenosis  of the left M2 branches. Scattered moderate to severe stenosis of the  A2 and A3 branches bilaterally. There is severe stenosis/near  occlusion of the left posterior communicating artery. The left P1  segment is small in size or absent which may be congenital. There are  mild to moderate multifocal stenoses of the right P2 segment. There  is  calcified atherosclerotic disease of the cavernous and supraclinoid  internal carotid arteries resulting in mild stenosis.    CT ANGIOGRAM NECK FINDINGS: Normal origin of the great vessels from  the aortic arch.     Right carotid artery: The right common and internal carotid arteries  are patent. No significant stenosis or atherosclerotic disease in the  carotid artery.     Left carotid artery: The left common and internal carotid arteries are  patent. There is soft and calcified plaque at the left carotid  bifurcation and proximal internal carotid artery resulting in 53%  stenosis by NASCET criteria.     Vertebral arteries: Vertebral arteries are patent without evidence of  dissection. No significant stenosis.     Other findings: Multilevel degenerative changes in the visualized  spine.        Impression     IMPRESSION:   1. Patent arteries in the neck without evidence of dissection.  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  2. Patent proximal major intracranial arteries without vascular  cutoff. Multifocal stenoses of the intracranial arteries as described  above likely due to atherosclerotic disease. No aneurysm identified.  3. CT perfusion images demonstrate possible area of ischemia within  the right parietal lobe. This could also be due to artifact and  quantum mottle. This would be better evaluated with brain MRI if the  patient is able.    Results discussed with Herrera Jiang at 10:32 AM on 8/28/2018.      RICKEY LOPEZ MD         8/28/2018 10:00 AM      Component Results     Component Value Ref Range & Units Status    WBC 8.7 4.0 - 11.0 10e9/L Final    RBC Count 4.92 4.4 - 5.9 10e12/L Final    Hemoglobin 14.9 13.3 - 17.7 g/dL Final    Hematocrit 44.6 40.0 - 53.0 % Final    MCV 91 78 - 100 fl Final    MCH 30.3 26.5 - 33.0 pg Final    MCHC 33.4 31.5 - 36.5 g/dL Final    RDW 17.0 (H) 10.0 - 15.0 % Final    Platelet Count 283 150 - 450 10e9/L  Final    Diff Method Automated Method  Final    % Neutrophils 71.6 % Final    % Lymphocytes 10.0 % Final    % Monocytes 13.3 % Final    % Eosinophils 3.8 % Final    % Basophils 0.7 % Final    % Immature Granulocytes 0.6 % Final    Nucleated RBCs 0 0 /100 Final    Absolute Neutrophil 6.2 1.6 - 8.3 10e9/L Final    Absolute Lymphocytes 0.9 0.8 - 5.3 10e9/L Final    Absolute Monocytes 1.2 0.0 - 1.3 10e9/L Final    Absolute Eosinophils 0.3 0.0 - 0.7 10e9/L Final    Absolute Basophils 0.1 0.0 - 0.2 10e9/L Final    Abs Immature Granulocytes 0.1 0 - 0.4 10e9/L Final    Absolute Nucleated RBC 0.0  Final         8/28/2018 10:19 AM      Component Results     Component Value Ref Range & Units Status    Sodium 142 133 - 144 mmol/L Final    Potassium 3.9 3.4 - 5.3 mmol/L Final    Chloride 110 (H) 94 - 109 mmol/L Final    Carbon Dioxide 26 20 - 32 mmol/L Final    Anion Gap 6 3 - 14 mmol/L Final    Glucose 92 70 - 99 mg/dL Final    Urea Nitrogen 19 7 - 30 mg/dL Final    Creatinine 1.08 0.66 - 1.25 mg/dL Final    GFR Estimate 64 >60 mL/min/1.7m2 Final    Non  GFR Calc    GFR Estimate If Black 77 >60 mL/min/1.7m2 Final    African American GFR Calc    Calcium 8.4 (L) 8.5 - 10.1 mg/dL Final         8/28/2018 10:09 AM      Component Results     Component Value Ref Range & Units Status    INR 1.09 0.86 - 1.14 Final         8/28/2018 10:09 AM      Component Results     Component Value Ref Range & Units Status    PTT 32 22 - 37 sec Final               8/28/2018  1:42 PM      Component Results     Component Value Ref Range & Units Status    Color Urine Yellow  Final    Appearance Urine Slightly Cloudy  Final    Glucose Urine Negative NEG^Negative mg/dL Final    Bilirubin Urine Negative NEG^Negative Final    Ketones Urine 5 (A) NEG^Negative mg/dL Final    Specific Gravity Urine 1.019 1.003 - 1.035 Final    Blood Urine Negative NEG^Negative Final    pH Urine 8.0 (H) 5.0 - 7.0 pH Final    Protein Albumin Urine Negative  NEG^Negative mg/dL Final    Urobilinogen mg/dL 0.0 0.0 - 2.0 mg/dL Final    Nitrite Urine Negative NEG^Negative Final    Leukocyte Esterase Urine Small (A) NEG^Negative Final    Source Catheterized Urine  Final    WBC Urine 15 (H) 0 - 5 /HPF Final    RBC Urine 5 (H) 0 - 2 /HPF Final    Bacteria Urine Few (A) NEG^Negative /HPF Final    Mucous Urine Present (A) NEG^Negative /LPF Final         8/28/2018 10:20 AM      Component Results     Component Value Ref Range & Units Status    Troponin I ES <0.015 0.000 - 0.045 ug/L Final    The 99th percentile for upper reference range is 0.045 ug/L.  Troponin values   in the range of 0.045 - 0.120 ug/L may be associated with risks of adverse   clinical events.           8/28/2018  9:59 AM      Component Results     Component Value Ref Range & Units Status    Glucose 85 70 - 99 mg/dL Final    Dr/RN Notified         8/28/2018 12:46 PM      Narrative     CT ABDOMEN AND PELVIS WITH CONTRAST August 28, 2018 11:45 AM    HISTORY: Abdomen pain.      COMPARISON: A CT on 8/25/2018.    TECHNIQUE: Routine transverse CT imaging of the abdomen and pelvis was  performed following the uneventful administration of 120mL Isovue-370  intravenous contrast. Radiation dose for this scan was reduced using  automated exposure control, adjustment of the mA and/or kV according  to patient size, or iterative reconstruction technique.    FINDINGS: There is mild dependent atelectasis of both lung bases. The  liver, spleen, pancreas, and gallbladder are normal. Again seen is a  2.6 cm left adrenal gland nodule which is unchanged. The right adrenal  gland remains normal. There is an approximately 11 cm cyst of the left  kidney with additional much smaller cysts in each kidney. Previously  seen left renal calculi are not well seen currently due to the  presence of contrast. A catheter is present within the bladder. No  other urinary tract abnormality is demonstrated. There are radiation  seeds within the  prostate gland. No enlarged lymph node or other  abnormal mass is demonstrated. No free fluid is seen. No free  intraperitoneal gas is identified. The gastrointestinal tract is  unremarkable. There is calcification of the vascular structures. There  are degenerative changes of the spine. No other osseous abnormality is  noted. Previously there is noted to be a large right inguinal hernia  containing the appendix, cecum, and terminal ileum. This has  significantly decreased in size. The appendix again extends into this  hernia where there is also a small amount of fluid. The appendix is  otherwise unremarkable with no additional evidence of appendicitis.  Again seen is a left inguinal hernia containing only fat. No other  abdominal or pelvic wall pathology is seen.        Impression     IMPRESSION: Since the previous CT, there has been decrease in size of  the right inguinal hernia which now only contains the appendix.  Although there is a small amount of fluid within this hernia, there is  no additional evidence of appendicitis. Previously seen nonobstructing  calculi of the left kidney are not well seen currently due to the  presence of contrast.     MELODY DE LEON MD                Clinical Quality Measure: Blood Pressure Screening     Your blood pressure was checked while you were in the emergency department today. The last reading we obtained was  BP: 184/87 . Please read the guidelines below about what these numbers mean and what you should do about them.  If your systolic blood pressure (the top number) is less than 120 and your diastolic blood pressure (the bottom number) is less than 80, then your blood pressure is normal. There is nothing more that you need to do about it.  If your systolic blood pressure (the top number) is 120-139 or your diastolic blood pressure (the bottom number) is 80-89, your blood pressure may be higher than it should be. You should have your blood pressure rechecked within a  year by a primary care provider.  If your systolic blood pressure (the top number) is 140 or greater or your diastolic blood pressure (the bottom number) is 90 or greater, you may have high blood pressure. High blood pressure is treatable, but if left untreated over time it can put you at risk for heart attack, stroke, or kidney failure. You should have your blood pressure rechecked by a primary care provider within the next 4 weeks.  If your provider in the emergency department today gave you specific instructions to follow-up with your doctor or provider even sooner than that, you should follow that instruction and not wait for up to 4 weeks for your follow-up visit.        Thank you for choosing [facility]       Thank you for choosing [facility] for your care. Our goal is always to provide you with excellent care. Hearing back from our patients is one way we can continue to improve our services. Please take a few minutes to complete the written survey that you may receive in the mail after you visit with us. Thank you!        [employer] Information     [employer] gives you secure access to your electronic health record. If you see a primary care provider, you can also send messages to your care team and make appointments. If you have questions, please call your primary care clinic.  If you do not have a primary care provider, please call [phone] and they will assist you.        Care EveryWhere ID     This is your Care EveryWhere ID. This could be used by other organizations to access your [facility] medical records  [id number]        Equal Access to Services     EMERITA CADE : Shellie Denson, darek griffith, qaybta yony lopez. So [facility] [phone].    ATENCIÓN: Si habla español, tiene a lauren disposición servicios gratuitos de asistencia lingüística. Lesley al [phone].    We comply with applicable federal civil rights laws and [place] laws. We do  not discriminate on the basis of race, color, national origin, age, disability, sex, sexual orientation, or gender identity.            After Visit Summary       This is your record. Keep this with you and show to your community pharmacist(s) and doctor(s) at your next visit.

## 2018-08-28 NOTE — ED PROVIDER NOTES
History     Chief Complaint:  Near Syncopal Episode    HPI     History is limited due to the patient's dementia.    Edison Boss is a 89 year old male with a history of dementia, HLD, HTN, atrial fibrillation who presents to the emergency department for evaluation of near syncopal episode. The patient reports he was changing at around 0830 when he had a sudden onset of dizziness and light headedness with a near syncopal episode, feeling like he was about to pass out. He indicates he managed to catch himself on a handrail before falling. He states he has had frequent history of the same although he has not had it for the past year. He further reports he had a short episode of nausea and vomiting accompanying his near syncope, as well as some right hip pain associated to a past hernia. Here at the ED, the patient says he still has some spinning, but it is improved. He denies any dysuria.    Allergies:  Ceftriaxone  Bactrim  Levaquin  Zithromax [Azithromycin]  Macrodantin [Nitrofuran Derivatives]     Medications:    Albuterol  Norvasc  Zyrtec  Ciprofloxacin  Temovate  Omeprazole   Paxil  Percocet     Past Medical History:    Calculus of kidney  Chronic infection  Chronic pain  Esophageal reflux  HLD  HTN  Neoplasm of prostate  Asthma  Paroxysmal afib  Sinus node dysfunction  Sleep apnea  Cerebral artery occlusion with cerebral infarction  RLS  SBO  Anxiety    Past Surgical History:    Kidney stone surgery  TURP x2  Brachytherapy  Left rotator cuff repair  Bilateral cataract surgery  EGD/dilation twice  T&A  Penis surgery  Prostate surgery    Family History:    Heart disease  Cancer  Cerebrovascular disease    Social History:  Presents alone.  Lives in independent living.  Former smoker, 1 ppd.  Negative for alcohol use.  Marital Status:   [2]     Review of Systems   Reason unable to perform ROS: limited to due dementia.   Gastrointestinal: Positive for nausea and vomiting.   Genitourinary: Negative  for dysuria.   Neurological: Positive for dizziness and light-headedness. Negative for syncope.   All other systems reviewed and are negative.    Physical Exam     Patient Vitals for the past 24 hrs:   BP Temp Temp src Heart Rate Resp SpO2   08/28/18 1445 184/87 - - - - -   08/28/18 1430 179/82 - - 60 15 91 %   08/28/18 1415 174/78 - - - - -   08/28/18 1400 167/77 - - 61 17 93 %   08/28/18 1345 170/82 - - 60 16 92 %   08/28/18 1330 170/74 - - - - -   08/28/18 1315 179/78 - - 60 16 92 %   08/28/18 1230 (!) 192/94 - - 60 16 93 %   08/28/18 1215 (!) 186/91 - - 60 19 96 %   08/28/18 1200 177/85 - - 60 17 96 %   08/28/18 1145 183/88 - - 60 8 92 %   08/28/18 1144 - 97.9  F (36.6  C) Oral - - -   08/28/18 1130 176/79 - - - - -   08/28/18 1115 178/79 - - - - -   08/28/18 1100 182/87 - - 60 16 91 %   08/28/18 1045 - - - 60 18 92 %   08/28/18 1030 183/84 - - - - -   08/28/18 1022 180/80 - - 68 16 -   08/28/18 0932 189/87 - Oral 60 14 96 %       Physical Exam  General: The patient is alert, in no respiratory distress.    HENT: Mucous membranes dry. Wax in both ear canals.    Cardiovascular: Regular rate and rhythm. Good pulses in all four extremities. Normal capillary refill and skin turgor. Device in left upper chest.    Respiratory: Lungs are clear. No nasal flaring. No retractions. No wheezing, no crackles.    Gastrointestinal: Abdomen soft. No guarding, no rebound. No palpable hernias. Mild diffuse abdominal tenderness.    : Christianson in place. No palpable hernias.    Musculoskeletal: No gross deformity.     Skin: No rashes or petechiae. Pallor.    Neurologic: The patient is alert and oriented x3. GCS 15. No testable cranial nerve deficit. Follows commands with clear and appropriate speech. Gives appropriate answers. Good strength in all extremities. No gross neurologic deficit. Gross sensation intact. Pupils are round and reactive. No meningismus. Has some mild dysmetria with finger-nose-finger but will still touch my  finger.    Lymphatic: No cervical adenopathy. No lower extremity swelling.    Psychiatric: The patient is non-tearful.    Emergency Department Course   ECG:  Time: 0935  Vent. Rate 60 bpm. VA interval 158. QRS duration 96. QT/QTc 438/438. P-R-T axis 76 28 26. Atrial-paced rhythm. Abnormal ECG. Read time: 0942    Imaging:  Radiographic findings were communicated with the patient and son who voiced understanding of the findings.    CT Head without contrast:   1. No evidence of acute intracranial hemorrhage, mass, or herniation.  2. Mild diffuse parenchymal volume loss and white matter changes  likely due to chronic microvascular ischemic disease.  As per radiology.    CT Head with contrast:   1. Patent arteries in the neck without evidence of dissection.  Atherosclerotic disease in the left carotid artery resulting in  approximately 50% stenosis by NASCET criteria.  2. Patent proximal major intracranial arteries without vascular  cutoff. Multifocal stenoses of the intracranial arteries as described  above likely due to atherosclerotic disease. No aneurysm identified.  3. CT perfusion images demonstrate possible area of ischemia within  the right parietal lobe. This could also be due to artifact and  quantum mottle. This would be better evaluated with brain MRI if the  patient is able. As per radiology.    CTA Head Neck w Contrast:  1. Patent arteries in the neck without evidence of dissection.  Atherosclerotic disease in the left carotid artery resulting in  approximately 50% stenosis by NASCET criteria.  2. Patent proximal major intracranial arteries without vascular  cutoff. Multifocal stenoses of the intracranial arteries as described  above likely due to atherosclerotic disease. No aneurysm identified.  3. CT perfusion images demonstrate possible area of ischemia within  the right parietal lobe. This could also be due to artifact and  quantum mottle. This would be better evaluated with brain MRI if the  patient is  able. As per radiology.    CT Abd/pelvis, with Contrast:  Since the previous CT, there has been decrease in size of  the right inguinal hernia which now only contains the appendix.  Although there is a small amount of fluid within this hernia, there is  no additional evidence of appendicitis. Previously seen nonobstructing  calculi of the left kidney are not well seen currently due to the  presence of contrast.  As per radiology.    Laboratory:  0946 Troponin I: <0.015  0947 Glucose by meter: 85    CBC: WBC: 8.7, HGB: 14.9, PLT: 283  BMP: Chloride 110 (H), Calcium 8.4 (L), o/w WNL (Creatinine: 1.08)    INR: 1.09    PTT: 32    UA with micro: [place] 5, pH 8.0 (H), Leukocyte Esterase Small, WBC 15 (H), RBC 5 (H), Bacteria Few, Mucous Present, o/w negative    Interventions:  1122 NS 1L IV BOLUS    Emergency Department Course:  Nursing notes and vitals reviewed. 0935 I performed an exam of the patient as documented above.     The patient provided a urine sample here in the emergency department. This was sent for laboratory testing, findings above.     EKG obtained in the ED, see results above.     IV inserted. Medicine administered as documented above. Blood drawn. This was sent to the lab for further testing, results above.    The patient was sent for a CT Head, CTA Head while in the emergency department, findings above.     0941 I called Code Stroke.    0946 I spoke with Dr. Crisostomo, stroke neurology, regarding this patient.    1030 I spoke with Dr. Vail, radiology, regarding this patient.    1115 I spoke with Dr. Crisostomo again after speaking with the family. He discussed anticoagulants and family declined.    1125 I confirmed that the family did not want any anticoagulants for the patient.    1449 I rechecked the patient and discussed the results of his workup thus far.     Findings and plan explained to the Patient. Patient discharged home with instructions regarding supportive care, medications, and reasons to  return. The importance of close follow-up was reviewed. The patient was prescribed Vibramycin.    I personally reviewed the laboratory results with the Patient and answered all related questions prior to discharge.     Impression & Plan      Medical Decision Making:  Edison Boss is a 89 year old male who had an episode of vertigo which was abrupt in onset. At his age, there was high concern it could be a stroke. The patient here, however, is doing much better. He had some lightheadedness prior to the episode and therefore I considered other events like cardiovascular. His labs look quite reassuring other than he has a UTI. He was called a code stroke. Stroke neurology saw him and felt that a TIA was definitely a possibility. The patient is otherwise stable. Both the stroke neurologist and I discussed with the family and they did not want him on aspirin or any anticoagulants due to his history of falls. Both I and the neurolgist told the family that not anticoagulating him  can lead to strokes. Because he will not be receiving anticoagulants or therapy for his TIAA,  it did not make sense to admit him for further workup for stroke.. Outpatient follow up was deemed appropriate. He is ambulatory here without problems. He was feeling much better and discharged into the care of his family.    Diagnosis:    ICD-10-CM   1. Other specified transient cerebral ischemias G45.8   2. Syncope, near R55   3. UTI (urinary tract infection) with pyuria N39.0   4. Abdominal hernia without obstruction and without gangrene, recurrence not specified, unspecified hernia type K46.9       Disposition:  discharged to home    Discharge Medications:  New Prescriptions    DOXYCYCLINE (VIBRAMYCIN) 100 MG CAPSULE    Take 1 capsule (100 mg) by mouth 2 times daily   Christos Patel, am serving as a scribe on 8/28/2018 at 9:38 AM to personally document services performed by No att. providers found based on my observations and the  provider's statements to me.     Christos Flower  8/28/2018   [facility] EMERGENCY DEPARTMENT       Herrera Jiang MD  08/28/18 1536       Herrera Jiang MD  08/28/18 3524

## 2018-08-28 NOTE — ED AVS SNAPSHOT
[facility]    [address]    [facility] [address]    Phone:  [phone]    Fax:  [phone]                                       Edison Boss   MRN: [id number]    Department:  [facility]   Date of Visit:  8/28/2018           After Visit Summary Signature Page     I have received my discharge instructions, and my questions have been answered. I have discussed any challenges I see with this plan with the nurse or doctor.    ..........................................................................................................................................  Patient/Patient Representative Signature      ..........................................................................................................................................  Patient Representative Print Name and Relationship to Patient    ..................................................               ................................................  Date                                            Time    ..........................................................................................................................................  Reviewed by Signature/Title    ...................................................              ..............................................  Date                                                            Time          22EPIC Rev 08/18

## 2018-08-28 NOTE — ED TRIAGE NOTES
"Patient presents via EMS with near syncopal episode. Patient lives at [facility]. Patient states he ate breakfast, then his head started to spin, and he gripped a handrail to prevent from \"passing out\". Patient was recently diagnosed with a hernia here on 8/25. No chest pain or shortness of breath. ABCDs intact, alert.   "

## 2018-08-30 NOTE — MR AVS SNAPSHOT
After Visit Summary   8/30/2018    Edison Boss    MRN: [id number]           Patient Information     Date Of Birth          10/19/1924        Visit Information        Provider Department      8/30/2018 11:00 AM Michael Lilly MD [facility]        Today's Diagnoses     Urge incontinence    -  1    Malignant neoplasm of prostate (H)           Follow-ups after your visit        Follow-up notes from your care team     Return in about 4 weeks (around 9/27/2018) for tolliver change.      Your next 10 appointments already scheduled     Sep 04, 2018 12:00 PM CDT   SHORT with Darline Maravilla PA-C   [facility]    [address]  [facility] [id number] [phone]            Sep 25, 2018  1:40 PM CDT   Return Visit with Michael Lilly MD   [facility] ([facility])    [address]  [address]  [facility] [id number] [phone]            Oct 30, 2018 10:10 AM CDT   Return Visit with Michael Lilly MD   [facility] ([facility])    [address]  [address]  [facility] [id number] [phone]            Nov 05, 2018  1:45 PM CST   Remote PPM Check with SANDRA TECH1   [facility] ([facility])    [address] [place] [address] [address]  [facility] [address] [phone] OPT 2           This appointment is for a remote check of your pacemaker.  This is not an appointment at the office.              Who to contact     If you have questions or need follow up information about today's clinic visit or your schedule please contact [facility]OLOG[facility] directly at [phone].  Normal or non-critical lab and imaging results will be communicated to you by MyChart, letter or phone  within 4 business days after the clinic has received the results. If you do not hear from us within 7 days, please contact the clinic through [employer] or phone. If you have a critical or abnormal lab result, we will notify you by phone as soon as possible.  Submit refill requests through [employer] or call your pharmacy and they will forward the refill request to us. Please allow 3 business days for your refill to be completed.          Additional Information About Your Visit        Fl[employer] Information     [employer] gives you secure access to your electronic health record. If you see a primary care provider, you can also send messages to your care team and make appointments. If you have questions, please call your primary care clinic.  If you do not have a primary care provider, please call [phone] and they will assist you.        Care EveryWhere ID     This is your Care EveryWhere ID. This could be used by other organizations to access your [facility] medical records  [id number]         Blood Pressure from Last 3 Encounters:   08/28/18 191/86   08/25/18 (!) 235/96   01/02/18 150/77    Weight from Last 3 Encounters:   04/05/18 78 kg (172 lb)   02/08/18 78 kg (172 lb)   01/02/18 78.2 kg (172 lb 8 oz)              We Performed the Following     INSERT BLADDER CATH, TEMP INDWELL SIMPLE (ARAMBULA) ([address])          Today's Medication Changes          These changes are accurate as of 8/30/18 11:38 AM.  If you have any questions, ask your nurse or doctor.               These medicines have changed or have updated prescriptions.        Dose/Directions    PARoxetine 30 MG tablet   Commonly known as:  PAXIL   This may have changed:    - how much to take  - additional instructions   Used for:  RAMÓN (generalized anxiety disorder)        TAKE 1/2 TABLET BY MOUTH AT BEDTIME   Quantity:  45 tablet   Refills:  3                Primary Care Provider Office Phone # Fax #    Porfirio Terrell [phone]       [address]  [facility] [address]        Equal Access to Services     EMERITA CADE : Hadii vanessa ku hadmontezdione Soezekielali, waaxda luqadaha, qaybta kaalmakan chamberlain, yony valenzuelarajesse nunn. So [facility] [phone].    ATENCIÓN: Si habla español, tiene a lauren disposición servicios gratuitos de asistencia lingüística. Llame al [phone].    We comply with applicable federal civil rights laws and [place] laws. We do not discriminate on the basis of race, color, national origin, age, disability, sex, sexual orientation, or gender identity.            Thank you!     Thank you for choosing [facility] [place]  for your care. Our goal is always to provide you with excellent care. Hearing back from our patients is one way we can continue to improve our services. Please take a few minutes to complete the written survey that you may receive in the mail after your visit with us. Thank you!             Your Updated Medication List - Protect others around you: Learn how to safely use, store and throw away your medicines at www.disposemymeds.org.          This list is accurate as of 8/30/18 11:38 AM.  Always use your most recent med list.                   Brand Name Dispense Instructions for use Diagnosis    albuterol 108 (90 Base) MCG/ACT inhaler    PROAIR HFA/PROVENTIL HFA/VENTOLIN HFA    1 Inhaler    Inhale 2 puffs into the lungs every 6 hours as needed for shortness of breath / dyspnea.    Mild persistent asthma       amLODIPine 5 MG tablet    NORVASC    90 tablet    Take 1 tablet (5 mg) by mouth daily    Essential hypertension with goal blood pressure less than 140/90       AVEENO ECZEMA THERAPY 1 % Crea   Generic drug:  Colloidal Oatmeal      Externally apply topically daily        cetirizine 10 MG tablet    zyrTEC     Take 10 mg by mouth daily as needed        cholecalciferol 5000 units Caps      Take 1 capsule by mouth daily. Takes 5000 units in the summer and 43579 units in  the winter        CIPROFLOXACIN PO      Take 500 mg by mouth 1 tablet by mouth once as needed. Take one tablet after each procedere        clobetasol 0.05 % ointment    TEMOVATE     daily as needed        COLON CLEANSER PO      Take 1 capsule by mouth daily Advanced Colon Care II        D-Mannose Powd      daily 500 mg Pt takes 2 pills daily        doxycycline 100 MG capsule    VIBRAMYCIN    20 capsule    Take 1 capsule (100 mg) by mouth 2 times daily        * EYE VITAMINS Caps      one tablet twice daily        * PRESERVISION AREDS Caps     180 capsule    Take 1 capsule by mouth 2 times daily    Macular degeneration       lidocaine 4 % Crea cream    LMX4     Apply topically daily as needed        nebulizer nebulization     1    Use as directed    Mild persistent asthma       omeprazole 20 MG CR capsule    priLOSEC    90 capsule    Take 1 capsule (20 mg) by mouth daily    Gastroesophageal reflux disease without esophagitis       oxyCODONE-acetaminophen 5-325 MG per tablet    PERCOCET    30 tablet    Take 1 tablet by mouth every 4 hours as needed for pain for pain.    Cervicalgia, Acute upper back pain       PARoxetine 30 MG tablet    PAXIL    45 tablet    TAKE 1/2 TABLET BY MOUTH AT BEDTIME    RAMÓN (generalized anxiety disorder)       VITAMIN C PO      Take by mouth 2 times daily 500 mg take 1 daily        * Notice:  This list has 2 medication(s) that are the same as other medications prescribed for you. Read the directions carefully, and ask your doctor or other care provider to review them with you.

## 2018-08-30 NOTE — LETTER
8/30/2018       RE: Edison Boss  [address]  [facility] [address]     Dear Colleague,    Thank you for referring your patient, Edison Boss, to the [facility]OLOG[facility] [place] [facility]. Please see a copy of my visit note below.    Edison Boss is a 89-year-old male with a history of prostate cancer, urinary retention and urgency incontinence secondary to his cancer treatment. He has an indwelling Christianson that is changed monthly. He needs periodic injections with Botox to control his bladder spasticity. Recently he's been in the emergency room for a right inguinal hernia that was easily reduced and separately for lightheadedness and a near syncopal episode.  Other past medical history: Medications and allergies reviewed  Exam: Alert and oriented, normocephalic, normal respirations. Uncircumcised phallus with no lesions.  Christianson catheter change with sterile technique. 4 cc in balloon and Christianson irrigates with clear returns.  Assessment: Prostate cancer, urgency incontinence, history of severe bladder spasticity, urinary retention, near syncopal episode, right inguinal hernia-no tenderness today  Plan: See me monthly for catheter change. No PSAs in the future. Botox p.r.n.    Again, thank you for allowing me to participate in the care of your patient.      Sincerely,    Michael Lilly MD

## 2018-08-30 NOTE — PROGRESS NOTES
Edison Boss is a 89-year-old male with a history of prostate cancer, urinary retention and urgency incontinence secondary to his cancer treatment. He has an indwelling Christianson that is changed monthly. He needs periodic injections with Botox to control his bladder spasticity. Recently he's been in the emergency room for a right inguinal hernia that was easily reduced and separately for lightheadedness and a near syncopal episode.  Other past medical history: Medications and allergies reviewed  Exam: Alert and oriented, normocephalic, normal respirations. Uncircumcised phallus with no lesions.  Christianson catheter change with sterile technique. 4 cc in balloon and Christianson irrigates with clear returns.  Assessment: Prostate cancer, urgency incontinence, history of severe bladder spasticity, urinary retention, near syncopal episode, right inguinal hernia-no tenderness today  Plan: See me monthly for catheter change. No PSAs in the future. Botox p.r.n.

## 2018-08-31 NOTE — TELEPHONE ENCOUNTER
[facility]  Emergency Department Lab result notification [Adult-Male]    [facility] ED lab result protocol used  Urine culture    Reason for call  Notify of lab results, assess symptoms,  review ED providers recommendations/discharge instructions (if necessary) and advise per ED lab result f/u protocol    Lab Result (including Rx patient on, if applicable)  Final Urine Culture Report on 8/31/18.  Emergency Dept discharge antibiotic prescribed:  Doxycycline 100 mg PO tablet, 1 tablet (100 mg) by mouth 2 times daily for 10 days  Bacteria #1: >100,000 colonies/mL Enterococcus faecalis is [NOT TESTED] to antibiotic  Bacteria #2: 10,000 to 50,000 colonies/ml Escherichia coli is [NOT TESTED] to antibiotic  Recommendations in treatment per  ED Lab result protocol.    Information table from ED Provider visit on 8/28/18  Symptoms reported at ED visit (Chief complaint, HPI) Edison Boss is a 89 year old male with a history of dementia, HLD, HTN, atrial fibrillation who presents to the emergency department for evaluation of near syncopal episode. The patient reports he was changing at around 0830 when he had a sudden onset of dizziness and light headedness with a near syncopal episode, feeling like he was about to pass out. He indicates he managed to catch himself on a handrail before falling. He states he has had frequent history of the same although he has not had it for the past year. He further reports he had a short episode of nausea and vomiting accompanying his near syncope, as well as some right hip pain associated to a past hernia. Here at the ED, the patient says he still has some spinning, but it is improved. He denies any dysuria.   Significant Medical hx, if applicable (i.e. CKD, diabetes) Calculus of kidney, A fib, chronic infection, kidney stones surgery, TURP, prostate surgery   Allergies Allergies   Allergen Reactions     Ceftriaxone Itching     Bactrim Hives     Levaquin Diarrhea      "Oral only, can tolerate IV     Zithromax [Azithromycin]      Macrodantin [Nitrofuran Derivatives] Rash     Took recently with no problems      Weight, if applicable Wt Readings from Last 2 Encounters:   04/05/18 78 kg (172 lb)   02/08/18 78 kg (172 lb)      Coumadin/Warfarin [Yes /No] No   Creatinine Level (mg/dl) Creatinine   Date Value Ref Range Status   08/28/2018 1.08 0.66 - 1.25 mg/dL Final      Creatinine clearance (ml/min), if applicable CREATININE: 1.08 mg/dL (08/28/18 0946)  Estimated creatinine clearance: 47.1 mL/min   ED providers Impression and Plan (applicable information) Edison Bsos is a 89 year old male who had an episode of vertigo which was abrupt in onset. At his age, there was high concern it could be a stroke. The patient here, however, is doing much better. He had some lightheadedness prior to the episode and therefore I considered other events like cardiovascular. His labs look quite reassuring other than he has a UTI. He was called a code stroke. Stroke neurology saw him and felt that a TIA was definitely a possibility. The patient is otherwise stable. Both the stroke neurologist and I discussed with the family and they did not want him on aspirin or any anticoagulants due to his history of falls. Both I and the neurolgist told the family that not anticoagulating him  can lead to strokes. Because he will not be receiving anticoagulants or therapy for his TIAA,  it did not make sense to admit him for further workup for stroke.. Outpatient follow up was deemed appropriate. He is ambulatory here without problems. He was feeling much better and discharged into the care of his family   ED diagnosis UTI (urinary tract infection) with pyuria   ED provider Herrera Jiang MD      RN Assessment (Patient s current Symptoms), include time called.  [Insert Left message here if message left]  \"Feeling pretty good\" per Edison's report.  Did f/u with urology, no changes made to abx.  Per " "Edison's request, did speak with dtr Concepcion and relayed recommended change in abx.   Has tolerated Augmentin in the past per EPIC, reaction to Ceftriaxone is \"itching\".    RN Recommendations/Instructions per [facility] ED lab result protocol  Patient notified of lab result and treatment recommendations.  Rx for Augmentin sent to [Pharmacy - [facility] in [place]].  RN reviewed information about stopping Doxycycline once Augmentin obtained.     Please Contact your PCP clinic or return to the Emergency department if your:    Symptoms return.    Symptoms do not resolve after completing antibiotic.    Symptoms worsen or other concerning symptom's.    PCP follow-up Questions asked: YES       Cherelle Skinner RN    [facility] RN  Lung Nodule and ED Lab Results F/U RN  Epic pool (ED late result f/u RN) : P [id number]   # [phone]    Copy of Lab result  Order   Urine Culture Aerobic Bacterial [[id number]] (Order [id number])   Exam Information   Exam Date Exam Time Accession # Results    8/28/18 12:40 PM [id number]    Component Results   Component Collected Lab   Specimen Description 08/28/2018 12:40    Catheterized Urine   Special Requests 08/28/2018 12:40 PM 75   Specimen received in preservative   Culture Micro (Abnormal) 08/28/2018 12:40    10,000 to 50,000 colonies/mL   Escherichia coli      Culture Micro (Abnormal) 08/28/2018 12:40    >100,000 colonies/mL   Enterococcus faecalis      Culture & Susceptibility   ENTEROCOCCUS FAECALIS   Antibiotic Interpretation Sensitivity Unit Method Status   AMPICILLIN Sensitive <=2 ug/mL JANNIE Final   NITROFURANTOIN Sensitive <=16 ug/mL JANNIE Final   PENICILLIN Sensitive 4 ug/mL JANNIE Final   VANCOMYCIN Sensitive 1 ug/mL JANNIE Final         ESCHERICHIA COLI   Antibiotic Interpretation Sensitivity Unit Method Status   AMPICILLIN Sensitive 8 ug/mL JANNIE Final   AMPICILLIN/SULBACTAM Sensitive 4 ug/mL JANNIE Final   CEFAZOLIN Sensitive <=4 ug/mL JANNIE Final   Comment: " Cefazolin JANNIE breakpoints are for the treatment of uncomplicated urinary tract   infections.  For the treatment of systemic infections, please contact the   laboratory for additional testing.   CEFEPIME Sensitive <=1 ug/mL JANNIE Final   CEFOXITIN Intermediate 16 ug/mL JANNIE Final   CEFTAZIDIME Sensitive <=1 ug/mL JANNIE Final   CEFTRIAXONE Sensitive <=1 ug/mL JANNIE Final   CIPROFLOXACIN Resistant >=4 ug/mL JANNIE Final   GENTAMICIN Sensitive <=1 ug/mL JANNIE Final   LEVOFLOXACIN Resistant >=8 ug/mL JANNIE Final   NITROFURANTOIN Resistant 128 ug/mL JANNIE Final   Piperacillin/Tazo Sensitive <=4 ug/mL JANNIE Final   TOBRAMYCIN Sensitive <=1 ug/mL JANNIE Final   Trimethoprim/Sulfa Sensitive <=1/19 ug/mL JANNIE Final

## 2018-09-04 NOTE — MR AVS SNAPSHOT
After Visit Summary   9/4/2018    Edison Boss    MRN: [id number]           Patient Information     Date Of Birth          10/19/1924        Visit Information        Provider Department      9/4/2018 12:00 PM Darline Maravilla PA-C [facility]        Today's Diagnoses     Follow up    -  1    Gastroesophageal reflux disease without esophagitis        RAMÓN (generalized anxiety disorder)        Urinary tract infection associated with indwelling urethral catheter, subsequent encounter          Care Instructions    I will have you complete the antibiotic for the bladder infection and follow up if having recurrence of abdominal pain or pressure.   I will refill omeprazole and Paxil and have you follow up as needed and as scheduled.           Follow-ups after your visit        Follow-up notes from your care team     Return if symptoms worsen or fail to improve.      Your next 10 appointments already scheduled     Sep 25, 2018  1:40 PM CDT   Return Visit with Michael Lilly MD   [facility] ([facility])    [address]  [address]  [facility] [address]   [phone]            Nov 01, 2018 10:40 AM CDT   Return Visit with Michael Lilly MD   [facility] ([facility])    [address]  [address]  [facility] [address]   [phone]            Nov 05, 2018  1:45 PM CST   Remote PPM Check with SANDRA TECH1   [facility] ([facility])    [address] [place] [address] [address]  [facility] [address] [phone] OPT 2           This appointment is for a remote check of your pacemaker.  This is not an appointment at the office.              Who to contact     If you have questions or need follow up information about today's clinic visit or your schedule please contact [facility]  "directly at [phone].  Normal or non-critical lab and imaging results will be communicated to you by [employer], letter or phone within 4 business days after the clinic has received the results. If you do not hear from us within 7 days, please contact the clinic through [employer] or phone. If you have a critical or abnormal lab result, we will notify you by phone as soon as possible.  Submit refill requests through [employer] or call your pharmacy and they will forward the refill request to us. Please allow 3 business days for your refill to be completed.          Additional Information About Your Visit        [employer] Information     [employer] gives you secure access to your electronic health record. If you see a primary care provider, you can also send messages to your care team and make appointments. If you have questions, please call your primary care clinic.  If you do not have a primary care provider, please call [phone] and they will assist you.        Care EveryWhere ID     This is your Care EveryWhere ID. This could be used by other organizations to access your [place] medical records  [id number]        Your Vitals Were     Pulse Temperature Respirations Height Pulse Oximetry BMI (Body Mass Index)    59 97.7  F (36.5  C) (Oral) 12 5' 10\" (1.778 m) 94% 24.25 kg/m2       Blood Pressure from Last 3 Encounters:   09/04/18 132/60   08/28/18 191/86   08/25/18 (!) 235/96    Weight from Last 3 Encounters:   09/04/18 169 lb (76.7 kg)   04/05/18 172 lb (78 kg)   02/08/18 172 lb (78 kg)              Today, you had the following     No orders found for display         Today's Medication Changes          These changes are accurate as of 9/4/18 12:13 PM.  If you have any questions, ask your nurse or doctor.               These medicines have changed or have updated prescriptions.        Dose/Directions    PARoxetine 30 MG tablet   Commonly known as:  PAXIL   This may have changed:    - how much to take  - how to take " this  - when to take this   Used for:  RAMÓN (generalized anxiety disorder)   Changed by:  Darline Maravilla PA-C        Dose:  15 mg   Take 0.5 tablets (15 mg) by mouth every morning TAKE 1/2 TABLET BY MOUTH AT BEDTIME   Quantity:  45 tablet   Refills:  0            Where to get your medicines      These medications were sent to [id number] - [place], [place] - [address] [place] [address] [address] AT [facility] [address]  [address], [facility] [address]     Phone:  [phone]     omeprazole 20 MG CR capsule    PARoxetine 30 MG tablet                Primary Care Provider Office Phone # Fax #    Porfirio Terrell [phone]       [address] [facility] [address]  [facility] [address]        Equal Access to Services     [facility] : Hadii vanessa jennings hadasho Soomaali, waaxda luqadaha, qaybta kaalmada adeegyada, yony mcclure . So [facility] [phone].    ATENCIÓN: Si habla español, tiene a lauren disposición servicios gratuitos de asistencia lingüística. [facility] [phone].    We comply with applicable federal civil rights laws and [place] laws. We do not discriminate on the basis of race, color, national origin, age, disability, sex, sexual orientation, or gender identity.            Thank you!     Thank you for choosing [facility]  for your care. Our goal is always to provide you with excellent care. Hearing back from our patients is one way we can continue to improve our services. Please take a few minutes to complete the written survey that you may receive in the mail after your visit with us. Thank you!             Your Updated Medication List - Protect others around you: Learn how to safely use, store and throw away your medicines at www.disposemymeds.org.          This list is accurate as of 9/4/18 12:13 PM.  Always use your most recent med list.                   Brand Name Dispense Instructions for use Diagnosis    albuterol 108 (90 Base) MCG/ACT  inhaler    PROAIR HFA/PROVENTIL HFA/VENTOLIN HFA    1 Inhaler    Inhale 2 puffs into the lungs every 6 hours as needed for shortness of breath / dyspnea.    Mild persistent asthma       amLODIPine 5 MG tablet    NORVASC    90 tablet    Take 1 tablet (5 mg) by mouth daily    Essential hypertension with goal blood pressure less than 140/90       amoxicillin-clavulanate 875-125 MG per tablet    AUGMENTIN    28 tablet    Take 1 tablet by mouth 2 times daily for 14 days    Urinary tract infection       AVEENO ECZEMA THERAPY 1 % Crea   Generic drug:  Colloidal Oatmeal      Externally apply topically daily        cetirizine 10 MG tablet    zyrTEC     Take 10 mg by mouth daily as needed        cholecalciferol 5000 units Caps      Take 1 capsule by mouth daily. Takes 5000 units in the summer and 73892 units in the winter        CIPROFLOXACIN PO      Take 500 mg by mouth 1 tablet by mouth once as needed. Take one tablet after each procedere        clobetasol 0.05 % ointment    TEMOVATE     daily as needed        COLON CLEANSER PO      Take 1 capsule by mouth daily Advanced Colon Care II        D-Mannose Powd      daily 500 mg Pt takes 2 pills daily        * EYE VITAMINS Caps      one tablet twice daily        * PRESERVISION AREDS Caps     180 capsule    Take 1 capsule by mouth 2 times daily    Macular degeneration       lidocaine 4 % Crea cream    LMX4     Apply topically daily as needed        nebulizer nebulization     1    Use as directed    Mild persistent asthma       omeprazole 20 MG CR capsule    priLOSEC    90 capsule    Take 1 capsule (20 mg) by mouth daily    Gastroesophageal reflux disease without esophagitis       oxyCODONE-acetaminophen 5-325 MG per tablet    PERCOCET    30 tablet    Take 1 tablet by mouth every 4 hours as needed for pain for pain.    Cervicalgia, Acute upper back pain       PARoxetine 30 MG tablet    PAXIL    45 tablet    Take 0.5 tablets (15 mg) by mouth every morning TAKE 1/2 TABLET BY MOUTH  AT BEDTIME    RAMÓN (generalized anxiety disorder)       VITAMIN C PO      Take by mouth 2 times daily 500 mg take 1 daily        * Notice:  This list has 2 medication(s) that are the same as other medications prescribed for you. Read the directions carefully, and ask your doctor or other care provider to review them with you.

## 2018-09-04 NOTE — PATIENT INSTRUCTIONS
I will have you complete the antibiotic for the bladder infection and follow up if having recurrence of abdominal pain or pressure.   I will refill omeprazole and Paxil and have you follow up as needed and as scheduled.

## 2018-09-04 NOTE — PROGRESS NOTES
"  SUBJECTIVE:   Edison Boss is a 89 year old male who presents to clinic today for the following health issues:      ED/UC Followup:    Facility:  [facility]  Date of visit: 8/25/18,8/28/18  Reason for visit: RLQ Pain, Syncope, UTI  Current Status: Improved     Patient is here in clinic with his daughter for ER follow up. He was seen on 8/25 for RLQ abdominal pain and was discovered to have a reducible inguinal hernia. He was then seen on 8/28 following a near syncopal episode and was found to have a UTI no acute stroke but TIA could not be ruled out. Both stroke doctor and ER doctor discussed risks and benefits of anticoagulation to decrease risk of stroke but patient and family declined this due to risk of bleeding with falls. He did have his antibiotic for UTI changed to Augmentin on 8/31 and has been doing well on that.  They do report that he is due for a refill of his omeprazole and his Paxil and are requesting that I do that today. He reports that symptoms are well controlled on medications. He is scheduled to follow up for catheter change in a few weeks.     -------------------------------------    Problem list and histories reviewed & adjusted, as indicated.  Additional history: as documented    BP Readings from Last 3 Encounters:   09/04/18 132/60   08/28/18 191/86   08/25/18 (!) 235/96    Wt Readings from Last 3 Encounters:   09/04/18 169 lb (76.7 kg)   04/05/18 172 lb (78 kg)   02/08/18 172 lb (78 kg)        Reviewed and updated as needed this visit by clinical staff  Tobacco  Allergies  Meds  Soc Hx      Reviewed and updated as needed this visit by Provider         ROS:  Constitutional, HEENT, cardiovascular, pulmonary, gi and gu systems are negative, except as otherwise noted.    OBJECTIVE:     /60  Pulse 59  Temp 97.7  F (36.5  C) (Oral)  Resp 12  Ht 5' 10\" (1.778 m)  Wt 169 lb (76.7 kg)  SpO2 94%  BMI 24.25 kg/m2  Body mass index is 24.25 kg/(m^2).  GENERAL: healthy, alert and " no distress  RESP: lungs clear to auscultation - no rales, rhonchi or wheezes  CV: regular rates and rhythm, no murmur, click or rub, peripheral pulses strong and no peripheral edema  ABDOMEN: soft, nontender and bowel sounds normal  MS: no gross musculoskeletal defects noted, no edema  SKIN: no suspicious lesions or rashes  BACK: no CVA tenderness, no paralumbar tenderness  LYMPH: no cervical adenopathy    Diagnostic Test Results:  Results for orders placed or performed during the hospital encounter of 08/28/18   CT Head w/o Contrast    Narrative    CT SCAN OF THE HEAD WITHOUT CONTRAST August 28, 2018 10:07 AM     HISTORY: Code Stroke.    TECHNIQUE: Axial images of the head and coronal reformations without  IV contrast material. Radiation dose for this scan was reduced using  automated exposure control, adjustment of the mA and/or kV according  to patient size, or iterative reconstruction technique.    COMPARISON: Head CT 4/4/2016.    FINDINGS: There is no evidence of intracranial hemorrhage, mass, or  anomaly. The ventricles are normal in size, shape and configuration.  Mild diffuse parenchymal volume loss. Mild patchy periventricular  white matter hypodensities which are nonspecific, but likely related  to chronic microvascular ischemic disease.     The visualized portions of the sinuses and mastoids appear normal. The  bony calvarium and bones of the skull base appear intact.       Impression    IMPRESSION:     1. No evidence of acute intracranial hemorrhage, mass, or herniation.  2. Mild diffuse parenchymal volume loss and white matter changes  likely due to chronic microvascular ischemic disease.     RICKEY LOPEZ MD   CTA Head Neck with Contrast    Narrative    CT ANGIOGRAM OF THE HEAD AND NECK WITH CONTRAST  8/28/2018 10:25 AM     HISTORY: Code stroke.    TECHNIQUE:  CT angiography with an injection of 70 mL Isovue-370  (accession [id number]), 50 mL Isovue-370 (accession [id number]) IV with  scans through the  head and neck. Images were transferred to a separate  3-D workstation where multiplanar reformations and 3-D images were  created. Estimates of carotid stenoses are made relative to the distal  internal carotid artery diameters except as noted. Radiation dose for  this scan was reduced using automated exposure control, adjustment of  the mA and/or kV according to patient size, or iterative  reconstruction technique. Perfusion scans were performed at three  levels with injection of additional IV nonionic contrast and saline  flush.  These images were processed on a separate 3-D workstation.     COMPARISON: None.     CT HEAD FINDINGS: No contrast enhancing lesions. CT perfusion images  demonstrate area of possible wedge-shaped perfusion abnormality in the  right parietal lobe with increase in time to drain and Tmax in this  area. No corresponding decrease in cerebral blood volume or cerebral  blood flow.     There is reflux of dense contrast material up the left internal  jugular vein and also extending to the left sigmoid and transverse  sinuses.    CT ANGIOGRAM HEAD FINDINGS:  The major intracranial arteries including  the proximal branches of the anterior cerebral, middle cerebral, and  posterior cerebral arteries appear patent without vascular cutoff. No  aneurysm identified.    Multifocal stenosis of the intracranial arteries likely due to  atherosclerotic disease. There is mild to moderate stenosis of the  right M1 segment and mild stenosis of the left M1 segment. Moderate to  severe stenosis of the right M2 branches and mild to moderate stenosis  of the left M2 branches. Scattered moderate to severe stenosis of the  A2 and A3 branches bilaterally. There is severe stenosis/near  occlusion of the left posterior communicating artery. The left P1  segment is small in size or absent which may be congenital. There are  mild to moderate multifocal stenoses of the right P2 segment. There is  calcified atherosclerotic  disease of the cavernous and supraclinoid  internal carotid arteries resulting in mild stenosis.    CT ANGIOGRAM NECK FINDINGS: Normal origin of the great vessels from  the aortic arch.     Right carotid artery: The right common and internal carotid arteries  are patent. No significant stenosis or atherosclerotic disease in the  carotid artery.     Left carotid artery: The left common and internal carotid arteries are  patent. There is soft and calcified plaque at the left carotid  bifurcation and proximal internal carotid artery resulting in 53%  stenosis by NASCET criteria.     Vertebral arteries: Vertebral arteries are patent without evidence of  dissection. No significant stenosis.     Other findings: Multilevel degenerative changes in the visualized  spine.      Impression    IMPRESSION:   1. Patent arteries in the neck without evidence of dissection.  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  2. Patent proximal major intracranial arteries without vascular  cutoff. Multifocal stenoses of the intracranial arteries as described  above likely due to atherosclerotic disease. No aneurysm identified.  3. CT perfusion images demonstrate possible area of ischemia within  the right parietal lobe. This could also be due to artifact and  quantum mottle. This would be better evaluated with brain MRI if the  patient is able.    Results discussed with Herrera Jiang at 10:32 AM on 8/28/2018.      RICKEY LOPEZ MD   CT Head w Contrast    Narrative    CT ANGIOGRAM OF THE HEAD AND NECK WITH CONTRAST  8/28/2018 10:25 AM     HISTORY: Code stroke.    TECHNIQUE:  CT angiography with an injection of 70 mL Isovue-370  (accession [id number]), 50 mL Isovue-370 (accession [id number]) IV with  scans through the head and neck. Images were transferred to a separate  3-D workstation where multiplanar reformations and 3-D images were  created. Estimates of carotid stenoses are made  relative to the distal  internal carotid artery diameters except as noted. Radiation dose for  this scan was reduced using automated exposure control, adjustment of  the mA and/or kV according to patient size, or iterative  reconstruction technique. Perfusion scans were performed at three  levels with injection of additional IV nonionic contrast and saline  flush.  These images were processed on a separate 3-D workstation.     COMPARISON: None.     CT HEAD FINDINGS: No contrast enhancing lesions. CT perfusion images  demonstrate area of possible wedge-shaped perfusion abnormality in the  right parietal lobe with increase in time to drain and Tmax in this  area. No corresponding decrease in cerebral blood volume or cerebral  blood flow.     There is reflux of dense contrast material up the left internal  jugular vein and also extending to the left sigmoid and transverse  sinuses.    CT ANGIOGRAM HEAD FINDINGS:  The major intracranial arteries including  the proximal branches of the anterior cerebral, middle cerebral, and  posterior cerebral arteries appear patent without vascular cutoff. No  aneurysm identified.    Multifocal stenosis of the intracranial arteries likely due to  atherosclerotic disease. There is mild to moderate stenosis of the  right M1 segment and mild stenosis of the left M1 segment. Moderate to  severe stenosis of the right M2 branches and mild to moderate stenosis  of the left M2 branches. Scattered moderate to severe stenosis of the  A2 and A3 branches bilaterally. There is severe stenosis/near  occlusion of the left posterior communicating artery. The left P1  segment is small in size or absent which may be congenital. There are  mild to moderate multifocal stenoses of the right P2 segment. There is  calcified atherosclerotic disease of the cavernous and supraclinoid  internal carotid arteries resulting in mild stenosis.    CT ANGIOGRAM NECK FINDINGS: Normal origin of the great vessels  from  the aortic arch.     Right carotid artery: The right common and internal carotid arteries  are patent. No significant stenosis or atherosclerotic disease in the  carotid artery.     Left carotid artery: The left common and internal carotid arteries are  patent. There is soft and calcified plaque at the left carotid  bifurcation and proximal internal carotid artery resulting in 53%  stenosis by NASCET criteria.     Vertebral arteries: Vertebral arteries are patent without evidence of  dissection. No significant stenosis.     Other findings: Multilevel degenerative changes in the visualized  spine.      Impression    IMPRESSION:   1. Patent arteries in the neck without evidence of dissection.  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  2. Patent proximal major intracranial arteries without vascular  cutoff. Multifocal stenoses of the intracranial arteries as described  above likely due to atherosclerotic disease. No aneurysm identified.  3. CT perfusion images demonstrate possible area of ischemia within  the right parietal lobe. This could also be due to artifact and  quantum mottle. This would be better evaluated with brain MRI if the  patient is able.    Results discussed with Herrera Jiang at 10:32 AM on 8/28/2018.      RICKEY LOPEZ MD   CT Abdomen Pelvis w Contrast    Narrative    CT ABDOMEN AND PELVIS WITH CONTRAST August 28, 2018 11:45 AM    HISTORY: Abdomen pain.      COMPARISON: A CT on 8/25/2018.    TECHNIQUE: Routine transverse CT imaging of the abdomen and pelvis was  performed following the uneventful administration of 120mL Isovue-370  intravenous contrast. Radiation dose for this scan was reduced using  automated exposure control, adjustment of the mA and/or kV according  to patient size, or iterative reconstruction technique.    FINDINGS: There is mild dependent atelectasis of both lung bases. The  liver, spleen, pancreas, and  gallbladder are normal. Again seen is a  2.6 cm left adrenal gland nodule which is unchanged. The right adrenal  gland remains normal. There is an approximately 11 cm cyst of the left  kidney with additional much smaller cysts in each kidney. Previously  seen left renal calculi are not well seen currently due to the  presence of contrast. A catheter is present within the bladder. No  other urinary tract abnormality is demonstrated. There are radiation  seeds within the prostate gland. No enlarged lymph node or other  abnormal mass is demonstrated. No free fluid is seen. No free  intraperitoneal gas is identified. The gastrointestinal tract is  unremarkable. There is calcification of the vascular structures. There  are degenerative changes of the spine. No other osseous abnormality is  noted. Previously there is noted to be a large right inguinal hernia  containing the appendix, cecum, and terminal ileum. This has  significantly decreased in size. The appendix again extends into this  hernia where there is also a small amount of fluid. The appendix is  otherwise unremarkable with no additional evidence of appendicitis.  Again seen is a left inguinal hernia containing only fat. No other  abdominal or pelvic wall pathology is seen.      Impression    IMPRESSION: Since the previous CT, there has been decrease in size of  the right inguinal hernia which now only contains the appendix.  Although there is a small amount of fluid within this hernia, there is  no additional evidence of appendicitis. Previously seen nonobstructing  calculi of the left kidney are not well seen currently due to the  presence of contrast.     MELODY DE LEON MD   CBC with platelets differential   Result Value Ref Range    WBC 8.7 4.0 - 11.0 10e9/L    RBC Count 4.92 4.4 - 5.9 10e12/L    Hemoglobin 14.9 13.3 - 17.7 g/dL    Hematocrit 44.6 40.0 - 53.0 %    MCV 91 78 - 100 fl    MCH 30.3 26.5 - 33.0 pg    MCHC 33.4 31.5 - 36.5 g/dL    RDW 17.0  (H) 10.0 - 15.0 %    Platelet Count 283 150 - 450 10e9/L    Diff Method Automated Method     % Neutrophils 71.6 %    % Lymphocytes 10.0 %    % Monocytes 13.3 %    % Eosinophils 3.8 %    % Basophils 0.7 %    % Immature Granulocytes 0.6 %    Nucleated RBCs 0 0 /100    Absolute Neutrophil 6.2 1.6 - 8.3 10e9/L    Absolute Lymphocytes 0.9 0.8 - 5.3 10e9/L    Absolute Monocytes 1.2 0.0 - 1.3 10e9/L    Absolute Eosinophils 0.3 0.0 - 0.7 10e9/L    Absolute Basophils 0.1 0.0 - 0.2 10e9/L    Abs Immature Granulocytes 0.1 0 - 0.4 10e9/L    Absolute Nucleated RBC 0.0    Basic metabolic panel   Result Value Ref Range    Sodium 142 133 - 144 mmol/L    Potassium 3.9 3.4 - 5.3 mmol/L    Chloride 110 (H) 94 - 109 mmol/L    Carbon Dioxide 26 20 - 32 mmol/L    Anion Gap 6 3 - 14 mmol/L    Glucose 92 70 - 99 mg/dL    Urea Nitrogen 19 7 - 30 mg/dL    Creatinine 1.08 0.66 - 1.25 mg/dL    GFR Estimate 64 >60 mL/min/1.7m2    GFR Estimate If Black 77 >60 mL/min/1.7m2    Calcium 8.4 (L) 8.5 - 10.1 mg/dL   INR   Result Value Ref Range    INR 1.09 0.86 - 1.14   Partial thromboplastin time   Result Value Ref Range    PTT 32 22 - 37 sec   UA with Microscopic   Result Value Ref Range    Color Urine Yellow     Appearance Urine Slightly Cloudy     Glucose Urine Negative NEG^Negative mg/dL    Bilirubin Urine Negative NEG^Negative    Ketones Urine 5 (A) NEG^Negative mg/dL    Specific Gravity Urine 1.019 1.003 - 1.035    Blood Urine Negative NEG^Negative    pH Urine 8.0 (H) 5.0 - 7.0 pH    Protein Albumin Urine Negative NEG^Negative mg/dL    Urobilinogen mg/dL 0.0 0.0 - 2.0 mg/dL    Nitrite Urine Negative NEG^Negative    Leukocyte Esterase Urine Small (A) NEG^Negative    Source Catheterized Urine     WBC Urine 15 (H) 0 - 5 /HPF    RBC Urine 5 (H) 0 - 2 /HPF    Bacteria Urine Few (A) NEG^Negative /HPF    Mucous Urine Present (A) NEG^Negative /LPF   Troponin I   Result Value Ref Range    Troponin I ES <0.015 0.000 - 0.045 ug/L   Glucose by meter    Result Value Ref Range    Glucose 85 70 - 99 mg/dL   EKG 12 lead   Result Value Ref Range    Interpretation ECG Click View Image link to view waveform and result    Urine Culture Aerobic Bacterial   Result Value Ref Range    Specimen Description Catheterized Urine     Special Requests Specimen received in preservative     Culture Micro 10,000 to 50,000 colonies/mL  Escherichia coli   (A)     Culture Micro >100,000 colonies/mL  Enterococcus faecalis   (A)        Susceptibility    Enterococcus faecalis - JANNIE     AMPICILLIN <=2 Sensitive ug/mL     NITROFURANTOIN <=16 Sensitive ug/mL     PENICILLIN 4 Sensitive ug/mL     VANCOMYCIN 1 Sensitive ug/mL    Escherichia coli - JANNIE     AMPICILLIN 8 Sensitive ug/mL     CEFAZOLIN* <=4 Sensitive ug/mL      * Cefazolin JANNIE breakpoints are for the treatment of uncomplicated urinary tract infections.  For the treatment of systemic infections, please contact the laboratory for additional testing.     CEFOXITIN 16 Intermediate ug/mL     CEFTAZIDIME <=1 Sensitive ug/mL     CEFTRIAXONE <=1 Sensitive ug/mL     CIPROFLOXACIN >=4 Resistant ug/mL     GENTAMICIN <=1 Sensitive ug/mL     LEVOFLOXACIN >=8 Resistant ug/mL     NITROFURANTOIN 128 Resistant ug/mL     TOBRAMYCIN <=1 Sensitive ug/mL     Trimethoprim/Sulfa <=1/19 Sensitive ug/mL     AMPICILLIN/SULBACTAM 4 Sensitive ug/mL     Piperacillin/Tazo <=4 Sensitive ug/mL     CEFEPIME <=1 Sensitive ug/mL       ASSESSMENT/PLAN:       ICD-10-CM    1. Follow up Z09    2. Gastroesophageal reflux disease without esophagitis K21.9 omeprazole (PRILOSEC) 20 MG CR capsule   3. RAMÓN (generalized anxiety disorder) F41.1 PARoxetine (PAXIL) 30 MG tablet   4. Urinary tract infection associated with indwelling urethral catheter, subsequent encounter T83.511D     N39.0        I will have patient complete antibiotic and follow up if recurrence of abdominal pain or UTI symptoms. I will refill medications for anxiety and reflux and have him follow up with his PCP  for recheck in the next 3 months. They have no other concerns today. Their only question is if he should get the new shingles vaccine. I gave them the CDC information sheet and they will decide if they want to go ahead with that and follow up if they decide to do so.     See Patient Instructions    Darline Maravilla PA-C  [facility]

## 2018-09-12 NOTE — PROGRESS NOTES
[place] [address]  [facility] [address]  [phone]  Dept: [phone]    PRE-OP EVALUATION:  Today's date: 2018    Edison Boss (: 10/19/1924) presents for pre-operative evaluation assessment as requested by Dr. Phillips.      Fax number for surgical facility: [facility]  Primary Physician: Porfirio Terrell  Type of Anesthesia Anticipated: Choice    Patient has a Health Care Directive or Living Will:  YES     Preop Questions 2018   Who is doing your surgery? dr phillips   What are you having done? botox injections into bladder   Date of Surgery/Procedure: 18   Facility or Hospital where procedure/surgery will be performed: [facility]   1.  Do you have a history of Heart attack, stroke, stent, coronary bypass surgery, or other heart surgery? YES  - stroke, a fib, pacemaker.   2.  Do you ever have any pain or discomfort in your chest? No   3.  Do you have a history of  Heart Failure? No   4.   Are you troubled by shortness of breath when:  walking on a level surface, or up a slight hill, or at night? No   5.  Do you currently have a cold, bronchitis or other respiratory infection? No   6.  Do you have a cough, shortness of breath, or wheezing? No   7.  Do you sometimes get pains in the calves of your legs when you walk? No   8. Do you or anyone in your family have previous history of blood clots? No   9.  Do you or does anyone in your family have a serious bleeding problem such as prolonged bleeding following surgeries or cuts? No   10. Have you ever had problems with anemia or been told to take iron pills? No   11. Have you had any abnormal blood loss such as black, tarry or bloody stools? No   12. Have you ever had a blood transfusion? UNKNOWN -    13. Have you or any of your relatives ever had problems with anesthesia? No   14. Do you have sleep apnea, excessive snoring or daytime drowsiness? YES -    15. Do you have any prosthetic heart  valves? No   16. Do you have prosthetic joints? No         HPI:     HPI related to upcoming procedure:     Mr. Boss has a history of prostate cancer.  He has bladder spasticity.  He uses an indwelling Christianson catheter.  He has periodically received benefit from Botox injections to relax the bladder and relieve lower abdominal pain symptoms.  He has redeveloped the symptoms at this time and is having a cystoscopy and Botox procedure.      There is no history of anesthesia complications.    MEDICAL HISTORY:     Patient Active Problem List    Diagnosis Date Noted     MCI (mild cognitive impairment) 10/20/2016     Priority: Medium     Patient with history of cerebral infarct: now with mild cognitive changes affecting executive function compounded by depression       RAMÓN (generalized anxiety disorder) 05/05/2016     Priority: Medium     Sinus node dysfunction (H)      Priority: Medium     Pacemaker      Priority: Medium     Cerebral infarction (H) 02/14/2014     Priority: Medium     Diagnosis updated by automated process. Provider to review and confirm.       SBO (small bowel obstruction) 11/30/2012     Priority: Medium     Chronic atrial fibrillation (H) 07/02/2012     Priority: Medium     HYPERLIPIDEMIA LDL GOAL <100 10/31/2010     Priority: Medium     Restless legs syndrome (RLS) 11/14/2007     Priority: Medium     Insomnia 04/04/2007     Priority: Medium     Problem list name updated by automated process. Provider to review       Mild persistent asthma      Priority: Medium     Takes Singulair, Spiriva        Essential hypertension with goal blood pressure less than 140/90 06/06/2006     Priority: Medium     Problem list name updated by automated process. Provider to review       Esophageal reflux      Priority: Medium     Personal history of other diseases of circulatory system      Priority: Medium     Aphasia and right hemiparesis with minimal residual       Osteoporosis      Priority: Medium     On  Fosamax  Problem list name updated by automated process. Provider to review       Other specified disorder of skin 09/01/2004     Priority: Medium     Malignant neoplasm of prostate (H) 08/14/2002     Priority: Medium      Past Medical History:   Diagnosis Date     Calculus of kidney     in dwelling [place]     Chronic infection     UTI     Chronic pain     lower pain, perineum     Esophageal reflux      Hyperlipidaemia      Hypertension      Malignant neoplasm of prostate (H) 8/14/2002    Brachytherapy, then external radiation. chronic indwelling catheter     Mild persistent asthma     with URI     Paroxysmal a-fib (H)     paroxysmal     Sinus node dysfunction (H)     sp DDD PM     Sleep apnea     ?   snores     Unspecified cerebral artery occlusion with cerebral infarction      Past Surgical History:   Procedure Laterality Date     C NONSPECIFIC PROCEDURE  1980's    Kidney stone surgery     C NONSPECIFIC PROCEDURE  1985, 5/2005    TURP x 2     C NONSPECIFIC PROCEDURE  1/2002    Brachytherapy     C NONSPECIFIC PROCEDURE  ~1999    Left rotator cuff repair     C NONSPECIFIC PROCEDURE      Bilateral cataract surgery     C NONSPECIFIC PROCEDURE      EGD/dilation twice     CYSTOSCOPY       CYSTOSCOPY, INJECT COLLAGEN, COMBINED  6/6/2012    Procedure:COMBINED CYSTOSCOPY, INJECT BULKING AGENT; VIDEO CYSTOSCOPY WITH BOTOX INJECTIONS  ; Surgeon:BRIAN ADAN; Location: OR     CYSTOSCOPY, INJECT COLLAGEN, COMBINED  3/22/2013    Procedure: COMBINED CYSTOSCOPY, INJECT BULKING AGENT;  VIDEO CYSTOSCOPY, BOTOX INJECTION;  Surgeon: Brian Adan MD;  Location:  OR     CYSTOSCOPY, INJECT COLLAGEN, COMBINED  8/8/2014    Procedure: COMBINED CYSTOSCOPY, INJECT BULKING AGENT;  Surgeon: Brian Adan MD;  Location:  OR     CYSTOSCOPY, INJECT COLLAGEN, COMBINED N/A 8/12/2015    Procedure: COMBINED CYSTOSCOPY, INJECT BULKING AGENT;  Surgeon: Brian Adan MD;  Location:  OR     CYSTOSCOPY, INJECT COLLAGEN, COMBINED  N/A 12/8/2016    Procedure: COMBINED CYSTOSCOPY, INJECT BULKING AGENT;  Surgeon: Michael Lilly MD;  Location: [facility] OR     HC REMOVE TONSILS/ADENOIDS,<13 Y/O  ~1928    T & A <12y.o.     IMPLANT PACEMAKER       PENIS SURGERY       PROSTATE SURGERY       Current Outpatient Prescriptions   Medication Sig Dispense Refill     albuterol (PROVENTIL HFA: VENTOLIN HFA) 108 (90 BASE) MCG/ACT inhaler Inhale 2 puffs into the lungs every 6 hours as needed for shortness of breath / dyspnea. 1 Inhaler 1     amLODIPine (NORVASC) 5 MG tablet Take 1 tablet (5 mg) by mouth daily 90 tablet 3     amoxicillin-clavulanate (AUGMENTIN) 875-125 MG per tablet Take 1 tablet by mouth 2 times daily for 14 days 28 tablet 0     Ascorbic Acid (VITAMIN C PO) Take by mouth 2 times daily 500 mg take 1 daily       cetirizine (ZYRTEC) 10 MG tablet Take 10 mg by mouth daily as needed       cholecalciferol 5000 UNITS CAPS Take 1 capsule by mouth daily. Takes 5000 units in the summer and 02161 units in the winter       CIPROFLOXACIN PO Take 500 mg by mouth 1 tablet by mouth once as needed. Take one tablet after each procedere       clobetasol (TEMOVATE) 0.05 % ointment daily as needed   2     Colloidal Oatmeal (AVEENO ECZEMA THERAPY) 1 % CREA Externally apply topically daily       D-MANNOSE POWD daily 500 mg Pt takes 2 pills daily       EYE VITAMINS OR CAPS one tablet twice daily       lidocaine (LMX4) 4 % CREA 4% topical cream Apply topically daily as needed       Misc Natural Products (COLON CLEANSER PO) Take 1 capsule by mouth daily Advanced Colon Care II       Multiple Vitamins-Minerals (PRESERVISION AREDS) CAPS Take 1 capsule by mouth 2 times daily 180 capsule 3     NEBULIZER MISC Use as directed 1 0     omeprazole (PRILOSEC) 20 MG CR capsule Take 1 capsule (20 mg) by mouth daily 90 capsule 3     oxyCODONE-acetaminophen (PERCOCET) 5-325 MG per tablet Take 1 tablet by mouth every 4 hours as needed for pain for pain. 30 tablet 0     PARoxetine (PAXIL)  "30 MG tablet Take 0.5 tablets (15 mg) by mouth every morning TAKE 1/2 TABLET BY MOUTH AT BEDTIME 45 tablet 0     OTC products: no recent use of OTC ASA, NSAIDS or Steroids    Allergies   Allergen Reactions     Ceftriaxone Itching     Bactrim Hives     Levaquin Diarrhea     Oral only, can tolerate IV     Zithromax [Azithromycin]      Macrodantin [Nitrofuran Derivatives] Rash     Took recently with no problems      Latex Allergy: NO    Social History   Substance Use Topics     Smoking status: Former Smoker     Packs/day: 1.00     Years: 40.00     Smokeless tobacco: Never Used      Comment: QUIT 1978     Alcohol use No     History   Drug Use No       REVIEW OF SYSTEMS:   Constitutional,  ENT, endocrine, pulmonary, cardiac, gastrointestinal, genitourinary, musculoskeletal, integument and psychiatric systems are negative, except as otherwise noted.  Neuro - cognitive impairment.  EXAM:   /66 (BP Location: Left arm, Patient Position: Chair, Cuff Size: Adult Large)  Pulse 66  Temp 97.6  F (36.4  C) (Oral)  Resp 20  Ht 5' 10\" (1.778 m)  Wt 167 lb 11.2 oz (76.1 kg)  SpO2 95%  BMI 24.06 kg/m2    GENERAL APPEARANCE: Elderly man, somewhat pale.     EYES: EOMI,  PERRL     HENT:  mouth without ulcers or lesions     NECK: no adenopathy, no asymmetry, masses     RESP: lungs clear to auscultation      CV: regular rates and rhythm, no murmur, click or rub     ABDOMEN:  soft, nontender     MS: extremities normal-no edema.     SKIN: no suspicious lesions or rashes     NEURO: Normal strength and tone     PSYCH: mentation appears modestly impaired, no agitation     LYMPHATICS: No cervical adenopathy    DIAGNOSTICS:       EKG from August 28, 2018.  Atrial paced rhythm with rate of 60.  Minimal ST changes.  No ectopy.    Recent Labs   Lab Test  08/28/18   0946  08/25/18   2031  09/24/15   11/30/12   1311   HGB  14.9  16.3   < >   --    < >  14.4   PLT  283  324   < >   --    < >  180   INR  1.09   --    --   1.8*   < >  2.81* "   NA  142  142   < >   --    < >  140   POTASSIUM  3.9  4.2   < >   --    < >  4.3   CR  1.08  1.17   < >   --    < >  0.88   A1C   --    --    --    --    --   5.5    < > = values in this interval not displayed.        IMPRESSION:   Diagnosis/reason for consult:       (Z01.818) Preoperative examination  (primary encounter diagnosis)  Comment: satis  Plan:     (R10.30) Lower abdominal pain  Comment:   Plan:     (N32.89) Spastic bladder  Comment:   Plan:     (C61) Malignant neoplasm of prostate (H)  Comment:   Plan:         The proposed surgical procedure is considered LOW risk.    REVISED CARDIAC RISK INDEX  The patient has the following serious cardiovascular risks for perioperative complications such as (MI, PE, VFib and 3  AV Block):  No serious cardiac risks  INTERPRETATION: 1 risks: Class II (low risk - 0.9% complication rate)    The patient has the following additional risks for perioperative complications:  Age, HD, CVA.      ICD-10-CM    1. Preoperative examination Z01.818    2. Lower abdominal pain R10.30    3. Spastic bladder N32.89    4. Malignant neoplasm of prostate (H) C61        RECOMMENDATIONS:         --Patient is to take all scheduled medications on the day of surgery EXCEPT for modifications listed below.    APPROVAL GIVEN to proceed with proposed procedure, without further diagnostic evaluation       Signed Electronically by: Joshua Arias MD    Copy of this evaluation report is provided to requesting physician.    [place] Preop Guidelines    Revised Cardiac Risk Index

## 2018-09-12 NOTE — MR AVS SNAPSHOT
After Visit Summary   9/12/2018    Edison Boss    MRN: [id number]           Patient Information     Date Of Birth          10/19/1924        Visit Information        Provider Department      9/12/2018 3:40 PM Joshua Arias MD [facility]        Today's Diagnoses     Preoperative examination    -  1    Lower abdominal pain        Spastic bladder        Malignant neoplasm of prostate (H)           Follow-ups after your visit        Your next 10 appointments already scheduled     Sep 20, 2018   Procedure with Michael Lilly MD   [facility] (--)    [address]  [facility] [address]   [phone]            Sep 25, 2018  1:40 PM CDT   Return Visit with Michael Lilly MD   [facility] ([facility])    [address] [facility]  [address]  [facility] [id number] [phone]            Nov 01, 2018 10:40 AM CDT   Return Visit with Michael Lilly MD   [facility] ([facility])    [address]  [address]  [facility] [address] [phone]            Nov 05, 2018  1:45 PM CST   Remote PPM Check with SANDRA TECH1   [facility] ([facility])    [facility] [facility] [address]  [facility] [id number] [phone] OPT 2           This appointment is for a remote check of your pacemaker.  This is not an appointment at the office.              Future tests that were ordered for you today     Open Future Orders        Priority Expected Expires Ordered    Anabel-Operative Worksheet  (Urology General) Routine  9/12/2019 9/12/2018            Who to contact     If you have questions or need follow up information about today's clinic visit or your schedule please contact [facility] directly at [phone].  Normal or non-critical lab and imaging results  "will be communicated to you by MyChart, letter or phone within 4 business days after the clinic has received the results. If you do not hear from us within 7 days, please contact the clinic through [employer] or phone. If you have a critical or abnormal lab result, we will notify you by phone as soon as possible.  Submit refill requests through [employer] or call your pharmacy and they will forward the refill request to us. Please allow 3 business days for your refill to be completed.          Additional Information About Your Visit        [employer] Information     [facility] gives you secure access to your electronic health record. If you see a primary care provider, you can also send messages to your care team and make appointments. If you have questions, please call your primary care clinic.  If you do not have a primary care provider, please call [phone] and they will assist you.        Care EveryWhere ID     This is your Care EveryWhere ID. This could be used by other organizations to access your [facility] medical records  [id number]        Your Vitals Were     Pulse Temperature Respirations Height Pulse Oximetry BMI (Body Mass Index)    66 97.6  F (36.4  C) (Oral) 20 5' 10\" (1.778 m) 95% 24.06 kg/m2       Blood Pressure from Last 3 Encounters:   09/12/18 122/66   09/04/18 132/60   08/28/18 191/86    Weight from Last 3 Encounters:   09/12/18 167 lb 11.2 oz (76.1 kg)   09/04/18 169 lb (76.7 kg)   04/05/18 172 lb (78 kg)              Today, you had the following     No orders found for display       Primary Care Provider Office Phone # Fax #    Porfirio Terrell [phone]       [address] [facility] [address]  [facility] [address]        Equal Access to Services     [facility] : Hadnoel Denson, wasalvador griffith, qamitchel kaalmada bulmaro, yony nunn. So [facility] [phone].    ATENCIÓN: Si habla español, tiene a lauren disposición servicios gratuitos de asistencia " lingüísticaPat Sutton al [phone].    We comply with applicable federal civil rights laws and [place] laws. We do not discriminate on the basis of race, color, national origin, age, disability, sex, sexual orientation, or gender identity.            Thank you!     Thank you for choosing [facility]  for your care. Our goal is always to provide you with excellent care. Hearing back from our patients is one way we can continue to improve our services. Please take a few minutes to complete the written survey that you may receive in the mail after your visit with us. Thank you!             Your Updated Medication List - Protect others around you: Learn how to safely use, store and throw away your medicines at www.disposemymeds.org.          This list is accurate as of 9/12/18  5:00 PM.  Always use your most recent med list.                   Brand Name Dispense Instructions for use Diagnosis    albuterol 108 (90 Base) MCG/ACT inhaler    PROAIR HFA/PROVENTIL HFA/VENTOLIN HFA    1 Inhaler    Inhale 2 puffs into the lungs every 6 hours as needed for shortness of breath / dyspnea.    Mild persistent asthma       amLODIPine 5 MG tablet    NORVASC    90 tablet    Take 1 tablet (5 mg) by mouth daily    Essential hypertension with goal blood pressure less than 140/90       amoxicillin-clavulanate 875-125 MG per tablet    AUGMENTIN    28 tablet    Take 1 tablet by mouth 2 times daily for 14 days    Urinary tract infection       AVEENO ECZEMA THERAPY 1 % Crea   Generic drug:  Colloidal Oatmeal      Externally apply topically daily        cetirizine 10 MG tablet    zyrTEC     Take 10 mg by mouth daily as needed        cholecalciferol 5000 units Caps      Take 1 capsule by mouth daily. Takes 5000 units in the summer and [address]61 units in the winter        CIPROFLOXACIN PO      Take 500 mg by mouth 1 tablet by mouth once as needed. Take one tablet after each procedere        clobetasol 0.05 % ointment    TEMOVATE      daily as needed        COLON CLEANSER PO      Take 1 capsule by mouth daily Advanced Colon Care II        D-Mannose Powd      daily 500 mg Pt takes 2 pills daily        * EYE VITAMINS Caps      one tablet twice daily        * PRESERVISION AREDS Caps     180 capsule    Take 1 capsule by mouth 2 times daily    Macular degeneration       lidocaine 4 % Crea cream    LMX4     Apply topically daily as needed        nebulizer nebulization     1    Use as directed    Mild persistent asthma       omeprazole 20 MG CR capsule    priLOSEC    90 capsule    Take 1 capsule (20 mg) by mouth daily    Gastroesophageal reflux disease without esophagitis       oxyCODONE-acetaminophen 5-325 MG per tablet    PERCOCET    30 tablet    Take 1 tablet by mouth every 4 hours as needed for pain for pain.    Cervicalgia, Acute upper back pain       PARoxetine 30 MG tablet    PAXIL    45 tablet    Take 0.5 tablets (15 mg) by mouth every morning TAKE 1/2 TABLET BY MOUTH AT BEDTIME    RAMÓN (generalized anxiety disorder)       VITAMIN C PO      Take by mouth 2 times daily 500 mg take 1 daily        * Notice:  This list has 2 medication(s) that are the same as other medications prescribed for you. Read the directions carefully, and ask your doctor or other care provider to review them with you.

## 2018-09-13 NOTE — TELEPHONE ENCOUNTER
Mr Boss would be a candidate for the Shingrix vaccinations if he was interested. Please advise patient's daughter.

## 2018-09-13 NOTE — TELEPHONE ENCOUNTER
"Requested Prescriptions   Pending Prescriptions Disp Refills     amLODIPine (NORVASC) 5 MG tablet [Pharmacy Med Name: AMLODIPINE BESYLATE 5MG TABLETS] 90 tablet 0    Last Written Prescription Date:  08/10/2017  Last Fill Quantity: 90,  # refills: 3   Last office visit: 9/12/2018 with prescribing provider:     Future Office Visit:   Sig: TAKE 1 TABLET(5 MG) BY MOUTH DAILY    Calcium Channel Blockers Protocol  Passed    9/13/2018  3:15 AM       Passed - Blood pressure under 140/90 in past 12 months    BP Readings from Last 3 Encounters:   09/12/18 122/66   09/04/18 132/60   08/28/18 191/86                Passed - Recent (12 mo) or future (30 days) visit within the authorizing provider's specialty    Patient had office visit in the last 12 months or has a visit in the next 30 days with authorizing provider or within the authorizing provider's specialty.  See \"Patient Info\" tab in inbasket, or \"Choose Columns\" in Meds & Orders section of the refill encounter.           Passed - Patient is age 18 or older       Passed - Normal serum creatinine on file in past 12 months    Recent Labs   Lab Test  08/28/18   0946   05/15/15   1332   CR  1.08   < >   --    CREAT   --    --   1.2    < > = values in this interval not displayed.             "

## 2018-09-13 NOTE — TELEPHONE ENCOUNTER
Patient's daughter sent [employer] message asking if patient should have the Shingrix vaccine. She states Dr. Terrell did not feel Zostavax was very useful given patient's age and asks if Dr. Terrell thinks the Shingrix vaccine would be beneficial for him.

## 2018-09-16 NOTE — IP AVS SNAPSHOT
MRN:[id number]                      After Visit Summary   9/16/2018    Edison Boss    MRN: [id number]           Thank you!     Thank you for choosing [facility] for your care. Our goal is always to provide you with excellent care. Hearing back from our patients is one way we can continue to improve our services. Please take a few minutes to complete the written survey that you may receive in the mail after you visit. If you would like to speak to someone directly about your visit please contact Patient Relations at [phone]. Thank you!          Patient Information     Date Of Birth          10/19/1924        About your hospital stay     You were admitted on:  September 16, 2018 You last received care in the:  [facility] Observation Department    You were discharged on:  September 19, 2018        Reason for your hospital stay       Pneumonia, candida urinary tract infection                  Who to Call     For medical emergencies, please call 911.  For non-urgent questions about your medical care, please call your primary care provider or clinic, [phone]          Attending Provider     Provider Specialty    Sia Soto MD Emergency Medicine    Corona Arguello,  Internal Medicine       Primary Care Provider Office Phone # Fax #    Porfirio Terrell [phone]      After Care Instructions     Activity       Your activity upon discharge: activity as tolerated            Diet       Follow this diet upon discharge: Orders Placed This Encounter      Combination Diet Regular Diet Adult                  Follow-up Appointments     Follow-up and recommended labs and tests        Follow up with primary care provider, Porfirio Terrell MD, within 7 days for hospital follow- up.  The following labs/tests are recommended: repeat urinalysis. Follow-up with Dr. Lilly for catheter change. His office will call you. He is going to get you  scheduled for a catheter exchange tomorrow.                  Your next 10 appointments already scheduled     Sep 20, 2018  8:00 AM CDT   Return Visit with Michael Lilly MD   [facility] ([facility])    [address] [facility]  [address]  [facility] [address]   [phone]            Sep 26, 2018  2:20 PM CDT   Office Visit with Porfirio Terrell MD   [facility]    [address] [place]  [facility] [address]   [phone]           Bring a current list of meds and any records pertaining to this visit. For Physicals, please bring immunization records and any forms needing to be filled out. Please arrive 10 minutes early to complete paperwork.            Nov 01, 2018 10:40 AM CDT   Return Visit with Michael Lilly MD   [facility] ([facility])    [address] [facility]  [address]  [facility] [address]   [phone]            Nov 05, 2018  1:45 PM CST   Remote PPM Check with SANDRA TECH1   [facility] ([facility])    [facility] [facility] [address]  [facility] [id number] [phone] OPT 2           This appointment is for a remote check of your pacemaker.  This is not an appointment at the office.              Additional Services     Home Care PT Referral for Hospital Discharge       PT to eval and treat    Your provider has ordered home care - physical therapy. If you have not been contacted within 2 days of your discharge please call the department phone number listed on the top of this document.            Home Care Social Service Referral for Hospital Discharge        to assist family    Your provider has ordered home care - . If you have not been contacted within 2 days of your discharge please call the department phone number listed on the top of this  document.            Home care nursing referral       RN extended hours visit. RN to assess vital signs and weight and hydration, nutrition and bowel status.    Your provider has ordered home care nursing services. If you have not been contacted within 2 days of your discharge please call the inpatient department phone number at [phone] .                             Further instructions from your care team       Your home care referral was sent to [facility]   If you haven't heard from them within the next 24-48 hours,  Please call them at [phone]    Your hospital follow up appointment has been scheduled for you with Dr. Terrell at [facility] for Wednesday 9/26 at 2:20pm. Please bring your hospital discharge instructions and any new medications with you to your appointment. Please call the clinic at [phone] if you need to reschedule.            Pending Results     Date and Time Order Name Status Description    9/16/2018 2221 Blood culture ONE site Preliminary     9/16/2018 2104 Blood culture ONE site Preliminary             Admission Information     Date & Time Provider Department Dept. Phone    9/16/2018 Corona Arguello, DO [facility] Observation Department [phone]      Your Vitals Were     Blood Pressure Pulse Temperature Respirations Weight Pulse Oximetry    162/89 (BP Location: Left arm) 58 99.2  F (37.3  C) (Oral) 16 80.1 kg (176 lb 9.6 oz) 92%    BMI (Body Mass Index)                   25.34 kg/m2           MyChart Information     [employer] gives you secure access to your electronic health record. If you see a primary care provider, you can also send messages to your care team and make appointments. If you have questions, please call your primary care clinic.  If you do not have a primary care provider, please call [phone] and they will assist you.        Care EveryWhere ID     This is your Care EveryWhere ID. This could be used by other  organizations to access your [place] medical records  [id number]        Equal Access to Services     EMERITA CADE : Shellie Denson, wajeannetteda gladis, qamitchel hurtadomakan chamberlain, yony valenzuelarajesse nunn. So [facility] [phone].    ATENCIÓN: Si habla español, tiene a lauren disposición servicios gratuitos de asistencia lingüística. Llame al [phone].    We comply with applicable federal civil rights laws and [place] laws. We do not discriminate on the basis of race, color, national origin, age, disability, sex, sexual orientation, or gender identity.               Review of your medicines      START taking        Dose / Directions    fluconazole 200 MG tablet   Commonly known as:  DIFLUCAN   Indication:  uti   Used for:  Candida infection        Dose:  200 mg   Start taking on:  9/20/2018   Take 1 tablet (200 mg) by mouth daily for 12 days   Quantity:  12 tablet   Refills:  0       levofloxacin 750 MG tablet   Commonly known as:  LEVAQUIN        Dose:  750 mg   Start taking on:  9/20/2018   Take 1 tablet (750 mg) by mouth daily for 1 day   Quantity:  5 tablet   Refills:  1         CONTINUE these medicines which have NOT CHANGED        Dose / Directions    albuterol 108 (90 Base) MCG/ACT inhaler   Commonly known as:  PROAIR HFA/PROVENTIL HFA/VENTOLIN HFA   Used for:  Mild persistent asthma        Dose:  2 puff   Inhale 2 puffs into the lungs every 6 hours as needed for shortness of breath / dyspnea.   Quantity:  1 Inhaler   Refills:  1       amLODIPine 5 MG tablet   Commonly known as:  NORVASC   Used for:  Essential hypertension with goal blood pressure less than 140/90        TAKE 1 TABLET(5 MG) BY MOUTH DAILY   Quantity:  90 tablet   Refills:  3       AVEENO ECZEMA THERAPY 1 % Crea   Generic drug:  Colloidal Oatmeal        Externally apply topically daily as needed   Refills:  0       cetirizine 10 MG tablet   Commonly known as:  zyrTEC        Dose:  10 mg   Take 10 mg by mouth daily  as needed   Refills:  0       cholecalciferol 5000 units Caps        Dose:  1 capsule   Take 1 capsule by mouth daily. Takes 5000 units in the summer and 64921 units in the winter   Refills:  0       clobetasol 0.05 % ointment   Commonly known as:  TEMOVATE        daily as needed   Refills:  2       COLON CLEANSER PO        Dose:  1 capsule   Take 1 capsule by mouth daily Advanced Colon Care II   Refills:  0       D-Mannose Powd        Dose:  100 mg   Take 100 mg by mouth daily 500 mg Pt takes 2 pills daily   Refills:  0       lidocaine 4 % Crea cream   Commonly known as:  LMX4        Apply topically daily as needed   Refills:  0       omeprazole 20 MG CR capsule   Commonly known as:  priLOSEC   Used for:  Gastroesophageal reflux disease without esophagitis        Dose:  20 mg   Take 1 capsule (20 mg) by mouth daily   Quantity:  90 capsule   Refills:  3       oxyCODONE-acetaminophen 5-325 MG per tablet   Commonly known as:  PERCOCET   Used for:  Cervicalgia, Acute upper back pain        Dose:  1 tablet   Take 1 tablet by mouth every 4 hours as needed for pain for pain.   Quantity:  30 tablet   Refills:  0       PARoxetine 30 MG tablet   Commonly known as:  PAXIL        Dose:  15 mg   Take 15 mg by mouth At Bedtime Takes 1/2 tab of 30mg=15mg   Refills:  0       PRESERVISION AREDS Caps   Used for:  Macular degeneration        Dose:  1 capsule   Take 1 capsule by mouth 2 times daily   Quantity:  180 capsule   Refills:  3       VITAMIN C PO        Dose:  1000 mg   Take 1,000 mg by mouth daily   Refills:  0         STOP taking     CIPROFLOXACIN PO                Where to get your medicines      These medications were sent to [place], [place] - [address] [facility]  [address] [facility] [address]     Phone:  [phone]     fluconazole 200 MG tablet    levofloxacin 750 MG tablet                Protect others around you: Learn how to safely use, store and throw away your  medicines at www.disposemymeds.org.        ANTIBIOTIC INSTRUCTION     You've Been Prescribed an Antibiotic - Now What?  Your healthcare team thinks that you or your loved one might have an infection. Some infections can be treated with antibiotics, which are powerful, life-saving drugs. Like all medications, antibiotics have side effects and should only be used when necessary. There are some important things you should know about your antibiotic treatment.      Your healthcare team may run tests before you start taking an antibiotic.    Your team may take samples (e.g., from your blood, urine or other areas) to run tests to look for bacteria. These test can be important to determine if you need an antibiotic at all and, if you do, which antibiotic will work best.      Within a few days, your healthcare team might change or even stop your antibiotic.    Your team may start you on an antibiotic while they are working to find out what is making you sick.    Your team might change your antibiotic because test results show that a different antibiotic would be better to treat your infection.    In some cases, once your team has more information, they learn that you do not need an antibiotic at all. They may find out that you don't have an infection, or that the antibiotic you're taking won't work against your infection. For example, an infection caused by a virus can't be treated with antibiotics. Staying on an antibiotic when you don't need it is more likely to be harmful than helpful.      You may experience side effects from your antibiotic.    Like all medications, antibiotics have side effects. Some of these can be serious.    Let you healthcare team know if you have any known allergies when you are admitted to the hospital.    One significant side effect of nearly all antibiotics is the risk of severe and sometimes deadly diarrhea caused by Clostridium difficile (C. Difficile). This occurs when a person takes  antibiotics because some good germs are destroyed. Antibiotic use allows C. diificile to take over, putting patients at high risk for this serious infection.    As a patient or caregiver, it is important to understand your or your loved one's antibiotic treatment. It is especially important for caregivers to speak up when patients can't speak for themselves. Here are some important questions to ask your healthcare team.    What infection is this antibiotic treating and how do you know I have that infection?    What side effects might occur from this antibiotic?    How long will I need to take this antibiotic?    Is it safe to take this antibiotic with other medications or supplements (e.g., vitamins) that I am taking?     Are there any special directions I need to know about taking this antibiotic? For example, should I take it with food?    How will I be monitored to know whether my infection is responding to the antibiotic?    What tests may help to make sure the right antibiotic is prescribed for me?      Information provided by:  www.cdc.gov/getsmart  U.S. Department of Health and Human Services  Centers for disease Control and Prevention  National Center for Emerging and Zoonotic Infectious Diseases  Division of Healthcare Quality Promotion             Medication List: This is a list of all your medications and when to take them. Check marks below indicate your daily home schedule. Keep this list as a reference.      Medications           Morning Afternoon Evening Bedtime As Needed    albuterol 108 (90 Base) MCG/ACT inhaler   Commonly known as:  PROAIR HFA/PROVENTIL HFA/VENTOLIN HFA   Inhale 2 puffs into the lungs every 6 hours as needed for shortness of breath / dyspnea.                                amLODIPine 5 MG tablet   Commonly known as:  NORVASC   TAKE 1 TABLET(5 MG) BY MOUTH DAILY   Last time this was given:  5 mg on 9/19/2018  7:58 AM                                AVEENO ECZEMA THERAPY 1 % Crea    Externally apply topically daily as needed   Generic drug:  Colloidal Oatmeal                                cetirizine 10 MG tablet   Commonly known as:  zyrTEC   Take 10 mg by mouth daily as needed                                cholecalciferol 5000 units Caps   Take 1 capsule by mouth daily. Takes 5000 units in the summer and 86569 units in the winter                                clobetasol 0.05 % ointment   Commonly known as:  TEMOVATE   daily as needed                                COLON CLEANSER PO   Take 1 capsule by mouth daily Advanced Colon Care II                                D-Mannose Powd   Take 100 mg by mouth daily 500 mg Pt takes 2 pills daily                                fluconazole 200 MG tablet   Commonly known as:  DIFLUCAN   Take 1 tablet (200 mg) by mouth daily for 12 days   Start taking on:  9/20/2018   Last time this was given:  200 mg on 9/19/2018  7:57 AM                                levofloxacin 750 MG tablet   Commonly known as:  LEVAQUIN   Take 1 tablet (750 mg) by mouth daily for 1 day   Start taking on:  9/20/2018                                lidocaine 4 % Crea cream   Commonly known as:  LMX4   Apply topically daily as needed                                omeprazole 20 MG CR capsule   Commonly known as:  priLOSEC   Take 1 capsule (20 mg) by mouth daily   Last time this was given:  20 mg on 9/19/2018  7:58 AM                                oxyCODONE-acetaminophen 5-325 MG per tablet   Commonly known as:  PERCOCET   Take 1 tablet by mouth every 4 hours as needed for pain for pain.                                PARoxetine 30 MG tablet   Commonly known as:  PAXIL   Take 15 mg by mouth At Bedtime Takes 1/2 tab of 30mg=15mg   Last time this was given:  15 mg on 9/18/2018  8:05 PM                                PRESERVISION AREDS Caps   Take 1 capsule by mouth 2 times daily                                VITAMIN C PO   Take 1,000 mg by mouth daily   Last time this was given:   1,000 mg on 9/19/2018  7:57 AM

## 2018-09-16 NOTE — LETTER
Transition Communication Hand-off for Care Transitions to Next Level of Care Provider    Name: Edison Boss  : 10/19/1924  MRN #: [id number]  Primary Care Provider: Porfirio Terrell MD  Primary Care MD Name: Nidhi  Primary Clinic: [address] [facility] [address]  [facility] [address]  Primary Care Clinic Name: [facility]  Reason for Hospitalization:  Dehydration [E86.0]  Renal insufficiency [N28.9]  Generalized muscle weakness [M62.81]  Admit Date/Time: 2018  8:40 PM  Discharge Date: 18  Payor Source: Payor: MEDICARE / Plan: MEDICARE / Product Type: Medicare /     Readmission Assessment Measure (UGO) Risk Score/category: Average           Reason for Communication Hand-off Referral: Admission diagnoses: PN    Discharge Plan: Home with 24 Hr Assist of Family       Concern for non-adherence with plan of care:   No  Discharge Needs Assessment:  Needs       Most Recent Value    Anticipated Changes Related to Illness inability to care for self    Equipment Currently Used at Home walker, rolling    Transportation Available family or friend will provide          Already enrolled in Tele-monitoring program and name of program:  NA  Follow-up specialty is recommended: Yes    Follow-up plan:  Future Appointments  Date Time Provider Department Center   2018 8:00 AM Maxx, Michael M, MD [facility]   2018 2:20 PM Porfirio Terrell MD [facility] [place] [facility]   2018 10:40 AM [place], Michael M, MD [facility]   2018 1:45 PM SANDRA TECH1 [facility]       Any outstanding tests or procedures:        Referrals     Future Labs/Procedures    Home care nursing referral     Comments:    RN extended hours visit. RN to assess vital signs and weight and hydration, nutrition and bowel status.    Your provider has ordered home care nursing services. If you have not been contacted within 2 days of your discharge please call the inpatient department phone number at [phone] .    Home Care PT Referral for  Hospital Discharge     Comments:    PT to eval and treat    Your provider has ordered home care - physical therapy. If you have not been contacted within 2 days of your discharge please call the department phone number listed on the top of this document.    Home Care Social Service Referral for Hospital Discharge     Comments:     to assist family    Your provider has ordered home care - . If you have not been contacted within 2 days of your discharge please call the department phone number listed on the top of this document.            Key Recommendations:      Key Recommendations:  Patient admitted with possible pneumonia and falls. Patient has had a recent UTI with tolliver placement. Patient has a history of dementia, stroke, and macular degeneration. Patient lives in [facility] and currently receives no services. Anticipate DC home with 24 hour assist from family. Patient has supportive and involved children. Patient was DC'd with home care RN and PT.   Recommend to monitor for resolution of pneumonia and return to baseline mobility.   Other recommendations at discharge are completion of antibiotics for pneumonia, Levaquin X 1 day, and candida infection, Diflucan X 12 days. Patient to return to urology on 9/20/18 for a catheter exchange.     Jen Wills    AVS/Discharge Summary is the source of truth; this is a helpful guide for improved communication of patient story

## 2018-09-16 NOTE — IP AVS SNAPSHOT
[facility] Observation Department    [address]    [facility] [address]    Phone:  [phone]                                       After Visit Summary   9/16/2018    Edison Boss    MRN: [id number]           After Visit Summary Signature Page     I have received my discharge instructions, and my questions have been answered. I have discussed any challenges I see with this plan with the nurse or doctor.    ..........................................................................................................................................  Patient/Patient Representative Signature      ..........................................................................................................................................  Patient Representative Print Name and Relationship to Patient    ..................................................               ................................................  Date                                   Time    ..........................................................................................................................................  Reviewed by Signature/Title    ...................................................              ..............................................  Date                                               Time          22EPIC Rev 08/18

## 2018-09-17 PROBLEM — J18.9 PNEUMONIA: Status: ACTIVE | Noted: 2018-01-01

## 2018-09-17 NOTE — PLAN OF CARE
Problem: Patient Care Overview  Goal: Plan of Care/Patient Progress Review  PRIMARY DIAGNOSIS: Possible PNEUMONIA/ Falls  OUTPATIENT/OBSERVATION GOALS TO BE MET BEFORE DISCHARGE:  1. Dyspnea improved and O2 sats >88% on RA or back to baseline O2 levels: Yes   SpO2: 93 %, O2 Device: None (Room air)    2. Tolerating oral abx or appropriate plans made outpatient infusion: Yes, IV Levaquin scheduled    3. Vitals signs normal or return to baseline: Yes, except /74, Tele: SR HR 60s    4. Short term supplemental O2 needed with activity at home: No    5. Tolerate oral intake to maintain hydration: Yes    6. Return to near baseline physical activity: No, Ax1 with gait belt and walker    Discharge Planner Nurse   Safe discharge environment identified: No  Barriers to discharge: Yes, PT/OT/SW consults and pain management     Please review provider order for any additional goals.   Nurse to notify provider when observation goals have been met and patient is ready for discharge.      Pt A&O to person, place, and time, but not situation - often shows confusion to situation when communicating with staff. VSS except BP, no c/o pain when lying in bed, but has pain when moving around. LS clear on ausculation in all lobes, no reported SOB, no labored breathing noted, bowel sounds present. X-ray results of hip and pelvis pending. Continuous fluids infusing. Has Christianson cath in place. Plan: PT/OT/SW, pain management, scheduled IV levaquin for pneumonia. Tele: SR HR 60s.

## 2018-09-17 NOTE — CONSULTS
Care Transition Initial Assessment - RN        Met with: Patient.  DATA   Active Problems:    Pneumonia       Cognitive Status: awake and alert.        Contact information and PCP information verified: Yes                       Insurance concerns: No Insurance issues identified  ASSESSMENT  Patient currently receives the following services:  Patient lives at [facility] and receives no services at this time.         Identified issues/concerns regarding health management: Patient admitted after a fall and pneumonia. Patient has a history of macular degeneration, stroke, and a recent UTI with a tolliver. Patient lives in an apartment at [facility] who receives one meal per day at the facility. Patient states that daughters provide other food and assist with grocery shopping and transportation. Patient states that daughter Red, who is an RN, assists with medication management, but patient also fills and uses a weekly med box. Patient has a walker and uses this for ambulation.   Reviewed pneumonia action plan with patient verbally to complete antibiotics as prescribed and avoid sick contacts. Patient has limited vision d/t macular degeneration.     PLAN  Financial costs for the patient not discussed .  Patient given options and choices for discharge Yes .  Patient/family is agreeable to the plan?  Yes.  Patient anticipates discharging to Unknown at this time pending consults .        Patient anticipates needs for home equipment: No  Plan/Disposition: Pending consults.   Appointments: None made at this time.       Care  (CTS) will continue to follow as needed.

## 2018-09-17 NOTE — PROGRESS NOTES
Discharge Planner   Discharge Plans in progress: Yes  Barriers to discharge plan: PT consult pending. Patient lives in an apartment at [facility] and currently receives no services.   Follow up plan: CM to follow up with discharge plan pending consults.          Entered by: Jen Wills 09/17/2018 9:56 AM

## 2018-09-17 NOTE — PLAN OF CARE
Problem: Patient Care Overview  Goal: Plan of Care/Patient Progress Review  PT: Orders received. PT evaluation completed and treatment initiated. Pt reports living in an apartment with no stairs to access. Pt lives alone and receives 1 meal/day. Pt's daughter assists with medication management. Pt ambulates with a 4WW at all times.     Discharge Planner PT   Patient plan for discharge: Home  Current status: Pt completes sit<>supine with Jovan at B LE. Pt completes sit<>stand with Jovan and cues for safe technique. Pt ambulates to/from bathroom with CGAx1-2 (pt only requires 1). Pt notes increased pain and fatigue with all mobility. Discussed discharge planning with pt and daughter.   Barriers to return to prior living situation: Requires assist with all mobility.   Recommendations for discharge: Originally recommending TCU, however daughter reports that they can support him at home with 24/7 assist and supervision for several days upon discharge. This is the preferred discharge plan for both pt and family.   Rationale for recommendations: Pt is unsafe to discharge home alone. Will require either 24/7 assist at home or TCU to maintain safe mobility at discharge. Family reporting that they would prefer to assist pt at home.        Entered by: Frida Kimble 09/17/2018 12:02 PM

## 2018-09-17 NOTE — PROGRESS NOTES
09/17/18 1149   Quick Adds   Type of Visit Initial PT Evaluation   Living Environment   Lives With alone   Living Arrangements independent living facility   Home Accessibility no concerns   Number of Stairs to Enter Home 0   Number of Stairs Within Home 0   Stair Railings at Home none   Transportation Available family or friend will provide   Self-Care   Dominant Hand right   Usual Activity Tolerance good   Current Activity Tolerance fair   Regular Exercise no   Equipment Currently Used at Home walker, rolling   Functional Level Prior   Ambulation 1-->assistive equipment   Transferring 1-->assistive equipment   Toileting 0-->independent   Bathing 0-->independent   Dressing 0-->independent   Eating 0-->independent   Fall history within last six months yes   Number of times patient has fallen within last six months 2   Which of the above functional risks had a recent onset or change? ambulation;transferring;toileting;bathing;dressing;eating   General Information   Onset of Illness/Injury or Date of Surgery - Date 09/16/18   Referring Physician Corona Arguello, DO   Patient/Family Goals Statement Return home   Pertinent History of Current Problem (include personal factors and/or comorbidities that impact the POC) Edison Boss is a 89 year old male with a history of dementia, stroke, sleep apnea, paroxysmal atrial fibrillation, asthma, prostate cancer, hypertension, hyperlipidemia, GERD, and chronic pain syndrome who presents with weakness after 2 falls and possible pneumonia.   Precautions/Limitations fall precautions   Weight-Bearing Status - LLE full weight-bearing   Weight-Bearing Status - RLE full weight-bearing   General Observations Pt supine upon intiation, agreeable to session.    Cognitive Status Examination   Orientation orientation to person, place and time   Level of Consciousness alert   Follows Commands and Answers Questions 100% of the time   Personal Safety and Judgment intact   Memory  "intact   Pain Assessment   Patient Currently in Pain Yes, see Vital Sign flowsheet   Integumentary/Edema   Integumentary/Edema no deficits were identifed   Posture    Posture Forward head position;Protracted shoulders   Range of Motion (ROM)   ROM Comment WNL   Strength   Strength Comments Functional weakness demonstrated via requiring assist for functional mobility   Bed Mobility   Bed Mobility Comments sit<>supine Jovan at B LE   Transfer Skills   Transfer Comments sit<>stand with Jovan   Gait   Gait Comments FWW with CGA/Jovan x 50'   Balance   Balance Comments Pt requires B UE support for safe dynamic mobility   Sensory Examination   Sensory Perception Comments Pt with macular degeneration at baseline   Coordination   Coordination no deficits were identified   Muscle Tone   Muscle Tone no deficits were identified   General Therapy Interventions   Planned Therapy Interventions bed mobility training;gait training;ROM;strengthening;stretching;transfer training;home program guidelines;progressive activity/exercise   Clinical Impression   Criteria for Skilled Therapeutic Intervention yes, treatment indicated   PT Diagnosis Impaired functional mobility   Influenced by the following impairments Pain, weakness/deconditioning   Functional limitations due to impairments Difficulty with bed mobility, transfers, ambulation   Clinical Presentation Stable/Uncomplicated   Clinical Presentation Rationale progressing medically   Clinical Decision Making (Complexity) Low complexity   Therapy Frequency` daily   Predicted Duration of Therapy Intervention (days/wks) 3 days   Anticipated Discharge Disposition Home with Assist;Transitional Care Facility   Risk & Benefits of therapy have been explained Yes   Patient, Family & other staff in agreement with plan of care Yes   [facility] AM-PAC TM \"6 Clicks\"   2016, Trustees of [facility], under license to [employer].  All rights reserved.   6 Clicks Short Forms Basic " "Mobility Inpatient Short Form   [facility] AM-PAC  \"6 Clicks\" V.2 Basic Mobility Inpatient Short Form   1. Turning from your back to your side while in a flat bed without using bedrails? 4 - None   2. Moving from lying on your back to sitting on the side of a flat bed without using bedrails? 3 - A Little   3. Moving to and from a bed to a chair (including a wheelchair)? 3 - A Little   4. Standing up from a chair using your arms (e.g., wheelchair, or bedside chair)? 3 - A Little   5. To walk in hospital room? 3 - A Little   6. Climbing 3-5 steps with a railing? 2 - A Lot   Basic Mobility Raw Score (Score out of 24.Lower scores equate to lower levels of function) 18   Total Evaluation Time   Total Evaluation Time (Minutes) 8     "

## 2018-09-17 NOTE — PROGRESS NOTES
[facility]  Hospitalist Progress Note  Lacey Carlton PA-C 09/17/2018    Reason for Stay (Diagnosis): weakness, falls and mild confusion         Assessment and Plan:      Summary of Stay:   Edison Boss is a 89 year old male with a history of dementia, stroke, sleep apnea, paroxysmal atrial fibrillation, asthma, prostate cancer, hypertension, hyperlipidemia, GERD, and chronic pain syndrome who presents with weakness after 2 falls and possible pneumonia.       Problem List:   1. Possible Left upper lobe PNA-Not sure if he truly has PNA, he has minimal respiratory sxs but he is somewhat a difficult historian,  does have elevated WBC, borderline pro calcitonin. I am veering on the conservative side and continue to treat him with abx for now.     2.  Weakness.  Likely multifactorial from recent UTI, deconditioning, and dehydration and poss PNA.  Levaquin as above to treat pneumonia.  Continuous IV fluids. For now. Will dec IVF to 75cc for now. CK improved today.      3.  Acute kidney injury.  Likely due to dehydration. Cr better today still with limited po intake. Dec IVF but follow labs in am.    4.  GERD.  Restart omeprazole.     5.  Acute on chronic pain.  After fall.  Having pain in his hips and neck. CT scan of the cervical spine and pelvic x-ray negative for fracture. Declining narcotics and pain meds. Will observe for now and consider scheduled tyelnol.      6.  Asthma.  No acute exacerbation.  Albuterol as needed.     7.  Depression.  Restart paroxetine.     8.  Hypertension.  Restart amlodipine.     Code status: Full code.  Prophylaxis: Pneumatic compression devices.  Discharge Dispo: home with home services   Estimated Disch Date / # of Days until Disch: 2-3 days        Interval History (Subjective):      States feeling a bit sleepy but c/o back and neck pain. Was able to get up with PT today and walk around in the room but otherwise wants to rest.                   Physical Exam:       Last Vital Signs:  /62 (BP Location: Left arm)  Temp 99  F (37.2  C) (Oral)  Resp 18  Wt 80.1 kg (176 lb 9.6 oz)  SpO2 91%  BMI 25.34 kg/m2      Intake/Output Summary (Last 24 hours) at 09/17/18 1559  Last data filed at 09/17/18 1358   Gross per 24 hour   Intake                0 ml   Output              950 ml   Net             -950 ml       Constitutional: Awake, alert, cooperative, no apparent distress   Respiratory: Clear to auscultation bilaterally, no crackles or wheezing, dec bs at bases   Cardiovascular: Regular rate and rhythm, normal S1 and S2, and no murmur noted   Abdomen: Normal bowel sounds, soft, non-distended, non-tender   Skin: No rashes, no cyanosis, dry to touch   Neuro: Alert and oriented x3, no weakness, numbness, memory loss   Extremities: No edema, normal range of motion   Other(s):        All other systems: Negative          Medications:      All current medications were reviewed with changes reflected in problem list.         Data:      All new lab and imaging data was reviewed.   Labs:       Lab Results   Component Value Date     09/17/2018     09/16/2018     08/28/2018    Lab Results   Component Value Date    CHLORIDE 108 09/17/2018    CHLORIDE 105 09/16/2018    CHLORIDE 110 08/28/2018    Lab Results   Component Value Date    BUN 26 09/17/2018    BUN 29 09/16/2018    BUN 19 08/28/2018      Lab Results   Component Value Date    POTASSIUM 3.8 09/17/2018    POTASSIUM 4.3 09/16/2018    POTASSIUM 3.9 08/28/2018    Lab Results   Component Value Date    CO2 23 09/17/2018    CO2 28 09/16/2018    CO2 26 08/28/2018    Lab Results   Component Value Date    CR 1.19 09/17/2018    CR 1.37 09/16/2018    CR 1.08 08/28/2018          Recent Labs  Lab 09/17/18  0636 09/16/18  2102   WBC 17.8* 20.2*   HGB 12.9* 14.6   HCT 38.8* 44.2   MCV 92 91    268      Imaging:   Results for orders placed or performed during the hospital encounter of 09/16/18   CT Head w/o Contrast     Narrative    CT SCAN OF THE HEAD WITHOUT CONTRAST   9/16/2018 10:30 PM     HISTORY: Confusion and weakness.    TECHNIQUE:  Axial images of the head and coronal reformations without  IV contrast material. Radiation dose for this scan was reduced using  automated exposure control, adjustment of the mA and/or kV according  to patient size, or iterative reconstruction technique.    COMPARISON: 8/28/2018    FINDINGS:  There is generalized atrophy of the brain.  There is low  attenuation in the white matter of the cerebral hemispheres consistent  with sequelae of small vessel ischemic disease. There is no evidence  of intracranial hemorrhage, mass, acute infarct or anomaly.     The visualized portions of the sinuses and mastoids appear normal.  There is no evidence of trauma.      Impression    IMPRESSION:   1. No acute abnormality.  2.  Atrophy of the brain.  White matter changes consistent with  sequelae of small vessel ischemic disease. This is unchanged.      DANILO ALFONSO MD   Chest XR,  PA & LAT    Narrative    CHEST TWO VIEWS    9/16/2018 10:48 PM     HISTORY: Weakness.    COMPARISON: 9/2/2011.      Impression    IMPRESSION: Dual lead cardiac device is again seen. This appears to be  a different battery pack than on the prior study. Minimal linear  scarring or platelike atelectasis left lung base without change.  Possible interval development of a small amount of focal infiltrate in  the left upper lung zone. The remainder of the lungs are clear. The  heart appears larger than on the prior exam but this is likely related  to the AP sitting technique.      MENDOZA AGUIRRE MD   XR Pelvis and Hip Bilateral 2 Views    Narrative    XR PELVIS AND HIP BILATERAL 2 VIEWS 9/17/2018 2:02 AM    COMPARISON: None.    HISTORY: Hip pain after fall.      Impression    IMPRESSION: Prostate radiation seeds are demonstrated.  Mild-to-moderate degenerative changes in both hips are seen, but no  fractures are demonstrated.    MONIQUE  MD COSTA   CT Cervical Spine w/o Contrast    Narrative    CT OF THE CERVICAL SPINE WITHOUT CONTRAST September 17, 2018 1:53 AM     HISTORY: Fall, neck pain.      TECHNIQUE: Axial images of the cervical spine were acquired without  intravenous contrast. Multiplanar reformations were created. Radiation  dose for this scan was reduced using automated exposure control,  adjustment of the mA and/or kV according to patient size, or iterative  reconstruction technique.      COMPARISON: None.    FINDINGS: There is normal alignment of the cervical vertebrae;  however, there is reversal of normal cervical lordosis centered at the  C5-C6 level. Vertebral body heights of the cervical spine are normal.  Craniocervical alignment is normal. There are no fractures of the  cervical spine. There is degenerative ankylosis of the C5 and C6  vertebral bodies. There is facet arthropathy bilaterally at the C2-C3,  C3-C4 and C4-C5 levels. There is no significant degenerative spinal  canal narrowing of the cervical spine. There is no prevertebral soft  tissue swelling.      Impression    IMPRESSION: Diffuse degenerative changes of the cervical spine. No  evidence for fracture or traumatic malalignment.      ISSAC SINGLETON MD

## 2018-09-17 NOTE — PLAN OF CARE
"Problem: Patient Care Overview  Goal: Plan of Care/Patient Progress Review  Outcome: Improving  VS: Temp: 98.4  F (36.9  C) Temp src: Oral BP: 136/65   Heart Rate: 60 Resp: 16 SpO2: 91 % O2 Device: None (Room air)    Pertinent labs: Creat 1.19, WBC 17.8, , UC/blood cultures pending  Pain: Moderate chronic generalized body pain; Tylenol given.  Neuro: Alert and oriented x4  Cardiac: WDL; A-paced with rate of 60's on tele  GI: WDL  : Chronic tolliver in place, astrid concentrated urine.  Resp: WDL, lung sounds clear; no cough noted/reported.  Skin: WDL  Mobility: Up with Ax1-2, walker & gait belt. Generalized weakness. Reports feeling \"tired\" and dizzy at times. Noted to be slightly unsteady on feet at times.  Interventions this shift: Tylenol for pain, tolliver in place, ambulated to bathroom today, but has been in bed most of the day due to feeling tired. PT evaluation complete.   Plan: Frequent ambulation/activity encouraged, pain control, continue Levaquin for possible pneumonia, IVF infusing at 100 mL/hour. PT and SW following. Lives independently in senior apartment; family support present.         "

## 2018-09-17 NOTE — PLAN OF CARE
ROOM # 221     Living Situation (if not independent, order SW consult): Senior living alone   Facility name:  :  [facility] [phone]    Activity level at baseline: Ind with walker   Activity level on admit: Assist x2      Patient registered to observation; given Patient Bill of Rights; given the opportunity to ask questions about observation status and their plan of care.  Patient has been oriented to the observation room, bathroom and call light is in place.    Discussed discharge goals and expectations with patient/family.

## 2018-09-17 NOTE — PHARMACY-ADMISSION MEDICATION HISTORY
Admission medication history interview status for this patient is complete. See [facility] admission navigator for allergy information, prior to admission medications and immunization status.     Medication history interview source(s):Patient and Family  Medication history resources (including written lists, pill bottles, clinic record):None    Changes made to PTA medication list:  Added: none  Deleted: none  Changed: vitamin c, aveeno threapy cream    Actions taken by pharmacist (provider contacted, etc):None     Additional medication history information:None    Medication reconciliation/reorder completed by provider prior to medication history? No    For patients on insulin therapy: no (Yes/No)   Lantus/levemir/NPH/Mix 70/30 dose: ___ in AM/PM or twice daily   Sliding scale Novolog Y/N   If Yes, do you have a baseline novolog pre-meal dose: ______units with meals   Patients eat three meals a day: Y/N ---  How many episodes of hypoglycemia (low blood glucose) do you have weekly: ---   How many missed doses do you have a week: ---  How many times do you check your blood glucose per day: ---  Any Barriers to therapy: cost of medications/comfortable with giving injections (if applicable)/ comfortable and confident with current diabetes regimen ---      Prior to Admission medications    Medication Sig Last Dose Taking? Auth Provider   albuterol (PROVENTIL HFA: VENTOLIN HFA) 108 (90 BASE) MCG/ACT inhaler Inhale 2 puffs into the lungs every 6 hours as needed for shortness of breath / dyspnea.  Yes Porfirio Terrell MD   amLODIPine (NORVASC) 5 MG tablet TAKE 1 TABLET(5 MG) BY MOUTH DAILY 9/16/2018 at am Yes Porfirio Terrell MD   Ascorbic Acid (VITAMIN C PO) Take 1,000 mg by mouth daily  9/16/2018 at am Yes Reported, Patient   cetirizine (ZYRTEC) 10 MG tablet Take 10 mg by mouth daily as needed  Yes Reported, Patient   cholecalciferol 5000 UNITS CAPS Take 1 capsule by mouth daily. Takes 5000 units in the summer and 17003 units in  the winter 9/16/2018 at am Yes Reported, Patient   CIPROFLOXACIN PO Take 500 mg by mouth 1 tablet by mouth once as needed. Take one tablet after each procedere  Yes Reported, Patient   clobetasol (TEMOVATE) 0.05 % ointment daily as needed   Yes Lewis Nuñez   Colloidal Oatmeal (AVEENO ECZEMA THERAPY) 1 % CREA Externally apply topically daily as needed   Yes Reported, Patient   D-MANNOSE POWD Take 100 mg by mouth daily 500 mg Pt takes 2 pills daily 9/16/2018 at am Yes Porfirio Terrell MD   lidocaine (LMX4) 4 % CREA 4% topical cream Apply topically daily as needed  Yes Reported, Patient   Misc Natural Products (COLON CLEANSER PO) Take 1 capsule by mouth daily Advanced Colon Care II  Yes Reported, Patient   Multiple Vitamins-Minerals (PRESERVISION AREDS) CAPS Take 1 capsule by mouth 2 times daily 9/16/2018 at am Yes Porfirio Terrell MD   omeprazole (PRILOSEC) 20 MG CR capsule Take 1 capsule (20 mg) by mouth daily 9/16/2018 at am Yes Darline Maravilla, PAJOHN   oxyCODONE-acetaminophen (PERCOCET) 5-325 MG per tablet Take 1 tablet by mouth every 4 hours as needed for pain for pain.  Yes Porfirio Terrell MD   PARoxetine (PAXIL) 30 MG tablet Take 15 mg by mouth At Bedtime Takes 1/2 tab of 30mg=15mg  Yes Unknown, Entered By History

## 2018-09-17 NOTE — ED PROVIDER NOTES
"  History     Chief Complaint:  Fall    HPI   HPI limited secondary to dementia, history of obtained by daughters.  Edison Boss is a 89 year old male with a medical history of dementia, hyperlipidemia, hypertension, atrial fibrillation, and chronic tolliver catheter in place accompanied to the ED by daughters who presents after falls x 2. The patient was seen here in the emergency department on 08/25/18 for evaluation of right lower quadrant abdominal pain. Patient had basic blood work obtained as well as urine sample showing pyuria. Patient also had a CT abdomen that showed evidence of a large right inguinal hernia. Findings are further detailed below. He was subsequently discharged home. Patient returned to the ED on 08/28/18 for evaluation after a near syncopal episode. There was a concern for stroke, so patient had a full stroke work up including CTs of the head and neck with results noted below. Basic blood work including urinalysis was obtained as well. Urine specimen revealed infection and patient was discharged home with a prescription for doxycycline.     Today patient presents to the emergency department via EMS. Patient lives alone in his own apartment and typically does well. Patient reportedly \"lowered himself to the floor\" last night because he had an episode where his thinking was not straight and was found by the paramedics this morning as he could not press the notification button. One of patient's daughter was with him today in the afternoon watching the football game, and daughter left about 6.5 hours ago. Tonight patient was found on the floor again as he was yelling, and people came to help and prompted EMS to bring the patient to the emergency department. Patient denies headache, syncope, or any other complaints at this time. Of note ,patient has right sided residual weakness from his past stroke. He is not longer on any anticoagulant therapy.    Last change of his tolliver catheter was " on 8/30 via Dr Lilly.  Urine culture from 8/20 grew out Enterococcus.  Patient was seen in the ED on 8/28 for weakness and had a stroke workup.  UTI was diagnosed and patient sent home with doxycycline. Urine culture noted below:    Component Results   Component Collected Lab   Specimen Description 08/28/2018 12:40    Catheterized Urine   Special Requests 08/28/2018 12:40 PM 75   Specimen received in preservative   Culture Micro (Abnormal) 08/28/2018 12:40    10,000 to 50,000 colonies/mL   Escherichia coli      Culture Micro (Abnormal) 08/28/2018 12:40    >100,000 colonies/mL   Enterococcus faecalis        Progress Notes  Encounter Date: 8/30/2018  Michael Lilly MD   Urology      []Hide copied text  []Hover for attribution information  Edison Boss is a 89-year-old male with a history of prostate cancer, urinary retention and urgency incontinence secondary to his cancer treatment. He has an indwelling Christianson that is changed monthly. He needs periodic injections with Botox to control his bladder spasticity. Recently he's been in the emergency room for a right inguinal hernia that was easily reduced and separately for lightheadedness and a near syncopal episode.  Other past medical history: Medications and allergies reviewed  Exam: Alert and oriented, normocephalic, normal respirations. Uncircumcised phallus with no lesions.  Christianson catheter change with sterile technique. 4 cc in balloon and Christianson irrigates with clear returns.  Assessment: Prostate cancer, urgency incontinence, history of severe bladder spasticity, urinary retention, near syncopal episode, right inguinal hernia-no tenderness today  Plan: See me monthly for catheter change. No PSAs in the future. Botox p.r.n.            ED Visit (08/28/18):  CT Head without contrast:   1. No evidence of acute intracranial hemorrhage, mass, or herniation.  2. Mild diffuse parenchymal volume loss and white matter changes  likely due to chronic  microvascular ischemic disease.  As per radiology.     CT Head with contrast:   1. Patent arteries in the neck without evidence of dissection.  Atherosclerotic disease in the left carotid artery resulting in  approximately 50% stenosis by NASCET criteria.  2. Patent proximal major intracranial arteries without vascular  cutoff. Multifocal stenoses of the intracranial arteries as described  above likely due to atherosclerotic disease. No aneurysm identified.  3. CT perfusion images demonstrate possible area of ischemia within  the right parietal lobe. This could also be due to artifact and  quantum mottle. This would be better evaluated with brain MRI if the  patient is able. As per radiology.     CTA Head Neck w Contrast:  1. Patent arteries in the neck without evidence of dissection.  Atherosclerotic disease in the left carotid artery resulting in  approximately 50% stenosis by NASCET criteria.  2. Patent proximal major intracranial arteries without vascular  cutoff. Multifocal stenoses of the intracranial arteries as described  above likely due to atherosclerotic disease. No aneurysm identified.  3. CT perfusion images demonstrate possible area of ischemia within  the right parietal lobe. This could also be due to artifact and  quantum mottle. This would be better evaluated with brain MRI if the  patient is able. As per radiology.     CT Abd/pelvis, with Contrast:  Since the previous CT, there has been decrease in size of  the right inguinal hernia which now only contains the appendix.  Although there is a small amount of fluid within this hernia, there is  no additional evidence of appendicitis. Previously seen nonobstructing  calculi of the left kidney are not well seen currently due to the  presence of contrast.  As per radiology.     Troponin I: <0.015  Glucose by meter: 85  CBC: WBC: 8.7, HGB: 14.9, PLT: 283  BMP: Chloride 110 (H), Calcium 8.4 (L), o/w WNL (Creatinine: 1.08)  INR: 1.09  PTT: 32  UA with  micro: Keton 5, pH 8.0 (H), Leukocyte Esterase Small, WBC 15 (H), RBC 5 (H), Bacteria Few, Mucous Present, o/w negative     ED Visit (08/25/18):  CT Abd/pelvis with contrast:  1. Large right inguinal hernia containing the appendix, cecum, and terminal ileum with associated fluid and stranding, ischemia could be present.  2. Multiple intrarenal collecting system stones on the left, no renal pelvis or ureteral stones.    CBC: WBC 13.7 (H) o/w WNL (HGB 16.3, )   CMP:  (H), GFR 58 (L) o/w WNL (Creatinine 1.17)   Lipase: 184  UA with micro: Albumin 100, Urobilinogen 4.0 (H), Ketones 5, LE small, WBC >182 (H), RBC 40 (H), WBC clumps present, Bacteria few, Yeast few, Mucous present, Hyaline casts 4 (H) o/w negative  Urine culture (aerobic bacteria): Pending      Allergies:  Ceftriaxone  Bactrim  Levaquin  Zithromax  Macrodantin     Medications:    Albuterol  Norvasc  Vitamin C  Zyrtec  Cholecalciferol  Eye vitamins  Colon cleanser  Preservision areds  Prilosec  Paxil    Past Medical History:    Esophageal reflux  Hyperlipemia  Hypertension  Malignant neoplasm of prostate  Asthma  Paroxysmal atrial fibrillation  Sinus node dysfunction  Sleep apnea  Cerebral artery occlusion with cerebral infarction  Restless legs syndrome  Anxiety  Mild cognitive impairment  Dementia    Past Surgical History:    Kidney stone surgery  TURP x2  Left rotator cuff repair  Bilateral cataract surgery  Tonsillectomy & adenoidectomy  Implant pacemaker  Penis surgery  Prostate surgery    Family History:    Heart disease  Cancer  Cerebrovascular disease     Social History:  Smoking status: Former smoker  Alcohol use: No   Marital Status:   [5]  Accompanied to the ED by daughters.      Review of Systems   Unable to perform ROS: Dementia   Constitutional: Positive for activity change, appetite change and fatigue. Negative for chills and diaphoresis.   HENT: Negative.    Eyes: Negative.    Respiratory: Negative.  Negative for cough.     Genitourinary: Negative.    Neurological: Positive for weakness.   Fell 2 times today.  Finished antibiotics for UTI Friday.  Has tolliver cath.    Physical Exam   Patient Vitals for the past 24 hrs:   BP Temp Temp src Heart Rate SpO2   09/16/18 2046 188/81 99.9  F (37.7  C) Oral 79 93 %      Physical Exam  GEN: patient smiling, joking but appears tired, family at the bedside  HEAD: atraumatic, normocephalic, no cephalohematoma  EYES: pupils reactive (3plus to 2plus), extraocular muscles intact, conjunctivae normal  ENT: TMs flat and white bilaterally, oropharynx normal with no erythema or exudate, mucus membranes dry  NECK: no cervical LAD, no posterior midline tenderness  RESPIRATORY: no tachypnea, breath sounds clear to auscultation (no rales, wheezes, rhonchi), chest wall nontender  CVS: normal S1/S2, I/VI systolic ej murmurs, no rubs/gallops  ABDOMEN: soft, nontender, no masses or organomegaly, no rebound, decreased bowel sounds, inguinal hernia  BACK: no spinal tenderness to palpation (thoracic or lumbar)  EXTREMITIES: intact pulses x 4, full range of motion at joints, no edema  MUSCULOSKELETAL: no deformities, right hip with no tenderness, no pain to int/ext rotation  SKIN: warm and dry, venous stasis, no acute infection  NEURO: GCS 15, cranial nerves intact.  Motor- moves all 4 extremities with 4/5.   4/5.  DF and PF 4/5. Sensation- intact.  Coordination- lifts legs off bed, too weak to ambulate.  Overall symmetrical exam  HEME: no bruising or petechiae/contusions  LYMPH: no lymphadenopathy  : normal male external genitalia, tolliver catheter in place, right inguinal hernia    Emergency Department Course   ECG (21:29:25)  Normal sinus rhythm. Cannot rule out Inferior infarct, age undetermined. Abnormal ECG.   Interpreted by Sia Soto MD.   Rate 71 bpm. DC interval 134. QRS duration 94. QT/QTc 394/428. P-R-T axes 49 32 -7.     Imaging:  Radiographic findings were communicated with the patient  who voiced understanding of the findings.  Chest XR, PA & LAT  Final result by Dar hPillips MD (09/16/18 23:04:54)     Impression:     IMPRESSION: Dual lead cardiac device is again seen. This appears to be  a different battery pack than on the prior study. Minimal linear  scarring or platelike atelectasis left lung base without change.  Possible interval development of a small amount of focal infiltrate in  the left upper lung zone. The remainder of the lungs are clear. The  heart appears larger than on the prior exam but this is likely related  to the AP sitting technique.      DAR PHILLIPS MD     Narrative:     CHEST TWO VIEWS    9/16/2018 10:48 PM     HISTORY: Weakness.    COMPARISON: 9/2/2011.         CT Head w/o Contrast  CT SCAN OF THE HEAD WITHOUT CONTRAST   9/16/2018 10:30 PM      HISTORY: Confusion and weakness.     TECHNIQUE:  Axial images of the head and coronal reformations without  IV contrast material. Radiation dose for this scan was reduced using  automated exposure control, adjustment of the mA and/or kV according  to patient size, or iterative reconstruction technique.     COMPARISON: 8/28/2018     FINDINGS:  There is generalized atrophy of the brain.  There is low  attenuation in the white matter of the cerebral hemispheres consistent  with sequelae of small vessel ischemic disease. There is no evidence  of intracranial hemorrhage, mass, acute infarct or anomaly.      The visualized portions of the sinuses and mastoids appear normal.  There is no evidence of trauma.         IMPRESSION:   1. No acute abnormality.  2.  Atrophy of the brain.  White matter changes consistent with  sequelae of small vessel ischemic disease. This is unchanged.        DANILO ALFONSO MD    Laboratory:  CBC: WBC 20.2 WNL (HGB 14.6, )     CMP: Glucose 100 (H), Creatinine 1.37 (H), GFR estimate 48 (L), Albumin 3.2 (L)     Troponin (2102): 0.016    Blood culture x2: Pending.    Collection Time Result Time  COLOR APPEARANCE URINE GLC URINEBILIN URINEKETON      09/16/18 22:08:00 09/16/18 22:30:01 Yellow Cloudy Negative Negative 20 (A)          Collection Time Result Time Specific Gravity Urine URINE BLOO pH Urine Protein Albumin Urine Urobilinogen mg/dL     09/16/18 22:08:00 09/16/18 22:30:01 1.021 Moderate (A) 5.0 100 (A) 0.0          Collection Time Result Time NITRITE Leukocyte Esterase Urine Source WBC/HPF RBC/HPF     09/16/18 22:08:00 09/16/18 22:30:01 Negative Moderate (A) Catheterized Urine 41 (H) 52 (H)          Collection Time Result Time BACTERIA Yeast Urine Squamous Epithelial /HPF Urine Mucous Urine     09/16/18 22:08:00 09/16/18 22:30:01 Few (A) Many (A) <1 Present (A)                     Urine culture: pending  CPK: 557    Interventions:  NS 1L IV Bolus   Levaquin 500 mg IV  Heplock  Cardiac/Sp02 monitoring  Oxygen by nasal cannula at 2L/min    Emergency Department Course:  Past medical records, nursing notes, and vitals reviewed.  2051: I performed an exam of the patient and obtained history, as documented above.   2102: IV inserted and blood drawn for basic laboratory. Troponin obtained. Results as noted above.     2129: ECG obtained, findings as noted above.   The patient was sent for a chest XR and CT head while in the emergency department, findings above.  I discussed the case with the hospitalist service who accepts the patient for further care, monitoring, and treatment.   Patient given 500 mg IV Levaquin while here in the emergency department.    /81  Temp 99.9  F (37.7  C) (Oral)  SpO2 93%  Awaiting on bed.    Dr Garcia will admit    Impression & Plan    Medical Decision Making:  Edison Boss is a 89 year old gentleman who was seen in the ER on 8/28 and was diagnosed with a UTI. The urine culture grew enterococcus. He's allergic to many antibiotics but his family states he can take IV Levaquin. He was put on doxycycline orally, and just finished that course of medication 2  days ago. He does have an indwelling Christianson catheter which has since turned cloudy. He had 2 episodes where his thinking was not straight today and he actually laid on the floor overnight, so his family was concerned with his increased in dehydration and weakness.     Clinically he does look dehydrated, but he's able to talk and converse. Head CT is with no acute changes. His urine is cloudy and has been sent for an additional culture. His white cell count is elevated and we will perform blood cultures in addition to urine cultures. His white cell count was a little elevated. He does have a left shift. Creatinine is high at 1.37 so he does have renal insufficiency (likely pre renal).  Troponin does not show any ischemia.     IV fluids ordered.  CT head with no acute mass or swelling/edema.  No skull fracture.   CXR with old atelectasis but no acute new pneumonia.    Diagnosis:    ICD-10-CM   1. Dehydration E86.0   2. Generalized muscle weakness M62.81   3. Renal insufficiency N28.9     Disposition:  Admitted to the hospital.    Onelia Samuel  9/16/2018   [facility] EMERGENCY DEPARTMENT  I, Onelia Samuel, am serving as a scribe at 8:51 PM on 9/16/2018 to document services personally performed by Sia Soto MD based on my observations and the provider's statements to me.       Sia Soto MD  09/17/18 [id number]

## 2018-09-17 NOTE — PROGRESS NOTES
Pt ambulated in halls with gait belt and walker. Denied any SOB, dizziness, lightheadedness or chest pain. Once we got back into the room he was breathing heavier than we he was walking. Pt mentioned pain on right hip/stomach 7/10 after ambulating.     Mercedes VIEYRAT

## 2018-09-17 NOTE — ED NOTES
[facility]  ED Nurse Handoff Report    Edison Boss is a 89 year old male   ED Chief complaint: Fall  . ED Diagnosis:   Final diagnoses:   Dehydration   Generalized muscle weakness   Renal insufficiency     Allergies:   Allergies   Allergen Reactions     Ceftriaxone Itching     Bactrim Hives     Levaquin Diarrhea     Oral only, can tolerate IV     Zithromax [Azithromycin]      Macrodantin [Nitrofuran Derivatives] Rash     Took recently with no problems       Code Status: Full Code  Activity level - Baseline/Home:  Independent. Activity Level - Current:   Stand with Assist. Lift room needed: No. Bariatric: No   Needed: No   Isolation: No. Infection: Not Applicable.     Vital Signs:   Vitals:    09/16/18 2145 09/16/18 2200 09/16/18 2230 09/16/18 2300   BP: 157/72 162/70 168/75    Temp:       TempSrc:       SpO2: 94% 92% 94% 93%       Cardiac Rhythm:  ,      Pain level: 0-10 Pain Scale: 4  Patient confused: No. Patient Falls Risk: Yes.   Elimination Status: Urethral catheter (tolliver) in place; orders for patient to discharge with tolliver    Patient Report - Initial Complaint: Fell 2 times today.  Finished antibiotics for UTI Friday Focused Assessment:  Musculoskeletal - Musculoskeletal WDL:  WDL except General Mobility: generalized weakness Left Joint Tenderness: hip Right Joint Tenderness: hip Genitourinary - Genitourinary WDL:  WDL except Voiding Characteristics: urethral catheter (bladder) Urine Characteristics: cloudy  Tests Performed: Lab, UA, CXR, CT HEAD. Abnormal Results:   Labs Ordered and Resulted from Time of ED Arrival Up to the Time of Departure from the ED   ROUTINE UA WITH MICROSCOPIC - Abnormal; Notable for the following:        Result Value    Ketones Urine 20 (*)     Blood Urine Moderate (*)     Protein Albumin Urine 100 (*)     Leukocyte Esterase Urine Moderate (*)     WBC Urine 41 (*)     RBC Urine 52 (*)     Bacteria Urine Few (*)     Yeast Urine Many (*)     Mucous  Urine Present (*)     All other components within normal limits   CBC WITH PLATELETS DIFFERENTIAL - Abnormal; Notable for the following:     WBC 20.2 (*)     RDW 16.7 (*)     Absolute Neutrophil 17.3 (*)     Absolute Lymphocytes 0.6 (*)     Absolute Monocytes 2.1 (*)     All other components within normal limits   COMPREHENSIVE METABOLIC PANEL - Abnormal; Notable for the following:     Glucose 100 (*)     Creatinine 1.37 (*)     GFR Estimate 48 (*)     GFR Estimate If Black 59 (*)     Albumin 3.2 (*)     All other components within normal limits   TROPONIN I   PERIPHERAL IV CATHETER   URINE CULTURE AEROBIC BACTERIAL   BLOOD CULTURE   BLOOD CULTURE   BLOOD CULTURE     Chest XR,  PA & LAT   Final Result   IMPRESSION: Dual lead cardiac device is again seen. This appears to be   a different battery pack than on the prior study. Minimal linear   scarring or platelike atelectasis left lung base without change.   Possible interval development of a small amount of focal infiltrate in   the left upper lung zone. The remainder of the lungs are clear. The   heart appears larger than on the prior exam but this is likely related   to the AP sitting technique.        MENDOZA AGUIRRE MD      CT Head w/o Contrast   Final Result   IMPRESSION:    1. No acute abnormality.   2.  Atrophy of the brain.  White matter changes consistent with   sequelae of small vessel ischemic disease. This is unchanged.         DANILO ALFONSO MD         Treatments provided: See MAR  Family Comments: Daughters present  OBS brochure/video discussed/provided to patient:  Yes  ED Medications:   Medications   levofloxacin (LEVAQUIN) infusion 500 mg (500 mg Intravenous New Bag 9/16/18 2305)   0.9% sodium chloride BOLUS (not administered)   0.9% sodium chloride BOLUS (500 mLs Intravenous New Bag 9/16/18 2230)     Drips infusing:  Yes  For the majority of the shift, the patient's behavior Green. Interventions performed were Green.     Severe Sepsis OR Septic  Shock Diagnosis Present: No      ED Nurse Name/Phone Number: Jeremy CHAVEZ Yashira,   11:11 PM      RECEIVING UNIT ED HANDOFF REVIEW    Above ED Nurse Handoff Report was reviewed: Yes  Reviewed by: Natasha Jara on September 16, 2018 at 11:54 PM

## 2018-09-17 NOTE — ED NOTES
Bed: ED01  Expected date: 9/16/18  Expected time:   Means of arrival:   Comments:  BVM-1 89 M Fall

## 2018-09-17 NOTE — H&P
[facility]  Hospitalist H&P    Name: Edison Boss      MRN: [id number]  YOB: 1924    Age: 89 year old  Date of admission: 9/16/2018  Primary care provider: Porfirio Terrell            Assessment and Plan:   Edison Boss is a 89 year old male with a history of dementia, stroke, sleep apnea, paroxysmal atrial fibrillation, asthma, prostate cancer, hypertension, hyperlipidemia, GERD, and chronic pain syndrome who presents with weakness after 2 falls and possible pneumonia.    1.  Possible pneumonia.  Continue IV Levaquin.  Check pro-calcitonin level.    2.  Weakness.  Likely multifactorial.  Does appear to be dehydrated.  Also has possible pneumonia.  Levaquin as above to treat pneumonia.  Continuous IV fluids.  Check CK level.  Physical and Occupational Therapy consults.    3.  Acute kidney injury.  Likely due to dehydration.  Start continuous IV fluid.  Avoid nephrotoxins as able.    4.  GERD.  Restart omeprazole.    5.  Acute on chronic pain.  After fall.  Having pain in his hips and neck.  Check CT scan of the cervical spine and pelvic x-ray for further evaluation.  Pain medications as needed.  Try to limit opiates as able as these may be contributing to weakness.  Pain team consult.    6.  Asthma.  No acute exacerbation.  Albuterol as needed.    7.  Depression.  Restart paroxetine.    8.  Hypertension.  Restart amlodipine.    Code status: Full code.  Admit to inpatient.  Prophylaxis: Pneumatic compression devices.              Chief Complaint:   Weakness and falls.         History of Present Illness:   Edison Boss is a 89 year old male who presents with weakness.  He has had 2 falls in the past 2 days.  After his first fall, he laid on the floor for several hours overnight.  Second fall happened earlier today.  Was brought to emergency room for further evaluation.  He does not feel like he hit his head during either fall.  However, he has a poor  medical historian is unable to provide significant details about either fall.  He does feel somewhat short of breath.  Has an occasional cough.  Cough is nonproductive.  He has felt like he has had fevers and chills at times.  Has not had any dysuria.  Does have a chronic indwelling catheter.  Does have chronic pain throughout his body.  He does feel like he is having more hip pain than usual after his falls.  Also having neck pain which is unusual for him.  No other complaints.            Past Medical History:     Past Medical History:   Diagnosis Date     Calculus of kidney     in dwelling [place]     Chronic infection     UTI     Chronic pain     lower pain, perineum     Esophageal reflux      Hyperlipidaemia      Hypertension      Malignant neoplasm of prostate (H) 8/14/2002    Brachytherapy, then external radiation. chronic indwelling catheter     Mild persistent asthma     with URI     Paroxysmal a-fib (H)     paroxysmal     Sinus node dysfunction (H)     sp DDD PM     Sleep apnea     ?   snores     Unspecified cerebral artery occlusion with cerebral infarction              Past Surgical History:     Past Surgical History:   Procedure Laterality Date     C NONSPECIFIC PROCEDURE  1980's    Kidney stone surgery     C NONSPECIFIC PROCEDURE  1985, 5/2005    TURP x 2     C NONSPECIFIC PROCEDURE  1/2002    Brachytherapy     C NONSPECIFIC PROCEDURE  ~1999    Left rotator cuff repair     C NONSPECIFIC PROCEDURE      Bilateral cataract surgery     C NONSPECIFIC PROCEDURE      EGD/dilation twice     CYSTOSCOPY       CYSTOSCOPY, INJECT COLLAGEN, COMBINED  6/6/2012    Procedure:COMBINED CYSTOSCOPY, INJECT BULKING AGENT; VIDEO CYSTOSCOPY WITH BOTOX INJECTIONS  ; Surgeon:BRIAN ADAN; Location: OR     CYSTOSCOPY, INJECT COLLAGEN, COMBINED  3/22/2013    Procedure: COMBINED CYSTOSCOPY, INJECT BULKING AGENT;  VIDEO CYSTOSCOPY, BOTOX INJECTION;  Surgeon: Brian Adan MD;  Location:  OR     CYSTOSCOPY, INJECT COLLAGEN,  COMBINED  8/8/2014    Procedure: COMBINED CYSTOSCOPY, INJECT BULKING AGENT;  Surgeon: Michael Lilly MD;  Location: [facility] OR     CYSTOSCOPY, INJECT COLLAGEN, COMBINED N/A 8/12/2015    Procedure: COMBINED CYSTOSCOPY, INJECT BULKING AGENT;  Surgeon: Michael Lilly MD;  Location: [facility] OR     CYSTOSCOPY, INJECT COLLAGEN, COMBINED N/A 12/8/2016    Procedure: COMBINED CYSTOSCOPY, INJECT BULKING AGENT;  Surgeon: Michael Lilly MD;  Location: [facility] OR     HC REMOVE TONSILS/ADENOIDS,<11 Y/O  ~1928    T & A <12y.o.     IMPLANT PACEMAKER       PENIS SURGERY       PROSTATE SURGERY               Social History:     Social History   Substance Use Topics     Smoking status: Former Smoker     Packs/day: 1.00     Years: 40.00     Smokeless tobacco: Never Used      Comment: QUIT 1978     Alcohol use No             Family History:   Brother with lung cancer.  Brother with coronary disease.  Brother with unknown type of cancer.         Allergies:     Allergies   Allergen Reactions     Ceftriaxone Itching     Bactrim Hives     Levaquin Diarrhea     Oral only, can tolerate IV     Zithromax [Azithromycin]      Macrodantin [Nitrofuran Derivatives] Rash     Took recently with no problems             Medications:     Prior to Admission medications    Medication Sig Last Dose Taking? Auth Provider   albuterol (PROVENTIL HFA: VENTOLIN HFA) 108 (90 BASE) MCG/ACT inhaler Inhale 2 puffs into the lungs every 6 hours as needed for shortness of breath / dyspnea.  Yes Porfirio Terrell MD   amLODIPine (NORVASC) 5 MG tablet TAKE 1 TABLET(5 MG) BY MOUTH DAILY 9/16/2018 at am Yes Porfirio Terrell MD   Ascorbic Acid (VITAMIN C PO) Take 1,000 mg by mouth daily  9/16/2018 at am Yes Reported, Patient   cetirizine (ZYRTEC) 10 MG tablet Take 10 mg by mouth daily as needed  Yes Reported, Patient   cholecalciferol 5000 UNITS CAPS Take 1 capsule by mouth daily. Takes 5000 units in the summer and 76123 units in the winter 9/16/2018 at am Yes Reported, Patient    CIPROFLOXACIN PO Take 500 mg by mouth 1 tablet by mouth once as needed. Take one tablet after each procedere  Yes Reported, Patient   clobetasol (TEMOVATE) 0.05 % ointment daily as needed   Yes Lewis Nuñez   Colloidal Oatmeal (AVEENO ECZEMA THERAPY) 1 % CREA Externally apply topically daily as needed   Yes Reported, Patient   D-MANNOSE POWD Take 100 mg by mouth daily 500 mg Pt takes 2 pills daily 9/16/2018 at am Yes Porfirio Terrell MD   lidocaine (LMX4) 4 % CREA 4% topical cream Apply topically daily as needed  Yes Reported, Patient   Misc Natural Products (COLON CLEANSER PO) Take 1 capsule by mouth daily Advanced Colon Care II  Yes Reported, Patient   Multiple Vitamins-Minerals (PRESERVISION AREDS) CAPS Take 1 capsule by mouth 2 times daily 9/16/2018 at am Yes Porfirio Terrell MD   omeprazole (PRILOSEC) 20 MG CR capsule Take 1 capsule (20 mg) by mouth daily 9/16/2018 at am Yes Darline Maravilla PA-C   oxyCODONE-acetaminophen (PERCOCET) 5-325 MG per tablet Take 1 tablet by mouth every 4 hours as needed for pain for pain.  Yes Porfirio Terrell MD   PARoxetine (PAXIL) 30 MG tablet Take 15 mg by mouth At Bedtime Takes 1/2 tab of 30mg=15mg  Yes Unknown, Entered By History             Review of Systems:   A Comprehensive greater than 10 system review of systems was carried out.  Pertinent positives and negatives are noted above.  Otherwise negative for contributory information.           Physical Exam:   Blood pressure 168/75, temperature 99.9  F (37.7  C), temperature source Oral, SpO2 93 %.  Wt Readings from Last 1 Encounters:   09/12/18 76.1 kg (167 lb 11.2 oz)     Exam:  GENERAL: No apparent distress. Awake, alert, and oriented to person and place.  Not oriented to time..  HEENT: Normocephalic, atraumatic. Extraocular movements intact.  CARDIOVASCULAR: Regular rate and rhythm without murmurs or rubs. No S3.  PULMONARY: Clear to auscultation bilaterally.  ABDOMINAL: Soft, non-tender, non-distended.  Bowel sounds normoactive.   EXTREMITIES: No cyanosis or clubbing. No appreciable edema.  NEUROLOGICAL: CN 2-12 grossly intact, no focal neurological deficits.  DERMATOLOGICAL: No rash, ulcer, bruising, nor jaundice.          Data:   EKG:  Personally reviewed.  Sinus rhythm.  Q waves present in a few leads.  However, no obvious acute ischemia.    Laboratory:    Recent Labs  Lab 09/16/18  2102   WBC 20.2*   HGB 14.6   HCT 44.2   MCV 91          Recent Labs  Lab 09/16/18  2102      POTASSIUM 4.3   CHLORIDE 105   CO2 28   ANIONGAP 5   *   BUN 29   CR 1.37*   GFRESTIMATED 48*   GFRESTBLACK 59*   ARYAN 9.1     No results for input(s): CULT in the last 168 hours.    Imaging:  Recent Results (from the past 24 hour(s))   CT Head w/o Contrast    Narrative    CT SCAN OF THE HEAD WITHOUT CONTRAST   9/16/2018 10:30 PM     HISTORY: Confusion and weakness.    TECHNIQUE:  Axial images of the head and coronal reformations without  IV contrast material. Radiation dose for this scan was reduced using  automated exposure control, adjustment of the mA and/or kV according  to patient size, or iterative reconstruction technique.    COMPARISON: 8/28/2018    FINDINGS:  There is generalized atrophy of the brain.  There is low  attenuation in the white matter of the cerebral hemispheres consistent  with sequelae of small vessel ischemic disease. There is no evidence  of intracranial hemorrhage, mass, acute infarct or anomaly.     The visualized portions of the sinuses and mastoids appear normal.  There is no evidence of trauma.      Impression    IMPRESSION:   1. No acute abnormality.  2.  Atrophy of the brain.  White matter changes consistent with  sequelae of small vessel ischemic disease. This is unchanged.      DANILO ALFONSO MD   Chest XR,  PA & LAT    Narrative    CHEST TWO VIEWS    9/16/2018 10:48 PM     HISTORY: Weakness.    COMPARISON: 9/2/2011.      Impression    IMPRESSION: Dual lead cardiac device is again seen.  This appears to be  a different battery pack than on the prior study. Minimal linear  scarring or platelike atelectasis left lung base without change.  Possible interval development of a small amount of focal infiltrate in  the left upper lung zone. The remainder of the lungs are clear. The  heart appears larger than on the prior exam but this is likely related  to the AP sitting technique.      MENDOZA AGUIRRE MD

## 2018-09-18 NOTE — PROGRESS NOTES
Discharge Planner   Discharge Plans in progress: PT recommending TCU vs 24/7 assist. Per AM care rounds, pt potential for discharge tomorrow 9/19, if he discharges on or after 9/19, he would have a 3 day qualifying stay for TCU coverage w/ his insurance. Met w/ pt and family at bedside and reviewed this information, they express understanding. Pt/family prefer for pt to return to apartment and family is planning on staying with pt 24/7 for the next week or so and are considering adding half-way services for pt if needed after that. Reviewed home care options w/ pt/family, they elect to use [facility]. Referral sent for [facility] RN/PT and possible SLP?, AVS updated. Home care can call son David at [phone] to schedule visits as he will be staying w/ pt upon discharge.   Barriers to discharge plan: IV ABX and IVF.   Follow up plan: Not needed.        Entered by: Mame Leslie 09/18/2018 11:43 AM       CM will continue to follow patient for any additional discharge needs.     Mame Leslie RN BSN CTS   [phone]  Care Transitions Team  [facility]

## 2018-09-18 NOTE — PROGRESS NOTES
[facility]  Hospitalist Progress Note  Lacey Carlton PA-C 09/18/2018    Reason for Stay (Diagnosis): weakness, falls and mild confusion         Assessment and Plan:      Summary of Stay:   Edison Boss is a 89 year old male with a history of dementia, stroke, sleep apnea, paroxysmal atrial fibrillation, asthma, prostate cancer, hypertension, hyperlipidemia, GERD, and chronic pain syndrome who presents with weakness after 2 falls and possible pneumonia.       Problem List:     1. Possible Left upper lobe PNA-Not sure if he truly has PNA, as he has minimal respiratory sxs but he is somewhat a difficult historian, he does have elevated WBC, borderline pro calcitonin. Nurse witnessed some concerns for aspiration with clear liquid, appreciate Speech eval. Which did not show overt aspiration and recommended continued regular diet with thin liquids but having patient sit fully upright.  Will continue to finish antibiotic treatment for pneumonia   But would give as short of the course as possible.   2nd dose today, getting 750mg IV Q48 hrs due to renal clearance.     2.  Weakness.  Likely multifactorial from recent UTI, deconditioning, and dehydration and poss PNA.  Looks better today, getting up and walked in hallways. Pls ambulate TID in hallways and up for all meals.     3.  Acute kidney injury. Due to dehydration. Cr better today, will discontinue IVF and push oral intake.       4.  GERD.  Restart omeprazole.      5.  Acute on chronic pain.  Having pain in his hips and neck after fall. CT scan of the cervical spine and pelvic x-ray negative for fracture. Declining narcotics and pain meds. Will observe for now and use scheduled tyelnol.       6.  Asthma.  No acute exacerbation.  Albuterol as needed.      7.  Depression.  Restart paroxetine.      8.  Hypertension.  Restart amlodipine.    9.  Chronic indwelling cath: had recent UTI few weeks ago and finished Augmentin. Urine cx this admission  shows 50-100K candida. Will start Diflucan for 2 weeks (shortest duration for treatment, d/w Pharmacist)    10. Continued Leukocytosis: unclear, suspect related to the PNA, ?urine infection perhaps but blood cx are negative and is afebrile. Will follow and get a diff for tomorrow's CBC but no obvious source of infection found.          Code status: Full code.  Prophylaxis: Pneumatic compression devices.  Discharge Dispo: home with family assistance. Will need orders for home services PT/RN   Estimated Disch Date / # of Days until Disch:  likely tomrorow        Interval History (Subjective):      Looks better today, more alert.   Getting out of bed to ambulate when I saw him.   C/o chronic pain but not too back.   No CP, SOB, no n/v. Eating ok.   +BM today.                   Physical Exam:      Last Vital Signs:  /69 (BP Location: Left arm)  Pulse 55  Temp 98.8  F (37.1  C) (Oral)  Resp 16  Wt 80.1 kg (176 lb 9.6 oz)  SpO2 91%  BMI 25.34 kg/m2      Intake/Output Summary (Last 24 hours) at 09/18/18 1556  Last data filed at 09/18/18 0705   Gross per 24 hour   Intake             2553 ml   Output              400 ml   Net             2153 ml       Constitutional: Awake, alert, cooperative, no apparent distress   Respiratory: Clear to auscultation bilaterally, no crackles or wheezing   Cardiovascular: Regular rate and rhythm, normal S1 and S2, and no murmur noted   Abdomen: Normal bowel sounds, soft, non-distended, non-tender   Skin: No rashes, no cyanosis, dry to touch   Neuro: Alert and oriented x3, no weakness, numbness, memory loss   Extremities: No edema, normal range of motion   Other(s):        All other systems: Negative          Medications:      All current medications were reviewed with changes reflected in problem list.         Data:      All new lab and imaging data was reviewed.   Labs:       Lab Results   Component Value Date     09/18/2018     09/17/2018     09/16/2018    Lab  Results   Component Value Date    CHLORIDE 110 09/18/2018    CHLORIDE 108 09/17/2018    CHLORIDE 105 09/16/2018    Lab Results   Component Value Date    BUN 25 09/18/2018    BUN 26 09/17/2018    BUN 29 09/16/2018      Lab Results   Component Value Date    POTASSIUM 3.9 09/18/2018    POTASSIUM 3.8 09/17/2018    POTASSIUM 4.3 09/16/2018    Lab Results   Component Value Date    CO2 23 09/18/2018    CO2 23 09/17/2018    CO2 28 09/16/2018    Lab Results   Component Value Date    CR 1.18 09/18/2018    CR 1.19 09/17/2018    CR 1.37 09/16/2018          Recent Labs  Lab 09/18/18  0652 09/17/18  0636 09/16/18  2102   WBC 17.7* 17.8* 20.2*   HGB 13.0* 12.9* 14.6   HCT 39.8* 38.8* 44.2   MCV 92 92 91    230 268       Recent Labs  Lab 09/16/18  2102   AST 44   ALT 27   ALKPHOS 96   BILITOTAL 1.0       Recent Labs  Lab 09/16/18  2102   TROPI 0.016     No results for input(s): TSH in the last 168 hours.    Recent Labs  Lab 09/16/18  2208   COLOR Yellow   APPEARANCE Cloudy   URINEGLC Negative   URINEBILI Negative   URINEKETONE 20*   SG 1.021   UBLD Moderate*   URINEPH 5.0   PROTEIN 100*   NITRITE Negative   LEUKEST Moderate*   RBCU 52*   WBCU 41*      Imaging:   Results for orders placed or performed during the hospital encounter of 09/16/18   CT Head w/o Contrast    Narrative    CT SCAN OF THE HEAD WITHOUT CONTRAST   9/16/2018 10:30 PM     HISTORY: Confusion and weakness.    TECHNIQUE:  Axial images of the head and coronal reformations without  IV contrast material. Radiation dose for this scan was reduced using  automated exposure control, adjustment of the mA and/or kV according  to patient size, or iterative reconstruction technique.    COMPARISON: 8/28/2018    FINDINGS:  There is generalized atrophy of the brain.  There is low  attenuation in the white matter of the cerebral hemispheres consistent  with sequelae of small vessel ischemic disease. There is no evidence  of intracranial hemorrhage, mass, acute infarct or  anomaly.     The visualized portions of the sinuses and mastoids appear normal.  There is no evidence of trauma.      Impression    IMPRESSION:   1. No acute abnormality.  2.  Atrophy of the brain.  White matter changes consistent with  sequelae of small vessel ischemic disease. This is unchanged.      DANILO ALFONSO MD   Chest XR,  PA & LAT    Narrative    CHEST TWO VIEWS    9/16/2018 10:48 PM     HISTORY: Weakness.    COMPARISON: 9/2/2011.      Impression    IMPRESSION: Dual lead cardiac device is again seen. This appears to be  a different battery pack than on the prior study. Minimal linear  scarring or platelike atelectasis left lung base without change.  Possible interval development of a small amount of focal infiltrate in  the left upper lung zone. The remainder of the lungs are clear. The  heart appears larger than on the prior exam but this is likely related  to the AP sitting technique.      MENDOZA AGUIRRE MD   XR Pelvis and Hip Bilateral 2 Views    Narrative    XR PELVIS AND HIP BILATERAL 2 VIEWS 9/17/2018 2:02 AM    COMPARISON: None.    HISTORY: Hip pain after fall.      Impression    IMPRESSION: Prostate radiation seeds are demonstrated.  Mild-to-moderate degenerative changes in both hips are seen, but no  fractures are demonstrated.    MONIQUE SKELTON MD   CT Cervical Spine w/o Contrast    Narrative    CT OF THE CERVICAL SPINE WITHOUT CONTRAST September 17, 2018 1:53 AM     HISTORY: Fall, neck pain.      TECHNIQUE: Axial images of the cervical spine were acquired without  intravenous contrast. Multiplanar reformations were created. Radiation  dose for this scan was reduced using automated exposure control,  adjustment of the mA and/or kV according to patient size, or iterative  reconstruction technique.      COMPARISON: None.    FINDINGS: There is normal alignment of the cervical vertebrae;  however, there is reversal of normal cervical lordosis centered at the  C5-C6 level. Vertebral body heights of  the cervical spine are normal.  Craniocervical alignment is normal. There are no fractures of the  cervical spine. There is degenerative ankylosis of the C5 and C6  vertebral bodies. There is facet arthropathy bilaterally at the C2-C3,  C3-C4 and C4-C5 levels. There is no significant degenerative spinal  canal narrowing of the cervical spine. There is no prevertebral soft  tissue swelling.      Impression    IMPRESSION: Diffuse degenerative changes of the cervical spine. No  evidence for fracture or traumatic malalignment.      ISSAC SINGLETON MD

## 2018-09-18 NOTE — PLAN OF CARE
Problem: Patient Care Overview  Goal: Plan of Care/Patient Progress Review  PRIMARY DIAGNOSIS: PNEUMONIA  OUTPATIENT/OBSERVATION GOALS TO BE MET BEFORE DISCHARGE:  1. Dyspnea improved and O2 sats >88% on RA or back to baseline O2 levels: Yes   SpO2: 90 %, O2 Device: None (Room air)    2. Tolerating oral abx or appropriate plans made outpatient infusion: No, IV levaquin    3. Vitals signs normal or return to baseline: Yes    4. Short term supplemental O2 needed with activity at home: NA    5. Tolerate oral intake to maintain hydration: Yes    6. Return to near baseline physical activity: Ax1-2 with gait belt and walker. Did not transfer or ambulate this shift. Need to obtain ortho BPs.    Discharge Planner Nurse   Safe discharge environment identified: Yes - family to provide 24/7 cares at pt's home  Barriers to discharge: No       Entered by: Natasha Jara 09/18/2018 5:04 AM     Please review provider order for any additional goals.   Nurse to notify provider when observation goals have been met and patient is ready for discharge.    Pt A&O x 4 with moderate forgetfulness. Christianson in place with clear and astrid-colored urine. LS clear on auscultation, no SOB noted or reported, O2sat WDL. VSS, stated pain in abdomen, refused interventions. Tele: 100% atrial paced with frequent PVCs, HR 60s. Pt did not get out of bed this shift, orthostatic BP needs to be obtained when pt rises from bed. Bowel sounds present in all 4Qs.  Plan: IVF infusing 75/hr. Assess orthostatic BP. IV levaquin. PT and SW following, lives alone at senior living apartment, family will provide 24/7 cares rather than TCU that was recommended by PT.

## 2018-09-18 NOTE — PLAN OF CARE
Problem: Patient Care Overview  Goal: Plan of Care/Patient Progress Review  Discharge Planner SLP   Patient plan for discharge: Daughter reported home with 24 hour assist  Current status: SLP: Pt presents mild dysphagia on clinical swallow evaluation. History of dysphagia with VFSS completed in 2010 recommending regular diet/nectar thick liquids and chin tuck with aspiration of thin liquids. Pt alert and upright in bed, however, did not recall previous SLP services and denied dysphagia symptoms. Family arrived later and reported intermittent coughing and reflux symptoms with no difficulty swallowing since  bouts of pneumonia  in 2010. Oral motor exam WFL with adequate strength and ROM. Adequate oral phase and no overt s/sx of aspiration with thin liquids and puree solids. Belching noted. Prolonged mastication with regular solids with oral residue that clear with liquid rinse and continued s/sx of esophageal dysfunction. Pt/family verbalized agreement with recommended: continue regular diet and thin liquids with pt sitting fully upright for intake and 30-60 minutes after, encourage chin tuck with thin liquids and downgrade to nectar thick liquids if overt s/sx of aspiration with thins. SLP will follow 1-2 sessions for meal follow up and further strategy training/education if needed.   Barriers to return to prior living situation: none from SLP  Recommendations for discharge: TCU per PT  Rationale for recommendations: Pt will likely meet SLP goals during hosptialization       Entered by: Frida Huertas 09/18/2018 10:29 AM

## 2018-09-18 NOTE — PLAN OF CARE
Problem: Patient Care Overview  Goal: Plan of Care/Patient Progress Review  Discharge Planner PT   Patient plan for discharge: Not stated  Current status: Pt's son present throughotu session for family training. Pt's son observed level of assist, pt requires min - CGA for all mobility, progressing well. CGA bed mobility.  STS with FWW and CGA from low surface. Pt assisted into BR with CGA and cues for technique. Total A x 1 for doffing brief. Pt ambulated in hallway ~ 100' with CGA and cues for improved mechanics. Pt engaged in repeated STS and LE strengthening exercises   Barriers to return to prior living situation: A x 1 for mobility  Recommendations for discharge: TCu however family wanting to take pt home and provide 24/7 care as pt requires this at this time, pt is fall risk. Home with 24/7 assist and HHPT to progress strength, balance, gait, functional mobility as pt below baseline. Pt has had 2 falls at home  Rationale for recommendations: See above        Entered by: Yeni Carreno 09/18/2018 4:44 PM

## 2018-09-18 NOTE — PLAN OF CARE
Problem: Patient Care Overview  Goal: Plan of Care/Patient Progress Review  Outcome: No Change  /54 (BP Location: Left arm)  Pulse 58  Temp 98.8  F (37.1  C) (Oral)  Resp 16  Wt 80.1 kg (176 lb 9.6 oz)  SpO2 92%  BMI 25.34 kg/m2    Neuro: WDL   Cardiac: WDL  Lungs: WDL, diminished  GI: WDL  : ex., catheter  Pain: Denies  IV: Saline Locked   Meds-started Diflucan   Diet: Reg  Activity: Assist of two   Plan: Patient is stable and on room air.  Assist of  two, on a regular diet. Patient will ambulate multiple times a day,  Will continue to monitor and provide cares.

## 2018-09-18 NOTE — DISCHARGE INSTRUCTIONS
Your home care referral was sent to [facility]   If you haven't heard from them within the next 24-48 hours,  Please call them at [phone]    Your hospital follow up appointment has been scheduled for you with Dr. Terrell at [facility] for Wednesday 9/26 at 2:20pm. Please bring your hospital discharge instructions and any new medications with you to your appointment. Please call the clinic at [phone] if you need to reschedule.

## 2018-09-18 NOTE — PROGRESS NOTES
"   09/18/18 1022   General Information   Onset Date 09/16/18   Start of Care Date 09/18/18   Referring Physician Red Zamora MD   Patient Profile Review/OT: Additional Occupational Profile Info See Profile for full history and prior level of function   Patient/Family Goals Statement did not state   Swallowing Evaluation Bedside swallow evaluation   Behaviorial Observations Alert;Confused   Mode of current nutrition Oral diet   Type of oral diet Regular;Thin liquid   Respiratory Status O2 Supply   Type of O2 supply Nasal cannula   Comments Per MD, \"Edison Boss is a 89 year old male with a history of dementia, stroke, sleep apnea, paroxysmal atrial fibrillation, asthma, prostate cancer, hypertension, hyperlipidemia, GERD, and chronic pain syndrome who presents with weakness after 2 falls and possible pneumonia.\" Per Radiology in 2010, \"Patient was given thin through solid barium mixtures. Initially, the patient had flash penetration taking thin barium mixtures with a spoon and cup. This was improved with chin tuck. At the end of the procedure the patient aspirated thin barium mixture. Patient was able swallow all of the remaining barium mixtures normally.\"   Clinical Swallow Evaluation   Oral Musculature generally intact   Structural Abnormalities none present   Dentition present and adequate   Mucosal Quality good;dry   Mandibular Strength and Mobility intact   Oral Labial Strength and Mobility WFL   Lingual Strength and Mobility WFL   Velar Elevation intact   Buccal Strength and Mobility intact   Laryngeal Function Throat clear;Swallow;Voicing initiated   Oral Musculature Comments WFL   Additional Documentation Yes   Clinical Swallow Eval: Thin Liquid Texture Trial   Mode of Presentation, Thin Liquids cup;straw;self-fed   Oral Phase of Swallow WFL   Pharyngeal Phase of Swallow intact   Diagnostic Statement no overt s/sx of aspiration; belching   Clinical Swallow Eval: Puree Solid Texture Trial "   Mode of Presentation, Puree spoon   Oral Phase, Puree WFL   Pharyngeal Phase, Puree intact   Diagnostic Statement no difficulty   Clinical Swallow Eval: Solid Food Texture Trial   Mode of Presentation, Solid self-fed   Oral Phase, Solid Poor AP movement;Residue in oral cavity   Oral Residue, Solid mid posterior tongue   Pharyngeal Phase, Solid impaired;throat clearing   Diagnostic Statement throat clearing/belching   Swallow Compensations   Swallow Compensations Alternate viscosity of consistencies;Chin tuck;Pacing;Reduce amounts;Multiple swallow   Esophageal Phase of Swallow   Patient reports or presents with symptoms of esophageal dysphagia Yes   Esophageal comments pt taking PPI   General Therapy Interventions   Planned Therapy Interventions Dysphagia Treatment   Dysphagia treatment Instruction of safe swallow strategies   Swallow Eval: Clinical Impressions   Skilled Criteria for Therapy Intervention Skilled criteria met.  Treatment indicated.   Functional Assessment Scale (FAS) 5   Treatment Diagnosis Mild dysphagia   Diet texture recommendations Regular diet;Thin liquids   Recommended Feeding/Eating Techniques alternate between small bites and sips of food/liquid;check mouth frequently for oral residue/pocketing;hard swallow w/ each bite or sip;maintain upright posture during/after eating for 30 mins;small sips/bites;tuck chin during every swallow   Demonstrates Need for Referral to Another Service physical therapy   Therapy Frequency (1-2 sessions)   Predicted Duration of Therapy Intervention (days/wks) 1 week   Anticipated Discharge Disposition extended care facility   Risks and Benefits of Treatment have been explained. Yes   Patient, family and/or staff in agreement with Plan of Care Yes   Clinical Impression Comments SLP: Pt presents mild dysphagia on clinical swallow evaluation. History of dysphagia with VFSS completed in 2010 recommending regular diet/nectar thick liquids and chin tuck with aspiration  "of thin liquids. Pt alert and upright in bed, however, did not recall previous SLP services and denied dysphagia symptoms. Family arrived later and reported intermittent coughing and reflux symptoms with no difficulty swallowing since \"bouts of pneumonia\" in 2010. Oral motor exam WFL with adequate strength and ROM. Adequate oral phase and no overt s/sx of aspiration with thin liquids and puree solids. Belching noted. Prolonged mastication with regular solids with oral residue that clear with liquid rinse and continued s/sx of esophageal dysfunction. Pt/family verbalized agreement with recommended: continue regular diet and thin liquids with pt sitting fully upright for intake and 30-60 minutes after, encourage chin tuck with thin liquids and downgrade to nectar thick liquids if overt s/sx of aspiration with thins. SLP will follow 1-2 sessions for meal follow up and further strategy training/education if needed.    Total Evaluation Time   Total Evaluation Time (Minutes) 15     "

## 2018-09-18 NOTE — PROGRESS NOTES
"Goal: Plan of Care/Patient Progress Review  Outcome: Improving  VS: Temp: 98.8  F (37.1  C) Temp src: Oral BP: 152/67   Heart Rate: 67 Resp: 12 SpO2: 92 % O2 Device: None (Room air)    Pertinent labs: Creat 1.19, WBC 17.8, , UC/blood cultures pending  Pain: Moderate chronic generalized body pain; Tylenol given.  Neuro: Alert and oriented x4  Cardiac: WDL; A-paced with rate of 60's on tele  GI: WDL  : Chronic tolliver in place, cloudy dark urine draining.  Resp: WDL, lung sounds clear; no cough noted/reported.  Skin: WDL  Mobility: Up with Ax1-2, walker & gait belt. Generalized weakness. Reports feeling \"tired\" and dizzy at times. Noted to be slightly unsteady on feet at times.  Interventions this shift: Tylenol for pain, tolliver in place, ambulated in hallway. PT evaluation complete.   Plan: Frequent ambulation/activity encouraged, pain control, continue Levaquin for possible pneumonia, IVF infusing at 75 mL/hour. Orthostatic BP needed. PT and SW following. Lives independently in senior apartment; family support present.            "

## 2018-09-18 NOTE — PLAN OF CARE
Problem: Pain, Chronic (Adult)  Goal: Identify Related Risk Factors and Signs and Symptoms  Related risk factors and signs and symptoms are identified upon initiation of Human [employer] Clinical Practice Guideline (CPG).   Outcome: Improving  Patient is alert and oriented. Patient denies any pain/lightheadedness/dizziness. Patient has suprapubic catheter in place and is on diflucan for UTI. Patient is up with an assist of 1 and was seen by PT yesterday who recommended tcu or 24 hour care and family said between his 8 children they will provide 24 hour care. Patient is on tele running SR with frequent pvcs and Apaced intermittent per tele tech. Terra has son at bedside and patient walked around one lap in rizo before sitting up in chair for dinner.  WBC was 17.7 today. Speech saw patient earlier who said to encourage chin tuck when swallowing fluids. Pos discharge home tomorrow if patient feeling better. Will continue to monitor.

## 2018-09-19 NOTE — PROGRESS NOTES
Transition Communication Hand-off for Care Transitions to Next Level of Care Provider    Name: Edison Boss  : 10/19/1924  MRN #: [id number]  Primary Care Provider: Porfirio Terrell MD  Primary Care MD Name: Nidhi  Primary Clinic: [address] [facility] [address]  [facility] [address]  Primary Care Clinic Name: [facility]  Reason for Hospitalization:  Dehydration [E86.0]  Renal insufficiency [N28.9]  Generalized muscle weakness [M62.81]  Admit Date/Time: 2018  8:40 PM  Discharge Date: 18  Payor Source: Payor: MEDICARE / Plan: MEDICARE / Product Type: Medicare /     Readmission Assessment Measure (UGO) Risk Score/category: Average           Reason for Communication Hand-off Referral: Admission diagnoses: PN    Discharge Plan: Home with 24 Hr Assist of Family       Concern for non-adherence with plan of care:   No  Discharge Needs Assessment:  Needs       Most Recent Value    Anticipated Changes Related to Illness inability to care for self    Equipment Currently Used at Home walker, rolling    Transportation Available family or friend will provide          Already enrolled in Tele-monitoring program and name of program:  NA  Follow-up specialty is recommended: Yes    Follow-up plan:  Future Appointments  Date Time Provider Department Center   2018 8:00 AM Maxx, Michael M, MD [facility]   2018 2:20 PM Porfirio Terrell MD [facility]   2018 10:40 AM Cyr, Michael M, MD [facility]   2018 1:45 PM SANDRA TECH1 [facility]       Any outstanding tests or procedures:        Referrals     Future Labs/Procedures    Home care nursing referral     Comments:    RN extended hours visit. RN to assess vital signs and weight and hydration, nutrition and bowel status.    Your provider has ordered home care nursing services. If you have not been contacted within 2 days of your discharge please call the inpatient department phone number at [phone] .    Home Care PT Referral for  Hospital Discharge     Comments:    PT to eval and treat    Your provider has ordered home care - physical therapy. If you have not been contacted within 2 days of your discharge please call the department phone number listed on the top of this document.    Home Care Social Service Referral for Hospital Discharge     Comments:     to assist family    Your provider has ordered home care - . If you have not been contacted within 2 days of your discharge please call the department phone number listed on the top of this document.               Key Recommendations:  Patient admitted with possible pneumonia and falls. Patient has had a recent UTI with tolliver placement. Patient has a history of dementia, stroke, and macular degeneration. Patient lives in [facility] and currently receives no services. Anticipate DC home with 24 hour assist from family. Patient has supportive and involved children. Patient was DC'd with home care RN and PT.   Recommend to monitor for resolution of pneumonia and return to baseline mobility.   Other recommendations at discharge are completion of antibiotics for pneumonia, Levaquin X 1 day, and candida infection, Diflucan X 12 days. Patient to return to urology on 9/20/18 for a catheter exchange.     Jen Wills    AVS/Discharge Summary is the source of truth; this is a helpful guide for improved communication of patient story

## 2018-09-19 NOTE — PLAN OF CARE
Problem: Patient Care Overview  Goal: Plan of Care/Patient Progress Review  Patient's After Visit Summary was reviewed with patient and/or son.   Patient verbalized understanding of After Visit Summary, recommended follow up and was given an opportunity to ask questions.   Discharge medications sent home with patient/family: YES, antibiotics given to son.    Discharged with transport tech and son. Teaching on leg bag completed. All belongings sent with patient and son.     /79 (BP Location: Right arm)  Pulse 58  Temp 98.3  F (36.8  C) (Oral)  Resp 16  Wt 80.1 kg (176 lb 9.6 oz)  SpO2 90%  BMI 25.34 kg/m2    OBSERVATION patient END time: 1400

## 2018-09-19 NOTE — LETTER
[facility] CARE COORDINATION  [address]  [facility] [address]  September 21, 2018    Edison Boss  [address]   [facility] [address]      Dear Mel[place],    I am a clinic care coordinator who works with Porfirio Terrell MD, MD at the [facility]. Thank you for spending the time to talk with me.  I wanted to provide you with my contact information so that you can call me with questions or concerns about your health care. Attached to this email is a flyer about clinic care coordination and how I can further assist you.     Please feel free to contact me at [phone], with any questions or concerns. We at [facility] are focused on providing you with the highest-quality healthcare experience possible and that all starts with you.     Sincerely,     Vincent Steel, [facility]  Clinic Care Coordinator  Ph. [phone]  [email or web address]    Enclosed: I have enclosed a copy of a 24 Hour Access Plan. This has helpful phone numbers for you to call when needed. Please keep this in an easy to access place to use as needed.

## 2018-09-19 NOTE — LETTER
Health Care Home - Access Care Plan    About Me  Patient Name:  Edison Van    YOB: 1924  Age:                             89 year old   [place] MRN:            [id number] Telephone Information:     Home Phone [phone]   Mobile [phone]       Address:    [address]  [facility] [address] Email address:  [email or web address]      Emergency Contact(s)  Name Relationship Lgl Grd Work Phone Home Phone Mobile Phone   1. JERONIMO BURNS Daughter No NONE [phone]   2. IGNACIO BAÑUELOS Daughter No [phone]   3. ROBINA VAN Daughter    [phone]             Health Maintenance: Routine Health maintenance Reviewed: Due/Overdue     My Access Plan  Medical Emergency 911   Questions or concerns during clinic hours Primary Clinic Line, I will call the clinic directly: [facility] - [phone]   24 Hour Appointment Line [phone] or  [phone]) (toll free)   24 Hour Nurse Line [phone] (toll free)   Questions or concerns outside clinic hours 24 Hour Appointment Line, I will call the after-hours on-call line:   [facility] [phone] or [phone]) (toll-free)   Preferred Urgent Care [facility] [phone]   Preferred Hospital [facility]  [phone]   Preferred Pharmacy [facility] [place], [place] - [address]     Behavioral Health Crisis Line The National Suicide Prevention Lifeline at 1-213.487.6069 or 914     My Care Team Members  Patient Care Team       Relationship Specialty Notifications Start End    Porfirio Terrell MD PCP - General   2/15/02     Phone: [phone] Pager: [phone] Fax: [phone]        [address] [facility] [address]    [facility]   HOME HEALTH AGENCY ([facility] ([place]  9/18/18     Phone: [phone]         Vincent Cardona, LGSW  Clinic Care Coordinator   9/21/18            My Medical and Care Information  Problem List   Patient Active Problem List   Diagnosis     Malignant neoplasm of prostate (H)     Other specified disorder of skin     Personal history of other diseases of circulatory system     Osteoporosis     Esophageal reflux     Essential hypertension with goal blood pressure less than 140/90     Mild persistent asthma     Insomnia     Restless legs syndrome (RLS)     HYPERLIPIDEMIA LDL GOAL <100     Chronic atrial fibrillation (H)     SBO (small bowel obstruction)     Cerebral infarction (H)     Sinus node dysfunction (H)     Pacemaker     RAMÓN (generalized anxiety disorder)     MCI (mild cognitive impairment)     Pneumonia      Current Medications and Allergies:  See printed Medication Report

## 2018-09-19 NOTE — PLAN OF CARE
Problem: Patient Care Overview  Goal: Plan of Care/Patient Progress Review  PRIMARY DIAGNOSIS: GENERALIZED WEAKNESS/UTI/Pneumonia     OUTPATIENT/OBSERVATION GOALS TO BE MET BEFORE DISCHARGE  1. Orthostatic performed: Yes:          Lying Orthostatic BP: 162/89         Sitting Orthostatic BP: 158/72         Standing Orthostatic BP: 136/79              2. Tolerating PO medications: Yes    3. Return to near baseline physical activity: Yes    4. Cleared for discharge by consultants (if involved): No    Discharge Planner Nurse   Safe discharge environment identified: Yes  Barriers to discharge: Yes       Entered by: Mari Diallo 09/19/2018 8:51 AM     Please review provider order for any additional goals.   Nurse to notify provider when observation goals have been met and patient is ready for discharge.    Patient is alert and oriented x4. VS WNL and documented on the FS. Lung sounds clear in all lobes and patient is on RA. O2 sats at 92%. Active bowel sounds in all 4 quadrants with LBM (small) this morning. Patient has a chronic urinary catheter that is patent. Urine is astrid in color. Patient states he has chronic generalized pain and is getting Tylenol. Patient also states he has chronic numbness and tinging in LE's. Patient is a 1 assist with walker and gait belt when up. Fall precautions in place. Plan: Patient to DC home with family today.     /89 (BP Location: Left arm)  Pulse 58  Temp 99.2  F (37.3  C) (Oral)  Resp 16  Wt 80.1 kg (176 lb 9.6 oz)  SpO2 92%  BMI 25.34 kg/m2

## 2018-09-19 NOTE — DISCHARGE SUMMARY
[facility]  Discharge Summary  Name: Edison Boss    MRN: [id number]  YOB: 1924    Age: 89 year old  Date of Discharge:  9/19/2018  Date of Admission: 9/16/2018  Primary Care Provider: Porfirio Terrell  Discharge Physician:  Red Zamora MD  Discharging Service:  Hospitalist  Date of Service (when I saw the patient): 09/19/18    Discharge Diagnosis:  Pneumonia, candidal infection of the bladder     Other Diagnosis:  dementia, stroke, sleep apnea, paroxysmal atrial fibrillation, asthma, prostate cancer, hypertension, hyperlipidemia, GERD, and chronic pain syndrome      Discharge Disposition:  Discharged to home     Allergies:  Allergies   Allergen Reactions     Ceftriaxone Itching     Bactrim Hives     Levaquin Diarrhea     Oral only, can tolerate IV     Zithromax [Azithromycin]      Macrodantin [Nitrofuran Derivatives] Rash     Took recently with no problems        Discharge Medications:   Current Discharge Medication List      START taking these medications    Details   fluconazole (DIFLUCAN) 200 MG tablet Take 1 tablet (200 mg) by mouth daily for 12 days  Qty: 12 tablet, Refills: 0    Associated Diagnoses: Candida infection      levofloxacin (LEVAQUIN) 750 MG tablet Take 1 tablet (750 mg) by mouth daily for 1 day  Qty: 5 tablet, Refills: 1    Associated Diagnoses: Pneumonia due to infectious organism, unspecified laterality, unspecified part of lung         CONTINUE these medications which have NOT CHANGED    Details   albuterol (PROVENTIL HFA: VENTOLIN HFA) 108 (90 BASE) MCG/ACT inhaler Inhale 2 puffs into the lungs every 6 hours as needed for shortness of breath / dyspnea.  Qty: 1 Inhaler, Refills: 1    Associated Diagnoses: Mild persistent asthma      amLODIPine (NORVASC) 5 MG tablet TAKE 1 TABLET(5 MG) BY MOUTH DAILY  Qty: 90 tablet, Refills: 3    Associated Diagnoses: Essential hypertension with goal blood pressure less than 140/90      Ascorbic Acid (VITAMIN C PO) Take  1,000 mg by mouth daily       cetirizine (ZYRTEC) 10 MG tablet Take 10 mg by mouth daily as needed      cholecalciferol 5000 UNITS CAPS Take 1 capsule by mouth daily. Takes 5000 units in the summer and 94578 units in the winter      clobetasol (TEMOVATE) 0.05 % ointment daily as needed   Refills: 2      Colloidal Oatmeal (AVEENO ECZEMA THERAPY) 1 % CREA Externally apply topically daily as needed       D-MANNOSE POWD Take 100 mg by mouth daily 500 mg Pt takes 2 pills daily      lidocaine (LMX4) 4 % CREA 4% topical cream Apply topically daily as needed      Misc Natural Products (COLON CLEANSER PO) Take 1 capsule by mouth daily Advanced Colon Care II      Multiple Vitamins-Minerals (PRESERVISION AREDS) CAPS Take 1 capsule by mouth 2 times daily  Qty: 180 capsule, Refills: 3    Associated Diagnoses: Macular degeneration      omeprazole (PRILOSEC) 20 MG CR capsule Take 1 capsule (20 mg) by mouth daily  Qty: 90 capsule, Refills: 3    Associated Diagnoses: Gastroesophageal reflux disease without esophagitis      oxyCODONE-acetaminophen (PERCOCET) 5-325 MG per tablet Take 1 tablet by mouth every 4 hours as needed for pain for pain.  Qty: 30 tablet, Refills: 0    Associated Diagnoses: Cervicalgia; Acute upper back pain      PARoxetine (PAXIL) 30 MG tablet Take 15 mg by mouth At Bedtime Takes 1/2 tab of 30mg=15mg         STOP taking these medications       CIPROFLOXACIN PO Comments:   Reason for Stopping:                Condition on Discharge:  Discharge condition: Stable   Discharge vitals: Blood pressure 162/89, pulse 58, temperature 99.2  F (37.3  C), temperature source Oral, resp. rate 16, weight 80.1 kg (176 lb 9.6 oz), SpO2 92 %.   Code status on discharge: Full Code     History of Illness:  See detailed admission note for full details.  89 year old male who presents with weakness.  He has had 2 falls in the past 2 days.  After his first fall, he laid on the floor for several hours overnight.  Second fall happened  earlier today.  Was brought to emergency room for further evaluation.  He does not feel like he hit his head during either fall.  However, he has a poor medical historian is unable to provide significant details about either fall.  He does feel somewhat short of breath.  Has an occasional cough.  Cough is nonproductive.  He has felt like he has had fevers and chills at times.  Has not had any dysuria.  Does have a chronic indwelling catheter.  Does have chronic pain throughout his body.  He does feel like he is having more hip pain than usual after his falls.  Also having neck pain which is unusual for him.  No other complaints.     Significant Physical Exam Findings:  Patient in chair. States he feels well. No chest pain, sob, abdo pain, n, v, d. Eating. Ambulating  s1s2 rrr, lungs clear    Procedures:  none     Imaging:  Results for orders placed or performed during the hospital encounter of 09/16/18   CT Head w/o Contrast    Narrative    CT SCAN OF THE HEAD WITHOUT CONTRAST   9/16/2018 10:30 PM     HISTORY: Confusion and weakness.    TECHNIQUE:  Axial images of the head and coronal reformations without  IV contrast material. Radiation dose for this scan was reduced using  automated exposure control, adjustment of the mA and/or kV according  to patient size, or iterative reconstruction technique.    COMPARISON: 8/28/2018    FINDINGS:  There is generalized atrophy of the brain.  There is low  attenuation in the white matter of the cerebral hemispheres consistent  with sequelae of small vessel ischemic disease. There is no evidence  of intracranial hemorrhage, mass, acute infarct or anomaly.     The visualized portions of the sinuses and mastoids appear normal.  There is no evidence of trauma.      Impression    IMPRESSION:   1. No acute abnormality.  2.  Atrophy of the brain.  White matter changes consistent with  sequelae of small vessel ischemic disease. This is unchanged.      DANILO ALFONSO MD   Chest XR,  PA &  LAT    Narrative    CHEST TWO VIEWS    9/16/2018 10:48 PM     HISTORY: Weakness.    COMPARISON: 9/2/2011.      Impression    IMPRESSION: Dual lead cardiac device is again seen. This appears to be  a different battery pack than on the prior study. Minimal linear  scarring or platelike atelectasis left lung base without change.  Possible interval development of a small amount of focal infiltrate in  the left upper lung zone. The remainder of the lungs are clear. The  heart appears larger than on the prior exam but this is likely related  to the AP sitting technique.      MENDOZA AGUIRRE MD   XR Pelvis and Hip Bilateral 2 Views    Narrative    XR PELVIS AND HIP BILATERAL 2 VIEWS 9/17/2018 2:02 AM    COMPARISON: None.    HISTORY: Hip pain after fall.      Impression    IMPRESSION: Prostate radiation seeds are demonstrated.  Mild-to-moderate degenerative changes in both hips are seen, but no  fractures are demonstrated.    MONIQUE SKELTON MD   CT Cervical Spine w/o Contrast    Narrative    CT OF THE CERVICAL SPINE WITHOUT CONTRAST September 17, 2018 1:53 AM     HISTORY: Fall, neck pain.      TECHNIQUE: Axial images of the cervical spine were acquired without  intravenous contrast. Multiplanar reformations were created. Radiation  dose for this scan was reduced using automated exposure control,  adjustment of the mA and/or kV according to patient size, or iterative  reconstruction technique.      COMPARISON: None.    FINDINGS: There is normal alignment of the cervical vertebrae;  however, there is reversal of normal cervical lordosis centered at the  C5-C6 level. Vertebral body heights of the cervical spine are normal.  Craniocervical alignment is normal. There are no fractures of the  cervical spine. There is degenerative ankylosis of the C5 and C6  vertebral bodies. There is facet arthropathy bilaterally at the C2-C3,  C3-C4 and C4-C5 levels. There is no significant degenerative spinal  canal narrowing of the cervical  spine. There is no prevertebral soft  tissue swelling.      Impression    IMPRESSION: Diffuse degenerative changes of the cervical spine. No  evidence for fracture or traumatic malalignment.      ISSAC SINGLETON MD        Consultations:  No consultations were requested during this admission.     Significant Lab Results:    Recent Labs  Lab 09/19/18  0639 09/18/18  0652 09/17/18  0636   WBC 18.9* 17.7* 17.8*   HGB 13.0* 13.0* 12.9*   HCT 38.7* 39.8* 38.8*   MCV 91 92 92    256 230          Lab Results   Component Value Date     09/19/2018     09/18/2018     09/17/2018    Lab Results   Component Value Date    CHLORIDE 110 09/19/2018    CHLORIDE 110 09/18/2018    CHLORIDE 108 09/17/2018    Lab Results   Component Value Date    BUN 23 09/19/2018    BUN 25 09/18/2018    BUN 26 09/17/2018      Lab Results   Component Value Date    POTASSIUM 4.0 09/19/2018    POTASSIUM 3.9 09/18/2018    POTASSIUM 3.8 09/17/2018    Lab Results   Component Value Date    CO2 23 09/19/2018    CO2 23 09/18/2018    CO2 23 09/17/2018    Lab Results   Component Value Date    CR 1.14 09/19/2018    CR 1.18 09/18/2018    CR 1.19 09/17/2018          Recent Labs  Lab 09/16/18 2208   COLOR Yellow   APPEARANCE Cloudy   URINEGLC Negative   URINEBILI Negative   URINEKETONE 20*   SG 1.021   UBLD Moderate*   URINEPH 5.0   PROTEIN 100*   NITRITE Negative   LEUKEST Moderate*   RBCU 52*   WBCU 41*        Pending Results:    Unresulted Labs Ordered in the Past 30 Days of this Admission     Date and Time Order Name Status Description    9/16/2018 2221 Blood culture ONE site Preliminary     9/16/2018 2104 Blood culture ONE site Preliminary            Discharge Instructions and Follow-Up:   Discharge diet:   Active Diet Order      Combination Diet Regular Diet Adult      Diet   Discharge activity: Activity as tolerated   Discharge follow-up: Follow up with primary care provider in 7 days   Outpatient therapy: None    Home Care agency: Home  PT and nursing    Other instructions: Catheter change tomorrow with Dr. Lilly     [facility] Course:  Patient was admitted to the hospital. He was treated for a PNA with levaquin. His urine also grew candida. He has an indwelling tolliver. He was started on fluconazole. He should have a catheter change, but this is a difficult task because he has a stricture. He had an appt with Dr. Lilly for catheter change next week I spoke with Dr. Lilly who states he would have his office get him in tomorrow.  He will DC with home services. Family can provide 24 hour care. I have kept in touch with his daughter.      Total time spent in face to face contact with the patient and coordinating discharge was:  40 Minutes.    Red Zamora MD  Pager: [phone]

## 2018-09-19 NOTE — PLAN OF CARE
Problem: Patient Care Overview  Goal: Plan of Care/Patient Progress Review  Physical Therapy Discharge Summary    Reason for therapy discharge:    Discharged to home. 24/7 supervision    Progress towards therapy goal(s). [facility] for goal details.  Goals partially met.  Barriers to achieving goals:   discharge from facility.    Therapy recommendation(s):    Continued therapy is recommended.  Rationale/Recommendations:   PT to progress safety with gait.

## 2018-09-19 NOTE — PROGRESS NOTES
Speech Language Therapy Discharge Summary    Reason for therapy discharge:    Discharged to home. Family assist 24 hours/day.    Progress towards therapy goal(s). See goals on Care Plan in [facility] electronic health record for goal details.  Goals partially met.  Barriers to achieving goals:   discharge from facility.    Therapy recommendation(s):    Continued therapy is recommended.  Rationale/Recommendations:  Recommend consideration for home SLP vs. OP SLP follow up to ensure chin tuck use for aspiration/penetration prevention with thin liquids.  Discharged on regular diet with thin liquids with recommendations for strict chin tuck use.

## 2018-09-19 NOTE — PLAN OF CARE
Problem: Patient Care Overview  Goal: Plan of Care/Patient Progress Review  Outcome: Improving  Alert and oriented. Reports generalized chronic pain, tolerable. Scheduled Tylenol given. Sat in chair for dinner. Chronic tolliver patent, draining clear astrid urine. Per tele tech: A-paced, frequent PVC's, rate in 60's. IV Levaquin given. Continue to monitor. Likely discharge to home with home services/family assistance tomorrow. Bed alarm in place for safety.

## 2018-09-19 NOTE — PLAN OF CARE
Problem: Patient Care Overview  Goal: Plan of Care/Patient Progress Review  Vitals are Temp: 97.9  F (36.6  C) Temp src: Oral BP: 174/79 Pulse: 58 Heart Rate: 60 Resp: 16 SpO2: 95 % .    Patient is Alert and Oriented x4 but forgetful at times. They are 1 Assist with Gait Belt and Walker.  Pt is a Regular diet and denying pain.  Patient is on 1L of O2.  Pt has a chronic tolliver in place.  Plan of care is to discharge home with family staying with him 24/7 and home care RN and home PT.

## 2018-09-20 NOTE — PROGRESS NOTES
Eidson Boss is a pleasant 89-year-old male with a history of prostate cancer, urinary retention, severe bladder spasticity and now a fungal UTI. He is currently on Diflucan and returns for change of his Christianson catheter.  He was scheduled today for Botox injections. This will be rescheduled when his urine is sterile.  Other past medical history: Urolithiasis, chronic pain, GERD, hyperlipidemia, hypertension, asthma, A. fib, sleep apnea, CVA, former smoker  Allergies: Cephalosporin, sulfa, Levaquin, Zithromax, Macrodantin  Medications: Albuterol, Norvasc, vitamin C, Zyrtec, vitamin D, Diflucan, multivitamin, omeprazole, Paxil  Exam: Alert and oriented, somewhat somnolent. Son present. Normal respirations.  Christianson catheter removed and new catheter placed with sterile technique. 4 cc in Christianson balloon. Bladder irrigates clear  Assessment: Fungal UTI  Plan: Rescheduled Botox injections when urine sterile-probably 2 weeks

## 2018-09-20 NOTE — MR AVS SNAPSHOT
After Visit Summary   9/20/2018    Edison Boss    MRN: [id number]           Patient Information     Date Of Birth          10/19/1924        Visit Information        Provider Department      9/20/2018 8:00 AM Michael Lilly MD [facility]        Today's Diagnoses     Urinary retention    -  1       Follow-ups after your visit        Your next 10 appointments already scheduled     Sep 26, 2018  2:20 PM CDT   [facility] Porf[facility] MD   [facility]    [address]  [facility] [address]   [phone]           Bring a current list of meds and any records pertaining to this visit. For Physicals, please bring immunization records and any forms needing to be filled out. Please arrive 10 minutes early to complete paperwork.            Nov 01, 2018 10:40 AM CDT   Return Visit with Michael Lilly MD   [facility] ([facility])    [address]  [address]  [facility] [id number] [phone]            Nov 05, 2018  1:45 PM CST   Remote PPM Check with SANDRA TECH1   [facility] ([facility])    [address]  [facility] [facility] [id number] [phone] OPT 2           This appointment is for a remote check of your pacemaker.  This is not an appointment at the office.              Who to contact     If you have questions or need follow up information about today's clinic visit or your schedule please contact [facility] directly at [phone].  Normal or non-critical lab and imaging results will be communicated to you by MyChart, letter or phone within 4 business days after the clinic has received the results. If you do not hear from us within 7 days, please contact the clinic through MyChart or phone. If you  have a critical or abnormal lab result, we will notify you by phone as soon as possible.  Submit refill requests through [employer] or call your pharmacy and they will forward the refill request to us. Please allow 3 business days for your refill to be completed.          Additional Information About Your Visit        [employer] Information     [employer] gives you secure access to your electronic health record. If you see a primary care provider, you can also send messages to your care team and make appointments. If you have questions, please call your primary care clinic.  If you do not have a primary care provider, please call [phone] and they will assist you.        Care EveryWhere ID     This is your Care EveryWhere ID. This could be used by other organizations to access your [facility] medical records  [id number]         Blood Pressure from Last 3 Encounters:   09/19/18 136/79   09/12/18 122/66   09/04/18 132/60    Weight from Last 3 Encounters:   09/17/18 80.1 kg (176 lb 9.6 oz)   09/12/18 76.1 kg (167 lb 11.2 oz)   09/04/18 76.7 kg (169 lb)              We Performed the Following     INSERT BLADDER CATH, TEMP INDWELL SIMPLE (ARAMBULA) ([id number])        Primary Care Provider Office Phone # Fax #    Porfirio Terrell [phone]       [address] [address]        Equal Access to Services     EMERITA CADE : Hadii aad ku hadasho Soomaali, waaxda luqadaha, qaybta kaalmada adeegyada, yony mcclure . So [facility] [phone].    ATENCIÓN: Si habla español, tiene a lauren disposición servicios gratuitos de asistencia lingüística. Lesley al [phone].    We comply with applicable federal civil rights laws and [place] laws. We do not discriminate on the basis of race, color, national origin, age, disability, sex, sexual orientation, or gender identity.            Thank you!     Thank you for choosing [facility] [place]  for your  care. Our goal is always to provide you with excellent care. Hearing back from our patients is one way we can continue to improve our services. Please take a few minutes to complete the written survey that you may receive in the mail after your visit with us. Thank you!             Your Updated Medication List - Protect others around you: Learn how to safely use, store and throw away your medicines at www.disposemymeds.org.          This list is accurate as of 9/20/18  8:27 AM.  Always use your most recent med list.                   Brand Name Dispense Instructions for use Diagnosis    albuterol 108 (90 Base) MCG/ACT inhaler    PROAIR HFA/PROVENTIL HFA/VENTOLIN HFA    1 Inhaler    Inhale 2 puffs into the lungs every 6 hours as needed for shortness of breath / dyspnea.    Mild persistent asthma       amLODIPine 5 MG tablet    NORVASC    90 tablet    TAKE 1 TABLET(5 MG) BY MOUTH DAILY    Essential hypertension with goal blood pressure less than 140/90       AVEENO ECZEMA THERAPY 1 % Crea   Generic drug:  Colloidal Oatmeal      Externally apply topically daily as needed        cetirizine 10 MG tablet    zyrTEC     Take 10 mg by mouth daily as needed        cholecalciferol 5000 units Caps      Take 1 capsule by mouth daily. Takes 5000 units in the summer and 41582 units in the winter        clobetasol 0.05 % ointment    TEMOVATE     daily as needed        COLON CLEANSER PO      Take 1 capsule by mouth daily Advanced Colon Care II        D-Mannose Powd      Take 100 mg by mouth daily 500 mg Pt takes 2 pills daily        fluconazole 200 MG tablet    DIFLUCAN    12 tablet    Take 1 tablet (200 mg) by mouth daily for 12 days    Candida infection       levofloxacin 750 MG tablet    LEVAQUIN    5 tablet    Take 1 tablet (750 mg) by mouth daily for 1 day    Pneumonia due to infectious organism, unspecified laterality, unspecified part of lung       lidocaine 4 % Crea cream    LMX4     Apply topically daily as needed         omeprazole 20 MG CR capsule    priLOSEC    90 capsule    Take 1 capsule (20 mg) by mouth daily    Gastroesophageal reflux disease without esophagitis       oxyCODONE-acetaminophen 5-325 MG per tablet    PERCOCET    30 tablet    Take 1 tablet by mouth every 4 hours as needed for pain for pain.    Cervicalgia, Acute upper back pain       PARoxetine 30 MG tablet    PAXIL     Take 15 mg by mouth At Bedtime Takes 1/2 tab of 30mg=15mg        PRESERVISION AREDS Caps     180 capsule    Take 1 capsule by mouth 2 times daily    Macular degeneration       VITAMIN C PO      Take 1,000 mg by mouth daily

## 2018-09-20 NOTE — LETTER
9/20/2018     RE: Edison Boss  [address]  [place] [address]     Dear Colleague,    Thank you for referring your patient, Edison Boss, to the [facility] [place] [facility]. Please see a copy of my visit note below.    Edison Boss is a pleasant 89-year-old male with a history of prostate cancer, urinary retention, severe bladder spasticity and now a fungal UTI. He is currently on Diflucan and returns for change of his Christianson catheter.  He was scheduled today for Botox injections. This will be rescheduled when his urine is sterile.  Other past medical history: Urolithiasis, chronic pain, GERD, hyperlipidemia, hypertension, asthma, A. fib, sleep apnea, CVA, former smoker  Allergies: Cephalosporin, sulfa, Levaquin, Zithromax, Macrodantin  Medications: Albuterol, Norvasc, vitamin C, Zyrtec, vitamin D, Diflucan, multivitamin, omeprazole, Paxil  Exam: Alert and oriented, somewhat somnolent. Son present. Normal respirations.  Christianson catheter removed and new catheter placed with sterile technique. 4 cc in Christianson balloon. Bladder irrigates clear  Assessment: Fungal UTI  Plan: Rescheduled Botox injections when urine sterile-probably 2 weeks    Again, thank you for allowing me to participate in the care of your patient.      Sincerely,    Michael Lilly MD

## 2018-09-21 NOTE — PROGRESS NOTES
Clinic Care Coordination Contact  Care Coordination Communication    Referral Source: IP Handoff    Clinical Data: Patient was hospitalized at [facility] from 9.16.2018 to 9.19.2018 with diagnosis of Pneumonia.     Home Care Contact:              Home Care Agency: [facility]              Contact name () and phone number: Terri Walter, Ph. [phone]              Care Coordination contacted home care: Yes              Anticipated start of care date: 9.21.2018    Patient Contact:               Introduced self and role of care coordination.               Discharge instructions were reviewed with patient/caregiver.               Do you have any questions about your medications? None.              Follow up appointment is scheduled for 9.26.2018.              Provided 24 Hour Nurse Line and/or 24 Hour Appointment Scheduling: Yes              Home care has contacted patient: Yes              Patient questions/concerns: Pt's son, David, would like to make sure that Pt has a couple weeks of home care. Email sent to  to clarify.    Plan: RN/SW Care Coordinator will await notification from home care staff informing RN/SW Care Coordinator of patients discharge plans/needs. RN/SW Care Coordinator will review chart and outreach to home care every 4 weeks and as needed.      Vincent Steel [facility]  Clinic Care Coordinator  Ph. [phone]  [email or web address]

## 2018-09-21 NOTE — TELEPHONE ENCOUNTER
Call received from Jany with [facility] requesting orders for skilled nursing for medication management and cardiology pulmonary assessment and physical therapy evaluation and treatment. Please advise.

## 2018-09-24 PROBLEM — G93.40 ACUTE ENCEPHALOPATHY: Status: ACTIVE | Noted: 2018-01-01

## 2018-09-24 NOTE — PHARMACY-VANCOMYCIN DOSING SERVICE
Pharmacy Vancomycin Initial Note  Date of Service 2018  Patient's  10/19/1924  89 year old, male    Indication: Healthcare-Associated Pneumonia    Current estimated CrCl = Estimated Creatinine Clearance: 28.4 mL/min (based on Cr of 1.84).    Creatinine for last 3 days  2018: 12:04 PM Creatinine 1.84 mg/dL    Recent Vancomycin Level(s) for last 3 days  No results found for requested labs within last 72 hours.      Vancomycin IV Administrations (past 72 hours)                   vancomycin (VANCOCIN) 1,750 mg in sodium chloride 0.9 % 500 mL intermittent infusion (mg) 1,750 mg Given 18 1505                Nephrotoxins and other renal medications (Future)    Start     Dose/Rate Route Frequency Ordered Stop    18 1500  vancomycin (VANCOCIN) 1,750 mg in sodium chloride 0.9 % 500 mL intermittent infusion      1,750 mg  over 2 Hours Intravenous EVERY 48 HOURS 18 1733      18 1630  piperacillin-tazobactam (ZOSYN) infusion 3.375 g      3.375 g  100 mL/hr over 30 Minutes Intravenous EVERY 6 HOURS 18 1627            Contrast Orders - past 72 hours     None                Plan:  1.  Start vancomycin  1750 mg IV q48h.   2.  Goal Trough Level: 15-20 mg/L   3.  Pharmacy will check trough levels as appropriate in 1-3 Days.    4. Serum creatinine levels will be ordered daily for the first week of therapy and at least twice weekly for subsequent weeks.    5. [place] method utilized to dose vancomycin therapy: Method 1    Kanu Anderson

## 2018-09-24 NOTE — PROGRESS NOTES
[facility]  Patient is currently open to home care services with [facility].  The patient is currently receiving SN/PT  services.  [facility]  and team have been notified of patient admission.  [facility] liaison will continue to follow patient during stay.  If appropriate provide orders to resume home care at time of discharge.

## 2018-09-24 NOTE — IP AVS SNAPSHOT
` ` Patient Information     Patient Name Sex     Edison Boss ([id number]) Male 10/19/1924       Room Bed    [facility] 6982-04      Patient Demographics     Address Phone E-mail Address    [address]   [place] [address] [phone] (Home) *Preferred*  NONE (Work)  [phone] (Mobile) [email or web address]      Patient Ethnicity & Race     Ethnic Group Patient Race    American White      Emergency Contact(s)     Name Relation Home Work Mobile    Red Saldana Daughter [phone] NONE [phone]    Concepcion Navarrete Daughter [phone]    Bea Boss Daughter   [phone]      Documents on File        Status Date Received Description       Documents for the Patient    Insurance Card  ()      Face Sheet Received () 08     Insurance Card  () 03     Insurance Card  () 04     External Medication Information Consent Accepted () 06/10/09     Face Sheet Received () 09     External Medication Information Consent Accepted () 06/30/10     Patient ID Received () 11     Consent for Services - Hospital/Clinic Received () 11/02/10     Privacy Notice - [place] Received 11/30/10     Other Received 03/10/11 BC COB    Consent for Services - Hospital/Clinic Received () 11     External Medication Information Consent Accepted () 11     Insurance Card Received () 11     CMS IM for Patient Signature Received 18     Consent for Services - Hospital/Clinic Received () 12     Insurance Card  () 05/15/12     Patient ID Received () 12     Consent for Services - Hospital/Clinic Received () 12     Business/Insurance/Care Coordination/Health Form - Patient.1 Received 12     Business/Insurance/Care Coordination/Health Form - Patient.1       External Medication Information Consent Accepted 12      Consent for EHR Access Received 13 Copied from existing Consent for services - C/HOD collected on 2012    [facility] Specified Other       HIM CHRISTIAN Authorization  13 RAC    HIM CHRISTIAN Authorization  13 [facility]    Consent for Services - Hospital/Clinic Received () 13     Insurance Card Received () 16 Medicare Part A and B/ BLUE [facility]    Insurance Card  ()      Consent for Services - Hospital/Clinic Received () 14     Consent for Services - Hospital/Clinic       Patient ID Received () 16     Consent for Services - Hospital/Clinic Received () 07/14/15     Consent for Services - Hospital/Clinic Received () 07/28/15     Insurance Card Received () 08/11/15 [facility]     Insurance Card  ()  Medicare A&B    Consent for Services/Privacy Notice - Hospital/Clinic Received () 16     HIM CHRISTIAN Authorization - File Only Received 02/10/16 Email    Consent to Communicate Received 02/10/16 Auth to Discuss PHI    HIM CHRISTIAN Authorization - File Only Received 02/10/16 Records to Red Saldana - if requested.    Physical Therapy Certification Received 02/17/16 2/10/16-3/9/16    Consent for Services/Privacy Notice - Hospital/Clinic Received ()      Consent for Services/Privacy Notice - Hospital/Clinic Received () 16 [facility]    Business/Insurance/Care Coordination/Health Form - Patient    [facility] Pre-Visit Questionnaire    Insurance Card Received 10/04/16 MEDICARE     Insurance Card Received 10/04/16 [facility] CHRISTIAN Authorization  10/18/16 [place] [facility]    [place] Received 18 [place]    Business/Insurance/Care Coordination/Health Form - Patient  17 [facility]ENDANCE AGREEMENT- [facility]    Business/Insurance/Care Coordination/Health Form - Patient  05/15/17 [facility] PAROXETINE HCL  APPROVAL 10/24/16 - 10/24/17    Consent for Services/Privacy Notice - Hospital/Clinic Received () 17     Care Everywhere Prospective Auth Received 10/21/17     Insurance Card Received 17 BCBS     Consent for Services - Hospital and Clinic Received 18     HIE Auth Received 18     Insurance Card Received 18 MEDICARE    Insurance Card Received 18 BCBS    Privacy Notice - [place] Received (Deleted) 11     Face Sheet  (Deleted)      Consent for Services - Hospital/Clinic  (Deleted)      Insurance Card  (Deleted) 05/15/12     Consent for Services - Hospital/Clinic  (Deleted)      Insurance Card Received (Deleted) 17     Patient Photo   Photo of Patient       Documents for the Encounter    CMS IM for Patient Signature Received 18     CMS IM for Patient Signature Received 10/04/18 2nd IMM reviewed w/ dtr      Admission Information     Attending Provider Admitting Provider Admission Type Admission Date/Time    Corona Arguello, DO Corona Arguello, DO Emergency 18  1100    Discharge Date Hospital Service Auth/Cert Status Service Area     General Medicine Incomplete [facility]    Unit Room/Bed Admission Status        3 MEDICAL SURGICAL 0352/0352-01 Admission (Confirmed)       Admission     Complaint    Acute encephalopathy, unknown      Hospital Account     Name Acct ID Class Status Primary Coverage    Edison Boss [id number] Inpatient Open MEDICARE - MEDICARE            Guarantor Account (for Hospital Account #[id number])     Name Relation to Pt Service Area Active? Acct Type    Edison Boss  FCS Yes Personal/Family    Address Phone          [address] [place] [address] [phone](H)              Coverage Information (for Hospital Account #[id number])     1. MEDICARE/MEDICARE     F/O Payor/Plan Precert #    MEDICARE/MEDICARE     Subscriber Subscriber #    Edison Boss  [id number]    Address Phone    [employer]  [address]  [place] [address] [phone]          2. BC[place]     F/O Payor/Plan Precert #    [place]     Subscriber Subscriber #    Edison Boss Gerhard [id number]    Address Phone    [address] [address]  [place], [place] [id number] [phone]

## 2018-09-24 NOTE — IP AVS SNAPSHOT
` `     [place] [address] MEDICAL SURGICAL: [phone]                 INTERAGENCY TRANSFER FORM - NOTES (H&P, Discharge Summary, Consults, Procedures, Therapies)   2018                    Hospital Admission Date: 2018  JOSELIN VAN   : 10/19/1924  Sex: Male        Patient PCP Information     Provider PCP Type    Porfirio Terrell MD, MD General         History & Physicals      H&P by Corona Arguello DO at 2018  4:24 PM     Author:  Corona Arguello DO Service:  Hospitalist Author Type:  Physician    Filed:  2018  4:24 PM Date of Service:  2018  4:24 PM Creation Time:  2018  4:00 PM    Status:  Signed :  Corona Arguello DO (Physician)         [facility]  Hospitalist H&P    Name: Joselin Van      MRN: [id number]  YOB: 1924    Age: 89 year old  Date of admission: 2018  Primary care provider: Porfirio Terrell            Assessment and Plan:   Joselin aVn is a 89 year old male with a history of stroke, sleep apnea, A. fib, asthma, prostate cancer, hypertension, hyperlipidemia, GERD, and chronic pain syndrome who presents with acute encephalopathy.    1.  Acute encephalopathy.  Unclear etiology.  Was started on antibiotics with vancomycin and aztreonam in the emergency room.  He does have a history of ceftriaxone allergy.  Does not have a penicillin allergy.  Incidence of cross reactivity between cephalosporins and penicillins is fairly low.  Would continue him on antibiotics with IV Zosyn and vancomycin at this point.  Pro-calcitonin level pending.  Check CT scan of the head without contrast now.  If CT does not show any worrisome lesions, would go to MRI of the brain.  Stop Percocet for now.  Check urine analysis.    2.  Possible pneumonia.  Pro-calcitonin level pending.  Continue antibiotics with IV vancomycin and start IV Zosyn.    3.  Leukocytosis.  Possible pneumonia.  Pro-calcitonin level and  blood cultures pending.  Continue IV vancomycin and start IV Zosyn.  Also check urinalysis.      4.  Known cerebrovascular disease.  Check CT scan of the brain initially.  If no lesions, check MRI of the brain.  Continue aspirin 81 mg a day.  Check lipid panel.  Check echocardiogram with bubble study.    5.  Possible congestive heart failure.  Does not have obvious fluid overload on physical exam.  No known history of heart failure reported.  Last echocardiogram in our system from 2009.  BNP level elevated.  Unfortunately, will initially be n.p.o.  Does need to be on gentle IV fluids to start.  Check echocardiogram.  Suspect he will likely need some diuresis during hospital stay.    6.  Hypertension.  Continue amlodipine for now.    7.  Chronic pain syndrome.  Would hold Percocet initially due to sedation.  Have Tylenol available if needed.    8.  GERD.  Omeprazole 20 mg a day.    9.  Depression.  Continue paroxetine.    10.  Weakness.  As noted above initially get CT scan of the head.  If CT scan is unremarkable, MRI of the brain.  Plan for PT and OT consults.  Speech pathology consult.    11.  Troponin elevation.  Does have a very mild elevation in troponin level.  Suspect that this might be demand ischemia.  Check serial troponins.  Monitor on telemetry.  Check lipid panel.  Continue aspirin.  Check echocardiogram.    12.  Acute hypoxic respiratory failure.  Possible pneumonia.  Antibiotics as above.  Possible CHF.  Check echocardiogram.  Recent hospital stay.  Check d-dimer.  If d-dimer elevated, would need further investigation for possible DVT and PE.    13.  Acute kidney injury on chronic kidney disease.  Is going to be on gentle IV fluids initially.  Avoid nephrotoxins as able.  Does have evidence of possible pulmonary edema so will need to be careful of IV fluids.  Check urine analysis.    Code status: Currently full code.  Family members at bedside at this time are not he is medical power of .   They think that he may want to be DNR/DNI.  They will discuss with his medical power of  and let us know final decision on CODE STATUS.  For now, CODE STATUS is full code.  Admit to Inpatient  Prophylaxis: PCD's.            Chief Complaint:   Weakness and altered mental status.         History of Present Illness:   Edison Boss is a 89 year old male who presents with weakness and altered mental status.  Patient is currently fairly sedated.  He will wake up and answer a few yes or no questions before falling right back to sleep.  Most of information is obtained from family members at the bedside and from discussion with Dr. Acosta of the emergency room.  Patient had recently been hospitalized for possible pneumonia.  Did seem to improve per family.  Was able to discharge from the hospital a week ago.  Seem to have significant worsening over the past 24-48 hours.  During that time, seems to be having difficulty at times with speech.  This difficulty seems to last for a few minutes at a time.  He also been complaining of left leg weakness.  Has specifically mentioned that his left leg felt like it weighed 500 pounds.  Family had noticed fever today.  He is much more lethargic today than he normally is.  This is worsened since he arrived at the hospital.  They have not noticed significant shortness of breath.  Does have an occasional cough.  They have not noticed any other obvious problems.            Past Medical History:     Past Medical History:   Diagnosis Date     Calculus of kidney     in dwelling tolliver     Chronic infection     UTI     Chronic pain     lower pain, perineum     Esophageal reflux      Hyperlipidaemia      Hypertension      Malignant neoplasm of prostate (H) 8/14/2002    Brachytherapy, then external radiation. chronic indwelling catheter     Mild persistent asthma     with URI     Paroxysmal a-fib (H)     paroxysmal     Sinus node dysfunction (H)     sp DDD PM     Sleep apnea     ?    snores     Unspecified cerebral artery occlusion with cerebral infarction              Past Surgical History:     Past Surgical History:   Procedure Laterality Date     C NONSPECIFIC PROCEDURE  1980's    Kidney stone surgery     C NONSPECIFIC PROCEDURE  1985, 5/2005    TURP x 2     C NONSPECIFIC PROCEDURE  1/2002    Brachytherapy     C NONSPECIFIC PROCEDURE  ~1999    Left rotator cuff repair     C NONSPECIFIC PROCEDURE      Bilateral cataract surgery     C NONSPECIFIC PROCEDURE      EGD/dilation twice     CYSTOSCOPY       CYSTOSCOPY, INJECT COLLAGEN, COMBINED  6/6/2012    Procedure:COMBINED CYSTOSCOPY, INJECT BULKING AGENT; VIDEO CYSTOSCOPY WITH BOTOX INJECTIONS  ; Surgeon:BRIAN ADAN; Location:[facility] OR     CYSTOSCOPY, INJECT COLLAGEN, COMBINED  3/22/2013    Procedure: COMBINED CYSTOSCOPY, INJECT BULKING AGENT;  VIDEO CYSTOSCOPY, BOTOX INJECTION;  Surgeon: Brian Adan MD;  Location: [facility] OR     CYSTOSCOPY, INJECT COLLAGEN, COMBINED  8/8/2014    Procedure: COMBINED CYSTOSCOPY, INJECT BULKING AGENT;  Surgeon: Brian Adan MD;  Location: [facility] OR     CYSTOSCOPY, INJECT COLLAGEN, COMBINED N/A 8/12/2015    Procedure: COMBINED CYSTOSCOPY, INJECT BULKING AGENT;  Surgeon: Brian Adan MD;  Location: [facility] OR     CYSTOSCOPY, INJECT COLLAGEN, COMBINED N/A 12/8/2016    Procedure: COMBINED CYSTOSCOPY, INJECT BULKING AGENT;  Surgeon: Brian Adan MD;  Location: [facility] OR     HC REMOVE TONSILS/ADENOIDS,<13 Y/O  ~1928    T & A <12y.o.     IMPLANT PACEMAKER       PENIS SURGERY       PROSTATE SURGERY               Social History:     Social History   Substance Use Topics     Smoking status: Former Smoker     Packs/day: 1.00     Years: 40.00     Smokeless tobacco: Never Used      Comment: QUIT 1978     Alcohol use No             Family History:   One brother with lung cancer.  One brother with coronary artery disease.         Allergies:     Allergies   Allergen Reactions     Ceftriaxone Itching     Bactrim Hives      Levaquin Diarrhea     Oral only, can tolerate IV     Zithromax [Azithromycin]      Macrodantin [Nitrofuran Derivatives] Rash     Took recently with no problems             Medications:     Prior to Admission medications    Medication Sig Last Dose Taking? Auth Provider   albuterol (PROVENTIL HFA: VENTOLIN HFA) 108 (90 BASE) MCG/ACT inhaler Inhale 2 puffs into the lungs every 6 hours as needed for shortness of breath / dyspnea.  Yes Porfirio Terrell MD   amLODIPine (NORVASC) 5 MG tablet TAKE 1 TABLET(5 MG) BY MOUTH DAILY 9/24 - ? at ? Yes Porfirio Terrell MD   cetirizine (ZYRTEC) 10 MG tablet Take 10 mg by mouth daily as needed  Yes Reported, Patient   clobetasol (TEMOVATE) 0.05 % ointment daily as needed   Yes Lewis Nuñez   Colloidal Oatmeal (AVEENO ECZEMA THERAPY) 1 % CREA Externally apply topically daily as needed   Yes Reported, Patient   D-MANNOSE POWD Take 100 mg by mouth daily 500 mg Pt takes 2 pills daily  Yes Porfirio Terrell MD   fluconazole (DIFLUCAN) 200 MG tablet Take 1 tablet (200 mg) by mouth daily for 12 days 9/23 - ? at Unknown time Yes Red Zamora MD   lidocaine (LMX4) 4 % CREA 4% topical cream Apply topically daily as needed  Yes Reported, Patient   Misc Natural Products (COLON CLEANSER PO) Take 1 capsule by mouth daily Advanced Colon Care II  Yes Reported, Patient   Multiple Vitamins-Minerals (PRESERVISION AREDS) CAPS Take 1 capsule by mouth 2 times daily  Yes Porfirio Terrell MD   omeprazole (PRILOSEC) 20 MG CR capsule Take 20 mg by mouth daily as needed  Yes Unknown, Entered By History   oxyCODONE-acetaminophen (PERCOCET) 5-325 MG per tablet Take 1 tablet by mouth every 4 hours as needed for pain for pain. 9/23/2018 at Unknown time Yes Porfirio Terrell MD   PARoxetine (PAXIL) 30 MG tablet Take 15 mg by mouth At Bedtime Takes 1/2 tab of 30mg=15mg 9/23/2018 at Unknown time Yes Unknown, Entered By History             Review of Systems:   A Comprehensive greater than 10 system  review of systems was carried out.  Pertinent positives and negatives are noted above.  Otherwise negative for contributory information.           Physical Exam:   Blood pressure 150/63, pulse 60, temperature 99.8  F (37.7  C), temperature source Temporal, resp. rate 20, SpO2 93 %.  Wt Readings from Last 1 Encounters:   09/17/18 80.1 kg (176 lb 9.6 oz)     Exam:  GENERAL: Currently quite sedated.  Will awaken briefly to answer 1 or 2 yes or no questions.  Falls immediately back asleep.  HEENT: Normocephalic, atraumatic.   CARDIOVASCULAR: Regular , +1/6 murmur.  PULMONARY: Clear to auscultation bilaterally.  ABDOMINAL: Soft, non-tender, non-distended. Bowel sounds normoactive.   EXTREMITIES: No cyanosis or clubbing. No appreciable edema.  NEUROLOGICAL: Quite sedated.  Will awaken briefly.  Does not stay awake long enough to attempt to follow any commands.  DERMATOLOGICAL: No rash, ulcer, bruising, nor jaundice.          Data:   EKG:  Personally reviewed.  Sinus.  Q waves in lead III and aVF.  Also likely Q waves in lead II.    Laboratory:[KR1.1]    Recent Labs  Lab 09/24/18  1204 09/19/18  0639 09/18/18  0652   WBC 24.6* 18.9* 17.7*   HGB 12.2* 13.0* 13.0*   HCT 36.3* 38.7* 39.8*   MCV 89 91 92    291 256       Recent Labs  Lab 09/24/18  1204 09/19/18  0639 09/18/18  0652    140 142   POTASSIUM 4.3 4.0 3.9   CHLORIDE 104 110* 110*   CO2 23 23 23   ANIONGAP 9 7 9   * 108* 110*   BUN 33* 23 25   CR 1.84* 1.14 1.18   GFRESTIMATED 34* 60* 57*   GFRESTBLACK 42* 72 70   ARYAN 8.6 8.6 8.5[KR1.2]       Recent Labs  Lab 09/24/18  1203   CULT No growth after 3 hours  No growth after 3 hours       Imaging:  Recent Results (from the past 24 hour(s))   XR Chest 2 Views    Narrative    CHEST TWO VIEWS September 24, 2018 1:21 PM     HISTORY: Hypoxia. Cough.    COMPARISON: 9/16/2018.      Impression    IMPRESSION: Mild opacity at the left lung base has increased since the  previous exam, and may be related to  pneumonia and/or atelectasis. No  other significant interval change. Dual-lead cardiac device left chest  wall, with lead tips projected over the RA and RV. Small area of  ill-defined opacity in the left upper lung medially is unchanged.  Heart size appears stable. There is mild pulmonary venous congestion.    GERTRUDE ELIZABETH MD[KR1.1]            Revision History        User Key Date/Time User Provider Type Action    > KR1.2 9/24/2018  4:24 PM Corona Arguello DO Physician Sign     KR1.1 9/24/2018  4:00 PM Corona Arguello DO Physician                      Discharge Summaries      Discharge Summaries by Virginie Ferris MD at 10/4/2018 11:10 AM     Author:  Virginie Ferris MD Service:  Hospitalist Author Type:  Physician    Filed:  10/4/2018 12:29 PM Date of Service:  10/4/2018 11:10 AM Creation Time:  10/4/2018 10:57 AM    Status:  Addendum :  Virginie Ferris MD (Physician)         Discharge Summary  Hospitalist Service      Edison Boss MRN# [id number]   YOB: 1924 Age: 89 year old     Date of Admission:  9/24/2018  Date of Discharge:  10/4/2018  Admitting Physician:  Corona Arguello DO  Discharge Physician: Virginie Ferris MD   Discharging Service: Hospitalist Service     Primary Provider: Porfirio Terrell  Primary Care Physician Phone Number: [phone]         Discharge Diagnoses/Problem Oriented Hospital Course (Providers):    Edison Boss was admitted on 9/24/2018 by Corona Arguello DO and I would refer you to their history and physical.  The following problems were addressed during his hospitalization:    Summary of Stay: Edison Boss is a 89 year old male with a history of prior prostate cancer treated with brachy therapy and XRT, chronic indwelling tolliver catheter, paroxysmal AF (not anticoagulated) and SSS s/p ablation and dual chamber PPM, htn/hlp,  admitted on 9/24/2018 with encephalopathy felt most consistent with infectious process and  suspected pna complicated by hypoxia.   Recent PMH is notable for hospitalization at this facility 9/16/19 for pna and candidal UTI-discharged with appropriate levofloxacin and fluconazole.      He was placed on IV pip-tazo and vanco in the setting of sepsis but acutely decompensated on the morning of 9/27/18.  He had been complaining of some back pain in the setting of recent falling and thought that this was initially just MSK.  But given clinical decline CAP CT pursued and positive for obstructing left ureteral stone.  He was urgently taken for cysto and stent placement and since then has continued to improve.     His cultures are notable for heavy grown candida albicans from renal pelvic fluid in addition to yeast cultured from 1/2 blood cultures from day of admission.     My conclusion is that he had this ureteral stone for some time, and on prior admission was appropriately treated with fluconazole but given stone it was inadequate, and now that obstruction removed he can fully respond to therapy.  I think abx for pna likely red-herring and not true infection.  He's dramatically improved although remains deconditioned.      His hospitalization has been complicated by iatrogenic volume overload in the setting of appropriate treatment for sepsis.  He  responded well to IV furosemide and is euvolemic at discharge     He had hypoxic respiratory failure, likely MF in etiology-acute sepsis, pulmonary edema, and  suspected compressive atelectasis which has subsequently completely resolved     Problem List:   1. Fungemia: with candida- micafungin switched over to fluconazole 10/1.  At discharge can switch to oral for 2 more weeks at 400 mg every day, then decrease to 100 mg every day through stone removal and for a week after  2. pna vs renal pelvic infection: covered for nosocomial coverage with linezolid and pip-tazo.  Continued through weekend per weekend ID doc.  Most likely source though of sepsis is his yeast  (interestingly he was treated with fluconazole at discharge 9/19)-but doubt that could adequately take care of renal pelvis infection in setting of obstructing stone.  Linezolid discontinued 10/1, micafungin converted to fluconazole on 10/1, pip-tazo discontinued 10/3.  Discharge with 2 weeks fluconazole 400 mg, then 100 mg every day until urologic intervention and through any procedure and then for an additional week   3. Abdominal hernia:  Reducible so no indication for acute intervention  4. Iatrogenic volume overload with hypoxemia in the setting of G1dd:  Resolved with IV diuresis  5. G1dd:  With ongoing htn-start[KD1.1]ed BB  6. Elevated troponin:  Demand ischemia (Type 2 NSTEMI) in setting of sepsis, asa initiated, no indication for further evaluation.  Of note echo without wma[KD1.2]  7. Hypoxic respiratory failure:  Pulmonary edema and ? pna and atelectasis-resolved.  Per RN 94 % at rest 92 % with exertion so no need for discharge O2  8. Hematuria:  Mild-likely combination of factors including stone/tolliver/heparin. Would cont heparin as high risk for dvt given prolonged hospitalization but will not require this at discharge  9. MADELYN:  Resolved-back to wnl even in the setting of iv diuresis.  Suspect 2/2 ATN from sepsis  10. Sepsis:  Tachycardia/leukocytosis-2/2 candida infection behind obstructed ureter and fungemia.  Resolved  11. Htn:  pta meds amlodipine 5 mg-resumed as at home and BPs inadequately controlled - started metop 25 mg bid and BPs still on high side, will have close f/u in TCU, can easily titrate up on BB despite hx of SSS/bradycardia as have PPM in place  12. Depression:  Continued on paroxetine now that off linezolid  13. PAF/SSS paced rhythm by tele.  Not on chronic AC-started on asa this hospitalization   14. Thrombocytosis  A bit disconcerting as almost everything points to improvement but elevated platelet count (in the absence of PCV primary or secondary) is usually due to inflammatory  process.  now normalizing[KD1.1]  15. Pre-Op:  I have completed a history and physical exam, as long as patient remains stable this should serve as his pre procedural H and P.  He is moderate risk for complications in setting of age and MMP but clearly benefits outweigh the risks  DVT[KD1.3] Prophylaxis: Heparin SQ and Pneumatic Compression Devices  Code Status: DNR / DNI  Functional status:  Lives alone in senior apartment, walks with a walker    Discharge to TCU with PT/OT, close f/u for htn, complicated hospitalization.     Discussed with patient and dtr at the bedside            Code Status:      DNR / DNI         Important Results:      As noted below           Pending Results:        Unresulted Labs Ordered in the Past 30 Days of this Admission     No orders found from 7/26/2018 to 9/25/2018.               Discharge Instructions and Follow-Up:[KD1.1]      Follow-up Appointments     Follow Up and recommended labs and tests       F/U with Dr Lilly for definitive stone removal  Take fluconazoel 400 mg every day for 2 weeks then decrease to 100 mg a   day through stone removal procedure and for an additional week  F/U with [facility] MD/NP in 3-5 days for recheck of BP and BMP[KD1.4]                         Discharge Disposition:      Discharged to home          Discharge Medications:        Current Discharge Medication List      START taking these medications    Details   aspirin 81 MG EC tablet Take 1 tablet (81 mg) by mouth daily    Associated Diagnoses: NSTEMI (non-ST elevated myocardial infarction) (H)      metoprolol tartrate (LOPRESSOR) 25 MG tablet Take 1 tablet (25 mg) by mouth 2 times daily  Qty: 60 tablet    Associated Diagnoses: Benign essential hypertension         CONTINUE these medications which have CHANGED    Details   !! fluconazole (DIFLUCAN) 100 MG tablet Take 1 tablet (100 mg) by mouth daily Start taking 100 mg every day when you have completed the 2 weeks of 400 mg per day, you need to take 100 mg  per day through stone removal procedure and for about a week afterwards  Qty: 15 tablet, Refills: 0    Associated Diagnoses: Candidemia (H)      !! fluconazole (DIFLUCAN) 200 MG tablet Take 2 tablets (400 mg) by mouth daily for 14 days  Qty: 28 tablet, Refills: 0    Associated Diagnoses: Candida infection       !! - Potential duplicate medications found. Please discuss with provider.      CONTINUE these medications which have NOT CHANGED    Details   albuterol (PROVENTIL HFA: VENTOLIN HFA) 108 (90 BASE) MCG/ACT inhaler Inhale 2 puffs into the lungs every 6 hours as needed for shortness of breath / dyspnea.  Qty: 1 Inhaler, Refills: 1    Associated Diagnoses: Mild persistent asthma      amLODIPine (NORVASC) 5 MG tablet TAKE 1 TABLET(5 MG) BY MOUTH DAILY  Qty: 90 tablet, Refills: 3    Associated Diagnoses: Essential hypertension with goal blood pressure less than 140/90      cetirizine (ZYRTEC) 10 MG tablet Take 10 mg by mouth daily as needed      clobetasol (TEMOVATE) 0.05 % ointment daily as needed   Refills: 2      Colloidal Oatmeal (AVEENO ECZEMA THERAPY) 1 % CREA Externally apply topically daily as needed       D-MANNOSE POWD Take 100 mg by mouth daily 500 mg Pt takes 2 pills daily      lidocaine (LMX4) 4 % CREA 4% topical cream Apply topically daily as needed      Misc Natural Products (COLON CLEANSER PO) Take 1 capsule by mouth daily Advanced Colon Care II      Multiple Vitamins-Minerals (PRESERVISION AREDS) CAPS Take 1 capsule by mouth 2 times daily  Qty: 180 capsule, Refills: 3    Associated Diagnoses: Macular degeneration      omeprazole (PRILOSEC) 20 MG CR capsule Take 20 mg by mouth daily as needed      PARoxetine (PAXIL) 30 MG tablet Take 15 mg by mouth At Bedtime         STOP taking these medications       oxyCODONE-acetaminophen (PERCOCET) 5-325 MG per tablet Comments:   Reason for Stopping:                    Allergies:         Allergies   Allergen Reactions     Ceftriaxone Itching     Bactrim Hives      Levaquin Diarrhea     Oral only, can tolerate IV     Zithromax [Azithromycin]      Macrodantin [Nitrofuran Derivatives] Rash     Took recently with no problems            Consultations This Hospital Stay:      Consultation during this admission received from gastroenterology, infectious disease, surgery, urology and palliative care          Condition and Physical Exam on Discharge:      Discharge condition: Stable   Discharge vitals: Blood pressure 166/76, pulse 60, temperature 97.6  F (36.4  C), temperature source Oral, resp. rate 18, weight 76.2 kg (168 lb 1.6 oz), SpO2 92 %.     Constitutional: Pleasant nad looks stated age head nc/at sclera clear   Lungs: ctab nl effort good inspiration no atelectatic crackles anymore   Cardiovascular: rrr soft systolic m no r/g no le edema   Abdomen: S/nt/nd   Skin: W/d no c/c    Other: Alert and oriented affect appropriate cano ambulating with assistance, remains fatigued           Discharge Orders for Skilled Facility (from Discharge Orders):        After Care Instructions     Activity - Up with nursing assistance           Advance Diet as Tolerated       Follow this diet upon discharge: Orders Placed This Encounter      Snacks/Supplements Adult: Other; Chocolate Plus2 shake, mix w/2 packets beneprotein; With Meals      Advance Diet as Tolerated: Regular Diet Adult            Fall precautions           [place] catheter       To straight gravity drainage. Change catheter every 2 weeks and PRN for leaking or decreased uring output with signs of bladder distention. DO NOT change catheter without a specific MD order IF diagnosis of benign prostatic hypertrophy (BPH), neurogenic bladder, or other urological conditions            General info for SNF       Length of Stay Estimate: Short Term Care: Estimated # of Days <30  Condition at Discharge: Improving  Level of care:skilled   Rehabilitation Potential: Fair  Admission H&P remains valid and up-to-date: Yes  Recent Chemotherapy:  N/A  Use Nursing Home Standing Orders: Yes            Mantoux instructions       Give two-step Mantoux (PPD) Per Facility Policy Yes                           Rehab orders for Skilled Facility (from Discharge Orders):      Referrals     Future Labs/Procedures    Occupational Therapy Adult Consult     Comments:    Evaluate and treat as clinically indicated.    Reason:  deconditioning    Physical Therapy Adult Consult     Comments:    Evaluate and treat as clinically indicated.    Reason:  deconditioning             Discharge Time:      Greater than 30 minutes.        Image Results From This Hospital Stay (For Non-EPIC Providers):        Results for orders placed or performed during the hospital encounter of 09/24/18   XR Chest 2 Views    Narrative    CHEST TWO VIEWS September 24, 2018 1:21 PM     HISTORY: Hypoxia. Cough.    COMPARISON: 9/16/2018.      Impression    IMPRESSION: Mild opacity at the left lung base has increased since the  previous exam, and may be related to pneumonia and/or atelectasis. No  other significant interval change. Dual-lead cardiac device left chest  wall, with lead tips projected over the RA and RV. Small area of  ill-defined opacity in the left upper lung medially is unchanged.  Heart size appears stable. There is mild pulmonary venous congestion.    GERTRUDE ELIZABETH MD   CT Head w/o Contrast    Narrative    CT SCAN OF THE HEAD WITHOUT CONTRAST   9/24/2018 5:34 PM     HISTORY: Altered mental status. Left-sided weakness.    TECHNIQUE: Axial images of the head and coronal reformations without  IV contrast material. Radiation dose for this scan was reduced using  automated exposure control, adjustment of the mA and/or kV according  to patient size, or iterative reconstruction technique.    COMPARISON: 9/16/2018    FINDINGS: There is generalized atrophy of the brain. There is low  attenuation in the white matter of the cerebral hemispheres consistent  with sequelae of small vessel ischemic  disease. There is no evidence  of intracranial hemorrhage, mass, acute infarct or anomaly.     The visualized portions of the sinuses and mastoids appear normal.  There is no evidence of trauma.      Impression    IMPRESSION:   1. No acute abnormality.  2. Atrophy of the brain. White matter changes consistent with sequelae  of small vessel ischemic disease. This is unchanged.    DANILO ALFONSO MD   US Lower Extremity Venous Duplex Bilateral    Narrative    BILATERAL LOWER EXTREMITY VENOUS DOPPLER ULTRASOUND  9/24/2018 7:12 PM    HISTORY: Hypoxia with elevated D-dimer. The concern is for deep venous  thrombosis.    COMPARISON: None.    FINDINGS: Color flow and Doppler spectral waveform analysis  demonstrates normal blood flow in the common femoral, femoral,  popliteal, posterior tibial, and greater saphenous veins of the lower  extremities bilaterally. No thrombus is seen.      Impression    IMPRESSION: There is no evidence of deep venous thrombosis within the  lower extremities bilaterally.    MELODY DE LEON MD   NM Lung Scan Ventilation and Perfusion    Narrative    NUCLEAR MEDICINE LUNG PERFUSION SCAN AND VENTILATION SCAN 9/24/2018  7:41 PM     HISTORY: Hypoxia with elevated D-dimer.    COMPARISON: Radiographs earlier today.    TECHNIQUE: Tc-99m MAA injected intravenously for evaluation of  pulmonary perfusion. Tc-99m DTPA inhaled for the ventilation phase.    DOSE: 3 mCi Tc99m MAA via IV at 1905; 64.8 mCi Tc99m DTPA inhaled at  1920.    FINDINGS: Normal radiotracer distribution throughout both lungs on the  ventilation and perfusion scans. No unmatched perfusion defects to  suggest pulmonary emboli.      Impression    IMPRESSION: Normal lung ventilation and perfusion scan. No  scintigraphic evidence of pulmonary embolism.    MELODY DE LEON MD   XR Chest Port 1 View    Narrative    CHEST ONE VIEW PORTABLE  9/26/2018 10:45 AM     HISTORY: Hypoxia.     COMPARISON: 9/24/2018      Impression    IMPRESSION:  Left subclavian cardiac device remains in place. The  cardiac silhouette is enlarged and stable. Improved pulmonary vascular  congestion. Persistent left basilar infiltrate or atelectasis. There  is also mild atelectasis at the right lung base. No new pulmonary  opacities are seen.    IRVING COPE MD   CT Chest Abdomen Pelvis w/o Contrast    Narrative    CT CHEST, ABDOMEN AND PELVIS WITHOUT CONTRAST   9/27/2018 11:59 AM     HISTORY: Sepsis.     TECHNIQUE: No IV contrast. Radiation dose for this scan was reduced  using automated exposure control, adjustment of the mA and/or kV  according to patient size, or iterative reconstruction technique.    COMPARISON: Abdomen 8/28/2018    FINDINGS:   Chest: Cardiac pacer. There appear to be 3-4 right thyroid nodules,  measuring up to 1.4 cm. Aortic and coronary artery calcifications.  Mild adenopathy within the mediastinum and lower neck. Mild-moderate  bilateral pleural effusions (left greater than right). There is  adjacent lung consolidation-collapse, which could simply represent  atelectasis, but pneumonia cannot be excluded. There is mild bilateral  interstitial prominence as well as bilateral groundglass opacity. This  is nonspecific but the differential diagnosis would include  interstitial edema and interstitial pneumonia.    Abdomen, pelvis: There appears to be a small gallstone. There is also  a stone in the distal common bile duct which is elongated and measures  10 mm in length. Mild common bile duct dilatation with a diameter of  12 mm. Bladder catheter. Prostate region radiation seed implants.  Again there is a fat-containing left inguinal hernia. There is a  prominent right inguinal hernia; this is increased and contains  multiple small bowel loops without wall thickening or significant  obstruction. There is nonspecific presacral edema. Again there is a  large left renal cyst. Minimal free fluid in the lower pelvis. Colonic  diverticulosis. Again there is a  "low density left adrenal nodule; the  density measurement suggest a benign adenoma. There are left renal and  calyceal stones. There is a 13 mm stone within the proximal left  ureter with hydronephrosis.      Impression    IMPRESSION:  1. Mild-moderate pleural effusions, left greater than right.  2. Bilateral interstitial prominence and groundglass opacity. This is  nonspecific but could represent interstitial edema or interstitial  pneumonia.  3. Mild mediastinal and lower neck adenopathy.  4. 10 mm common bile duct stone with mild CBD dilatation.  5. 13 mm proximal left ureter stone with obstruction.  6. Bilateral inguinal hernias.  7. Minimal lower pelvic fluid.  8. Thyroid nodules.  9. Additional findings discussed above.    SERGEI SAHU MD   XR Surgery WHITNEY Fluoro L/T 5 Min w Stills    Narrative    SURGERY C-ARM FLUOROSCOPY LESS THAN FIVE MINUTES WITH STILLS   9/27/2018 3:18 PM     COMPARISON: CT 9/27/2018.    HISTORY: Left stent placement, left stones.    NUMBER OF IMAGES ACQUIRED: 2    VIEWS: 1    FLUOROSCOPY TIME: .2 minutes.      Impression    IMPRESSION: Left retrograde ureterogram demonstrates a filling defect  in the proximal left ureter. This probably corresponds to the  obstructing stone seen on prior CT. There is moderate left  hydronephrosis. Final image demonstrates stent that appears to be in  good position. Please see operative report for further details.    RICKEY LOPEZ MD   XR Chest Port 1 View    Narrative    CHEST ONE VIEW PORTABLE   9/28/2018 9:52 AM     HISTORY: Hypoxia.     COMPARISON: 9/26/2018.      Impression    IMPRESSION: There is perihilar \"batwing\" interstitial opacities  suspicious for acute pulmonary edema. Heart size similar to prior.  There are small bilateral pleural effusions. No pneumothorax.  Left-sided pacer device noted.    WILL BRICENO MD   XR Chest Port 1 View    Narrative    CHEST ONE VIEW PORTABLE  9/29/2018 9:38 AM     HISTORY:  Hypoxia.     COMPARISON: 9/28/2018 " chest x-ray.      Impression    IMPRESSION: Slight improvement in perihilar infiltrates likely  representing improving pulmonary edema. There is however a new  moderate left pleural effusion and associated atelectasis. There is  also a new mild right pleural effusion.    OLIVA PAN MD   Cysto 9/27/18  DATE OF SERVICE: 9/27/2018  PREOPERATIVE DIAGNOSIS: Left Ureteral Stone  POSTOPERATIVE DIAGNOSIS: Left Ureteral Stone    PROCEDURES PERFORMED:   1. Cystourethroscopy  2. Left retrograde pyelography with interpretation of intraoperative fluoroscopic imaging  3. Left ureteral stent placement[KD1.1]    Echo 9/25/18  Interpretation Summary     The left ventricle is normal in structure, function and size.  The left ventricular ejection fraction is normal.G1 dd present  There is trace to mild tricuspid regurgitation.  Aortic sclerosis but no significant valve issues.  Pulmonary hypertension.  Compared to the last echo done 1/2009 the pulmonary pressure is higher.[KD1.2]      Most Recent Lab Results In EPIC (For Non-EPIC Providers):    Most Recent 3 CBC's:  Recent Labs   Lab Test  10/04/18   0558  10/03/18   0624  10/02/18   0639   WBC  9.3  10.3  9.5   HGB  11.9*  11.7*  12.1*   MCV  91  91  92   PLT  389  463*  491*   Troponin:  0.061/0.063  Most Recent 3 BMP's:  Recent Labs   Lab Test  10/04/18   0558  10/03/18   0624  10/02/18   0639   NA  140  140  140   POTASSIUM  4.0  3.6  3.8   CHLORIDE  106  105  107   CO2  30  30  28   BUN  14  13  12   CR  1.03  1.14  1.07   ANIONGAP  4  5  5   ARYAN  8.7  8.9  8.7   GLC  88  88  85     Most Recent 3 INR's:  Recent Labs   Lab Test  08/28/18   0946 09/24/15 09/03/15   INR  1.09  1.8*  2.2*     Most Recent 2 LFT's:  Recent Labs   Lab Test  10/03/18   0624  09/29/18   0652   AST  28  34   ALT  29  33   ALKPHOS  112  136   BILITOTAL  0.4  0.6     Most Recent Cholesterol Panel:  Recent Labs   Lab Test  09/25/18   0602   CHOL  96   LDL  54   HDL  19*   TRIG  113     Most Recent 6  Bacteria Isolates From Any Culture (See EPIC Reports for Culture Details):  Recent Labs   Lab Test  09/28/18   1332  09/28/18   1331  09/27/18   1512  09/24/18   2050  09/24/18   1203  09/16/18   2303   CULT  No growth  No growth  No anaerobes isolated  Heavy growth  Denise albicans / dubliniensis  Candida albicans and Candida dubliniensis are not routinely speciated  Susceptibility testing not routinely done  *  <1000 colonies/mL  urogenital jeanette  Susceptibility testing not routinely done    Cultured on the 3rd day of incubation:  Candida albicans  *  Critical Value/Significant Value, preliminary result only, called to and read back by  Willow Nesbitt RN, @[id number] 09/27/18.DH.    No growth  No growth[KD1.1]          Revision History        User Key Date/Time User Provider Type Action    > KD1.3 10/4/2018 12:29 PM Virginie Ferris MD Physician Addend     KD1.2 10/4/2018 11:13 AM Virginie Ferris MD Physician Addend     KD1.4 10/4/2018 11:10 AM Virginie Ferris MD Physician Sign     KD1.1 10/4/2018 10:57 AM Virginie Ferris MD Physician                      Consult Notes      Consults by Radha Still PA-C at 9/28/2018  9:43 AM     Author:  Radha Still PA-C Service:  Gastroenterology Author Type:  Physician Assistant    Filed:  9/28/2018  9:53 AM Date of Service:  9/28/2018  9:43 AM Creation Time:  9/28/2018  9:42 AM    Status:  Attested :  Radha Still PA-C (Physician Assistant)    Cosigner:  Zachariah Antunez MD at 9/28/2018  3:14 PM         Consult Orders:    1. Gastroenterology IP Consult: common duct stone.  Having Surgery for ureteral stone.  ? if ERCP needed; Consultant may enter orders: Yes; Patient to be seen: Routine - within 24 hours; Requested Clinic/Group: [facility] [[id number]] ordered by Boubacar Means MD at 09/27/18 1328           Attestation signed by Zachariah Antunez MD at 9/28/2018  3:14 PM        GI/Hepatology Staff addendum  This patient was seen and  examined by myself, please see my findings as outlined below.    Per the patient and his daughter, he does not have any abdominal pain, nausea or vomiting, or any other symptoms that they attribute to the gallbladder.    Physical Exam:   General Appearance: alert, oriented x3, no acute distress.  /49 (BP Location: Right arm)  Pulse 62  Temp 96  F (35.6  C) (Axillary)  Resp 16  Wt 82.1 kg (181 lb 1.6 oz)  SpO2 95%  BMI 25.99 kg/m2:   Eye: PERRL, sclera anicteric.  GI: Soft, NABS, NT/ND.       COMMENTS: reviewed the patient's new clinical lab test results.   Recent Labs   Lab Test  09/28/18   0613  09/27/18   0730  09/26/18   0707   08/28/18   0946  09/24/15 09/03/15   WBC  24.4*  34.4*  24.0*   < >  8.7   < >   --    --    HGB  11.6*  13.3  13.5   < >  14.9   < >   --    --    MCV  92  91  92   < >  91   < >   --    --    PLT  411  441  392   < >  283   < >   --    --    INR   --    --    --    --   1.09   --   1.8*  2.2*    < > = values in this interval not displayed.     Recent Labs   Lab Test  09/28/18   0613  09/27/18   0730  09/26/18   0707   POTASSIUM  4.1  4.1  4.3   CHLORIDE  107  106  108   CO2  27  26  24   BUN  25  28  28   ANIONGAP  6  8  8     Recent Labs   Lab Test  09/28/18   0613  09/25/18   0602  09/24/18   2050  09/16/18   2208  09/16/18   2102  08/28/18   1240   08/25/18   2031   05/21/13   1735   11/29/12   2112   ALBUMIN  1.7*  2.1*   --    --   3.2*   --    --   3.8   < >  3.9   < >  4.1   BILITOTAL  0.5  0.6   --    --   1.0   --    --   0.5   < >  1.2   < >  0.8   ALT  25  31   --    --   27   --    --   19   < >  48   < >  29   AST  22  23   --    --   44   --    --   18   < >  70*   < >  35   PROTEIN   --    --   Negative  100*   --   Negative   < >   --    < >   --    --   30*   LIPASE   --    --    --    --    --    --    --   184   --   140   --   116    < > = values in this interval not displayed.     Reviewed the patient's pertinent imaging results.    Assessment and Plan:  89 year old male with multiple medical problems, incidentally found to have choledocholithiasis.  No abdominal symptoms, NL LFTs, no evidence of obstruction.  Given the size of this stone it has likely been there for years, no role for intervention unless evidence of obstruction.  Discussed with Dr. Curry.  Will sign off, please call with questions.    Zachariah Antunez MD                               GASTROENTEROLOGY CONSULTATION      Edison Boss  [address]   [facility] [address]  89 year old male     Admission Date/Time: 9/24/2018  Primary Care Provider: Porfirio Terrell  Referring / Attending Physician: Dr. Means     We were asked to see the patient in consultation by Dr. Means for evaluation of biliary stone.        HPI:  Edison Boss is a 89 year old male with medical history of stroke, atrial fibrillation, prostate cancer, hypertension, chronic pain syndrome who presented to the hospital with confusion.    Gastroenterology was consulted after CT of the abdomen and pelvis showed a common bile duct stone without obvious obstruction.    Patient is currently being monitored as he continues to have acute encephalopathy.  This is thought to be related to pneumonia.  He then developed acute hypoxic respiratory failure and abdominal pain.  He has a large right inguinal hernia which was not reducible.  Which was unable to be reduced.  General surgery did not believe him to be a good candidate.  Patient is unable to report his symptoms currently.  At one point he denies abdominal pain and then upon further questioning he says yes he has abdominal pain.     From a GI standpoint the patient's liver function tests are normal.  It is unlikely that the stone in the bile duct is causing any current symptoms.        PAST MEDICAL HISTORY:  Patient Active Problem List    Diagnosis Date Noted     Acute encephalopathy 09/24/2018     Priority: Medium     Pneumonia 09/17/2018     Priority: Medium      MCI (mild cognitive impairment) 10/20/2016     Priority: Medium     Patient with history of cerebral infarct: now with mild cognitive changes affecting executive function compounded by depression       RAMÓN (generalized anxiety disorder) 05/05/2016     Priority: Medium     Sinus node dysfunction (H)      Priority: Medium     Pacemaker      Priority: Medium     Cerebral infarction (H) 02/14/2014     Priority: Medium     Diagnosis updated by automated process. Provider to review and confirm.       SBO (small bowel obstruction) 11/30/2012     Priority: Medium     Chronic atrial fibrillation (H) 07/02/2012     Priority: Medium     HYPERLIPIDEMIA LDL GOAL <100 10/31/2010     Priority: Medium     Restless legs syndrome (RLS) 11/14/2007     Priority: Medium     Insomnia 04/04/2007     Priority: Medium     Problem list name updated by automated process. Provider to review       Mild persistent asthma      Priority: Medium     Takes Singulair, Spiriva        Essential hypertension with goal blood pressure less than 140/90 06/06/2006     Priority: Medium     Problem list name updated by automated process. 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 Provider to review       Other specified disorder of skin 09/01/2004     Priority: Medium     Malignant neoplasm of prostate (H) 08/14/2002     Priority: Medium          ROS: A comprehensive ten point review of systems was negative aside from those in mentioned in the HPI.       MEDICATIONS:   Prior to Admission medications    Medication Sig Start Date End Date Taking? Authorizing Provider   albuterol (PROVENTIL HFA: VENTOLIN HFA) 108 (90 BASE) MCG/ACT inhaler Inhale 2 puffs into the lungs every 6 hours as needed for  shortness of breath / dyspnea. 12  Yes Porfirio Terrell MD   amLODIPine (NORVASC) 5 MG tablet TAKE 1 TABLET(5 MG) BY MOUTH DAILY 9/15/18  Yes Porfirio Terrell MD   cetirizine (ZYRTEC) 10 MG tablet Take 10 mg by mouth daily as needed   Yes Reported, Patient   clobetasol (TEMOVATE) 0.05 % ointment daily as needed  14  Yes Lewis Nuñez   Colloidal Oatmeal (AVEENO ECZEMA THERAPY) 1 % CREA Externally apply topically daily as needed    Yes Reported, Patient   D-MANNOSE POWD Take 100 mg by mouth daily 500 mg Pt takes 2 pills daily 11/24/10  Yes Porfirio Terrell MD   fluconazole (DIFLUCAN) 200 MG tablet Take 1 tablet (200 mg) by mouth daily for 12 days 9/20/18 10/2/18 Yes Red Zamora MD   lidocaine (LMX4) 4 % CREA 4% topical cream Apply topically daily as needed   Yes Reported, Patient   Misc Natural Products (COLON CLEANSER PO) Take 1 capsule by mouth daily Advanced Colon Care II   Yes Reported, Patient   Multiple Vitamins-Minerals (PRESERVISION AREDS) CAPS Take 1 capsule by mouth 2 times daily 18  Yes Porfirio Terrell MD   omeprazole (PRILOSEC) 20 MG CR capsule Take 20 mg by mouth daily as needed   Yes Unknown, Entered By History   oxyCODONE-acetaminophen (PERCOCET) 5-325 MG per tablet Take 1 tablet by mouth every 4 hours as needed for pain for pain. 8/10/17  Yes Porfirio Terrell MD        ALLERGIES:   Allergies   Allergen Reactions     Ceftriaxone Itching     Bactrim Hives     Levaquin Diarrhea     Oral only, can tolerate IV     Zithromax [Azithromycin]      Macrodantin [Nitrofuran Derivatives] Rash     Took recently with no problems        SOCIAL HISTORY:  Social History   Substance Use Topics     Smoking status: Former Smoker     Packs/day: 1.00     Years: 40.00     Smokeless tobacco: Never Used      Comment: QUIT      Alcohol use No        FAMILY HISTORY:  Family History   Problem Relation Age of Onset     Family History Negative Father       age 91     HEART DISEASE Mother       pacemaker     Family History Negative Mother       in her sleep 103     Cancer Brother      oldest brother - lung cancer,  age 74     Cancer Brother      3rd brother - lymphoma,  age 76     Cancer Brother      2nd brother (Carrington)- prostate cancer, born 1920.      Cerebrovascular Disease Brother      Brother (Carrington), born in [birth year]        PHYSICAL EXAM:     /56 (BP Location: Right arm)  Pulse 62  Temp 96  F (35.6  C) (Axillary)  Resp 16  Wt 82.1 kg (181 lb 1.6 oz)  SpO2 94%  BMI 25.99 kg/m2     PHYSICAL EXAM:  GENERAL: NAD  SKIN: no suspicious lesions, rashes  HEAD: Normocephalic. Atraumatic.  NECK: Neck supple. No adenopathy.   EYES: No scleral icterus  RESPIRATORY: poor transmission. Decreased bilaterally.   CARDIOVASCULAR: RRR, normal S1, S2,  GASTROINTESTINAL: +BS, soft, non tender, non distended  JOINT/EXTREMITIES:  no gross deformities noted, normal muscle tone  NEURO: CN 2-12 grossly intact, no focal deficits        ADDITIONAL COMMENTS:   I reviewed the patient's new clinical lab test results.   Recent Labs   Lab Test  18   0613  18   0730  18   0707   18   0946  09/24/15 09/03/15   WBC  24.4*  34.4*  24.0*   < >  8.7   < >   --    --    HGB  11.6*  13.3  13.5   < >  14.9   < >   --    --    MCV  92  91  92   < >  91   < >   --    --    PLT  411  441  392   < >  283   < >   --    --    INR   --    --    --    --   1.09   --   1.8*  2.2*    < > = values in this interval not displayed.     Recent Labs   Lab Test  18   0613  18   0730  18   0707   POTASSIUM  4.1  4.1  4.3   CHLORIDE  107  106  108   CO2  27  26  24   BUN  25  28  28   ANIONGAP  6  8  8     Recent Labs   Lab Test  18   0613  18   0602  180  18   2208  18   2102  18   1240   18   2031   13   1735   12   2112   ALBUMIN  1.7*  2.1*   --    --   3.2*   --    --   3.8   < >  3.9   < >  4.1   BILITOTAL  0.5  0.6   --    --   1.0    --    --   0.5   < >  1.2   < >  0.8   ALT  25  31   --    --   27   --    --   19   < >  48   < >  29   AST  22  23   --    --   44   --    --   18   < >  70*   < >  35   PROTEIN   --    --   Negative  100*   --   Negative   < >   --    < >   --    --   30*   LIPASE   --    --    --    --    --    --    --   184   --   140   --   116    < > = values in this interval not displayed.        IMAGING / ENDOSCOPY    CT CHEST, ABDOMEN AND PELVIS WITHOUT CONTRAST   9/27/2018 11:59 AM      IMPRESSION:  1. Mild-moderate pleural effusions, left greater than right.  2. Bilateral interstitial prominence and groundglass opacity. This is  nonspecific but could represent interstitial edema or interstitial  pneumonia.  3. Mild mediastinal and lower neck adenopathy.  4. 10 mm common bile duct stone with mild CBD dilatation.  5. 13 mm proximal left ureter stone with obstruction.  6. Bilateral inguinal hernias.  7. Minimal lower pelvic fluid.  8. Thyroid nodules.  9. Additional findings discussed above.     CONSULTATION ASSESSMENT AND PLAN:    Edison Boss is a 89 year old male with medical history of stroke, atrial fibrillation, prostate cancer, hypertension, chronic pain syndrome who presented to the hospital with confusion found to have acute encephalopathy and pneumonia.    1.  Stone in common bile duct: normal LFTs and lipase.  This does not appear to be an obstructing stone.  Given his multiple comorbidities and worsening respiratory status no intervention at this time.   He is afebrile.  White count is 24.  He is currently being treated with multiple antibiotics and antifungals for pneumonia.      -- We will no longer follow.  Please call with questions or change in GI condition      I discussed the patient plan with Dr. Antunez. Thank you for asking us to participate in the care of this patient.    Shanita Still PA-C  [facility][KK1.1]       Revision History        User Key Date/Time User Provider Type  "Action    > KK1.1 9/28/2018  9:53 AM Radha Still PA-C Physician Assistant Sign            Consults by Viridiana Anaya APRN CNS at 9/28/2018 11:15 AM     Author:  Viridiana Anaya APRN CNS Service:  Palliative Author Type:  Clinical Nurse Specialist    Filed:  9/28/2018  1:36 PM Date of Service:  9/28/2018 11:15 AM Creation Time:  9/28/2018 11:15 AM    Status:  Signed :  Viridiana Anaya APRN CNS (Clinical Nurse Specialist)         [facility]    Palliative Care Consultation   Text Page    Date of Admission:  9/24/2018[AK1.1]    Assessment & Plan[AK1.2]   Edison Boss is a 89 year old male who was admitted on 9/24/2018. I was asked to see the patient for[AK1.1] goals of care in setting of \"declining clinical condition\" which has overnight improved with subsequent identification of kidney stone and ureteral stent placement.[AK1.3]    Recommendations:  1.Decisional Capacity -[AK1.1]  Questionable, mental status improving.[AK1.3].[AK1.1] Patient has an advance directive per family but it is not available in our system despite family reports they have brought it in multiple times.  They note daughter Red in [facility] and Dtr Bea is alternate.  Include patient in decision making as much as possible but involve health care agent attempting consensus with patient. Until document is validated per  policy next of kin would be agents, Patient is , he has 8 adult children, who appear to agree to have Red and Bea be spokespeople for the family.[AK1.3]   2. Pain-[AK1.1] Intractable back pain reported yesterday, now denies.  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  Now reported chronically well managed with yearly botox injections.   3. Delirium- Multifactorial due to pain, infection, improving.    4[AK1.3]. Spiritual Care-[AK1.1] Oriented to Spiritual Health Services as part of Palliative Care team.  5[AK1.3]. Care " Planning-[AK1.1] Open to services with attempt to return to baseline.[AK1.3]    Goal of Care:[AK1.1]DNR/DNI, Restorative.  Selective treatment, uninterested in testing only for a diagnosis that may not be acted upon, for instance, unclear as previously planned if video swallow study should continue to be pursued in setting of improvement and now explainable cause of presenting symptoms.     Disease Process/es & Symptoms:  Edison Boss is a 89 year old patient admitted with symptoms of acute encephalopathy, initially of unclear cause, he has been treated for CAP and potential aspiration, possible HF exacerbation, MADELYN and severe pain who was ultimately found to have an obstructing left proximal ureteral stone and now POD#1 left ureteral stent placement. With resolution of pain and improvement in mental status. He continues to be receive broad coverage for possible nosocomial pneumonia.     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  He lives independently at baseline, noted needed more assist from adult children checking in after his wife passed away in May.  HE is noted to have declined in functional status quickly over the past 2 weeks including increased weakness and falls, as well as change in mentation.  HE was hospitalized earlier this month as a part of this decline, but previously not since January for a pacemaker.  He has no noted weight loss.    Findings & plan of care discussed with: Nursing and daughters Jackie.  Follow-up plan from palliative team: Will continue to follow if patient is still hospitalized on Monday.  HAve requested advance directive to place on file.  Thank you for involving us in the patient's care.[AK1.3]     Viridiana Anaya[AK1.2] APRN, CNS, CNP[AK1.3]  Pain Management and Palliative Care  [facility]  Pgr:  [phone][AK1.1]    Time Spent on this Encounter[AK1.2]   I spent[AK1.1]  60[AK1.3] minutes[AK1.1] total, >50%[AK1.3] in assessment of the patient, counseling and discussion with the patient and family as documented in[AK1.1] this note and[AK1.3] coordination of care and discussion with the health care team.[AK1.1]    Reason for Consult[AK1.2]   Reason for consult: I was asked by[AK1.1] Dr. Barron[AK1.3] to evaluate this patient for[AK1.1] Goals of care  Patient and family support.[AK1.3]    Primary Care Physician   Porfirio Terrell MD    Chief Complaint[AK1.2]   None today    History is obtained from the patient, electronic health record and patient's daughters Elton.[AK1.3]    History of Present Illness[AK1.2]   Edison Boss is a 89 year old male who presents with[AK1.1] symptoms of acute encephalopathy, initially of unclear cause, he has been treated for CAP and potential aspiration, possible HF exacerbation, MADELYN and severe pain who was ultimately found to have an obstructing left proximal ureteral stone and now POD#1 left ureteral stent placement. With resolution of pain and improvement in mental status. He continues to be receive broad coverage for possible nosocomial pneumonia.     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  He lives independently at baseline, noted needed more assist from adult children checking in after his wife passed away in May.  HE is noted to have declined in functional status quickly over the past 2 weeks including increased weakness and falls, as well as change in mentation.  HE was hospitalized earlier this month as a part of this decline, but previously not since January for a pacemaker.  He has no noted weight loss.[AK1.3]    The following symptoms are noted by patient as concerning to his quality of life.[AK1.1]  Pain -  "Denies today, with a smile as reports back pain was severe yesterday.    Dyspnea - Denies, although appears mildly labored with breathing.   Fatigue   Delirium- still some difficulty \"thinking clearly\"    D[AK1.3]ecision-Making & Goals of Care:  Discussed on September 28, 2018 with[AK1.1] iVridiana Anaya[AK1.2] APRN, CNS, CNP[AK1.3]:[AK1.1]   Discussed with Patient's daughters Red and Bea and another son at patient's bedside.  Patient participates little as he drifts off to sleep.  They reviewed patient decline in function and health over the past 2 weeks and feeling as if the decline was fast.  They note patient has needed more help since his wife passed away this Spring, but remaining strong.  They reviewed some of his history of chronic pain and treatment for prostate cancer.  They reviewed a very distressing day yesterday with patient's escalating pain and delirium and concern of not finding a cause until finally the stone was found and surgery offered.  They are pleased with his recovery today and hopeful he can improve enough to return to his baseline, but also aware this may be a setback.  They note goal of recovery but also not wanting tests that do not improve his current status and now are just looking for more problems, as an example a carotid ultrasound that was at some point this visit or another offered, and now with improvement a video swallow if swallow may improve on its own.    Family agrees to bring in advance directive although note it has been brought into the hospital multiple times.[AK1.4]       Patient has decision-making capacity[AK1.1] Questionable[AK1.4]  Patient has[AK1.1] no known legal document designating a decision maker. Per policy next of kin is the designated decision maker currently available. See System Informed Consent policy for guidance.  There is no POLST (Physician orders for life-sustaining treatment) form on file for this patient[AK1.4]  Code Status:[AK1.1] Do not " resusitate / Do not intubate[AK1.4]     Past Medical History[AK1.2]   I have reviewed this patient's medical history and updated it with pertinent information if needed.[AK1.4]   Past Medical History:   Diagnosis Date     Calculus of kidney     in dwelling [place]     Chronic infection     UTI     Chronic pain     lower pain, perineum     Esophageal reflux      Hyperlipidaemia      Hypertension      Malignant neoplasm of prostate (H) 8/14/2002    Brachytherapy, then external radiation. chronic indwelling catheter     Mild persistent asthma     with URI     Paroxysmal a-fib (H)     paroxysmal     Sinus node dysfunction (H)     sp DDD PM     Sleep apnea     ?   snores     Unspecified cerebral artery occlusion with cerebral infarction[AK1.5]        Past Surgical History[AK1.2]   I have reviewed this patient's surgical history and updated it with pertinent information if needed.[AK1.4]  Past Surgical History:   Procedure Laterality Date     C NONSPECIFIC PROCEDURE  1980's    Kidney stone surgery     C NONSPECIFIC PROCEDURE  1985, 5/2005    TURP x 2     C NONSPECIFIC PROCEDURE  1/2002    Brachytherapy     C NONSPECIFIC PROCEDURE  ~1999    Left rotator cuff repair     C NONSPECIFIC PROCEDURE      Bilateral cataract surgery     C NONSPECIFIC PROCEDURE      EGD/dilation twice     CYSTOSCOPY       CYSTOSCOPY, INJECT COLLAGEN, COMBINED  6/6/2012    Procedure:COMBINED CYSTOSCOPY, INJECT BULKING AGENT; VIDEO CYSTOSCOPY WITH BOTOX INJECTIONS  ; Surgeon:BRIAN ADAN; Location: OR     CYSTOSCOPY, INJECT COLLAGEN, COMBINED  3/22/2013    Procedure: COMBINED CYSTOSCOPY, INJECT BULKING AGENT;  VIDEO CYSTOSCOPY, BOTOX INJECTION;  Surgeon: Brian Adan MD;  Location:  OR     CYSTOSCOPY, INJECT COLLAGEN, COMBINED  8/8/2014    Procedure: COMBINED CYSTOSCOPY, INJECT BULKING AGENT;  Surgeon: Biran Adan MD;  Location:  OR     CYSTOSCOPY, INJECT COLLAGEN, COMBINED N/A 8/12/2015    Procedure: COMBINED CYSTOSCOPY, INJECT  BULKING AGENT;  Surgeon: Michael Lilly MD;  Location: [facility] OR     CYSTOSCOPY, INJECT COLLAGEN, COMBINED N/A 12/8/2016    Procedure: COMBINED CYSTOSCOPY, INJECT BULKING AGENT;  Surgeon: Michael Lilly MD;  Location: [facility] OR     HC REMOVE TONSILS/ADENOIDS,<11 Y/O  ~1928    T & A <12y.o.     IMPLANT PACEMAKER       PENIS SURGERY       PROSTATE SURGERY[AK1.5]         Prior to Admission Medications   Prior to Admission Medications   Prescriptions Last Dose Informant Patient Reported? Taking?   Colloidal Oatmeal (AVEENO ECZEMA THERAPY) 1 % CREA   Yes Yes   Sig: Externally apply topically daily as needed    D-MANNOSE POWD   Yes Yes   Sig: Take 100 mg by mouth daily 500 mg Pt takes 2 pills daily   Misc Natural Products (COLON CLEANSER PO)   Yes Yes   Sig: Take 1 capsule by mouth daily Advanced Colon Care II   Multiple Vitamins-Minerals (PRESERVISION AREDS) CAPS   No Yes   Sig: Take 1 capsule by mouth 2 times daily   albuterol (PROVENTIL HFA: VENTOLIN HFA) 108 (90 BASE) MCG/ACT inhaler   No Yes   Sig: Inhale 2 puffs into the lungs every 6 hours as needed for shortness of breath / dyspnea.   amLODIPine (NORVASC) 5 MG tablet 9/24 - ? at ?  No Yes   Sig: TAKE 1 TABLET(5 MG) BY MOUTH DAILY   cetirizine (ZYRTEC) 10 MG tablet   Yes Yes   Sig: Take 10 mg by mouth daily as needed   clobetasol (TEMOVATE) 0.05 % ointment   Yes Yes   Sig: daily as needed    fluconazole (DIFLUCAN) 200 MG tablet 9/23 - ? at Unknown time  No Yes   Sig: Take 1 tablet (200 mg) by mouth daily for 12 days   lidocaine (LMX4) 4 % CREA 4% topical cream   Yes Yes   Sig: Apply topically daily as needed   omeprazole (PRILOSEC) 20 MG CR capsule   Yes Yes   Sig: Take 20 mg by mouth daily as needed   oxyCODONE-acetaminophen (PERCOCET) 5-325 MG per tablet 9/23/2018 at Unknown time  No Yes   Sig: Take 1 tablet by mouth every 4 hours as needed for pain for pain.      Facility-Administered Medications: None     Allergies   Allergies   Allergen Reactions      Ceftriaxone Itching     Bactrim Hives     Levaquin Diarrhea     Oral only, can tolerate IV     Zithromax [Azithromycin]      Macrodantin [Nitrofuran Derivatives] Rash     Took recently with no problems       Social History[AK1.2]   I have updated and reviewed the following Social History Narrative:[AK1.1]   Social History     Social History Narrative    .Living situation (location, living with others?):Lives with wife in [facility]    Activities of daily living (e.g., dressing, eating, walking):Independent        Instrumental activities of daily living (e.g., finance management, housekeeping, meal planning/ prep): Wife and kids help with preparing meals, shopping, driving, climbing stairs, complex decisions                 Meaningful Activities (e.g., hobbies, work): loves music, uses the computer, likes [employer]         Support:Wife and daughter        Community Resources Used/ Interested in: Referred to [facility][AK1.2]      Living situation:[AK1.1] . apartment[AK1.4]  Family system:[AK1.1]  this Spring, 8 adult children, 5 of whom live locally[AK1.4]  Functional status (needs help with ADLs or IADLs):[AK1.1] independent with most ADL's at baseline.[AK1.4]  Employment/education:[AK1.1] Retired [employer][AK1.4]  Use of community resources:[AK1.1] unknown[AK1.4]  Activities/interests:[AK1.1] unknown[AK1.4]  History of substance use/abuse:[AK1.1] unknown[AK1.4]  Hindu affiliation:[AK1.1] unknown[AK1.4]  Involvement in khang community:[AK1.1] unknown[AK1.4]    Family History[AK1.2]   I have reviewed this patient's family history and updated it with pertinent information if needed.[AK1.4]   Family History   Problem Relation Age of Onset     Family History Negative Father       age 91     HEART DISEASE Mother      pacemaker     Family History Negative Mother       in her sleep 89     Cancer Brother      oldest brother - lung cancer,  age 74     Cancer Brother      3rd brother - lymphoma,  age  76     Cancer Brother      2nd brother (Carrington)- prostate cancer, born 1920.      Cerebrovascular Disease Brother      Brother (Carrington), born in 1920[AK1.6]       Review of Systems[AK1.2]   The 10 point Review of Systems is negative other than noted in the HPI or here. Limited with patients drowsiness/ mild confusion.[AK1.4]     Palliative Symptom Review (0=no symptom/no concern, 1=mild, 2=moderate, 3=severe):      Pain:[AK1.1] 0-none[AK1.4]      Fatigue:[AK1.1] 2-moderate[AK1.4]      Nausea:[AK1.1] 0-none[AK1.4]      Constipation:       Diarrhea:[AK1.1] 0-none[AK1.4]      Depressive Symptoms:       Anxiety:[AK1.1] 0-none[AK1.4]      Drowsiness:[AK1.1] 2-moderate[AK1.4]      Poor Appetite:       Shortness of Breath:[AK1.1] 0-none[AK1.4]      Insomnia:[AK1.1] 0-none[AK1.4]    Physical Exam[AK1.2]   Temp:  [95.3  F (35.2  C)-97.1  F (36.2  C)] 96  F (35.6  C)  Heart Rate:  [60-85] 65  Resp:  [10-16] 16  BP: (117-146)/(45-69) 122/49  FiO2 (%):  [15 %] 15 %  SpO2:  [87 %-96 %] 95 %[AK1.7]  181 lbs 1.6 oz[AK1.2]  GEN:[AK1.1]  Drowsy[AK1.4], oriented x 3, appears comfortable, NAD.  HEENT:  Normocephalic/atraumatic, no scleral icterus, no nasal discharge, mouth moist.  CV:  RRR, S1, S2; no murmurs or other irregularities noted.  +3 DP/PT pulses bilatererally;  edema BLE[AK1.1] and LUE[AK1.4].  RESP:  Clear to auscultation bilaterally without rales/rhonchi/wheezing/retractions.  Symmetric chest rise on inhalation noted.  Normal respiratory effort.  ABD:  Rounded, soft, non-tender/non-distended.  +BS  EXT:  Edema & pulses as noted above.  CMS intact x 4.       SKIN:  Dry to touch, no exanthems noted in the visualized areas.    NEURO:[AK1.1] Generalized weakness, no focal deficits noted. Drowsy.[AK1.4]   Psych:  Calm, cooperative, conversant appropriately[AK1.1] when awake, admits to not thinking as clear as normal.[AK1.4] .     Delirium Screen/CAM:  Delirium = (#1 and #2 = YES) + (#3 and/or #4)   1) Acute onset and  fluctuating course:[AK1.1]   YES[AK1.4]   (acute change in mental status from baseline over last 24 hours)  2) Inattention:[AK1.1]   YES[AK1.4]   (difficulty focusing, distractible, can't follow conversation)  3) Disorganized thinking:[AK1.1]   No[AK1.4]   (score only if #1 and #2 are YES)  (rambling/irrelevant conversation, unclear/illogical thoughts, inconsistency)  4) Altered level of consciousness:[AK1.1]   YES[AK1.4]   (score only if #1 and #2 are YES)  (other than alert, calm, cooperative)    Delirium/CAM score:[AK1.1] 3[AK1.4]/4  Interpretation:  1)  Delirium:[AK1.1]  Present[AK1.4]  2)  Type:[AK1.1]  hypoactive[AK1.4]  3)  Severity:[AK1.1]  mild[AK1.4]    Data[AK1.2]   Results for orders placed or performed during the hospital encounter of 09/24/18 (from the past 24 hour(s))   Anaerobic bacterial culture   Result Value Ref Range    Specimen Description Renal pelvic Left Fluid SPECIMEN 1     Special Requests Received in anaerobic tubes.     Culture Micro Culture negative monitoring continues    Fluid Culture Aerobic Bacterial   Result Value Ref Range    Specimen Description Renal pelvic Left Fluid SPECIMEN 1     Culture Micro Culture negative monitoring continues    Gram stain   Result Value Ref Range    Specimen Description Renal pelvic Left Fluid SPECIMEN 1     Gram Stain No organisms seen     Gram Stain Moderate  WBC'S seen  PMNs seen      XR Surgery WHITNEY Fluoro L/T 5 Min w Stills    Narrative    SURGERY C-ARM FLUOROSCOPY LESS THAN FIVE MINUTES WITH STILLS   9/27/2018 3:18 PM     COMPARISON: CT 9/27/2018.    HISTORY: Left stent placement, left stones.    NUMBER OF IMAGES ACQUIRED: 2    VIEWS: 1    FLUOROSCOPY TIME: .2 minutes.      Impression    IMPRESSION: Left retrograde ureterogram demonstrates a filling defect  in the proximal left ureter. This probably corresponds to the  obstructing stone seen on prior CT. There is moderate left  hydronephrosis. Final image demonstrates stent that appears to be  in  good position. Please see operative report for further details.    RICKEY LOPEZ MD   Legionella pneumonia antigen urine   Result Value Ref Range    Specimen Description Urine     L Pneumo Urine Antigen       Presumptive negative for Legionella pneumophilia serogroup 1 antigen in urine, suggesting   no recent or current infection.  Infection due to Legionella cannot be ruled out, since   other serogroups and species may cause disease, antigen may not be present in urine in   early infection, and the level of antigen present in the urine may be below detectable   limits of the test.     Comprehensive metabolic panel   Result Value Ref Range    Sodium 140 133 - 144 mmol/L    Potassium 4.1 3.4 - 5.3 mmol/L    Chloride 107 94 - 109 mmol/L    Carbon Dioxide 27 20 - 32 mmol/L    Anion Gap 6 3 - 14 mmol/L    Glucose 106 (H) 70 - 99 mg/dL    Urea Nitrogen 25 7 - 30 mg/dL    Creatinine 1.67 (H) 0.66 - 1.25 mg/dL    GFR Estimate 39 (L) >60 mL/min/1.7m2    GFR Estimate If Black 47 (L) >60 mL/min/1.7m2    Calcium 8.2 (L) 8.5 - 10.1 mg/dL    Bilirubin Total 0.5 0.2 - 1.3 mg/dL    Albumin 1.7 (L) 3.4 - 5.0 g/dL    Protein Total 6.2 (L) 6.8 - 8.8 g/dL    Alkaline Phosphatase 146 40 - 150 U/L    ALT 25 0 - 70 U/L    AST 22 0 - 45 U/L   CBC with platelets differential   Result Value Ref Range    WBC 24.4 (H) 4.0 - 11.0 10e9/L    RBC Count 3.95 (L) 4.4 - 5.9 10e12/L    Hemoglobin 11.6 (L) 13.3 - 17.7 g/dL    Hematocrit 36.3 (L) 40.0 - 53.0 %    MCV 92 78 - 100 fl    MCH 29.4 26.5 - 33.0 pg    MCHC 32.0 31.5 - 36.5 g/dL    RDW 17.3 (H) 10.0 - 15.0 %    Platelet Count 411 150 - 450 10e9/L    Diff Method Automated Method     % Neutrophils 82.4 %    % Lymphocytes 3.7 %    % Monocytes 8.7 %    % Eosinophils 2.3 %    % Basophils 0.4 %    % Immature Granulocytes 2.5 %    Nucleated RBCs 0 0 /100    Absolute Neutrophil 20.1 (H) 1.6 - 8.3 10e9/L    Absolute Lymphocytes 0.9 0.8 - 5.3 10e9/L    Absolute Monocytes 2.1 (H) 0.0 - 1.3 10e9/L     "Absolute Eosinophils 0.6 0.0 - 0.7 10e9/L    Absolute Basophils 0.1 0.0 - 0.2 10e9/L    Abs Immature Granulocytes 0.6 (H) 0 - 0.4 10e9/L    Absolute Nucleated RBC 0.0    XR Chest Port 1 View    Narrative    CHEST ONE VIEW PORTABLE   9/28/2018 9:52 AM     HISTORY: Hypoxia.     COMPARISON: 9/26/2018.      Impression    IMPRESSION: There is perihilar \"batwing\" interstitial opacities  suspicious for acute pulmonary edema. Heart size similar to prior.  There are small bilateral pleural effusions. No pneumothorax.  Left-sided pacer device noted.    WILL BRICENO MD[AK1.8]        Revision History        User Key Date/Time User Provider Type Action    > AK1.8 9/28/2018  1:36 PM Klopp, Viridiana Rahman, JYOTI CNS Clinical Nurse Specialist Sign     AK1.7 9/28/2018  1:29 PM Klopp, Viridiana Rahman, APRN CNS Clinical Nurse Specialist      AK1.6 9/28/2018  1:27 PM Klopp, Viridiana Rahman, APRN CNS Clinical Nurse Specialist      AK1.5 9/28/2018  1:25 PM Klopp, Viridiana Rahman, APRN CNS Clinical Nurse Specialist      AK1.4 9/28/2018  1:14 PM Klopp, Viridiana Rahman, APRN CNS Clinical Nurse Specialist      AK1.3 9/28/2018 11:32 AM Klopp, Viridiana Rahman, APRN CNS Clinical Nurse Specialist      AK1.2 9/28/2018 11:16 AM Klopp, Viridiana Rahman, APRN CNS Clinical Nurse Specialist      AK1.1 9/28/2018 11:15 AM Klopp, Viridiana Rahman, APRN CNS Clinical Nurse Specialist             Consults signed by Kannan Lobo MD at 9/28/2018  8:12 AM      Author:  Kannan Lobo MD Service:  Infectious Disease Author Type:  Physician    Filed:  9/28/2018  8:12 AM Date of Service:  9/27/2018 12:44 PM Creation Time:  9/27/2018  1:05 PM    Status:  Signed :  Kannan Lobo MD (Physician)         Consult Date:  09/27/2018      INFECTIOUS DISEASE CONSULTATION       LOCATION:  Room 352 [facility]       REFERRING PHYSICIAN:       IMPRESSION:   1.  A 89-year-old male with 2+ week progressive deterioration in health, some hypoxia and possible " infiltrate, recent abnormal urine, but with unremarkable cultures, some intermittent abdominal pain and recently reduced hernia.  Etiology of syndrome is unclear, but signs of sepsis and infection as underlying it.   2.  Cognitive dysfunction, probably encephalopathy from the current acute infection on top of mild dementia.   3.  Abdominal pain, previously part of this now occurring again.  CT scan pending.   4.  Definite infiltrates and hypoxia, certainly at risk for aspiration and appearance suggestive of that, enough health care contact to be at risk for nosocomial pathogens.   5.  Acute renal insufficiency, undoubtedly acute tubular necrosis from the current acute illness, but also got vancomycin.   6.  Prior cerebrovascular infarction.   7.  Possible fluid and congestive heart failure component.      RECOMMENDATIONS:   1.  Guarded prognosis, discussed with daughter for now full care.   2.  Follow kidney function, respiratory function, cultures and await CT scan.   3.  Broad coverage as though pneumonia with nosocomial coverage.  Will add linezolid to the Zosyn he is on and also do doxycycline for atypicals.  He got a few doses of Levaquin to cover that 2 weeks ago, but would cover atypicals for now.   4.  Readjust according to clinical progress and symptoms.      HISTORY:  This 89-year-old male seen in consultation due to apparent sepsis.  The patient has a history of living in an assisted living.  His wife  earlier this year and he has had some struggles with that, but otherwise has been functioning reasonably well independently.  About 2-1/2 weeks ago started having falls, some malaise, fatigue and cognitive dysfunction.  He had a short hospital stay here.  There were some signs of infection and notably had an abnormal urine, but grew only Candida.  He got a few doses of Levaquin with possible pneumonia diagnosis.  Did not really improve much with that, went back to the assisted living and essentially has  not been functional.  He then had further worsening, including signs of sepsis, readmitted at present.  There are signs of left lower lobe pneumonia.  Urine is abnormal, but urine culture negative.  Blood cultures have been negative and now having some degree of abdominal discomfort with imaging being done.  No other clear focal symptoms.      PAST MEDICAL HISTORY:  Prior stroke, history of some degree of cognitive dysfunction, but not enough to impair him from being at assisted living.  Prior history of depression on treatment currently.  No particular history of pneumonia in general.      ALLERGIES:  MULTIPLE LISTED ALLERGIES THAT INCLUDE CEFTRIAXONE, BACTRIM, LEVAQUIN WHICH IS GI ONLY AND ZITHROMAX.      SOCIAL AND FAMILY HISTORY:  Lives in assisted living, but some healthcare contact.      REVIEW OF SYSTEMS:  Not that interactive at present, but definitely has been having abdominal pain.      PHYSICAL EXAMINATION:   GENERAL:  The patient appears his stated age.  He looks ill.   VITAL SIGNS:  Include hypoxia on oxygen, pulse of 100, respiratory rate 20, unlabored.   HEENT:  No thrush or intraoral lesions.   NECK:  Supple and nontender, no meningismus.   HEART:  Tachycardic, no major murmur.   LUNGS:  Crackles and few wheezing areas in the lateral left lung field.   ABDOMEN:  Tender, more lower than upper abdomen.   EXTREMITIES:  No major rash or skin lesions.      LABORATORY DATA:  Blood cultures negative.  Urine abnormal, but urine culture negative.        Chest x-ray suggested left lower lobe infiltrate.        CT scan being done.      Thank you very much for consultation.  I will follow the patient with you.         OLIVA SANTOS MD             D: 2018   T: 2018   MT: [email or web address]      Name:     JOSELIN VAN   MRN:      [id number]        Account:       [id number]   :      10/19/1924           Consult Date:  2018      Document: [id number][SD1.1]         Revision History        User Key  Date/Time User Provider Type Action    > SD1.1 9/28/2018  8:12 AM Kannan Lobo MD Physician Sign     [N/A] 9/27/2018  1:05 PM Kannan Lobo MD Physician Edit            Consults by Boubacar Means MD at 9/27/2018 11:07 AM     Author:  Boubacar Means MD Service:  Surgery Author Type:  Physician    Filed:  9/27/2018  2:42 PM Date of Service:  9/27/2018 11:07 AM Creation Time:  9/27/2018 11:07 AM    Status:  Signed :  Boubacar Means MD (Physician)     Consult Orders:    1. Surgery General IP Consult: Patient to be seen: ASAP - within 4 hours; Call back #: [phone]; suspect strangulated hernia; Consultant may enter orders: Yes [[id number]] ordered by Mu Barron MD at 09/27/18 1008                HPI:[DB1.1]  I am asked to see this 89-year-old gentleman regarding abdominal discomfort, possible sepsis and a question of a strangulated hernia.  The patient has had a known right inguinal hernia, but now has lower abdominal discomfort.  The hernia was reportedly quite large and firm.[DB1.2]    Past Medical History:   has a past medical history of Calculus of kidney; Chronic infection; Chronic pain; Esophageal reflux; Hyperlipidaemia; Hypertension; Malignant neoplasm of prostate (H) (8/14/2002); Mild persistent asthma; Paroxysmal a-fib (H); Sinus node dysfunction (H); Sleep apnea; and Unspecified cerebral artery occlusion with cerebral infarction.    Past Surgical History:  Past Surgical History:   Procedure Laterality Date     C NONSPECIFIC PROCEDURE  1980's    Kidney stone surgery     C NONSPECIFIC PROCEDURE  1985, 5/2005    TURP x 2     C NONSPECIFIC PROCEDURE  1/2002    Brachytherapy     C NONSPECIFIC PROCEDURE  ~1999    Left rotator cuff repair     C NONSPECIFIC PROCEDURE      Bilateral cataract surgery     C NONSPECIFIC PROCEDURE      EGD/dilation twice     CYSTOSCOPY       CYSTOSCOPY, INJECT COLLAGEN, COMBINED  6/6/2012    Procedure:COMBINED CYSTOSCOPY, INJECT BULKING AGENT; VIDEO  CYSTOSCOPY WITH BOTOX INJECTIONS  ; Surgeon:BRIAN ADAN; Location:[facility]     CYSTOSCOPY, INJECT COLLAGEN, COMBINED  3/22/2013    Procedure: COMBINED CYSTOSCOPY, INJECT BULKING AGENT;  VIDEO CYSTOSCOPY, BOTOX INJECTION;  Surgeon: Brian Adan MD;  Location: [facility]     CYSTOSCOPY, INJECT COLLAGEN, COMBINED  8/8/2014    Procedure: COMBINED CYSTOSCOPY, INJECT BULKING AGENT;  Surgeon: Brian Adan MD;  Location: [facility]     CYSTOSCOPY, INJECT COLLAGEN, COMBINED N/A 8/12/2015    Procedure: COMBINED CYSTOSCOPY, INJECT BULKING AGENT;  Surgeon: Brian Adan MD;  Location: [facility]     CYSTOSCOPY, INJECT COLLAGEN, COMBINED N/A 12/8/2016    Procedure: COMBINED CYSTOSCOPY, INJECT BULKING AGENT;  Surgeon: Brian Adan MD;  Location: [facility] OR     HC REMOVE TONSILS/ADENOIDS,<11 Y/O  ~1928    T & A <12y.o.     IMPLANT PACEMAKER       PENIS SURGERY       PROSTATE SURGERY          Social History:  Social History     Social History     Marital status:      Spouse name: Alysa     Number of children: 8     Years of education: N/A     Occupational History      Retired     Social History Main Topics     Smoking status: Former Smoker     Packs/day: 1.00     Years: 40.00     Smokeless tobacco: Never Used      Comment: QUIT 1978     Alcohol use No     Drug use: No     Sexual activity: No     Other Topics Concern     Parent/Sibling W/ Cabg, Mi Or Angioplasty Before 65f 55m? No     Social History Narrative    .Living situation (location, living with others?):Lives with wife [facility]    Activities of daily living (e.g., dressing, eating, walking):Independent        Instrumental activities of daily living (e.g., finance management, housekeeping, meal planning/ prep): Wife and kids help with preparing meals, shopping, driving, climbing stairs, complex decisions                 Meaningful Activities (e.g., hobbies, work): loves music, uses the computer, likes crossword         Support:Wife and daughter         Community Resources Used/ Interested in: Referred to Herb        Family History:  Family History   Problem Relation Age of Onset     Family History Negative Father       age 91     HEART DISEASE Mother      pacemaker     Family History Negative Mother       in her sleep 103     Cancer Brother      oldest brother - lung cancer,  age 74     Cancer Brother      3rd brother - lymphoma,  age 76     Cancer Brother      2nd brother (Carrington)- prostate cancer, born 1920.      Cerebrovascular Disease Brother      Brother (Carrington), born in 1920         ROS:  The 10 point review of systems is negative other than noted in the HPI and above.    PE:    General-[DB1.1] this is an elderly-appearing gentleman in the hospital bed.  He does not appear particularly uncomfortable.[DB1.2]  HEENT- Normocephalic and atraumatic. Pupils equal and round.  Mucous membranes moist.  Sclera are nonicteric.  Neck- No lymphadenopathy or masses   Respirations- are regular and non labored  Abdomen is[DB1.1] there is some mild lower abdominal tenderness.[DB1.2]  Hernia-[DB1.1] there is a prominent right inguinal hernia.  This is not particularly firm, and I'm easily able to reduce this.  The patient states that his pain is immediately a bit better.[DB1.2]    Asse[DB1.1]s[DB1.2]sment:[DB1.1] This is a patient with a prominent right inguinal hernia which is now reduced.  Clinically, this did not appear to be strangulated.[DB1.2]    Plan:[DB1.1]    At this point, I see no urgent indication for any sort of surgical intervention regarding the patient's hernia.  I agree with the plan to perform a CT scan.  I did discuss with the patient and his daughter that as long as his hernia was reducible, I probably wouldn't recommend surgical intervention due to the high risk of surgical complications.  They do not seem particularly interested in pursuing surgical intervention regarding his hernias.  I will follow-up on the patient's CT scan once it  has been performed.[DB1.2]      Boubacar Means MD[DB1.1]      Addendum: CT scan revealed no evidence of bowel thickening.  The patient has already gotten bowel back into his right inguinal hernia.  There is no evidence of obstruction.  There is also prominent fat containing hernia on the left.  In addition, there is a ureteral stone, and urology is planning on placing a stent.  The patient also has a common bile duct stone without obvious obstruction.  GI consultation will be obtained.  I still see no acute surgical indication.    Boubacar Means MD  [facility][DB1.2]       Revision History        User Key Date/Time User Provider Type Action    > DB1.2 9/27/2018  2:42 PM Boubacar Means MD Physician Sign     DB1.1 9/27/2018 11:07 AM Boubacar Means MD Physician             Consults by Viridiana Anaya APRN CNS at 9/27/2018  1:51 PM     Author:  Viridiana Anaya APRN CNS Service:  Palliative Author Type:  Clinical Nurse Specialist    Filed:  9/27/2018  1:53 PM Date of Service:  9/27/2018  1:51 PM Creation Time:  9/27/2018  1:51 PM    Status:  Signed :  Viridiana Anaya APRN CNS (Clinical Nurse Specialist)         [facility]    Palliative Care Consultation   Text Page    Date of Admission:  9/24/2018    Patient currently away at surgery.  Will attempt later today if possible, otherwise plan for full consult 9/28/2018.    If you would like the patient to be seen earlier, please contact the service at [phone]  Or use text link above.    Thank you![AK1.1]    Viridiana Anaya[AK1.2]   Pain Management and Palliative Care  [facility]  Pgr: [phone][AK1.1]           Revision History        User Key Date/Time User Provider Type Action    > AK1.2 9/27/2018  1:53 PM Viridiana Anaya APRN CNS Clinical Nurse Specialist Sign     AK1.1 9/27/2018  1:51 PM Viridiana Anaya APRN CNS Clinical Nurse Specialist             Consults by Susie  Froylan Borges MD at 9/27/2018  1:27 PM     Author:  Froylan Richards MD Service:  Urology Author Type:  Physician    Filed:  9/27/2018  1:34 PM Date of Service:  9/27/2018  1:27 PM Creation Time:  9/27/2018  1:27 PM    Status:  Signed :  Froylan Richards MD (Physician)     Consult Orders:    1. Urology IP Consult: tolliver cath management; Consultant may enter orders: Yes; Patient to be seen: Routine - within 24 hours; Requested Clinic[facility]Group: [facility] [[id number]] ordered by Mu Barron MD at 09/26/18 1704                Urology Consult History and Physical    Name: Edison Boss    MRN: [id number]   YOB: 1924       We were asked to see Edison Boss at the request of Dr. Barron for evaluation and treatment of urinary retention and ureteral stone.        Chief Complaint:   Urinary issues          History of Present Illness:   Edison Boss is a 89 year old male who is being seen for evaluation of Tolliver issues, and found to have obstructing left ureteral stone. Has deterioration over the past few weeks with hypoxia, difficulty with bladder spasms and urine leakage, hernia, abdominal pain. Overall has been doing poorly at home. Since admission, has had progressive rising of creatinine, and profound leukocytosis. Patient notes some vague left abdominal pain.    Patient also has history of urinary retention and bladder spasms with history of brachytherapy. Follows with Dr. Lilly and had Tolliver changed one week ago. Currently, catheter is not draining well and majority of urine is bypassing the catheter. Attempts to irrigate were unsuccessful.    CT scan done today, and demonstrates finding of obstructing left proximal ureteral stone. Per daughter, patient does have history of ureteral stones.           Past Medical History:     Past Medical History:   Diagnosis Date     Calculus of kidney     in dwelling tolliver     Chronic  infection     UTI     Chronic pain     lower pain, perineum     Esophageal reflux      Hyperlipidaemia      Hypertension      Malignant neoplasm of prostate (H) 8/14/2002    Brachytherapy, then external radiation. chronic indwelling catheter     Mild persistent asthma     with URI     Paroxysmal a-fib (H)     paroxysmal     Sinus node dysfunction (H)     sp DDD PM     Sleep apnea     ?   snores     Unspecified cerebral artery occlusion with cerebral infarction           Past Surgical History:     Past Surgical History:   Procedure Laterality Date     C NONSPECIFIC PROCEDURE  1980's    Kidney stone surgery     C NONSPECIFIC PROCEDURE  1985, 5/2005    TURP x 2     C NONSPECIFIC PROCEDURE  1/2002    Brachytherapy     C NONSPECIFIC PROCEDURE  ~1999    Left rotator cuff repair     C NONSPECIFIC PROCEDURE      Bilateral cataract surgery     C NONSPECIFIC PROCEDURE      EGD/dilation twice     CYSTOSCOPY       CYSTOSCOPY, INJECT COLLAGEN, COMBINED  6/6/2012    Procedure:COMBINED CYSTOSCOPY, INJECT BULKING AGENT; VIDEO CYSTOSCOPY WITH BOTOX INJECTIONS  ; Surgeon:BRIAN ADAN; Location: OR     CYSTOSCOPY, INJECT COLLAGEN, COMBINED  3/22/2013    Procedure: COMBINED CYSTOSCOPY, INJECT BULKING AGENT;  VIDEO CYSTOSCOPY, BOTOX INJECTION;  Surgeon: Brian Adan MD;  Location:  OR     CYSTOSCOPY, INJECT COLLAGEN, COMBINED  8/8/2014    Procedure: COMBINED CYSTOSCOPY, INJECT BULKING AGENT;  Surgeon: Brian Adan MD;  Location:  OR     CYSTOSCOPY, INJECT COLLAGEN, COMBINED N/A 8/12/2015    Procedure: COMBINED CYSTOSCOPY, INJECT BULKING AGENT;  Surgeon: Brian Adan MD;  Location:  OR     CYSTOSCOPY, INJECT COLLAGEN, COMBINED N/A 12/8/2016    Procedure: COMBINED CYSTOSCOPY, INJECT BULKING AGENT;  Surgeon: Brian Adan MD;  Location: [facility] OR     HC REMOVE TONSILS/ADENOIDS,<11 Y/O  ~1928    T & A <12y.o.     IMPLANT PACEMAKER       PENIS SURGERY       PROSTATE SURGERY            Social History:     Social  History   Substance Use Topics     Smoking status: Former Smoker     Packs/day: 1.00     Years: 40.00     Smokeless tobacco: Never Used      Comment: QUIT      Alcohol use No          Family History:     Family History   Problem Relation Age of Onset     Family History Negative Father       age 91     HEART DISEASE Mother      pacemaker     Family History Negative Mother       in her sleep 103     Cancer Brother      oldest brother - lung cancer,  age 74     Cancer Brother      3rd brother - lymphoma,  age 76     Cancer Brother      2nd brother (Carrington)- prostate cancer, born 1920.      Cerebrovascular Disease Brother      Brother (Carrington), born in 1920          Allergies:     Allergies   Allergen Reactions     Ceftriaxone Itching     Bactrim Hives     Levaquin Diarrhea     Oral only, can tolerate IV     Zithromax [Azithromycin]      Macrodantin [Nitrofuran Derivatives] Rash     Took recently with no problems          Medications:     Current Facility-Administered Medications   Medication     acetaminophen (TYLENOL) tablet 650 mg    Or     acetaminophen (TYLENOL) Suppository 650 mg     acetaminophen (TYLENOL) Suppository 650 mg     acetaminophen (TYLENOL) tablet 650 mg     amLODIPine (NORVASC) tablet 5 mg     aspirin EC tablet 81 mg     bisacodyl (DULCOLAX) Suppository 10 mg     doxycycline (VIBRAMYCIN) 100 mg vial to attach to  mL bag     HYDROmorphone (PF) (DILAUDID) injection 0.2 mg     influenza Vac Split High-Dose (FLUZONE) injection 0.5 mL     ipratropium - albuterol 0.5 mg/2.5 mg/3 mL (DUONEB) neb solution 3 mL     ipratropium - albuterol 0.5 mg/2.5 mg/3 mL (DUONEB) neb solution 3 mL     lidocaine (LMX4) kit     lidocaine 1 % 1 mL     linezolid (ZYVOX) infusion 600 mg     magnesium sulfate 4 g in 100 mL sterile water (premade)     melatonin tablet 1 mg     naloxone (NARCAN) injection 0.1-0.4 mg     nitroGLYcerin (NITROSTAT) sublingual tablet 0.4 mg     omeprazole (priLOSEC) CR  capsule 20 mg     ondansetron (ZOFRAN-ODT) ODT tab 4 mg    Or     ondansetron (ZOFRAN) injection 4 mg     opium-belladonna (B&O SUPPRETTES) 30-16.2 MG per suppository 1 suppository     piperacillin-tazobactam (ZOSYN) infusion 3.375 g     polyethylene glycol (MIRALAX/GLYCOLAX) Packet 17 g     potassium chloride (KLOR-CON) Packet 20-40 mEq     potassium chloride 10 mEq in 100 mL intermittent infusion with 10 mg lidocaine     potassium chloride 10 mEq in 100 mL sterile water intermittent infusion (premix)     potassium chloride 20 mEq in 50 mL intermittent infusion     potassium chloride SA (K-DUR/KLOR-CON M) CR tablet 20-40 mEq     PRESERVISION AREDS CAPS 1 capsule     prochlorperazine (COMPAZINE) injection 5 mg    Or     prochlorperazine (COMPAZINE) tablet 5 mg    Or     prochlorperazine (COMPAZINE) Suppository 12.5 mg     senna-docusate (SENOKOT-S;PERICOLACE) 8.6-50 MG per tablet 1 tablet    Or     senna-docusate (SENOKOT-S;PERICOLACE) 8.6-50 MG per tablet 2 tablet     sodium chloride (PF) 0.9% PF flush 3 mL     sodium chloride (PF) 0.9% PF flush 3 mL            Review of Systems:    ROS: 10 point ROS neg other than the symptoms noted above in the HPI.          Physical Exam:   Patient Vitals for the past 24 hrs:   BP Temp Temp src Pulse Heart Rate Resp SpO2 Weight   09/27/18 0908 - - - - - - (!) 86 % -   09/27/18 0840 133/53 96.4  F (35.8  C) Axillary - 70 22 (!) 84 % -   09/27/18 0749 - - - - - - 90 % -   09/27/18 0500 - - - - - - - 83.1 kg (183 lb 4.8 oz)   09/26/18 2331 164/69 96.6  F (35.9  C) Axillary - 70 16 90 % -   09/26/18 2238 - - - - 62 16 90 % -   09/26/18 2202 - - - - - 16 91 % -   09/26/18 2000 - - - - - - 94 % -   09/26/18 1540 124/48 95.7  F (35.4  C) Axillary 62 - 16 92 % -   09/26/18 1516 - - - - - - 93 % -     General: age-appropriate appearing male in NAD; somnolent  HEENT: Head AT/NC, EOMI, CN Grossly intact  Lungs: no respiratory distress, or pursed lip breathing  Heart: No obvious jugular  venous distension present  Back: no bony midline tenderness, no CVAT bilaterally.  Abdomen: soft, non-distended, non-tender. No organomegaly  : normal male external genitalia. Right inguinal hernia noted - reducible  Christianson catheter with clear urine output. Urine draining around catheter. Unable to irrigate.  Lymph: no palpable inguinal lymphadenopathy.  LE: no edema. pneumoboots in place.  Musculoskeltal: extremities normal, no peripheral edema  Skin: no suspicious lesions or rashes  Neuro: Alert, oriented, speech and mentation normal; moving all 4 extremities equally.  Psych: affect and mood normal          Data:   All laboratory data reviewed:      Recent Labs  Lab 09/27/18  0730 09/26/18  0707 09/25/18  0602 09/24/18  1204   WBC 34.4* 24.0* 25.7* 24.6*   HGB 13.3 13.5 12.4* 12.2*    392 312 344       Recent Labs  Lab 09/27/18  0730 09/26/18  0707 09/25/18  0602 09/24/18  1204    140 139 136   POTASSIUM 4.1 4.3 4.6 4.3   CHLORIDE 106 108 108 104   CO2 26 24 22 23   BUN 28 28 28 33*   CR 2.02* 1.75* 1.65* 1.84*   * 88 82 108*   ARYAN 8.3* 8.4* 8.1* 8.6   MAG  --  2.6*  --   --        Recent Labs  Lab 09/24/18  2050   COLOR Straw   APPEARANCE Cloudy   URINEGLC Negative   URINEBILI Negative   URINEKETONE Negative   SG 1.011   URINEPH 5.0   PROTEIN Negative   NITRITE Negative   LEUKEST Moderate*   RBCU 175*   WBCU 49*       All pertinent imaging reviewed:  CT chest/abd/pelvis 9/27/2018  IMPRESSION:  1. Mild-moderate pleural effusions, left greater than right.  2. Bilateral interstitial prominence and groundglass opacity. This is  nonspecific but could represent interstitial edema or interstitial  pneumonia.  3. Mild mediastinal and lower neck adenopathy.  4. 10 mm common bile duct stone with mild CBD dilatation.  5. 13 mm proximal left ureter stone with obstruction.  6. Bilateral inguinal hernias.  7. Minimal lower pelvic fluid.  8. Thyroid nodules.  9. Additional findings discussed above.          Impression and Plan:   Impression:   89 year old male with deteriorating medical condition. Noted to have obstructing proximal left ureteral stone. This is potential source of infection and MADELYN. Given location of stone and overall symptoms, I recommend placement of a left ureteral stent in the OR. Will also assess urethra and bladder in this setting trouble-shoot the Christianson issues. This was discussed, in detail with the patient and his daughter, and they would like to proceed.    Of note, general surgery has also been consulted. Will discuss regarding finding of common bile duct stone and hernia regarding possible intervention.      Plan:   - NPO  - To OR for cystoscopy and left ureteral stent placement     Froylan Richards MD  Urology  [facility]  Clinic Phone [phone][JZ1.1]            Revision History        User Key Date/Time User Provider Type Action    > JZ1.1 9/27/2018  1:34 PM Froylan Richards MD Physician Sign            Consults by Kannan Lobo MD at 9/27/2018 12:39 PM     Author:  Kannan Lobo MD Service:  Infectious Disease Author Type:  Physician    Filed:  9/27/2018 12:39 PM Date of Service:  9/27/2018 12:39 PM Creation Time:  9/27/2018 12:38 PM    Status:  Signed :  Kannan Lobo MD (Physician)         ID consult dictated IMP 1 89 yo 2+ week deterioration , ? Aspiration    REC await CT, continue zosyn, add zyvox and doxy[SD1.1]     Revision History        User Key Date/Time User Provider Type Action    > SD1.1 9/27/2018 12:39 PM Kannan Lobo MD Physician Sign            Consults by Viridiana Peraza, RN at 9/25/2018  2:09 PM     Author:  Viridiana Peraza, RN Service:  (none) Author Type:      Filed:  9/25/2018  2:09 PM Date of Service:  9/25/2018  2:09 PM Creation Time:  9/25/2018  1:56 PM    Status:  Signed :  Viridiana Peraza, RN ()     Consult Orders:    1. Care Coordinator IP Consult [[id number]] ordered by Mu Barron  MD ABIOLA at 09/25/18 1341                Care Transition Initial Assessment - RN    Reason For Consult: care coordination/care conference, discharge planning   Met with: Patient and Family.  DATA   Active Problems:    Acute encephalopathy       Cognitive Status: alert and oriented.  [facility]  Primary Care MD Name: Dr Terrell  Contact information and PCP information verified: Yes  Lives With: alone  Living Arrangements: independent living facility  Quality Of Family Relationships: supportive, helpful, involved  Description of Support System: Supportive, Involved   Who is your support system?: Children   Support Assessment: Adequate family and caregiver support   Insurance concerns: No Insurance issues identified  ASSESSMENT  Patient currently receives the following services:  Pt lives at the [facility] and was supposed to start [facility] RN/PT.        Identified issues/concerns regarding health management: increased weakness and inability to return to ILF with readmission and recent PNA    Met with pt and daughter Concepcion, son in law at bedside to discuss plan of care. CTS following for readmission, Elevated UGO and PNA dx. Pt was admitted from 9/16-9/19 for PNA and candida in bladder. He was dc'd back to his ILF with levo and diflucan. His family was staying with him. He followed up with his Urologist, Dr Lilly, on 9/20 and was readmitted on 9/24 with encephalopathy, fall, PNA. Pt and daughter felt he was dc'd too soon. They had gotten orders for pt to start [facility] for RN/PT but this had not yet started when he was brought in. We discussed PNA plan of care and discontinue plan. Per OT, they are recommending TCU on discontinue. PT has not yet seen pt. Pt and daughter are agreeable to TCU on discharge due to his weakness. They were given a TCU packet to look at and choose facilities for discharge. SW will cont to follow for TCU placement on discontinue.    PLAN  Patient/family is agreeable to the plan?   Yes  Patient anticipates discharging to TCU .        Patient anticipates needs for home equipment: No  Plan/Disposition: TCU   Appointments: no appts made at this time due to discharge to TCU       Care  (CTS) will continue to follow as needed. Handoff will be sent to CTS for PCP on discontinue. Will cont to follow for discharge plan of care.    Viridiana Peraza RN CTS  Care Transitions  [phone][AH1.1]                 Revision History        User Key Date/Time User Provider Type Action    > AH1.1 9/25/2018  2:09 PM Viridiana Peraza, RN Case Manager Sign                     Progress Notes - Physician (Notes from 10/01/18 through 10/04/18)      Progress Notes by Pillo Crook at 10/4/2018  1:50 PM     Author:  Pillo Crook Service:  (none) Author Type:  Coordinator    Filed:  10/4/2018  1:50 PM Date of Service:  10/4/2018  1:50 PM Creation Time:  10/4/2018  1:50 PM    Status:  Signed :  Pillo Crook (Coordinator)         Your information has been submitted on October 04th, 2018 at 01:50:25 PM CDT. The confirmation number is [id number][BB1.1]       Revision History        User Key Date/Time User Provider Type Action    > BB1.1 10/4/2018  1:50 PM Pillo Crook Coordinator Sign            Progress Notes by Kannan Lobo MD at 10/4/2018  9:33 AM     Author:  Kannan Lobo MD Service:  Infectious Disease Author Type:  Physician    Filed:  10/4/2018  9:35 AM Date of Service:  10/4/2018  9:33 AM Creation Time:  10/4/2018  9:33 AM    Status:  Signed :  Kannan Lobo MD (Physician)         [facility]  Infectious Disease Progress Note          Assessment and Plan:   IMPRESSION:   1.  A 89-year-old male with 2+ week progressive deterioration in health, some hypoxia and possible infiltrate, recent abnormal urine, but with unremarkable cultures, some intermittent abdominal pain and recently reduced hernia.  Primary etiology of syndrome now likely  aparrent with CT L ureteral obstruction and BC candida   2.  Cognitive dysfunction, probably encephalopathy from the current acute infection on top of mild dementia.   3.  Abdominal pain, previously part of this now occurring again.  CT scan with new finding L ureteral obstuction  4.  Definite infiltrates and hypoxia, certainly at risk for aspiration and appearance suggestive of that, enough health care contact to be at risk for nosocomial pathogens.   5.  Acute renal insufficiency, undoubtedly acute tubular necrosis from the current acute illness, but also got vancomycin.   6.  Prior cerebrovascular infarction.   7.  Possible fluid and congestive heart failure component.   8 Ongoing reducible abd hernia  9 gall stones      RECOMMENDATIONS:   1. Candidemia and + urine from kidney, C albicans, clear cause of current illness  2.  Improving kidney function, respiratory function,   3.   Zosyn DC  4 Recheck Bc neg   5  po diflucan 400 daily 2 weeks more, then 100mg daily continue until urologic intervention and thru any procedure 1 week or so longer if procedure done            Interval History:   no new complaints more awake, hypoxia better, abd pain resolved CT with gall bladder issue but more sig had L ureteral obstruction candida albicans in Bc, prior UC C albicans, current UC neg, new stent collected UC C albicans dx seems straight forward              Medications:[SD1.1]       acetaminophen  650 mg Oral Q8H    Or     acetaminophen  650 mg Rectal Q8H     amLODIPine  5 mg Oral Daily     aspirin  81 mg Oral Daily     fluconazole  400 mg Oral Daily     heparin  5,000 Units Subcutaneous Q12H     ipratropium - albuterol 0.5 mg/2.5 mg/3 mL  3 mL Nebulization 4x daily     metoprolol tartrate  25 mg Oral BID     omeprazole  20 mg Oral QAM AC     PARoxetine  15 mg Oral At Bedtime     PRESERVISION AREDS  1 capsule Oral BID     sodium chloride (PF)  3 mL Intracatheter Q8H[SD1.2]                  Physical Exam:[SD1.1]   Blood  pressure 166/76, pulse 60, temperature 97.6  F (36.4  C), temperature source Oral, resp. rate 18, weight 76.2 kg (168 lb 1.6 oz), SpO2 95 %.  Wt Readings from Last 2 Encounters:   10/04/18 76.2 kg (168 lb 1.6 oz)   09/17/18 80.1 kg (176 lb 9.6 oz)[SD1.2]     Vital Signs with Ranges[SD1.1]  Temp:  [96.5  F (35.8  C)-97.6  F (36.4  C)] 97.6  F (36.4  C)  Pulse:  [60] 60  Heart Rate:  [60-61] 60  Resp:  [18] 18  BP: (153-175)/(59-76) 166/76  SpO2:  [88 %-97 %] 95 %[SD1.2]    Constitutional: Awake, alert, looks better, no apparent distress   Lungs: Clear to auscultation bilaterally, no crackles or wheezing   Cardiovascular: Regular rate and rhythm, normal S1 and S2, and no murmur noted   Abdomen: Normal bowel sounds, soft, non-distended, non-tender   Skin: No rashes, no cyanosis, no edema   Other:           Data:   All microbiology laboratory data reviewed.[SD1.1]  Recent Labs   Lab Test  10/04/18   0558  10/03/18   0624  10/02/18   0639   WBC  9.3  10.3  9.5   HGB  11.9*  11.7*  12.1*   HCT  37.1*  36.2*  37.6*   MCV  91  91  92   PLT  389  463*  491*     Recent Labs   Lab Test  10/04/18   0558  10/03/18   0624  10/02/18   0639   CR  1.03  1.14  1.07     Recent Labs   Lab Test  05/08/15   1510   SED  7     Recent Labs   Lab Test  09/28/18   1332  09/28/18   1331  09/27/18   1512  09/24/18   2050  09/24/18   1203  09/16/18   2303  09/16/18   2207  09/16/18   2100  08/28/18   1240   CULT  No growth  No growth  Heavy growth  Denise albicans / dubliniensis  Candida albicans and Candida dubliniensis are not routinely speciated  Susceptibility testing not routinely done  *  Culture negative monitoring continues  <1000 colonies/mL  urogenital jeanette  Susceptibility testing not routinely done    Cultured on the 3rd day of incubation:  Candida albicans  *  Critical Value/Significant Value, preliminary result only, called to and read back by  Willow Nesbitt RN, @[id number] 09/27/18.DH.    No growth  No growth  50,000 to 100,000  colonies/mL  Candida albicans / dubliniensis  Candida albicans and Candida dubliniensis are not routinely speciated  Susceptibility testing not routinely done  *  No growth  10,000 to 50,000 colonies/mL  Escherichia coli  *  >100,000 colonies/mL  Enterococcus faecalis  *[SD1.2]          Revision History        User Key Date/Time User Provider Type Action    > SD1.2 10/4/2018  9:35 AM Kannan Lobo MD Physician Sign     SD1.1 10/4/2018  9:33 AM Kannan Lobo MD Physician             Progress Notes by Virginie Ferris MD at 10/3/2018  7:42 AM     Author:  Virginie Ferris MD Service:  Hospitalist Author Type:  Physician    Filed:  10/3/2018  3:23 PM Date of Service:  10/3/2018  7:42 AM Creation Time:  10/3/2018  7:42 AM    Status:  Signed :  Virginie Ferris MD (Physician)         [facility]  Hospitalist Progress Note  Virginie Ferris MD 10/03/2018    Reason for Stay (Diagnosis): encephalopathy/sepsi         Assessment and Plan:      Summary of Stay: Edison Boss is a 89 year old male with a history of prior prostate cancer treated with brachy therapy and XRT, chronic indwelling tolliver catheter, paroxysmal AF (not anticoagulated) and SSS s/p ablation and dual chamber PPM, htn/hlp,  admitted on 9/24/2018 with encephalopathy felt most consistent with infectious process and suspected pna complicated by hypoxia.     He was placed on IV pip-tazo and vanco in the setting of sepsis but acutely decompensated on the morning of 9/27/18.  He had been complaining of some back pain in the setting of recent falling and thought that this was initially just MSK.  But given clinical decline CAP CT pursued and positive for obstructing left ureteral stone.  He was urgently taken for cysto and stent placement and since then has continued to improve.    His cultures are notable for heavy grown candida albicans from renal pelvic fluid in addition to yeast cultured from 1/2 blood cultures from day of admission.      His hospitalization has been complicated by iatrogenic volume overload in the setting of appropriate treatment for sepsis.  He is responding well to IV furosemide.    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 Fungemia: with candida- micafungin switched over to fluconazole 10/1.  At discharge can switch to oral for 2 more weeks  2. pna vs renal pelvic infection: covered for nosocomial coverage with linezolid and pip-tazo.  Continued through weekend per weekend ID doc.  Most likely source though of sepsis is his yeast (interestingly he was treated with fluconazole at discharge 9/19)-but doubt that could adequately take care of renal pelvis infection in setting of obstructing stone.  Linezolid discontinued 10/1, micafungin converted to fluconazole on 10/1, pip-tazo[KD1.1] discontinued 10/3.  Discharge with 2 weeks fluconazole 400 mg, then 100 mg every day until urologic intervention and through any procedure and then for an additional week[KD1.2]   3. Abdominal hernia:  Reducible so no indication for acute intervention  4. Iatrogenic volume overload with hypoxemia in the setting of G1dd:[KD1.1]  Resolved with IV diuresis[KD1.2]  5. G1dd:  With ongoing htn-start BB  6. Hypoxic respiratory failure:  Pulmonary edema and ? pna-improving.  Now down to  1 L supplemental O2-not on home O2.  I think now mostly atelectasis so have encouraged IS[KD1.1].  Check at rest and with exertion RA sats to determine need for discharge oxygen[KD1.2]  7. Hematuria:  Mild-likely combination of factors including stone/tolliver/heparin. Would cont heparin as high risk for dvt given prolonged hospitalization   8. MADELYN:  Resolved-back to wnl even in the setting of iv diuresis.  Suspect 2/2 ATN from sepsis  9. Sepsis:   Tachycardia/leukocytosis-2/2 candida infection behind obstructed ureter and fungemia.  Resolved  10. Htn:  pta meds amlodipine 5 mg-resumed as at home and BPs inadequately controlled - started metop 25 mg bid  11. Depression:  Continued on paroxetine now that off linezolid  12. PAF/SSS paced rhythm by tele.  Not on chronic AC-started on asa this hospitalization   13. Thrombocytosis  A bit disconcerting as almost everything points to improvement but elevated platelet count (in the absence of PCV primary or secondary) is usually due to inflammatory process.  Cont to monitor  DVT Prophylaxis: Heparin SQ and Pneumatic Compression Devices  Code Status: DNR / DNI  Functional status:  Lives alone in senior apartment, walks with a walker    Disposition Plan   Expected discharge in 1 days to tbd once respiratory failure resolved, to[KD1.1] [facility][KD1.2]     Entered: Virginie Ferris 10/03/2018, 7:42 AM        Discussed with patient, dtr  at the bedside            Interval History (Subjective):      Had another busy morning and actually got up and walked down the rizo a bit.  No cp or sob, gets fatigued easily.  No n/v and appetite is improving                  Physical Exam:      Last Vital Signs:  /68 (BP Location: Left arm)  Pulse 62  Temp 96.3  F (35.7  C) (Oral)  Resp 18  Wt 77.3 kg (170 lb 8 oz)  SpO2 93%  BMI 24.46 kg/m2    I/O: net neg[KD1.1] 9.3[KD1.2] L  Tele:  A and AV paced    Pleasant nad sleeping quietly when I came in but easily awoke and participated in interview and exam looks stated age head nc/at sclera clear lungs diminished in the bases otherwise ctab rrr no mrg trace le edema skin w/d no cyanosis or clubbing alert and oriented affect appropriate abd s/nt/nd cano urine reddish hue in catheter bag           Medications:      All current medications were reviewed with changes reflected in problem list.         Data:      All new lab and imaging data was reviewed.   Labs:    Recent Labs  Lab  10/03/18  0624      POTASSIUM 3.6   CHLORIDE 105   CO2 30   ANIONGAP 5   GLC 88   BUN 13   CR 1.14   GFRESTIMATED 60*   GFRESTBLACK 72   ARYAN 8.9       Recent Labs  Lab 10/03/18  0624   WBC 10.3   HGB 11.7*   HCT 36.2*   MCV 91   *      Imaging:[KD1.1]      Revision History        User Key Date/Time User Provider Type Action    > KD1.2 10/3/2018  3:23 PM Virginie Ferris MD Physician Sign     KD1.1 10/3/2018  7:42 AM Virginie Ferris MD Physician             Progress Notes by Kannan Lobo MD at 10/3/2018  2:02 PM     Author:  Kannan Lobo MD Service:  Infectious Disease Author Type:  Physician    Filed:  10/3/2018  2:04 PM Date of Service:  10/3/2018  2:02 PM Creation Time:  10/3/2018  2:02 PM    Status:  Signed :  Kannan Lobo MD (Physician)         [facility]  Infectious Disease Progress Note          Assessment and Plan:   IMPRESSION:   1.  A 89-year-old male with 2+ week progressive deterioration in health, some hypoxia and possible infiltrate, recent abnormal urine, but with unremarkable cultures, some intermittent abdominal pain and recently reduced hernia.  Primary etiology of syndrome now likely aparrent with CT L ureteral obstruction and BC candida   2.  Cognitive dysfunction, probably encephalopathy from the current acute infection on top of mild dementia.   3.  Abdominal pain, previously part of this now occurring again.  CT scan with new finding L ureteral obstuction  4.  Definite infiltrates and hypoxia, certainly at risk for aspiration and appearance suggestive of that, enough health care contact to be at risk for nosocomial pathogens.   5.  Acute renal insufficiency, undoubtedly acute tubular necrosis from the current acute illness, but also got vancomycin.   6.  Prior cerebrovascular infarction.   7.  Possible fluid and congestive heart failure component.   8 Ongoing reducible abd hernia  9 gall stones      RECOMMENDATIONS:   1. Candidemia and + urine  from kidney, C albicans, clear cause of current illness  2.  Improving kidney function, respiratory function,   3.   Zosyn DC  4 Recheck Bc neg   5 to po diflucan 400 daily 2 weeks more, then 100mg daily continue until urologic intervention and thru any procedure 1 week or so longer            Interval History:   no new complaints more awake, hypoxia stable, abd pain better CT with gall bladder issue but more sig had L ureteral obstruction candida albicans in Bc, prior UC C albicans, current UC neg, new stent collected UC C albicans              Medications:       acetaminophen  650 mg Oral Q8H    Or     acetaminophen  650 mg Rectal Q8H     amLODIPine  5 mg Oral Daily     aspirin  81 mg Oral Daily     fluconazole  400 mg Oral Daily     heparin  5,000 Units Subcutaneous Q12H     ipratropium - albuterol 0.5 mg/2.5 mg/3 mL  3 mL Nebulization 4x daily     metoprolol tartrate  25 mg Oral BID     omeprazole  20 mg Oral QAM AC     PARoxetine  15 mg Oral At Bedtime     PRESERVISION AREDS  1 capsule Oral BID     sodium chloride (PF)  3 mL Intracatheter Q8H                  Physical Exam:   Blood pressure 166/69, pulse 62, temperature 96.7  F (35.9  C), temperature source Axillary, resp. rate 20, weight 77.3 kg (170 lb 8 oz), SpO2 93 %.  Wt Readings from Last 2 Encounters:   10/03/18 77.3 kg (170 lb 8 oz)   09/17/18 80.1 kg (176 lb 9.6 oz)     Vital Signs with Ranges  Temp:  [95.4  F (35.2  C)-96.7  F (35.9  C)] 96.7  F (35.9  C)  Heart Rate:  [60-66] 65  Resp:  [18-20] 20  BP: (160-167)/(65-69) 166/69  SpO2:  [89 %-97 %] 93 %    Constitutional: Awake, alert, looks better, no apparent distress   Lungs: Clear to auscultation bilaterally, no crackles or wheezing   Cardiovascular: Regular rate and rhythm, normal S1 and S2, and no murmur noted   Abdomen: Normal bowel sounds, soft, non-distended, non-tender   Skin: No rashes, no cyanosis, no edema   Other:           Data:   All microbiology laboratory data reviewed.  Recent Labs    Lab Test  10/03/18   0624  10/02/18   0639  10/01/18   0638   WBC  10.3  9.5  11.6*   HGB  11.7*  12.1*  12.4*   HCT  36.2*  37.6*  38.5*   MCV  91  92  91   PLT  463*  491*  525*     Recent Labs   Lab Test  10/03/18   0624  10/02/18   0639  10/01/18   0638   CR  1.14  1.07  1.10     Recent Labs   Lab Test  05/08/15   1510   SED  7     Recent Labs   Lab Test  09/28/18   1332  09/28/18   1331  09/27/18   1512  09/24/18   2050  09/24/18   1203  09/16/18   2303  09/16/18   2207  09/16/18   2100  08/28/18   1240   CULT  No growth after 5 days  No growth after 5 days  Heavy growth  Denise albicans / dubliniensis  Candida albicans and Candida dubliniensis are not routinely speciated  Susceptibility testing not routinely done  *  Culture negative monitoring continues  <1000 colonies/mL  urogenital jeanette  Susceptibility testing not routinely done    Cultured on the 3rd day of incubation:  Candida albicans  *  Critical Value/Significant Value, preliminary result only, called to and read back by  Willow Nesbitt RN, @[id number] 09/27/18.DH.    No growth  No growth  50,000 to 100,000 colonies/mL  Candida albicans / dubliniensis  Candida albicans and Candida dubliniensis are not routinely speciated  Susceptibility testing not routinely done  *  No growth  10,000 to 50,000 colonies/mL  Escherichia coli  *  >100,000 colonies/mL  Enterococcus faecalis  *[SD1.1]          Revision History        User Key Date/Time User Provider Type Action    > SD1.1 10/3/2018  2:04 PM Kannan Lobo MD Physician Sign            Progress Notes by Viridiana Anaya APRN CNS at 10/3/2018  9:51 AM     Author:  Viridiana Anaya APRN CNS Service:  Palliative Author Type:  Clinical Nurse Specialist    Filed:  10/3/2018  9:53 AM Date of Service:  10/3/2018  9:51 AM Creation Time:  10/3/2018  9:51 AM    Status:  Signed :  Viridiana Anaya APRN CNS (Clinical Nurse Specialist)         [facility]  Palliative Care Chart  Check    This patient's most recent hospitalist note, nursing notes, most recent consultant notes, medication profile and labs from the past 24 hours have been reviewed.  POLST completed with patient and his Daughter Red Saldana. She agrees to bring in advance directive prior to discontinue for our records.  Will follow up on document but otherwise sign off.  If you would like the patient to be seen, please contact the service at [phone] and ask to have the patient seen.    Thank you!    Viridiana Anaya   Pain Management and Palliative Care  [facility]  Pgr: [phone][AK1.1]       Revision History        User Key Date/Time User Provider Type Action    > AK1.1 10/3/2018  9:53 AM Viridiana Anaya APRN CNS Clinical Nurse Specialist Sign            Progress Notes by Virginie Ferris MD at 10/2/2018  3:55 PM     Author:  Virginie Ferris MD Service:  Hospitalist Author Type:  Physician    Filed:  10/2/2018  4:13 PM Date of Service:  10/2/2018  3:55 PM Creation Time:  10/2/2018  3:55 PM    Status:  Signed :  Virginie Ferris MD (Physician)         [facility]  Hospitalist Progress Note  Virginie Ferris MD[KD1.1] 10/02/2018[KD1.2]    Reason for Stay (Diagnosis): encephalopathy/sepsi         Assessment and Plan:      Summary of Stay: Edison Boss is a 89 year old male with a history of prior prostate cancer treated with brachy therapy and XRT, chronic indwelling tolliver catheter, paroxysmal AF (not anticoagulated) and SSS s/p ablation and dual chamber PPM, htn/hlp,  admitted on 9/24/2018 with encephalopathy felt most consistent with infectious process and suspected pna complicated by hypoxia.     He was placed on IV pip-tazo and vanco in the setting of sepsis but acutely decompensated on the morning of 9/27/18.  He had been complaining of some back pain in the setting of recent falling and thought that this was initially just MSK.  But given clinical decline CAP CT pursued and  positive for obstructing left ureteral stone.  He was urgently taken for cysto and stent placement and since then has continued to improve.    His cultures are notable for heavy grown candida albicans from renal pelvic fluid in addition to yeast cultured from 1/2 blood cultures from day of admission.     His hospitalization has been complicated by iatrogenic volume overload in the setting of appropriate treatment for sepsis.  He is responding well to IV furosemide.    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 Fungemia: with candida- micafungin switched over to fluconazole 10/1.  At discharge can switch to oral for 2 more weeks  2. pna vs renal pelvic infection: covered for nosocomial coverage with linezolid and pip-tazo.  Continued through weekend per weekend ID doc.  Most likely source though of sepsis is his yeast (interestingly he was treated with fluconazole at discharge 9/19)-but doubt that could adequately take care of renal pelvis infection in setting of obstructing stone.  Linezolid discontinued 10/1, micafungin converted to fluconazole on 10/1, pip-tazo continues for one more day.  Will need 2 weeks of oral fluconazole at discharge  3. Abdominal hernia:  Reducible so no indication for acute intervention  4. Iatrogenic volume overload:  Improving with IV diuresis-close to resolved  5. Hypoxic respiratory failure:  Pulmonary edema and ? pna-improving.  Now down to  1 L supplemental O2-not on home O2.  I think now mostly atelectasis so have encouraged IS  6. Hematuria:  Mild-likely combination of factors including stone/tolliver/heparin. Would cont heparin as high risk for dvt given prolonged hospitalization   7. MADELYN:  Resolved-back to wnl even in the setting of iv diuresis.  Suspect 2/2  ATN from sepsis  8. Sepsis:  Tachycardia/leukocytosis-2/2 candida infection behind obstructed ureter and fungemia.  Resolved  9. Htn:  pta meds amlodipine 5 mg-resumed as at home  10. Depression:  Continued on paroxetine now that off linezolid  11. PAF/SSS paced rhythm by tele.  Not on chronic AC-started on asa this hospitalization   12. Thrombocytosis  A bit disconcerting as almost everything points to improvement but elevated platelet count (in the absence of PCV primary or secondary) is usually due to inflammatory process.  Cont to monitor  DVT Prophylaxis: Heparin SQ and Pneumatic Compression Devices  Code Status: DNR / DNI  Functional status:  Lives alone in senior apartment, walks with a walker[KD1.1]    Disposition Plan[KD1.2]   Expected discharge in 1-2 days to tbd once respiratory failure resolved, to TCU     Entered:[KD1.1] Virginie Ferris 10/02/2018[KD1.2],[KD1.1] 3:56 PM[KD1.2]        Discussed with patient, dtr  at the bedside            Interval History (Subjective):      Had another busy morning and actually got up and walked down the rizo a bit.  No cp or sob, gets fatigued easily.  No n/v and appetite is improving                  Physical Exam:      Last Vital Signs:[KD1.1]  /70 (BP Location: Left arm)  Pulse 62  Temp 96  F (35.6  C) (Oral)  Resp 18  Wt 79.2 kg (174 lb 9.6 oz)  SpO2 92%  BMI 25.05 kg/m2[KD1.2]    I/O: net neg 8.7 L  Tele:  A and AV paced    Pleasant nad sleeping quietly when I came in but easily awoke and participated in interview and exam looks stated age head nc/at sclera clear lungs diminished in the bases otherwise ctab rrr no mrg trace le edema skin w/d no cyanosis or clubbing alert and oriented affect appropriate abd s/nt/nd cano urine reddish hue in catheter bag           Medications:      All current medications were reviewed with changes reflected in problem list.         Data:      All new lab and imaging data was reviewed.   Labs:[KD1.1]    Recent Labs  Lab  10/02/18  0639      POTASSIUM 3.8   CHLORIDE 107   CO2 28   ANIONGAP 5   GLC 85   BUN 12   CR 1.07   GFRESTIMATED 64   GFRESTBLACK 78   ARYAN 8.7       Recent Labs  Lab 10/02/18  0639   WBC 9.5   HGB 12.1*   HCT 37.6*   MCV 92   *[KD1.2]      Imaging:[KD1.1]      Revision History        User Key Date/Time User Provider Type Action    > KD1.2 10/2/2018  4:13 PM Virginie Ferris MD Physician Sign     KD1.1 10/2/2018  3:55 PM Virginie Ferris MD Physician             Progress Notes by Shefali Calixto LSW at 10/2/2018  4:00 PM     Author:  Shefali Calixto LSW Service:  (none) Author Type:      Filed:  10/2/2018  4:05 PM Date of Service:  10/2/2018  4:00 PM Creation Time:  10/2/2018  4:00 PM    Status:  Addendum :  Shefali Calixto LSW ()         D:  TCU referrals have been sent to [facility], [facility], and [facility].  Family's first choice is [facility].      I:  Ava,  at [facility] ([phone]) called to check the status of the pt.  Bia informed Ava that the pt's discharge day is set for Thursday possibly to [facility] if there is an open bed.  Bia informed Ava that they will be updated when plans are in place.    A/P:  Bia will continue to follow pt until discharge as needed.  Bia will call and update Ava when discharge plans are known.[TH1.1]    Addendum:  Bia called and left Ava a voicemail to let her know that the pt will discharge on Thursday to [facility].[TH1.2]      Revision History        User Key Date/Time User Provider Type Action    > [N/A] 10/2/2018  4:05 PM Shefali Calixto LSW  Addend     TH1.2 10/2/2018  4:04 PM Shefali Calixto LSW  Sign     TH1.1 10/2/2018  4:00 PM Shefali Calixto LSW              Progress Notes by Kannan Lobo MD at 10/2/2018  3:10 PM     Author:  Kannan Lobo MD Service:  Infectious Disease Author Type:  Physician    Filed:  10/2/2018  3:11 PM Date of Service:  10/2/2018  3:10 PM Creation  Time:  10/2/2018  3:10 PM    Status:  Signed :  Kannan Lobo MD (Physician)         [facility]  Infectious Disease Progress Note          Assessment and Plan:   IMPRESSION:   1.  A 89-year-old male with 2+ week progressive deterioration in health, some hypoxia and possible infiltrate, recent abnormal urine, but with unremarkable cultures, some intermittent abdominal pain and recently reduced hernia.  Primary etiology of syndrome now likely aparrent with CT L ureteral obstruction and BC candida   2.  Cognitive dysfunction, probably encephalopathy from the current acute infection on top of mild dementia.   3.  Abdominal pain, previously part of this now occurring again.  CT scan with new finding L ureteral obstuction  4.  Definite infiltrates and hypoxia, certainly at risk for aspiration and appearance suggestive of that, enough health care contact to be at risk for nosocomial pathogens.   5.  Acute renal insufficiency, undoubtedly acute tubular necrosis from the current acute illness, but also got vancomycin.   6.  Prior cerebrovascular infarction.   7.  Possible fluid and congestive heart failure component.   8 Ongoing reducible abd hernia  9 gall stones      RECOMMENDATIONS:   1. Candidemia and + urine from kidney, C albicans, plan flucon 2 doses IV then extended po  2.  Improving kidney function, respiratory function,   3.   Zosyn 1 day longer, discontinue zyvox,  doubt needed as urine/candidemia likely is main issue,   4 Recheck Bc neg   5 Likely tomorrow  po diflucan about 2 weeks             Interval History:   no new complaints more awake, hypoxia stable, abd pain better CT with gall bladder issue but more sig had L ureteral obstruction candida albicans in Bc, prior UC C albicans, current UC neg, new stent collected UC C albicans              Medications:       acetaminophen  650 mg Oral Q8H    Or     acetaminophen  650 mg Rectal Q8H     amLODIPine  5 mg Oral Daily     aspirin   81 mg Oral Daily     fluconazole  400 mg Intravenous Q24H     heparin  5,000 Units Subcutaneous Q12H     ipratropium - albuterol 0.5 mg/2.5 mg/3 mL  3 mL Nebulization 4x daily     omeprazole  20 mg Oral QAM AC     PARoxetine  15 mg Oral At Bedtime     piperacillin-tazobactam  3.375 g Intravenous Q8H NAKIA     PRESERVISION AREDS  1 capsule Oral BID     sodium chloride (PF)  3 mL Intracatheter Q8H                  Physical Exam:   Blood pressure 158/70, pulse 62, temperature 96  F (35.6  C), temperature source Oral, resp. rate 18, weight 79.2 kg (174 lb 9.6 oz), SpO2 95 %.  Wt Readings from Last 2 Encounters:   10/01/18 79.2 kg (174 lb 9.6 oz)   09/17/18 80.1 kg (176 lb 9.6 oz)     Vital Signs with Ranges  Temp:  [95.8  F (35.4  C)-96  F (35.6  C)] 96  F (35.6  C)  Heart Rate:  [60-64] 64  Resp:  [16-18] 18  BP: (158-194)/(66-75) 158/70  SpO2:  [92 %-96 %] 95 %    Constitutional: Awake, alert, looks better, no apparent distress   Lungs: Clear to auscultation bilaterally, no crackles or wheezing   Cardiovascular: Regular rate and rhythm, normal S1 and S2, and no murmur noted   Abdomen: Normal bowel sounds, soft, non-distended, non-tender   Skin: No rashes, no cyanosis, no edema   Other:           Data:   All microbiology laboratory data reviewed.  Recent Labs   Lab Test  10/02/18   0639  10/01/18   0638  09/30/18   0714   WBC  9.5  11.6*  13.1*   HGB  12.1*  12.4*  11.8*   HCT  37.6*  38.5*  36.7*   MCV  92  91  91   PLT  491*  525*  490*     Recent Labs   Lab Test  10/02/18   0639  10/01/18   0638  09/30/18   0714   CR  1.07  1.10  1.14     Recent Labs   Lab Test  05/08/15   1510   SED  7     Recent Labs   Lab Test  09/28/18   1332  09/28/18   1331  09/27/18   1512  09/24/18   2050  09/24/18   1203  09/16/18   2303  09/16/18   2207  09/16/18   2100  08/28/18   1240   CULT  No growth after 4 days  No growth after 4 days  Heavy growth  Denise albicans / dubliniensis  Candida albicans and Candida dubliniensis are not  routinely speciated  Susceptibility testing not routinely done  *  Culture negative monitoring continues  <1000 colonies/mL  urogenital jeanette  Susceptibility testing not routinely done    Cultured on the 3rd day of incubation:  Candida albicans / dubliniensis  Speciation in progress  *  Critical Value/Significant Value, preliminary result only, called to and read back by  Willow Nesbitt RN, @1714 09/27/18.DH.    No growth  No growth  50,000 to 100,000 colonies/mL  Candida albicans / dubliniensis  Candida albicans and Candida dubliniensis are not routinely speciated  Susceptibility testing not routinely done  *  No growth  10,000 to 50,000 colonies/mL  Escherichia coli  *  >100,000 colonies/mL  Enterococcus faecalis  *[SD1.1]          Revision History        User Key Date/Time User Provider Type Action    > SD1.1 10/2/2018  3:11 PM Kannan Lobo MD Physician Sign            Progress Notes by Viridiana Peraza RN at 10/2/2018  2:53 PM     Author:  Viridiana Peraza RN Service:  (none) Author Type:      Filed:  10/2/2018  2:54 PM Date of Service:  10/2/2018  2:53 PM Creation Time:  10/2/2018  2:53 PM    Status:  Signed :  Viridiana Peraza RN ()         Discharge Planner   Discharge Plans in progress: [facility] TCU on Thurs, family will transport  Barriers to discharge plan: plans per MD recommendations         Entered by: Viridiana Peraza 10/02/2018 2:53 PM[AH1.1]          Revision History        User Key Date/Time User Provider Type Action    > AH1.1 10/2/2018  2:54 PM Viridiana Peraza, RN Case Manager Sign            Progress Notes by Viridiana Peraza RN at 10/2/2018  2:48 PM     Author:  Viridiana Peraza RN Service:  (none) Author Type:      Filed:  10/2/2018  2:53 PM Date of Service:  10/2/2018  2:48 PM Creation Time:  10/2/2018  2:48 PM    Status:  Signed :  Viridiana Peraza RN ()         Care Coordination:  Per bedside RN who spoke with MD, pt will be ready for TCU discharge  on Thurs. Call placed to [facility] admissions regarding bed availability for Thurs. They will have both a shared room in LTC with no extra cost and a private room with $38.50/day cost available for Thurs. Spoke to pt's daughter, Red, regarding plan for thurs discharge to TCU and discussed bed availability along with cost. They would prefer a shared room at this time. Family is planning to transport. [facility] admissions updated on preference of shared room. Will cont to follow.    Viridiana Peraza RN CTS[AH1.1]      Revision History        User Key Date/Time User Provider Type Action    > AH1.1 10/2/2018  2:53 PM Viridiana Peraza, RN Case Manager Sign            Progress Notes by Virginie Ferris MD at 10/1/2018 12:03 PM     Author:  Virginie Ferris MD Service:  Hospitalist Author Type:  Physician    Filed:  10/1/2018  5:19 PM Date of Service:  10/1/2018 12:03 PM Creation Time:  10/1/2018 12:03 PM    Status:  Signed :  Virginie Ferris MD (Physician)         [facility]  Hospitalist Progress Note  Virginie Ferris MD[KD1.1] 10/01/2018[KD1.2]    Reason for Stay (Diagnosis): encephalopathy/sepsi         Assessment and Plan:      Summary of Stay: Edison Boss is a 89 year old male with a history of prior prostate cancer treated with brachy therapy and XRT, chronic indwelling tolliver catheter, paroxysmal AF (not anticoagulated) and SSS s/p ablation and dual chamber PPM, htn/hlp,  admitted on 9/24/2018 with encephalopathy felt most consistent with infectious process and suspected pna complicated by hypoxia.     He was placed on IV pip-tazo and vanco in the setting of sepsis but acutely decompensated on the morning of 9/27/18.  He had been complaining of some back pain in the setting of recent falling and thought that this was initially just MSK.  But given clinical decline CAP CT pursued and positive for obstructing left ureteral stone.  He was urgently taken for cysto and stent placement and since then has continued to  improve.    His cultures are notable for heavy grown candida albicans from renal pelvic fluid in addition to yeast cultured from 1/2 blood cultures from day of admission.     His hospitalization has been complicated by iatrogenic volume overload in the setting of appropriate treatment for sepsis.  He is responding well to IV furosemide    He now has evidence of mild hematuria after initiation of low dose heparin      Problem List:   1. Fungemia:[KD1.1] with candida[KD1.3]- micafungin[KD1.1] switched over to fluconazole 10/1.  At discharge can switch to oral for 2 more weeks  2. pna vs renal pelvic infection[KD1.3]: covered for nosocomial coverage with linezolid and pip-tazo.  Continued through weekend per weekend ID doc[KD1.1].  Most likely source though of sepsis is his yeast (interestingly he was treated with fluconazole at discharge 9/19)-but doubt that could adequately take care of renal pelvis infection in setting of obstructing stone.  Linezolid discontinued today, micafungin converted to fluconazole, pip-tazo continues for now[KD1.3]  3. Abdominal hernia:  Reducible so no indication for acute intervention  4. Iatrogenic volume overload:  Improving with IV diuresis  5. Hypoxic respiratory failure:  Pulmonary edema and ? pna-improving.  Now down to  3 L supplemental O2-not on home O2.[KD1.1]    6. Hematuria:  Mild-likely combination of factors including stone/tolliver/heparin. Would cont heparin as high risk for dvt given prolonged hospitalization[KD1.3]   7. MADELYN:  Resolved-back to wnl even in the setting of iv diiuresis.  Suspect 2/2 ATN from sepsis  8. Sepsis:  Tachycardia/leukocytosis-2/2 candida infection behind obstructed ureter and fungemia.  Resolved  9. Htn:  pta meds amlodipine 5 mg-resumed as at home  10. Depression:  Continued on paroxetine[KD1.1] now that off linezolid[KD1.3]  11. PAF/SSS paced rhythm by tele.  Not on chronic AC-started on asa this hospitalization[KD1.1]   12. Thrombocytosis  A bit  disconcerting as almost everything points to improvement but elevated platelet count (in the absence of PCV primary or secondary) is usually due to inflammatory process.  Cont to monitor[KD1.3]  DVT Prophylaxis: Heparin SQ and Pneumatic Compression Devices  Code Status: DNR / DNI  Functional status:  Lives alone in senior apartment, walks with a walker[KD1.1]    Disposition Plan[KD1.2]   Expected discharge in 2-3 days to tbd once respiratory failure resolved, appropriate abx regimen known.  TCU      Entered:[KD1.1] Virginie Ferris 10/01/2018[KD1.2],[KD1.1] 12:04 PM[KD1.2]        Discussed with patient, dtr  at the bedside            Interval History (Subjective):[KD1.1]      Had busy morning, up in chair, out walking with PT, now feeling worn out.  No cp, thinks breathing is ok, dtr has noticed that urine is red in color now, no n/v and po intake is fair[KD1.3]                  Physical Exam:      Last Vital Signs:[KD1.1]  /69 (BP Location: Right arm)  Pulse 62  Temp 98  F (36.7  C) (Oral)  Resp 18  Wt 79.2 kg (174 lb 9.6 oz)  SpO2 95%  BMI 25.05 kg/m2[KD1.2]    I/O: net neg[KD1.1] 6[KD1.3] L  Tele:  A and AV paced    Pleasant nad but fatigued and will nod off during my interview looks stated age head nc/at sclera clear lungs diminished in the bases otherwise ctab rrr no mrg 1 + le edema skin w/d no cyanosis or clubbing alert and oriented affect appropriate abd s/nt/nd cano[KD1.1] urine reddish hue in catheter bag[KD1.3]           Medications:      All current medications were reviewed with changes reflected in problem list.         Data:      All new lab and imaging data was reviewed.   Labs:[KD1.1]    Recent Labs  Lab 10/01/18  0638      POTASSIUM 4.0   CHLORIDE 102   CO2 30   ANIONGAP 7   GLC 92   BUN 13   CR 1.10   GFRESTIMATED 62   GFRESTBLACK 75   ARYAN 8.6       Recent Labs  Lab 10/01/18  0638   WBC 11.6*   HGB 12.4*   HCT 38.5*   MCV 91   *[KD1.2]      Imaging:[KD1.1]        Revision  History        User Key Date/Time User Provider Type Action    > KD1.3 10/1/2018  5:19 PM Virginie Ferris MD Physician Sign     KD1.2 10/1/2018 12:04 PM Virginie Ferris MD Physician      KD1.1 10/1/2018 12:03 PM Virginie Ferris MD Physician             Progress Notes by Sobeida rTimble RD, LD at 10/1/2018  3:23 PM     Author:  Sobeida Trimble RD, LD Service:  Nutrition Author Type:  Registered Dietitian    Filed:  10/1/2018  4:25 PM Date of Service:  10/1/2018  3:23 PM Creation Time:  10/1/2018  3:23 PM    Status:  Signed :  Sobeida Trimble RD, LD (Registered Dietitian)         CLINICAL NUTRITION SERVICES  -  ASSESSMENT NOTE      Recommendations Ordered by Registered Dietitian (DIANA):   Oral nutrition supplements   Malnutrition:   % Intake:</= 75% for >/= 1 month (severe malnutrition)  Subcutaneous Fat Loss:Orbital region mild to moderate depletion and Upper arm region mild to moderate depletion  Muscle Loss:Temporal region moderate depletion, Clavicle bone region mild to moderate depletion, Acromion bone region mild to moderate depletion, Dorsal hand region depletion masked by fluid) Patellar region mild to moderate depletion, Anterior thigh region not observed and Posterior calf region moderate depletion  Fluid Retention[RP1.1]: Trace, does not meet criteria[RP1.2]    Malnutrition Diagnosis: Severe malnutrition  In Context of:  Chronic illness or disease     REASON FOR ASSESSMENT  Edison Boss is a 89 year old male seen by the dietitian for[RP1.1] LOS[RP1.2]    NUTRITION HISTORY  - Information obtained from patient  - Food allergies/intolerances: NKFA  - Patient is on a regular diet at home.  - Typical food/fluid intake PTA:    Supplements at home: family recently bought Boost, not consistently drinking    Issues chewing or swallowing: denies    Lives in senior housing and receives one meal per day    second meal is a bowl of cereal or hotdog - convenient, easy to prepare meal    Chronic poor  "appetite - may eat better when family is around.[RP1.1]     as of May    Pacemaker as of Jan 2018[RP1.2]    CURRENT NUTRITION ORDERS  - Diet: Regular  - Supplement: none  Current Intake/Tolerance:  - Per flow sheet review, 50-75% intake for documented meals - ordering small amounts.  - Factors affecting nutrition intake include: decreased appetite, early satiety    PHYSICAL FINDINGS  Observed  See malnutrition section below  Obtained from Chart/Interdisciplinary Team  Pepe nutrition score: ; total score:   Fluid status: +1 BLE and L arm/hand edema    ANTHROPOMETRICS  Height: 5'10\"  Weight:[RP1.1] 79 kg   Body mass index is 25.05 kg/(m^2).[RP1.3]  Weight Status:  Overweight BMI 25-29.9  Ideal body weight: 75.5kg +/- 10%, 105% of IBW   Weight History:  Weight appears relatively stable, as noted below - family endorses visible fat and muscle loss[RP1.1]  W[RP1.3]t Readings from Last 10 Encounters:   10/01/18 79.2 kg (174 lb 9.6 oz)   09/17/18 80.1 kg (176 lb 9.6 oz)   09/12/18 76.1 kg (167 lb 11.2 oz)   09/04/18 76.7 kg (169 lb)   04/05/18 78 kg (172 lb)   02/08/18 78 kg (172 lb)   01/02/18 78.2 kg (172 lb 8 oz)   12/28/17 78 kg (172 lb)   10/30/17 78.9 kg (174 lb)   08/30/17 80.5 kg (177 lb 8 oz)       ASSESSED NUTRITION NEEDS (PER APPROVED PRACTICE GUIDELINES, Dosing weight: 79.2 kg):  Estimated Energy Needs: 8612-5889 kcals (25-30 Kcal/Kg)  Justification: maintenance  Estimated Protein Needs:  grams protein (1.2-1.5 g pro/Kg)  Justification: preservation of lean body mass  Estimated Fluid Needs: >1 mL/Kcal  Justification: maintenance    LABS  Labs reviewed    Recent Labs   Lab Test  10/01/18   0638  09/30/18   1512  09/30/18   0714  09/29/18   0652  09/28/18   0613   POTASSIUM  4.0  4.0  3.1*  4.3  4.1     No results for input(s): PHOS in the last 09809 hours.  Recent Labs   Lab Test  09/26/18   0707   MAG  2.6*     Recent Labs   Lab Test  10/01/18   0638  09/30/18   0714  09/29/18   0652  09/28/18   " 0613  09/27/18   0730   NA  139  140  139  140  140     Recent Labs   Lab Test  10/01/18   0638  09/30/18   0714  09/29/18   0652  09/28/18   0613  09/27/18   0730   CR  1.10  1.14  1.45*  1.67*  2.02*[RP1.1]       Recent Labs  Lab 10/01/18  0638 09/30/18  0714 09/29/18  0652 09/28/18  0613 09/27/18  0730 09/26/18  0707 09/25/18  0602   GLC 92 94 100* 106* 103* 88 82[RP1.3]     Lab Results   Component Value Date    A1C 5.5 11/30/2012       MEDICATIONS  Medications reviewed    MALNUTRITION:  % Weight Loss:None noted  % Intake:</= 75% for >/= 1 month (severe malnutrition)  Subcutaneous Fat Loss:Orbital region mild to moderate depletion and Upper arm region mild to moderate depletion  Muscle Loss:Temporal region moderate depletion, Clavicle bone region mild to moderate depletion, Acromion bone region mild to moderate depletion, Dorsal hand region depletion masked by fluid) Patellar region mild to moderate depletion, Anterior thigh region not observed and Posterior calf region moderate depletion  Fluid Retention[RP1.1]: Trace, does not meet criteria[RP1.2]    Malnutrition Diagnosis: Severe malnutrition  In Context of:  Chronic illness or disease    NUTRITION DIAGNOSIS:[RP1.1]  Malnutrition[RP1.2] related to[RP1.1] decreased appetite[RP1.2] as evidenced by[RP1.1] fat and muscle loss, PO intake likely meeting <75% of needs for >1 month with baseline intake of 2 meals per day[RP1.2]    INTERVENTIONS  Recommendations / Nutrition Prescription  Continue regular diet as ordered + Diet appropriate oral nutrition supplements to increase calorie and protein intake    Implementation  Nutrition education:[RP1.1] reviewed rational for increased calorie and protein intake, supplement options, push for small/frequent bites to increase overall intake.  M[RP1.2]edical food supplement: Plus2 shake mixed w/2 packets Beneprotein  Collaboration and Referral of care: Discussed patient during interdisciplinary care rounds this  morning    Goals  Patient to consume >/= 50% of meals TID and >/=2 high protein supplements per day      MONITORING AND EVALUATION:  Progress towards goals will be monitored and evaluated per protocol and Practice Guidelines      Sobeida Trimble RDN, KIRAN, [facility]  Pager - 3rd floor/ICU: [phone]  Pager - All other floors: [phone]  Pager - Weekend/holiday: [phone]  Office: [phone][RP1.1]     Revision History        User Key Date/Time User Provider Type Action    > RP1.2 10/1/2018  4:25 PM Atilio, Sobeida RAM RD, LD Registered Dietitian Sign     RP1.3 10/1/2018  3:25 PM Sobeida Trimble RD, LD Registered Dietitian      RP1.1 10/1/2018  3:23 PM Sobeida Trimble RD, LD Registered Dietitian             Progress Notes by Kannan Lobo MD at 10/1/2018  3:13 PM     Author:  Kannan Lobo MD Service:  Infectious Disease Author Type:  Physician    Filed:  10/1/2018  3:16 PM Date of Service:  10/1/2018  3:13 PM Creation Time:  10/1/2018  3:13 PM    Status:  Signed :  Kannan Lobo MD (Physician)         [facility]  Infectious Disease Progress Note          Assessment and Plan:   IMPRESSION:   1.  A 89-year-old male with 2+ week progressive deterioration in health, some hypoxia and possible infiltrate, recent abnormal urine, but with unremarkable cultures, some intermittent abdominal pain and recently reduced hernia.  Primary etiology of syndrome now likely aparrent with CT L ureteral obstruction and BC candida   2.  Cognitive dysfunction, probably encephalopathy from the current acute infection on top of mild dementia.   3.  Abdominal pain, previously part of this now occurring again.  CT scan with new finding L ureteral obstuction  4.  Definite infiltrates and hypoxia, certainly at risk for aspiration and appearance suggestive of that, enough health care contact to be at risk for nosocomial pathogens.   5.  Acute renal insufficiency, undoubtedly acute tubular necrosis from  the current acute illness, but also got vancomycin.   6.  Prior cerebrovascular infarction.   7.  Possible fluid and congestive heart failure component.   8 Ongoing reducible abd hernia  9 gall stones      RECOMMENDATIONS:   1. Candidemia and urine from kidney, C albicans, to flucon  2.  Improving kidney function, respiratory function,   3.   Zosyn a bit longer, discontinue zyvox,  doubt needed as urine/candidemia likely is main issue,   4 Recheck Bc neg so far  5 Likely soon po diflucan about 2 weeks             Interval History:   no new complaints more awake, hypoxia stable, abd pain better CT with gall bladder issue but more sig had L ureteral obstruction candida albicans in Bc, prior UC C albicans, current UC neg, new stent collected UC C albicans              Medications:[SD1.1]       acetaminophen  650 mg Oral Q8H    Or     acetaminophen  650 mg Rectal Q8H     amLODIPine  5 mg Oral Daily     aspirin  81 mg Oral Daily     fluconazole  400 mg Intravenous Q24H     heparin  5,000 Units Subcutaneous Q12H     influenza Vac Split High-Dose  0.5 mL Intramuscular Prior to discharge     ipratropium - albuterol 0.5 mg/2.5 mg/3 mL  3 mL Nebulization 4x daily     omeprazole  20 mg Oral QAM AC     piperacillin-tazobactam  3.375 g Intravenous Q8H NAKIA     PRESERVISION AREDS  1 capsule Oral BID     sodium chloride (PF)  3 mL Intracatheter Q8H[SD1.2]                  Physical Exam:[SD1.1]   Blood pressure 169/69, pulse 62, temperature 98  F (36.7  C), temperature source Oral, resp. rate 18, weight 79.2 kg (174 lb 9.6 oz), SpO2 95 %.  Wt Readings from Last 2 Encounters:   10/01/18 79.2 kg (174 lb 9.6 oz)   09/17/18 80.1 kg (176 lb 9.6 oz)[SD1.2]     Vital Signs with Ranges[SD1.1]  Temp:  [95.4  F (35.2  C)-98  F (36.7  C)] 98  F (36.7  C)  Heart Rate:  [60-62] 62  Resp:  [16-18] 18  BP: (156-171)/(57-73) 169/69  SpO2:  [93 %-96 %] 95 %[SD1.2]    Constitutional: Awake, alert, looks better, no apparent distress   Lungs: Clear to  auscultation bilaterally, no crackles or wheezing   Cardiovascular: Regular rate and rhythm, normal S1 and S2, and no murmur noted   Abdomen: Normal bowel sounds, soft, non-distended, non-tender   Skin: No rashes, no cyanosis, no edema   Other:           Data:   All microbiology laboratory data reviewed.[SD1.1]  Recent Labs   Lab Test  10/01/18   0638  09/30/18   0714  09/29/18   0652   WBC  11.6*  13.1*  18.4*   HGB  12.4*  11.8*  12.4*   HCT  38.5*  36.7*  39.5*   MCV  91  91  92   PLT  525*  490*  460*     Recent Labs   Lab Test  10/01/18   0638  09/30/18   0714  09/29/18   0652   CR  1.10  1.14  1.45*     Recent Labs   Lab Test  05/08/15   1510   SED  7     Recent Labs   Lab Test  09/28/18   1332  09/28/18   1331  09/27/18   1512  09/24/18   2050  09/24/18   1203  09/16/18   2303  09/16/18   2207  09/16/18   2100  08/28/18   1240   CULT  No growth after 3 days  No growth after 3 days  Heavy growth  Denise albicans / dubliniensis  Candida albicans and Candida dubliniensis are not routinely speciated  Susceptibility testing not routinely done  *  Culture negative monitoring continues  <1000 colonies/mL  urogenital jeanette  Susceptibility testing not routinely done    Cultured on the 3rd day of incubation:  Candida albicans / dubliniensis  Speciation in progress  *  Critical Value/Significant Value, preliminary result only, called to and read back by  Willow Nesbitt RN, @0383 09/27/18.DH.    No growth  No growth  50,000 to 100,000 colonies/mL  Candida albicans / dubliniensis  Candida albicans and Candida dubliniensis are not routinely speciated  Susceptibility testing not routinely done  *  No growth  10,000 to 50,000 colonies/mL  Escherichia coli  *  >100,000 colonies/mL  Enterococcus faecalis  *[SD1.2]          Revision History        User Key Date/Time User Provider Type Action    > SD1.2 10/1/2018  3:16 PM Kannan Lobo MD Physician Sign     SD1.1 10/1/2018  3:13 PM Kannan Lobo MD Physician              Progress Notes by Viridiana Anaya APRN CNS at 10/1/2018  2:53 PM     Author:  Viridiana Anaya APRN CNS Service:  Palliative Author Type:  Clinical Nurse Specialist    Filed:  10/1/2018  3:08 PM Date of Service:  10/1/2018  2:53 PM Creation Time:  10/1/2018  2:53 PM    Status:  Signed :  Viridiana Anaya APRN CNS (Clinical Nurse Specialist)         [facility]  Palliative Care Progress Note  Text Page     Assessment & Plan   1.Decisional Capacity -  Questionable, mental status improving.. Patient has an advance directive per family but it is not available in our system despite family reports they have brought it in multiple times.  They note daughter Red in [facility] and Dtr Bea is alternate.  Red agrees to bring a copy in again. Include patient in decision making as much as possible but involve health care agent attempting consensus with patient. Until document is validated per  policy next of kin would be agents, Patient is , he has 8 adult children, who appear to agree to have Red and Bea be spokespeople for the family.   2. Pain- Intractable back pain reported prior to stent placement, now denies.  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  Now reported chronically well managed with yearly botox injections.   3. Delirium- Multifactorial due to pain, infection, improving.    4. Care Planning- Open to services with attempt to return to baseline.  Appreciate social work support in TCU placement.     Goal of Care:DNR/DNI, Restorative.  Selective treatment, uninterested in testing only for a diagnosis that may not be acted upon.     Disease Process/es & Symptoms:  Edison Boss is a 89 year old patient admitted with symptoms of acute encephalopathy, initially of unclear cause, he has been treated for CAP and potential aspiration, possible HF exacerbation, MADELYN and severe pain who was ultimately found to have an obstructing left  proximal ureteral stone and now POD#4 left ureteral stent placement. With resolution of pain and improvement in mental status. He continues to be receive broad coverage for possible nosocomial pneumonia.      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  He lives independently at baseline, noted needed more assist from adult children checking in after his wife passed away in May.  He is noted to have declined in functional status quickly over the past 2 weeks including increased weakness and falls, as well as change in mentation.  He was hospitalized earlier this month as a part of this decline, but previously not since January for a pacemaker.  He has no noted weight loss.     Findings & plan of care discussed with: Nursing and daughter Bea.  Follow-up plan from palliative team: Will continue to follow, may not see daily, and facilitate advance directive to place on file.  Thank you for involving us in the patient's care.     Viridiana MONTES, CNS, CNP  Pain Management and Palliative Care  [facility]  Pgr: [phone]    Time Spent on this Encounter   I spent  15 minutes total, in assessment of the patient, counseling and discussion with the patient and family as documented in this note and coordination of care and discussion with the health care team.    Interval History   Feels well, up in chair, no c/o pain.  Mild SOB.  Most concerned about his preservation caps being on time.   Daughter Bea has no concerns only giving things time to see to what extent he can improve.     Course of Hospitalization Discussions Data   Discussed on September 28, 2018 with Viridiana MONTES, CNS, CNP:   Discussed with Patient's daughters Red and Bea and another son at patient's bedside.  Patient participates little as he drifts off to sleep.  They reviewed  patient decline in function and health over the past 2 weeks and feeling as if the decline was fast.  They note patient has needed more help since his wife passed away this Spring, but remaining strong.  They reviewed some of his history of chronic pain and treatment for prostate cancer.  They reviewed a very distressing day yesterday with patient's escalating pain and delirium and concern of not finding a cause until finally the stone was found and surgery offered.  They are pleased with his recovery today and hopeful he can improve enough to return to his baseline, but also aware this may be a setback.  They note goal of recovery but also not wanting tests that do not improve his current status and now are just looking for more problems, as an example a carotid ultrasound that was at some point this visit or another offered, and now with improvement a video swallow if swallow may improve on its own.    Family agrees to bring in advance directive although note it has been brought into the hospital multiple times.         Review of Systems    CONSTITUTIONAL: NEGATIVE for fever, chills  ENT/MOUTH: NEGATIVE for ear, mouth and throat problems  RESP: NEGATIVE for significant cough, POSITIVE for mild SOB.   CV: NEGATIVE for chest pain, palpitations, edema improving.    Palliative Symptom Review (0=no symptom/no concern, 1=mild, 2=moderate, 3=severe):      Pain: 0-none      Fatigue: 2-moderate      Nausea: 0-none      Constipation: 0-none      Diarrhea: 0-none      Depressive Symptoms: 0-none      Anxiety: 0-none      Drowsiness: 1-mild      Poor Appetite: 1-mild      Shortness of Breath: 1-mild      Insomnia: 0-none      Physical Exam[AK1.1]   Temp:  [95.4  F (35.2  C)-98  F (36.7  C)] 98  F (36.7  C)  Heart Rate:  [60-62] 62  Resp:  [16-18] 18  BP: (156-171)/(57-73) 169/69  SpO2:  [93 %-96 %] 95 %[AK1.2]  174 lbs 9.6 oz  GEN:  Alert, oriented x 2, appears comfortable, NAD.  HEENT:  Normocephalic/atraumatic, no scleral  icterus, no nasal discharge, mouth moist.  CV:  RRR, S1, S2; no murmurs or other irregularities noted.  +3 DP/PT pulses bilatererally; trace edema BLE, edema in LUE improving..  RESP:  Clear to auscultation bilaterally without rales/rhonchi/wheezing/retractions.  Symmetric chest rise on inhalation noted.  Normal respiratory effort.  ABD:  Rounded, soft, non-tender/non-distended.  +BS  EXT:  Edema & pulses as noted above.  CMS intact x 4.     SKIN:  Dry to touch, no exanthems noted in the visualized areas.    NEURO: FERNÁNDEZ. Generalized weakness, no focal deficits.   Psych:  Normal affect.  Calm, cooperative, slow to answer, difficulty following conversation.     Medications[AK1.1]       acetaminophen  650 mg Oral Q8H    Or     acetaminophen  650 mg Rectal Q8H     amLODIPine  5 mg Oral Daily     aspirin  81 mg Oral Daily     fluconazole  400 mg Intravenous Q24H     heparin  5,000 Units Subcutaneous Q12H     influenza Vac Split High-Dose  0.5 mL Intramuscular Prior to discharge     ipratropium - albuterol 0.5 mg/2.5 mg/3 mL  3 mL Nebulization 4x daily     omeprazole  20 mg Oral QAM AC     piperacillin-tazobactam  3.375 g Intravenous Q8H NAKIA     PRESERVISION AREDS  1 capsule Oral BID     sodium chloride (PF)  3 mL Intracatheter Q8H[AK1.3]       Data[AK1.1]   Results for orders placed or performed during the hospital encounter of 09/24/18 (from the past 24 hour(s))   Potassium   Result Value Ref Range    Potassium 4.0 3.4 - 5.3 mmol/L   Basic metabolic panel   Result Value Ref Range    Sodium 139 133 - 144 mmol/L    Potassium 4.0 3.4 - 5.3 mmol/L    Chloride 102 94 - 109 mmol/L    Carbon Dioxide 30 20 - 32 mmol/L    Anion Gap 7 3 - 14 mmol/L    Glucose 92 70 - 99 mg/dL    Urea Nitrogen 13 7 - 30 mg/dL    Creatinine 1.10 0.66 - 1.25 mg/dL    GFR Estimate 62 >60 mL/min/1.7m2    GFR Estimate If Black 75 >60 mL/min/1.7m2    Calcium 8.6 8.5 - 10.1 mg/dL   CBC with platelets differential   Result Value Ref Range    WBC 11.6 (H)  4.0 - 11.0 10e9/L    RBC Count 4.25 (L) 4.4 - 5.9 10e12/L    Hemoglobin 12.4 (L) 13.3 - 17.7 g/dL    Hematocrit 38.5 (L) 40.0 - 53.0 %    MCV 91 78 - 100 fl    MCH 29.2 26.5 - 33.0 pg    MCHC 32.2 31.5 - 36.5 g/dL    RDW 17.2 (H) 10.0 - 15.0 %    Platelet Count 525 (H) 150 - 450 10e9/L    Diff Method Manual Differential     % Neutrophils 73.0 %    % Lymphocytes 13.0 %    % Monocytes 7.0 %    % Eosinophils 3.0 %    % Basophils 2.0 %    % Promyelocytes 2.0 %    Absolute Neutrophil 8.5 (H) 1.6 - 8.3 10e9/L    Absolute Lymphocytes 1.5 0.8 - 5.3 10e9/L    Absolute Monocytes 0.8 0.0 - 1.3 10e9/L    Absolute Eosinophils 0.3 0.0 - 0.7 10e9/L    Absolute Basophils 0.2 0.0 - 0.2 10e9/L    Absolute Promyeloctyes 0.2 (H) 0 10e9/L    Anisocytosis Slight     Platelet Estimate       Automated count confirmed.  Platelet morphology is normal.[AK1.3]        Revision History        User Key Date/Time User Provider Type Action    > AK1.3 10/1/2018  3:08 PM Viridiana Anaya APRN CNS Clinical Nurse Specialist Sign     AK1.2 10/1/2018  3:06 PM Viridiana Anaya APRN CNS Clinical Nurse Specialist      AK1.1 10/1/2018  2:53 PM Viridiana Anaya APRN CNS Clinical Nurse Specialist                   Procedure Notes     No notes of this type exist for this encounter.      Progress Notes - Therapies (Notes from 10/01/18 through 10/04/18)     No notes of this type exist for this encounter.

## 2018-09-24 NOTE — LETTER
Key Recommendations:  CTS following for readmission, Elevated UGO and PNA dx. Pt was admitted from 9/16-9/19 for PNA and candida in bladder. He was dc'd back to his [facility] with levo and diflucan. His family was staying with him. He followed up with his Urologist, Dr Lilly, on 9/20 and was readmitted on 9/24 with encephalopathy, fall, PNA. He is being treated with zosyn and vanco. Pt and daughter felt he was dc'd too soon. They had gotten orders for pt to start [facility] for RN/PT but this had not yet started when he was brought in. We discussed PNA plan of care. TCU was recommended and pt and daughter are agreeable to TCU on discharge due to his weakness. Pt dc'd to *** TCU    Viridiana Peraza    AVS/Discharge Summary is the source of truth; this is a helpful guide for improved communication of patient story

## 2018-09-24 NOTE — ED NOTES
[facility]  ED Nurse Handoff Report    Edison Boss is a 89 year old male   ED Chief complaint: Altered Mental Status  . ED Diagnosis:   Final diagnoses:   HCAP (healthcare-associated pneumonia)   Acute respiratory failure with hypoxia (H)     Allergies:   Allergies   Allergen Reactions     Ceftriaxone Itching     Bactrim Hives     Levaquin Diarrhea     Oral only, can tolerate IV     Zithromax [Azithromycin]      Macrodantin [Nitrofuran Derivatives] Rash     Took recently with no problems       Code Status: Full Code  Activity level - Baseline/Home:  Independent. Activity Level - Current:   Stand with Assist of 2. Lift room needed: No. Bariatric: No   Needed: No   Isolation: No. Infection: Not Applicable.     Vital Signs:   Vitals:    09/24/18 1415 09/24/18 1430 09/24/18 1445 09/24/18 1500   BP: 163/76 139/64 142/62 150/63   Pulse:    60   Resp:       Temp:       TempSrc:       SpO2: 93% 93% 93% 93%       Cardiac Rhythm:  ,      Pain level:    Patient confused: Yes. Patient Falls Risk: Yes.   Elimination Status: Has voided   Patient Report - Initial Complaint: Generalized weakness, Altered mental status. Focused Assessment: Respiratory - Lung Fields: LLL; RLL LLL Breath Sounds: crackles RLL Breath Sounds: crackles. Pt was 81% on RA, is now 92-93% on 4L O2. Pt is having difficulty transferring on his own but typically has no difficulties. Pt is more lethargic than usual and is confused which he usually is not.    Tests Performed: lab, imaging. Abnormal Results:   XR Chest 2 Views   Final Result   IMPRESSION: Mild opacity at the left lung base has increased since the   previous exam, and may be related to pneumonia and/or atelectasis. No   other significant interval change. Dual-lead cardiac device left chest   wall, with lead tips projected over the RA and RV. Small area of   ill-defined opacity in the left upper lung medially is unchanged.   Heart size appears stable. There is  mild pulmonary venous congestion.      GERTRUDE ELIZABETH MD        Labs Ordered and Resulted from Time of ED Arrival Up to the Time of Departure from the ED   CRP INFLAMMATION - Abnormal; Notable for the following:        Result Value    CRP Inflammation 168.0 (*)     All other components within normal limits   TROPONIN I - Abnormal; Notable for the following:     Troponin I ES 0.061 (*)     All other components within normal limits   BASIC METABOLIC PANEL - Abnormal; Notable for the following:     Glucose 108 (*)     Urea Nitrogen 33 (*)     Creatinine 1.84 (*)     GFR Estimate 34 (*)     GFR Estimate If Black 42 (*)     All other components within normal limits   CBC WITH PLATELETS DIFFERENTIAL - Abnormal; Notable for the following:     WBC 24.6 (*)     RBC Count 4.06 (*)     Hemoglobin 12.2 (*)     Hematocrit 36.3 (*)     RDW 16.9 (*)     Absolute Neutrophil 21.2 (*)     Absolute Lymphocytes 0.4 (*)     Absolute Monocytes 2.0 (*)     Abs Immature Granulocytes 0.8 (*)     All other components within normal limits   LACTIC ACID WHOLE BLOOD   PROCALCITONIN   NT PROBNP INPATIENT   PERIPHERAL IV CATHETER   PULSE OXIMETRY NURSING   CARDIAC CONTINUOUS MONITORING   BLOOD CULTURE   BLOOD CULTURE     .   Treatments provided: IVF, ABX  Family Comments: None  OBS brochure/video discussed/provided to patient:  N/A  ED Medications:   Medications   vancomycin (VANCOCIN) 1,750 mg in sodium chloride 0.9 % 500 mL intermittent infusion (1,750 mg Intravenous Given 9/24/18 1505)   aspirin tablet 325 mg (325 mg Oral Given 9/24/18 1256)   0.9% sodium chloride BOLUS (0 mLs Intravenous Stopped 9/24/18 1503)   aztreonam (AZACTAM) 2 g vial to attach to  mL bag (0 g Intravenous Stopped 9/24/18 1503)     Drips infusing:  Yes  For the majority of the shift, the patient's behavior Green. Interventions performed were None.     Severe Sepsis OR Septic Shock Diagnosis Present: No  RECEIVING UNIT ED HANDOFF REVIEW    Above ED Nurse Handoff  Report was reviewed: Yes  Reviewed by: Dora Lawton on September 24, 2018 at 3:56 PM       ED Nurse Name/Phone Number: Stewart Cunningham,   3:10 PM

## 2018-09-24 NOTE — ED PROVIDER NOTES
History     Chief Complaint:  Altered Mental Status    The history is provided by the patient and a relative.      Edison Boss is a 89 year old male with a history of asthma, sinus node dysfunction s/p pacemaker, and prostate cancer with chronic indwelling catheter who was recently discharged (9/19/18) after a 3 day stay for pneumonia treated with Levaquin and candidal urinary tract infection treated with fluconazole who presents with son for altered mental status. The patient was getting out of bed this morning when he fell due to generalized weakness and was assisted to his floor by his son. No injuries. Patient had a fever of 100.5F this morning and was given Tylenol. Son reports that patient is more confused as compared to baseline over the last 1-2 days. The patient has associated cough, nausea, rhinorrhea, congestion, and eye discharge over the same time period. He denies CP, dyspnea, diarrhea, or vomiting. Patient has leg swelling at baseline. Patient's son reports he doesn't seem to have much appetite but he has been pushing him to eat a drink.    Allergies:  Ceftriaxone  Bactrim  Levaquin  Zithromax [Azithromycin]  Macrodantin [Nitrofuran Derivatives]    Medications:    Proventil  Norvasc  Zyrtec  Cholecalciferol  Temovate   Aveeno   D-mannose  Diflucan - per   Lidocaine  Colon cleanser  Prilosec   Percocet   Paxil     Past Medical History:    MCI  RAMÓN  Sinus node dysfunction   Pacemaker  SBO   Cerebral infarction  Hyperlipidemia   Restless leg syndrome   Insomnia   Asthma   Esophageal reflux   Osteoporosis   Disorder of skin  Malignant neoplasm of prostate  Calculus of kidney  Chronic infection  Chronic pain   Hypertension   Hyperlipidemia   Atrial fibrillation   Sinus node dysfunction  Sleep apnea  Cerebral artery occlusion with cerebral infarction     Past Surgical History:    Kidney stone   TURP   Brachytherapy  Left rotator cuff repair   Bilateral cataract surgery   EGD  Cystoscopy    Tonsil removal  Implant pacemaker  Penis surgery  Prostate surgery     Family History:    Heart disease  Cancer  Cerebrovascular disease     Social History:  The patient was accompanied to the ED by son.  Smoking Status: Former  Smokeless Tobacco: Never  Alcohol Use: No    Marital Status:       Review of Systems   Constitutional: Positive for appetite change (poor appetite) and fever.   HENT: Positive for congestion and rhinorrhea.    Eyes: Positive for discharge.   Respiratory: Positive for cough. Negative for shortness of breath.    Cardiovascular: Negative for chest pain and leg swelling (baseline).   Gastrointestinal: Positive for nausea. Negative for diarrhea and vomiting.   Neurological: Positive for weakness (generalized). Negative for syncope.   Psychiatric/Behavioral: Positive for confusion.   All other systems reviewed and are negative.    Physical Exam     Patient Vitals for the past 24 hrs:   BP Temp Temp src Pulse Heart Rate Resp SpO2   09/24/18 1500 150/63 - - 60 60 - 93 %   09/24/18 1445 142/62 - - - 60 - 93 %   09/24/18 1430 139/64 - - - 59 - 93 %   09/24/18 1415 163/76 - - - 64 - 93 %   09/24/18 1400 159/72 - - - 65 - 93 %   09/24/18 1305 - - - 70 70 - 92 %   09/24/18 1304 - - - - - - 93 %   09/24/18 1245 166/69 - - - - - -   09/24/18 1230 153/64 - - - - - -   09/24/18 1215 150/69 - - 67 67 - 91 %   09/24/18 1200 156/71 - - - - - (!) 89 %   09/24/18 1145 - - - - - - 91 %   09/24/18 1130 160/69 - - - - - -   09/24/18 1115 166/71 - - - - - 94 %   09/24/18 1112 - - - - - - 93 %   09/24/18 1107 - - - - - - (!) 81 %   09/24/18 1105 - - - - - - (!) 78 %   09/24/18 1058 164/70 99.8  F (37.7  C) Temporal - 76 20 (!) 84 %      Physical Exam  General: Well-developed and well-nourished. Fatigued appearing elderly  man. Cooperative.  Head:  Atraumatic.  Eyes:  Conjunctivae, lids, and sclerae are normal.  ENT:    Normal nose. Dry mucous membranes.  Neck:  Supple. Normal range of  motion.  CV:  Regular rate and rhythm. Normal heart sounds with no murmurs, rubs, or gallops detected.  Resp:  No respiratory distress. Clear to auscultation bilaterally without decreased breath sounds, wheezing, rales, or rhonchi.  GI:  Soft. Non-distended. Non-tender.   :  Christianson catheter in place.   MS:  Normal ROM. Trace symmetric bilateral lower extremity edema.  Skin:  Warm. Non-diaphoretic. No pallor.  Neuro:  Awake and alert. Normal strength.  Psych:  Normal mood and affect. Normal speech.  Vitals reviewed.      Emergency Department Course     EKG  Indication: confusion  Time: 1112  Rate 74 bpm. NJ interval 122. QRS duration 90. QT/QTc 408/452.   Normal sinus rhythm  Cannot rule out inferior infarct, age undetermined    Cannot rule out anterior infarct, age undetermined    Abnormal ECG   No acute ST changes.  No significant changeas compared to prior, dated 08/28/18.    Imaging:  Radiology findings were communicated with the patient and family who voiced understanding of the findings.  Chest XR  IMPRESSION: Mild opacity at the left lung base has increased since the previous exam, and may be related to pneumonia and/or atelectasis. No other significant interval change. Dual-lead cardiac device left chest wall, with lead tips projected over the RA and RV. Small area of ill-defined opacity in the left upper lung medially is unchanged. Heart size appears stable. There is mild pulmonary venous congestion.  Report per radiology      Laboratory:  Laboratory findings were communicated with the patient and family who voiced understanding of the findings.  CRP: 168 (H)  Troponin (Collected 1204): 0.061(H)   Lactic Acid: 1.3   BMP: 108(H) Glucose, 33(H) BUN, 34(L) GFR o/w WNL (Creatinine 1.84(H))   CBC: WNL (WBC 24.6(H), HGB 12.2(L), )   Procalcitonin: Pending  Blood cultures: Pending     Interventions:  1256: Asprin 325mg PO   1301: 0.9% NaCl 1L IV Bolus   1415: Azactam 2g IV   1505: Vancocin 1,750 mg IV      Emergency Department Course:  Nursing notes and vitals reviewed.  1144: I performed an exam of the patient as documented above.   IV was inserted and blood was drawn for laboratory testing, results above.  The patient was sent for a chest XR while in the emergency department, results above.   1357: Patient rechecked and updated. Patient is 93% at 4L. Sleeping but arouses easily.  1412: Findings and plan explained to the patient and son who consent to admission. Discussed the patient with Dr. Tamayo, who will admit the patient to a telemetry bed for further monitoring, evaluation, and treatment.   I personally reviewed the laboratory and imaging results with the patient and son and answered all related questions prior to admission.   Impression & Plan    Medical Decision Making:  Edison is a 89 year old man who was recently discharged after a stay for pneumonia treated with Levaquin and candidal urinary tract infection treated with fluconazole who presents with his son for increased confusion and weakness such that he had to be lowered to the ground by his son prior to arrival. He had no trauma. Patient reports cough and son also has noted a fever 100.5 F today.  On exam, patient appears fatigued, though well.  He is not tachycardic or hypotensive, though he is hypoxic to 81% on room air.  On 4 L nasal cannula patient's oxygen saturations have improved to 93%.  Mucous membranes are dry and patient was given a liter of IV fluids. EKG is reassuring without acute ST changes or arrhythmias.  However, troponin is minimally elevated at 0.06.  Patient was given aspirin though this is likely a demand ischemia alone from an infectious process and heparin infusion or further treatment at this time is not indicated. Infectious process is evidenced by a significantly elevated CRP at 168 and a leukocytosis to 24.6.  Given his hypoxia and cough this is likely to be pneumonia as supported by increased opacity in the left lung  base as compared to prior x-ray 9/16/18.  I have sent blood cultures and will empirically treat with aztreonam and vancomycin given combination of recent admission with IV antibiotics and cephalosporin allergy.  I have sent a pro-calcitonin to further characterize the patient's infection.  It is of note that while his electrolytes are overall reassuring, his creatinine is significantly elevated at 1.84 from a baseline of 1.1.  Fortunately, lactic acid is normal at 1.3 and there is no evidence of septic shock. Of note, I did not obtain a urinalysis as these are difficult to interpret with chronic Christianson and patient is already being treated for last urine culture which grew only Candida. His encephalopathy and weakness is likely due to pneumonia and resultant hypoxia. I updated the patient and the son on the findings as well as plan for admission.  All the patient and his son's questions were answered and they verbalized understanding.  Amenable to admission.  I discussed patient's case with Dr Arguello, hospitalist, who accepts admission and has no further orders.    Diagnosis:    ICD-10-CM    1. HCAP (healthcare-associated pneumonia) J18.9    2. Acute respiratory failure with hypoxia (H) J96.01    3. NSTEMI (non-ST elevated myocardial infarction) (H) I21.4    4. MADELYN (acute kidney injury) (H) N17.9        Disposition:  Admitted to telemetry    Scribe Disclosure:  IJudy, am serving as a scribe at 11:44 AM on 9/24/2018 to document services personally performed by Zita Julian MD based on my observations and the provider's statements to me.    9/24/2018   [facility] EMERGENCY DEPARTMENT       Zita Julian MD  09/24/18 3510

## 2018-09-24 NOTE — H&P
[facility]  Hospitalist H&P    Name: Edison Boss      MRN: [id number]  YOB: 1924    Age: 89 year old  Date of admission: 9/24/2018  Primary care provider: Porfirio Terrell            Assessment and Plan:   Edison Boss is a 89 year old male with a history of stroke, sleep apnea, A. fib, asthma, prostate cancer, hypertension, hyperlipidemia, GERD, and chronic pain syndrome who presents with acute encephalopathy.    1.  Acute encephalopathy.  Unclear etiology.  Was started on antibiotics with vancomycin and aztreonam in the emergency room.  He does have a history of ceftriaxone allergy.  Does not have a penicillin allergy.  Incidence of cross reactivity between cephalosporins and penicillins is fairly low.  Would continue him on antibiotics with IV Zosyn and vancomycin at this point.  Pro-calcitonin level pending.  Check CT scan of the head without contrast now.  If CT does not show any worrisome lesions, would go to MRI of the brain.  Stop Percocet for now.  Check urine analysis.    2.  Possible pneumonia.  Pro-calcitonin level pending.  Continue antibiotics with IV vancomycin and start IV Zosyn.    3.  Leukocytosis.  Possible pneumonia.  Pro-calcitonin level and blood cultures pending.  Continue IV vancomycin and start IV Zosyn.  Also check urinalysis.      4.  Known cerebrovascular disease.  Check CT scan of the brain initially.  If no lesions, check MRI of the brain.  Continue aspirin 81 mg a day.  Check lipid panel.  Check echocardiogram with bubble study.    5.  Possible congestive heart failure.  Does not have obvious fluid overload on physical exam.  No known history of heart failure reported.  Last echocardiogram in our system from 2009.  BNP level elevated.  Unfortunately, will initially be n.p.o.  Does need to be on gentle IV fluids to start.  Check echocardiogram.  Suspect he will likely need some diuresis during hospital stay.    6.  Hypertension.   Continue amlodipine for now.    7.  Chronic pain syndrome.  Would hold Percocet initially due to sedation.  Have Tylenol available if needed.    8.  GERD.  Omeprazole 20 mg a day.    9.  Depression.  Continue paroxetine.    10.  Weakness.  As noted above initially get CT scan of the head.  If CT scan is unremarkable, MRI of the brain.  Plan for PT and OT consults.  Speech pathology consult.    11.  Troponin elevation.  Does have a very mild elevation in troponin level.  Suspect that this might be demand ischemia.  Check serial troponins.  Monitor on telemetry.  Check lipid panel.  Continue aspirin.  Check echocardiogram.    12.  Acute hypoxic respiratory failure.  Possible pneumonia.  Antibiotics as above.  Possible CHF.  Check echocardiogram.  Recent hospital stay.  Check d-dimer.  If d-dimer elevated, would need further investigation for possible DVT and PE.    13.  Acute kidney injury on chronic kidney disease.  Is going to be on gentle IV fluids initially.  Avoid nephrotoxins as able.  Does have evidence of possible pulmonary edema so will need to be careful of IV fluids.  Check urine analysis.    Code status: Currently full code.  Family members at bedside at this time are not he is medical power of .  They think that he may want to be DNR/DNI.  They will discuss with his medical power of  and let us know final decision on CODE STATUS.  For now, CODE STATUS is full code.  Admit to Inpatient  Prophylaxis: PCD's.            Chief Complaint:   Weakness and altered mental status.         History of Present Illness:   Edison Boss is a 89 year old male who presents with weakness and altered mental status.  Patient is currently fairly sedated.  He will wake up and answer a few yes or no questions before falling right back to sleep.  Most of information is obtained from family members at the bedside and from discussion with Dr. Acosta of the emergency room.  Patient had recently been  hospitalized for possible pneumonia.  Did seem to improve per family.  Was able to discharge from the hospital a week ago.  Seem to have significant worsening over the past 24-48 hours.  During that time, seems to be having difficulty at times with speech.  This difficulty seems to last for a few minutes at a time.  He also been complaining of left leg weakness.  Has specifically mentioned that his left leg felt like it weighed 500 pounds.  Family had noticed fever today.  He is much more lethargic today than he normally is.  This is worsened since he arrived at the hospital.  They have not noticed significant shortness of breath.  Does have an occasional cough.  They have not noticed any other obvious problems.            Past Medical History:     Past Medical History:   Diagnosis Date     Calculus of kidney     in dwelling [place]     Chronic infection     UTI     Chronic pain     lower pain, perineum     Esophageal reflux      Hyperlipidaemia      Hypertension      Malignant neoplasm of prostate (H) 8/14/2002    Brachytherapy, then external radiation. chronic indwelling catheter     Mild persistent asthma     with URI     Paroxysmal a-fib (H)     paroxysmal     Sinus node dysfunction (H)     sp DDD PM     Sleep apnea     ?   snores     Unspecified cerebral artery occlusion with cerebral infarction              Past Surgical History:     Past Surgical History:   Procedure Laterality Date     C NONSPECIFIC PROCEDURE  1980's    Kidney stone surgery     C NONSPECIFIC PROCEDURE  1985, 5/2005    TURP x 2     C NONSPECIFIC PROCEDURE  1/2002    Brachytherapy     C NONSPECIFIC PROCEDURE  ~1999    Left rotator cuff repair     C NONSPECIFIC PROCEDURE      Bilateral cataract surgery     C NONSPECIFIC PROCEDURE      EGD/dilation twice     CYSTOSCOPY       CYSTOSCOPY, INJECT COLLAGEN, COMBINED  6/6/2012    Procedure:COMBINED CYSTOSCOPY, INJECT BULKING AGENT; VIDEO CYSTOSCOPY WITH BOTOX INJECTIONS  ; Surgeon:BRIAN ADAN;  Location:[facility]     CYSTOSCOPY, INJECT COLLAGEN, COMBINED  3/22/2013    Procedure: COMBINED CYSTOSCOPY, INJECT BULKING AGENT;  VIDEO CYSTOSCOPY, BOTOX INJECTION;  Surgeon: Michael Lilly MD;  Location: [facility] OR     CYSTOSCOPY, INJECT COLLAGEN, COMBINED  8/8/2014    Procedure: COMBINED CYSTOSCOPY, INJECT BULKING AGENT;  Surgeon: Michael Lilly MD;  Location: [facility] OR     CYSTOSCOPY, INJECT COLLAGEN, COMBINED N/A 8/12/2015    Procedure: COMBINED CYSTOSCOPY, INJECT BULKING AGENT;  Surgeon: Michael Lilly MD;  Location: [facility] OR     CYSTOSCOPY, INJECT COLLAGEN, COMBINED N/A 12/8/2016    Procedure: COMBINED CYSTOSCOPY, INJECT BULKING AGENT;  Surgeon: Michael Lilly MD;  Location: [facility] OR     HC REMOVE TONSILS/ADENOIDS,<13 Y/O  ~1928    T & A <12y.o.     IMPLANT PACEMAKER       PENIS SURGERY       PROSTATE SURGERY               Social History:     Social History   Substance Use Topics     Smoking status: Former Smoker     Packs/day: 1.00     Years: 40.00     Smokeless tobacco: Never Used      Comment: QUIT 1978     Alcohol use No             Family History:   One brother with lung cancer.  One brother with coronary artery disease.         Allergies:     Allergies   Allergen Reactions     Ceftriaxone Itching     Bactrim Hives     Levaquin Diarrhea     Oral only, can tolerate IV     Zithromax [Azithromycin]      Macrodantin [Nitrofuran Derivatives] Rash     Took recently with no problems             Medications:     Prior to Admission medications    Medication Sig Last Dose Taking? Auth Provider   albuterol (PROVENTIL HFA: VENTOLIN HFA) 108 (90 BASE) MCG/ACT inhaler Inhale 2 puffs into the lungs every 6 hours as needed for shortness of breath / dyspnea.  Yes Porfirio Terrell MD   amLODIPine (NORVASC) 5 MG tablet TAKE 1 TABLET(5 MG) BY MOUTH DAILY 9/24 - ? at ? Yes Porfirio Terrell MD   cetirizine (ZYRTEC) 10 MG tablet Take 10 mg by mouth daily as needed  Yes Reported, Patient   clobetasol (TEMOVATE) 0.05 % ointment daily  as needed   Yes Lewis Nuñez   Colloidal Oatmeal (AVEENO ECZEMA THERAPY) 1 % CREA Externally apply topically daily as needed   Yes Reported, Patient   D-MANNOSE POWD Take 100 mg by mouth daily 500 mg Pt takes 2 pills daily  Yes Porfirio Terrell MD   fluconazole (DIFLUCAN) 200 MG tablet Take 1 tablet (200 mg) by mouth daily for 12 days 9/23 - ? at Unknown time Yes Red Zamora MD   lidocaine (LMX4) 4 % CREA 4% topical cream Apply topically daily as needed  Yes Reported, Patient   Misc Natural Products (COLON CLEANSER PO) Take 1 capsule by mouth daily Advanced Colon Care II  Yes Reported, Patient   Multiple Vitamins-Minerals (PRESERVISION AREDS) CAPS Take 1 capsule by mouth 2 times daily  Yes Porfirio Terrell MD   omeprazole (PRILOSEC) 20 MG CR capsule Take 20 mg by mouth daily as needed  Yes Unknown, Entered By History   oxyCODONE-acetaminophen (PERCOCET) 5-325 MG per tablet Take 1 tablet by mouth every 4 hours as needed for pain for pain. 9/23/2018 at Unknown time Yes Porfirio Terrell MD   PARoxetine (PAXIL) 30 MG tablet Take 15 mg by mouth At Bedtime Takes 1/2 tab of 30mg=15mg 9/23/2018 at Unknown time Yes Unknown, Entered By History             Review of Systems:   A Comprehensive greater than 10 system review of systems was carried out.  Pertinent positives and negatives are noted above.  Otherwise negative for contributory information.           Physical Exam:   Blood pressure 150/63, pulse 60, temperature 99.8  F (37.7  C), temperature source Temporal, resp. rate 20, SpO2 93 %.  Wt Readings from Last 1 Encounters:   09/17/18 80.1 kg (176 lb 9.6 oz)     Exam:  GENERAL: Currently quite sedated.  Will awaken briefly to answer 1 or 2 yes or no questions.  Falls immediately back asleep.  HEENT: Normocephalic, atraumatic.   CARDIOVASCULAR: Regular , +1/6 murmur.  PULMONARY: Clear to auscultation bilaterally.  ABDOMINAL: Soft, non-tender, non-distended. Bowel sounds normoactive.   EXTREMITIES: No  cyanosis or clubbing. No appreciable edema.  NEUROLOGICAL: Quite sedated.  Will awaken briefly.  Does not stay awake long enough to attempt to follow any commands.  DERMATOLOGICAL: No rash, ulcer, bruising, nor jaundice.          Data:   EKG:  Personally reviewed.  Sinus.  Q waves in lead III and aVF.  Also likely Q waves in lead II.    Laboratory:    Recent Labs  Lab 09/24/18  1204 09/19/18  0639 09/18/18  0652   WBC 24.6* 18.9* 17.7*   HGB 12.2* 13.0* 13.0*   HCT 36.3* 38.7* 39.8*   MCV 89 91 92    291 256       Recent Labs  Lab 09/24/18  1204 09/19/18  0639 09/18/18  0652    140 142   POTASSIUM 4.3 4.0 3.9   CHLORIDE 104 110* 110*   CO2 23 23 23   ANIONGAP 9 7 9   * 108* 110*   BUN 33* 23 25   CR 1.84* 1.14 1.18   GFRESTIMATED 34* 60* 57*   GFRESTBLACK 42* 72 70   ARYAN 8.6 8.6 8.5       Recent Labs  Lab 09/24/18  1203   CULT No growth after 3 hours  No growth after 3 hours       Imaging:  Recent Results (from the past 24 hour(s))   XR Chest 2 Views    Narrative    CHEST TWO VIEWS September 24, 2018 1:21 PM     HISTORY: Hypoxia. Cough.    COMPARISON: 9/16/2018.      Impression    IMPRESSION: Mild opacity at the left lung base has increased since the  previous exam, and may be related to pneumonia and/or atelectasis. No  other significant interval change. Dual-lead cardiac device left chest  wall, with lead tips projected over the RA and RV. Small area of  ill-defined opacity in the left upper lung medially is unchanged.  Heart size appears stable. There is mild pulmonary venous congestion.    GERTRUDE ELIZABETH MD

## 2018-09-24 NOTE — ED TRIAGE NOTES
Was getting out of bed and was assisted to the floor by his son.  More anxious and forgetful than usual.  Patient awake and oriented x2.  Airway, breathing and circulation intact.

## 2018-09-24 NOTE — IP AVS SNAPSHOT
"` `           [place] [address] MEDICAL SURGICAL: [phone]                                              INTERAGENCY TRANSFER FORM - NURSING   2018                    Hospital Admission Date: 2018  JOSELIN VAN   : 10/19/1924  Sex: Male        Attending Provider: Corona Arguello DO     Allergies:  Ceftriaxone, Bactrim, Levaquin, Zithromax [Azithromycin], Macrodantin [Nitrofuran Derivatives]    Infection:  None   Service:  GENERAL MEDI    Ht:  1.778 m (5' 10\")   Wt:  76.2 kg (168 lb 1.6 oz)   Admission Wt:  85.1 kg (187 lb 9.6 oz)    BMI:  24.12 kg/m 2   BSA:  1.94 m 2            Patient PCP Information     Provider PCP Type    Porfirio Terrell MD, MD General      Current Code Status     Date Active Code Status Order ID Comments User Context       Prior      Code Status History     Date Active Date Inactive Code Status Order ID Comments User Context    10/4/2018 11:15 AM  DNR [id number]  Virginie Ferris MD Outpatient    2018  9:19 AM 10/4/2018 11:15 AM DNR/DNI [id number]  Mu Barron MD Inpatient    2018  4:27 PM 2018  9:19 AM Full Code [id number]  Corona Arguello,  Inpatient    2018 12:09 AM 2018  3:58 PM Full Code [id number]  Corona Arguello,  Inpatient    2012  3:07 PM 2018 12:09 AM Full Code [id number]  Porfirio Terrell MD Outpatient    2012  2:30 AM 2012  3:07 PM Full Code [id number]  Bharat Lopez MD Inpatient      Advance Directives        Scanned docmt in ACP Activity?           No scanned doc        Hospital Problems as of 10/4/2018              Priority Class Noted POA    Acute encephalopathy Medium  2018 Yes      Non-Hospital Problems as of 10/4/2018              Priority Class Noted    Malignant neoplasm of prostate (H) Medium  2002    Other specified disorder of skin Medium  2004    Personal history of other diseases of circulatory system Medium  Unknown    Osteoporosis Medium  Unknown    " Esophageal reflux Medium  Unknown    Essential hypertension with goal blood pressure less than 140/90 Medium  6/6/2006    Mild persistent asthma Medium  Unknown    Insomnia Medium  4/4/2007    Restless legs syndrome (RLS) Medium  11/14/2007    HYPERLIPIDEMIA LDL GOAL <100 Medium  10/31/2010    Chronic atrial fibrillation (H) Medium  7/2/2012    SBO (small bowel obstruction) (H) Medium  11/30/2012    Cerebral infarction (H) Medium  2/14/2014    Sinus node dysfunction (H) Medium  Unknown    Pacemaker Medium  Unknown    RAMÓN (generalized anxiety disorder) Medium  5/5/2016    MCI (mild cognitive impairment) Medium  10/20/2016    Pneumonia Medium  9/17/2018      Immunizations     Name Date      Influenza (High Dose) 3 valent vaccine 10/02/18     Influenza (High Dose) 3 valent vaccine 10/11/14     Influenza (High Dose) 3 valent vaccine 11/06/13     Influenza (High Dose) 3 valent vaccine 10/05/10     Influenza (IIV3) PF 10/10/12     Influenza (IIV3) PF 10/27/11     Influenza (IIV3) PF 10/25/07     Influenza (IIV3) PF 10/01/06     Influenza (IIV3) PF 10/15/05     Influenza (IIV3) PF 11/19/03     Influenza (IIV3) PF 11/11/02     Influenza (IIV3) PF 10/25/01     Pneumo Conj 13-V (2010&after) 01/07/16     Pneumococcal 23 valent 10/01/12     Pneumococcal 23 valent 10/12/10     Pneumococcal 23 valent 12/01/99     TD (ADULT, 7+) 08/18/05     TDAP Vaccine (Adacel) 05/26/16     Tetanus 10/01/04     Tetanus 02/17/94          END      ASSESSMENT     Discharge Profile Flowsheet     EXPECTED DISCHARGE     COMMUNICATION ASSESSMENT      Expected Discharge Date  -- (TBD<from the formerly [facility]) 09/25/18 1015   Patient's communication style  spoken language (English or Bilingual) 09/24/18 1053    DISCHARGE NEEDS ASSESSMENT     FINAL RESOURCES      Patient/family verbalizes understanding of discharge plan recommendations?  Yes 09/25/18 1356   PAS Number  [id number] 10/04/18 1350    Readmission Within The Last 30 Days  previous discharge  "plan unsuccessful 09/25/18 1356   SKIN      Equipment Currently Used at Home  walker, rolling 09/25/18 1530   Inspection of bony prominences  Full 10/04/18 0830    Transportation Available  family or friend will provide 09/25/18 1530   Inspection under devices  Full 10/03/18 1911    # of Referrals Placed by CTS  Post Acute Facilities 09/25/18 1356   Skin WDL  ex 10/04/18 0830    New Steerage to  Clinics?  No 09/25/18 1356   Skin Color/Characteristics  bruised (ecchymotic) 10/04/18 0830    Primary Care Clinic Name  [facility] 09/25/18 1356   Skin Temperature  warm 10/04/18 0439    Primary Care MD Name  Dr Terrell 09/25/18 1356   Skin Moisture  dry 10/04/18 0439    Return Category  Progression of disease (Adherence) 09/25/18 1356   Skin Elasticity  slow return to original state 10/04/18 0439    Reason for Return  Failed outpatient treatment 09/25/18 1356   Skin Integrity  abrasion(s) 10/04/18 0830    GASTROINTESTINAL (ADULT,PEDIATRIC,OB)     Procedural focused assessment (identify areas inspected)   -- 10/03/18 0224    GI WDL  WDL 10/04/18 0830   Full except areas not inspected   Buttock, left;Buttock, right;Sacrum;Coccyx 10/03/18 0224    Abdominal Appearance  rounded 10/04/18 0439   Not Inspected under devices  -- 10/03/18 0224    All Quadrants Bowel Sounds  audible and active in all quadrants 10/04/18 0439   SAFETY      Last Bowel Movement  10/03/18 10/04/18 0830   Safety WDL  WDL 10/04/18 0830    GI Signs/Symptoms  abdominal discomfort 10/03/18 0224   All Alarms  alarm(s) activated and audible 10/04/18 0830    Passing flatus  yes 10/04/18 0439                      Assessment WDL (Within Defined Limits) Definitions           Safety WDL     Effective: 09/28/15    Row Information: <b>WDL Definition:</b> Bed in low position, wheels locked; call light in reach; upper side rails up x 2; ID band on<br> <font color=\"gray\"><i>Item=AS safety wdl>>List=AS safety wdl>>Version=F14</i></font>      Skin WDL     Effective: " "09/28/15    Row Information: <b>WDL Definition:</b> Warm; dry; intact; elastic; without discoloration; pressure points without redness<br> <font color=\"gray\"><i>Item=AS skin wdl>>List=AS skin wdl>>Version=F14</i></font>      Vitals     Vital Signs Flowsheet     VITAL SIGNS     ANALGESIA SIDE EFFECTS MONITORING      Temp  97.6  F (36.4  C) 10/04/18 0747   Side Effects Monitoring: Respiratory Quality  R 10/03/18 1608    Temp src  Oral 10/04/18 0747   Side Effects Monitoring: Respiratory Depth  N 10/03/18 1608    Resp  18 10/04/18 0747   Side Effects Monitoring: Sedation Level  S 10/03/18 1608    Pulse  60 10/03/18 2132   CHRISTINE COMA SCALE      Heart Rate  60 10/04/18 0747   Best Eye Response  3-->(E3) to speech 10/02/18 0943    Pulse/Heart Rate Source  Monitor 10/04/18 0747   Best Motor Response  6-->(M6) obeys commands 10/02/18 0943    BP  166/76 10/04/18 0747   Best Verbal Response  5-->(V5) oriented 10/02/18 0943    BP Location  Right arm 10/04/18 0747   Christine Coma Scale Score  14 10/02/18 0943    OXYGEN THERAPY     HEIGHT AND WEIGHT      SpO2  94 % 10/04/18 1212   Weight  76.2 kg (168 lb 1.6 oz) 10/04/18 0334    O2 Device  None (Room air) 10/04/18 1212   Weight Method  Standing scale 10/04/18 0334    FiO2 (%)  15 % 09/28/18 0749   Bed Scale  Standard (fitted sheet, draw sheet/ pad, cover/flat sheet, blanket, two pillows);Christianson bag - must be empty before weighing 09/30/18 0420    Oxygen Delivery  1 LPM 10/04/18 0755   POSITIONING      PACEMAKER     Body Position  supine, head elevated 10/04/18 0439    Pacemaker  Permanent 10/04/18 0830   Head of Bed (HOB)  HOB at 20-30 degrees 10/04/18 0439    Paced Amount  Completely paced (100%) 10/04/18 0439   Chair  Upright in chair 10/04/18 0830    PAIN/COMFORT     Positioning/Transfer Devices  pillows;in use 10/04/18 0439    Patient Currently in Pain  yes 10/04/18 0004   DAILY CARE      Preferred Pain Scale  word (verbal rating pain scale) 10/04/18 0004   Activity " Management  ambulated in rizo 10/04/18 1055    Patient's Stated Pain Goal  No pain 10/04/18 0004   Activity Assistance Provided  assistance, 1 person 10/04/18 0830    0-10 Pain Scale  0 10/03/18 0635   Assistive Device Utilized  walker;gait belt 10/04/18 0830    Word Pain Scale  4 10/04/18 0004   Additional Documentation  Activity Device Assistance (Row) 09/25/18 0809    FACES Pain Rating  8-->hurts whole lot 09/24/18 1059   Ambulation Distance (Feet)  300 (A2 w gait belt walker) 10/02/18 1125    Pain Location  Scrotum 10/04/18 0004   ECG      Pain Orientation  Right 10/04/18 0004   ECG Rhythm  Normal sinus rhythm 09/27/18 1614    Pain Descriptors  Aching 10/04/18 0004   RESPIRATORY MONITORING      Pain Intervention(s)  Medication (See eMAR) 10/04/18 0004   Respiratory Monitoring (EtCO2)  37 mmHg 09/27/18 2355    Response to Interventions  No change in pain 09/26/18 1031   Integrated Pulmonary Index (IPI)  7 09/27/18 2355            Patient Lines/Drains/Airways Status    Active LINES/DRAINS/AIRWAYS     Name: Placement date: Placement time: Site: Days: Last dressing change:    Urethral Catheter Coude 16 fr 09/27/18   1509   Coude   6     Wound 09/26/18 Anterior;Inner;Left;Lower Knee Abrasion(s) 09/26/18      Knee   8     Incision/Surgical Site 03/22/13 Urethral meatus 03/22/13   1325    2022     Incision/Surgical Site 12/08/16 Groin 12/08/16   1230    665             Patient Lines/Drains/Airways Status    Active PICC/CVC     None            Intake/Output Detail Report     Date Intake     Output Net    Shift P.O. I.V. IV Piggyback Total Urine Total       Noc 10/02/18 2300 - 10/03/18 0659 -- -- -- -- 600 600 -600    Day 10/03/18 0700 - 10/03/18 1459 300 -- -- 300 350 350 -50    Pepper 10/03/18 1500 - 10/03/18 2259 -- -- -- -- -- -- 0    Noc 10/03/18 2300 - 10/04/18 0659 -- -- -- -- 850 850 -850    Day 10/04/18 0700 - 10/04/18 1459 -- -- -- -- -- -- 0      Last Void/BM       Most Recent Value    Urine Occurrence 1 [urine  bypassing from catheter. ] at 10/01/2018 1348    Stool Occurrence 1 at 10/03/2018 1235      Case Management/Discharge Planning     Case Management/Discharge Planning Flowsheet     REFERRAL INFORMATION     Marital Status   09/25/18 1356    Admission Type  inpatient 09/25/18 1356   Who is your support system?  Children 09/25/18 1356    Arrived From  home or self-care 09/25/18 1356   Description of Support System  Supportive;Involved 09/25/18 1356    Referral Source  high risk screening 09/25/18 1356   Support Assessment  Adequate family and caregiver support 09/25/18 1356    New Steerage to  Clinics?  No 09/25/18 1356   COPING/STRESS      # of Referrals Placed by CTS  Post Acute Facilities 09/25/18 1356   Major Change/Loss/Stressor  illness 09/25/18 0945    Post Acute Facilities  TCU 09/25/18 1356   EXPECTED DISCHARGE      Reason For Consult  care coordination/care conference;discharge planning 09/25/18 1356   Expected Discharge Date  -- (TBD<from the [facility]) 09/25/18 1015    Record Reviewed  clinical discipline documentation;history and physical;medical record;patient profile;plan of care 09/25/18 1356   DISCHARGE PLANNING      CTS Assigned to Case  Viridiana Stu 09/25/18 1356   Patient/family verbalizes understanding of discharge plan recommendations?  Yes 09/25/18 1356    Primary Care Clinic Name   [facility] 09/25/18 1356   Readmission Within The Last 30 Days  previous discharge plan unsuccessful 09/25/18 1356    Primary Care MD Name  Dr Terrell 09/25/18 1356   Transportation Available  family or friend will provide 09/25/18 1530    LIVING ENVIRONMENT     FINAL RESOURCES      Lives With  alone 09/25/18 1530   Equipment Currently Used at Home  walker, rolling 09/25/18 1530    Living Arrangements  independent living facility 09/25/18 1530   PAS Number  [id number] 10/04/18 1350    Provides Primary Care For  no one 09/25/18 1356   ABUSE RISK SCREEN      Quality Of Family Relationships   supportive;helpful;involved 09/25/18 1352   QUESTION TO PATIENT:  Has a member of your family or a partner(now or in the past) intimidated, hurt, manipulated, or controlled you in any way?  no 09/24/18 1100    Able to Return to Prior Living Arrangements  no 09/25/18 1354   OTHER      ASSESSMENT OF FAMILY/SOCIAL SUPPORT     Are you depressed or being treated for depression?  No 09/25/18 2401

## 2018-09-24 NOTE — PHARMACY-ADMISSION MEDICATION HISTORY
Admission medication history interview status for this patient is complete. See [facility] admission navigator for allergy information, prior to admission medications and immunization status.     Medication history interview source(s):Family(son)  Medication history resources (including written lists, pill bottles, clinic record):None  [facility]    Changes made to PTA medication list:  Added: none  Deleted: vit c, vit d  Changed: omeprazole to prn    Actions taken by pharmacist (provider contacted, etc):None     Additional medication history information: Per son - only had one oblong medication this am, but unsure of which one it was.    Medication reconciliation/reorder completed by provider prior to medication history? No    Do you take OTC medications (eg tylenol, ibuprofen, fish oil, eye/ear drops, etc)? Y(Y/N)    For patients on insulin therapy: N (Y/N)  Lantus/levemir/NPH/Mix 70/30 dose:   (Y/N) (see Med list for doses)   Sliding scale Novolog Y/N  If Yes, do you have a baseline novolog pre-meal dose:  units with meals  Patients eat three meals a day:   Y/N    How many episodes of hypoglycemia do you have per week: _______  How many missed doses do you have per week: ______  How many times do you check your blood glucose per day: _______   Any Barriers to therapy - Be specific :  cost of medications, comfortable with giving injections (if applicable), comfortable and confident with current diabetes regimen: Y/N ______________      Prior to Admission medications    Medication Sig Last Dose Taking? Auth Provider   albuterol (PROVENTIL HFA: VENTOLIN HFA) 108 (90 BASE) MCG/ACT inhaler Inhale 2 puffs into the lungs every 6 hours as needed for shortness of breath / dyspnea.  Yes Porfirio Terrell MD   amLODIPine (NORVASC) 5 MG tablet TAKE 1 TABLET(5 MG) BY MOUTH DAILY 9/24 - ? at ? Yes Porfirio Terrell MD   cetirizine (ZYRTEC) 10 MG tablet Take 10 mg by mouth daily as needed  Yes Reported, Patient    clobetasol (TEMOVATE) 0.05 % ointment daily as needed   Yes Lewis Nuñez   Colloidal Oatmeal (AVEENO ECZEMA THERAPY) 1 % CREA Externally apply topically daily as needed   Yes Reported, Patient   D-MANNOSE POWD Take 100 mg by mouth daily 500 mg Pt takes 2 pills daily  Yes Porfirio Terrell MD   fluconazole (DIFLUCAN) 200 MG tablet Take 1 tablet (200 mg) by mouth daily for 12 days 9/23 - ? at Unknown time Yes Red Zamora MD   lidocaine (LMX4) 4 % CREA 4% topical cream Apply topically daily as needed  Yes Reported, Patient   Misc Natural Products (COLON CLEANSER PO) Take 1 capsule by mouth daily Advanced Colon Care II  Yes Reported, Patient   Multiple Vitamins-Minerals (PRESERVISION AREDS) CAPS Take 1 capsule by mouth 2 times daily  Yes Porfirio Terrell MD   omeprazole (PRILOSEC) 20 MG CR capsule Take 20 mg by mouth daily as needed  Yes Unknown, Entered By History   oxyCODONE-acetaminophen (PERCOCET) 5-325 MG per tablet Take 1 tablet by mouth every 4 hours as needed for pain for pain. 9/23/2018 at Unknown time Yes Porfirio Terrell MD   PARoxetine (PAXIL) 30 MG tablet Take 15 mg by mouth At Bedtime Takes 1/2 tab of 30mg=15mg 9/23/2018 at Unknown time Yes Unknown, Entered By History

## 2018-09-24 NOTE — IP AVS SNAPSHOT
"    [place] [address] MEDICAL SURGICAL: [phone]                                              INTERAGENCY TRANSFER FORM - LAB / IMAGING / EKG / EMG RESULTS   2018                    Hospital Admission Date: 2018  JOSELIN VAN   : 10/19/1924  Sex: Male        Attending Provider: Corona Arguello DO     Allergies:  Ceftriaxone, Bactrim, Levaquin, Zithromax [Azithromycin], Macrodantin [Nitrofuran Derivatives]    Infection:  None   Service:  GENERAL MEDI    Ht:  1.778 m (5' 10\")   Wt:  76.2 kg (168 lb 1.6 oz)   Admission Wt:  85.1 kg (187 lb 9.6 oz)    BMI:  24.12 kg/m 2   BSA:  1.94 m 2            Patient PCP Information     Provider PCP Type    Porfirio Terrell MD, MD General         Lab Results - 3 Days      Anaerobic bacterial culture [[id number]]  Resulted: 10/04/18 1026, Result status: Final result    Ordering provider: Froylan Richards MD  18 1512 Resulting lab: [facility]    Specimen Information    Type Source Collected On   Fluid Renal Pelvis, Left 18 1512          Components       Value Reference Range Flag Lab   Specimen Description Renal pelvic Left Fluid SPECIMEN 1      Special Requests Received in anaerobic tubes.   75   Culture Micro No anaerobes isolated   225            Basic metabolic panel [[id number]]  Resulted: 10/04/18 0632, Result status: Final result    Ordering provider: Virginie Ferris MD  10/04/18 0000 Resulting lab: [facility]    Specimen Information    Type Source Collected On   Blood  10/04/18 0558          Components       Value Reference Range Flag Lab   Sodium 140 133 - 144 mmol/L  FrRdHs   Potassium 4.0 3.4 - 5.3 mmol/L  FrRdHs   Chloride 106 94 - 109 mmol/L  FrRdHs   Carbon Dioxide 30 20 - 32 mmol/L  FrRdHs   Anion Gap 4 3 - 14 mmol/L  FrRdHs   Glucose 88 70 - 99 mg/dL  FrRdHs   Urea Nitrogen 14 7 - 30 mg/dL  FrRdHs   Creatinine 1.03 0.66 - 1.25 mg/dL  FrRdHs   GFR Estimate 67 >60 " mL/min/1.7m2  FrRdHs   Comment:  Non  GFR Calc   GFR Estimate If Black 81 >60 mL/min/1.7m2  FrRdHs   Comment:  African American GFR Calc   Calcium 8.7 8.5 - 10.1 mg/dL  FrRdHs            CBC with platelets differential [[id number]] (Abnormal)  Resulted: 10/04/18 0617, Result status: Final result    Ordering provider: Virginie Ferris MD  10/04/18 0000 Resulting lab: [facility]    Specimen Information    Type Source Collected On   Blood  10/04/18 0558          Components       Value Reference Range Flag Lab   WBC 9.3 4.0 - 11.0 10e9/L  FrRdHs   RBC Count 4.06 4.4 - 5.9 10e12/L L FrRdHs   Hemoglobin 11.9 13.3 - 17.7 g/dL L FrRdHs   Hematocrit 37.1 40.0 - 53.0 % L FrRdHs   MCV 91 78 - 100 fl  FrRdHs   MCH 29.3 26.5 - 33.0 pg  FrRdHs   MCHC 32.1 31.5 - 36.5 g/dL  FrRdHs   RDW 17.5 10.0 - 15.0 % H FrRdHs   Platelet Count 389 150 - 450 10e9/L  FrRdHs   Diff Method Automated Method   FrRdHs   % Neutrophils 61.2 %  FrRdHs   % Lymphocytes 16.3 %  FrRdHs   % Monocytes 13.6 %  FrRdHs   % Eosinophils 3.7 %  FrRdHs   % Basophils 1.0 %  FrRdHs   % Immature Granulocytes 4.2 %  FrRdHs   Nucleated RBCs 0 0 /100  FrRdHs   Absolute Neutrophil 5.7 1.6 - 8.3 10e9/L  FrRdHs   Absolute Lymphocytes 1.5 0.8 - 5.3 10e9/L  FrRdHs   Absolute Monocytes 1.3 0.0 - 1.3 10e9/L  FrRdHs   Absolute Eosinophils 0.3 0.0 - 0.7 10e9/L  FrRdHs   Absolute Basophils 0.1 0.0 - 0.2 10e9/L  FrRdHs   Abs Immature Granulocytes 0.4 0 - 0.4 10e9/L  FrRdHs   Absolute Nucleated RBC 0.0   FrRdHs            Blood culture [[id number]]  Resulted: 10/04/18 0307, Result status: Final result    Ordering provider: Kannan Lobo MD  09/28/18 1305 Resulting lab: [facility]    Specimen Information    Type Source Collected On   Blood  09/28/18 1332   Comment:  Right Arm          Components       Value Reference Range Flag Lab   Specimen Description Blood Right Arm      Special Requests Received in aerobic bottle only    226   Culture Micro No growth   75            Blood culture [[id number]]  Resulted: 10/04/18 0307, Result status: Final result    Ordering provider: Kannan Lobo MD  09/28/18 1305 Resulting lab: [facility]    Specimen Information    Type Source Collected On   Blood  09/28/18 1331   Comment:  Right Hand          Components       Value Reference Range Flag Lab   Specimen Description Blood Right Hand      Special Requests Received in aerobic bottle only   226   Culture Micro No growth   75            Blood culture [[id number]] (Abnormal)  Resulted: 10/03/18 1014, Result status: Final result    Ordering provider: Zita Julian MD  09/24/18 1158 Resulting lab: [facility]    Specimen Information    Type Source Collected On   Blood  09/24/18 1203   Comment:  Left Arm          Components       Value Reference Range Flag Lab   Specimen Description Blood Left Arm      Special Requests Aerobic and anaerobic bottles received   75   Culture Micro --  A 225   Result:         Cultured on the 3rd day of incubation:  Candida albicans     Culture Micro --   225   Result:         Critical Value/Significant Value, preliminary result only, called to and read back by  Willow Nesbitt RN, @2315 09/27/18..              Comprehensive metabolic panel [[id number]] (Abnormal)  Resulted: 10/03/18 0710, Result status: Final result    Ordering provider: Virginie Ferris MD  10/03/18 0001 Resulting lab: [facility]    Specimen Information    Type Source Collected On   Blood  10/03/18 0624          Components       Value Reference Range Flag Lab   Sodium 140 133 - 144 mmol/L  FrRdHs   Potassium 3.6 3.4 - 5.3 mmol/L  FrRdHs   Chloride 105 94 - 109 mmol/L  FrRdHs   Carbon Dioxide 30 20 - 32 mmol/L  FrRdHs   Anion Gap 5 3 - 14 mmol/L  FrRdHs   Glucose 88 70 - 99 mg/dL  FrRdHs   Urea Nitrogen 13 7 - 30 mg/dL  FrRdHs   Creatinine 1.14 0.66 - 1.25 mg/dL  FrRdHs   GFR Estimate 60  >60 mL/min/1.7m2 L FrRdHs   Comment:  Non  GFR Calc   GFR Estimate If Black 72 >60 mL/min/1.7m2  FrRdHs   Comment:  African American GFR Calc   Calcium 8.9 8.5 - 10.1 mg/dL  FrRdHs   Bilirubin Total 0.4 0.2 - 1.3 mg/dL  FrRdHs   Albumin 2.2 3.4 - 5.0 g/dL L FrRdHs   Protein Total 6.8 6.8 - 8.8 g/dL  FrRdHs   Alkaline Phosphatase 112 40 - 150 U/L  FrRdHs   ALT 29 0 - 70 U/L  FrRdHs   AST 28 0 - 45 U/L  FrRdHs            CBC with platelets differential [[id number]] (Abnormal)  Resulted: 10/03/18 0652, Result status: Final result    Ordering provider: Virginie Ferris MD  10/03/18 0001 Resulting lab: [facility]    Specimen Information    Type Source Collected On   Blood  10/03/18 0624          Components       Value Reference Range Flag Lab   WBC 10.3 4.0 - 11.0 10e9/L  FrRdHs   RBC Count 3.99 4.4 - 5.9 10e12/L L FrRdHs   Hemoglobin 11.7 13.3 - 17.7 g/dL L FrRdHs   Hematocrit 36.2 40.0 - 53.0 % L FrRdHs   MCV 91 78 - 100 fl  FrRdHs   MCH 29.3 26.5 - 33.0 pg  FrRdHs   MCHC 32.3 31.5 - 36.5 g/dL  FrRdHs   RDW 17.1 10.0 - 15.0 % H FrRdHs   Platelet Count 463 150 - 450 10e9/L H FrRdHs   Diff Method Automated Method   FrRdHs   % Neutrophils 66.0 %  FrRdHs   % Lymphocytes 13.1 %  FrRdHs   % Monocytes 12.4 %  FrRdHs   % Eosinophils 3.6 %  FrRdHs   % Basophils 0.8 %  FrRdHs   % Immature Granulocytes 4.1 %  FrRdHs   Nucleated RBCs 0 0 /100  FrRdHs   Absolute Neutrophil 6.8 1.6 - 8.3 10e9/L  FrRdHs   Absolute Lymphocytes 1.4 0.8 - 5.3 10e9/L  FrRdHs   Absolute Monocytes 1.3 0.0 - 1.3 10e9/L  FrRdHs   Absolute Eosinophils 0.4 0.0 - 0.7 10e9/L  FrRdHs   Absolute Basophils 0.1 0.0 - 0.2 10e9/L  FrRdHs   Abs Immature Granulocytes 0.4 0 - 0.4 10e9/L  FrRdHs   Absolute Nucleated RBC 0.0   FrRdHs            CBC with platelets differential [[id number]] (Abnormal)  Resulted: 10/02/18 0744, Result status: Final result    Ordering provider: Virginie Ferris MD  10/02/18 0001 Resulting lab: [facility]     Specimen Information    Type Source Collected On   Blood  10/02/18 0639          Components       Value Reference Range Flag Lab   WBC 9.5 4.0 - 11.0 10e9/L  FrRdHs   RBC Count 4.09 4.4 - 5.9 10e12/L L FrRdHs   Hemoglobin 12.1 13.3 - 17.7 g/dL L FrRdHs   Hematocrit 37.6 40.0 - 53.0 % L FrRdHs   MCV 92 78 - 100 fl  FrRdHs   MCH 29.6 26.5 - 33.0 pg  FrRdHs   MCHC 32.2 31.5 - 36.5 g/dL  FrRdHs   RDW 17.2 10.0 - 15.0 % H FrRdHs   Platelet Count 491 150 - 450 10e9/L H FrRdHs   Diff Method Manual Differential   FrRdHs   % Neutrophils 70.0 %  FrRdHs   % Lymphocytes 16.0 %  FrRdHs   % Monocytes 3.0 %  FrRdHs   % Eosinophils 4.0 %  FrRdHs   % Basophils 2.0 %  FrRdHs   % Metamyelocytes 4.0 %  FrRdHs   % Myelocytes 1.0 %  FrRdHs   Absolute Neutrophil 6.7 1.6 - 8.3 10e9/L  FrRdHs   Absolute Lymphocytes 1.5 0.8 - 5.3 10e9/L  FrRdHs   Absolute Monocytes 0.3 0.0 - 1.3 10e9/L  FrRdHs   Absolute Eosinophils 0.4 0.0 - 0.7 10e9/L  FrRdHs   Absolute Basophils 0.2 0.0 - 0.2 10e9/L  FrRdHs   Absolute Metamyelocytes 0.4 0 10e9/L H FrRdHs   Absolute Myelocytes 0.1 0 10e9/L H FrRdHs   Anisocytosis Slight   FrRdHs   Shirlene Cells Slight   FrRdHs   Microcytes Present   FrRdHs   Platelet Estimate Automated count confirmed.  Platelet morphology is normal.   RdHs            Basic metabolic panel [[id number]]  Resulted: 10/02/18 0718, Result status: Final result    Ordering provider: Virginei Ferris MD  10/02/18 0001 Resulting lab: [facility]    Specimen Information    Type Source Collected On   Blood  10/02/18 0639          Components       Value Reference Range Flag Lab   Sodium 140 133 - 144 mmol/L  FrRdHs   Potassium 3.8 3.4 - 5.3 mmol/L  FrRdHs   Chloride 107 94 - 109 mmol/L  FrRdHs   Carbon Dioxide 28 20 - 32 mmol/L  FrRdHs   Anion Gap 5 3 - 14 mmol/L  FrRdHs   Glucose 85 70 - 99 mg/dL  FrRdHs   Urea Nitrogen 12 7 - 30 mg/dL  FrRdHs   Creatinine 1.07 0.66 - 1.25 mg/dL  FrRdHs   GFR Estimate 64 >60 mL/min/1.7m2  FrRdHs    Comment:  Non  GFR Calc   GFR Estimate If Black 78 >60 mL/min/1.7m2  FrRdHs   Comment:  African American GFR Calc   Calcium 8.7 8.5 - 10.1 mg/dL  FrRdHs            CBC with platelets differential [[id number]] (Abnormal)  Resulted: 10/01/18 0749, Result status: Final result    Ordering provider: Virginie Ferris MD  10/01/18 0000 Resulting lab: [facility]    Specimen Information    Type Source Collected On   Blood  10/01/18 0638          Components       Value Reference Range Flag Lab   WBC 11.6 4.0 - 11.0 10e9/L H FrRdHs   RBC Count 4.25 4.4 - 5.9 10e12/L L FrRdHs   Hemoglobin 12.4 13.3 - 17.7 g/dL L FrRdHs   Hematocrit 38.5 40.0 - 53.0 % L FrRdHs   MCV 91 78 - 100 fl  FrRdHs   MCH 29.2 26.5 - 33.0 pg  FrRdHs   MCHC 32.2 31.5 - 36.5 g/dL  FrRdHs   RDW 17.2 10.0 - 15.0 % H FrRdHs   Platelet Count 525 150 - 450 10e9/L H FrRdHs   Diff Method Manual Differential   FrRdHs   % Neutrophils 73.0 %  FrRdHs   % Lymphocytes 13.0 %  FrRdHs   % Monocytes 7.0 %  FrRdHs   % Eosinophils 3.0 %  FrRdHs   % Basophils 2.0 %  FrRdHs   % Promyelocytes 2.0 %  FrRdHs   Absolute Neutrophil 8.5 1.6 - 8.3 10e9/L H FrRdHs   Absolute Lymphocytes 1.5 0.8 - 5.3 10e9/L  FrRdHs   Absolute Monocytes 0.8 0.0 - 1.3 10e9/L  FrRdHs   Absolute Eosinophils 0.3 0.0 - 0.7 10e9/L  FrRdHs   Absolute Basophils 0.2 0.0 - 0.2 10e9/L  FrRdHs   Absolute Promyeloctyes 0.2 0 10e9/L H FrRdHs   Anisocytosis Slight   FrRdHs   Platelet Estimate Automated count confirmed.  Platelet morphology is normal.   FrRdHs            Basic metabolic panel [[id number]]  Resulted: 10/01/18 0743, Result status: Final result    Ordering provider: Virginie Ferris MD  10/01/18 0000 Resulting lab: [facility]    Specimen Information    Type Source Collected On   Blood  10/01/18 0638          Components       Value Reference Range Flag Lab   Sodium 139 133 - 144 mmol/L  FrRdHs   Potassium 4.0 3.4 - 5.3 mmol/L  FrRdHs   Chloride 102 94 - 109 mmol/L   "[facility]   Carbon Dioxide 30 20 - 32 mmol/L  [facility]   Anion Gap 7 3 - 14 mmol/L  [facility]   Glucose 92 70 - 99 mg/dL  [facility]   Urea Nitrogen 13 7 - 30 mg/dL  [facility]   Creatinine 1.10 0.66 - 1.25 mg/dL  [facility]   GFR Estimate 62 >60 mL/min/1.7m2  [facility]   Comment:  Non  GFR Calc   GFR Estimate If Black 75 >60 mL/min/1.7m2  [facility]   Comment:  African American GFR Calc   Calcium 8.6 8.5 - 10.1 mg/dL  [facility]            Testing Performed By     Lab - Abbreviation Name Director Address Valid Date Range    12 - FrRdHs [facility] Unknown [address] [facility] [address] 05/08/15 1057 - Present    75 - Unknown [facility] Unknown [address] [facility] [address] 01/15/15 1019 - Present    225 - Unknown [facility] Unknown [address] [facility] [address] 12/19/14 0954 - Present    226 - Unknown [facility]ID TESTING LAB Unknown [address] [facility] [address] 12/19/14 0955 - Present            Unresulted Labs (24h ago through future)    Start       Ordered    10/01/18 0600  Platelet count  EVERY 72 HOURS,   Timed     Comments:  If no result is listed, this lab has not been done the past 365 days. LATEST LAB RESULT: Platelet Count (10e9/L)       Date                     Value                 09/30/2018               490 (H)          ----------    09/30/18 1709    Unscheduled  Potassium  (Potassium Replacement - \"Standard\" - For K levels less than 3.4 mmol/L - UU,UR,UA,RH,SH,PH,WY )  CONDITIONAL (SPECIFY),   Routine     Comments:  Obtain Potassium Level for these conditions:  *IF no potassium result within 24 hours before initiation of order set, draw potassium level with next lab collect.    *2 HOURS AFTER last IV potassium replacement dose and 4 hours after an oral replacement dose.  *Next morning after potassium dose.     Repeat Potassium Replacement if necessary.    09/26/18 0955    Unscheduled  Magnesium  " "(Magnesium Replacement -  Adult - \"Standard\" - Replacement for all levels less than 1.6 mg/dL )  CONDITIONAL (SPECIFY),   Routine     Comments:  Obtain Magnesium Level for these conditions:  *IF no magnesium result within 24 hrs before initiation of order set, draw magnesium level with next lab collect.    *2 HOURS AFTER last magnesium replacement dose when magnesium replacement given for level less than 1.6   *Next morning after magnesium dose.     Repeat Magnesium Replacement if necessary.    18 0955         ECG/EMG Results      ECHO COMPLETE WITH OPTISON [[id number]]  Resulted: 18 1011, Result status: Edited Result - FINAL    Ordering provider: Corona English, DO  18 1749 Resulted by: Dandre Alamo MD    Performed: 18 1049 - 18 1050 Resulting lab: RADIOLOGY RESULTS    Narrative:       [id number]  [id number]  [id number]  [id number]^AMADOR^CORONA^SCOTT           [facility]  Echocardiography Laboratory  [address] [place] [address]        Name: JOSELIN VAN  MRN: [id number]  : 10/19/1924  Study Date: 2018 10:11 AM  Age: 89 yrs  Gender: Male  Patient Location: [facility]  Reason For Study: CHF  Ordering Physician: CORONA ENGLISH  Referring Physician: Porfirio Terrell  Performed By: Razia Batista     BSA: 2.0 m2  Height: 71 in  Weight: 176 lb  HR: 64  BP: 142/63 mmHg  _____________________________________________________________________________  __        Procedure  Complete Portable Echo Adult. Contrast Optison.  _____________________________________________________________________________  __        Interpretation Summary     The left ventricle is normal in structure, function and size.  The left ventricular ejection fraction is normal.  There is trace to mild tricuspid regurgitation.  Aortic sclerosis but no significant valve issues.  Pulmonary hypertension.  Compared to the last echo done 2009 the pulmonary pressure is " higher.  _____________________________________________________________________________  __        Left Ventricle  The left ventricle is normal in structure, function and size. There is normal  left ventricular wall thickness. The left ventricular ejection fraction is  normal. Grade I or early diastolic dysfunction. No regional wall motion  abnormalities noted.     Right Ventricle  There is a catheter/pacemaker lead seen in the right ventricle. The right  ventricle is normal in structure, function and size.     Atria  The left atrium is mildly dilated. Right atrial size is normal. There is no  atrial shunt seen.     Mitral Valve  There is mild mitral annular calcification. There is no mitral regurgitation  noted.        Tricuspid Valve  There is trace to mild tricuspid regurgitation. The right ventricular systolic  pressure is elevated at 53.8 mmHg. Normal IVC (1.5-2.5cm) with <50%  respiratory collapse; right atrial pressure is estimated at 10-15mmHg.  Pulmonary hypertension.     Aortic Valve  The aortic valve is not well visualized. Sclerotic aortic valve, unable to  determine # of cusps. No aortic regurgitation is present. No aortic stenosis  is present. The peak AoV pressure gradient is 18.0 mmHg. The mean AoV pressure  gradient is 9.9 mmHg.     Pulmonic Valve  There is no pulmonic valvular regurgitation.     Vessels  Normal size aorta.     Pericardium  There is no pericardial effusion. Moderate left pleural effusion.        Rhythm  Sinus rhythm was noted.  _____________________________________________________________________________  __  MMode/2D Measurements & Calculations  IVSd: 0.96 cm     LVIDd: 4.3 cm  LVIDs: 2.5 cm  LVPWd: 0.84 cm  FS: 42.3 %  LV mass(C)d: 123.0 grams  LV mass(C)dI: 61.6 grams/m2  Ao root diam: 3.3 cm  asc Aorta Diam: 3.4 cm  LVOT diam: 2.2 cm  LVOT area: 3.8 cm2  LA Volume (BP): 75.7 ml  LA Volume Index (BP): 37.9 ml/m2  RWT: 0.39           Doppler Measurements & Calculations  MV E max  charles: 84.4 cm/sec  MV A max charles: 107.2 cm/sec  MV E/A: 0.79  MV dec time: 0.27 sec  Ao V2 max: 211.4 cm/sec  Ao max P.0 mmHg  Ao V2 mean: 144.8 cm/sec  Ao mean P.9 mmHg  Ao V2 VTI: 45.9 cm  NAS(I,D): 2.3 cm2  NAS(V,D): 2.0 cm2  LV V1 max P.1 mmHg  LV V1 max: 113.0 cm/sec  LV V1 VTI: 27.5 cm  SV(LVOT): 105.3 ml  SI(LVOT): 52.7 ml/m2  TR max charles: 366.5 cm/sec  TR max P.8 mmHg  AV Charles Ratio (DI): 0.53  NAS Index (cm2/m2): 1.1  E/E' av.2  Lateral E/e': 14.1  Medial E/e': 12.4              _____________________________________________________________________________  __        Report approved by: Mahsa Ortiz 2018 01:19 PM       1    Type Source Collected On     18 1011          View Image (below)        Echocardiogram Complete [[id number]]  Resulted: 18 1050, Result status: In process    Ordering provider: Corona Arguello DO  18 1749 Performed: 18 1049 - 18 1049    Resulting lab: [facility]                   Encounter-Level Documents:     There are no encounter-level documents.      Order-Level Documents:     There are no order-level documents.

## 2018-09-24 NOTE — IP AVS SNAPSHOT
MRN:[id number]                      After Visit Summary   9/24/2018    Edison Boss    MRN: [id number]           Thank you!     Thank you for choosing [facility] for your care. Our goal is always to provide you with excellent care. Hearing back from our patients is one way we can continue to improve our services. Please take a few minutes to complete the written survey that you may receive in the mail after you visit. If you would like to speak to someone directly about your visit please contact Patient Relations at [phone]. Thank you!          Patient Information     Date Of Birth          10/19/1924        Designated Caregiver       Most Recent Value    Caregiver    Will someone help with your care after discharge? yes    Name of designated caregiver Bea    Phone number of caregiver see facesheet    Caregiver address see facesheet      About your hospital stay     You were admitted on:  September 24, 2018 You last received care in the:  [place] [address] [facility]    You were discharged on:  October 4, 2018       Who to Call     For medical emergencies, please call 911.  For non-urgent questions about your medical care, please call your primary care provider or clinic, [phone]  For questions related to your surgery, please call your surgery clinic        Attending Provider     Provider Specialty    Zita Julian MD Emergency Medicine    Corona Arguello DO Internal Medicine       Primary Care Provider Office Phone # Fax #    Porfirio Terrell [phone]      After Care Instructions     Activity - Up with nursing assistance           Advance Diet as Tolerated       Follow this diet upon discharge: Orders Placed This Encounter      Snacks/Supplements Adult: Other; Chocolate Plus2 shake, mix w/2 packets beneprotein; With Meals      Advance Diet as Tolerated: Regular Diet Adult            Fall precautions           [place] catheter        To straight gravity drainage. Change catheter every 2 weeks and PRN for leaking or decreased uring output with signs of bladder distention. DO NOT change catheter without a specific MD order IF diagnosis of benign prostatic hypertrophy (BPH), neurogenic bladder, or other urological conditions            General info for SNF       Length of Stay Estimate: Short Term Care: Estimated # of Days <30  Condition at Discharge: Improving  Level of care:skilled   Rehabilitation Potential: Fair  Admission H&P remains valid and up-to-date: Yes  Recent Chemotherapy: N/A  Use Nursing Home Standing Orders: Yes            Mantoux instructions       Give two-step Mantoux (PPD) Per Facility Policy Yes                  Follow-up Appointments     Follow Up and recommended labs and tests       F/U with Dr Lilly for definitive stone removal  Take fluconazoel 400 mg every day for 2 weeks then decrease to 100 mg a day through stone removal procedure and for an additional week  F/U with [facility] MD/NP in 3-5 days for recheck of BP and BMP  Hold paroxetine until fluconazole completed                  Your next 10 appointments already scheduled     Oct 11, 2018   Procedure with Michael Lilly MD   [facility] (--)    [address] [place] [address]   [phone]            Nov 01, 2018 10:40 AM CDT   Return Visit with Michael Lilly MD   [facility])    [address]  [address]  [facility] [address]   [phone]            Nov 05, 2018  1:45 PM CST   Remote PPM Check with SANDRA TECH1   [facility] ([facility])    [address] [place] [address] [address]  [facility] [address] [phone] OPT 2           This appointment is for a remote check of your pacemaker.  This is not an appointment at the office.              Additional Services     Occupational Therapy Adult Consult        Evaluate and treat as clinically indicated.    Reason:  deconditioning            Physical Therapy Adult Consult       Evaluate and treat as clinically indicated.    Reason:  deconditioning                  Pending Results     No orders found from 9/22/2018 to 9/25/2018.            Statement of Approval     Ordered          10/04/18 1114  I have reviewed and agree with all the recommendations and orders detailed in this document.  EFFECTIVE NOW     Approved and electronically signed by:  Virginie Ferris MD             Admission Information     Date & Time Provider Department Dept. Phone    9/24/2018 Corona Arguello, DO [place] [address] [facility] [phone]      Your Vitals Were     Blood Pressure Pulse Temperature Respirations Weight Pulse Oximetry    166/76 (BP Location: Right arm) 60 97.6  F (36.4  C) (Oral) 18 76.2 kg (168 lb 1.6 oz) 94%    BMI (Body Mass Index)                   24.12 kg/m2           MyChart Information     [employer] gives you secure access to your electronic health record. If you see a primary care provider, you can also send messages to your care team and make appointments. If you have questions, please call your primary care clinic.  If you do not have a primary care provider, please call [phone] and they will assist you.        Care EveryWhere ID     This is your Care EveryWhere ID. This could be used by other organizations to access your [facility] medical records  [id number]        Equal Access to Services     EMERITA CADE : Shellie Denson, waaxda luqadaha, qaybta kaalmada bulmaro, yony nunn. So [facility] [phone].    ATENCIÓN: Si habla español, tiene a lauren disposición servicios gratuitos de asistencia lingüística. Llame al [phone].    We comply with applicable federal civil rights laws and [place] laws. We do not discriminate on the basis of race, color, national origin, age, disability, sex, sexual orientation, or  gender identity.               Review of your medicines      START taking        Dose / Directions    aspirin 81 MG EC tablet   Used for:  NSTEMI (non-ST elevated myocardial infarction) (H)        Dose:  81 mg   Start taking on:  10/5/2018   Take 1 tablet (81 mg) by mouth daily   Refills:  0       metoprolol tartrate 25 MG tablet   Commonly known as:  LOPRESSOR   Used for:  Benign essential hypertension        Dose:  25 mg   Take 1 tablet (25 mg) by mouth 2 times daily   Quantity:  60 tablet   Refills:  0         CONTINUE these medicines which may have CHANGED, or have new prescriptions. If we are uncertain of the size of tablets/capsules you have at home, strength may be listed as something that might have changed.        Dose / Directions    * fluconazole 200 MG tablet   Commonly known as:  DIFLUCAN   Indication:  uti   This may have changed:  how much to take   Used for:  Candida infection        Dose:  400 mg   Start taking on:  10/5/2018   Take 2 tablets (400 mg) by mouth daily for 14 days   Quantity:  28 tablet   Refills:  0       * fluconazole 100 MG tablet   Commonly known as:  DIFLUCAN   This may have changed:  You were already taking a medication with the same name, and this prescription was added. Make sure you understand how and when to take each.   Used for:  Candidemia (H)        Dose:  100 mg   Start taking on:  10/19/2018   Take 1 tablet (100 mg) by mouth daily Start taking 100 mg every day when you have completed the 2 weeks of 400 mg per day, you need to take 100 mg per day through stone removal procedure and for about a week afterwards   Quantity:  15 tablet   Refills:  0       PARoxetine 30 MG tablet   Commonly known as:  PAXIL   This may have changed:  additional instructions   Used for:  Current mild episode of major depressive disorder, unspecified whether recurrent (H)        Dose:  15 mg   Take 0.5 tablets (15 mg) by mouth At Bedtime Hold until fluconazole completed   Quantity:  30 tablet    Refills:  0       * Notice:  This list has 2 medication(s) that are the same as other medications prescribed for you. Read the directions carefully, and ask your doctor or other care provider to review them with you.      CONTINUE these medicines which have NOT CHANGED        Dose / Directions    albuterol 108 (90 Base) MCG/ACT inhaler   Commonly known as:  PROAIR HFA/PROVENTIL HFA/VENTOLIN HFA   Used for:  Mild persistent asthma        Dose:  2 puff   Inhale 2 puffs into the lungs every 6 hours as needed for shortness of breath / dyspnea.   Quantity:  1 Inhaler   Refills:  1       amLODIPine 5 MG tablet   Commonly known as:  NORVASC   Used for:  Essential hypertension with goal blood pressure less than 140/90        TAKE 1 TABLET(5 MG) BY MOUTH DAILY   Quantity:  90 tablet   Refills:  3       AVEENO ECZEMA THERAPY 1 % Crea   Generic drug:  Colloidal Oatmeal        Externally apply topically daily as needed   Refills:  0       cetirizine 10 MG tablet   Commonly known as:  zyrTEC        Dose:  10 mg   Take 10 mg by mouth daily as needed   Refills:  0       clobetasol 0.05 % ointment   Commonly known as:  TEMOVATE        daily as needed   Refills:  2       COLON CLEANSER PO        Dose:  1 capsule   Take 1 capsule by mouth daily Advanced Colon Care II   Refills:  0       D-Mannose Powd        Dose:  100 mg   Take 100 mg by mouth daily 500 mg Pt takes 2 pills daily   Refills:  0       lidocaine 4 % Crea cream   Commonly known as:  LMX4        Apply topically daily as needed   Refills:  0       omeprazole 20 MG CR capsule   Commonly known as:  priLOSEC        Dose:  20 mg   Take 20 mg by mouth daily as needed   Refills:  0       PRESERVISION AREDS Caps   Used for:  Macular degeneration        Dose:  1 capsule   Take 1 capsule by mouth 2 times daily   Quantity:  180 capsule   Refills:  3         STOP taking     oxyCODONE-acetaminophen 5-325 MG per tablet   Commonly known as:  PERCOCET                Where to get your  medicines      Some of these will need a paper prescription and others can be bought over the counter. Ask your nurse if you have questions.     You don't need a prescription for these medications     aspirin 81 MG EC tablet    fluconazole 100 MG tablet    fluconazole 200 MG tablet    metoprolol tartrate 25 MG tablet    PARoxetine 30 MG tablet                Protect others around you: Learn how to safely use, store and throw away your medicines at www.disposemymeds.org.        ANTIBIOTIC INSTRUCTION     You've Been Prescribed an Antibiotic - Now What?  Your healthcare team thinks that you or your loved one might have an infection. Some infections can be treated with antibiotics, which are powerful, life-saving drugs. Like all medications, antibiotics have side effects and should only be used when necessary. There are some important things you should know about your antibiotic treatment.      Your healthcare team may run tests before you start taking an antibiotic.    Your team may take samples (e.g., from your blood, urine or other areas) to run tests to look for bacteria. These test can be important to determine if you need an antibiotic at all and, if you do, which antibiotic will work best.      Within a few days, your healthcare team might change or even stop your antibiotic.    Your team may start you on an antibiotic while they are working to find out what is making you sick.    Your team might change your antibiotic because test results show that a different antibiotic would be better to treat your infection.    In some cases, once your team has more information, they learn that you do not need an antibiotic at all. They may find out that you don't have an infection, or that the antibiotic you're taking won't work against your infection. For example, an infection caused by a virus can't be treated with antibiotics. Staying on an antibiotic when you don't need it is more likely to be harmful than helpful.       You may experience side effects from your antibiotic.    Like all medications, antibiotics have side effects. Some of these can be serious.    Let you healthcare team know if you have any known allergies when you are admitted to the hospital.    One significant side effect of nearly all antibiotics is the risk of severe and sometimes deadly diarrhea caused by Clostridium difficile (C. Difficile). This occurs when a person takes antibiotics because some good germs are destroyed. Antibiotic use allows C. diificile to take over, putting patients at high risk for this serious infection.    As a patient or caregiver, it is important to understand your or your loved one's antibiotic treatment. It is especially important for caregivers to speak up when patients can't speak for themselves. Here are some important questions to ask your healthcare team.    What infection is this antibiotic treating and how do you know I have that infection?    What side effects might occur from this antibiotic?    How long will I need to take this antibiotic?    Is it safe to take this antibiotic with other medications or supplements (e.g., vitamins) that I am taking?     Are there any special directions I need to know about taking this antibiotic? For example, should I take it with food?    How will I be monitored to know whether my infection is responding to the antibiotic?    What tests may help to make sure the right antibiotic is prescribed for me?      Information provided by:  www.cdc.gov/getsmart  U.S. Department of Health and Human Services  Centers for disease Control and Prevention  National Center for Emerging and Zoonotic Infectious Diseases  Division of Healthcare Quality Promotion             Medication List: This is a list of all your medications and when to take them. Check marks below indicate your daily home schedule. Keep this list as a reference.      Medications           Morning Afternoon Evening Bedtime As Needed     albuterol 108 (90 Base) MCG/ACT inhaler   Commonly known as:  PROAIR HFA/PROVENTIL HFA/VENTOLIN HFA   Inhale 2 puffs into the lungs every 6 hours as needed for shortness of breath / dyspnea.                                amLODIPine 5 MG tablet   Commonly known as:  NORVASC   TAKE 1 TABLET(5 MG) BY MOUTH DAILY   Last time this was given:  5 mg on 10/4/2018  8:06 AM                                aspirin 81 MG EC tablet   Take 1 tablet (81 mg) by mouth daily   Start taking on:  10/5/2018   Last time this was given:  81 mg on 10/4/2018  8:06 AM                                AVEENO ECZEMA THERAPY 1 % Crea   Externally apply topically daily as needed   Generic drug:  Colloidal Oatmeal                                cetirizine 10 MG tablet   Commonly known as:  zyrTEC   Take 10 mg by mouth daily as needed                                clobetasol 0.05 % ointment   Commonly known as:  TEMOVATE   daily as needed                                COLON CLEANSER PO   Take 1 capsule by mouth daily Advanced Colon Care II                                D-Mannose Powd   Take 100 mg by mouth daily 500 mg Pt takes 2 pills daily                                * fluconazole 200 MG tablet   Commonly known as:  DIFLUCAN   Take 2 tablets (400 mg) by mouth daily for 14 days   Start taking on:  10/5/2018   Last time this was given:  400 mg on 10/4/2018  8:06 AM                                * fluconazole 100 MG tablet   Commonly known as:  DIFLUCAN   Take 1 tablet (100 mg) by mouth daily Start taking 100 mg every day when you have completed the 2 weeks of 400 mg per day, you need to take 100 mg per day through stone removal procedure and for about a week afterwards   Start taking on:  10/19/2018   Last time this was given:  400 mg on 10/4/2018  8:06 AM                                lidocaine 4 % Crea cream   Commonly known as:  LMX4   Apply topically daily as needed                                metoprolol tartrate 25 MG tablet    Commonly known as:  LOPRESSOR   Take 1 tablet (25 mg) by mouth 2 times daily   Last time this was given:  25 mg on 10/4/2018  8:06 AM                                omeprazole 20 MG CR capsule   Commonly known as:  priLOSEC   Take 20 mg by mouth daily as needed   Last time this was given:  20 mg on 10/4/2018  8:06 AM                                PARoxetine 30 MG tablet   Commonly known as:  PAXIL   Take 0.5 tablets (15 mg) by mouth At Bedtime Hold until fluconazole completed   Last time this was given:  15 mg on 10/3/2018  9:34 PM                                PRESERVISION AREDS Caps   Take 1 capsule by mouth 2 times daily   Last time this was given:  1 capsule on 10/4/2018  8:06 AM                                * Notice:  This list has 2 medication(s) that are the same as other medications prescribed for you. Read the directions carefully, and ask your doctor or other care provider to review them with you.

## 2018-09-24 NOTE — IP AVS SNAPSHOT
[place] [address] [facility]    [address]    [place] [address]    Phone:  [phone]    Fax:  [phone]                                       After Visit Summary   9/24/2018    Edison Boss    MRN: [id number]           After Visit Summary Signature Page     I have received my discharge instructions, and my questions have been answered. I have discussed any challenges I see with this plan with the nurse or doctor.    ..........................................................................................................................................  Patient/Patient Representative Signature      ..........................................................................................................................................  Patient Representative Print Name and Relationship to Patient    ..................................................               ................................................  Date                                   Time    ..........................................................................................................................................  Reviewed by Signature/Title    ...................................................              ..............................................  Date                                               Time          22EPIC Rev 08/18

## 2018-09-24 NOTE — ED NOTES
Oxygen saturations at 78% after transfer from wheelchair to bed. Pt at 81% when lying in bed. 4L of oxygen via NC administered. O2 at 93%.

## 2018-09-24 NOTE — IP AVS SNAPSHOT
"[place] [address] MEDICAL SURGICAL: [phone]                                              INTERAGENCY TRANSFER FORM - PHYSICIAN ORDERS   2018                    Hospital Admission Date: 2018  JOSELIN VAN   : 10/19/1924  Sex: Male        Attending Provider: Corona Arguello DO     Allergies:  Ceftriaxone, Bactrim, Levaquin, Zithromax [Azithromycin], Macrodantin [Nitrofuran Derivatives]    Infection:  None   Service:  GENERAL MEDI    Ht:  1.778 m (5' 10\")   Wt:  76.2 kg (168 lb 1.6 oz)   Admission Wt:  85.1 kg (187 lb 9.6 oz)    BMI:  24.12 kg/m 2   BSA:  1.94 m 2            Patient PCP Information     Provider PCP Type    Porfirio Terrell MD, MD General      ED Clinical Impression     Diagnosis Description Comment Added By Time Added    HCAP (healthcare-associated pneumonia) [J18.9] HCAP (healthcare-associated pneumonia) [J18.9]  Zita Julian MD 2018  2:09 PM    Acute respiratory failure with hypoxia (H) [J96.01] Acute respiratory failure with hypoxia (H) [J96.01]  Zita Julian MD 2018  2:09 PM    NSTEMI (non-ST elevated myocardial infarction) (H) [I21.4] NSTEMI (non-ST elevated myocardial infarction) (H) [I21.4]  Zita Julian MD 2018  3:25 PM    MADELYN (acute kidney injury) (H) [N17.9] MADELYN (acute kidney injury) (H) [N17.9]  Zita Julian MD 2018  3:25 PM      Hospital Problems as of 10/4/2018              Priority Class Noted POA    Acute encephalopathy Medium  2018 Yes      Non-Hospital Problems as of 10/4/2018              Priority Class Noted    Malignant neoplasm of prostate (H) Medium  2002    Other specified disorder of skin Medium  2004    Personal history of other diseases of circulatory system Medium  Unknown    Osteoporosis Medium  Unknown    Esophageal reflux Medium  Unknown    Essential hypertension with goal blood pressure less than 140/90 Medium  2006    Mild persistent asthma Medium  Unknown    Insomnia Medium  " 4/4/2007    Restless legs syndrome (RLS) Medium  11/14/2007    HYPERLIPIDEMIA LDL GOAL <100 Medium  10/31/2010    Chronic atrial fibrillation (H) Medium  7/2/2012    SBO (small bowel obstruction) (H) Medium  11/30/2012    Cerebral infarction (H) Medium  2/14/2014    Sinus node dysfunction (H) Medium  Unknown    Pacemaker Medium  Unknown    RAMÓN (generalized anxiety disorder) Medium  5/5/2016    MCI (mild cognitive impairment) Medium  10/20/2016    Pneumonia Medium  9/17/2018      Code Status History     Date Active Date Inactive Code Status Order ID Comments User Context    10/4/2018 11:15 AM  DNR [id number]  Virginie Ferris MD Outpatient    9/25/2018  9:19 AM 10/4/2018 11:15 AM DNR/DNI [id number]  Mu Barron MD Inpatient    9/24/2018  4:27 PM 9/25/2018  9:19 AM Full Code [id number]  Corona Arguello, DO Inpatient    9/17/2018 12:09 AM 9/19/2018  3:58 PM Full Code [id number]  Corona Arguello, DO Inpatient    12/1/2012  3:07 PM 9/17/2018 12:09 AM Full Code [id number]  Porfirio Terrell MD Outpatient    11/30/2012  2:30 AM 12/1/2012  3:07 PM Full Code [id number]  Bharat Lopez MD Inpatient         Medication Review      START taking        Dose / Directions Comments    aspirin 81 MG EC tablet   Used for:  NSTEMI (non-ST elevated myocardial infarction) (H)        Dose:  81 mg   Start taking on:  10/5/2018   Take 1 tablet (81 mg) by mouth daily   Refills:  0        metoprolol tartrate 25 MG tablet   Commonly known as:  LOPRESSOR   Used for:  Benign essential hypertension        Dose:  25 mg   Take 1 tablet (25 mg) by mouth 2 times daily   Quantity:  60 tablet   Refills:  0          CONTINUE these medications which may have CHANGED, or have new prescriptions. If we are uncertain of the size of tablets/capsules you have at home, strength may be listed as something that might have changed.        Dose / Directions Comments    * fluconazole 200 MG tablet   Commonly known as:  DIFLUCAN   Indication:  uti   This  may have changed:  how much to take   Used for:  Candida infection        Dose:  400 mg   Start taking on:  10/5/2018   Take 2 tablets (400 mg) by mouth daily for 14 days   Quantity:  28 tablet   Refills:  0        * fluconazole 100 MG tablet   Commonly known as:  DIFLUCAN   This may have changed:  You were already taking a medication with the same name, and this prescription was added. Make sure you understand how and when to take each.   Used for:  Candidemia (H)        Dose:  100 mg   Start taking on:  10/19/2018   Take 1 tablet (100 mg) by mouth daily Start taking 100 mg every day when you have completed the 2 weeks of 400 mg per day, you need to take 100 mg per day through stone removal procedure and for about a week afterwards   Quantity:  15 tablet   Refills:  0        PARoxetine 30 MG tablet   Commonly known as:  PAXIL   This may have changed:  additional instructions   Used for:  Current mild episode of major depressive disorder, unspecified whether recurrent (H)        Dose:  15 mg   Take 0.5 tablets (15 mg) by mouth At Bedtime Hold until fluconazole completed   Quantity:  30 tablet   Refills:  0        * Notice:  This list has 2 medication(s) that are the same as other medications prescribed for you. Read the directions carefully, and ask your doctor or other care provider to review them with you.      CONTINUE these medications which have NOT CHANGED        Dose / Directions Comments    albuterol 108 (90 Base) MCG/ACT inhaler   Commonly known as:  PROAIR HFA/PROVENTIL HFA/VENTOLIN HFA   Used for:  Mild persistent asthma        Dose:  2 puff   Inhale 2 puffs into the lungs every 6 hours as needed for shortness of breath / dyspnea.   Quantity:  1 Inhaler   Refills:  1        amLODIPine 5 MG tablet   Commonly known as:  NORVASC   Used for:  Essential hypertension with goal blood pressure less than 140/90        TAKE 1 TABLET(5 MG) BY MOUTH DAILY   Quantity:  90 tablet   Refills:  3        AVEENO ECZEMA  THERAPY 1 % Crea   Generic drug:  Colloidal Oatmeal        Externally apply topically daily as needed   Refills:  0        cetirizine 10 MG tablet   Commonly known as:  zyrTEC        Dose:  10 mg   Take 10 mg by mouth daily as needed   Refills:  0        clobetasol 0.05 % ointment   Commonly known as:  TEMOVATE        daily as needed   Refills:  2        COLON CLEANSER PO        Dose:  1 capsule   Take 1 capsule by mouth daily Advanced Colon Care II   Refills:  0        D-Mannose Powd        Dose:  100 mg   Take 100 mg by mouth daily 500 mg Pt takes 2 pills daily   Refills:  0        lidocaine 4 % Crea cream   Commonly known as:  LMX4        Apply topically daily as needed   Refills:  0        omeprazole 20 MG CR capsule   Commonly known as:  priLOSEC        Dose:  20 mg   Take 20 mg by mouth daily as needed   Refills:  0        PRESERVISION AREDS Caps   Used for:  Macular degeneration        Dose:  1 capsule   Take 1 capsule by mouth 2 times daily   Quantity:  180 capsule   Refills:  3          STOP taking     oxyCODONE-acetaminophen 5-325 MG per tablet   Commonly known as:  PERCOCET                   After Care     Activity - Up with nursing assistance           Advance Diet as Tolerated       Follow this diet upon discharge: Orders Placed This Encounter      Snacks/Supplements Adult: Other; Chocolate Plus2 shake, mix w/2 packets beneprotein; With Meals      Advance Diet as Tolerated: Regular Diet Adult       Fall precautions           [place] catheter       To straight gravity drainage. Change catheter every 2 weeks and PRN for leaking or decreased uring output with signs of bladder distention. DO NOT change catheter without a specific MD order IF diagnosis of benign prostatic hypertrophy (BPH), neurogenic bladder, or other urological conditions       General info for SNF       Length of Stay Estimate: Short Term Care: Estimated # of Days <30  Condition at Discharge: Improving  Level of care:skilled    Rehabilitation Potential: Fair  Admission H&P remains valid and up-to-date: Yes  Recent Chemotherapy: N/A  Use Nursing Home Standing Orders: Yes       Mantoux instructions       Give two-step Mantoux (PPD) Per Facility Policy Yes             Referrals     Occupational Therapy Adult Consult       Evaluate and treat as clinically indicated.    Reason:  deconditioning       Physical Therapy Adult Consult       Evaluate and treat as clinically indicated.    Reason:  deconditioning             Your next 10 appointments already scheduled     Oct 11, 2018   Procedure with Michael Lilly MD   [facility] (--)    [address] [facility] [address]   [phone]            Nov 01, 2018 10:40 AM CDT   Return Visit with Michael Lilly MD   [facility] ([facility])    [address] [facility]  [address]  [facility] [address]   [phone]            Nov 05, 2018  1:45 PM CST   Remote PPM Check with SANDRA TECH1   [facility] ([facility])    [address]  [facility] [address] [phone] OPT 2           This appointment is for a remote check of your pacemaker.  This is not an appointment at the office.              Follow-Up Appointment Instructions     Future Labs/Procedures    Follow Up and recommended labs and tests     Comments:    F/U with Dr Lilly for definitive stone removal  Take fluconazoel 400 mg every day for 2 weeks then decrease to 100 mg a day through stone removal procedure and for an additional week  F/U with [facility] MD/NP in 3-5 days for recheck of BP and BMP  Hold paroxetine until fluconazole completed      Follow-Up Appointment Instructions     Follow Up and recommended labs and tests       F/U with Dr Lilly for definitive stone removal  Take fluconazoel 400 mg every day for 2 weeks then decrease to 100 mg a day through stone removal procedure and  for an additional week  F/U with [facility] MD/NP in 3-5 days for recheck of BP and BMP  Hold paroxetine until fluconazole completed             Statement of Approval     Ordered          10/04/18 1114  I have reviewed and agree with all the recommendations and orders detailed in this document.  EFFECTIVE NOW     Approved and electronically signed by:  Virginie Ferris MD

## 2018-09-24 NOTE — IP AVS SNAPSHOT
` `     [place] [address] MEDICAL SURGICAL: [phone]            Medication Administration Report for Edison Boss as of 10/04/18 1442   Legend:    Given Hold Not Given Due Canceled Entry Other Actions    Time Time (Time) Time  Time-Action       Inactive    Active    Linked        Medications 09/28/18 09/29/18 09/30/18 10/01/18 10/02/18 10/03/18 10/04/18    acetaminophen (TYLENOL) Suppository 650 mg  Dose: 650 mg  Freq: EVERY 4 HOURS PRN Route: RE  PRN Reason: mild pain  Start: 09/24/18 1627   Admin Instructions: Alternate ibuprofen (if ordered) with acetaminophen.  Maximum acetaminophen dose from all sources = 75 mg/kg/day not to exceed 4 grams/day.    Admin. Amount: 1 suppository (1 × 650 mg suppository)  Dispense Loc:  [facility]               acetaminophen (TYLENOL) tablet 650 mg  Dose: 650 mg  Freq: EVERY 8 HOURS Route: PO  Start: 09/27/18 1015   Admin Instructions: Maximum acetaminophen dose from all sources = 75 mg/kg/day not to exceed 4 grams/day.    Admin. Amount: 2 tablet (2 × 325 mg tablet)  Last Admin: 10/04/18 0806  Dispense Loc:  [facility]            0948 (650 mg)-Given       2027 (650 mg)-Given        (0305)-Not Given       1009 (650 mg)-Given       1916 (650 mg)-Given        0218 (650 mg)-Given       0810 (650 mg)-Given       1757 (650 mg)-Given        0007 (650 mg)-Given       0816 (650 mg)-Given       1616 (650 mg)-Given        0111 (650 mg)-Given       0932 (650 mg)-Given       1744 (650 mg)-Given        (0214)-Not Given [C]       0825 (650 mg)-Given       1614 (650 mg)-Given        0005 (650 mg)-Given       0806 (650 mg)-Given       [ ] 1600          Or  acetaminophen (TYLENOL) Suppository 650 mg  Dose: 650 mg  Freq: EVERY 8 HOURS Route: RE  Start: 09/27/18 1015   Admin Instructions: Maximum acetaminophen dose from all sources = 75 mg/kg/day not to exceed 4 grams/day.    Admin. Amount: 1 suppository (1 × 650 mg suppository)  Dispense Loc:  [facility]     (7010)-Not Given                                                                                                                                                   [ ] 1600           acetaminophen (TYLENOL) tablet 650 mg  Dose: 650 mg  Freq: EVERY 4 HOURS PRN Route: PO  PRN Reason: mild pain  Start: 09/24/18 1627   Admin Instructions: Alternate ibuprofen (if ordered) with acetaminophen.  Maximum acetaminophen dose from all sources = 75 mg/kg/day not to exceed 4 grams/day.    Admin. Amount: 2 tablet (2 × 325 mg tablet)  Last Admin: 10/03/18 0547  Dispense Loc: [facility]          0547 (650 mg)-Given            amLODIPine (NORVASC) tablet 5 mg  Dose: 5 mg  Freq: DAILY Route: PO  Start: 09/25/18 0900   Admin. Amount: 1 tablet (1 × 5 mg tablet)  Last Admin: 10/04/18 0806  Dispense Loc: [facility]     0947 (5 mg)-Given        1009 (5 mg)-Given        0941 (5 mg)-Given        0816 (5 mg)-Given        0932 (5 mg)-Given        0825 (5 mg)-Given        0806 (5 mg)-Given           aspirin EC tablet 81 mg  Dose: 81 mg  Freq: DAILY Route: PO  Start: 09/25/18 0900   Admin Instructions: DO NOT CRUSH.    Admin. Amount: 1 tablet (1 × 81 mg tablet)  Last Admin: 10/04/18 0806  Dispense Loc:  [facility]     0948 (81 mg)-Given        1009 (81 mg)-Given        0941 (81 mg)-Given        0816 (81 mg)-Given        0932 (81 mg)-Given        0825 (81 mg)-Given        0806 (81 mg)-Given           bisacodyl (DULCOLAX) Suppository 10 mg  Dose: 10 mg  Freq: DAILY PRN Route: RE  PRN Reason: constipation  Start: 09/24/18 1627   Admin Instructions: Hold for loose stools.  This is the third step of a three step constipation treatment.    Admin. Amount: 1 suppository (1 × 10 mg suppository)  Dispense Loc:  [facility]               fluconazole (DIFLUCAN) tablet 400 mg  Dose: 400 mg  Freq: DAILY Route: PO  Indications of Use: CANDIDEMIA  Start: 10/03/18 1415   Admin. Amount: 2 tablet (2 × 200 mg tablet)  Last Admin: 10/04/18 0806  Dispense Loc:  [facility]          1614  (400 mg)-Given        0806 (400 mg)-Given           heparin sodium PF injection 5,000 Units  Dose: 5,000 Units  Freq: EVERY 12 HOURS Route: SC  Start: 09/30/18 1730   Admin Instructions: High concentration heparin. Not for line flush or cath care.    Admin. Amount: 5,000 Units = 0.5 mL Conc: 5,000 Units/0.5 mL  Last Admin: 10/04/18 0611  Dispense Loc: [facility]  Volume: 0.5 mL       1757 (5,000 Units)-Given        0609 (5,000 Units)-Given       1752 (5,000 Units)-Given        0541 (5,000 Units)-Given       1744 (5,000 Units)-Given        0547 (5,000 Units)-Given       1643 (5,000 Units)-Given        0611 (5,000 Units)-Given [C]       [ ] 1730           HYDROmorphone (PF) (DILAUDID) injection 0.2 mg  Dose: 0.2 mg  Freq: EVERY 2 HOURS PRN Route: IV  PRN Reason: moderate to severe pain  Start: 09/27/18 1006   Admin Instructions: For ordered IV doses 0.1-4 mg give IV Push undiluted. Administer each 2mg over 2-5 minutes.    Admin. Amount: 0.2 mg  Dispense Loc: [facility]               ipratropium - albuterol 0.5 mg/2.5 mg/3 mL (DUONEB) neb solution 3 mL  Dose: 3 mL  Freq: EVERY 2 HOURS PRN Route: NEBULIZATION  PRN Reason: wheezing  Start: 09/26/18 1003   Admin. Amount: 3 mL  Dispense Loc: [facility]  Volume: 3 mL               ipratropium - albuterol 0.5 mg/2.5 mg/3 mL (DUONEB) neb solution 3 mL  Dose: 3 mL  Freq: 4 TIMES DAILY Route: NEBULIZATION  Start: 09/26/18 1200   Admin. Amount: 3 mL  Last Admin: 10/04/18 1209  Dispense Loc: [facility]  Volume: 3 mL     0756 (3 mL)-Given       1142 (3 mL)-Given       1516 (3 mL)-Given       1934 (3 mL)-Given        0808 (3 mL)-Given       1136 (3 mL)-Given       1519 (3 mL)-Given       (2133)-Not Given [C]        0758 (3 mL)-Given       1103 (3 mL)-Given       1509 (3 mL)-Given       1932 (3 mL)-Given        0716 (3 mL)-Given       1138 (3 mL)-Given       1531 (3 mL)-Given       1936 (3 mL)-Given        0714 (3 mL)-Given       1146 (3 mL)-Given       1513 (3 mL)-Given      "  1932 (3 mL)-Given        0708 (3 mL)-Given       1109 (3 mL)-Given       1523 (3 mL)-Given       1937 (3 mL)-Given        0754 (3 mL)-Given       1209 (3 mL)-Given       [ ] 1600       [ ] 2000           lidocaine (LMX4) kit  Freq: EVERY 1 HOUR PRN Route: Top  PRN Reason: pain  PRN Comment: with VAD insertion or accessing implanted port.  Start: 09/24/18 1627   Admin Instructions: Do NOT give if patient has a history of allergy to any local anesthetic or any \"gustavo\" product.   Apply 30 minutes prior to VAD insertion or port access.  MAX Dose:  2.5 g (  of 5 g tube)    Dispense Loc: [facility]               lidocaine 1 % 1 mL  Dose: 1 mL  Freq: EVERY 1 HOUR PRN Route: OTHER  PRN Comment: mild pain with VAD insertion or accessing implanted port  Start: 09/24/18 1627   Admin Instructions: Do NOT give if patient has a history of allergy to any local anesthetic or any \"gustavo\" product. MAX dose 1 mL subcutaneous OR intradermal in divided doses.    Admin. Amount: 1 mL  Dispense Loc: [facility] Floor Stock  Volume: 2 mL               magnesium sulfate 4 g in 100 mL sterile water (premade)  Dose: 4 g  Freq: EVERY 4 HOURS PRN Route: IV  PRN Reason: magnesium supplementation  Start: 09/26/18 0955   Admin Instructions: For serum Mg++ less than 1.6 mg/dL  Give 4 g and recheck magnesium level 2 hours after dose, and next AM.    Admin. Amount: 4 g = 100 mL Conc: 4 g/100 mL  Dispense Loc: [facility]  Infused Over: 120 Minutes  Volume: 100 mL               melatonin tablet 1 mg  Dose: 1 mg  Freq: AT BEDTIME PRN Route: PO  PRN Reason: sleep  Start: 09/24/18 1627   Admin Instructions: Do not give unless at least 6 hours of uninterrupted sleep is expected.    Admin. Amount: 1 tablet (1 × 1 mg tablet)  Dispense Loc:  [facility]               metoprolol tartrate (LOPRESSOR) tablet 25 mg  Dose: 25 mg  Freq: 2 TIMES DAILY Route: PO  Start: 10/03/18 0900   Admin Instructions: Hold for SBP < 100 or HR < 60    Admin. Amount: 1 tablet " (1 × 25 mg tablet)  Last Admin: 10/04/18 0806  Dispense Loc:  [facility]          0825 (25 mg)-Given       2134 (25 mg)-Given        0806 (25 mg)-Given       [ ] 2100           naloxone (NARCAN) injection 0.1-0.4 mg  Dose: 0.1-0.4 mg  Freq: EVERY 2 MIN PRN Route: IV  PRN Reason: opioid reversal  Start: 09/24/18 1627   Admin Instructions: For respiratory rate LESS than or EQUAL to 8.  Partial reversal dose:  0.1 mg titrated q 2 minutes for Analgesia Side Effects Monitoring Sedation Level of 3 (frequently drowsy, arousable, drifts to sleep during conversation).Full reversal dose:  0.4 mg bolus for Analgesia Side Effects Monitoring Sedation Level of 4 (somnolent, minimal or no response to stimulation).  For ordered IV doses 0.1-2mg give IVP. Give each 0.4mg over 15 seconds in emergency situations. For non-emergent situations further dilute in 9mL of NS to facilitate titration of response.    Admin. Amount: 0.1-0.4 mg = 0.25-1 mL Conc: 0.4 mg/mL  Dispense Loc:  [facility]  Volume: 1 mL               nitroGLYcerin (NITROSTAT) sublingual tablet 0.4 mg  Dose: 0.4 mg  Freq: EVERY 5 MIN PRN Route: SL  PRN Reason: chest pain  Start: 09/24/18 1627   Admin Instructions: Maximum 3 doses in 15 minutes.  Notify provider if no relief after 3 doses.    Do NOT give nitroglycerin SL IF the patient has taken avanafil (STENDRA), sildenafil (VIAGRA) or (REVATIO) within the last 8 hours, vardenafil (LEVITRA) or (STAXYN) within the last 18 hours, tadalafil (CIALIS) or (ADCIRCA) within the last 36 hours. Inform provider if patient has taken one of these medications.  If patient is still having acute angina requiring treatment, an alternative treatment option may be used such as: IV beta-blocker [2.5 mg - 5 mg metoprolol (LOPRESSOR)] if ordered by a provider.    Admin. Amount: 1 tablet (1 × 0.4 mg tablet)  Dispense Loc:  [facility]               omeprazole (priLOSEC) CR capsule 20 mg  Dose: 20 mg  Freq: EVERY MORNING BEFORE BREAKFAST Route:  PO  Start: 09/25/18 0730   Admin. Amount: 1 capsule (1 × 20 mg capsule)  Last Admin: 10/04/18 0806  Dispense Loc: [facility]     (0632)-Not Given       0948 (20 mg)-Given        0643 (20 mg)-Given        0810 (20 mg)-Given        0610 (20 mg)-Given               0932 (20 mg)-Given        0825 (20 mg)-Given        0806 (20 mg)-Given           ondansetron (ZOFRAN-ODT) ODT tab 4 mg  Dose: 4 mg  Freq: EVERY 6 HOURS PRN Route: PO  PRN Reasons: nausea,vomiting  Start: 09/24/18 1627   Admin Instructions: This is Step 1 of nausea and vomiting management.  If nausea not resolved in 15 minutes, go to Step 2 prochlorperazine (COMPAZINE). Do not push through foil backing. Peel back foil and gently remove. Place on tongue immediately. Administration with liquid unnecessary  With dry hands, peel back foil backing and gently remove tablet; do not push oral disintegrating tablet through foil backing; administer immediately on tongue and oral disintegrating tablet dissolves in seconds; then swallow with saliva; liquid not required.    Admin. Amount: 1 tablet (1 × 4 mg tablet)  Last Admin: 09/26/18 0922  Dispense Loc: [facility]              Or  ondansetron (ZOFRAN) injection 4 mg  Dose: 4 mg  Freq: EVERY 6 HOURS PRN Route: IV  PRN Reasons: nausea,vomiting  Start: 09/24/18 1627   Admin Instructions: This is Step 1 of nausea and vomiting management.  If nausea not resolved in 15 minutes, go to Step 2 prochlorperazine (COMPAZINE).  Irritant. For ordered IV doses 0.1-4 mg, give IV Push undiluted over 2-5 minutes.    Admin. Amount: 4 mg = 2 mL Conc: 4 mg/2 mL  Dispense Loc: [facility]  Infused Over: 2-5 Minutes  Volume: 2 mL               opium-belladonna (B&O SUPPRETTES) 30-16.2 MG per suppository 1 suppository  Dose: 30 mg  Freq: EVERY 8 HOURS PRN Route: RE  PRN Reason: moderate pain  Start: 09/25/18 0916   Admin. Amount: 1 suppository  Last Admin: 10/01/18 0949  Dispense Loc:  Main Pharmacy        0949 (1 suppository)-Given               PARoxetine (PAXIL) half-tab 15 mg  Dose: 15 mg  Freq: AT BEDTIME Route: PO  Start: 10/01/18 2200   Admin. Amount: 1 half-tab (1 × 5 mg half-tab) + 1 tablet (1 × 10 mg tablet)  Last Admin: 10/03/18 2134  Dispense Loc: [facility]S [facility]   Mixture Administration Information:   Medication Type Amount   PARoxetine 5 mg TABS Medications 5 mg   PARoxetine 10 MG TABS Medications 10 mg                2135 (15 mg)-Given        2148 (15 mg)-Given        2134 (15 mg)-Given        [ ] 2200           polyethylene glycol (MIRALAX/GLYCOLAX) Packet 17 g  Dose: 17 g  Freq: DAILY PRN Route: PO  PRN Reason: constipation  Start: 09/24/18 1627   Admin Instructions: Give in 8oz of  water, juice, or soda. Hold for loose stools.  This is the second step of a three step constipation treatment.  1 Packet = 17 grams. Mixed prescribed dose in 8 ounces of water. Follow with 8 oz. of water.    Admin. Amount: 17 g  Dispense Loc:  [facility]               potassium chloride (KLOR-CON) Packet 20-40 mEq  Dose: 20-40 mEq  Freq: EVERY 2 HOURS PRN Route: ORAL OR FEED  PRN Reason: potassium supplementation  Start: 09/26/18 0955   Admin Instructions: Use if unable to tolerate tablets.  If Serum K+ 3.0-3.3, dose = 60 mEq po total dose (40 mEq x1 followed in 2 hours by 20 mEq x1). Recheck K+ level 4 hours after dose and the next AM.  If Serum K+ 2.5-2.9, dose = 80 mEq po total dose (40 mEq Q2H x2). Recheck K+ level 4 hours after dose and the next AM.  If Serum K+ less than 2.5, See IV order.  Dissolve packet contents in 4-8 ounces of cold water or juice.    Admin. Amount: 20-40 mEq  Dispense Loc:  [facility]               potassium chloride 10 mEq in 100 mL intermittent infusion with 10 mg lidocaine  Dose: 10 mEq  Freq: EVERY 1 HOUR PRN Route: IV  PRN Reason: potassium supplementation  Start: 09/26/18 0955   Admin Instructions: Infuse via PERIPHERAL LINE. Use potassium with lidocaine for pain with peripheral administration.  If Serum K+ 3.0-3.3, dose =  10 mEq/hr x4 doses (40 mEq IV total dose). Recheck K+ level 2 hours after dose and the next AM.  If Serum K+ less than 3.0, dose = 10 mEq/hr x6 doses (60 mEq IV total dose). Recheck K+ level 2 hours after dose and the next AM.    Admin. Amount: 10 mEq = 100 mL Conc: 10 mEq/100 mL  Dispense Loc:  Main Pharmacy  Infused Over: 1 Hours  Volume: 100 mL               potassium chloride 10 mEq in 100 mL sterile water intermittent infusion (premix)  Dose: 10 mEq  Freq: EVERY 1 HOUR PRN Route: IV  PRN Reason: potassium supplementation  Start: 09/26/18 0955   Admin Instructions: Infuse via PERIPHERAL LINE or CENTRAL LINE. Use for central line replacement if patient weight less than 65 kg, if patient is on TPN with high potassium content or if unit does not stock 20 mEq bags.   If Serum K+ 3.0-3.3, dose = 10 mEq/hr x4 doses (40 mEq IV total dose). Recheck K+ level 2 hours after dose and the next AM.   If Serum K+ less than 3.0, dose = 10 mEq/hr x6 doses (60 mEq IV total dose). Recheck K+ level 2 hours after dose and the next AM.    Admin. Amount: 10 mEq = 100 mL Conc: 10 mEq/100 mL  Dispense Loc: [facility]  Infused Over: 60 Minutes  Volume: 100 mL               potassium chloride 20 mEq in 50 mL intermittent infusion  Dose: 20 mEq  Freq: EVERY 1 HOUR PRN Route: IV  PRN Reason: potassium supplementation  Start: 09/26/18 0955   Admin Instructions: Infuse via CENTRAL LINE Only. May need EKG if less than 65 kg or on TPN - Max rate is 0.3 mEq/kg/hr for patients not on EKG monitoring.   If Serum K+ 3.0-3.3, dose = 20 mEq/hr x2 doses (40 mEq IV total dose). Recheck K+ level 2 hours after dose and the next AM.  If Serum K+ less than 3.0, dose = 20 mEq/hr x3 doses (60 mEq IV total dose). Recheck K+ level 2 hours after dose and the next AM.    Admin. Amount: 20 mEq = 50 mL Conc: 20 mEq/50 mL  Dispense Loc:  Main Pharmacy  Volume: 50 mL               potassium chloride SA (K-DUR/KLOR-CON M) CR tablet 20-40 mEq  Dose: 20-40  mEq  Freq: EVERY 2 HOURS PRN Route: PO  PRN Reason: potassium supplementation  Start: 09/26/18 0955   Admin Instructions: Use if able to take PO.   If Serum K+ 3.0-3.3, dose = 60 mEq po total dose (40 mEq x1 followed in 2 hours by 20 mEq x1). Recheck K+ level 4 hours after dose and the next AM.  If Serum K+ 2.5-2.9, dose = 80 mEq po total dose (40 mEq Q2H x2). Recheck K+ level 4 hours after dose and the next AM.  If Serum K+ less than 2.5, See IV order.  DO NOT CRUSH    Admin. Amount: 1-2 tablet (1-2 × 20 mEq tablet)  Last Admin: 09/30/18 1047  Dispense Loc:  ADS [facility]       0815 (40 mEq)-Given       1047 (20 mEq)-Given               PRESERVISION AREDS CAPS 1 capsule  Dose: 1 capsule  Freq: 2 TIMES DAILY. Route: PO  Start: 09/24/18 1630   Admin Instructions: Use home supply    Last Admin: 10/04/18 0806  Dispense Loc:  Main Pharmacy     0947 (1 capsule)-Given       1533 (1 capsule)-Given        1009 (1 capsule)-Given       1544 (1 capsule)-Given        0941 (1 capsule)-Given       1758 (1 capsule)-Given        0818 (1 capsule)-Given       1619 (1 capsule)-Given        0933 (1 capsule)-Given       1751 (1 capsule)-Given        0826 (1 capsule)-Given       1614 (1 capsule)-Given        0806 (1 capsule)-Given       [ ] 1600           prochlorperazine (COMPAZINE) injection 5 mg  Dose: 5 mg  Freq: EVERY 6 HOURS PRN Route: IV  PRN Reasons: nausea,vomiting  Start: 09/24/18 1627   Admin Instructions: This is Step 2 of nausea and vomiting management. Give if nausea not resolved 15 minutes after giving ondansetron (ZOFRAN). If nausea not resolved in 15 minutes, go to Step 3 metoclopramide (REGLAN), if ordered.  For ordered IV doses 0.1-10 mg, give IV Push undiluted. Each 5mg over 1 minute.    Admin. Amount: 5 mg = 1 mL Conc: 5 mg/mL  Dispense Loc: [facility]  Infused Over: 1-2 Minutes  Volume: 1 mL              Or  prochlorperazine (COMPAZINE) tablet 5 mg  Dose: 5 mg  Freq: EVERY 6 HOURS PRN Route: PO  PRN Reason:  vomiting  Start: 09/24/18 1627   Admin Instructions: This is Step 2 of nausea and vomiting management. Give if nausea not resolved 15 minutes after giving ondansetron (ZOFRAN). If nausea not resolved in 15 minutes, go to Step 3 metoclopramide (REGLAN), if ordered.    Admin. Amount: 1 tablet (1 × 5 mg tablet)  Dispense Loc: [facility] ADS [facility]              Or  prochlorperazine (COMPAZINE) Suppository 12.5 mg  Dose: 12.5 mg  Freq: EVERY 12 HOURS PRN Route: RE  PRN Reasons: nausea,vomiting  Start: 09/24/18 1627   Admin Instructions: This is Step 2 of nausea and vomiting management. Give if nausea not resolved 15 minutes after giving ondansetron (ZOFRAN). If nausea not resolved in 15 minutes, go to Step 3 metoclopramide (REGLAN), if ordered.    Admin. Amount: 0.5 suppository (0.5 × 25 mg suppository)  Dispense Loc: [facility] ADS MS3[facility]               senna-docusate (SENOKOT-S;PERICOLACE) 8.6-50 MG per tablet 1 tablet  Dose: 1 tablet  Freq: 2 TIMES DAILY PRN Route: PO  PRN Reason: constipation  Start: 09/24/18 1627   Admin Instructions: If no bowel movement in 24 hours, increase to 2 tablets PO.  Hold for loose stools.  This is the first step of a three step constipation treatment.    Admin. Amount: 1 tablet  Dispense Loc: [facility] ADS [facility]              Or  senna-docusate (SENOKOT-S;PERICOLACE) 8.6-50 MG per tablet 2 tablet  Dose: 2 tablet  Freq: 2 TIMES DAILY PRN Route: PO  PRN Reason: constipation  Start: 09/24/18 1627   Admin Instructions: Hold for loose stools.  This is the first step of a three step constipation treatment.    Admin. Amount: 2 tablet  Dispense Loc: [facility] ADS [facility]               sodium chloride (PF) 0.9% PF flush 3 mL  Dose: 3 mL  Freq: EVERY 8 HOURS Route: IK  Start: 09/24/18 1630   Admin Instructions: And Q1H PRN, to lock peripheral IV dormant line.    Admin. Amount: 3 mL  Last Admin: 10/04/18 0005  Dispense Loc: [facility] Floor Stock  Volume: 4 mL     0007 (3 mL)-Given       (0954)-Not Given       1711 (3 mL)-Given        0031 (3  mL)-Given       1009 (3 mL)-Given       1544 (3 mL)-Given        (0012)-Not Given       0818 (3 mL)-Given       1758 (3 mL)-Given        0017 (3 mL)-Given       0819 (3 mL)-Given       1621 (3 mL)-Given        0111 (3 mL)-Given       0933 (3 mL)-Given       1755 (3 mL)-Given        0653 (3 mL)-Given [C]              1615 (3 mL)-Given        0005 (3 mL)-Given       (0807)-Not Given       [ ] 1630           sodium chloride (PF) 0.9% PF flush 3 mL  Dose: 3 mL  Freq: EVERY 1 HOUR PRN Route: IK  PRN Reason: line flush  PRN Comment: for peripheral IV flush post IV meds  Start: 18   Admin. Amount: 3 mL  Last Admin: 10/01/18 1217  Dispense Loc: [facility] Floor Stock  Volume: 4 mL     1219 (3 mL)-Given          1217 (3 mL)-Given             Completed Medications  Medications 09/28/18 09/29/18 09/30/18 10/01/18 10/02/18 10/03/18 10/04/18         Dose: 60 mg  Freq: ONCE Route: IV  Start: 10/02/18 0830   End: 10/02/18 0934   Admin Instructions: For ordered IV doses 1-40 mg, give IV Push undiluted over 1-2 minutes.    Admin. Amount: 60 mg = 6 mL Conc: 10 mg/mL  Last Admin: 10/02/18 0932  Dispense Loc:  ADS [facility]  Infused Over: 1-2 Minutes  Administrations Remainin  Volume: 6 mL         0932 (60 mg)-Given               Dose: 0.5 mL  Freq: PRIOR TO DISCHARGE Route: IM  Start: 18 1000   End: 10/02/18 0933   Admin Instructions: Administer when afebrile (less than 100.4F) x 24 hours, or Prior to Discharge.  If not administering when scheduled , change the due time by following the instructions in the reference link below.  If patient refuses vaccine, chart as Vaccine Refused.    Admin. Amount: 0.5 mL  Last Admin: 10/02/18 0933  Dispense Loc:  Main Pharmacy  Administrations Remainin  Volume: 0.5 mL         0933 (0.5 mL)-Given            Discontinued Medications  Medications 09/28/18 09/29/18 09/30/18 10/01/18 10/02/18 10/03/18 10/04/18         Dose: 400 mg  Freq: EVERY 24 HOURS Route: IV  Indications of Use:  CANDIDEMIA  Last Dose: 400 mg (10/01/18 1621)  Start: 10/01/18 1515   End: 10/03/18 1402   Admin. Amount: 400 mg = 200 mL Conc: 2 mg/mL  Last Admin: 10/02/18 1751  Dispense Loc:  Main Pharmacy  Infused Over: 60 Minutes  Volume: 200 mL        1621 (400 mg)-New Bag        1751 (400 mg)-New Bag        1402-Med Discontinued          Dose: 600 mg  Freq: EVERY 12 HOURS Route: IV  Indications of Use: ASPIRATION PNEUMONIA  Start: 09/27/18 1245   End: 10/01/18 1507   Admin. Amount: 600 mg = 300 mL Conc: 2 mg/mL  Last Admin: 10/01/18 1217  Dispense Loc:  Main Pharmacy  Infused Over: 60 Minutes  Volume: 300 mL     0007 (600 mg)-New Bag       1219 (600 mg)-New Bag        0029 (600 mg)-New Bag       1209 (600 mg)-New Bag       2344 (600 mg)-New Bag        1204 (600 mg)-New Bag        0017 (600 mg)-New Bag       1217 (600 mg)-New Bag       1507-Med Discontinued            Dose: 100 mg  Freq: EVERY 24 HOURS Route: IV  Indications of Use: CANDIDEMIA  Last Dose: 100 mg (09/30/18 1918)  Start: 09/28/18 1900   End: 10/01/18 1507   Admin Instructions: Do not administer as an IV bolus injection; an existing IV line should be flushed with NS prior to infusion.  Protect from light.    Admin. Amount: 100 mg  Last Admin: 09/30/18 1918  Dispense Loc:  Main Pharmacy  Infused Over: 60 Minutes  Volume: 100 mL   Mixture Administration Information:   Medication Type Amount   micafungin 50 MG SOLR Medications 100 mg   sodium chloride 0.9 % SOLN Base 100 mL             1857 (100 mg)-New Bag        1916 (100 mg)-New Bag        1918 (100 mg)-New Bag        1507-Med Discontinued            Dose: 3.375 g  Freq: EVERY 8 HOURS SCHEDULED Route: IV  Indications of Use: ASPIRATION PNEUMONIA  Last Dose: 3.375 g (10/03/18 0547)  Start: 09/27/18 2000   End: 10/03/18 1402   Admin. Amount: 3.375 g = 50 mL Conc: 3.375 g/50 mL  Last Admin: 10/03/18 0547  Dispense Loc:  Main Pharmacy  Infused Over: 30 Minutes  Volume: 50 mL     0502 (3.375 g)-New Bag        1349 (3.375 g)-New Bag       2246 (3.375 g)-New Bag        0537 (3.375 g)-New Bag       1433 (3.375 g)-New Bag       2217 (3.375 g)-New Bag        0539 (3.375 g)-New Bag       1504 (3.375 g)-New Bag       2144 (3.375 g)-New Bag        0610 (3.375 g)-New Bag       1341 (3.375 g)-New Bag       2139 (3.375 g)-New Bag        0542 (3.375 g)-New Bag       1353 (3.375 g)-New Bag       2148 (3.375 g)-New Bag        0547 (3.375 g)-New Bag       (1400)-Not Given [C]       1402-Med Discontinued

## 2018-09-25 NOTE — PHARMACY-VANCOMYCIN DOSING SERVICE
Pharmacy Vancomycin Note  Date of Service 2018  Patient's  10/19/1924   89 year old, male    Indication: Healthcare-Associated Pneumonia  Goal Trough Level: 15-20 mg/L  Day of Therapy: 2  Current Vancomycin regimen:  Intermittent dosing    Current estimated CrCl = Estimated Creatinine Clearance: 33.7 mL/min (based on Cr of 1.65).    Creatinine for last 3 days  2018: 12:04 PM Creatinine 1.84 mg/dL  2018:  6:02 AM Creatinine 1.65 mg/dL    Recent Vancomycin Levels (past 3 days)  2018:  1:42 PM Vancomycin Level 12.6 mg/L    Vancomycin IV Administrations (past 72 hours)                   vancomycin (VANCOCIN) 1,750 mg in sodium chloride 0.9 % 500 mL intermittent infusion (mg) 1,750 mg Given 18 1505                Nephrotoxins and other renal medications (Future)    Start     Dose/Rate Route Frequency Ordered Stop    18 1515  vancomycin (VANCOCIN) 1,500 mg in sodium chloride 0.9 % 250 mL intermittent infusion      1,500 mg  over 90 Minutes Intravenous ONCE 18 1512      18 1314  vancomycin place hernández - receiving intermittent dosing      1 each Does not apply SEE ADMIN INSTRUCTIONS 18 1314      18 1630  piperacillin-tazobactam (ZOSYN) infusion 3.375 g      3.375 g  100 mL/hr over 30 Minutes Intravenous EVERY 6 HOURS 18 1627               Contrast Orders - past 72 hours (72h ago through future)    Start     Dose/Rate Route Frequency Ordered Stop    18 1100  perflutren diluted 1mL to 2mL with saline (OPTISON) diluted injection 3 mL      3 mL Intravenous ONCE 18 1049 18 1100    18 191  technetium pertechnetate with albumin (Tc99m MAA) radioisotope injection 3 millicurie      3 millicurie Intravenous ONCE 18 1905 18 19018 191  technetium pentetate Tc99m (DTPA) inhaled radioisotope 75 millicurie      75 millicurie Inhalation ONCE 18 19018 192          Interpretation of levels and current  regimen:  Trough level is  Subtherapeutic    Has serum creatinine changed > 50% in last 72 hours: No    Urine output:  unable to determine    Renal Function: Stable    Plan:  1.  redose now  2.  Pharmacy will check trough levels as appropriate in 1-3 Days.    3. Serum creatinine levels will be ordered daily for the first week of therapy and at least twice weekly for subsequent weeks.      Andreea Arellano        .

## 2018-09-25 NOTE — PROGRESS NOTES
09/25/18 1520   Quick Adds   Type of Visit Initial PT Evaluation   Living Environment   Lives With alone   Living Arrangements independent living facility   Home Accessibility no concerns   Number of Stairs to Enter Home 0   Number of Stairs Within Home 0   Stair Railings at Home none   Transportation Available family or friend will provide   Living Environment Comment Pt lives alone in ILF apartment, no stairs, elevator access, walk in shower with shower chair, RTS.   Self-Care   Dominant Hand right   Usual Activity Tolerance good   Regular Exercise no   Equipment Currently Used at Home walker, rolling   Functional Level Prior   Ambulation 1-->assistive equipment   Transferring 1-->assistive equipment   Toileting 0-->independent   Bathing 1-->assistive equipment   Dressing 0-->independent   Eating 0-->independent   Communication 0-->understands/communicates without difficulty   Swallowing 0-->swallows foods/liquids without difficulty   Cognition 1 - attention or memory deficits   Fall history within last six months yes   Number of times patient has fallen within last six months 4   Which of the above functional risks had a recent onset or change? transferring;toileting;bathing;dressing   Prior Functional Level Comment Son assisted with providing PLOF. Patient uses 4WW for all mobility at baseline. Patient receives all meals at facility, children assist with laundry, driving, daughter (who is an RN) assists with setting up meds. Increased services available if needed.    General Information   Onset of Illness/Injury or Date of Surgery - Date 09/24/18   Referring Physician Corona Arguello D, DO   Patient/Family Goals Statement increase strength, endurance   Pertinent History of Current Problem (include personal factors and/or comorbidities that impact the POC) From EMR: Edison Boss is a 89 year old  male with a significant past medical history of multiple medical problems including history  "of prostate cancer status post brachytherapy followed by external radiation and chronic Christianson catheter, proximal atrial fibrillation, sinus node dysfunction status post dual-chamber pacemaker, sleep apnea, chronic pain, kidney stones who presents with encephalopathy.   Precautions/Limitations fall precautions;oxygen therapy device and L/min  (4L NC)   Cognitive Status Examination   Orientation orientation to person, place and time   Pain Assessment   Patient Currently in Pain No   Posture    Posture Forward head position;Protracted shoulders   Range of Motion (ROM)   ROM Comment WFL   Strength   Strength Comments moderate strength deficits requiring assist with transfers   Bed Mobility   Bed Mobility Comments mod A x2   Transfer Skills   Transfer Comments between SBA to max A depending on height of chair   Gait   Gait Comments unable to ambulate at this time   Balance   Balance Comments CGA with standing   General Therapy Interventions   Planned Therapy Interventions balance training;bed mobility training;gait training;strengthening;transfer training;home program guidelines;progressive activity/exercise   Clinical Impression   Criteria for Skilled Therapeutic Intervention yes, treatment indicated   PT Diagnosis decreased independence with mobility   Influenced by the following impairments decreased endurance   Clinical Presentation Stable/Uncomplicated   Clinical Presentation Rationale complex pmh, stable presentation, good social support   Clinical Decision Making (Complexity) Low complexity   Therapy Frequency` 5 times/week   Predicted Duration of Therapy Intervention (days/wks) 5 days   Anticipated Discharge Disposition Transitional Care Facility   Risk & Benefits of therapy have been explained Yes   Patient, Family & other staff in agreement with plan of care Yes   [facility] AM-PAC TM \"6 Clicks\"   2016, Trustees of [facility], under license to [employer].  All rights reserved.   6 Clicks Short " "Forms Basic Mobility Inpatient Short Form   [facility] AM-PAC  \"6 Clicks\" V.2 Basic Mobility Inpatient Short Form   1. Turning from your back to your side while in a flat bed without using bedrails? 4 - None   2. Moving from lying on your back to sitting on the side of a flat bed without using bedrails? 2 - A Lot   3. Moving to and from a bed to a chair (including a wheelchair)? 2 - A Lot   4. Standing up from a chair using your arms (e.g., wheelchair, or bedside chair)? 2 - A Lot   5. To walk in hospital room? 1 - Total   6. Climbing 3-5 steps with a railing? 1 - Total   Basic Mobility Raw Score (Score out of 24.Lower scores equate to lower levels of function) 12   Total Evaluation Time   Total Evaluation Time (Minutes) 5     "

## 2018-09-25 NOTE — PLAN OF CARE
Problem: Patient Care Overview  Goal: Plan of Care/Patient Progress Review  Discharge Planner SLP   Patient plan for discharge: TCU    Current status: Clinical Swallow Evaluation completed at bedside, pt and son present and agreeable. Pt/son deny any issues with swallowing at home with general solids/thin liquids, specifically since last admission (d/c'd on 9/19/18) - son stating that he was at home with pt at all times. Re-educated on pt's hx of dysphagia/aspiration indicated by video swallow study completed in 2010 demonstrating penetration and aspiration with thin liquids, resolving with chin tuck - ultimately recommending regular solids/nectar thick liquids. Oromotor examination WFL. Evaluation completed with thin liquids, puree solids and general solids. Oropharyngeal swallow only with mild deficits, judged relatively typical given pt's age - mildly prolonged mastication and mild delayed pharyngeal swallow initiation suspected. No overt signs/sx aspiration noted, very mild increased work of breathing across intake. No clinical changes in tolerance of liquids with/without chin tuck use - unable to comment if same is assisting in laryngeal closure - would recommend repeat video swallow for updated information. Educated pt/son on possibility of SILENT aspiration being a possible cause for recurrent pneumonia. Educated pt/son on video swallow study procedures/purpose to help determine safest least restrictive diet and further POC. Son would like to speak to other siblings prior to making a decision re: video swallow study. Pt/son wish to pursue regular solids/thin liquids in meantime. Recommend regular diet and thin liquids with pt sitting fully upright for intake and 30-60 minutes after, encourage chin tuck with thin liquids as able; downgrade to nectar thick liquids if overt s/sx of aspiration with thins. SLP to complete video swallow study if pt/family agreeable, if not will follow up clinically with meal  assessment x1-2 sessions.     Barriers to return to prior living situation: weakness, fall risk, medical status  Recommendations for discharge: TBD pending progress made in acute care, pending possible video swallow study, refer to PT/OT recs at this time  Rationale for recommendations: Pt would likely benefit from objective assessment if pt/family agreeable, if not will likely meet goals prior to discharge         Entered by: Kacey Ford 09/25/2018 3:53 PM

## 2018-09-25 NOTE — PROGRESS NOTES
Discharge Planner   Discharge Plans in progress: TCU on dc  Barriers to discharge plan: pt will need a couple days abx yet in hospital  Follow up plan: will be followed at TCU       Entered by: Viridiana Peraza 09/25/2018 2:10 PM

## 2018-09-25 NOTE — PLAN OF CARE
Problem: Patient Care Overview  Goal: Plan of Care/Patient Progress Review  PT: Patient seen by physical therapy for evaluation and treatment.  Patient admitted with encephalopathy and possible pneumonia. Patient lives alone in ILF apartment, no stairs, elevator access, walk in shower with shower chair, RTS. Son present, reports that patient uses 4WW for all mobility at baseline.  Patient receives all meals at facility, children assist with laundry, driving, daughter (who is an RN) assists with setting up meds. Increased services available if needed.    Discharge Planner PT   Patient plan for discharge: TCU  Current status: Patient seated in bedside chair upon arrival on 4L NC.  Patient transferred to standing with Ana Laura Steady and mod A from low chair.  Patient transferred to bedside commode with min A and verbal cueing.  From commode, patient able to transfer to standing with SBA.  Transferred to bedside chair with min A and verbal cueing, able to scoot back in chair independently.  Patient with SOB with mobility; SaO2 89-91% during spot checks.  Would like to trial walker tomorrow.  Recommend use of Ana Laura Steady with nursing this evening.  Barriers to return to prior living situation: Lives alone, decreased strength, decreased endurance, fall history  Recommendations for discharge: TCU  Rationale for recommendations: Patient presents below baseline for mobility and activity endurance.  Patient would benefit from ongoing physical therapy in order to increase independence with mobility.       Entered by: Heidy Rodriguez 09/25/2018 3:19 PM

## 2018-09-25 NOTE — PLAN OF CARE
Problem: Confusion, Acute (Adult)  Goal: Identify Related Risk Factors and Signs and Symptoms  Related risk factors and signs and symptoms are identified upon initiation of [facility] [employer] Clinical Practice Guideline (CPG).  Outcome: No Change  Pt oriented x3. Lethargic. 4L of O2 via n/c. LS diminished w/ expiratory wheeze. Tele - 100% a-paced. BS active 4. Indwelling catheter in place. UA sent per order. Pt transferring via lift ext.2. IV infusing  ml/hr to LA. IV zosyn & vanco ordered for possible PNA. Plan: PT/OT/ST will assess tomorrow. Continue POC.

## 2018-09-25 NOTE — PLAN OF CARE
Problem: Patient Care Overview  Goal: Plan of Care/Patient Progress Review  Outcome: No Change  Vitals: VSS, afebrile   O2: 4L NC  LOC: AO*3  NEURO: Lethargic, word-finding   TELE: 100% A-paced    RESP: Dyspneic, wheezing, crackles   GI: WNL  :  Chronic tolliver   Plan: Continue IV antibiotics, MRI and ECHO planned for today

## 2018-09-25 NOTE — PROGRESS NOTES
Patient was seen and examined by me this morning.  89-year-old male who was admitted yesterday with encephalopathy and possible pneumonia.  Patient is improving in terms of his mental status and he is alert and oriented times 3 this  morning.  He denies any chest pain but he has some shortness of breath and remained hypoxic.  He has been having some bladder spasm with Christianson catheter in place for previous prostate cancer.  Patient daughter at bedside and her questions and concerns addressed.  Patient appears to be fatigued but alert, decreased air movement and denies hypoxia requiring 4 L of oxygen   Suspect patient has severe pneumonia in the setting of possible underlying cardiomyopathy.  He is to continue antibiotic, continue to monitor patient's symptoms and titrate oxygen down as tolerated.  Will get echocardiogram, stop IV fluid, monitor vitals.  Will control bladder spasm with B and O suppositories.  Strict intake and output as well as daily weight checks.  Status was addressed at bedside and both patient and family decided DNR/DNI and order placed

## 2018-09-25 NOTE — PROGRESS NOTES
09/25/18 0934   Quick Adds   Type of Visit Initial Occupational Therapy Evaluation   Living Environment   Lives With alone   Living Arrangements independent living facility   Home Accessibility no concerns   Number of Stairs to Enter Home 0   Number of Stairs Within Home 0   Transportation Available family or friend will provide   Living Environment Comment Pt lives alone in ILF apartment, no stairs, elevator access, walk in shower with shower chair, RTS.   Self-Care   Dominant Hand right   Usual Activity Tolerance good   Current Activity Tolerance fair   Regular Exercise no   Equipment Currently Used at Home walker, rolling;raised toilet;shower chair   Functional Level Prior   Ambulation 1-->assistive equipment   Transferring 1-->assistive equipment   Toileting 0-->independent   Bathing 1-->assistive equipment   Dressing 0-->independent   Eating 0-->independent   Communication 0-->understands/communicates without difficulty   Swallowing 0-->swallows foods/liquids without difficulty   Cognition 1 - attention or memory deficits   Fall history within last six months yes   Number of times patient has fallen within last six months 4   Which of the above functional risks had a recent onset or change? transferring;toileting;bathing;dressing   Prior Functional Level Comment Daughter assisted with providing PLOF. Pt mod I/indep in all ADLs and mobility with use of 4WW. Pt receives all meals at facility, children assist with laundry, driving, daughter (who is an RN) assists with setting up meds. Increased services available if needed.    General Information   Onset of Illness/Injury or Date of Surgery - Date 09/24/18   Referring Physician Corona Arguello, DO   Patient/Family Goals Statement Per daughter, TCU, not stated by patient   Additional Occupational Profile Info/Pertinent History of Current Problem Per chart: Pt is a 89-year-old male who was admitted with encephalopathy and possible pneumonia  "  Precautions/Limitations fall precautions;oxygen therapy device and L/min   Cognitive Status Examination   Orientation orientation to person, place and time   Level of Consciousness alert;lethargic/somnolent   Able to Follow Commands mild impairment   Personal Safety (Cognitive) mild impairment;decreased insight to deficits   Memory impaired   Visual Perception   Visual Perception Comments Macular degeneration   Sensory Examination   Sensory Comments Pt denies numbness/tingling in BUEs   Pain Assessment   Patient Currently in Pain Yes, see Vital Sign flowsheet  (\"discomfort when moving')   Range of Motion (ROM)   ROM Comment WFL   Strength   Strength Comments Generalized weakness BUEs, 3+/5 to 4-/5   Hand Strength   Hand Strength Comments Generalized weakness B gross grasp   Coordination   Upper Extremity Coordination No deficits were identified   Mobility   Bed Mobility Comments Initiated -refer to OT daily note for details   Bed Mobility Skill: Sit to Supine   Level of [place]: Sit/Supine maximum assist (25% patients effort)   Physical Assist/Nonphysical Assist: Sit/Supine 1 person assist   Bed Mobility Skill: Supine to Sit   Level of [place]: Supine/Sit maximum assist (25% patients effort)   Physical Assist/Nonphysical Assist: Supine/Sit 1 person assist   Transfer Skills   Transfer Comments Pt declined to attempt OOB mobility at this time   Balance   Balance Comments CGA provided while seated EOB   Upper Body Dressing   Level of [place]: Dress Upper Body maximum assist (25% patients effort)   Physical Assist/Nonphysical Assist: Dress Upper Body 1 person assist   Lower Body Dressing   Level of [place]: Dress Lower Body dependent (less than 25% patients effort)   Physical Assist/Nonphysical Assist: Dress Lower Body 1 person assist   Toileting   Level of [place]: Toilet dependent (less than 25% patients effort)   Physical Assist/Nonphysical Assist: Toilet 2 person assist   Grooming   Level " "of [place]: Grooming minimum assist (75% patients effort)   Physical Assist/Nonphysical Assist: Grooming 1 person assist   Instrumental Activities of Daily Living (IADL)   Previous Responsibilities housekeeping;medication management   IADL Comments Facility provides all meals, family assists with med set up, laundry   Activities of Daily Living Analysis   Impairments Contributing to Impaired Activities of Daily Living balance impaired;cognition impaired;pain;strength decreased   ADL Comments Pt presents to OT below baseline level of functioning in all areas of self cares including bathing, dressing, grooming and toileting   General Therapy Interventions   Planned Therapy Interventions ADL retraining;IADL retraining;cognition;strengthening;transfer training   Clinical Impression   Criteria for Skilled Therapeutic Interventions Met yes, treatment indicated   OT Diagnosis Impaired ADLs, IADLs and mobility tasks   Influenced by the following impairments Decreased strength and functional activity tolerance, impaired balance, impaired cognition   Assessment of Occupational Performance 5 or more Performance Deficits   Identified Performance Deficits Bathing, dressing, grooming, toileting, transfers   Clinical Decision Making (Complexity) Moderate complexity   Therapy Frequency 5 times/wk   Predicted Duration of Therapy Intervention (days/wks) 5 days   Anticipated Discharge Disposition Transitional Care Facility   Risks and Benefits of Treatment have been explained. Yes   Patient, Family & other staff in agreement with plan of care Yes   Clinical Impression Comments Pt would benefit from skilled OT to maximize safety and indep in all ADLs, IADLs and mobility tasks due to current deficits impacting function    [facility] AM-PAC  \"6 Clicks\" Daily Activity Inpatient Short Form   1. Putting on and taking off regular lower body clothing? 2 - A Lot   2. Bathing (including washing, rinsing, drying)? 2 - A Lot   3. " Toileting, which includes using toilet, bedpan or urinal? 1 - Total   4. Putting on and taking off regular upper body clothing? 2 - A Lot   5. Taking care of personal grooming such as brushing teeth? 3 - A Little   6. Eating meals? 3 - A Little   Daily Activity Raw Score (Score out of 24.Lower scores equate to lower levels of function) 13   Total Evaluation Time   Total Evaluation Time (Minutes) 10

## 2018-09-25 NOTE — PROGRESS NOTES
Hospitalist Progress Note  [facility]      Mu Barron MD 09/25/2018      Reason for hospitalization: Acute encephalopathy, pneumonia         Assessment and Plan:        Brief patient summary of Stay:     Edison Boss is a 89 year old  male with a significant past medical history of multiple medical problems including history of prostate cancer status post brachytherapy followed by external radiation and chronic Christianson catheter, proximal atrial fibrillation, sinus node dysfunction status post dual-chamber pacemaker, sleep apnea, chronic pain, kidney stones who presents with encephalopathy.      Hospital course,Consults and procedures:  Patient has evidence of pulmonary infiltrate as well as leukocytosis concerning for lung infection.  He was admitted and was treated with intravenous antibiotic with IV Zosyn and vancomycin and was closely monitored.      Hospital Active Problem List,Assessment and Plan:      1. Acute encephalopathy: Etiology unclear, suspect infection related versus toxic metabolic.  --Mental status improving, patient is alert and oriented x3, continue to monitor patient, continue to treat infection    2. Community-acquired pneumonia: Evidence of opacity at the left lung base.  --Has evidence of sepsis with severe leukocytosis with WBC count of 25.7 today  --Patient appears toxic today, continue antibiotic, monitor cultures    3. Elevated troponin: Without evidence of acute coronary syndrome, suspect demand ischemia.  Patient has elevated BNP at 7974 concerning for underlying cardiomyopathy.  --Stop IV fluid, continue to monitor intake and output, echocardiogram pending, monitor his weight    4. Generalized deconditioning and weakness: Physical therapy as tolerated, discharge planning  5. Acute kidney injury: Suspect underlying chronic kidney disease, continue to monitor intake and output, renal function, avoid nephrotoxic medications, will discontinue IV fluid  for now due to concern for underlying cardiomyopathy  6. Bladder spasm: Patient has Christianson catheter in place, history of prostate cancer.  Will try B and O suppositories and monitor patient      #Pain management: # Pain Assessment:  Current Pain Score 9/25/2018   Patient currently in pain? yes   Pain score (0-10) 8   Pain location Generalized   Pain descriptors Aching   Edison s pain level was assessed and he currently denies pain.        #Indwelling devices: Indwelling Christianson catheter    #FEN : Electrolytes stable, IV fluids stopped      #DVT Prophylaxis: Heparin SQ            Code Status and Goal of care: DNR / DNI      Discharge Plan: May discharge to TCU in the next 2-3 days if he continues to improve        Interval History (Subjective):      Patient was seen and examined by me this morning.  89-year-old male who was admitted yesterday with encephalopathy and possible pneumonia.  Patient is improving in terms of his mental status and he is alert and oriented times 3 this  morning.  He denies any chest pain but he has some shortness of breath and remained hypoxic.  He has been having some bladder spasm with Christianson catheter in place for previous prostate cancer.  Patient daughter at bedside and her questions and concerns addressed.  Patient appears to be fatigued but alert, decreased air movement and denies hypoxia requiring 4 L of oxygen   Suspect patient has severe pneumonia in the setting of possible underlying cardiomyopathy.  He is to continue antibiotic, continue to monitor patient's symptoms and titrate oxygen down as tolerated.  Will get echocardiogram, stop IV fluid, monitor vitals.  Will control bladder spasm with B and O suppositories.  Strict intake and output as well as daily weight checks.  Status was addressed at bedside and both patient and family decided DNR/DNI and order placed                     Physical Exam:      Last Vital Signs:  Temp:  [95.7  F (35.4  C)-98.2  F (36.8  C)] 98.2  F (36.8   C)  Pulse:  [60-61] 60  Heart Rate:  [60-67] 67  Resp:  [16-20] 20  BP: (142-174)/(63-72) 166/71  SpO2:  [91 %-95 %] 91 %  I/O last 3 completed shifts:  In: -   Out: 750 [Urine:750]    Wt Readings from Last 5 Encounters:   09/25/18 85.1 kg (187 lb 9.6 oz)   09/17/18 80.1 kg (176 lb 9.6 oz)   09/12/18 76.1 kg (167 lb 11.2 oz)   09/04/18 76.7 kg (169 lb)   04/05/18 78 kg (172 lb)       GEN:   Alert, oriented x 3, appears toxic   NECK:Supple ,no mass or thyromegaly   HEENT:  Normocephalic/atraumatic, no scleral icterus, no nasal discharge, mouth moist.  CV:  Regular rate and rhythm, no murmur or JVD.  S1 + S2 noted, no S3 or S4.  LUNGS: No increased work of breathing, decreased air entry bilaterally diffusely.  No crackles or wheezing evident.  No wheezing c  ABD: Active bowel sounds, soft, non-tender/non-distended.  No rebound/guarding/rigidity.  EXT: 1-2+ edema.  No cyanosis.  No joint synovitis noted.  SKIN:  Dry to touch, no exanthems noted in the visualized areas.  Neurologic:Grossly intact,non focal                    Medications:      All current medications were reviewed with changes reflected in problem list.    Medications       amLODIPine  5 mg Oral Daily     aspirin  81 mg Oral Daily     [START ON 9/26/2018] influenza Vac Split High-Dose  0.5 mL Intramuscular Prior to discharge     omeprazole  20 mg Oral QAM AC     PARoxetine  15 mg Oral At Bedtime     piperacillin-tazobactam  3.375 g Intravenous Q6H     PRESERVISION AREDS  1 capsule Oral BID     sodium chloride (PF)  3 mL Intracatheter Q8H     vancomycin place hernández - receiving intermittent dosing  1 each Does not apply See Admin Instructions                Data:          Data reviewed today: I reviewed all new labs and imaging results over the last 24 hours. I personally reviewed no images or EKG's today        Recent Labs  Lab 09/25/18  0602 09/24/18  1204 09/19/18  0639   WBC 25.7* 24.6* 18.9*   HGB 12.4* 12.2* 13.0*   HCT 37.8* 36.3* 38.7*   MCV 90  89 91    344 291       Recent Labs  Lab 09/24/18  2050 09/24/18  1203   CULT PENDING No growth after 20 hours  No growth after 20 hours       Recent Labs  Lab 09/25/18  0602 09/24/18  1204 09/19/18  0639    136 140   POTASSIUM 4.6 4.3 4.0   CHLORIDE 108 104 110*   CO2 22 23 23   ANIONGAP 9 9 7   GLC 82 108* 108*   BUN 28 33* 23   CR 1.65* 1.84* 1.14   GFRESTIMATED 39* 34* 60*   GFRESTBLACK 47* 42* 72   ARYAN 8.1* 8.6 8.6   PROTTOTAL 6.6*  --   --    ALBUMIN 2.1*  --   --    BILITOTAL 0.6  --   --    ALKPHOS 118  --   --    AST 23  --   --    ALT 31  --   --          Recent Labs  Lab 09/25/18  0602 09/24/18  1204 09/19/18  0639   GLC 82 108* 108*           No results for input(s): INR in the last 168 hours.        Recent Labs  Lab 09/24/18  1650 09/24/18  1204   TROPI 0.063* 0.061*       Recent Results (from the past 48 hour(s))   XR Chest 2 Views    Narrative    CHEST TWO VIEWS September 24, 2018 1:21 PM     HISTORY: Hypoxia. Cough.    COMPARISON: 9/16/2018.      Impression    IMPRESSION: Mild opacity at the left lung base has increased since the  previous exam, and may be related to pneumonia and/or atelectasis. No  other significant interval change. Dual-lead cardiac device left chest  wall, with lead tips projected over the RA and RV. Small area of  ill-defined opacity in the left upper lung medially is unchanged.  Heart size appears stable. There is mild pulmonary venous congestion.    GERTRUDE ELIZABETH MD   CT Head w/o Contrast    Narrative    CT SCAN OF THE HEAD WITHOUT CONTRAST   9/24/2018 5:34 PM     HISTORY: Altered mental status. Left-sided weakness.    TECHNIQUE: Axial images of the head and coronal reformations without  IV contrast material. Radiation dose for this scan was reduced using  automated exposure control, adjustment of the mA and/or kV according  to patient size, or iterative reconstruction technique.    COMPARISON: 9/16/2018    FINDINGS: There is generalized atrophy of the brain.  There is low  attenuation in the white matter of the cerebral hemispheres consistent  with sequelae of small vessel ischemic disease. There is no evidence  of intracranial hemorrhage, mass, acute infarct or anomaly.     The visualized portions of the sinuses and mastoids appear normal.  There is no evidence of trauma.      Impression    IMPRESSION:   1. No acute abnormality.  2. Atrophy of the brain. White matter changes consistent with sequelae  of small vessel ischemic disease. This is unchanged.    DANILO ALFONSO MD   US Lower Extremity Venous Duplex Bilateral    Narrative    BILATERAL LOWER EXTREMITY VENOUS DOPPLER ULTRASOUND  9/24/2018 7:12 PM    HISTORY: Hypoxia with elevated D-dimer. The concern is for deep venous  thrombosis.    COMPARISON: None.    FINDINGS: Color flow and Doppler spectral waveform analysis  demonstrates normal blood flow in the common femoral, femoral,  popliteal, posterior tibial, and greater saphenous veins of the lower  extremities bilaterally. No thrombus is seen.      Impression    IMPRESSION: There is no evidence of deep venous thrombosis within the  lower extremities bilaterally.    MELODY DE LEON MD   NM Lung Scan Ventilation and Perfusion    Narrative    NUCLEAR MEDICINE LUNG PERFUSION SCAN AND VENTILATION SCAN 9/24/2018  7:41 PM     HISTORY: Hypoxia with elevated D-dimer.    COMPARISON: Radiographs earlier today.    TECHNIQUE: Tc-99m MAA injected intravenously for evaluation of  pulmonary perfusion. Tc-99m DTPA inhaled for the ventilation phase.    DOSE: 3 mCi Tc99m MAA via IV at 1905; 64.8 mCi Tc99m DTPA inhaled at  1920.    FINDINGS: Normal radiotracer distribution throughout both lungs on the  ventilation and perfusion scans. No unmatched perfusion defects to  suggest pulmonary emboli.      Impression    IMPRESSION: Normal lung ventilation and perfusion scan. No  scintigraphic evidence of pulmonary embolism.    MELODY DE LEON MD               Disclaimer: This note  consists of symbols derived from keyboarding, dictation and/or voice recognition software. As a result, there may be errors in the script that have gone undetected. Please consider this when interpreting information found in this chart

## 2018-09-25 NOTE — PLAN OF CARE
Problem: Patient Care Overview  Goal: Plan of Care/Patient Progress Review  OT: Order received, eval completed and treatment initiated. Pt is a 89-year-old male who was admitted with encephalopathy and possible pneumonia. Pt lives alone in ILF apartment, no stairs, elevator access, walk in shower with shower chair, RTS. Daughter assisted with providing PLOF. Pt mod I/indep in all ADLs and mobility with use of 4WW. Pt receives all meals at facility, children assist with laundry, driving, daughter (who is an RN) assists with setting up meds. Increased services available if needed.     Discharge Planner OT   Patient plan for discharge: Per daughter, TCU. Not stated by patient  Current status: Pt completed bed mobility supine <> sit EOB requiring max A, able to sit EOB for brief time with CGA. Pt with c/o fatigue, requesting to return to supine, declined OOB trial. Pt required max Ax2 for boosting/repositioning in bed. Pt currently requiring total A for toileting, min A for grooming. Pt A&Ox3, mild direction following deficits and confusion noted. Pt's daughter present and assisting with providing accurate PLOF.   Barriers to return to prior living situation: Lives alone, current level of assist for ADLs/IADLs and transfers, decreased strength and functional activity tolerance, impaired balance, impaired cognition  Recommendations for discharge: TCU  Rationale for recommendations: Pt is significantly below baseline level of functioning and would benefit from skilled OT to maximize safety and indep in all ADLs/IADLs and mobility tasks. Per daughter, pt's strength has been declining for past 3 weeks with resulting falls. Pt is not safe to return to previous living environment at this time due to current level of assist.       Entered by: Lenora Montaño 09/25/2018 10:04 AM

## 2018-09-25 NOTE — CONSULTS
Care Transition Initial Assessment - RN    Reason For Consult: care coordination/care conference, discharge planning   Met with: Patient and Family.  DATA   Active Problems:    Acute encephalopathy       Cognitive Status: alert and oriented.  [facility]  Primary Care MD Name: Dr Terrell  Contact information and PCP information verified: Yes  Lives With: alone  Living Arrangements: independent living facility  Quality Of Family Relationships: supportive, helpful, involved  Description of Support System: Supportive, Involved   Who is your support system?: Children   Support Assessment: Adequate family and caregiver support   Insurance concerns: No Insurance issues identified  ASSESSMENT  Patient currently receives the following services:  Pt lives at the [facility] and was supposed to start [facility] RN/PT.        Identified issues/concerns regarding health management: increased weakness and inability to return to ILF with readmission and recent PNA    Met with pt and daughter Concepcion, son in law at bedside to discuss plan of care. CTS following for readmission, Elevated UGO and PNA dx. Pt was admitted from 9/16-9/19 for PNA and candida in bladder. He was dc'd back to his ILF with levo and diflucan. His family was staying with him. He followed up with his Urologist, Dr Lilly, on 9/20 and was readmitted on 9/24 with encephalopathy, fall, PNA. Pt and daughter felt he was dc'd too soon. They had gotten orders for pt to start [facility] for RN/PT but this had not yet started when he was brought in. We discussed PNA plan of care and discontinue plan. Per OT, they are recommending TCU on discontinue. PT has not yet seen pt. Pt and daughter are agreeable to TCU on discharge due to his weakness. They were given a TCU packet to look at and choose facilities for discharge. SW will cont to follow for TCU placement on discontinue.    PLAN  Patient/family is agreeable to the plan?  Yes  Patient anticipates discharging  to TCU .        Patient anticipates needs for home equipment: No  Plan/Disposition: TCU   Appointments: no appts made at this time due to discharge to TCU       Care  (CTS) will continue to follow as needed. Handoff will be sent to CTS for PCP on discontinue. Will cont to follow for discharge plan of care.    Viridiana Peraza RN CTS  Care Transitions  [phone]

## 2018-09-25 NOTE — PLAN OF CARE
Problem: Patient Care Overview  Goal: Plan of Care/Patient Progress Review  Outcome: No Change  Intermittent disorientation to place. Up with assist X2, lift up to chair. VSS on 4L, 92%. C/o abdominal spasms, PRN Suppository given alongside Tylenol. Chronic tolliver in place with good output. On IV Zosyn, Vancomycin for PNA. C/o ADLER and mild cough. IVFs discontinued, remains NPO until speech evaluation. BLE and left hand edema +2. Echo done today, see results. PT/OT following, plan for discharge to TCU when medically stable.     Addendum: Patient's wedding ring sent home with daughter, taken off per MD due to swelling.

## 2018-09-25 NOTE — PROGRESS NOTES
Clinical Swallow Evaluation:      09/25/18 1600   General Information   Onset Date 09/24/18   Start of Care Date 09/25/18   Patient/Family Goals Statement to drink water   Swallowing Evaluation Bedside swallow evaluation   Behaviorial Observations WNL (within normal limits)   Mode of current nutrition (NPO for speech eval, prior on regular/thin)   Respiratory Status Room air   Comments Pt recently d/c'd from hospital on 9/19/18 after a 3 day stay for pneumonia treated with Levaquin and candidal urinary tract infection treated with fluconazole. Pt re-admitted to hospital on 9/24/18 s/p fall with associated sx of: AMS, confusion, fever, cough, nausea, rhinorrhea, congestion and reduced appetite. Pt was admitted with pneumonia, placed NPO until speech evaluation. Both pt and son deny any observed issues with eating/drinking at home, specifically since prior discharge. Pt with hx of aspiration with thin liquids (resolving with chin tuck) per video in 2010 - pt has not been utilizing chin tuck and has not been on any modified diets.    Clinical Swallow Evaluation   Oral Musculature generally intact   Structural Abnormalities none present   Dentition present and adequate   Mucosal Quality dry;good   Mandibular Strength and Mobility intact   Oral Labial Strength and Mobility WFL   Lingual Strength and Mobility WFL   Velar Elevation intact   Buccal Strength and Mobility intact   Laryngeal Function Cough;Throat clear;Swallow;Dry swallow palpated;Voicing initiated   Additional Documentation Yes   Clinical Swallow Eval: Thin Liquid Texture Trial   Mode of Presentation, Thin Liquids cup;straw;self-fed   Volume of Liquid or Food Presented 4 oz   Oral Phase of Swallow Premature pharyngeal entry   Pharyngeal Phase of Swallow reduction in laryngeal movement   Diagnostic Statement no overt signs/sx aspiration noted   Clinical Swallow Eval: Puree Solid Texture Trial   Mode of Presentation, Puree spoon;self-fed   Volume of Puree  Presented 2 oz   Oral Phase, Puree Premature pharyngeal entry   Pharyngeal Phase, Puree reduction in laryngeal movement   Diagnostic Statement no overt signs/sx aspiration noted   Clinical Swallow Eval: Solid Food Texture Trial   Mode of Presentation, Solid self-fed   Volume of Solid Food Presented 2 shukri crackers   Oral Phase, Solid WFL   Pharyngeal Phase, Solid reduction in laryngeal movement   Diagnostic Statement no overt signs/sx aspiration noted   Swallow Compensations   Swallow Compensations Chin tuck   Results (no clinical difference in tolerance, pt hesitent to use)   Esophageal Phase of Swallow   Patient reports or presents with symptoms of esophageal dysphagia No   General Therapy Interventions   Planned Therapy Interventions Dysphagia Treatment   Dysphagia treatment Instruction of safe swallow strategies   Swallow Eval: Clinical Impressions   Skilled Criteria for Therapy Intervention Skilled criteria met.  Treatment indicated.   Functional Assessment Scale (FAS) 5   Treatment Diagnosis oropharyngeal dysphagia   Diet texture recommendations Regular diet;Thin liquids  (with chin tuck as able)   Recommended Feeding/Eating Techniques alternate between small bites and sips of food/liquid;maintain upright posture during/after eating for 30 mins;small sips/bites;tuck chin during every swallow   Therapy Frequency 3 times/wk   Predicted Duration of Therapy Intervention (days/wks) 1 week   Anticipated Discharge Disposition (TBD, likely home, refer to PT/OT for further recs)   Risks and Benefits of Treatment have been explained. Yes   Patient, family and/or staff in agreement with Plan of Care Yes   Clinical Impression Comments Evaluation completed with thin liquids, puree solids and general solids. Oropharyngeal swallow only with mild deficits, judged relatively typical given pt's age - mildly prolonged mastication and mild delayed pharyngeal swallow initiation suspected. No overt signs/sx aspiration noted, very  mild increased work of breathing across intake. No clinical changes noted of tolearnce with liquids with and without chin tuck. Would recommend video swallow study for more updated objective data re: oropharyngeal swallow function, safest least restrictive diet, updated strategies   Total Evaluation Time   Total Evaluation Time (Minutes) 60

## 2018-09-26 NOTE — PROGRESS NOTES
"Date:9/26/2018  Admission Dx: Acute encephalopathy, CAP, elevated trop  Pulmonary History: Asthma, TB  Home Nebulizer/MDI Use:Albuterol Q6 prn  Home Oxygen: none  Acuity Level (RCAT flow sheet):3    Aerosol Therapy initiated: Duoneb QID/prn    Pulmonary Hygiene initiated: Deep breathe and cough    Volume Expansion initiated: IS    Current Oxygen Requirements: 4LPM Nc    Current SpO2: 94%    Re-evaluation date: 29 September     Patient Education:Patient and daughter informed of medication benefits and possible side effects.    See \"RT Assessments\" flow sheet for patient assessment scoring and Acuity Level Details.           "

## 2018-09-26 NOTE — PLAN OF CARE
ADDENDUM: Upon first approach this date pt/son initially stating that family did NOT want to pursue video. Shortly after, received a page from RN stating that dtr is now present and was considering a video swallow study. Re-approached pt and son/dtr in room, discussion held re: purpose/procedures of objective testing, and all are now agreeable to video swallow study. Orders placed and video swallow study to be completed tomorrow. For the time being, continue current recommendations: regular diet and thin liquids with pt sitting fully upright for intake and 30-60 minutes after, encourage chin tuck with thin liquids as able; downgrade to nectar thick liquids if overt s/sx of aspiration with thins.

## 2018-09-26 NOTE — PLAN OF CARE
Problem: Patient Care Overview  Goal: Plan of Care/Patient Progress Review  PT: Patient seen by physical therapy for evaluation and treatment.  Patient admitted with encephalopathy and possible pneumonia. Patient lives alone in ILF apartment, no stairs, elevator access, walk in shower with shower chair, RTS. Son present, reports that patient uses 4WW for all mobility at baseline.  Patient receives all meals at facility, children assist with laundry, driving, daughter (who is an RN) assists with setting up meds. Increased services available if needed.    Discharge Planner PT   Patient plan for discharge: TCU  Current status: .Pt amb 30' within room CGA with FWW on 4L O2. O2 sat 92% pre, 85% post gait, recovers to 90 <1 min with PLB cues. Cues for step-through gait and posture. Overall pt steady, but very decreased pace with NBOS and fear of falling due to previous fall.   Min-A supine<>sit at trunk, min-A at hips scooting EOB. Min-A sit<>stand with cues for handplacement, poor eccentric control sitting to bedside chair.  Tolerates exercises well.  Barriers to return to prior living situation: Lives alone, decreased strength, decreased endurance, fall history  Recommendations for discharge: TCU  Rationale for recommendations: Patient presents below baseline for mobility and activity endurance.  Patient would benefit from ongoing physical therapy in order to increase independence with mobility.       Entered by: Dejan Murphy 09/26/2018 5:30 PM

## 2018-09-26 NOTE — PROGRESS NOTES
Hospitalist Progress Note  [facility]      Mu Barron MD 09/26/2018      Reason for hospitalization: Acute encephalopathy, pneumonia         Assessment and Plan:        Brief patient summary of Stay:     Edison Boss is a 89 year old  male with a significant past medical history of multiple medical problems including history of prostate cancer status post brachytherapy followed by external radiation and chronic Christianson catheter, proximal atrial fibrillation, sinus node dysfunction status post dual-chamber pacemaker, sleep apnea, chronic pain, kidney stones who presents with encephalopathy.      Hospital course,Consults and procedures:  Patient has evidence of pulmonary infiltrate as well as leukocytosis concerning for lung infection.  He was admitted and was treated with intravenous antibiotic with IV Zosyn and vancomycin and was closely monitored.      Hospital Active Problem List,Assessment and Plan:      1. Acute encephalopathy: Etiology unclear, suspect infection related versus toxic metabolic.  --Mental status improving, patient is alert and oriented x3, continue to monitor patient, continue to treat infection    2. Community-acquired pneumonia: Evidence of opacity at the left lung base.  --Has evidence of sepsis with severe leukocytosis with WBC count of 25.7 today  --improving , afebrile , continue abx , stop vanc since there is no growth of MRSA cultures    3. Elevated troponin: Without evidence of acute coronary syndrome, suspect demand ischemia.  Patient has elevated BNP at 7974 concerning for underlying cardiomyopathy.  --Suspect acute diastolic CHF  As evidenced by pulmonary hypertension, elevated BNP and hypoxia   -- lasix  Scheduled , I/o , daily weight    -- echo reviewed -HFpEF   4. Generalized deconditioning and weakness: Physical therapy as tolerated, discharge planning  5. Acute kidney injury: Suspect underlying chronic kidney disease, continue to monitor  intake and output, renal function, avoid nephrotoxic medications  -- Stable , monitor with diuretics      6. Bladder spasm: Patient has Christianson catheter in place, history of prostate cancer.  Will try B and O suppositories and monitor patient    7.Acute hypoxemic respiratory failure - multifactorial   -- abx , nebs for wheezing   #Pain management: # Pain Assessment:  Current Pain Score 9/26/2018   Patient currently in pain? sleeping: patient not able to self report   Pain score (0-10) -   Pain location -   Pain descriptors -   Edison s pain level was assessed and he currently denies pain.        #Indwelling devices: Indwelling Christianson catheter    #FEN : Electrolytes stable, IV fluids stopped      #DVT Prophylaxis: Heparin SQ            Code Status and Goal of care: DNR / DNI      Discharge Plan: May discharge to TCU in the next 2 days if he continues to improve        Interval History (Subjective):      Patient is seen and examined by me today and medical record reviewed.Overnight events noted and care discussed with nursing staff.    Continues to have sob and weak   Spoke with multiple family at bedside   Concern  For CHF discussed   No fever   No chest pain   Tele reviewed                 Physical Exam:      Last Vital Signs:  Temp:  [95.8  F (35.4  C)-96.5  F (35.8  C)] 95.8  F (35.4  C)  Pulse:  [85] 85  Heart Rate:  [61-62] 61  Resp:  [20] 20  BP: (139-170)/(54-79) 168/70  SpO2:  [92 %-93 %] 92 %  I/O last 3 completed shifts:  In: 5 [P.O.:5]  Out: 825 [Urine:825]    Wt Readings from Last 5 Encounters:   09/26/18 83 kg (182 lb 14.4 oz)   09/17/18 80.1 kg (176 lb 9.6 oz)   09/12/18 76.1 kg (167 lb 11.2 oz)   09/04/18 76.7 kg (169 lb)   04/05/18 78 kg (172 lb)       GEN:   Alert, oriented x 3, appears toxic   NECK:Supple ,no mass or thyromegaly   HEENT:  Normocephalic/atraumatic, no scleral icterus, no nasal discharge, mouth moist.  CV:  Regular rate and rhythm, no murmur or JVD.  S1 + S2 noted, no S3 or S4.  LUNGS:  No increased work of breathing, decreased air entry bilaterally diffusely.  No crackles .has  wheezing   ABD: Active bowel sounds, soft, non-tender/non-distended.  No rebound/guarding/rigidity.  EXT: 1-2+ edema.  No cyanosis.  No joint synovitis noted.  SKIN:  Dry to touch, no exanthems noted in the visualized areas.  Neurologic:Grossly intact,non focal                    Medications:      All current medications were reviewed with changes reflected in problem list.    Medications       amLODIPine  5 mg Oral Daily     aspirin  81 mg Oral Daily     furosemide  40 mg Intravenous TID     [START ON 9/27/2018] influenza Vac Split High-Dose  0.5 mL Intramuscular Prior to discharge     ipratropium - albuterol 0.5 mg/2.5 mg/3 mL  3 mL Nebulization 4x daily     omeprazole  20 mg Oral QAM AC     PARoxetine  15 mg Oral At Bedtime     piperacillin-tazobactam  3.375 g Intravenous Q6H     PRESERVISION AREDS  1 capsule Oral BID     sodium chloride (PF)  3 mL Intracatheter Q8H                Data:          Data reviewed today: I reviewed all new labs and imaging results over the last 24 hours. I personally reviewed no images or EKG's today        Recent Labs  Lab 09/26/18  0707 09/25/18  0602 09/24/18  1204   WBC 24.0* 25.7* 24.6*   HGB 13.5 12.4* 12.2*   HCT 41.5 37.8* 36.3*   MCV 92 90 89    312 344       Recent Labs  Lab 09/24/18  2050 09/24/18  1203   CULT <1000 colonies/mLurogenital floraSusceptibility testing not routinely done No growth after 2 days  No growth after 2 days       Recent Labs  Lab 09/26/18  0707 09/25/18  0602 09/24/18  1204    139 136   POTASSIUM 4.3 4.6 4.3   CHLORIDE 108 108 104   CO2 24 22 23   ANIONGAP 8 9 9   GLC 88 82 108*   BUN 28 28 33*   CR 1.75* 1.65* 1.84*   GFRESTIMATED 36* 39* 34*   GFRESTBLACK 44* 47* 42*   ARYAN 8.4* 8.1* 8.6   PROTTOTAL  --  6.6*  --    ALBUMIN  --  2.1*  --    BILITOTAL  --  0.6  --    ALKPHOS  --  118  --    AST  --  23  --    ALT  --  31  --          Recent  Labs  Lab 09/26/18  0707 09/25/18  0602 09/24/18  1204   GLC 88 82 108*           No results for input(s): INR in the last 168 hours.        Recent Labs  Lab 09/24/18  1650 09/24/18  1204   TROPI 0.063* 0.061*       Recent Results (from the past 48 hour(s))   XR Chest 2 Views    Narrative    CHEST TWO VIEWS September 24, 2018 1:21 PM     HISTORY: Hypoxia. Cough.    COMPARISON: 9/16/2018.      Impression    IMPRESSION: Mild opacity at the left lung base has increased since the  previous exam, and may be related to pneumonia and/or atelectasis. No  other significant interval change. Dual-lead cardiac device left chest  wall, with lead tips projected over the RA and RV. Small area of  ill-defined opacity in the left upper lung medially is unchanged.  Heart size appears stable. There is mild pulmonary venous congestion.    GERTRUDE ELIZABETH MD   CT Head w/o Contrast    Narrative    CT SCAN OF THE HEAD WITHOUT CONTRAST   9/24/2018 5:34 PM     HISTORY: Altered mental status. Left-sided weakness.    TECHNIQUE: Axial images of the head and coronal reformations without  IV contrast material. Radiation dose for this scan was reduced using  automated exposure control, adjustment of the mA and/or kV according  to patient size, or iterative reconstruction technique.    COMPARISON: 9/16/2018    FINDINGS: There is generalized atrophy of the brain. There is low  attenuation in the white matter of the cerebral hemispheres consistent  with sequelae of small vessel ischemic disease. There is no evidence  of intracranial hemorrhage, mass, acute infarct or anomaly.     The visualized portions of the sinuses and mastoids appear normal.  There is no evidence of trauma.      Impression    IMPRESSION:   1. No acute abnormality.  2. Atrophy of the brain. White matter changes consistent with sequelae  of small vessel ischemic disease. This is unchanged.    DANILO ALFONSO MD   US Lower Extremity Venous Duplex Bilateral    Narrative    BILATERAL  LOWER EXTREMITY VENOUS DOPPLER ULTRASOUND  9/24/2018 7:12 PM    HISTORY: Hypoxia with elevated D-dimer. The concern is for deep venous  thrombosis.    COMPARISON: None.    FINDINGS: Color flow and Doppler spectral waveform analysis  demonstrates normal blood flow in the common femoral, femoral,  popliteal, posterior tibial, and greater saphenous veins of the lower  extremities bilaterally. No thrombus is seen.      Impression    IMPRESSION: There is no evidence of deep venous thrombosis within the  lower extremities bilaterally.    MELODY DE LEON MD   NM Lung Scan Ventilation and Perfusion    Narrative    NUCLEAR MEDICINE LUNG PERFUSION SCAN AND VENTILATION SCAN 9/24/2018  7:41 PM     HISTORY: Hypoxia with elevated D-dimer.    COMPARISON: Radiographs earlier today.    TECHNIQUE: Tc-99m MAA injected intravenously for evaluation of  pulmonary perfusion. Tc-99m DTPA inhaled for the ventilation phase.    DOSE: 3 mCi Tc99m MAA via IV at 1905; 64.8 mCi Tc99m DTPA inhaled at  1920.    FINDINGS: Normal radiotracer distribution throughout both lungs on the  ventilation and perfusion scans. No unmatched perfusion defects to  suggest pulmonary emboli.      Impression    IMPRESSION: Normal lung ventilation and perfusion scan. No  scintigraphic evidence of pulmonary embolism.    MELODY DE LEON MD               Disclaimer: This note consists of symbols derived from keyboarding, dictation and/or voice recognition software. As a result, there may be errors in the script that have gone undetected. Please consider this when interpreting information found in this chart

## 2018-09-26 NOTE — PLAN OF CARE
Problem: Patient Care Overview  Goal: Plan of Care/Patient Progress Review  Outcome: No Change  VSS denies pian alert to self only denies pain ambulates with AX2 tolliver cath patent cath cares done,  On Zosyn and Vanco on 4 liters oxygen tele 100% a paced UC pending.

## 2018-09-26 NOTE — PLAN OF CARE
Problem: Patient Care Overview  Goal: Plan of Care/Patient Progress Review  OT: Attempted session, pt in bed resting, responded to name, able to verbalize he is in hospital, stated it was too early and politely requested therapy to check back later, updated RN, will check back as schedule allows

## 2018-09-26 NOTE — PLAN OF CARE
Presentation/Diagnosis: Pt admitted  due to fall at home with increased confusion, also associated with cough, nausea, congestion and generalized weakness. Pt diagnosed with pneumonia.   History: A-fib (pacemaker), Prostate CA (chronic tolliver), Asthma, HLD, HTN, sleep apnea.   Labs/Protocols: K and Mag protocols. K: 4.3, Ma.6, Creatinine: 1.75, WBC: 24.   Vitals: VSS on 4L O2 per NC (Pt not on home O2). Pt complains of pain with bladder spasms intermittently throughout shift, PRN  Tylenol and B&O suppository given without relief. One time order of oxycodone given without relief. One time order of percocet given, somewhat improved, but continues to have painful bladder spasms 10/10 when they occur.   Cardiac: +2 edema to LUE and BLL. +1 edema to RUE. Pt receiving scheduled IV lasix TID.   Telemetry: 100% A-paced.   Respiratory: Diminished lung sounds. Dyspnea with exertion. On 3L O2 per NC (not on home O2). RT following. Pt on IV zosyn for pneumonia.   Neuro: Lethargic throughout shift, arouses to voice. Oriented x4. Calm and cooperative with cares.   GI/: Chronic tolliver in place with leg bag. Urine leaking around catheter at urethral meatus, MD notified. Urology consulted. Brief in place.   Skin: Scab from abrasion to L knee. Pale skin. Scattered bruising. Redness to scrotum, derek applied.   LDAs: PIV to L AC, SL with intermittent Zosyn.   Diet: Regular diet with thin liquids, fair appetite, improving from previous days per pt's family. Aspiration precautions in place. SLP following. Video swallow study to be done tomorrow.   Activity: Ax2 with gait belt and FWW. Pt up to chair once this shift. PT following. PT recommending TCU on discharge.   Teaching: Pt and family educated on plan of care.   Plan: Continue IV zosyn. Wean O2 as possible. Monitor and manage pain with bladder spasms. Urology consult and swallow study tomorrow.

## 2018-09-26 NOTE — PROGRESS NOTES
Care Coordination:  CTS met with family to discuss TCU choices. Family was given TCU packet yesterday to discuss with patient and amongst themselves. Pt has 8 children involved in his care. Bea [phone], will be the spokesperson for the family. They are all agreeable to TCU plan for discharge and have chosen 1. [facility]. [facility]. [facility]. They really prefer [facility] if possible. They are open to a private or shared bed and will be agreeable to whats available. We discussed the out of pockets cost and Private room fees in which they are agreeable to. We also discussed transportation and the cost of w/c transportation. Family prefers to transport him themselves. SW updated and will cont to follow for discharge plan.    Viridiana Peraza RN CTS

## 2018-09-26 NOTE — PROVIDER NOTIFICATION
MD notified that pt is having urine leak around chronic tolliver from urethral meatus. Family stated tolliver changed 2 weeks ago by urology, changes once a month. Family says this hasn't happened before.

## 2018-09-26 NOTE — PLAN OF CARE
Problem: Patient Care Overview  Goal: Plan of Care/Patient Progress Review  Outcome: No Change  Pt up in chair today with 2 assist and gait belt/walker. Son and daughters at bedside today. Feeding self when sitting up in the chair. ST to see for video swallow as family does indeed want that done. Lasix IV given and leg bag with chronic tolliver emptied 150 cc of cloudy yellow urine.  Awaiting UC from [facility] sent the other day. PT/OT seeing as well. Lungs wheezing off and on and diminished and IS brought and demonstrated. 4L NC. Will monitor.    Addendum: Tolliver emptied with 50cc but depend pad soaked with urine as well. No evidence of tolliver leaking so will add some saline to balloon and see if that helps. Bladder spasms off and on and tylenol given with relief. Good appetite today.

## 2018-09-27 NOTE — PLAN OF CARE
Problem: Pneumonia (Adult)  Goal: Signs and Symptoms of Listed Potential Problems Will be Absent, Minimized or Managed (Pneumonia)  Signs and symptoms of listed potential problems will be absent, minimized or managed by discharge/transition of care (reference Pneumonia (Adult) CPG).  Outcome: Improving  Pt VSS on 3L O2 per NC (not on home O2). LS diminished throughout. Occasional dry non-productive cough. RT following with nebs. Pt on IV zosyn for pneumonia. Pt using IS with encouragement, able to reach 1250. +1-2 edema throughout, pt on TID IV lasix. K: 4.3. Creatinine: 1.75. WBC:24. Regular diet with thin liquids, aspiration precautions in place. SLP following. Pt to have video swallow study tomorrow.     Will continue to monitor.

## 2018-09-27 NOTE — CONSULTS
[facility]    Palliative Care Consultation   Text Page    Date of Admission:  9/24/2018    Patient currently away at surgery.  Will attempt later today if possible, otherwise plan for full consult 9/28/2018.    If you would like the patient to be seen earlier, please contact the service at [phone]  Or use text link above.    Thank you!    Viridiana Anaya   Pain Management and Palliative Care  [facility]  Pgr: [phone]

## 2018-09-27 NOTE — CONSULTS
HPI:  I am asked to see this 89-year-old gentleman regarding abdominal discomfort, possible sepsis and a question of a strangulated hernia.  The patient has had a known right inguinal hernia, but now has lower abdominal discomfort.  The hernia was reportedly quite large and firm.    Past Medical History:   has a past medical history of Calculus of kidney; Chronic infection; Chronic pain; Esophageal reflux; Hyperlipidaemia; Hypertension; Malignant neoplasm of prostate (H) (8/14/2002); Mild persistent asthma; Paroxysmal a-fib (H); Sinus node dysfunction (H); Sleep apnea; and Unspecified cerebral artery occlusion with cerebral infarction.    Past Surgical History:  Past Surgical History:   Procedure Laterality Date     C NONSPECIFIC PROCEDURE  1980's    Kidney stone surgery     C NONSPECIFIC PROCEDURE  1985, 5/2005    TURP x 2     C NONSPECIFIC PROCEDURE  1/2002    Brachytherapy     C NONSPECIFIC PROCEDURE  ~1999    Left rotator cuff repair     C NONSPECIFIC PROCEDURE      Bilateral cataract surgery     C NONSPECIFIC PROCEDURE      EGD/dilation twice     CYSTOSCOPY       CYSTOSCOPY, INJECT COLLAGEN, COMBINED  6/6/2012    Procedure:COMBINED CYSTOSCOPY, INJECT BULKING AGENT; VIDEO CYSTOSCOPY WITH BOTOX INJECTIONS  ; Surgeon:BRIAN ADAN; Location: OR     CYSTOSCOPY, INJECT COLLAGEN, COMBINED  3/22/2013    Procedure: COMBINED CYSTOSCOPY, INJECT BULKING AGENT;  VIDEO CYSTOSCOPY, BOTOX INJECTION;  Surgeon: Brian Adan MD;  Location:  OR     CYSTOSCOPY, INJECT COLLAGEN, COMBINED  8/8/2014    Procedure: COMBINED CYSTOSCOPY, INJECT BULKING AGENT;  Surgeon: Brian Adan MD;  Location:  OR     CYSTOSCOPY, INJECT COLLAGEN, COMBINED N/A 8/12/2015    Procedure: COMBINED CYSTOSCOPY, INJECT BULKING AGENT;  Surgeon: Brian Adan MD;  Location:  OR     CYSTOSCOPY, INJECT COLLAGEN, COMBINED N/A 12/8/2016    Procedure: COMBINED CYSTOSCOPY, INJECT BULKING AGENT;  Surgeon: Brian Adan MD;  Location:  OR      HC REMOVE TONSILS/ADENOIDS,<11 Y/O  ~1928    T & A <12y.o.     IMPLANT PACEMAKER       PENIS SURGERY       PROSTATE SURGERY          Social History:  Social History     Social History     Marital status:      Spouse name: Alysa     Number of children: 8     Years of education: N/A     Occupational History      Retired     Social History Main Topics     Smoking status: Former Smoker     Packs/day: 1.00     Years: 40.00     Smokeless tobacco: Never Used      Comment: QUIT      Alcohol use No     Drug use: No     Sexual activity: No     Other Topics Concern     Parent/Sibling W/ Cabg, Mi Or Angioplasty Before 65f 55m? No     Social History Narrative    .Living situation (location, living with others?):Lives with wife in [facility]    Activities of daily living (e.g., dressing, eating, walking):Independent        Instrumental activities of daily living (e.g., finance management, housekeeping, meal planning/ prep): Wife and kids help with preparing meals, shopping, driving, climbing stairs, complex decisions                 Meaningful Activities (e.g., hobbies, work): loves music, uses the computer, likes [employer]         Support:Wife and daughter        Community Resources Used/ Interested in: Referred to Herb        Family History:  Family History   Problem Relation Age of Onset     Family History Negative Father       age 91     HEART DISEASE Mother      pacemaker     Family History Negative Mother       in her sleep 89     Cancer Brother      oldest brother - lung cancer,  age 74     Cancer Brother      3rd brother - lymphoma,  age 76     Cancer Brother      2nd brother (Carrington)- prostate cancer, born [birth year].      Cerebrovascular Disease Brother      Brother (Carrington), born in [birth year]         ROS:  The 10 point review of systems is negative other than noted in the HPI and above.    PE:    General- this is an elderly-appearing gentleman in the hospital bed.  He does not appear  particularly uncomfortable.  HEENT- Normocephalic and atraumatic. Pupils equal and round.  Mucous membranes moist.  Sclera are nonicteric.  Neck- No lymphadenopathy or masses   Respirations- are regular and non labored  Abdomen is there is some mild lower abdominal tenderness.  Hernia- there is a prominent right inguinal hernia.  This is not particularly firm, and I'm easily able to reduce this.  The patient states that his pain is immediately a bit better.    Assessment: This is a patient with a prominent right inguinal hernia which is now reduced.  Clinically, this did not appear to be strangulated.    Plan:    At this point, I see no urgent indication for any sort of surgical intervention regarding the patient's hernia.  I agree with the plan to perform a CT scan.  I did discuss with the patient and his daughter that as long as his hernia was reducible, I probably wouldn't recommend surgical intervention due to the high risk of surgical complications.  They do not seem particularly interested in pursuing surgical intervention regarding his hernias.  I will follow-up on the patient's CT scan once it has been performed.      Boubacar Means MD      Addendum: CT scan revealed no evidence of bowel thickening.  The patient has already gotten bowel back into his right inguinal hernia.  There is no evidence of obstruction.  There is also prominent fat containing hernia on the left.  In addition, there is a ureteral stone, and urology is planning on placing a stent.  The patient also has a common bile duct stone without obvious obstruction.  GI consultation will be obtained.  I still see no acute surgical indication.    Boubacar Means MD  [facility]

## 2018-09-27 NOTE — CONSULTS
ID consult dictated IMP 1 89 yo 2+ week deterioration , ? Aspiration    REC await CT, continue zosyn, add zyvox and doxy

## 2018-09-27 NOTE — PROVIDER NOTIFICATION
MD notified: Pt back from surgery and has no diet orders in place including NPO.       MD called writer and stated to keep pt NPO.

## 2018-09-27 NOTE — ANESTHESIA POSTPROCEDURE EVALUATION
Patient: Edison Hennessy Karyn    Procedure(s):  COMBINED CYSTOSCOPY, LEFT STENT PLACEMENT, left retrograde pyelogram - Wound Class: II-Clean Contaminated    Diagnosis:unknown  Diagnosis Additional Information: Left Ureteral Stone    Anesthesia Type:  No value filed.    Note:  Anesthesia Post Evaluation    Patient location during evaluation: PACU  Patient participation: Able to fully participate in evaluation  Level of consciousness: awake and alert  Pain management: adequate  Airway patency: patent  Cardiovascular status: acceptable  Respiratory status: acceptable  Hydration status: acceptable  PONV: none     Anesthetic complications: None          Last vitals:  Vitals:    09/27/18 1650 09/27/18 1708 09/27/18 1721   BP: 117/52 123/53 126/51   Pulse:      Resp: 14 14 13   Temp: 95.3  F (35.2  C) 96.7  F (35.9  C) 95.4  F (35.2  C)   SpO2: 93% 94% 94%         Electronically Signed By: Claudio Nunes MD  September 27, 2018  5:23 PM

## 2018-09-27 NOTE — OP NOTE
DATE OF SERVICE: 9/27/2018  PREOPERATIVE DIAGNOSIS: Left Ureteral Stone  POSTOPERATIVE DIAGNOSIS: Left Ureteral Stone    PROCEDURES PERFORMED:   1. Cystourethroscopy  2. Left retrograde pyelography with interpretation of intraoperative fluoroscopic imaging  3. Left ureteral stent placement    STAFF SURGEON: Froylan Richards MD  ANESTHESIA: General    ESTIMATED BLOOD LOSS: 1 cc  DRAINS/TUBES: 6 Cypriot x 26 cm double-J ureteral stent     COMPLICATIONS: None.   SPECIMEN: Renal pelvis fluid for culture  SIGNIFICANT FINDINGS: Left proximal ureteral stone with hydronephrosis. Purulent material aspirated from kidney for culture.    BRIEF OPERATIVE INDICATIONS: Edison Boss is a89 year old male who recently presented with left ureteral stone and evidence of sepsis. After a discussion of all risks, benefits, and alternatives, the patient elected to proceed with ureteral stent placement. The patient understands the need for more than one procedure to eliminate stone burden.      DESCRIPTION OF PROCEDURE:  After informed consent was verified, the patient was transported to the operating room, placed supine on the table. After adequate anesthesia was induced, he was placed in dorsal lithotomy and prepped and draped in the usual sterile fashion. A timeout was taken to confirm correct patient, procedure and laterality. Pre-operative IV antibiotics were administered.    A 22 Cypriot rigid cystoscope was inserted per a well-lubricated urethra. The anterior urethra was unremarkable. Prostatic urethra with evidence of prior brachytherapy, but bladder neck was open. Stone debris noted in bladder. The ureteral orifices were orthotopic bilaterally. The left ureteral orifice was identified and cannulated with a Sensor wire and 6-Fr open-ended catheter. The wire passed without resistance into the upper pole. A sample of renal pelvis fluid was collected for culture. Renal pelvis fluid was purulent A retrograde pyelogram showed evidence of  ureteral stone, mild hydronephrosis, but no other filling defects.  A 6 Fr x 26 cm double-J stent was advanced over the Sensor wire, and a good proximal curl was seen in the renal pelvis and the distal curl was seen in the bladder. The bladder was drained.   The patient tolerated the procedure well.  No apparent complications. He was transported to the postanesthesia care unit in stable condition.      PLAN: Patient to be scheduled for outpatient stone management in the coming weeks. Continue broad spectrum antibiotics.    Froylan Richards MD  Urology  [facility]  Clinic Phone [phone]

## 2018-09-27 NOTE — PROVIDER NOTIFICATION
MD notified: Pt continuing to have pain associated with bladder spasms unrelieved following one time order of oxycodone. Pt continues to request Percocet.

## 2018-09-27 NOTE — PROVIDER NOTIFICATION
MD notified: Pt having pain due to bladder spasms. Pt given B&O suppository with only some relief. Per pt and family pt has Percocet at home which helps. could we get this ordered?

## 2018-09-27 NOTE — PROGRESS NOTES
Hospitalist Progress Note  [facility]      Mu Barron MD 09/27/2018      Reason for hospitalization: Acute encephalopathy, pneumonia, Sepsis          Assessment and Plan:        Brief patient summary of Stay:     Edison Boss is a 89 year old  male with a significant past medical history of multiple medical problems including history of prostate cancer status post brachytherapy followed by external radiation and chronic Christianson catheter, proximal atrial fibrillation, sinus node dysfunction status post dual-chamber pacemaker, sleep apnea, chronic pain, kidney stones who presents with encephalopathy.      Hospital course,Consults and procedures:  Patient has evidence of pulmonary infiltrate as well as leukocytosis concerning for lung infection.  He was admitted and was treated with intravenous antibiotic with IV Zosyn and vancomycin and was closely monitored.  Despite antibiotic therapy and trial of lasix , patient deteriorated morning of sep 27 and further work up and consultation pursued.      Hospital Active Problem List,Assessment and Plan:      1. Acute encephalopathy: Etiology unclear, suspect infection related versus toxic metabolic.  -- no improvement today with confusion and somnolence   -- will keep NPO     2. Acute hypoxic respiratory failure with bilateral effusion , suspect multifactorial- pneumonia and CHF   Community-acquired pneumonia:Bilateral interstitial prominence and groundglass opacity  -- Suspect component of aspiration as well   -- no improvement with acute respiratory failure ,hypoxic   -- discussed with ID and zyvox and doxy added   -- plan to monitor     3. Elevated troponin: Without evidence of acute coronary syndrome, suspect demand ischemia.  Patient has elevated BNP at 7974 concerning for underlying cardiomyopathy.  --IVF stopped , lasix tried with no benefit   -- hold lasix , monitor fluid balance     4. Generalized deconditioning and weakness:  Physical therapy as tolerated, discharge planning  5. Acute kidney injury:   -- obstructive uropathy with left ureteral stone   -- urology consulted and operative intervention planned   -- urology input appreciated     6. Abdominal pain : Left ureteral stone , hernia , bladder spasm   -- pain med , B and O supp , urology consulted     7. Right inguinal hernia - reduced by Dr Means ,input appreciated   8. CBD stone on CT - Check LFTs , US , GI Consulted   9. Sepsis - suspect urologic vs pulmonary source   -- on abx , ID consulted , follow culture          #Pain management: # Pain Assessment:  Current Pain Score 9/26/2018   Patient currently in pain? sleeping: patient not able to self report   Pain score (0-10) -   Pain location -   Pain descriptors -   Edison s pain level was assessed and he currently denies pain.        #Indwelling devices: Indwelling Christianson catheter    #FEN : Electrolytes stable, IV fluids stopped      #DVT Prophylaxis: Heparin SQ        Code Status and Goal of care: DNR / DNI      Discharge Plan: unclear , patient declining. Care discussed with daughter at bedside. TLC consulted         Interval History (Subjective):      Patient was seen and examined by me this morning.  89-year-old male with multiorgan dysfunction admitted with acute encephalopathy and suspected pneumonia.  Patient also has acute kidney injury as well as concern for diastolic congestive heart failure who failed trial with Lasix.  Patient condition declined despite IV antibiotic, IV Lasix and close monitoring.  He developed acute hypoxic respiratory failure and abdominal pain.  He has right bile hernia which is not reducible and concerning for incarcerated inguinal hernia with sepsis.  Plan is to keep her n.p.o., get a contrast CT of the chest abdomen pelvis, get surgical consultation and continue antibiotic.  I spoke with patient's daughter at bedside regarding patient's decline in status.  Hold Lasix, urology to assist with  Christianson catheter management for acute measurement of intake and output.  Will consider palliative care consultation as well as pain control.     Addendum  I spoke with general surgeon Dr. Means who will examine patient for further recommendations.  I will cancel swallow study  We will consult palliative care to assist with further discussion of goals of care as well                  Physical Exam:      Last Vital Signs:  Temp:  [95.7  F (35.4  C)-96.6  F (35.9  C)] 96.4  F (35.8  C)  Pulse:  [62] 62  Heart Rate:  [62-70] 70  Resp:  [16-22] 22  BP: (124-164)/(48-69) 133/53  SpO2:  [84 %-94 %] 90 %  I/O last 3 completed shifts:  In: 6 [I.V.:6]  Out: 625 [Urine:625]    Wt Readings from Last 5 Encounters:   09/27/18 83.1 kg (183 lb 4.8 oz)   09/17/18 80.1 kg (176 lb 9.6 oz)   09/12/18 76.1 kg (167 lb 11.2 oz)   09/04/18 76.7 kg (169 lb)   04/05/18 78 kg (172 lb)       GEN:  Lethargic , alert at times   NECK:Supple ,no mass or thyromegaly   HEENT:  Normocephalic/atraumatic, no scleral icterus, no nasal discharge, mouth moist.  CV:  Regular rate and rhythm, no murmur or JVD.  S1 + S2 noted, no S3 or S4.  LUNGS: No increased work of breathing, decreased air entry bilaterally diffusely.  No crackles or wheezing evident.  No wheezing c  ABD: Active bowel sounds, soft, non-tender/non-distended.  No rebound/guarding/rigidity.  EXT: 1-2+ edema.  No cyanosis.  No joint synovitis noted.  SKIN:  Dry to touch, no exanthems noted in the visualized areas.  Neurologic:Grossly intact,non focal                    Medications:      All current medications were reviewed with changes reflected in problem list.    Medications     lactated ringers       Patient RECEIVING antibiotic to treat a different condition and it provides ADEQUATE COVERAGE for this surgical procedure.         [Auto Hold] acetaminophen  650 mg Oral Q8H    Or     [Auto Hold] acetaminophen  650 mg Rectal Q8H     [Auto Hold] amLODIPine  5 mg Oral Daily     [Auto Hold]  aspirin  81 mg Oral Daily     [Auto Hold] doxycycline (VIBRAMYCIN) IV  100 mg Intravenous Q12H     influenza Vac Split High-Dose  0.5 mL Intramuscular Prior to discharge     [Auto Hold] ipratropium - albuterol 0.5 mg/2.5 mg/3 mL  3 mL Nebulization 4x daily     [Auto Hold] linezolid  600 mg Intravenous Q12H     [Auto Hold] omeprazole  20 mg Oral QAM AC     [Auto Hold] piperacillin-tazobactam  3.375 g Intravenous Q8H NAKIA     [Auto Hold] PRESERVISION AREDS  1 capsule Oral BID     sodium chloride (PF)  3 mL Intracatheter Q8H     [Auto Hold] sodium chloride (PF)  3 mL Intracatheter Q8H                Data:          Data reviewed today: I reviewed all new labs and imaging results over the last 24 hours. I personally reviewed no images or EKG's today        Recent Labs  Lab 09/27/18 0730 09/26/18  0707 09/25/18  0602   WBC 34.4* 24.0* 25.7*   HGB 13.3 13.5 12.4*   HCT 40.9 41.5 37.8*   MCV 91 92 90    392 312       Recent Labs  Lab 09/24/18 2050 09/24/18  1203   CULT <1000 colonies/mLurogenital floraSusceptibility testing not routinely done No growth after 3 days  No growth after 3 days       Recent Labs  Lab 09/27/18 0730 09/26/18  0707 09/25/18  0602    140 139   POTASSIUM 4.1 4.3 4.6   CHLORIDE 106 108 108   CO2 26 24 22   ANIONGAP 8 8 9   * 88 82   BUN 28 28 28   CR 2.02* 1.75* 1.65*   GFRESTIMATED 31* 36* 39*   GFRESTBLACK 37* 44* 47*   ARYAN 8.3* 8.4* 8.1*   MAG  --  2.6*  --    PROTTOTAL  --   --  6.6*   ALBUMIN  --   --  2.1*   BILITOTAL  --   --  0.6   ALKPHOS  --   --  118   AST  --   --  23   ALT  --   --  31         Recent Labs  Lab 09/27/18 0730 09/26/18  0707 09/25/18  0602 09/24/18  1204   * 88 82 108*           No results for input(s): INR in the last 168 hours.        Recent Labs  Lab 09/24/18  1650 09/24/18  1204   TROPI 0.063* 0.061*       Recent Results (from the past 48 hour(s))   XR Chest 2 Views    Narrative    CHEST TWO VIEWS September 24, 2018 1:21 PM     HISTORY:  Hypoxia. Cough.    COMPARISON: 9/16/2018.      Impression    IMPRESSION: Mild opacity at the left lung base has increased since the  previous exam, and may be related to pneumonia and/or atelectasis. No  other significant interval change. Dual-lead cardiac device left chest  wall, with lead tips projected over the RA and RV. Small area of  ill-defined opacity in the left upper lung medially is unchanged.  Heart size appears stable. There is mild pulmonary venous congestion.    GERTRUDE ELIZABETH MD   CT Head w/o Contrast    Narrative    CT SCAN OF THE HEAD WITHOUT CONTRAST   9/24/2018 5:34 PM     HISTORY: Altered mental status. Left-sided weakness.    TECHNIQUE: Axial images of the head and coronal reformations without  IV contrast material. Radiation dose for this scan was reduced using  automated exposure control, adjustment of the mA and/or kV according  to patient size, or iterative reconstruction technique.    COMPARISON: 9/16/2018    FINDINGS: There is generalized atrophy of the brain. There is low  attenuation in the white matter of the cerebral hemispheres consistent  with sequelae of small vessel ischemic disease. There is no evidence  of intracranial hemorrhage, mass, acute infarct or anomaly.     The visualized portions of the sinuses and mastoids appear normal.  There is no evidence of trauma.      Impression    IMPRESSION:   1. No acute abnormality.  2. Atrophy of the brain. White matter changes consistent with sequelae  of small vessel ischemic disease. This is unchanged.    DANILO ALFONSO MD   US Lower Extremity Venous Duplex Bilateral    Narrative    BILATERAL LOWER EXTREMITY VENOUS DOPPLER ULTRASOUND  9/24/2018 7:12 PM    HISTORY: Hypoxia with elevated D-dimer. The concern is for deep venous  thrombosis.    COMPARISON: None.    FINDINGS: Color flow and Doppler spectral waveform analysis  demonstrates normal blood flow in the common femoral, femoral,  popliteal, posterior tibial, and greater saphenous  veins of the lower  extremities bilaterally. No thrombus is seen.      Impression    IMPRESSION: There is no evidence of deep venous thrombosis within the  lower extremities bilaterally.    MELODY DE LEON MD   NM Lung Scan Ventilation and Perfusion    Narrative    NUCLEAR MEDICINE LUNG PERFUSION SCAN AND VENTILATION SCAN 9/24/2018  7:41 PM     HISTORY: Hypoxia with elevated D-dimer.    COMPARISON: Radiographs earlier today.    TECHNIQUE: Tc-99m MAA injected intravenously for evaluation of  pulmonary perfusion. Tc-99m DTPA inhaled for the ventilation phase.    DOSE: 3 mCi Tc99m MAA via IV at 1905; 64.8 mCi Tc99m DTPA inhaled at  1920.    FINDINGS: Normal radiotracer distribution throughout both lungs on the  ventilation and perfusion scans. No unmatched perfusion defects to  suggest pulmonary emboli.      Impression    IMPRESSION: Normal lung ventilation and perfusion scan. No  scintigraphic evidence of pulmonary embolism.    MELODY DE LEON MD               Disclaimer: This note consists of symbols derived from keyboarding, dictation and/or voice recognition software. As a result, there may be errors in the script that have gone undetected. Please consider this when interpreting information found in this chart

## 2018-09-27 NOTE — ANESTHESIA PREPROCEDURE EVALUATION
PAC NOTE:       ANESTHESIA PRE EVALUATION:  Anesthesia Evaluation     . Pt has had prior anesthetic. Type: General    No history of anesthetic complications          ROS/MED HX    ENT/Pulmonary:     (+)sleep apnea, Mild Persistent asthma , recent URI . .    Neurologic:     (+)CVA     Cardiovascular:     (+) Dyslipidemia, hypertension----. : . . . pacemaker :. .       METS/Exercise Tolerance:     Hematologic:  - neg hematologic  ROS       Musculoskeletal:  - neg musculoskeletal ROS       GI/Hepatic:     (+) GERD Asymptomatic on medication,       Renal/Genitourinary:     (+) Nephrolithiasis , Other Renal/ Genitourinary,       Endo:  - neg endo ROS       Psychiatric:  - neg psychiatric ROS       Infectious Disease:   (+) Other Infectious Disease sepsis, possibly urosepsis      Malignancy:      - no malignancy   Other:    (+) No chance of pregnancy C-spine cleared: N/A, H/O Chronic Pain,no other significant disability                    Physical Exam  Normal systems: cardiovascular    Airway   TM distance: >3 FB    Dental     Cardiovascular       Pulmonary (+) rhonchi                Anesthesia Plan      History & Physical Review  History and physical reviewed and following examination; no interval change.    ASA Status:  4 .    NPO Status:  > 8 hours    Plan for General and LMA with Intravenous and Propofol induction. Maintenance will be Balanced.    PONV prophylaxis:  Ondansetron (or other 5HT-3)       Postoperative Care  Postoperative pain management:  IV analgesics.      Consents  Anesthetic plan, risks, benefits and alternatives discussed with:  Patient or representative and Patient..                            .

## 2018-09-27 NOTE — PROGRESS NOTES
Page regarding patient with pain from bladder spasms.  Already received B&O suppository without much improvement.  Family says he takes Percocet at home and requests additional pain medicine at this time.  Admitted with encephalopathy that appears to be improving per documentation.  -Give 2.5 mg oxycodone once now and monitor    Pain not relieved by oxycodone.  Request Percocet.  One-time order of 5 mg Percocet.

## 2018-09-27 NOTE — OR NURSING
No documentation of patient's power of  found in chart. 2 daughters with patient. Both stated the sister who has poa is in [place] & they haven't been able to reach all day. Preop nurse (HAILEY) stated surgeon deems surgery as emergency. Both director of preop(ARUN) & director of OR(DIANNE) agreed surgery to proceed. Preop director asked patient's daughter to bring in paperwork for poa status when becomes available.

## 2018-09-27 NOTE — CONSULTS
Urology Consult History and Physical    Name: Edison Boss    MRN: [id number]   YOB: 1924       We were asked to see Edison Boss at the request of Dr. Barron for evaluation and treatment of urinary retention and ureteral stone.        Chief Complaint:   Urinary issues          History of Present Illness:   Edison Boss is a 89 year old male who is being seen for evaluation of Tolliver issues, and found to have obstructing left ureteral stone. Has deterioration over the past few weeks with hypoxia, difficulty with bladder spasms and urine leakage, hernia, abdominal pain. Overall has been doing poorly at home. Since admission, has had progressive rising of creatinine, and profound leukocytosis. Patient notes some vague left abdominal pain.    Patient also has history of urinary retention and bladder spasms with history of brachytherapy. Follows with Dr. Lilly and had Tolliver changed one week ago. Currently, catheter is not draining well and majority of urine is bypassing the catheter. Attempts to irrigate were unsuccessful.    CT scan done today, and demonstrates finding of obstructing left proximal ureteral stone. Per daughter, patient does have history of ureteral stones.           Past Medical History:     Past Medical History:   Diagnosis Date     Calculus of kidney     in dwelling tolliver     Chronic infection     UTI     Chronic pain     lower pain, perineum     Esophageal reflux      Hyperlipidaemia      Hypertension      Malignant neoplasm of prostate (H) 8/14/2002    Brachytherapy, then external radiation. chronic indwelling catheter     Mild persistent asthma     with URI     Paroxysmal a-fib (H)     paroxysmal     Sinus node dysfunction (H)     sp DDD PM     Sleep apnea     ?   snores     Unspecified cerebral artery occlusion with cerebral infarction           Past Surgical History:     Past Surgical History:   Procedure Laterality Date     C NONSPECIFIC  PROCEDURE      Kidney stone surgery     C NONSPECIFIC PROCEDURE  , 2005    TURP x 2     C NONSPECIFIC PROCEDURE  2002    Brachytherapy     C NONSPECIFIC PROCEDURE  ~    Left rotator cuff repair     C NONSPECIFIC PROCEDURE      Bilateral cataract surgery     C NONSPECIFIC PROCEDURE      EGD/dilation twice     CYSTOSCOPY       CYSTOSCOPY, INJECT COLLAGEN, COMBINED  2012    Procedure:COMBINED CYSTOSCOPY, INJECT BULKING AGENT; VIDEO CYSTOSCOPY WITH BOTOX INJECTIONS  ; Surgeon:BRIAN ADAN; Location:[facility]     CYSTOSCOPY, INJECT COLLAGEN, COMBINED  3/22/2013    Procedure: COMBINED CYSTOSCOPY, INJECT BULKING AGENT;  VIDEO CYSTOSCOPY, BOTOX INJECTION;  Surgeon: Brian Adan MD;  Location: [facility] OR     CYSTOSCOPY, INJECT COLLAGEN, COMBINED  2014    Procedure: COMBINED CYSTOSCOPY, INJECT BULKING AGENT;  Surgeon: Brian Adan MD;  Location: [facility]     CYSTOSCOPY, INJECT COLLAGEN, COMBINED N/A 2015    Procedure: COMBINED CYSTOSCOPY, INJECT BULKING AGENT;  Surgeon: Brian Adan MD;  Location: [facility]     CYSTOSCOPY, INJECT COLLAGEN, COMBINED N/A 2016    Procedure: COMBINED CYSTOSCOPY, INJECT BULKING AGENT;  Surgeon: Brian Adan MD;  Location: [facility] OR     HC REMOVE TONSILS/ADENOIDS,<13 Y/O  ~1928    T & A <12y.o.     IMPLANT PACEMAKER       PENIS SURGERY       PROSTATE SURGERY            Social History:     Social History   Substance Use Topics     Smoking status: Former Smoker     Packs/day: 1.00     Years: 40.00     Smokeless tobacco: Never Used      Comment: QUIT      Alcohol use No          Family History:     Family History   Problem Relation Age of Onset     Family History Negative Father       age 91     HEART DISEASE Mother      pacemaker     Family History Negative Mother       in her sleep 103     Cancer Brother      oldest brother - lung cancer,  age 74     Cancer Brother      3rd brother - lymphoma,  age 76     Cancer Brother      2nd brother  (Carrington)- prostate cancer, born 1920.      Cerebrovascular Disease Brother      Brother (Carrington), born in 1920          Allergies:     Allergies   Allergen Reactions     Ceftriaxone Itching     Bactrim Hives     Levaquin Diarrhea     Oral only, can tolerate IV     Zithromax [Azithromycin]      Macrodantin [Nitrofuran Derivatives] Rash     Took recently with no problems          Medications:     Current Facility-Administered Medications   Medication     acetaminophen (TYLENOL) tablet 650 mg    Or     acetaminophen (TYLENOL) Suppository 650 mg     acetaminophen (TYLENOL) Suppository 650 mg     acetaminophen (TYLENOL) tablet 650 mg     amLODIPine (NORVASC) tablet 5 mg     aspirin EC tablet 81 mg     bisacodyl (DULCOLAX) Suppository 10 mg     doxycycline (VIBRAMYCIN) 100 mg vial to attach to  mL bag     HYDROmorphone (PF) (DILAUDID) injection 0.2 mg     influenza Vac Split High-Dose (FLUZONE) injection 0.5 mL     ipratropium - albuterol 0.5 mg/2.5 mg/3 mL (DUONEB) neb solution 3 mL     ipratropium - albuterol 0.5 mg/2.5 mg/3 mL (DUONEB) neb solution 3 mL     lidocaine (LMX4) kit     lidocaine 1 % 1 mL     linezolid (ZYVOX) infusion 600 mg     magnesium sulfate 4 g in 100 mL sterile water (premade)     melatonin tablet 1 mg     naloxone (NARCAN) injection 0.1-0.4 mg     nitroGLYcerin (NITROSTAT) sublingual tablet 0.4 mg     omeprazole (priLOSEC) CR capsule 20 mg     ondansetron (ZOFRAN-ODT) ODT tab 4 mg    Or     ondansetron (ZOFRAN) injection 4 mg     opium-belladonna (B&O SUPPRETTES) 30-16.2 MG per suppository 1 suppository     piperacillin-tazobactam (ZOSYN) infusion 3.375 g     polyethylene glycol (MIRALAX/GLYCOLAX) Packet 17 g     potassium chloride (KLOR-CON) Packet 20-40 mEq     potassium chloride 10 mEq in 100 mL intermittent infusion with 10 mg lidocaine     potassium chloride 10 mEq in 100 mL sterile water intermittent infusion (premix)     potassium chloride 20 mEq in 50 mL intermittent infusion      potassium chloride SA (K-DUR/KLOR-CON M) CR tablet 20-40 mEq     PRESERVISION AREDS CAPS 1 capsule     prochlorperazine (COMPAZINE) injection 5 mg    Or     prochlorperazine (COMPAZINE) tablet 5 mg    Or     prochlorperazine (COMPAZINE) Suppository 12.5 mg     senna-docusate (SENOKOT-S;PERICOLACE) 8.6-50 MG per tablet 1 tablet    Or     senna-docusate (SENOKOT-S;PERICOLACE) 8.6-50 MG per tablet 2 tablet     sodium chloride (PF) 0.9% PF flush 3 mL     sodium chloride (PF) 0.9% PF flush 3 mL            Review of Systems:    ROS: 10 point ROS neg other than the symptoms noted above in the HPI.          Physical Exam:   Patient Vitals for the past 24 hrs:   BP Temp Temp src Pulse Heart Rate Resp SpO2 Weight   09/27/18 0908 - - - - - - (!) 86 % -   09/27/18 0840 133/53 96.4  F (35.8  C) Axillary - 70 22 (!) 84 % -   09/27/18 0749 - - - - - - 90 % -   09/27/18 0500 - - - - - - - 83.1 kg (183 lb 4.8 oz)   09/26/18 2331 164/69 96.6  F (35.9  C) Axillary - 70 16 90 % -   09/26/18 2238 - - - - 62 16 90 % -   09/26/18 2202 - - - - - 16 91 % -   09/26/18 2000 - - - - - - 94 % -   09/26/18 1540 124/48 95.7  F (35.4  C) Axillary 62 - 16 92 % -   09/26/18 1516 - - - - - - 93 % -     General: age-appropriate appearing male in NAD; somnolent  HEENT: Head AT/NC, EOMI, CN Grossly intact  Lungs: no respiratory distress, or pursed lip breathing  Heart: No obvious jugular venous distension present  Back: no bony midline tenderness, no CVAT bilaterally.  Abdomen: soft, non-distended, non-tender. No organomegaly  : normal male external genitalia. Right inguinal hernia noted - reducible  Christianson catheter with clear urine output. Urine draining around catheter. Unable to irrigate.  Lymph: no palpable inguinal lymphadenopathy.  LE: no edema. pneumoboots in place.  Musculoskeltal: extremities normal, no peripheral edema  Skin: no suspicious lesions or rashes  Neuro: Alert, oriented, speech and mentation normal; moving all 4 extremities  equally.  Psych: affect and mood normal          Data:   All laboratory data reviewed:      Recent Labs  Lab 09/27/18  0730 09/26/18  0707 09/25/18  0602 09/24/18  1204   WBC 34.4* 24.0* 25.7* 24.6*   HGB 13.3 13.5 12.4* 12.2*    392 312 344       Recent Labs  Lab 09/27/18  0730 09/26/18  0707 09/25/18  0602 09/24/18  1204    140 139 136   POTASSIUM 4.1 4.3 4.6 4.3   CHLORIDE 106 108 108 104   CO2 26 24 22 23   BUN 28 28 28 33*   CR 2.02* 1.75* 1.65* 1.84*   * 88 82 108*   ARYAN 8.3* 8.4* 8.1* 8.6   MAG  --  2.6*  --   --        Recent Labs  Lab 09/24/18 2050   COLOR Straw   APPEARANCE Cloudy   URINEGLC Negative   URINEBILI Negative   URINEKETONE Negative   SG 1.011   URINEPH 5.0   PROTEIN Negative   NITRITE Negative   LEUKEST Moderate*   RBCU 175*   WBCU 49*       All pertinent imaging reviewed:  CT chest/abd/pelvis 9/27/2018  IMPRESSION:  1. Mild-moderate pleural effusions, left greater than right.  2. Bilateral interstitial prominence and groundglass opacity. This is  nonspecific but could represent interstitial edema or interstitial  pneumonia.  3. Mild mediastinal and lower neck adenopathy.  4. 10 mm common bile duct stone with mild CBD dilatation.  5. 13 mm proximal left ureter stone with obstruction.  6. Bilateral inguinal hernias.  7. Minimal lower pelvic fluid.  8. Thyroid nodules.  9. Additional findings discussed above.         Impression and Plan:   Impression:   89 year old male with deteriorating medical condition. Noted to have obstructing proximal left ureteral stone. This is potential source of infection and MADELYN. Given location of stone and overall symptoms, I recommend placement of a left ureteral stent in the OR. Will also assess urethra and bladder in this setting trouble-shoot the Christianson issues. This was discussed, in detail with the patient and his daughter, and they would like to proceed.    Of note, general surgery has also been consulted. Will discuss regarding finding of  common bile duct stone and hernia regarding possible intervention.      Plan:   - NPO  - To OR for cystoscopy and left ureteral stent placement     Froylan Richards MD  Urology  [facility]  Clinic Phone [phone]

## 2018-09-27 NOTE — PLAN OF CARE
Problem: Patient Care Overview  Goal: Plan of Care/Patient Progress Review  Outcome: Improving  Neuro: Lethargic.  Slow to answer questions.   VS: VSS  Tele: SR RH 70 - 80's.    Respiratory: LS diminished. Increased oxygen needs throughout shift. Now on 10 Lpm Oximyzer.   GI: Hernias to low abd bilaterally - see CT  : Christianson catheter in place, continues to bypass urine. Urology consulted. Obstructing kidney stone to left ureter - Pt currently down for stent placement.   Pain: to abd controled with B & O suppositories.   IV: PIV WDL SL.   Transfer: A2 mechanical lift. T & R q 2   Diet: NPO - waiting bedside swallow study per speech.   Plan: TBD. Hospitalist, ID, Surgery, GI, PT/OT/ST following. Palliative consulted. IV zosyn, IV doxycycline, IV Zyvox. Pt triggered sepsis protocol this AM - latic 0.8.

## 2018-09-27 NOTE — PLAN OF CARE
Problem: Patient Care Overview  Goal: Plan of Care/Patient Progress Review    PT:  Noted pt to be off unit this afternoon for procedure.  Chart reviewed, note that his condition has declined.  Will check back tomorrow.

## 2018-09-27 NOTE — PLAN OF CARE
Problem: Patient Care Overview  Goal: Plan of Care/Patient Progress Review  Video swallow study was cancelled by MD given recent decline, pt currently NPO for STAT CT chest/abd/pelvis to be obtained, possible palliative care consult. Discussed cancellation with dtr in room who is in agreement. Will follow up per POC, as needed.

## 2018-09-27 NOTE — PROVIDER NOTIFICATION
MD notified via telephone that pt had a critical value from the [facility] microbiology of positive blood culture from 9/24 at 1203 from the L arm was positive for yeast.     MD stated to contact Dr. Lobo.     Writer called Dr. Lobo' office and left a message with , waiting for a call back.

## 2018-09-27 NOTE — PLAN OF CARE
Problem: Patient Care Overview  Goal: Plan of Care/Patient Progress Review  Outcome: No Change  VSS denies pian alert to self and place disoriented to time and situation ambulates with AX2 and a joel stedy  tolliver cath in place with bypass leakage cares done MD aware urology will consult today,  On Zosyn and Vanco on 4 liters oxygen no home oxygen turn and repositioned every two hours, tele Afib CVR. Will continue POC

## 2018-09-27 NOTE — ANESTHESIA CARE TRANSFER NOTE
Patient: Edison Hennessy Karyn    Procedure(s):  COMBINED CYSTOSCOPY, LEFT STENT PLACEMENT, left retrograde pyelogram - Wound Class: II-Clean Contaminated    Diagnosis: unknown  Diagnosis Additional Information: No value filed.    Anesthesia Type:   No value filed.     Note:    Patient transferred to:PACU  Comments: Pt spont resps LMA removed to PACU VSS report to RNHandoff Report: Identifed the Patient, Identified the Reponsible Provider, Reviewed the pertinent medical history, Discussed the surgical course, Reviewed Intra-OP anesthesia mangement and issues during anesthesia, Set expectations for post-procedure period and Allowed opportunity for questions and acknowledgement of understanding      Vitals: (Last set prior to Anesthesia Care Transfer)    CRNA VITALS  9/27/2018 1452 - 9/27/2018 1528      9/27/2018             Pulse: 60    SpO2: 90 %                Electronically Signed By: JYOTI Bonner CRNA  September 27, 2018  3:28 PM

## 2018-09-27 NOTE — CONSULTS
Consult Date:  2018      INFECTIOUS DISEASE CONSULTATION       LOCATION:  Room 352 [facility]       REFERRING PHYSICIAN:       IMPRESSION:   1.  A 89-year-old male with 2+ week progressive deterioration in health, some hypoxia and possible infiltrate, recent abnormal urine, but with unremarkable cultures, some intermittent abdominal pain and recently reduced hernia.  Etiology of syndrome is unclear, but signs of sepsis and infection as underlying it.   2.  Cognitive dysfunction, probably encephalopathy from the current acute infection on top of mild dementia.   3.  Abdominal pain, previously part of this now occurring again.  CT scan pending.   4.  Definite infiltrates and hypoxia, certainly at risk for aspiration and appearance suggestive of that, enough health care contact to be at risk for nosocomial pathogens.   5.  Acute renal insufficiency, undoubtedly acute tubular necrosis from the current acute illness, but also got vancomycin.   6.  Prior cerebrovascular infarction.   7.  Possible fluid and congestive heart failure component.      RECOMMENDATIONS:   1.  Guarded prognosis, discussed with daughter for now full care.   2.  Follow kidney function, respiratory function, cultures and await CT scan.   3.  Broad coverage as though pneumonia with nosocomial coverage.  Will add linezolid to the Zosyn he is on and also do doxycycline for atypicals.  He got a few doses of Levaquin to cover that 2 weeks ago, but would cover atypicals for now.   4.  Readjust according to clinical progress and symptoms.      HISTORY:  This 89-year-old male seen in consultation due to apparent sepsis.  The patient has a history of living in an assisted living.  His wife  earlier this year and he has had some struggles with that, but otherwise has been functioning reasonably well independently.  About 2-1/2 weeks ago started having falls, some malaise, fatigue and cognitive dysfunction.  He had a short  hospital stay here.  There were some signs of infection and notably had an abnormal urine, but grew only Candida.  He got a few doses of Levaquin with possible pneumonia diagnosis.  Did not really improve much with that, went back to the assisted living and essentially has not been functional.  He then had further worsening, including signs of sepsis, readmitted at present.  There are signs of left lower lobe pneumonia.  Urine is abnormal, but urine culture negative.  Blood cultures have been negative and now having some degree of abdominal discomfort with imaging being done.  No other clear focal symptoms.      PAST MEDICAL HISTORY:  Prior stroke, history of some degree of cognitive dysfunction, but not enough to impair him from being at assisted living.  Prior history of depression on treatment currently.  No particular history of pneumonia in general.      ALLERGIES:  MULTIPLE LISTED ALLERGIES THAT INCLUDE CEFTRIAXONE, BACTRIM, LEVAQUIN WHICH IS GI ONLY AND ZITHROMAX.      SOCIAL AND FAMILY HISTORY:  Lives in assisted living, but some healthcare contact.      REVIEW OF SYSTEMS:  Not that interactive at present, but definitely has been having abdominal pain.      PHYSICAL EXAMINATION:   GENERAL:  The patient appears his stated age.  He looks ill.   VITAL SIGNS:  Include hypoxia on oxygen, pulse of 100, respiratory rate 20, unlabored.   HEENT:  No thrush or intraoral lesions.   NECK:  Supple and nontender, no meningismus.   HEART:  Tachycardic, no major murmur.   LUNGS:  Crackles and few wheezing areas in the lateral left lung field.   ABDOMEN:  Tender, more lower than upper abdomen.   EXTREMITIES:  No major rash or skin lesions.      LABORATORY DATA:  Blood cultures negative.  Urine abnormal, but urine culture negative.        Chest x-ray suggested left lower lobe infiltrate.        CT scan being done.      Thank you very much for consultation.  I will follow the patient with you.         OLIVA SANTOS MD              D: 2018   T: 2018   MT: [email or web address]      Name:     JOSELIN VAN   MRN:      [id number]        Account:       [id number]   :      10/19/1924           Consult Date:  2018      Document: [id number]

## 2018-09-27 NOTE — PROGRESS NOTES
Patient was seen and examined by me this morning.  89-year-old male with multiorgan dysfunction admitted with acute encephalopathy and suspected pneumonia.  Patient also has acute kidney injury as well as concern for diastolic congestive heart failure who failed trial with Lasix.  Patient condition declined despite IV antibiotic, IV Lasix and close monitoring.  He developed acute hypoxic respiratory failure and abdominal pain.  He has right bile hernia which is not reducible and concerning for incarcerated inguinal hernia with sepsis.  Plan is to keep her n.p.o., get a contrast CT of the chest abdomen pelvis, get surgical consultation and continue antibiotic.  I spoke with patient's daughter at bedside regarding patient's decline in status.  Hold Lasix, urology to assist with Christianson catheter management for acute measurement of intake and output.  Will consider palliative care consultation as well as pain control.    Addendum  I spoke with general surgeon Dr. Means who will examine patient for further recommendations.  I will cancel swallow study  We will consult palliative care to assist with further discussion of goals of care as well

## 2018-09-27 NOTE — PLAN OF CARE
Problem: Patient Care Overview  Goal: Plan of Care/Patient Progress Review  OT: Attempted session this AM; pt busy with nursing cares and then other provider arrived; will check back as schedule allows.     Attempted session again; per RN pt not appropriate for therapy today; pt now requiring 10Lpm of O2. Will cancel and r/s for tomorrow.

## 2018-09-28 NOTE — PLAN OF CARE
Problem: Pneumonia (Adult)  Goal: Signs and Symptoms of Listed Potential Problems Will be Absent, Minimized or Managed (Pneumonia)  Signs and symptoms of listed potential problems will be absent, minimized or managed by discharge/transition of care (reference Pneumonia (Adult) CPG).   Outcome: Declining  VSS on 15L O2 per oxymask. Capnography monitor in place. Pt on intermittent IV zosyn, doxycycline and zyvox for pneumonia. Pt started on IV micafungin this shift due to positive blood cultures for yeast.  Infectious disease following.  Deep breathing encouraged throughout shift. Pt strict NPO until after video swallow study. +3 edema to LUE, +1 edema to RUE, LLE, and RLE. Will continue to monitor.

## 2018-09-28 NOTE — PLAN OF CARE
Problem: Patient Care Overview  Goal: Plan of Care/Patient Progress Review  OT session attempted x2. 1st attempt, pt busy with another provider. 2nd attempt, pt resting in bed, declines activity.

## 2018-09-28 NOTE — PROGRESS NOTES
Hospitalist Progress Note  [facility]      Mu Barron MD 09/28/2018      Reason for hospitalization: Acute encephalopathy, pneumonia, Sepsis          Assessment and Plan:        Brief patient summary of Stay:     Edison Boss is a 89 year old  male with a significant past medical history of multiple medical problems including history of prostate cancer status post brachytherapy followed by external radiation and chronic Christianson catheter, proximal atrial fibrillation, sinus node dysfunction status post dual-chamber pacemaker, sleep apnea, chronic pain, kidney stones who presents with encephalopathy.      Hospital course,Consults and procedures:  Patient has evidence of pulmonary infiltrate as well as leukocytosis concerning for lung infection.  He was admitted and was treated with intravenous antibiotic with IV Zosyn and vancomycin and was closely monitored.  Despite antibiotic therapy and trial of lasix , patient deteriorated morning of sep 27 and further work up and consultation pursued.      Hospital Active Problem List,Assessment and Plan:      1. Acute encephalopathy: Etiology unclear, suspect infection related versus toxic metabolic.  -- improving today   -- monitor     2. Acute hypoxic respiratory failure with bilateral effusion , suspect multifactorial- pneumonia and CHF   Community-acquired pneumonia:Bilateral interstitial prominence and groundglass opacity  -- Suspect component of aspiration as well   -- still hypoxic ,CXR  today showed pulmonary edema   -- discussed with ID and zyvox and doxy added   -- plan to monitor   -- lasix 40 mg times one and then 20 mg q8h times two     3. Elevated troponin: Without evidence of acute coronary syndrome, suspect demand ischemia.  Patient has elevated BNP at 7974 concerning for underlying cardiomyopathy.  --IVF stopped , lasix tried with no benefit   -- hold lasix , monitor fluid balance     4. Generalized deconditioning and  weakness: Physical therapy as tolerated, discharge planning  5. Acute kidney injury:   -- obstructive uropathy with left ureteral stone   -- urology consulted and operative intervention done with stent placed    -- urology input appreciated     6. Abdominal pain : Left ureteral stone , hernia , bladder spasm   -- pain med , B and O supp , urology consulted     7. Right inguinal hernia - reduced by Dr Means ,input appreciated   8. CBD stone on CT - Check LFTs , US , GI Consulted   9. Sepsis - suspect urologic vs pulmonary source  --  Yeast grew in blood culture   -- on abx ,micafungin per ID  , follow culture          #Pain management: # Pain Assessment:  Current Pain Score 9/28/2018   Patient currently in pain? denies   Pain score (0-10) -   Pain location -   Pain descriptors -   Edison s pain level was assessed and he currently denies pain.        #Indwelling devices: Indwelling Christianson catheter    #FEN : May eat as able       #DVT Prophylaxis: Heparin SQ        Code Status and Goal of care: DNR / DNI      Discharge Plan: unclear         Interval History (Subjective):        Patient was seen and examined by me this morning.  He is doing much better today.  He is alert and oriented x3.  Still requires plenty of oxygen received 2 L via facemask.  Multiple family members at bedside.  He has no fever or chills.  Pain is much better.  Labs are improving including WBC count.  Appreciate input from urology, surgery and infectious disease team.  Patient is currently on broad-spectrum antibiotic including doxycycline, Zyvox and Zosyn.  Patient is also on micafungin for positive yeast and blood culture obtained on 24 September.  Patient is gradually improving, continue to monitor for now.  Questions and concerns of patient's family addressed.  Detailed progress note at a later time                               Physical Exam:      Last Vital Signs:  Temp:  [95.3  F (35.2  C)-96.8  F (36  C)] 95.7  F (35.4  C)  Heart Rate:   [60-65] 61  Resp:  [10-16] 16  BP: (117-146)/(45-69) 118/45  FiO2 (%):  [15 %] 15 %  SpO2:  [87 %-95 %] 95 %  I/O last 3 completed shifts:  In: 703 [I.V.:703]  Out: 1205 [Urine:1205]    Wt Readings from Last 5 Encounters:   09/28/18 82.1 kg (181 lb 1.6 oz)   09/17/18 80.1 kg (176 lb 9.6 oz)   09/12/18 76.1 kg (167 lb 11.2 oz)   09/04/18 76.7 kg (169 lb)   04/05/18 78 kg (172 lb)       GEN: alert and oriented   NECK:Supple ,no mass or thyromegaly   HEENT:  Normocephalic/atraumatic, no scleral icterus, no nasal discharge, mouth moist.  CV:  Regular rate and rhythm, no murmur or JVD.  S1 + S2 noted, no S3 or S4.  LUNGS: No increased work of breathing, decreased air entry bilaterally diffusely.  No crackles or wheezing evident.  No wheezing c  ABD: Active bowel sounds, soft, non-tender/non-distended.  No rebound/guarding/rigidity.  EXT: 1-2+ edema.  No cyanosis.  No joint synovitis noted.  SKIN:  Dry to touch, no exanthems noted in the visualized areas.  Neurologic:Grossly intact,non focal                    Medications:      All current medications were reviewed with changes reflected in problem list.    Medications       acetaminophen  650 mg Oral Q8H    Or     acetaminophen  650 mg Rectal Q8H     amLODIPine  5 mg Oral Daily     aspirin  81 mg Oral Daily     influenza Vac Split High-Dose  0.5 mL Intramuscular Prior to discharge     ipratropium - albuterol 0.5 mg/2.5 mg/3 mL  3 mL Nebulization 4x daily     linezolid  600 mg Intravenous Q12H     micafungin  100 mg Intravenous Q24H     omeprazole  20 mg Oral QAM AC     piperacillin-tazobactam  3.375 g Intravenous Q8H NAKIA     PRESERVISION AREDS  1 capsule Oral BID     sodium chloride (PF)  3 mL Intracatheter Q8H                Data:          Data reviewed today: I reviewed all new labs and imaging results over the last 24 hours. I personally reviewed no images or EKG's today        Recent Labs  Lab 09/28/18  0613 09/27/18  0730 09/26/18  0707   WBC 24.4* 34.4* 24.0*   HGB  11.6* 13.3 13.5   HCT 36.3* 40.9 41.5   MCV 92 91 92    441 392       Recent Labs  Lab 09/27/18  1512 09/24/18  2050 09/24/18  1203   CULT Culture negative monitoring continues  Culture negative monitoring continues <1000 colonies/mLurogenital floraSusceptibility testing not routinely done No growth after 4 days  Cultured on the 3rd day of incubation:Yeast*  Critical Value/Significant Value, preliminary result only, called to and read back byWillow Nesbitt RN, @0831 09/27/18..       Recent Labs  Lab 09/28/18  0613 09/27/18  0730 09/26/18  0707 09/25/18  0602    140 140 139   POTASSIUM 4.1 4.1 4.3 4.6   CHLORIDE 107 106 108 108   CO2 27 26 24 22   ANIONGAP 6 8 8 9   * 103* 88 82   BUN 25 28 28 28   CR 1.67* 2.02* 1.75* 1.65*   GFRESTIMATED 39* 31* 36* 39*   GFRESTBLACK 47* 37* 44* 47*   ARYAN 8.2* 8.3* 8.4* 8.1*   MAG  --   --  2.6*  --    PROTTOTAL 6.2*  --   --  6.6*   ALBUMIN 1.7*  --   --  2.1*   BILITOTAL 0.5  --   --  0.6   ALKPHOS 146  --   --  118   AST 22  --   --  23   ALT 25  --   --  31         Recent Labs  Lab 09/28/18  0613 09/27/18  0730 09/26/18  0707 09/25/18  0602 09/24/18  1204   * 103* 88 82 108*           No results for input(s): INR in the last 168 hours.        Recent Labs  Lab 09/24/18  1650 09/24/18  1204   TROPI 0.063* 0.061*       Recent Results (from the past 48 hour(s))   XR Chest 2 Views    Narrative    CHEST TWO VIEWS September 24, 2018 1:21 PM     HISTORY: Hypoxia. Cough.    COMPARISON: 9/16/2018.      Impression    IMPRESSION: Mild opacity at the left lung base has increased since the  previous exam, and may be related to pneumonia and/or atelectasis. No  other significant interval change. Dual-lead cardiac device left chest  wall, with lead tips projected over the RA and RV. Small area of  ill-defined opacity in the left upper lung medially is unchanged.  Heart size appears stable. There is mild pulmonary venous congestion.    GERTRUDE ELIZABETH MD   CT  Head w/o Contrast    Narrative    CT SCAN OF THE HEAD WITHOUT CONTRAST   9/24/2018 5:34 PM     HISTORY: Altered mental status. Left-sided weakness.    TECHNIQUE: Axial images of the head and coronal reformations without  IV contrast material. Radiation dose for this scan was reduced using  automated exposure control, adjustment of the mA and/or kV according  to patient size, or iterative reconstruction technique.    COMPARISON: 9/16/2018    FINDINGS: There is generalized atrophy of the brain. There is low  attenuation in the white matter of the cerebral hemispheres consistent  with sequelae of small vessel ischemic disease. There is no evidence  of intracranial hemorrhage, mass, acute infarct or anomaly.     The visualized portions of the sinuses and mastoids appear normal.  There is no evidence of trauma.      Impression    IMPRESSION:   1. No acute abnormality.  2. Atrophy of the brain. White matter changes consistent with sequelae  of small vessel ischemic disease. This is unchanged.    DANILO ALFONSO MD   US Lower Extremity Venous Duplex Bilateral    Narrative    BILATERAL LOWER EXTREMITY VENOUS DOPPLER ULTRASOUND  9/24/2018 7:12 PM    HISTORY: Hypoxia with elevated D-dimer. The concern is for deep venous  thrombosis.    COMPARISON: None.    FINDINGS: Color flow and Doppler spectral waveform analysis  demonstrates normal blood flow in the common femoral, femoral,  popliteal, posterior tibial, and greater saphenous veins of the lower  extremities bilaterally. No thrombus is seen.      Impression    IMPRESSION: There is no evidence of deep venous thrombosis within the  lower extremities bilaterally.    MELODY DE LEON MD   NM Lung Scan Ventilation and Perfusion    Narrative    NUCLEAR MEDICINE LUNG PERFUSION SCAN AND VENTILATION SCAN 9/24/2018  7:41 PM     HISTORY: Hypoxia with elevated D-dimer.    COMPARISON: Radiographs earlier today.    TECHNIQUE: Tc-99m MAA injected intravenously for evaluation  of  pulmonary perfusion. Tc-99m DTPA inhaled for the ventilation phase.    DOSE: 3 mCi Tc99m MAA via IV at 1905; 64.8 mCi Tc99m DTPA inhaled at  1920.    FINDINGS: Normal radiotracer distribution throughout both lungs on the  ventilation and perfusion scans. No unmatched perfusion defects to  suggest pulmonary emboli.      Impression    IMPRESSION: Normal lung ventilation and perfusion scan. No  scintigraphic evidence of pulmonary embolism.    MELODY DE LEON MD               Disclaimer: This note consists of symbols derived from keyboarding, dictation and/or voice recognition software. As a result, there may be errors in the script that have gone undetected. Please consider this when interpreting information found in this chart

## 2018-09-28 NOTE — PROGRESS NOTES
General surgery chart check.    GI consult appreciated.  Of note, the patient's right inguinal hernia is indeed reducible.  I was able to reduce this quite easily yesterday.  I do think that surgery is probably more risky than any potential benefit the patient would get from it.  Will sign off.  Please call if I can be of further assistance during this hospitalization.    Boubacar Means MD  [facility]

## 2018-09-28 NOTE — PLAN OF CARE
Problem: Patient Care Overview  Goal: Plan of Care/Patient Progress Review  Outcome: Improving  Patient resting comfortably overnight. Vital signs stable. Alert/oriented x4, but lethargic. 15 LPM oxymask with oxygen saturations in low 90's. Capnography on, alarming frequently for low IPI and low breaths. Breaths not detected on capnography. Tele is 100 % A paced. Patient had a run of PSVT while sleeping, vital sign stable at that time. Christianson catheter is patent with good urine output. Patient denies pain/discomfort overnight. Continue with ABX.

## 2018-09-28 NOTE — PLAN OF CARE
Presentation/Diagnosis: Pt admitted 9/24 due to fall at home with increased confusion, also associated with cough, nausea, congestion and generalized weakness. Pt diagnosed with pneumonia.   History: A-fib (pacemaker), Prostate CA (chronic tolliver), Asthma, HLD, HTN, sleep apnea.   Labs/Protocols: K and Mag protocols. K: 4.1, WBC: 34.4, Creatinine: 2.02. Blood cultures taken 9/24 came back positive for yeast, MD and ID notified. (pt started on micafungin).   Vitals: VSS on 15L O2 per NC (Pt not on home O2). Pt on capnography monitoring post-procedure. No complaints of pain this shift.   Cardiac: +3 edema to LUE. +1 edema to RUE, LLE and RLE.   Telemetry: 100% A-paced.   Respiratory: Diminished lung sounds. Dyspnea with exertion. On 15L O2 per oxymask. (not on home O2). RT following. Pt on IV zosyn, doxycycline, and zyvox for pneumonia (possibly aspiration).   Neuro: Lethargic throughout shift, arouses to voice. Oriented x4. Calm and cooperative with cares.   GI/: Chronic tolliver in place. Pt had cystoscopy today with stent placed to L ureter. Tolliver catheter replaced during procedure, draining adequately. No BM this shift.   Skin: Scab from abrasion to L knee. Pale skin. Scattered bruising. Repositioned Q 2 hours.   LDAs: PIV to L AC, SL with intermittent antibiotics and micafungin.   Diet: Pt strict NPO until video swallow study.   Activity: Ax2 with lift. PT following. PT recommending TCU on discharge.   Teaching: Pt and family educated on plan of care.   Plan: Wean O2 as able. Continue IV antibiotics and micafungin. PT as tolerated. Monitor and manage pain.

## 2018-09-28 NOTE — PLAN OF CARE
Problem: Patient Care Overview  Goal: Plan of Care/Patient Progress Review  Discharge Planner SLP   Patient plan for discharge: none stated this date  Current status: Re-evaluation completed this date per MD wishes (pt currently NPO), though MD states to limit overall intake. MD also reports video not appropriate this date either. Family present (dtr stating she is pt's medical POA and son). Assessed with ice chips, thin liquids tsp/cup/straw, nectar thick liquids cup/straw, puree solids and medications with puree solids; chewable solids not trialed this date as pt fatigued quickly, endurance appears to be reduced likely secondary to current respiratory needs/medical status. Pt currently on 15 L 02 via oxymask, sats between 91-94% across all tasks though x1 drop to 89% with thin liquids without chin tuck, quickly recovering. Suspect decreased oral control/ increased premature spillage with thin liquids compared to nectar thick liquids and puree solids. Pt reporting globus sensation and independently completing double swallows with puree solids. Pt with x1 throat clear and x2 coughs with thin liquids without chin tuck, x1 delayed cough wtih thin liquids wtih chin tuck when maneuver was not completed accurately. No overt signs/sx of aspiration with accurate use of chin tuck, nectar thick liquids or puree solids. Pt reporting he liked the thin liquids better than the nectar thick liquids, family also wishes to pursue thin liquids with chin tuck vs nectar thick liquids. Speech would recommend diet initiation of nectar thick liquids and puree solids, however pt/family wish to pursue thin liquids with chin tuck. Pt/family educated on risks/possible complications of aspiration. Follow strategies: only allow intake when fully alert/upright, slow pace, rest breaks when SOB/increased WOB noted, small bites/sips, alterate solids/liquids. MD please place diet order as appropriate. Speech to closely monitor tolerance, POC/GOC  and complete video swallow study if appropriate in future.   Barriers to return to prior living situation: deconditioning, weakness, current respiratory status, medical decline  Recommendations for discharge: TBD pending progress made in acute care, may benefit from post-acute intervention, refer to PT/OT for further recommendations  Rationale for recommendations: below baseline, risk for aspiration       Entered by: Kacey Ford 09/28/2018 10:23 AM

## 2018-09-28 NOTE — PLAN OF CARE
Problem: Patient Care Overview  Goal: Plan of Care/Patient Progress Review  Outcome: Improving  Start shift with oxymask at 15L. Port CXR done. Received 40mg Lasix IV with 580cc returned over 8 hours-MD aware. Have weaned to 6L oxymizer and 94%.  Up to chair and commode with 2 assist, gait belt and walker. Started on regular diet with cues to tuck chin with swallows. Fine crackles on left posterior. Family very involved.

## 2018-09-28 NOTE — CONSULTS
"[facility]    Palliative Care Consultation   Text Page    Date of Admission:  9/24/2018    Assessment & Plan   Edison Boss is a 89 year old male who was admitted on 9/24/2018. I was asked to see the patient for goals of care in setting of \"declining clinical condition\" which has overnight improved with subsequent identification of kidney stone and ureteral stent placement.    Recommendations:  1.Decisional Capacity -  Questionable, mental status improving.. Patient has an advance directive per family but it is not available in our system despite family reports they have brought it in multiple times.  They note daughter Red in [facility] and Dtr Bea is alternate.  Include patient in decision making as much as possible but involve health care agent attempting consensus with patient. Until document is validated per  policy next of kin would be agents, Patient is , he has 8 adult children, who appear to agree to have Red and Bea be spokespeople for the family.   2. Pain- Intractable back pain reported yesterday, now denies.  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  Now reported chronically well managed with yearly botox injections.   3. Delirium- Multifactorial due to pain, infection, improving.    4. Spiritual Care- Oriented to Spiritual Health Services as part of Palliative Care team.  5. Care Planning- Open to services with attempt to return to baseline.    Goal of Care:DNR/DNI, Restorative.  Selective treatment, uninterested in testing only for a diagnosis that may not be acted upon, for instance, unclear as previously planned if video swallow study should continue to be pursued in setting of improvement and now explainable cause of presenting symptoms.     Disease Process/es & Symptoms:  Edison Boss is a 89 year old patient admitted with symptoms of acute encephalopathy, initially of unclear cause, he has been treated for CAP and " potential aspiration, possible HF exacerbation, MADELYN and severe pain who was ultimately found to have an obstructing left proximal ureteral stone and now POD#1 left ureteral stent placement. With resolution of pain and improvement in mental status. He continues to be receive broad coverage for possible nosocomial pneumonia.     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  He lives independently at baseline, noted needed more assist from adult children checking in after his wife passed away in May.  HE is noted to have declined in functional status quickly over the past 2 weeks including increased weakness and falls, as well as change in mentation.  HE was hospitalized earlier this month as a part of this decline, but previously not since January for a pacemaker.  He has no noted weight loss.    Findings & plan of care discussed with: Nursing and daughters Bea and Red.  Follow-up plan from palliative team: Will continue to follow if patient is still hospitalized on Monday.  HAve requested advance directive to place on file.  Thank you for involving us in the patient's care.     Viridiana Anaya APRN, CNS, CNP  Pain Management and Palliative Care  [facility]  Pgr: [phone]    Time Spent on this Encounter   I spent  60 minutes total, >50% in assessment of the patient, counseling and discussion with the patient and family as documented in this note and coordination of care and discussion with the health care team.    Reason for Consult   Reason for consult: I was asked by Dr. Barron to evaluate this patient for Goals of care  Patient and family support.    Primary Care Physician   Porfirio Terrell MD    Chief Complaint   None today    History is obtained from the patient, electronic health record and patient's daughters Red and Bea.    History of Present  "Illness   Edison Boss is a 89 year old male who presents with symptoms of acute encephalopathy, initially of unclear cause, he has been treated for CAP and potential aspiration, possible HF exacerbation, MADELYN and severe pain who was ultimately found to have an obstructing left proximal ureteral stone and now POD#1 left ureteral stent placement. With resolution of pain and improvement in mental status. He continues to be receive broad coverage for possible nosocomial pneumonia.     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  He lives independently at baseline, noted needed more assist from adult children checking in after his wife passed away in May.  HE is noted to have declined in functional status quickly over the past 2 weeks including increased weakness and falls, as well as change in mentation.  HE was hospitalized earlier this month as a part of this decline, but previously not since January for a pacemaker.  He has no noted weight loss.    The following symptoms are noted by patient as concerning to his quality of life.  Pain - Denies today, with a smile as reports back pain was severe yesterday.    Dyspnea - Denies, although appears mildly labored with breathing.   Fatigue   Delirium- still some difficulty \"thinking clearly\"    Decision-Making & Goals of Care:  Discussed on September 28, 2018 with Viridiana Anaya APRN, CNS, CNP:   Discussed with Patient's daughters Red and Bea and another son at patient's bedside.  Patient participates little as he drifts off to sleep.  They reviewed patient decline in function and health over the past 2 weeks and feeling as if the decline was fast.  They note patient has needed more help since his wife passed away this Spring, but remaining strong.  They reviewed some of his history of chronic pain and treatment for " prostate cancer.  They reviewed a very distressing day yesterday with patient's escalating pain and delirium and concern of not finding a cause until finally the stone was found and surgery offered.  They are pleased with his recovery today and hopeful he can improve enough to return to his baseline, but also aware this may be a setback.  They note goal of recovery but also not wanting tests that do not improve his current status and now are just looking for more problems, as an example a carotid ultrasound that was at some point this visit or another offered, and now with improvement a video swallow if swallow may improve on its own.    Family agrees to bring in advance directive although note it has been brought into the hospital multiple times.       Patient has decision-making capacity Questionable  Patient has no known legal document designating a decision maker. Per policy next of kin is the designated decision maker currently available. See System Informed Consent policy for guidance.  There is no POLST (Physician orders for life-sustaining treatment) form on file for this patient  Code Status: Do not resusitate / Do not intubate     Past Medical History   I have reviewed this patient's medical history and updated it with pertinent information if needed.   Past Medical History:   Diagnosis Date     Calculus of kidney     in dwelling [place]     Chronic infection     UTI     Chronic pain     lower pain, perineum     Esophageal reflux      Hyperlipidaemia      Hypertension      Malignant neoplasm of prostate (H) 8/14/2002    Brachytherapy, then external radiation. chronic indwelling catheter     Mild persistent asthma     with URI     Paroxysmal a-fib (H)     paroxysmal     Sinus node dysfunction (H)     sp DDD PM     Sleep apnea     ?   snores     Unspecified cerebral artery occlusion with cerebral infarction        Past Surgical History   I have reviewed this patient's surgical history and updated it with  pertinent information if needed.  Past Surgical History:   Procedure Laterality Date     C NONSPECIFIC PROCEDURE  1980's    Kidney stone surgery     C NONSPECIFIC PROCEDURE  1985, 5/2005    TURP x 2     C NONSPECIFIC PROCEDURE  1/2002    Brachytherapy     C NONSPECIFIC PROCEDURE  ~1999    Left rotator cuff repair     C NONSPECIFIC PROCEDURE      Bilateral cataract surgery     C NONSPECIFIC PROCEDURE      EGD/dilation twice     CYSTOSCOPY       CYSTOSCOPY, INJECT COLLAGEN, COMBINED  6/6/2012    Procedure:COMBINED CYSTOSCOPY, INJECT BULKING AGENT; VIDEO CYSTOSCOPY WITH BOTOX INJECTIONS  ; Surgeon:BRIAN ADAN; Location:[facility] OR     CYSTOSCOPY, INJECT COLLAGEN, COMBINED  3/22/2013    Procedure: COMBINED CYSTOSCOPY, INJECT BULKING AGENT;  VIDEO CYSTOSCOPY, BOTOX INJECTION;  Surgeon: Brian Adan MD;  Location: [facility] OR     CYSTOSCOPY, INJECT COLLAGEN, COMBINED  8/8/2014    Procedure: COMBINED CYSTOSCOPY, INJECT BULKING AGENT;  Surgeon: Brian Adan MD;  Location: [facility] OR     CYSTOSCOPY, INJECT COLLAGEN, COMBINED N/A 8/12/2015    Procedure: COMBINED CYSTOSCOPY, INJECT BULKING AGENT;  Surgeon: Brian Adan MD;  Location: [facility] OR     CYSTOSCOPY, INJECT COLLAGEN, COMBINED N/A 12/8/2016    Procedure: COMBINED CYSTOSCOPY, INJECT BULKING AGENT;  Surgeon: Brian Adan MD;  Location: [facility] OR     HC REMOVE TONSILS/ADENOIDS,<11 Y/O  ~1928    T & A <12y.o.     IMPLANT PACEMAKER       PENIS SURGERY       PROSTATE SURGERY         Prior to Admission Medications   Prior to Admission Medications   Prescriptions Last Dose Informant Patient Reported? Taking?   Colloidal Oatmeal (AVEENO ECZEMA THERAPY) 1 % CREA   Yes Yes   Sig: Externally apply topically daily as needed    D-MANNOSE POWD   Yes Yes   Sig: Take 100 mg by mouth daily 500 mg Pt takes 2 pills daily   Misc Natural Products (COLON CLEANSER PO)   Yes Yes   Sig: Take 1 capsule by mouth daily Advanced Colon Care II   Multiple Vitamins-Minerals (PRESERVISION AREDS) CAPS    No Yes   Sig: Take 1 capsule by mouth 2 times daily   albuterol (PROVENTIL HFA: VENTOLIN HFA) 108 (90 BASE) MCG/ACT inhaler   No Yes   Sig: Inhale 2 puffs into the lungs every 6 hours as needed for shortness of breath / dyspnea.   amLODIPine (NORVASC) 5 MG tablet 9/24 - ? at ?  No Yes   Sig: TAKE 1 TABLET(5 MG) BY MOUTH DAILY   cetirizine (ZYRTEC) 10 MG tablet   Yes Yes   Sig: Take 10 mg by mouth daily as needed   clobetasol (TEMOVATE) 0.05 % ointment   Yes Yes   Sig: daily as needed    fluconazole (DIFLUCAN) 200 MG tablet 9/23 - ? at Unknown time  No Yes   Sig: Take 1 tablet (200 mg) by mouth daily for 12 days   lidocaine (LMX4) 4 % CREA 4% topical cream   Yes Yes   Sig: Apply topically daily as needed   omeprazole (PRILOSEC) 20 MG CR capsule   Yes Yes   Sig: Take 20 mg by mouth daily as needed   oxyCODONE-acetaminophen (PERCOCET) 5-325 MG per tablet 9/23/2018 at Unknown time  No Yes   Sig: Take 1 tablet by mouth every 4 hours as needed for pain for pain.      Facility-Administered Medications: None     Allergies   Allergies   Allergen Reactions     Ceftriaxone Itching     Bactrim Hives     Levaquin Diarrhea     Oral only, can tolerate IV     Zithromax [Azithromycin]      Macrodantin [Nitrofuran Derivatives] Rash     Took recently with no problems       Social History   I have updated and reviewed the following Social History Narrative:   Social History     Social History Narrative    .Living situation (location, living with others?):Lives with wife in [facility]    Activities of daily living (e.g., dressing, eating, walking):Independent        Instrumental activities of daily living (e.g., finance management, housekeeping, meal planning/ prep): Wife and kids help with preparing meals, shopping, driving, climbing stairs, complex decisions                 Meaningful Activities (e.g., hobbies, work): loves music, uses the computer, likes [employer]         Support:Wife and daughter        Community Resources Used/  Interested in: Referred to Herb      Living situation: Sr. apartment  Family system:  this Spring, 8 adult children, 5 of whom live locally  Functional status (needs help with ADLs or IADLs): independent with most ADL's at baseline.  Employment/education: Retired [employer]  Use of community resources: unknown  Activities/interests: unknown  History of substance use/abuse: unknown  Jain affiliation: unknown  Involvement in khang community: unknown    Family History   I have reviewed this patient's family history and updated it with pertinent information if needed.   Family History   Problem Relation Age of Onset     Family History Negative Father       age 91     HEART DISEASE Mother      pacemaker     Family History Negative Mother       in her sleep 89     Cancer Brother      oldest brother - lung cancer,  age 74     Cancer Brother      3rd brother - lymphoma,  age 76     Cancer Brother      2nd brother (Carrington)- prostate cancer, born 1920.      Cerebrovascular Disease Brother      Brother (Carrington), born in 1920       Review of Systems   The 10 point Review of Systems is negative other than noted in the HPI or here. Limited with patients drowsiness/ mild confusion.     Palliative Symptom Review (0=no symptom/no concern, 1=mild, 2=moderate, 3=severe):      Pain: 0-none      Fatigue: 2-moderate      Nausea: 0-none      Constipation:       Diarrhea: 0-none      Depressive Symptoms:       Anxiety: 0-none      Drowsiness: 2-moderate      Poor Appetite:       Shortness of Breath: 0-none      Insomnia: 0-none    Physical Exam   Temp:  [95.3  F (35.2  C)-97.1  F (36.2  C)] 96  F (35.6  C)  Heart Rate:  [60-85] 65  Resp:  [10-16] 16  BP: (117-146)/(45-69) 122/49  FiO2 (%):  [15 %] 15 %  SpO2:  [87 %-96 %] 95 %  181 lbs 1.6 oz  GEN:  Drowsy, oriented x 3, appears comfortable, NAD.  HEENT:  Normocephalic/atraumatic, no scleral icterus, no nasal discharge, mouth moist.  CV:  RRR, S1, S2; no  murmurs or other irregularities noted.  +3 DP/PT pulses bilatererally;  edema BLE and LUE.  RESP:  Clear to auscultation bilaterally without rales/rhonchi/wheezing/retractions.  Symmetric chest rise on inhalation noted.  Normal respiratory effort.  ABD:  Rounded, soft, non-tender/non-distended.  +BS  EXT:  Edema & pulses as noted above.  CMS intact x 4.       SKIN:  Dry to touch, no exanthems noted in the visualized areas.    NEURO: Generalized weakness, no focal deficits noted. Drowsy.   Psych:  Calm, cooperative, conversant appropriately when awake, admits to not thinking as clear as normal. .     Delirium Screen/CAM:  Delirium = (#1 and #2 = YES) + (#3 and/or #4)   1) Acute onset and fluctuating course:   YES   (acute change in mental status from baseline over last 24 hours)  2) Inattention:   YES   (difficulty focusing, distractible, can't follow conversation)  3) Disorganized thinking:   No   (score only if #1 and #2 are YES)  (rambling/irrelevant conversation, unclear/illogical thoughts, inconsistency)  4) Altered level of consciousness:   YES   (score only if #1 and #2 are YES)  (other than alert, calm, cooperative)    Delirium/CAM score: 3/4  Interpretation:  1)  Delirium:  Present  2)  Type:  hypoactive  3)  Severity:  mild    Data   Results for orders placed or performed during the hospital encounter of 09/24/18 (from the past 24 hour(s))   Anaerobic bacterial culture   Result Value Ref Range    Specimen Description Renal pelvic Left Fluid SPECIMEN 1     Special Requests Received in anaerobic tubes.     Culture Micro Culture negative monitoring continues    Fluid Culture Aerobic Bacterial   Result Value Ref Range    Specimen Description Renal pelvic Left Fluid SPECIMEN 1     Culture Micro Culture negative monitoring continues    Gram stain   Result Value Ref Range    Specimen Description Renal pelvic Left Fluid SPECIMEN 1     Gram Stain No organisms seen     Gram Stain Moderate  WBC'S seen  PMNs seen       XR Surgery WHITNEY Fluoro L/T 5 Min w Stills    Narrative    SURGERY C-ARM FLUOROSCOPY LESS THAN FIVE MINUTES WITH STILLS   9/27/2018 3:18 PM     COMPARISON: CT 9/27/2018.    HISTORY: Left stent placement, left stones.    NUMBER OF IMAGES ACQUIRED: 2    VIEWS: 1    FLUOROSCOPY TIME: .2 minutes.      Impression    IMPRESSION: Left retrograde ureterogram demonstrates a filling defect  in the proximal left ureter. This probably corresponds to the  obstructing stone seen on prior CT. There is moderate left  hydronephrosis. Final image demonstrates stent that appears to be in  good position. Please see operative report for further details.    RICKEY LOPEZ MD   Legionella pneumonia antigen urine   Result Value Ref Range    Specimen Description Urine     L Pneumo Urine Antigen       Presumptive negative for Legionella pneumophilia serogroup 1 antigen in urine, suggesting   no recent or current infection.  Infection due to Legionella cannot be ruled out, since   other serogroups and species may cause disease, antigen may not be present in urine in   early infection, and the level of antigen present in the urine may be below detectable   limits of the test.     Comprehensive metabolic panel   Result Value Ref Range    Sodium 140 133 - 144 mmol/L    Potassium 4.1 3.4 - 5.3 mmol/L    Chloride 107 94 - 109 mmol/L    Carbon Dioxide 27 20 - 32 mmol/L    Anion Gap 6 3 - 14 mmol/L    Glucose 106 (H) 70 - 99 mg/dL    Urea Nitrogen 25 7 - 30 mg/dL    Creatinine 1.67 (H) 0.66 - 1.25 mg/dL    GFR Estimate 39 (L) >60 mL/min/1.7m2    GFR Estimate If Black 47 (L) >60 mL/min/1.7m2    Calcium 8.2 (L) 8.5 - 10.1 mg/dL    Bilirubin Total 0.5 0.2 - 1.3 mg/dL    Albumin 1.7 (L) 3.4 - 5.0 g/dL    Protein Total 6.2 (L) 6.8 - 8.8 g/dL    Alkaline Phosphatase 146 40 - 150 U/L    ALT 25 0 - 70 U/L    AST 22 0 - 45 U/L   CBC with platelets differential   Result Value Ref Range    WBC 24.4 (H) 4.0 - 11.0 10e9/L    RBC Count 3.95 (L) 4.4 - 5.9 10e12/L  "   Hemoglobin 11.6 (L) 13.3 - 17.7 g/dL    Hematocrit 36.3 (L) 40.0 - 53.0 %    MCV 92 78 - 100 fl    MCH 29.4 26.5 - 33.0 pg    MCHC 32.0 31.5 - 36.5 g/dL    RDW 17.3 (H) 10.0 - 15.0 %    Platelet Count 411 150 - 450 10e9/L    Diff Method Automated Method     % Neutrophils 82.4 %    % Lymphocytes 3.7 %    % Monocytes 8.7 %    % Eosinophils 2.3 %    % Basophils 0.4 %    % Immature Granulocytes 2.5 %    Nucleated RBCs 0 0 /100    Absolute Neutrophil 20.1 (H) 1.6 - 8.3 10e9/L    Absolute Lymphocytes 0.9 0.8 - 5.3 10e9/L    Absolute Monocytes 2.1 (H) 0.0 - 1.3 10e9/L    Absolute Eosinophils 0.6 0.0 - 0.7 10e9/L    Absolute Basophils 0.1 0.0 - 0.2 10e9/L    Abs Immature Granulocytes 0.6 (H) 0 - 0.4 10e9/L    Absolute Nucleated RBC 0.0    XR Chest Port 1 View    Narrative    CHEST ONE VIEW PORTABLE   9/28/2018 9:52 AM     HISTORY: Hypoxia.     COMPARISON: 9/26/2018.      Impression    IMPRESSION: There is perihilar \"batwing\" interstitial opacities  suspicious for acute pulmonary edema. Heart size similar to prior.  There are small bilateral pleural effusions. No pneumothorax.  Left-sided pacer device noted.    WILL BRICENO MD     "

## 2018-09-28 NOTE — PROGRESS NOTES
Patient was seen and examined by me this morning.  He is doing much better today.  He is alert and oriented x3.  Still requires plenty of oxygen received 2 L via facemask.  Multiple family members at bedside.  He has no fever or chills.  Pain is much better.  Labs are improving including WBC count.  Appreciate input from urology, surgery and infectious disease team.  Patient is currently on broad-spectrum antibiotic including doxycycline, Zyvox and Zosyn.  Patient is also on micafungin for positive yeast and blood culture obtained on 24 September.  Patient is gradually improving, continue to monitor for now.  Questions and concerns of patient's family addressed.  Detailed progress note at a later time

## 2018-09-28 NOTE — PROGRESS NOTES
[facility]  Infectious Disease Progress Note          Assessment and Plan:   IMPRESSION:   1.  A 89-year-old male with 2+ week progressive deterioration in health, some hypoxia and possible infiltrate, recent abnormal urine, but with unremarkable cultures, some intermittent abdominal pain and recently reduced hernia.  Primary etiology of syndrome now likely aparrent with CT L ureteral obstruction and BC candida   2.  Cognitive dysfunction, probably encephalopathy from the current acute infection on top of mild dementia.   3.  Abdominal pain, previously part of this now occurring again.  CT scan with new finding L ureteral obstuction  4.  Definite infiltrates and hypoxia, certainly at risk for aspiration and appearance suggestive of that, enough health care contact to be at risk for nosocomial pathogens.   5.  Acute renal insufficiency, undoubtedly acute tubular necrosis from the current acute illness, but also got vancomycin.   6.  Prior cerebrovascular infarction.   7.  Possible fluid and congestive heart failure component.   8 Ongoing reducible abd hernia  9 gall stones      RECOMMENDATIONS:   1.  micafungin at usual 100 mg dose, but will likely be C albicans and then switch to flucon, very likely obstructed kidney as cause, await ureteral cx from stent, current UC no candida but 9/16 at onset of illness had candida  2.  Follow kidney function, respiratory function, cultures and await CT scan.   3.  Broad coverage as though pneumonia with nosocomial coverage.  Had added linezolid to the Zosyn but doubt needed as urine/candidemia likely is main issue, discontinue doxy.    4 Recheck Bc  5 Discussed with pt and family, still guarded prognosis but looks better            Interval History:   no new complaints more awake, hypoxia stable, abd pain better CT with gall bladder issue but more sig had L ureteral obstruction candida in Bc, prior UC C albicans, current UC neg, new stent collected UC  pending              Medications:       acetaminophen  650 mg Oral Q8H    Or     acetaminophen  650 mg Rectal Q8H     amLODIPine  5 mg Oral Daily     aspirin  81 mg Oral Daily     influenza Vac Split High-Dose  0.5 mL Intramuscular Prior to discharge     ipratropium - albuterol 0.5 mg/2.5 mg/3 mL  3 mL Nebulization 4x daily     linezolid  600 mg Intravenous Q12H     micafungin  100 mg Intravenous Q24H     omeprazole  20 mg Oral QAM AC     piperacillin-tazobactam  3.375 g Intravenous Q8H NAKIA     PRESERVISION AREDS  1 capsule Oral BID     sodium chloride (PF)  3 mL Intracatheter Q8H                  Physical Exam:   Blood pressure 122/49, pulse 62, temperature 96  F (35.6  C), temperature source Axillary, resp. rate 16, weight 82.1 kg (181 lb 1.6 oz), SpO2 95 %.  Wt Readings from Last 2 Encounters:   09/28/18 82.1 kg (181 lb 1.6 oz)   09/17/18 80.1 kg (176 lb 9.6 oz)     Vital Signs with Ranges  Temp:  [95.3  F (35.2  C)-97.1  F (36.2  C)] 96  F (35.6  C)  Heart Rate:  [60-85] 65  Resp:  [10-16] 16  BP: (117-146)/(45-69) 122/49  FiO2 (%):  [15 %] 15 %  SpO2:  [87 %-96 %] 95 %    Constitutional: Awake, alert, looks better, no apparent distress   Lungs: Clear to auscultation bilaterally, no crackles or wheezing   Cardiovascular: Regular rate and rhythm, normal S1 and S2, and no murmur noted   Abdomen: Normal bowel sounds, soft, non-distended, non-tender   Skin: No rashes, no cyanosis, no edema   Other:           Data:   All microbiology laboratory data reviewed.  Recent Labs   Lab Test  09/28/18   0613  09/27/18   0730  09/26/18   0707   WBC  24.4*  34.4*  24.0*   HGB  11.6*  13.3  13.5   HCT  36.3*  40.9  41.5   MCV  92  91  92   PLT  411  441  392     Recent Labs   Lab Test  09/28/18   0613  09/27/18   0730  09/26/18   0707   CR  1.67*  2.02*  1.75*     Recent Labs   Lab Test  05/08/15   1510   SED  7     Recent Labs   Lab Test  09/27/18   1512  09/24/18   2050  09/24/18   1203  09/16/18   2303  09/16/18   2207   09/16/18   2100  08/28/18   1240  08/25/18   2031  10/19/16   1259   CULT  Culture negative monitoring continues  Culture negative monitoring continues  <1000 colonies/mL  urogenital jeanette  Susceptibility testing not routinely done    No growth after 4 days  Cultured on the 3rd day of incubation:  Yeast  *  Critical Value/Significant Value, preliminary result only, called to and read back by  Willow Nesbitt RN, @7401 09/27/18..    No growth  50,000 to 100,000 colonies/mL  Candida albicans / dubliniensis  Candida albicans and Candida dubliniensis are not routinely speciated  Susceptibility testing not routinely done  *  No growth  10,000 to 50,000 colonies/mL  Escherichia coli  *  >100,000 colonies/mL  Enterococcus faecalis  *  >100,000 colonies/mL  mixed urogenital jeanette    >100,000 colonies/mL Non lactose fermenting gram negative rods  50,000 to 100,000 colonies/mL Strain 2 Non lactose fermenting gram negative rods  >100,000 colonies/mL Gram positive bacilli resembling Lactobacillus  <10,000 colonies/mL Gram positive cocci in clusters  <10,000 colonies/mL Strain 2 Gram positive cocci in clusters  No further identification Susceptibility testing not routinely done  *

## 2018-09-28 NOTE — CONSULTS
GASTROENTEROLOGY CONSULTATION      Edison Boss  [address]   [facility] [id number]  89 year old male     Admission Date/Time: 9/24/2018  Primary Care Provider: Porfirio Terrell  Referring / Attending Physician: Dr. Means     We were asked to see the patient in consultation by Dr. Means for evaluation of biliary stone.        HPI:  Edison Boss is a 89 year old male with medical history of stroke, atrial fibrillation, prostate cancer, hypertension, chronic pain syndrome who presented to the hospital with confusion.    Gastroenterology was consulted after CT of the abdomen and pelvis showed a common bile duct stone without obvious obstruction.    Patient is currently being monitored as he continues to have acute encephalopathy.  This is thought to be related to pneumonia.  He then developed acute hypoxic respiratory failure and abdominal pain.  He has a large right inguinal hernia which was not reducible.  Which was unable to be reduced.  General surgery did not believe him to be a good candidate.  Patient is unable to report his symptoms currently.  At one point he denies abdominal pain and then upon further questioning he says yes he has abdominal pain.     From a GI standpoint the patient's liver function tests are normal.  It is unlikely that the stone in the bile duct is causing any current symptoms.        PAST MEDICAL HISTORY:  Patient Active Problem List    Diagnosis Date Noted     Acute encephalopathy 09/24/2018     Priority: Medium     Pneumonia 09/17/2018     Priority: Medium     MCI (mild cognitive impairment) 10/20/2016     Priority: Medium     Patient with history of cerebral infarct: now with mild cognitive changes affecting executive function compounded by depression       RAMÓN (generalized anxiety disorder) 05/05/2016     Priority: Medium     Sinus node dysfunction (H)      Priority: Medium     Pacemaker      Priority: Medium     Cerebral infarction (H) 02/14/2014      Priority: Medium     Diagnosis updated by automated process. Provider to review and confirm.       SBO (small bowel obstruction) 11/30/2012     Priority: Medium     Chronic atrial fibrillation (H) 07/02/2012     Priority: Medium     HYPERLIPIDEMIA LDL GOAL <100 10/31/2010     Priority: Medium     Restless legs syndrome (RLS) 11/14/2007     Priority: Medium     Insomnia 04/04/2007     Priority: Medium     Problem list name updated by automated process. Provider to review       Mild persistent asthma      Priority: Medium     Takes Singulair, Spiriva        Essential hypertension with goal blood pressure less than 140/90 06/06/2006     Priority: Medium     Problem list name updated by automated process. 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 Provider to review       Other specified disorder of skin 09/01/2004     Priority: Medium     Malignant neoplasm of prostate (H) 08/14/2002     Priority: Medium          ROS: A comprehensive ten point review of systems was negative aside from those in mentioned in the HPI.       MEDICATIONS:   Prior to Admission medications    Medication Sig Start Date End Date Taking? Authorizing Provider   albuterol (PROVENTIL HFA: VENTOLIN HFA) 108 (90 BASE) MCG/ACT inhaler Inhale 2 puffs into the lungs every 6 hours as needed for shortness of breath / dyspnea. 11/19/12  Yes Porfirio Terrell MD   amLODIPine (NORVASC) 5 MG tablet TAKE 1 TABLET(5 MG) BY MOUTH DAILY 9/15/18  Yes Porfirio Terrell MD   cetirizine (ZYRTEC) 10 MG tablet Take 10 mg by mouth daily as needed   Yes Reported, Patient   clobetasol (TEMOVATE) 0.05 % ointment daily as needed  9/30/14  Yes Lewis Nuñez   Colloidal Oatmeal (AVEENO ECZEMA THERAPY) 1 %  CREA Externally apply topically daily as needed    Yes Reported, Patient   D-MANNOSE POWD Take 100 mg by mouth daily 500 mg Pt takes 2 pills daily 11/24/10  Yes Porfirio Terrell MD   fluconazole (DIFLUCAN) 200 MG tablet Take 1 tablet (200 mg) by mouth daily for 12 days 9/20/18 10/2/18 Yes Red Zamora MD   lidocaine (LMX4) 4 % CREA 4% topical cream Apply topically daily as needed   Yes Reported, Patient   Misc Natural Products (COLON CLEANSER PO) Take 1 capsule by mouth daily Advanced Colon Care II   Yes Reported, Patient   Multiple Vitamins-Minerals (PRESERVISION AREDS) CAPS Take 1 capsule by mouth 2 times daily 18  Yes Porfirio Terrell MD   omeprazole (PRILOSEC) 20 MG CR capsule Take 20 mg by mouth daily as needed   Yes Unknown, Entered By History   oxyCODONE-acetaminophen (PERCOCET) 5-325 MG per tablet Take 1 tablet by mouth every 4 hours as needed for pain for pain. 8/10/17  Yes Porfirio Terrell MD        ALLERGIES:   Allergies   Allergen Reactions     Ceftriaxone Itching     Bactrim Hives     Levaquin Diarrhea     Oral only, can tolerate IV     Zithromax [Azithromycin]      Macrodantin [Nitrofuran Derivatives] Rash     Took recently with no problems        SOCIAL HISTORY:  Social History   Substance Use Topics     Smoking status: Former Smoker     Packs/day: 1.00     Years: 40.00     Smokeless tobacco: Never Used      Comment: QUIT      Alcohol use No        FAMILY HISTORY:  Family History   Problem Relation Age of Onset     Family History Negative Father       age 91     HEART DISEASE Mother      pacemaker     Family History Negative Mother       in her sleep 103     Cancer Brother      oldest brother - lung cancer,  age 74     Cancer Brother      3rd brother - lymphoma,  age 76     Cancer Brother      2nd brother (Carrington)- prostate cancer, born 1920.      Cerebrovascular Disease Brother      Brother (Carrington), born in 1920        PHYSICAL EXAM:     /56 (BP Location: Right  arm)  Pulse 62  Temp 96  F (35.6  C) (Axillary)  Resp 16  Wt 82.1 kg (181 lb 1.6 oz)  SpO2 94%  BMI 25.99 kg/m2     PHYSICAL EXAM:  GENERAL: NAD  SKIN: no suspicious lesions, rashes  HEAD: Normocephalic. Atraumatic.  NECK: Neck supple. No adenopathy.   EYES: No scleral icterus  RESPIRATORY: poor transmission. Decreased bilaterally.   CARDIOVASCULAR: RRR, normal S1, S2,  GASTROINTESTINAL: +BS, soft, non tender, non distended  JOINT/EXTREMITIES:  no gross deformities noted, normal muscle tone  NEURO: CN 2-12 grossly intact, no focal deficits        ADDITIONAL COMMENTS:   I reviewed the patient's new clinical lab test results.   Recent Labs   Lab Test  09/28/18   0613  09/27/18   0730  09/26/18   0707   08/28/18   0946  09/24/15 09/03/15   WBC  24.4*  34.4*  24.0*   < >  8.7   < >   --    --    HGB  11.6*  13.3  13.5   < >  14.9   < >   --    --    MCV  92  91  92   < >  91   < >   --    --    PLT  411  441  392   < >  283   < >   --    --    INR   --    --    --    --   1.09   --   1.8*  2.2*    < > = values in this interval not displayed.     Recent Labs   Lab Test  09/28/18   0613 09/27/18   0730  09/26/18   0707   POTASSIUM  4.1  4.1  4.3   CHLORIDE  107  106  108   CO2  27  26  24   BUN  25  28  28   ANIONGAP  6  8  8     Recent Labs   Lab Test  09/28/18   0613  09/25/18   0602  09/24/18   2050  09/16/18   2208  09/16/18   2102  08/28/18   1240   08/25/18   2031   05/21/13   1735   11/29/12   2112   ALBUMIN  1.7*  2.1*   --    --   3.2*   --    --   3.8   < >  3.9   < >  4.1   BILITOTAL  0.5  0.6   --    --   1.0   --    --   0.5   < >  1.2   < >  0.8   ALT  25  31   --    --   27   --    --   19   < >  48   < >  29   AST  22  23   --    --   44   --    --   18   < >  70*   < >  35   PROTEIN   --    --   Negative  100*   --   Negative   < >   --    < >   --    --   30*   LIPASE   --    --    --    --    --    --    --   184   --   140   --   116    < > = values in this interval not displayed.        IMAGING  / ENDOSCOPY    CT CHEST, ABDOMEN AND PELVIS WITHOUT CONTRAST   9/27/2018 11:59 AM      IMPRESSION:  1. Mild-moderate pleural effusions, left greater than right.  2. Bilateral interstitial prominence and groundglass opacity. This is  nonspecific but could represent interstitial edema or interstitial  pneumonia.  3. Mild mediastinal and lower neck adenopathy.  4. 10 mm common bile duct stone with mild CBD dilatation.  5. 13 mm proximal left ureter stone with obstruction.  6. Bilateral inguinal hernias.  7. Minimal lower pelvic fluid.  8. Thyroid nodules.  9. Additional findings discussed above.     CONSULTATION ASSESSMENT AND PLAN:    Edison Boss is a 89 year old male with medical history of stroke, atrial fibrillation, prostate cancer, hypertension, chronic pain syndrome who presented to the hospital with confusion found to have acute encephalopathy and pneumonia.    1.  Stone in common bile duct: normal LFTs and lipase.  This does not appear to be an obstructing stone.  Given his multiple comorbidities and worsening respiratory status no intervention at this time.   He is afebrile.  White count is 24.  He is currently being treated with multiple antibiotics and antifungals for pneumonia.      -- We will no longer follow.  Please call with questions or change in GI condition      I discussed the patient plan with Dr. Antunez. Thank you for asking us to participate in the care of this patient.    Shanita Still PA-C  [facility]

## 2018-09-28 NOTE — PROVIDER NOTIFICATION
12:15 AM  Paged Admitting MD  Patient hypoxic on 15 L Oxymask. Capnography with IPI 1 and CO2 14. VS otherwise stable. Can you see patient or have recommendations? Thank you.     12:22 AM  Dr. Slaughter assessed patient. At time of assessment patient oxygen level had increased to 92% on 15L. MD recommend that if continues to be hypoxic would try dose of lasix.

## 2018-09-29 NOTE — PLAN OF CARE
Problem: Patient Care Overview  Goal: Plan of Care/Patient Progress Review  Discharge Planner SLP   Patient plan for discharge: not stated  Current status: Pt seen for dysphagia treatment.  Nursing reports that patient has been tolerating regular diet with thin liquids with chin tuck.  Pt sitting in chair.  Independent with feeding.  Pt able to take single sips from straw without any difficulty.  Adequate oral management with straw sips of water (thin) using chin tuck consistently and independently.  No overt s/s aspiration.  Pt chewed shukri cracker with functional oral management of boluses, timely swallows and no oral residue after liquid wash at end of snack.  No overt s/s aspiration during this session.  Recommend continue with regular diet and thin liquids, sitting in chair, slow rate of intake, single sips of water with chin tuck and alternation of solids and liquids.   Barriers to return to prior living situation: none per slp  Recommendations for discharge: home with family  Rationale for recommendations: pt is meeting goals, will discharge from caseload tomorrow if continues to tolerate regular/thin with chin tuck.       Entered by: Amaya Levin 09/29/2018 10:46 AM

## 2018-09-29 NOTE — PROGRESS NOTES
Hospitalist Progress Note  [facility]      Mu Barron MD 09/29/2018      Reason for hospitalization: Acute encephalopathy, pneumonia, Sepsis          Assessment and Plan:        Brief patient summary of Stay:     Edison Boss is a 89 year old  male with a significant past medical history of multiple medical problems including history of prostate cancer status post brachytherapy followed by external radiation and chronic Christianson catheter, proximal atrial fibrillation, sinus node dysfunction status post dual-chamber pacemaker, sleep apnea, chronic pain, kidney stones who presents with encephalopathy.      Hospital course,Consults and procedures:  Patient has evidence of pulmonary infiltrate as well as leukocytosis concerning for lung infection.  He was admitted and was treated with intravenous antibiotic with IV Zosyn and vancomycin and was closely monitored.  Despite antibiotic therapy and trial of lasix , patient deteriorated morning of sep 27 and further work up and consultation pursued.  Further evaluation with CT scan of the abdomen chest and pelvis revealed evidence of urinary obstruction which required urology consultation and stent placement for left ureteral stone.  Patient's kidney function and leukocytosis much improved after that.  Infectious disease physician was consulted for further evaluation of patient due to concern for sepsis and positive blood culture with yeast.  Patient was on multiple antibiotics including Zosyn, Zyvox and micafungin.  Surgery service was also consulted due to concern for right inguinal hernia which was benign and reducible.  He is hospital stays complicated by pulmonary edema which is responding to intravenous Lasix therapy.  Patient also has dysphasia and was seen by speech therapy as well.    Overall patient's condition is gradually improving.  Palliative care team was consulted initially due to patient's decline and  multiple family  members involved in the care of this patient including one daughter who is also a nurse.        Hospital Active Problem List,Assessment and Plan:      Acute encephalopathy: Etiology likely toxic metabolic and infectious   -- improving   -- monitor     1. Acute hypoxic respiratory failure with bilateral effusion , suspect multifactorial- pneumonia, suspect aspiration and CHF   Community-acquired pneumonia:Bilateral interstitial prominence and groundglass opacity  -- Suspect component of aspiration as well   -- still hypoxic ,CXR  today showed improvement of pulmonary edema   -- discussed with ID and zyvox and doxy added   -- plan to monitor   --Continue to titrate down oxygen as tolerated, IV Lasix for the next 24 hours.       2. Elevated troponin: Without evidence of acute coronary syndrome, suspect demand ischemia.  Patient has elevated BNP at 7974 concerning for underlying cardiomyopathy.  --IVF stopped , lasix       3. Generalized deconditioning and weakness: Physical therapy as tolerated, discharge planning  4. Acute kidney injury:   -- obstructive uropathy with left ureteral stone   -- urology consulted and operative intervention done with stent placed    -- urology input appreciated, kidney function improving    5. Abdominal pain : Left ureteral stone , hernia , bladder spasm   -- pain med , B and O supp , urology consulted   --Pain much better after stent placement    6. Right inguinal hernia - reduced by Dr Means ,input appreciated   7. CBD stone on CT - Check LFTs , US , GI Consulted and recommendation noted to monitor LFT and no role for intervention at this time as patient has  no evidence of obstruction  8. Sepsis - suspect urologic vs pulmonary source  --  Yeast grew in blood culture   -- on abx ,micafungin per ID  , follow culture          #Pain management: # Pain Assessment:  Current Pain Score 9/29/2018   Patient currently in pain? denies   Pain score (0-10) -   Pain location -   Pain descriptors -    Edison mackenzie pain level was assessed and he currently denies pain.        #Indwelling devices: Indwelling Christianson catheter    #FEN : May eat as able       #DVT Prophylaxis: Heparin SQ        Code Status and Goal of care: DNR / DNI      Discharge Plan: Patient condition is gradually improving, anticipate hospital stay for the next 2-3 days if he continues to improve and likely needs rehab placement.  If patient condition did not improve family is ready to discuss alternative goal of care including comfort measures.        Interval History (Subjective):        Patient was seen and examined by me this morning.  He is up in chair and eating, denies any complaints except for some shortness of breath and cough which is productive of whitish sputum.  Patient's daughter by the name of Red who was present at bedside and her questions and concerns addressed.               Physical Exam:      Last Vital Signs:  Temp:  [95.3  F (35.2  C)-97.1  F (36.2  C)] 97.1  F (36.2  C)  Heart Rate:  [61-62] 62  Resp:  [16] 16  BP: (118-164)/(45-65) 164/65  SpO2:  [91 %-95 %] 92 %  I/O last 3 completed shifts:  In: 540 [P.O.:540]  Out: 2530 [Urine:2530]    Wt Readings from Last 5 Encounters:   09/29/18 80.1 kg (176 lb 9.6 oz)   09/17/18 80.1 kg (176 lb 9.6 oz)   09/12/18 76.1 kg (167 lb 11.2 oz)   09/04/18 76.7 kg (169 lb)   04/05/18 78 kg (172 lb)       GEN: alert and oriented   NECK:Supple ,no mass or thyromegaly   HEENT:  Normocephalic/atraumatic, no scleral icterus, no nasal discharge, mouth moist.  CV:  Regular rate and rhythm, no murmur or JVD.  S1 + S2 noted, no S3 or S4.  LUNGS: No increased work of breathing, decreased air entry bilaterally diffusely.  No crackles or wheezing evident.  No wheezing c  ABD: Active bowel sounds, soft, non-tender/non-distended.  No rebound/guarding/rigidity.  EXT: 1-2+ edema.  No cyanosis.  No joint synovitis noted.  SKIN:  Dry to touch, no exanthems noted in the visualized areas.  Neurologic:Grossly  intact,non focal                    Medications:      All current medications were reviewed with changes reflected in problem list.    Medications       acetaminophen  650 mg Oral Q8H    Or     acetaminophen  650 mg Rectal Q8H     amLODIPine  5 mg Oral Daily     aspirin  81 mg Oral Daily     influenza Vac Split High-Dose  0.5 mL Intramuscular Prior to discharge     ipratropium - albuterol 0.5 mg/2.5 mg/3 mL  3 mL Nebulization 4x daily     linezolid  600 mg Intravenous Q12H     micafungin  100 mg Intravenous Q24H     omeprazole  20 mg Oral QAM AC     piperacillin-tazobactam  3.375 g Intravenous Q8H NAKIA     PRESERVISION AREDS  1 capsule Oral BID     sodium chloride (PF)  3 mL Intracatheter Q8H                Data:          Data reviewed today: I reviewed all new labs and imaging results over the last 24 hours. I personally reviewed no images or EKG's today        Recent Labs  Lab 09/29/18  0652 09/28/18  0613 09/27/18  0730   WBC 18.4* 24.4* 34.4*   HGB 12.4* 11.6* 13.3   HCT 39.5* 36.3* 40.9   MCV 92 92 91   * 411 441       Recent Labs  Lab 09/28/18  1332 09/28/18  1331 09/27/18  1512 09/24/18  2050 09/24/18  1203   CULT No growth after 5 hours No growth after 5 hours Heavy growthCandida albicans / dubliniensisCandida albicans and Denise dubliniensis are not routinely speciatedSusceptibility testing not routinely done*  Culture negative monitoring continues <1000 colonies/mLurogenital floraSusceptibility testing not routinely done Cultured on the 3rd day of incubation:YeastReferred to mycology for identification*  Critical Value/Significant Value, preliminary result only, called to and read back byWillow Nesbitt RN, @0488 09/27/18..  Susceptibility testing in progress  No growth after 5 days       Recent Labs  Lab 09/29/18  0652 09/28/18  0613 09/27/18  0730 09/26/18  0707 09/25/18  0602    140 140 140 139   POTASSIUM 4.3 4.1 4.1 4.3 4.6   CHLORIDE 102 107 106 108 108   CO2 34* 27 26 24 22    ANIONGAP 3 6 8 8 9   * 106* 103* 88 82   BUN 20 25 28 28 28   CR 1.45* 1.67* 2.02* 1.75* 1.65*   GFRESTIMATED 45* 39* 31* 36* 39*   GFRESTBLACK 55* 47* 37* 44* 47*   ARYAN 8.8 8.2* 8.3* 8.4* 8.1*   MAG  --   --   --  2.6*  --    PROTTOTAL 6.8 6.2*  --   --  6.6*   ALBUMIN 1.9* 1.7*  --   --  2.1*   BILITOTAL 0.6 0.5  --   --  0.6   ALKPHOS 136 146  --   --  118   AST 34 22  --   --  23   ALT 33 25  --   --  31         Recent Labs  Lab 09/29/18  0652 09/28/18  0613 09/27/18  0730 09/26/18  0707 09/25/18  0602   * 106* 103* 88 82           No results for input(s): INR in the last 168 hours.        Recent Labs  Lab 09/24/18  1650 09/24/18  1204   TROPI 0.063* 0.061*       Recent Results (from the past 48 hour(s))   XR Chest 2 Views    Narrative    CHEST TWO VIEWS September 24, 2018 1:21 PM     HISTORY: Hypoxia. Cough.    COMPARISON: 9/16/2018.      Impression    IMPRESSION: Mild opacity at the left lung base has increased since the  previous exam, and may be related to pneumonia and/or atelectasis. No  other significant interval change. Dual-lead cardiac device left chest  wall, with lead tips projected over the RA and RV. Small area of  ill-defined opacity in the left upper lung medially is unchanged.  Heart size appears stable. There is mild pulmonary venous congestion.    GERTRUDE ELIZABETH MD   CT Head w/o Contrast    Narrative    CT SCAN OF THE HEAD WITHOUT CONTRAST   9/24/2018 5:34 PM     HISTORY: Altered mental status. Left-sided weakness.    TECHNIQUE: Axial images of the head and coronal reformations without  IV contrast material. Radiation dose for this scan was reduced using  automated exposure control, adjustment of the mA and/or kV according  to patient size, or iterative reconstruction technique.    COMPARISON: 9/16/2018    FINDINGS: There is generalized atrophy of the brain. There is low  attenuation in the white matter of the cerebral hemispheres consistent  with sequelae of small vessel  ischemic disease. There is no evidence  of intracranial hemorrhage, mass, acute infarct or anomaly.     The visualized portions of the sinuses and mastoids appear normal.  There is no evidence of trauma.      Impression    IMPRESSION:   1. No acute abnormality.  2. Atrophy of the brain. White matter changes consistent with sequelae  of small vessel ischemic disease. This is unchanged.    DANILO ALFONSO MD   US Lower Extremity Venous Duplex Bilateral    Narrative    BILATERAL LOWER EXTREMITY VENOUS DOPPLER ULTRASOUND  9/24/2018 7:12 PM    HISTORY: Hypoxia with elevated D-dimer. The concern is for deep venous  thrombosis.    COMPARISON: None.    FINDINGS: Color flow and Doppler spectral waveform analysis  demonstrates normal blood flow in the common femoral, femoral,  popliteal, posterior tibial, and greater saphenous veins of the lower  extremities bilaterally. No thrombus is seen.      Impression    IMPRESSION: There is no evidence of deep venous thrombosis within the  lower extremities bilaterally.    MELODY DE LEON MD   NM Lung Scan Ventilation and Perfusion    Narrative    NUCLEAR MEDICINE LUNG PERFUSION SCAN AND VENTILATION SCAN 9/24/2018  7:41 PM     HISTORY: Hypoxia with elevated D-dimer.    COMPARISON: Radiographs earlier today.    TECHNIQUE: Tc-99m MAA injected intravenously for evaluation of  pulmonary perfusion. Tc-99m DTPA inhaled for the ventilation phase.    DOSE: 3 mCi Tc99m MAA via IV at 1905; 64.8 mCi Tc99m DTPA inhaled at  1920.    FINDINGS: Normal radiotracer distribution throughout both lungs on the  ventilation and perfusion scans. No unmatched perfusion defects to  suggest pulmonary emboli.      Impression    IMPRESSION: Normal lung ventilation and perfusion scan. No  scintigraphic evidence of pulmonary embolism.    MELODY DE LEON MD               Disclaimer: This note consists of symbols derived from keyboarding, dictation and/or voice recognition software. As a result, there may be  errors in the script that have gone undetected. Please consider this when interpreting information found in this chart

## 2018-09-29 NOTE — PLAN OF CARE
Problem: Patient Care Overview  Goal: Plan of Care/Patient Progress Review  Outcome: No Change  Alert, disoriented to time. Denies pain. LS diminished. RUE 2+edema. Bilateral LE trace edema. Denies SOB. LS diminished. O2 92% on 6 L. Christianson patent and draining.  2 assist with walker for transfers. Tele: 100% A-paced

## 2018-09-29 NOTE — PROGRESS NOTES
[facility]  Infectious Disease Progress Note          Assessment and Plan:   IMPRESSION:   1.  A 89-year-old male with 2+ week progressive deterioration in health, some hypoxia and possible infiltrate, recent abnormal urine, but with unremarkable cultures, some intermittent abdominal pain and recently reduced hernia.  Primary etiology of syndrome now likely aparrent with CT L ureteral obstruction and BC candida   2.  Cognitive dysfunction, probably encephalopathy from the current acute infection on top of mild dementia.   3.  Abdominal pain, previously part of this now occurring again.  CT scan with new finding L ureteral obstuction  4.  Definite infiltrates and hypoxia, certainly at risk for aspiration and appearance suggestive of that, enough health care contact to be at risk for nosocomial pathogens.   5.  Acute renal insufficiency, undoubtedly acute tubular necrosis from the current acute illness, but also got vancomycin.   6.  Prior cerebrovascular infarction.   7.  Possible fluid and congestive heart failure component.   8 Ongoing reducible abd hernia  9 gall stones      RECOMMENDATIONS:   1.  micafungin at usual 100 mg dose, but will likely be C albicans and then switch to flucon, very likely obstructed kidney as cause, fluid also has candida, wait for ID on blood isolate   2.  Follow kidney function, respiratory function, cultures and await CT scan.   3.  Broad coverage as though pneumonia with nosocomial coverage.  Had added linezolid to the Zosyn but doubt needed as urine/candidemia likely is main issue       Family updated bedside             Interval History:   no new complaints more awake, hypoxia stable, abd pain better CT with gall bladder issue but more sig had L ureteral obstruction candida in Bc, prior UC C albicans, current UC neg, new stent collected UC pending              Medications:       acetaminophen  650 mg Oral Q8H    Or     acetaminophen  650 mg Rectal Q8H      amLODIPine  5 mg Oral Daily     aspirin  81 mg Oral Daily     influenza Vac Split High-Dose  0.5 mL Intramuscular Prior to discharge     ipratropium - albuterol 0.5 mg/2.5 mg/3 mL  3 mL Nebulization 4x daily     linezolid  600 mg Intravenous Q12H     micafungin  100 mg Intravenous Q24H     omeprazole  20 mg Oral QAM AC     piperacillin-tazobactam  3.375 g Intravenous Q8H NAKIA     PRESERVISION AREDS  1 capsule Oral BID     sodium chloride (PF)  3 mL Intracatheter Q8H                  Physical Exam:   Blood pressure 164/65, pulse 62, temperature 97.1  F (36.2  C), temperature source Oral, resp. rate 16, weight 80.1 kg (176 lb 9.6 oz), SpO2 92 %.  Wt Readings from Last 2 Encounters:   09/29/18 80.1 kg (176 lb 9.6 oz)   09/17/18 80.1 kg (176 lb 9.6 oz)     Vital Signs with Ranges  Temp:  [95.3  F (35.2  C)-97.1  F (36.2  C)] 97.1  F (36.2  C)  Heart Rate:  [61-62] 62  Resp:  [16] 16  BP: (118-164)/(45-65) 164/65  SpO2:  [91 %-95 %] 92 %    Constitutional: Awake, alert, looks better, no apparent distress   Lungs: Clear to auscultation bilaterally, no crackles or wheezing   Cardiovascular: Regular rate and rhythm, normal S1 and S2, and no murmur noted   Abdomen: Normal bowel sounds, soft, non-distended, non-tender   Skin: No rashes, no cyanosis, no edema   Other:           Data:   All microbiology laboratory data reviewed.  Recent Labs   Lab Test  09/29/18   0652  09/28/18   0613  09/27/18   0730   WBC  18.4*  24.4*  34.4*   HGB  12.4*  11.6*  13.3   HCT  39.5*  36.3*  40.9   MCV  92  92  91   PLT  460*  411  441     Recent Labs   Lab Test  09/29/18   0652  09/28/18   0613  09/27/18   0730   CR  1.45*  1.67*  2.02*     Recent Labs   Lab Test  05/08/15   1510   SED  7     Recent Labs   Lab Test  09/28/18   1332  09/28/18   1331  09/27/18   1512  09/24/18   2050  09/24/18   1203  09/16/18   2303  09/16/18   2207  09/16/18   2100  08/28/18   1240   CULT  No growth after 5 hours  No growth after 5 hours  Heavy growth  Candida  albicans / dubliniensis  Candida albicans and Candida dubliniensis are not routinely speciated  Susceptibility testing not routinely done  *  Culture negative monitoring continues  <1000 colonies/mL  urogenital jeanette  Susceptibility testing not routinely done    Cultured on the 3rd day of incubation:  Yeast  Referred to mycology for identification  *  Critical Value/Significant Value, preliminary result only, called to and read back by  Willow Nesbitt RN, @[id number] 09/27/18.DH.    Susceptibility testing in progress  No growth after 5 days  No growth  50,000 to 100,000 colonies/mL  Candida albicans / dubliniensis  Candida albicans and Candida dubliniensis are not routinely speciated  Susceptibility testing not routinely done  *  No growth  10,000 to 50,000 colonies/mL  Escherichia coli  *  >100,000 colonies/mL  Enterococcus faecalis  *

## 2018-09-29 NOTE — PLAN OF CARE
Problem: Patient Care Overview  Goal: Plan of Care/Patient Progress Review    Discharge Planner OT   Patient plan for discharge: Per daughter, TCU. Not stated by patient  Current status: Pt transferred supine>sit EOB with Mod A of 2; pt able to follow cues for hand and LE placement throughout session; encouragement and guidance provided throughout to assist pt with fear and anxiety of falling; pt completed sit>stand, FWW, Mod A of 2 from elevated bed; pt transferred bed>commode and commode>chair with FWW, Mod-Min A of 2; Pt fatigued and somewhat confused however able to verbalize accurate location and identify dtr who arrived during session; pt on 5 Lpm via oximask, required increase to 7 Lpm with activity as sats dropped to low 80s; pt required repetitive cues for PLB in order to recover to low 90s  Barriers to return to prior living situation: Lives alone, current level of assist for ADLs/IADLs and transfers, decreased strength and functional activity tolerance, impaired balance, impaired cognition  Recommendations for discharge: TCU  Rationale for recommendations: Pt is significantly below baseline level of functioning and would benefit from skilled OT to maximize safety and indep in all ADLs/IADLs and mobility tasks. Per daughter, pt's strength has been declining for past 3 weeks with resulting falls. Pt is not safe to return to previous living environment at this time due to current level of assist.       Entered by: Jen Pacheco 09/29/2018 1:02 PM

## 2018-09-29 NOTE — PLAN OF CARE
Problem: Patient Care Overview  Goal: Plan of Care/Patient Progress Review    Discharge Planner PT   Patient plan for discharge: TCU  Current status: Pt amb 40' with ww with 6L of O2 with min assist with kyphotic posture. Vcs for pt to look up during amb, Note O2 sats @ beginning to 92% with 5L of O2. Pt transfers sit to stand with mod assist of 2 with facilitation for hand placement. Pt transfers stand to sit with mod assist of 1 and SBA of 1. Pt transfers chair to commode with ww with min assist.   Barriers to return to prior living situation: Lives alone, decreased strength, decreased endurance, fall history  Recommendations for discharge: TCU per plan established by the PT.      Rationale for recommendations: Patient presents below baseline for mobility and activity endurance.  Patient would benefit from ongoing physical therapy in order to increase independence with mobility.       Entered by: Chantal Loera 09/29/2018 12:12 PM

## 2018-09-29 NOTE — PHARMACY
"Note: Patient was on Paxil which was discontinued when antibiotic Zyvox (linezolid) was started due to contraindication: \"Concurrent use of linezolid, an MAOI, and paroxetine is contraindicated. If urgent treatment with linezolid is necessary in a patient receiving paroxetine, alternatives are not available, and risk/benefit has been evaluated, promptly discontinue paroxetine and then linezolid may be administered. Monitor for serotonin syndrome for 14 days or until 24 hours after the last dose of linezolid, whichever comes first. Paroxetine may be resumed 24 hours after the last dose of linezolid.\"    "

## 2018-09-30 NOTE — PROGRESS NOTES
No new recommendations, follow up on the ID and JANNIE on the blood cultures with yeast.   For nos stays on all 3 antimicrobials.     Halina Ma MD  Infectious Disease

## 2018-09-30 NOTE — PLAN OF CARE
Problem: Patient Care Overview  Goal: Plan of Care/Patient Progress Review  Alert, disoriented to time. Denies pain. LS diminished.  Bilateral LE trace edema. Denies SOB. LS diminished. O2 92% on 5 L. Christianson patent and draining.  2 assist with walker for transfers. Tele: 100% A-paced

## 2018-09-30 NOTE — PLAN OF CARE
Problem: Patient Care Overview  Goal: Plan of Care/Patient Progress Review  PT: Treatment attempted. Pt sleeping soundly. Family requesting to let pt sleep as he just walked with RN staff and returned to bed. Will continue to follow per POC and treat as appropriate.

## 2018-09-30 NOTE — PLAN OF CARE
Problem: Patient Care Overview  Goal: Plan of Care/Patient Progress Review  Speech Language Therapy Discharge Summary    Reason for therapy discharge:    All goals and outcomes met, no further needs identified.    Progress towards therapy goal(s). See goals on Care Plan in [facility] electronic health record for goal details.  Goals met    Therapy recommendation(s):    No further therapy is recommended.Tolerating regular diet with chin jose angelck independently.

## 2018-09-30 NOTE — PLAN OF CARE
Problem: Patient Care Overview  Goal: Plan of Care/Patient Progress Review  Outcome: Improving  Strength improving with increased activity. Up to chair for meals. Ambulated out into rizo from recliner and back to bed. 2 soft stools. Oxygen being weaned and down to 3L NC. Continues on triple antibiotics for UTI and + blood culture. Family very involved.

## 2018-09-30 NOTE — PROGRESS NOTES
"[facility] RCAT     Date:9/30/2018  Admission Dx: Acute encephalopathy  Pulmonary History; Asthma, pneumonia  Home Nebulizer/MDI Use: Albuterol HFA Q6 hours prn  Home Oxygen: Room air  Acuity Level (RCAT flow sheet):3  Aerosol Therapy initiated: Continue Duoneb QID  And Q2 hours prn      Pulmonary Hygiene initiated:  Deep breath and cough TID      Volume Expansion initiated: IS TID      Current Oxygen Requirements: 5 Lpm NC  Current SpO2:94%    Re-evaluation date: 10/3/2018    Patient Education: Informed Pt of RCAT and gave instruction inn the use of an IS and deep breathing.      See \"RT Assessments\" flow sheet for patient assessment scoring and Acuity Level Details.     Gerard Higgins  September 30, 2018.7:58 AM              "

## 2018-09-30 NOTE — PROGRESS NOTES
Pc to [facility] to check on bed available. Spoke with Nai who reported bed is no longer availible.   Pc to [facility] to check on bed available. Spoke with Ramona who requested for writer to resend referral since it has been a few days ago. Writer resend referrals.   Vm from Ramona at [facility] reporting bed is available today.

## 2018-09-30 NOTE — PROGRESS NOTES
[facility]  Hospitalist Progress Note  Virginie Ferris MD 09/30/2018    Reason for Stay (Diagnosis): encephalopathy/sepsi         Assessment and Plan:      Summary of Stay: Edison Boss is a 89 year old male with a history of prior prostate cancer treated with brachy therapy and XRT, chronic indwelling tolliver catheter, paroxysmal AF (not anticoagulated) and SSS s/p ablation and dual chamber PPM, htn/hlp,  admitted on 9/24/2018 with encephalopathy felt most consistent with infectious process and suspected pna complicated by hypoxia.     He was placed on IV pip-tazo and vanco in the setting of sepsis but acutely decompensated on the morning of 9/27/18.  He had been complaining of some back pain in the setting of recent falling and thought that this was initially just MSK.  But given clinical decline CAP CT pursued and positive for obstructing left ureteral stone.  He was urgently taken for cysto and stent placement and since then has continued to improve.    His cultures are notable for heavy grown candida albicans from renal pelvic fluid in addition to yeast cultured from 1/2 blood cultures from day of admission.     His hospitalization has been complicated by iatrogenic volume overload in the setting of appropriate treatment for sepsis.  He is responding well to IV furosemide      Problem List:   1. Fungemia:  Awaiting further ID from blood cultures-currently on micafungin  2. Pna:  Currently being covered for nosocomial coverage with linezolid and pip-tazo.  Continued through weekend per weekend ID doc, but likely can discontinue soon as yeast probably the issue  3. Abdominal hernia:  Reducible so no indication for acute intervention  4. Iatrogenic volume overload:  Improving with IV diuresis  5. Hypoxic respiratory failure:  Pulmonary edema and ? pna-improving.  Now down to  3 L supplemental O2-not on home O2.    6. MADELYN:  Resolved-back to wnl even in the setting of iv diiuresis.  Suspect 2/2 ATN  from sepsis  7. Sepsis:  Tachycardia/leukocytosis-2/2 candida infection behind obstructed ureter and fungemia.  Resolved  8. Htn:  pta meds amlodipine 5 mg-resumed as at home  9. Depression:  Continued on paroxetine  10. PAF/SSS paced rhythm by tele.  Not on chronic AC-started on asa this hospitalization   DVT Prophylaxis: Heparin SQ and Pneumatic Compression Devices  Code Status: DNR / DNI  Functional status:  Lives alone in senior apartment, walks with a walker    Disposition Plan   Expected discharge in 2-3 days to tbd once respiratory failure resolved, appropriate abx regimen known.  [facility]     Entered: Virginie CHETNAPat Ferris 09/30/2018, 4:20 PM        Discussed with patient, dtr and son at the bedside            Interval History (Subjective):      Tired but definitely better.  Denies any pain.  Poor appetite but eating about 1.2 of his meals.  No n/v.  No constipation.  No cp thinks breathing is better                  Physical Exam:      Last Vital Signs:  /77 (BP Location: Right arm)  Pulse 62  Temp 96.4  F (35.8  C) (Oral)  Resp 18  Wt 80.1 kg (176 lb 9.6 oz)  SpO2 95%  BMI 25.34 kg/m2    I/O: net neg 5 L  Tele:  A and AV paced    Pleasant nad but looks fatigued and will nod off during my interview looks stated age head nc/at sclera clear lungs diminished in the bases otherwise ctab rrr no mrg 1 + le edema skin w/d no cyanosis or clubbing alert and oriented affect appropriate abd s/nt/nd cano            Medications:      All current medications were reviewed with changes reflected in problem list.         Data:      All new lab and imaging data was reviewed.   Labs:    Recent Labs  Lab 09/30/18  1512 09/30/18  0714   NA  --  140   POTASSIUM 4.0 3.1*   CHLORIDE  --  100   CO2  --  34*   ANIONGAP  --  6   GLC  --  94   BUN  --  16   CR  --  1.14   GFRESTIMATED  --  60*   GFRESTBLACK  --  72   ARYAN  --  8.3*       Recent Labs  Lab 09/30/18  0714   WBC 13.1*    HGB 11.8*   HCT 36.7*   MCV 91   *      Imaging:

## 2018-09-30 NOTE — PLAN OF CARE
Problem: Patient Care Overview  Goal: Plan of Care/Patient Progress Review  Outcome: Improving  Up to chair x2 today. Strength improving-transfers with gait belt and walker with 1-2 assist. Ambulated in rizo with PT. Swallowing well with chin tuck. Continues on IV antibiotics and antifungal. Appetite better. Weaning oxygen to nasal cannula at 4-6L increased with activity as needed.

## 2018-10-01 NOTE — PLAN OF CARE
Problem: Patient Care Overview  Goal: Plan of Care/Patient Progress Review  Outcome: Improving  Neuro: Alert and orientated. Slow to respond. Forgetful.    VS:  VSS  Tele: SR  Respiratory: Lung sounds diminished. Continues on 2 LPM NC.   : Chronic tolliver in place.  Urine clear - slightly pink in color MD aware. B & O - suppository given.   GI: Incontinent of stool  Skin: Blanchable redness to coccyx. Area cleaned, mepilex applied. Turned and repositioned every 2 hours.   Pain: denies.   IV: PIV WDL SL  Transfer: A2 with gait belt and walker.   Diet: Tolerating PO foods and fluids well. Poor appetite.   Plan: TBD. Hospitalist following. Continues on IV zyvox, IV zosyn, IV mycamine, nebs. Discharge to home with increased services vs TCU in a few days.

## 2018-10-01 NOTE — PLAN OF CARE
Problem: Patient Care Overview  Goal: Plan of Care/Patient Progress Review  Outcome: No Change  [id number]: Pt resting comfortably, slept on and off  During the night. VSS on 3L. A&O, forgetful. Denies pain. On IV antibiotics. Tele 100% paced. Transfers with Ax1-2. Chronic tolliver with AUO. BC pending negative TD. Will continue to monitor.

## 2018-10-01 NOTE — PLAN OF CARE
Problem: Patient Care Overview  Goal: Plan of Care/Patient Progress Review    Discharge Planner PT   Patient plan for discharge: TCU  Current status: Pt amb 100' with ww with 4L of O2 with CGA with kyphotic posture. Note o2 sats @ beginning to be 95% with 3L and following amb to be 92% with SOB noted with 3L of O2. Pt transfers sit to stand with mod assist of 2 with vcs along with facilitation for hip ext and and knee ext. Pt transfers stand to sit with min assist with vcs for hand placement for safety. Pt transfers chair to bed with ww with min assist.  Barriers to return to prior living situation: Lives alone, decreased strength, decreased endurance, fall history  Recommendations for discharge: TCU per plan established by the PT.      Rationale for recommendations: Patient presents below baseline for mobility and activity endurance.  Patient would benefit from ongoing physical therapy in order to increase independence with mobility.       Entered by: Chantal Loera 10/01/2018 11:40 AM

## 2018-10-01 NOTE — PROGRESS NOTES
[facility]  Palliative Care Progress Note  Text Page     Assessment & Plan   1.Decisional Capacity -  Questionable, mental status improving.. Patient has an advance directive per family but it is not available in our system despite family reports they have brought it in multiple times.  They note daughter Red in [facility] and Dtr Bea is alternate.  Red agrees to bring a copy in again. Include patient in decision making as much as possible but involve health care agent attempting consensus with patient. Until document is validated per  policy next of kin would be agents, Patient is , he has 8 adult children, who appear to agree to have Red and Bea be spokespeople for the family.   2. Pain- Intractable back pain reported prior to stent placement, now denies.  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  Now reported chronically well managed with yearly botox injections.   3. Delirium- Multifactorial due to pain, infection, improving.    4. Care Planning- Open to services with attempt to return to baseline.  Appreciate social work support in TCU placement.     Goal of Care:DNR/DNI, Restorative.  Selective treatment, uninterested in testing only for a diagnosis that may not be acted upon.     Disease Process/es & Symptoms:  Edison Boss is a 89 year old patient admitted with symptoms of acute encephalopathy, initially of unclear cause, he has been treated for CAP and potential aspiration, possible HF exacerbation, MADELYN and severe pain who was ultimately found to have an obstructing left proximal ureteral stone and now POD#4 left ureteral stent placement. With resolution of pain and improvement in mental status. He continues to be receive broad coverage for possible nosocomial pneumonia.      This is in the setting of history of stroke, sleep apnea, Afib, asthma, prostate CA s/p brachytherapy, Chronic pudendal pain treated successfully with yearly Botox,  HTN, HLD and GERD.  He lives independently at baseline, noted needed more assist from adult children checking in after his wife passed away in May.  He is noted to have declined in functional status quickly over the past 2 weeks including increased weakness and falls, as well as change in mentation.  He was hospitalized earlier this month as a part of this decline, but previously not since January for a pacemaker.  He has no noted weight loss.     Findings & plan of care discussed with: Nursing and daughter Bea.  Follow-up plan from palliative team: Will continue to follow, may not see daily, and facilitate advance directive to place on file.  Thank you for involving us in the patient's care.     Viridiana MONTES, CNS, CNP  Pain Management and Palliative Care  [facility]  Pgr: [phone]    Time Spent on this Encounter   I spent  15 minutes total, in assessment of the patient, counseling and discussion with the patient and family as documented in this note and coordination of care and discussion with the health care team.    Interval History   Feels well, up in chair, no c/o pain.  Mild SOB.  Most concerned about his preservation caps being on time.   Daughter Bea has no concerns only giving things time to see to what extent he can improve.     Course of Hospitalization Discussions Data   Discussed on September 28, 2018 with Viridiana MONTES, CNS, CNP:   Discussed with Patient's daughters Red and Bea and another son at patient's bedside.  Patient participates little as he drifts off to sleep.  They reviewed patient decline in function and health over the past 2 weeks and feeling as if the decline was fast.  They note patient has needed more help since his wife passed away this Spring, but remaining strong.  They reviewed some of his history of chronic pain and treatment for prostate cancer.  They reviewed a very distressing day yesterday with patient's escalating pain and delirium  and concern of not finding a cause until finally the stone was found and surgery offered.  They are pleased with his recovery today and hopeful he can improve enough to return to his baseline, but also aware this may be a setback.  They note goal of recovery but also not wanting tests that do not improve his current status and now are just looking for more problems, as an example a carotid ultrasound that was at some point this visit or another offered, and now with improvement a video swallow if swallow may improve on its own.    Family agrees to bring in advance directive although note it has been brought into the hospital multiple times.         Review of Systems    CONSTITUTIONAL: NEGATIVE for fever, chills  ENT/MOUTH: NEGATIVE for ear, mouth and throat problems  RESP: NEGATIVE for significant cough, POSITIVE for mild SOB.   CV: NEGATIVE for chest pain, palpitations, edema improving.    Palliative Symptom Review (0=no symptom/no concern, 1=mild, 2=moderate, 3=severe):      Pain: 0-none      Fatigue: 2-moderate      Nausea: 0-none      Constipation: 0-none      Diarrhea: 0-none      Depressive Symptoms: 0-none      Anxiety: 0-none      Drowsiness: 1-mild      Poor Appetite: 1-mild      Shortness of Breath: 1-mild      Insomnia: 0-none      Physical Exam   Temp:  [95.4  F (35.2  C)-98  F (36.7  C)] 98  F (36.7  C)  Heart Rate:  [60-62] 62  Resp:  [16-18] 18  BP: (156-171)/(57-73) 169/69  SpO2:  [93 %-96 %] 95 %  174 lbs 9.6 oz  GEN:  Alert, oriented x 2, appears comfortable, NAD.  HEENT:  Normocephalic/atraumatic, no scleral icterus, no nasal discharge, mouth moist.  CV:  RRR, S1, S2; no murmurs or other irregularities noted.  +3 DP/PT pulses bilatererally; trace edema BLE, edema in LUE improving..  RESP:  Clear to auscultation bilaterally without rales/rhonchi/wheezing/retractions.  Symmetric chest rise on inhalation noted.  Normal respiratory effort.  ABD:  Rounded, soft, non-tender/non-distended.  +BS  EXT:   Edema & pulses as noted above.  CMS intact x 4.     SKIN:  Dry to touch, no exanthems noted in the visualized areas.    NEURO: FERNÁNDEZ. Generalized weakness, no focal deficits.   Psych:  Normal affect.  Calm, cooperative, slow to answer, difficulty following conversation.     Medications       acetaminophen  650 mg Oral Q8H    Or     acetaminophen  650 mg Rectal Q8H     amLODIPine  5 mg Oral Daily     aspirin  81 mg Oral Daily     fluconazole  400 mg Intravenous Q24H     heparin  5,000 Units Subcutaneous Q12H     influenza Vac Split High-Dose  0.5 mL Intramuscular Prior to discharge     ipratropium - albuterol 0.5 mg/2.5 mg/3 mL  3 mL Nebulization 4x daily     omeprazole  20 mg Oral QAM AC     piperacillin-tazobactam  3.375 g Intravenous Q8H NAKIA     PRESERVISION AREDS  1 capsule Oral BID     sodium chloride (PF)  3 mL Intracatheter Q8H       Data   Results for orders placed or performed during the hospital encounter of 09/24/18 (from the past 24 hour(s))   Potassium   Result Value Ref Range    Potassium 4.0 3.4 - 5.3 mmol/L   Basic metabolic panel   Result Value Ref Range    Sodium 139 133 - 144 mmol/L    Potassium 4.0 3.4 - 5.3 mmol/L    Chloride 102 94 - 109 mmol/L    Carbon Dioxide 30 20 - 32 mmol/L    Anion Gap 7 3 - 14 mmol/L    Glucose 92 70 - 99 mg/dL    Urea Nitrogen 13 7 - 30 mg/dL    Creatinine 1.10 0.66 - 1.25 mg/dL    GFR Estimate 62 >60 mL/min/1.7m2    GFR Estimate If Black 75 >60 mL/min/1.7m2    Calcium 8.6 8.5 - 10.1 mg/dL   CBC with platelets differential   Result Value Ref Range    WBC 11.6 (H) 4.0 - 11.0 10e9/L    RBC Count 4.25 (L) 4.4 - 5.9 10e12/L    Hemoglobin 12.4 (L) 13.3 - 17.7 g/dL    Hematocrit 38.5 (L) 40.0 - 53.0 %    MCV 91 78 - 100 fl    MCH 29.2 26.5 - 33.0 pg    MCHC 32.2 31.5 - 36.5 g/dL    RDW 17.2 (H) 10.0 - 15.0 %    Platelet Count 525 (H) 150 - 450 10e9/L    Diff Method Manual Differential     % Neutrophils 73.0 %    % Lymphocytes 13.0 %    % Monocytes 7.0 %    % Eosinophils 3.0 %     % Basophils 2.0 %    % Promyelocytes 2.0 %    Absolute Neutrophil 8.5 (H) 1.6 - 8.3 10e9/L    Absolute Lymphocytes 1.5 0.8 - 5.3 10e9/L    Absolute Monocytes 0.8 0.0 - 1.3 10e9/L    Absolute Eosinophils 0.3 0.0 - 0.7 10e9/L    Absolute Basophils 0.2 0.0 - 0.2 10e9/L    Absolute Promyeloctyes 0.2 (H) 0 10e9/L    Anisocytosis Slight     Platelet Estimate       Automated count confirmed.  Platelet morphology is normal.

## 2018-10-01 NOTE — PROGRESS NOTES
[facility]  Hospitalist Progress Note  Virginie Ferris MD 10/01/2018    Reason for Stay (Diagnosis): encephalopathy/sepsi         Assessment and Plan:      Summary of Stay: Edison Boss is a 89 year old male with a history of prior prostate cancer treated with brachy therapy and XRT, chronic indwelling tolliver catheter, paroxysmal AF (not anticoagulated) and SSS s/p ablation and dual chamber PPM, htn/hlp,  admitted on 9/24/2018 with encephalopathy felt most consistent with infectious process and suspected pna complicated by hypoxia.     He was placed on IV pip-tazo and vanco in the setting of sepsis but acutely decompensated on the morning of 9/27/18.  He had been complaining of some back pain in the setting of recent falling and thought that this was initially just MSK.  But given clinical decline CAP CT pursued and positive for obstructing left ureteral stone.  He was urgently taken for cysto and stent placement and since then has continued to improve.    His cultures are notable for heavy grown candida albicans from renal pelvic fluid in addition to yeast cultured from 1/2 blood cultures from day of admission.     His hospitalization has been complicated by iatrogenic volume overload in the setting of appropriate treatment for sepsis.  He is responding well to IV furosemide    He now has evidence of mild hematuria after initiation of low dose heparin      Problem List:   1. Fungemia: with candida- micafungin switched over to fluconazole 10/1.  At discharge can switch to oral for 2 more weeks  2. pna vs renal pelvic infection: covered for nosocomial coverage with linezolid and pip-tazo.  Continued through weekend per weekend ID doc.  Most likely source though of sepsis is his yeast (interestingly he was treated with fluconazole at discharge 9/19)-but doubt that could adequately take care of renal pelvis infection in setting of obstructing stone.  Linezolid discontinued today, micafungin converted  to fluconazole, pip-tazo continues for now  3. Abdominal hernia:  Reducible so no indication for acute intervention  4. Iatrogenic volume overload:  Improving with IV diuresis  5. Hypoxic respiratory failure:  Pulmonary edema and ? pna-improving.  Now down to  3 L supplemental O2-not on home O2.    6. Hematuria:  Mild-likely combination of factors including stone/tolliver/heparin. Would cont heparin as high risk for dvt given prolonged hospitalization   7. MADELYN:  Resolved-back to wnl even in the setting of iv diiuresis.  Suspect 2/2 ATN from sepsis  8. Sepsis:  Tachycardia/leukocytosis-2/2 candida infection behind obstructed ureter and fungemia.  Resolved  9. Htn:  pta meds amlodipine 5 mg-resumed as at home  10. Depression:  Continued on paroxetine now that off linezolid  11. PAF/SSS paced rhythm by tele.  Not on chronic AC-started on asa this hospitalization   12. Thrombocytosis  A bit disconcerting as almost everything points to improvement but elevated platelet count (in the absence of PCV primary or secondary) is usually due to inflammatory process.  Cont to monitor  DVT Prophylaxis: Heparin SQ and Pneumatic Compression Devices  Code Status: DNR / DNI  Functional status:  Lives alone in senior apartment, walks with a walker    Disposition Plan   Expected discharge in 2-3 days to tbd once respiratory failure resolved, appropriate abx regimen known.  TCU      Entered: Virginie Ferris 10/01/2018, 12:04 PM        Discussed with patient, dtr  at the bedside            Interval History (Subjective):      Had busy morning, up in chair, out walking with PT, now feeling worn out.  No cp, thinks breathing is ok, dtr has noticed that urine is red in color now, no n/v and po intake is fair                  Physical Exam:      Last Vital Signs:  /69 (BP Location: Right arm)  Pulse 62  Temp 98  F (36.7  C) (Oral)  Resp 18  Wt 79.2 kg (174 lb 9.6 oz)  SpO2 95%  BMI 25.05 kg/m2    I/O: net neg 6 L  Tele:  A and AV  paced    Pleasant nad but fatigued and will nod off during my interview looks stated age head nc/at sclera clear lungs diminished in the bases otherwise ctab rrr no mrg 1 + le edema skin w/d no cyanosis or clubbing alert and oriented affect appropriate abd s/nt/nd cano urine reddish hue in catheter bag           Medications:      All current medications were reviewed with changes reflected in problem list.         Data:      All new lab and imaging data was reviewed.   Labs:    Recent Labs  Lab 10/01/18  0638      POTASSIUM 4.0   CHLORIDE 102   CO2 30   ANIONGAP 7   GLC 92   BUN 13   CR 1.10   GFRESTIMATED 62   GFRESTBLACK 75   ARYAN 8.6       Recent Labs  Lab 10/01/18  0638   WBC 11.6*   HGB 12.4*   HCT 38.5*   MCV 91   *      Imaging:

## 2018-10-01 NOTE — PLAN OF CARE
Problem: Patient Care Overview  Goal: Plan of Care/Patient Progress Review  OT: Attempted session, pt reported just recently returned to bed, requested therapy tomorrow as he wanted to rest now, will reschedule

## 2018-10-01 NOTE — PROGRESS NOTES
"CLINICAL NUTRITION SERVICES  -  ASSESSMENT NOTE      Recommendations Ordered by Registered Dietitian (RD):   Oral nutrition supplements   Malnutrition:   % Intake:</= 75% for >/= 1 month (severe malnutrition)  Subcutaneous Fat Loss:Orbital region mild to moderate depletion and Upper arm region mild to moderate depletion  Muscle Loss:Temporal region moderate depletion, Clavicle bone region mild to moderate depletion, Acromion bone region mild to moderate depletion, Dorsal hand region depletion masked by fluid) Patellar region mild to moderate depletion, Anterior thigh region not observed and Posterior calf region moderate depletion  Fluid Retention: Trace, does not meet criteria    Malnutrition Diagnosis: Severe malnutrition  In Context of:  Chronic illness or disease     REASON FOR ASSESSMENT  Edison Boss is a 89 year old male seen by the dietitian for LOS    NUTRITION HISTORY  - Information obtained from patient  - Food allergies/intolerances: NKFA  - Patient is on a regular diet at home.  - Typical food/fluid intake PTA:    Supplements at home: family recently bought Boost, not consistently drinking    Issues chewing or swallowing: denies    Lives in senior housing and receives one meal per day    second meal is a bowl of cereal or hotdog - convenient, easy to prepare meal    Chronic poor appetite - may eat better when family is around.     as of May    Pacemaker as of Jan 2018    CURRENT NUTRITION ORDERS  - Diet: Regular  - Supplement: none  Current Intake/Tolerance:  - Per flow sheet review, 50-75% intake for documented meals - ordering small amounts.  - Factors affecting nutrition intake include: decreased appetite, early satiety    PHYSICAL FINDINGS  Observed  See malnutrition section below  Obtained from Chart/Interdisciplinary Team  Pepe nutrition score: ; total score:   Fluid status: +1 BLE and L arm/hand edema    ANTHROPOMETRICS  Height: 5'10\"  Weight: 79 kg   Body mass index is 25.05 " kg/(m^2).  Weight Status:  Overweight BMI 25-29.9  Ideal body weight: 75.5kg +/- 10%, 105% of IBW   Weight History:  Weight appears relatively stable, as noted below - family endorses visible fat and muscle loss  Wt Readings from Last 10 Encounters:   10/01/18 79.2 kg (174 lb 9.6 oz)   09/17/18 80.1 kg (176 lb 9.6 oz)   09/12/18 76.1 kg (167 lb 11.2 oz)   09/04/18 76.7 kg (169 lb)   04/05/18 78 kg (172 lb)   02/08/18 78 kg (172 lb)   01/02/18 78.2 kg (172 lb 8 oz)   12/28/17 78 kg (172 lb)   10/30/17 78.9 kg (174 lb)   08/30/17 80.5 kg (177 lb 8 oz)       ASSESSED NUTRITION NEEDS (PER APPROVED PRACTICE GUIDELINES, Dosing weight: 79.2 kg):  Estimated Energy Needs: 6383-7613 kcals (25-30 Kcal/Kg)  Justification: maintenance  Estimated Protein Needs:  grams protein (1.2-1.5 g pro/Kg)  Justification: preservation of lean body mass  Estimated Fluid Needs: >1 mL/Kcal  Justification: maintenance    LABS  Labs reviewed    Recent Labs   Lab Test  10/01/18   0638  09/30/18   1512  09/30/18   0714  09/29/18   0652  09/28/18   0613   POTASSIUM  4.0  4.0  3.1*  4.3  4.1     No results for input(s): PHOS in the last 85146 hours.  Recent Labs   Lab Test  09/26/18   0707   MAG  2.6*     Recent Labs   Lab Test  10/01/18   0638  09/30/18   0714  09/29/18   0652  09/28/18   0613  09/27/18   0730   NA  139  140  139  140  140     Recent Labs   Lab Test  10/01/18   0638  09/30/18   0714  09/29/18   0652  09/28/18   0613  09/27/18   0730   CR  1.10  1.14  1.45*  1.67*  2.02*       Recent Labs  Lab 10/01/18  0638 09/30/18  0714 09/29/18  0652 09/28/18  0613 09/27/18  0730 09/26/18  0707 09/25/18  0602   GLC 92 94 100* 106* 103* 88 82     Lab Results   Component Value Date    A1C 5.5 11/30/2012       MEDICATIONS  Medications reviewed    MALNUTRITION:  % Weight Loss:None noted  % Intake:</= 75% for >/= 1 month (severe malnutrition)  Subcutaneous Fat Loss:Orbital region mild to moderate depletion and Upper arm region mild to moderate  depletion  Muscle Loss:Temporal region moderate depletion, Clavicle bone region mild to moderate depletion, Acromion bone region mild to moderate depletion, Dorsal hand region depletion masked by fluid) Patellar region mild to moderate depletion, Anterior thigh region not observed and Posterior calf region moderate depletion  Fluid Retention: Trace, does not meet criteria    Malnutrition Diagnosis: Severe malnutrition  In Context of:  Chronic illness or disease    NUTRITION DIAGNOSIS:  Malnutrition related to decreased appetite as evidenced by fat and muscle loss, PO intake likely meeting <75% of needs for >1 month with baseline intake of 2 meals per day    INTERVENTIONS  Recommendations / Nutrition Prescription  Continue regular diet as ordered + Diet appropriate oral nutrition supplements to increase calorie and protein intake    Implementation  Nutrition education: reviewed rational for increased calorie and protein intake, supplement options, push for small/frequent bites to increase overall intake.  Medical food supplement: Plus2 shake mixed w/2 packets Beneprotein  Collaboration and Referral of care: Discussed patient during interdisciplinary care rounds this morning    Goals  Patient to consume >/= 50% of meals TID and >/=2 high protein supplements per day      MONITORING AND EVALUATION:  Progress towards goals will be monitored and evaluated per protocol and Practice Guidelines      Sobeida Trimble RDN, LD, CNSC  Pager - 3rd floor/ICU: [phone]  Pager - All other floors: [phone]  Pager - Weekend/holiday: [phone]  Office: [phone]

## 2018-10-01 NOTE — PROGRESS NOTES
[facility]  Infectious Disease Progress Note          Assessment and Plan:   IMPRESSION:   1.  A 89-year-old male with 2+ week progressive deterioration in health, some hypoxia and possible infiltrate, recent abnormal urine, but with unremarkable cultures, some intermittent abdominal pain and recently reduced hernia.  Primary etiology of syndrome now likely aparrent with CT L ureteral obstruction and BC candida   2.  Cognitive dysfunction, probably encephalopathy from the current acute infection on top of mild dementia.   3.  Abdominal pain, previously part of this now occurring again.  CT scan with new finding L ureteral obstuction  4.  Definite infiltrates and hypoxia, certainly at risk for aspiration and appearance suggestive of that, enough health care contact to be at risk for nosocomial pathogens.   5.  Acute renal insufficiency, undoubtedly acute tubular necrosis from the current acute illness, but also got vancomycin.   6.  Prior cerebrovascular infarction.   7.  Possible fluid and congestive heart failure component.   8 Ongoing reducible abd hernia  9 gall stones      RECOMMENDATIONS:   1. Candidemia and urine from kidney, C albicans, to flucon  2.  Improving kidney function, respiratory function,   3.   Zosyn a bit longer, discontinue zyvox,  doubt needed as urine/candidemia likely is main issue,   4 Recheck Bc neg so far  5 Likely soon po diflucan about 2 weeks             Interval History:   no new complaints more awake, hypoxia stable, abd pain better CT with gall bladder issue but more sig had L ureteral obstruction candida albicans in Bc, prior UC C albicans, current UC neg, new stent collected UC C albicans              Medications:       acetaminophen  650 mg Oral Q8H    Or     acetaminophen  650 mg Rectal Q8H     amLODIPine  5 mg Oral Daily     aspirin  81 mg Oral Daily     fluconazole  400 mg Intravenous Q24H     heparin  5,000 Units Subcutaneous Q12H     influenza Vac  Split High-Dose  0.5 mL Intramuscular Prior to discharge     ipratropium - albuterol 0.5 mg/2.5 mg/3 mL  3 mL Nebulization 4x daily     omeprazole  20 mg Oral QAM AC     piperacillin-tazobactam  3.375 g Intravenous Q8H NAKIA     PRESERVISION AREDS  1 capsule Oral BID     sodium chloride (PF)  3 mL Intracatheter Q8H                  Physical Exam:   Blood pressure 169/69, pulse 62, temperature 98  F (36.7  C), temperature source Oral, resp. rate 18, weight 79.2 kg (174 lb 9.6 oz), SpO2 95 %.  Wt Readings from Last 2 Encounters:   10/01/18 79.2 kg (174 lb 9.6 oz)   09/17/18 80.1 kg (176 lb 9.6 oz)     Vital Signs with Ranges  Temp:  [95.4  F (35.2  C)-98  F (36.7  C)] 98  F (36.7  C)  Heart Rate:  [60-62] 62  Resp:  [16-18] 18  BP: (156-171)/(57-73) 169/69  SpO2:  [93 %-96 %] 95 %    Constitutional: Awake, alert, looks better, no apparent distress   Lungs: Clear to auscultation bilaterally, no crackles or wheezing   Cardiovascular: Regular rate and rhythm, normal S1 and S2, and no murmur noted   Abdomen: Normal bowel sounds, soft, non-distended, non-tender   Skin: No rashes, no cyanosis, no edema   Other:           Data:   All microbiology laboratory data reviewed.  Recent Labs   Lab Test  10/01/18   0638  09/30/18   0714  09/29/18   0652   WBC  11.6*  13.1*  18.4*   HGB  12.4*  11.8*  12.4*   HCT  38.5*  36.7*  39.5*   MCV  91  91  92   PLT  525*  490*  460*     Recent Labs   Lab Test  10/01/18   0638  09/30/18   0714  09/29/18   0652   CR  1.10  1.14  1.45*     Recent Labs   Lab Test  05/08/15   1510   SED  7     Recent Labs   Lab Test  09/28/18   1332  09/28/18   1331  09/27/18   1512  09/24/18   2050  09/24/18   1203  09/16/18   2303  09/16/18   2207  09/16/18   2100  08/28/18   1240   CULT  No growth after 3 days  No growth after 3 days  Heavy growth  Denise albicans / dubliniensis  Candida albicans and Candida dubliniensis are not routinely speciated  Susceptibility testing not routinely done  *  Culture  negative monitoring continues  <1000 colonies/mL  urogenital jeanette  Susceptibility testing not routinely done    Cultured on the 3rd day of incubation:  Candida albicans / dubliniensis  Speciation in progress  *  Critical Value/Significant Value, preliminary result only, called to and read back by  Willow Nesbitt RN, @6384 09/27/18..    No growth  No growth  50,000 to 100,000 colonies/mL  Candida albicans / dubliniensis  Candida albicans and Candida dubliniensis are not routinely speciated  Susceptibility testing not routinely done  *  No growth  10,000 to 50,000 colonies/mL  Escherichia coli  *  >100,000 colonies/mL  Enterococcus faecalis  *

## 2018-10-01 NOTE — TELEPHONE ENCOUNTER
[facility]  Certification and POC from 9/21/18 to 11/19/18  Dr. Terrell's yellow folder   Fax

## 2018-10-02 NOTE — PROGRESS NOTES
D:  TCU referrals have been sent to [facility], [facility], and [facility].  Family's first choice is [facility].      I:  Ava,  at [facility] ([phone]) called to check the status of the pt.  Bia informed Ava that the pt's discharge day is set for Thursday possibly to [facility] if there is an open bed.  Bia informed Ava that they will be updated when plans are in place.    A/P:  [facility] will continue to follow pt until discharge as needed.  Bia will call and update Ava when discharge plans are known.    Addendum:  Bia called and left Ava a voicemail to let her know that the pt will discharge on Thursday to [facility].

## 2018-10-02 NOTE — PROGRESS NOTES
[facility]  Hospitalist Progress Note  Virginie Ferris MD 10/02/2018    Reason for Stay (Diagnosis): encephalopathy/sepsi         Assessment and Plan:      Summary of Stay: Edison Boss is a 89 year old male with a history of prior prostate cancer treated with brachy therapy and XRT, chronic indwelling tolliver catheter, paroxysmal AF (not anticoagulated) and SSS s/p ablation and dual chamber PPM, htn/hlp,  admitted on 9/24/2018 with encephalopathy felt most consistent with infectious process and suspected pna complicated by hypoxia.     He was placed on IV pip-tazo and vanco in the setting of sepsis but acutely decompensated on the morning of 9/27/18.  He had been complaining of some back pain in the setting of recent falling and thought that this was initially just MSK.  But given clinical decline CAP CT pursued and positive for obstructing left ureteral stone.  He was urgently taken for cysto and stent placement and since then has continued to improve.    His cultures are notable for heavy grown candida albicans from renal pelvic fluid in addition to yeast cultured from 1/2 blood cultures from day of admission.     His hospitalization has been complicated by iatrogenic volume overload in the setting of appropriate treatment for sepsis.  He is responding well to IV furosemide.    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 Fungemia: with candida- micafungin switched over to fluconazole 10/1.  At discharge can switch to oral for 2 more weeks  2. pna vs renal pelvic infection: covered for nosocomial coverage with linezolid and pip-tazo.  Continued through weekend per weekend ID doc.  Most likely source though of sepsis is his yeast (interestingly he was treated with fluconazole  at discharge 9/19)-but doubt that could adequately take care of renal pelvis infection in setting of obstructing stone.  Linezolid discontinued 10/1, micafungin converted to fluconazole on 10/1, pip-tazo continues for one more day.  Will need 2 weeks of oral fluconazole at discharge  3. Abdominal hernia:  Reducible so no indication for acute intervention  4. Iatrogenic volume overload:  Improving with IV diuresis-close to resolved  5. Hypoxic respiratory failure:  Pulmonary edema and ? pna-improving.  Now down to  1 L supplemental O2-not on home O2.  I think now mostly atelectasis so have encouraged IS  6. Hematuria:  Mild-likely combination of factors including stone/tolliver/heparin. Would cont heparin as high risk for dvt given prolonged hospitalization   7. MADELYN:  Resolved-back to wnl even in the setting of iv diuresis.  Suspect 2/2 ATN from sepsis  8. Sepsis:  Tachycardia/leukocytosis-2/2 candida infection behind obstructed ureter and fungemia.  Resolved  9. Htn:  pta meds amlodipine 5 mg-resumed as at home  10. Depression:  Continued on paroxetine now that off linezolid  11. PAF/SSS paced rhythm by tele.  Not on chronic AC-started on asa this hospitalization   12. Thrombocytosis  A bit disconcerting as almost everything points to improvement but elevated platelet count (in the absence of PCV primary or secondary) is usually due to inflammatory process.  Cont to monitor  DVT Prophylaxis: Heparin SQ and Pneumatic Compression Devices  Code Status: DNR / DNI  Functional status:  Lives alone in senior apartment, walks with a walker    Disposition Plan   Expected discharge in 1-2 days to tbd once respiratory failure resolved, to TCU     Entered: Virginie Ferris 10/02/2018, 3:56 PM        Discussed with patient, dtr  at the bedside            Interval History (Subjective):      Had another busy morning and actually got up and walked down the rizo a bit.  No cp or sob, gets fatigued easily.  No n/v and appetite is  improving                  Physical Exam:      Last Vital Signs:  /70 (BP Location: Left arm)  Pulse 62  Temp 96  F (35.6  C) (Oral)  Resp 18  Wt 79.2 kg (174 lb 9.6 oz)  SpO2 92%  BMI 25.05 kg/m2    I/O: net neg 8.7 L  Tele:  A and AV paced    Pleasant nad sleeping quietly when I came in but easily awoke and participated in interview and exam looks stated age head nc/at sclera clear lungs diminished in the bases otherwise ctab rrr no mrg trace le edema skin w/d no cyanosis or clubbing alert and oriented affect appropriate abd s/nt/nd cano urine reddish hue in catheter bag           Medications:      All current medications were reviewed with changes reflected in problem list.         Data:      All new lab and imaging data was reviewed.   Labs:    Recent Labs  Lab 10/02/18  0639      POTASSIUM 3.8   CHLORIDE 107   CO2 28   ANIONGAP 5   GLC 85   BUN 12   CR 1.07   GFRESTIMATED 64   GFRESTBLACK 78   ARYAN 8.7       Recent Labs  Lab 10/02/18  0639   WBC 9.5   HGB 12.1*   HCT 37.6*   MCV 92   *      Imaging:

## 2018-10-02 NOTE — PLAN OF CARE
Problem: Patient Care Overview  Goal: Plan of Care/Patient Progress Review  Outcome: Improving  VSS denies pain ambulates with AX2 with a walker and a gait belt alert/oriented with slow response on 2 liters oxygen mepelex dressing on coccyx turned and repositioned.  Reports baseline numbness on bilateral lower extremities. Catheter in place and patent. Pt/OT/pallative consulting. Tele 100% A paced.

## 2018-10-02 NOTE — PLAN OF CARE
Problem: Patient Care Overview  Goal: Plan of Care/Patient Progress Review    Discharge Planner PT   Patient plan for discharge: TCU  Current status:  Patient supine upon arrival, on 1L NC during session.  Participated in LE strengthening.  Patient transfers with 2WW and min A with height of bed raised.  Patient amb 125 feet with 2WW and CGA/min A; verbal cueing for upright posture.  VSS following ambulation.  Barriers to return to prior living situation: Lives alone, decreased strength, decreased endurance, fall history  Recommendations for discharge: TCU  Rationale for recommendations: Patient presents below baseline for mobility and activity endurance.  Patient would benefit from ongoing physical therapy in order to increase independence with mobility.       Entered by: Heidy Rodriguez 10/02/2018 5:03 PM

## 2018-10-02 NOTE — PLAN OF CARE
Problem: Patient Care Overview  Goal: Plan of Care/Patient Progress Review  Outcome: Improving  VSS except slight /66, pt on 2 LPM NC, no O2 at baseline, A/OX4 but slow to respond, LS clear but diminished, aspiration precautions maintained, assist of 2 and a walker w/ gate belt to transfer, +1 generalized edema, 100% A-Paced on tele, WBC's-11.6 trending downward, discharge in 2-3 days once respiratory function improves.

## 2018-10-02 NOTE — PROGRESS NOTES
Care Coordination:  Per bedside RN who spoke with MD, pt will be ready for TCU discharge on Thurs. Call placed to [facility] admissions regarding bed availability for Thurs. They will have both a shared room in LTC with no extra cost and a private room with $38.50/day cost available for Thurs. Spoke to pt's daughter, Red, regarding plan for thurs discharge to TCU and discussed bed availability along with cost. They would prefer a shared room at this time. Family is planning to transport. [facility] admissions updated on preference of shared room. Will cont to follow.    Viridiana Peraza RN CTS

## 2018-10-02 NOTE — PROGRESS NOTES
Discharge Planner   Discharge Plans in progress: [facility] TCU on Thurs, family will transport  Barriers to discharge plan: plans per MD recommendations         Entered by: Viridiana Peraza 10/02/2018 2:53 PM

## 2018-10-02 NOTE — PLAN OF CARE
Problem: Patient Care Overview  Goal: Plan of Care/Patient Progress Review  VS /70 (BP Location: Left arm)  Pulse 62  Temp 96  F (35.6  C) (Oral)  Resp 18  Wt 79.2 kg (174 lb 9.6 oz)  SpO2 95%  BMI 25.05 kg/m2   AOx4, slow to respond, forgetful at times. Up Ax2 with walker, ambulated halls with staff. VSS ex HTN, on 2L, LS dim, minimal ADLER. Denies pain. On reg diet, up to chair for meals, had bm x2. Christianson with LUA. Had IV lasix today. Mepliex on Coccyx changed and CDI. Continues on Zosyn and Diflucan. ID, OT, PT, palliative following.

## 2018-10-02 NOTE — PROGRESS NOTES
[facility]  Infectious Disease Progress Note          Assessment and Plan:   IMPRESSION:   1.  A 89-year-old male with 2+ week progressive deterioration in health, some hypoxia and possible infiltrate, recent abnormal urine, but with unremarkable cultures, some intermittent abdominal pain and recently reduced hernia.  Primary etiology of syndrome now likely aparrent with CT L ureteral obstruction and BC candida   2.  Cognitive dysfunction, probably encephalopathy from the current acute infection on top of mild dementia.   3.  Abdominal pain, previously part of this now occurring again.  CT scan with new finding L ureteral obstuction  4.  Definite infiltrates and hypoxia, certainly at risk for aspiration and appearance suggestive of that, enough health care contact to be at risk for nosocomial pathogens.   5.  Acute renal insufficiency, undoubtedly acute tubular necrosis from the current acute illness, but also got vancomycin.   6.  Prior cerebrovascular infarction.   7.  Possible fluid and congestive heart failure component.   8 Ongoing reducible abd hernia  9 gall stones      RECOMMENDATIONS:   1. Candidemia and + urine from kidney, C albicans, plan flucon 2 doses IV then extended po  2.  Improving kidney function, respiratory function,   3.   Zosyn 1 day longer, discontinue zyvox,  doubt needed as urine/candidemia likely is main issue,   4 Recheck Bc neg   5 Likely tomorrow  po diflucan about 2 weeks             Interval History:   no new complaints more awake, hypoxia stable, abd pain better CT with gall bladder issue but more sig had L ureteral obstruction candida albicans in Bc, prior UC C albicans, current UC neg, new stent collected UC C albicans              Medications:       acetaminophen  650 mg Oral Q8H    Or     acetaminophen  650 mg Rectal Q8H     amLODIPine  5 mg Oral Daily     aspirin  81 mg Oral Daily     fluconazole  400 mg Intravenous Q24H     heparin  5,000 Units  Subcutaneous Q12H     ipratropium - albuterol 0.5 mg/2.5 mg/3 mL  3 mL Nebulization 4x daily     omeprazole  20 mg Oral QAM AC     PARoxetine  15 mg Oral At Bedtime     piperacillin-tazobactam  3.375 g Intravenous Q8H NAKIA     PRESERVISION AREDS  1 capsule Oral BID     sodium chloride (PF)  3 mL Intracatheter Q8H                  Physical Exam:   Blood pressure 158/70, pulse 62, temperature 96  F (35.6  C), temperature source Oral, resp. rate 18, weight 79.2 kg (174 lb 9.6 oz), SpO2 95 %.  Wt Readings from Last 2 Encounters:   10/01/18 79.2 kg (174 lb 9.6 oz)   09/17/18 80.1 kg (176 lb 9.6 oz)     Vital Signs with Ranges  Temp:  [95.8  F (35.4  C)-96  F (35.6  C)] 96  F (35.6  C)  Heart Rate:  [60-64] 64  Resp:  [16-18] 18  BP: (158-194)/(66-75) 158/70  SpO2:  [92 %-96 %] 95 %    Constitutional: Awake, alert, looks better, no apparent distress   Lungs: Clear to auscultation bilaterally, no crackles or wheezing   Cardiovascular: Regular rate and rhythm, normal S1 and S2, and no murmur noted   Abdomen: Normal bowel sounds, soft, non-distended, non-tender   Skin: No rashes, no cyanosis, no edema   Other:           Data:   All microbiology laboratory data reviewed.  Recent Labs   Lab Test  10/02/18   0639  10/01/18   0638  09/30/18   0714   WBC  9.5  11.6*  13.1*   HGB  12.1*  12.4*  11.8*   HCT  37.6*  38.5*  36.7*   MCV  92  91  91   PLT  491*  525*  490*     Recent Labs   Lab Test  10/02/18   0639  10/01/18   0638  09/30/18   0714   CR  1.07  1.10  1.14     Recent Labs   Lab Test  05/08/15   1510   SED  7     Recent Labs   Lab Test  09/28/18   1332  09/28/18   1331  09/27/18   1512  09/24/18   2050  09/24/18   1203  09/16/18   2303  09/16/18   2207  09/16/18   2100  08/28/18   1240   CULT  No growth after 4 days  No growth after 4 days  Heavy growth  Denise albicans / dubliniensis  Candida albicans and Candida dubliniensis are not routinely speciated  Susceptibility testing not routinely done  *  Culture negative  monitoring continues  <1000 colonies/mL  urogenital jeanette  Susceptibility testing not routinely done    Cultured on the 3rd day of incubation:  Candida albicans / dubliniensis  Speciation in progress  *  Critical Value/Significant Value, preliminary result only, called to and read back by  Willow Nesbitt RN, @9149 09/27/18..    No growth  No growth  50,000 to 100,000 colonies/mL  Candida albicans / dubliniensis  Candida albicans and Candida dubliniensis are not routinely speciated  Susceptibility testing not routinely done  *  No growth  10,000 to 50,000 colonies/mL  Escherichia coli  *  >100,000 colonies/mL  Enterococcus faecalis  *

## 2018-10-02 NOTE — PLAN OF CARE
Problem: Patient Care Overview  Goal: Plan of Care/Patient Progress Review  Discharge Planner OT   Patient plan for discharge: TCU  Current status: Pt required mod A for bed mobility supine > sit EOB, min A to advance hips to EOB, CGA/SBA while seated EOB. Pt completed sit > stand from EOB with min A, able to take steps to bedside chair FWW level with min A. Vcs required for safe sequencing and hand placement with transfers. Pt completed grooming/hygiene task of face washing while seated. O2 sats decreased to 88% on 2LPM with activity, improved to >90% with seated rest break and PLB technique. Fatigue noted.   Barriers to return to prior living situation: Lives alone, current level of assist for ADLs/IADLs and transfers, decreased strength and functional activity tolerance, impaired balance, impaired cognition  Recommendations for discharge: TCU  Rationale for recommendations: Pt continues to be below baseline level of functioning and would benefit from skilled OT to maximize safety and indep in all ADLs/IADLs and mobility tasks.  Pt is not safe to return to previous living environment at this time due to current level of assist. Recommend TCU at discharge for ongoing strengthening and functional activity tolerance       Entered by: Lenora Montaño 10/02/2018 8:58 AM

## 2018-10-03 NOTE — PROGRESS NOTES
[facility]  Hospitalist Progress Note  Virginie Ferris MD 10/03/2018    Reason for Stay (Diagnosis): encephalopathy/sepsi         Assessment and Plan:      Summary of Stay: Edison Boss is a 89 year old male with a history of prior prostate cancer treated with brachy therapy and XRT, chronic indwelling tolliver catheter, paroxysmal AF (not anticoagulated) and SSS s/p ablation and dual chamber PPM, htn/hlp,  admitted on 9/24/2018 with encephalopathy felt most consistent with infectious process and suspected pna complicated by hypoxia.     He was placed on IV pip-tazo and vanco in the setting of sepsis but acutely decompensated on the morning of 9/27/18.  He had been complaining of some back pain in the setting of recent falling and thought that this was initially just MSK.  But given clinical decline CAP CT pursued and positive for obstructing left ureteral stone.  He was urgently taken for cysto and stent placement and since then has continued to improve.    His cultures are notable for heavy grown candida albicans from renal pelvic fluid in addition to yeast cultured from 1/2 blood cultures from day of admission.     His hospitalization has been complicated by iatrogenic volume overload in the setting of appropriate treatment for sepsis.  He is responding well to IV furosemide.    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 Fungemia: with candida- micafungin switched over to fluconazole 10/1.  At discharge can switch to oral for 2 more weeks  2. pna vs renal pelvic infection: covered for nosocomial coverage with linezolid and pip-tazo.  Continued through weekend per weekend ID doc.  Most likely source though of sepsis is his yeast (interestingly he was treated with fluconazole  at discharge 9/19)-but doubt that could adequately take care of renal pelvis infection in setting of obstructing stone.  Linezolid discontinued 10/1, micafungin converted to fluconazole on 10/1, pip-tazo discontinued 10/3.  Discharge with 2 weeks fluconazole 400 mg, then 100 mg every day until urologic intervention and through any procedure and then for an additional week   3. Abdominal hernia:  Reducible so no indication for acute intervention  4. Iatrogenic volume overload with hypoxemia in the setting of G1dd:  Resolved with IV diuresis  5. G1dd:  With ongoing htn-start BB  6. Hypoxic respiratory failure:  Pulmonary edema and ? pna-improving.  Now down to  1 L supplemental O2-not on home O2.  I think now mostly atelectasis so have encouraged IS.  Check at rest and with exertion RA sats to determine need for discharge oxygen  7. Hematuria:  Mild-likely combination of factors including stone/tolliver/heparin. Would cont heparin as high risk for dvt given prolonged hospitalization   8. MADELYN:  Resolved-back to wnl even in the setting of iv diuresis.  Suspect 2/2 ATN from sepsis  9. Sepsis:  Tachycardia/leukocytosis-2/2 candida infection behind obstructed ureter and fungemia.  Resolved  10. Htn:  pta meds amlodipine 5 mg-resumed as at home and BPs inadequately controlled - started metop 25 mg bid  11. Depression:  Continued on paroxetine now that off linezolid  12. PAF/SSS paced rhythm by tele.  Not on chronic AC-started on asa this hospitalization   13. Thrombocytosis  A bit disconcerting as almost everything points to improvement but elevated platelet count (in the absence of PCV primary or secondary) is usually due to inflammatory process.  Cont to monitor  DVT Prophylaxis: Heparin SQ and Pneumatic Compression Devices  Code Status: DNR / DNI  Functional status:  Lives alone in senior apartment, walks with a walker    Disposition Plan   Expected discharge in 1 days to d once respiratory failure resolved, to [facility]     Entered: Virginie Ferris 10/03/2018, 7:42 AM        Discussed with patient, dtr  at the bedside            Interval History (Subjective):      Had another busy morning and actually got up and walked down the rizo a bit.  No cp or sob, gets fatigued easily.  No n/v and appetite is improving                  Physical Exam:      Last Vital Signs:  /68 (BP Location: Left arm)  Pulse 62  Temp 96.3  F (35.7  C) (Oral)  Resp 18  Wt 77.3 kg (170 lb 8 oz)  SpO2 93%  BMI 24.46 kg/m2    I/O: net neg 9.3 L  Tele:  A and AV paced    Pleasant nad sleeping quietly when I came in but easily awoke and participated in interview and exam looks stated age head nc/at sclera clear lungs diminished in the bases otherwise ctab rrr no mrg trace le edema skin w/d no cyanosis or clubbing alert and oriented affect appropriate abd s/nt/nd cano urine reddish hue in catheter bag           Medications:      All current medications were reviewed with changes reflected in problem list.         Data:      All new lab and imaging data was reviewed.   Labs:    Recent Labs  Lab 10/03/18  0624      POTASSIUM 3.6   CHLORIDE 105   CO2 30   ANIONGAP 5   GLC 88   BUN 13   CR 1.14   GFRESTIMATED 60*   GFRESTBLACK 72   ARYAN 8.9       Recent Labs  Lab 10/03/18  0624   WBC 10.3   HGB 11.7*   HCT 36.2*   MCV 91   *      Imaging:

## 2018-10-03 NOTE — PROGRESS NOTES
[facility]  Palliative Care Chart Check    This patient's most recent hospitalist note, nursing notes, most recent consultant notes, medication profile and labs from the past 24 hours have been reviewed.  POLST completed with patient and his Daughter Red Saldana. She agrees to bring in advance directive prior to discontinue for our records.  Will follow up on document but otherwise sign off.  If you would like the patient to be seen, please contact the service at [phone] and ask to have the patient seen.    Thank you!    Viridiana Anaya   Pain Management and Palliative Care  [facility]  Pgr: [phone]

## 2018-10-03 NOTE — PLAN OF CARE
Problem: Patient Care Overview  Goal: Plan of Care/Patient Progress Review  Outcome: No Change   Patient A/O, A1 to 2 with longer distance.Mobilty improving, ambulated in rizo with walker and tolerated well. Up in chair x2 or meals. Poor appetite. F/C patent. Diflucan and Zosyn . Probable discharge to [facility] tomorrow.

## 2018-10-03 NOTE — PLAN OF CARE
Problem: Confusion, Acute (Adult)  Goal: Identify Related Risk Factors and Signs and Symptoms  Related risk factors and signs and symptoms are identified upon initiation of Human Response Clinical Practice Guideline (CPG).   Outcome: Improving  PT admitted 9/24 with Hx. Of prostate cancer with chronic catheter in place, paroxysmal AF, HTN, SSS w/ PPM with c/o altered mental status possible 2/2 infectious process and suspected PNA. IV lasix for fluid overload. CT found (+) uretal stone. stent placement w/ cysto completed. Cultures came back (+) for candida albicans from renal pelvic fluid - fluconazole abx IV. Pt AOx3. [place]. Christianson cathter in place. Transferring 1-2 w/ walker and gaitbelt. LS diminished. 1L of O2. Tele: SR.  Meplix in place to coccyx. IV to RA saline locked. ID/Palliateive/ PT/OT following. Plan is for pt to discharge to [facility] in 1-2 days w/ PO abx. Continue POC.

## 2018-10-03 NOTE — PLAN OF CARE
Problem: Patient Care Overview  Goal: Plan of Care/Patient Progress Review  Outcome: Improving  VSS denies pain ambulates with AX2 with a walker and a gait belt alert/oriented with slow response on 1 liter oxygen mepelex dressing on coccyx turned and repositioned.  Reports baseline numbness on bilateral lower extremities. Catheter in place and patent. Pt/OT/pallative consulting. Tele 100% A paced. Seem much sleepier this shift than previous. PT. OT,ID and social work following. On heparin subcutaneous

## 2018-10-03 NOTE — PROGRESS NOTES
[facility]  Infectious Disease Progress Note          Assessment and Plan:   IMPRESSION:   1.  A 89-year-old male with 2+ week progressive deterioration in health, some hypoxia and possible infiltrate, recent abnormal urine, but with unremarkable cultures, some intermittent abdominal pain and recently reduced hernia.  Primary etiology of syndrome now likely aparrent with CT L ureteral obstruction and BC candida   2.  Cognitive dysfunction, probably encephalopathy from the current acute infection on top of mild dementia.   3.  Abdominal pain, previously part of this now occurring again.  CT scan with new finding L ureteral obstuction  4.  Definite infiltrates and hypoxia, certainly at risk for aspiration and appearance suggestive of that, enough health care contact to be at risk for nosocomial pathogens.   5.  Acute renal insufficiency, undoubtedly acute tubular necrosis from the current acute illness, but also got vancomycin.   6.  Prior cerebrovascular infarction.   7.  Possible fluid and congestive heart failure component.   8 Ongoing reducible abd hernia  9 gall stones      RECOMMENDATIONS:   1. Candidemia and + urine from kidney, C albicans, clear cause of current illness  2.  Improving kidney function, respiratory function,   3.   Zosyn DC  4 Recheck Bc neg   5 to po diflucan 400 daily 2 weeks more, then 100mg daily continue until urologic intervention and thru any procedure 1 week or so longer            Interval History:   no new complaints more awake, hypoxia stable, abd pain better CT with gall bladder issue but more sig had L ureteral obstruction candida albicans in Bc, prior UC C albicans, current UC neg, new stent collected UC C albicans              Medications:       acetaminophen  650 mg Oral Q8H    Or     acetaminophen  650 mg Rectal Q8H     amLODIPine  5 mg Oral Daily     aspirin  81 mg Oral Daily     fluconazole  400 mg Oral Daily     heparin  5,000 Units Subcutaneous Q12H      ipratropium - albuterol 0.5 mg/2.5 mg/3 mL  3 mL Nebulization 4x daily     metoprolol tartrate  25 mg Oral BID     omeprazole  20 mg Oral QAM AC     PARoxetine  15 mg Oral At Bedtime     PRESERVISION AREDS  1 capsule Oral BID     sodium chloride (PF)  3 mL Intracatheter Q8H                  Physical Exam:   Blood pressure 166/69, pulse 62, temperature 96.7  F (35.9  C), temperature source Axillary, resp. rate 20, weight 77.3 kg (170 lb 8 oz), SpO2 93 %.  Wt Readings from Last 2 Encounters:   10/03/18 77.3 kg (170 lb 8 oz)   09/17/18 80.1 kg (176 lb 9.6 oz)     Vital Signs with Ranges  Temp:  [95.4  F (35.2  C)-96.7  F (35.9  C)] 96.7  F (35.9  C)  Heart Rate:  [60-66] 65  Resp:  [18-20] 20  BP: (160-167)/(65-69) 166/69  SpO2:  [89 %-97 %] 93 %    Constitutional: Awake, alert, looks better, no apparent distress   Lungs: Clear to auscultation bilaterally, no crackles or wheezing   Cardiovascular: Regular rate and rhythm, normal S1 and S2, and no murmur noted   Abdomen: Normal bowel sounds, soft, non-distended, non-tender   Skin: No rashes, no cyanosis, no edema   Other:           Data:   All microbiology laboratory data reviewed.  Recent Labs   Lab Test  10/03/18   0624  10/02/18   0639  10/01/18   0638   WBC  10.3  9.5  11.6*   HGB  11.7*  12.1*  12.4*   HCT  36.2*  37.6*  38.5*   MCV  91  92  91   PLT  463*  491*  525*     Recent Labs   Lab Test  10/03/18   0624  10/02/18   0639  10/01/18   0638   CR  1.14  1.07  1.10     Recent Labs   Lab Test  05/08/15   1510   SED  7     Recent Labs   Lab Test  09/28/18   1332  09/28/18   1331  09/27/18   1512  09/24/18   2050  09/24/18   1203  09/16/18   2303  09/16/18   2207  09/16/18   2100  08/28/18   1240   CULT  No growth after 5 days  No growth after 5 days  Heavy growth  Denise albicans / dubliniensis  Candida albicans and Candida dubliniensis are not routinely speciated  Susceptibility testing not routinely done  *  Culture negative monitoring continues  <1000  colonies/mL  urogenital jeanette  Susceptibility testing not routinely done    Cultured on the 3rd day of incubation:  Candida albicans  *  Critical Value/Significant Value, preliminary result only, called to and read back by  Willow Nesbitt RN, @[id number] 09/27/18..    No growth  No growth  50,000 to 100,000 colonies/mL  Candida albicans / dubliniensis  Candida albicans and Candida dubliniensis are not routinely speciated  Susceptibility testing not routinely done  *  No growth  10,000 to 50,000 colonies/mL  Escherichia coli  *  >100,000 colonies/mL  Enterococcus faecalis  *

## 2018-10-04 NOTE — PLAN OF CARE
Problem: Pneumonia (Adult)  Goal: Signs and Symptoms of Listed Potential Problems Will be Absent, Minimized or Managed (Pneumonia)  Signs and symptoms of listed potential problems will be absent, minimized or managed by discharge/transition of care (reference Pneumonia (Adult) CPG).   Outcome: Improving  Pt is alert and orientated x4, and forgetful. O2 94% on 1L. Lung sounds are clear.  Pt c/o left shoulder pain, scheduled tylenol given. Up with assist x1 using gait belt and walker. Tele 100% A paced. Will continue with POC.

## 2018-10-04 NOTE — PROGRESS NOTES
Your information has been submitted on October 04th, 2018 at 01:50:25 PM CDT. The confirmation number is [id number]

## 2018-10-04 NOTE — PLAN OF CARE
Problem: Confusion, Acute (Adult)  Goal: Identify Related Risk Factors and Signs and Symptoms  Related risk factors and signs and symptoms are identified upon initiation of Human Response Clinical Practice Guideline (CPG).   Outcome: Improving  PT admitted 9/24 with Hx. Of prostate cancer with chronic catheter in place, paroxysmal AF, HTN, SSS w/ PPM with c/o altered mental status possible 2/2 infectious process and suspected PNA. IV lasix for fluid overload. CT found (+) uretal stone. stent placement w/ cysto completed. Cultures came back (+) for candida albicans from renal pelvic fluid - fluconazole abx PO. Pt AOx3. [place]. Christianson cathter in place. Transferring 1-2 w/ walker and gaitbelt. Walked twice to and from nurses station on this shirt. LS diminished. 1L of O2. RA @ rest 88% Tele: 100% a-paced.  Removed meplix from coccyx - skin intact. IV to RA saline locked. ID/Palliateive/ PT/OT following. Plan is for pt to discharge to [facility] tomorrow. Continue POC.

## 2018-10-04 NOTE — PLAN OF CARE
Problem: Patient Care Overview  Goal: Plan of Care/Patient Progress Review  Occupational Therapy Discharge Summary    Reason for therapy discharge:    Discharged to transitional care facility.    Progress towards therapy goal(s). See goals on Care Plan in [facility] electronic health record for goal details.  Goals not met.  Barriers to achieving goals:   discharge from facility.    Therapy recommendation(s):    Continued therapy is recommended.  Rationale/Recommendations:  eval and treat at TCU.

## 2018-10-04 NOTE — PROGRESS NOTES
[facility]  Infectious Disease Progress Note          Assessment and Plan:   IMPRESSION:   1.  A 89-year-old male with 2+ week progressive deterioration in health, some hypoxia and possible infiltrate, recent abnormal urine, but with unremarkable cultures, some intermittent abdominal pain and recently reduced hernia.  Primary etiology of syndrome now likely aparrent with CT L ureteral obstruction and BC candida   2.  Cognitive dysfunction, probably encephalopathy from the current acute infection on top of mild dementia.   3.  Abdominal pain, previously part of this now occurring again.  CT scan with new finding L ureteral obstuction  4.  Definite infiltrates and hypoxia, certainly at risk for aspiration and appearance suggestive of that, enough health care contact to be at risk for nosocomial pathogens.   5.  Acute renal insufficiency, undoubtedly acute tubular necrosis from the current acute illness, but also got vancomycin.   6.  Prior cerebrovascular infarction.   7.  Possible fluid and congestive heart failure component.   8 Ongoing reducible abd hernia  9 gall stones      RECOMMENDATIONS:   1. Candidemia and + urine from kidney, C albicans, clear cause of current illness  2.  Improving kidney function, respiratory function,   3.   Zosyn DC  4 Recheck Bc neg   5  po diflucan 400 daily 2 weeks more, then 100mg daily continue until urologic intervention and thru any procedure 1 week or so longer if procedure done            Interval History:   no new complaints more awake, hypoxia better, abd pain resolved CT with gall bladder issue but more sig had L ureteral obstruction candida albicans in Bc, prior UC C albicans, current UC neg, new stent collected UC C albicans dx seems straight forward              Medications:       acetaminophen  650 mg Oral Q8H    Or     acetaminophen  650 mg Rectal Q8H     amLODIPine  5 mg Oral Daily     aspirin  81 mg Oral Daily     fluconazole  400 mg Oral Daily      heparin  5,000 Units Subcutaneous Q12H     ipratropium - albuterol 0.5 mg/2.5 mg/3 mL  3 mL Nebulization 4x daily     metoprolol tartrate  25 mg Oral BID     omeprazole  20 mg Oral QAM AC     PARoxetine  15 mg Oral At Bedtime     PRESERVISION AREDS  1 capsule Oral BID     sodium chloride (PF)  3 mL Intracatheter Q8H                  Physical Exam:   Blood pressure 166/76, pulse 60, temperature 97.6  F (36.4  C), temperature source Oral, resp. rate 18, weight 76.2 kg (168 lb 1.6 oz), SpO2 95 %.  Wt Readings from Last 2 Encounters:   10/04/18 76.2 kg (168 lb 1.6 oz)   09/17/18 80.1 kg (176 lb 9.6 oz)     Vital Signs with Ranges  Temp:  [96.5  F (35.8  C)-97.6  F (36.4  C)] 97.6  F (36.4  C)  Pulse:  [60] 60  Heart Rate:  [60-61] 60  Resp:  [18] 18  BP: (153-175)/(59-76) 166/76  SpO2:  [88 %-97 %] 95 %    Constitutional: Awake, alert, looks better, no apparent distress   Lungs: Clear to auscultation bilaterally, no crackles or wheezing   Cardiovascular: Regular rate and rhythm, normal S1 and S2, and no murmur noted   Abdomen: Normal bowel sounds, soft, non-distended, non-tender   Skin: No rashes, no cyanosis, no edema   Other:           Data:   All microbiology laboratory data reviewed.  Recent Labs   Lab Test  10/04/18   0558  10/03/18   0624  10/02/18   0639   WBC  9.3  10.3  9.5   HGB  11.9*  11.7*  12.1*   HCT  37.1*  36.2*  37.6*   MCV  91  91  92   PLT  389  463*  491*     Recent Labs   Lab Test  10/04/18   0558  10/03/18   0624  10/02/18   0639   CR  1.03  1.14  1.07     Recent Labs   Lab Test  05/08/15   1510   SED  7     Recent Labs   Lab Test  09/28/18   1332  09/28/18   1331  09/27/18   1512  09/24/18   2050  09/24/18   1203  09/16/18   2303  09/16/18   2207  09/16/18   2100  08/28/18   1240   CULT  No growth  No growth  Heavy growth  Denise albicans / dubliniensis  Candida albicans and Candida dubliniensis are not routinely speciated  Susceptibility testing not routinely done  *  Culture negative  monitoring continues  <1000 colonies/mL  urogenital jeanette  Susceptibility testing not routinely done    Cultured on the 3rd day of incubation:  Candida albicans  *  Critical Value/Significant Value, preliminary result only, called to and read back by  Willow Nesbitt RN, @[id number] 09/27/18..    No growth  No growth  50,000 to 100,000 colonies/mL  Candida albicans / dubliniensis  Candida albicans and Candida dubliniensis are not routinely speciated  Susceptibility testing not routinely done  *  No growth  10,000 to 50,000 colonies/mL  Escherichia coli  *  >100,000 colonies/mL  Enterococcus faecalis  *

## 2018-10-04 NOTE — PLAN OF CARE
Problem: Patient Care Overview  Goal: Plan of Care/Patient Progress Review  PT: Patient noted to be discharging to TCU. Goals not met.  Physical Therapy Discharge Summary    Reason for therapy discharge:    Discharged to transitional care facility.    Progress towards therapy goal(s). See goals on Care Plan in [facility] electronic health record for goal details.  Goals not met.  Barriers to achieving goals:   discharge from facility.    Therapy recommendation(s):    Continued therapy is recommended.  Rationale/Recommendations:  TCU to maximize return to PLOF.

## 2018-10-04 NOTE — TELEPHONE ENCOUNTER
Daughter called nurse line and spoke with this nurse. She is requesting that patient's stone be taken care of the same time as his scheduled Botox. Will forward message to MD. Bernadette Levy LPN

## 2018-10-04 NOTE — DISCHARGE SUMMARY
Discharge Summary  Hospitalist Service      Edison Boss MRN# [id number]   YOB: 1924 Age: 89 year old     Date of Admission:  9/24/2018  Date of Discharge:  10/4/2018  Admitting Physician:  Corona Arguello DO  Discharge Physician: Virginie Ferris MD   Discharging Service: Hospitalist Service     Primary Provider: Porfirio Terrell  Primary Care Physician Phone Number: [phone]         Discharge Diagnoses/Problem Oriented Hospital Course (Providers):    Edison Boss was admitted on 9/24/2018 by Corona Arguello DO and I would refer you to their history and physical.  The following problems were addressed during his hospitalization:    Summary of Stay: Edison Boss is a 89 year old male with a history of prior prostate cancer treated with brachy therapy and XRT, chronic indwelling tolliver catheter, paroxysmal AF (not anticoagulated) and SSS s/p ablation and dual chamber PPM, htn/hlp,  admitted on 9/24/2018 with encephalopathy felt most consistent with infectious process and suspected pna complicated by hypoxia.   Recent PMH is notable for hospitalization at this facility 9/16/19 for pna and candidal UTI-discharged with appropriate levofloxacin and fluconazole.      He was placed on IV pip-tazo and vanco in the setting of sepsis but acutely decompensated on the morning of 9/27/18.  He had been complaining of some back pain in the setting of recent falling and thought that this was initially just MSK.  But given clinical decline CAP CT pursued and positive for obstructing left ureteral stone.  He was urgently taken for cysto and stent placement and since then has continued to improve.     His cultures are notable for heavy grown candida albicans from renal pelvic fluid in addition to yeast cultured from 1/2 blood cultures from day of admission.     My conclusion is that he had this ureteral stone for some time, and on prior admission was appropriately treated with  fluconazole but given stone it was inadequate, and now that obstruction removed he can fully respond to therapy.  I think abx for pna likely red-herring and not true infection.  He's dramatically improved although remains deconditioned.      His hospitalization has been complicated by iatrogenic volume overload in the setting of appropriate treatment for sepsis.  He  responded well to IV furosemide and is euvolemic at discharge     He had hypoxic respiratory failure, likely MF in etiology-acute sepsis, pulmonary edema, and  suspected compressive atelectasis which has subsequently completely resolved     Problem List:   1. Fungemia: with candida- micafungin switched over to fluconazole 10/1.  At discharge can switch to oral for 2 more weeks at 400 mg every day, then decrease to 100 mg every day through stone removal and for a week after  2. pna vs renal pelvic infection: covered for nosocomial coverage with linezolid and pip-tazo.  Continued through weekend per weekend ID doc.  Most likely source though of sepsis is his yeast (interestingly he was treated with fluconazole at discharge 9/19)-but doubt that could adequately take care of renal pelvis infection in setting of obstructing stone.  Linezolid discontinued 10/1, micafungin converted to fluconazole on 10/1, pip-tazo discontinued 10/3.  Discharge with 2 weeks fluconazole 400 mg, then 100 mg every day until urologic intervention and through any procedure and then for an additional week   3. Abdominal hernia:  Reducible so no indication for acute intervention  4. Iatrogenic volume overload with hypoxemia in the setting of G1dd:  Resolved with IV diuresis  5. G1dd:  With ongoing htn-started BB  6. Elevated troponin:  Demand ischemia (Type 2 NSTEMI) in setting of sepsis, asa initiated, no indication for further evaluation.  Of note echo without wma  7. Hypoxic respiratory failure:  Pulmonary edema and ? pna and atelectasis-resolved.  Per RN 94 % at rest 92 % with  exertion so no need for discharge O2  8. Hematuria:  Mild-likely combination of factors including stone/tolliver/heparin. Would cont heparin as high risk for dvt given prolonged hospitalization but will not require this at discharge  9. MADELYN:  Resolved-back to wnl even in the setting of iv diuresis.  Suspect 2/2 ATN from sepsis  10. Sepsis:  Tachycardia/leukocytosis-2/2 candida infection behind obstructed ureter and fungemia.  Resolved  11. Htn:  pta meds amlodipine 5 mg-resumed as at home and BPs inadequately controlled - started metop 25 mg bid and BPs still on high side, will have close f/u in TCU, can easily titrate up on BB despite hx of SSS/bradycardia as have PPM in place  12. Depression:  Continued on paroxetine now that off linezolid  13. PAF/SSS paced rhythm by tele.  Not on chronic AC-started on asa this hospitalization   14. Thrombocytosis  A bit disconcerting as almost everything points to improvement but elevated platelet count (in the absence of PCV primary or secondary) is usually due to inflammatory process.  now normalizing  15. Pre-Op:  I have completed a history and physical exam, as long as patient remains stable this should serve as his pre procedural H and P.  He is moderate risk for complications in setting of age and MMP but clearly benefits outweigh the risks  DVT Prophylaxis: Heparin SQ and Pneumatic Compression Devices  Code Status: DNR / DNI  Functional status:  Lives alone in senior apartment, walks with a walker    Discharge to TCU with PT/OT, close f/u for htn, complicated hospitalization.     Discussed with patient and dtr at the bedside            Code Status:      DNR / DNI         Important Results:      As noted below           Pending Results:        Unresulted Labs Ordered in the Past 30 Days of this Admission     No orders found from 7/26/2018 to 9/25/2018.               Discharge Instructions and Follow-Up:      Follow-up Appointments     Follow Up and recommended labs and tests        F/U with Dr Lilly for definitive stone removal  Take fluconazoel 400 mg every day for 2 weeks then decrease to 100 mg a   day through stone removal procedure and for an additional week  F/U with [facility] MD/NP in 3-5 days for recheck of BP and BMP                         Discharge Disposition:      Discharged to home          Discharge Medications:        Current Discharge Medication List      START taking these medications    Details   aspirin 81 MG EC tablet Take 1 tablet (81 mg) by mouth daily    Associated Diagnoses: NSTEMI (non-ST elevated myocardial infarction) (H)      metoprolol tartrate (LOPRESSOR) 25 MG tablet Take 1 tablet (25 mg) by mouth 2 times daily  Qty: 60 tablet    Associated Diagnoses: Benign essential hypertension         CONTINUE these medications which have CHANGED    Details   !! fluconazole (DIFLUCAN) 100 MG tablet Take 1 tablet (100 mg) by mouth daily Start taking 100 mg every day when you have completed the 2 weeks of 400 mg per day, you need to take 100 mg per day through stone removal procedure and for about a week afterwards  Qty: 15 tablet, Refills: 0    Associated Diagnoses: Candidemia (H)      !! fluconazole (DIFLUCAN) 200 MG tablet Take 2 tablets (400 mg) by mouth daily for 14 days  Qty: 28 tablet, Refills: 0    Associated Diagnoses: Candida infection       !! - Potential duplicate medications found. Please discuss with provider.      CONTINUE these medications which have NOT CHANGED    Details   albuterol (PROVENTIL HFA: VENTOLIN HFA) 108 (90 BASE) MCG/ACT inhaler Inhale 2 puffs into the lungs every 6 hours as needed for shortness of breath / dyspnea.  Qty: 1 Inhaler, Refills: 1    Associated Diagnoses: Mild persistent asthma      amLODIPine (NORVASC) 5 MG tablet TAKE 1 TABLET(5 MG) BY MOUTH DAILY  Qty: 90 tablet, Refills: 3    Associated Diagnoses: Essential hypertension with goal blood pressure less than 140/90      cetirizine (ZYRTEC) 10 MG tablet Take 10 mg by mouth daily as  needed      clobetasol (TEMOVATE) 0.05 % ointment daily as needed   Refills: 2      Colloidal Oatmeal (AVEENO ECZEMA THERAPY) 1 % CREA Externally apply topically daily as needed       D-MANNOSE POWD Take 100 mg by mouth daily 500 mg Pt takes 2 pills daily      lidocaine (LMX4) 4 % CREA 4% topical cream Apply topically daily as needed      Misc Natural Products (COLON CLEANSER PO) Take 1 capsule by mouth daily Advanced Colon Care II      Multiple Vitamins-Minerals (PRESERVISION AREDS) CAPS Take 1 capsule by mouth 2 times daily  Qty: 180 capsule, Refills: 3    Associated Diagnoses: Macular degeneration      omeprazole (PRILOSEC) 20 MG CR capsule Take 20 mg by mouth daily as needed      PARoxetine (PAXIL) 30 MG tablet Take 15 mg by mouth At Bedtime         STOP taking these medications       oxyCODONE-acetaminophen (PERCOCET) 5-325 MG per tablet Comments:   Reason for Stopping:                    Allergies:         Allergies   Allergen Reactions     Ceftriaxone Itching     Bactrim Hives     Levaquin Diarrhea     Oral only, can tolerate IV     Zithromax [Azithromycin]      Macrodantin [Nitrofuran Derivatives] Rash     Took recently with no problems            Consultations This Hospital Stay:      Consultation during this admission received from gastroenterology, infectious disease, surgery, urology and palliative care          Condition and Physical Exam on Discharge:      Discharge condition: Stable   Discharge vitals: Blood pressure 166/76, pulse 60, temperature 97.6  F (36.4  C), temperature source Oral, resp. rate 18, weight 76.2 kg (168 lb 1.6 oz), SpO2 92 %.     Constitutional: Pleasant nad looks stated age head nc/at sclera clear   Lungs: ctab nl effort good inspiration no atelectatic crackles anymore   Cardiovascular: rrr soft systolic m no r/g no le edema   Abdomen: S/nt/nd   Skin: W/d no c/c    Other: Alert and oriented affect appropriate cano ambulating with assistance, remains fatigued           Discharge  Orders for Skilled Facility (from Discharge Orders):        After Care Instructions     Activity - Up with nursing assistance           Advance Diet as Tolerated       Follow this diet upon discharge: Orders Placed This Encounter      Snacks/Supplements Adult: Other; Chocolate Plus2 shake, mix w/2 packets beneprotein; With Meals      Advance Diet as Tolerated: Regular Diet Adult            Fall precautions           [place] catheter       To straight gravity drainage. Change catheter every 2 weeks and PRN for leaking or decreased uring output with signs of bladder distention. DO NOT change catheter without a specific MD order IF diagnosis of benign prostatic hypertrophy (BPH), neurogenic bladder, or other urological conditions            General info for SNF       Length of Stay Estimate: Short Term Care: Estimated # of Days <30  Condition at Discharge: Improving  Level of care:skilled   Rehabilitation Potential: Fair  Admission H&P remains valid and up-to-date: Yes  Recent Chemotherapy: N/A  Use Nursing Home Standing Orders: Yes            Mantoux instructions       Give two-step Mantoux (PPD) Per Facility Policy Yes                           Rehab orders for Skilled Facility (from Discharge Orders):      Referrals     Future Labs/Procedures    Occupational Therapy Adult Consult     Comments:    Evaluate and treat as clinically indicated.    Reason:  deconditioning    Physical Therapy Adult Consult     Comments:    Evaluate and treat as clinically indicated.    Reason:  deconditioning             Discharge Time:      Greater than 30 minutes.        Image Results From This Hospital Stay (For Non-EPIC Providers):        Results for orders placed or performed during the hospital encounter of 09/24/18   XR Chest 2 Views    Narrative    CHEST TWO VIEWS September 24, 2018 1:21 PM     HISTORY: Hypoxia. Cough.    COMPARISON: 9/16/2018.      Impression    IMPRESSION: Mild opacity at the left lung base has increased since  the  previous exam, and may be related to pneumonia and/or atelectasis. No  other significant interval change. Dual-lead cardiac device left chest  wall, with lead tips projected over the RA and RV. Small area of  ill-defined opacity in the left upper lung medially is unchanged.  Heart size appears stable. There is mild pulmonary venous congestion.    GERTRUDE ELIZABETH MD   CT Head w/o Contrast    Narrative    CT SCAN OF THE HEAD WITHOUT CONTRAST   9/24/2018 5:34 PM     HISTORY: Altered mental status. Left-sided weakness.    TECHNIQUE: Axial images of the head and coronal reformations without  IV contrast material. Radiation dose for this scan was reduced using  automated exposure control, adjustment of the mA and/or kV according  to patient size, or iterative reconstruction technique.    COMPARISON: 9/16/2018    FINDINGS: There is generalized atrophy of the brain. There is low  attenuation in the white matter of the cerebral hemispheres consistent  with sequelae of small vessel ischemic disease. There is no evidence  of intracranial hemorrhage, mass, acute infarct or anomaly.     The visualized portions of the sinuses and mastoids appear normal.  There is no evidence of trauma.      Impression    IMPRESSION:   1. No acute abnormality.  2. Atrophy of the brain. White matter changes consistent with sequelae  of small vessel ischemic disease. This is unchanged.    DANILO ALFONSO MD   US Lower Extremity Venous Duplex Bilateral    Narrative    BILATERAL LOWER EXTREMITY VENOUS DOPPLER ULTRASOUND  9/24/2018 7:12 PM    HISTORY: Hypoxia with elevated D-dimer. The concern is for deep venous  thrombosis.    COMPARISON: None.    FINDINGS: Color flow and Doppler spectral waveform analysis  demonstrates normal blood flow in the common femoral, femoral,  popliteal, posterior tibial, and greater saphenous veins of the lower  extremities bilaterally. No thrombus is seen.      Impression    IMPRESSION: There is no evidence of deep  venous thrombosis within the  lower extremities bilaterally.    MELODY DE LEON MD   NM Lung Scan Ventilation and Perfusion    Narrative    NUCLEAR MEDICINE LUNG PERFUSION SCAN AND VENTILATION SCAN 9/24/2018  7:41 PM     HISTORY: Hypoxia with elevated D-dimer.    COMPARISON: Radiographs earlier today.    TECHNIQUE: Tc-99m MAA injected intravenously for evaluation of  pulmonary perfusion. Tc-99m DTPA inhaled for the ventilation phase.    DOSE: 3 mCi Tc99m MAA via IV at 1905; 64.8 mCi Tc99m DTPA inhaled at  1920.    FINDINGS: Normal radiotracer distribution throughout both lungs on the  ventilation and perfusion scans. No unmatched perfusion defects to  suggest pulmonary emboli.      Impression    IMPRESSION: Normal lung ventilation and perfusion scan. No  scintigraphic evidence of pulmonary embolism.    MELODY DE LEON MD   XR Chest Port 1 View    Narrative    CHEST ONE VIEW PORTABLE  9/26/2018 10:45 AM     HISTORY: Hypoxia.     COMPARISON: 9/24/2018      Impression    IMPRESSION: Left subclavian cardiac device remains in place. The  cardiac silhouette is enlarged and stable. Improved pulmonary vascular  congestion. Persistent left basilar infiltrate or atelectasis. There  is also mild atelectasis at the right lung base. No new pulmonary  opacities are seen.    IRVING COPE MD   CT Chest Abdomen Pelvis w/o Contrast    Narrative    CT CHEST, ABDOMEN AND PELVIS WITHOUT CONTRAST   9/27/2018 11:59 AM     HISTORY: Sepsis.     TECHNIQUE: No IV contrast. Radiation dose for this scan was reduced  using automated exposure control, adjustment of the mA and/or kV  according to patient size, or iterative reconstruction technique.    COMPARISON: Abdomen 8/28/2018    FINDINGS:   Chest: Cardiac pacer. There appear to be 3-4 right thyroid nodules,  measuring up to 1.4 cm. Aortic and coronary artery calcifications.  Mild adenopathy within the mediastinum and lower neck. Mild-moderate  bilateral pleural effusions (left  greater than right). There is  adjacent lung consolidation-collapse, which could simply represent  atelectasis, but pneumonia cannot be excluded. There is mild bilateral  interstitial prominence as well as bilateral groundglass opacity. This  is nonspecific but the differential diagnosis would include  interstitial edema and interstitial pneumonia.    Abdomen, pelvis: There appears to be a small gallstone. There is also  a stone in the distal common bile duct which is elongated and measures  10 mm in length. Mild common bile duct dilatation with a diameter of  12 mm. Bladder catheter. Prostate region radiation seed implants.  Again there is a fat-containing left inguinal hernia. There is a  prominent right inguinal hernia; this is increased and contains  multiple small bowel loops without wall thickening or significant  obstruction. There is nonspecific presacral edema. Again there is a  large left renal cyst. Minimal free fluid in the lower pelvis. Colonic  diverticulosis. Again there is a low density left adrenal nodule; the  density measurement suggest a benign adenoma. There are left renal and  calyceal stones. There is a 13 mm stone within the proximal left  ureter with hydronephrosis.      Impression    IMPRESSION:  1. Mild-moderate pleural effusions, left greater than right.  2. Bilateral interstitial prominence and groundglass opacity. This is  nonspecific but could represent interstitial edema or interstitial  pneumonia.  3. Mild mediastinal and lower neck adenopathy.  4. 10 mm common bile duct stone with mild CBD dilatation.  5. 13 mm proximal left ureter stone with obstruction.  6. Bilateral inguinal hernias.  7. Minimal lower pelvic fluid.  8. Thyroid nodules.  9. Additional findings discussed above.    SERGEI SAHU MD   XR Surgery WHITNEY Fluoro L/T 5 Min w Stills    Narrative    SURGERY C-ARM FLUOROSCOPY LESS THAN FIVE MINUTES WITH STILLS   9/27/2018 3:18 PM     COMPARISON: CT 9/27/2018.    HISTORY: Left  "stent placement, left stones.    NUMBER OF IMAGES ACQUIRED: 2    VIEWS: 1    FLUOROSCOPY TIME: .2 minutes.      Impression    IMPRESSION: Left retrograde ureterogram demonstrates a filling defect  in the proximal left ureter. This probably corresponds to the  obstructing stone seen on prior CT. There is moderate left  hydronephrosis. Final image demonstrates stent that appears to be in  good position. Please see operative report for further details.    RICKEY LOPEZ MD   XR Chest Port 1 View    Narrative    CHEST ONE VIEW PORTABLE   9/28/2018 9:52 AM     HISTORY: Hypoxia.     COMPARISON: 9/26/2018.      Impression    IMPRESSION: There is perihilar \"batwing\" interstitial opacities  suspicious for acute pulmonary edema. Heart size similar to prior.  There are small bilateral pleural effusions. No pneumothorax.  Left-sided pacer device noted.    WILL BRICENO MD   XR Chest Port 1 View    Narrative    CHEST ONE VIEW PORTABLE  9/29/2018 9:38 AM     HISTORY:  Hypoxia.     COMPARISON: 9/28/2018 chest x-ray.      Impression    IMPRESSION: Slight improvement in perihilar infiltrates likely  representing improving pulmonary edema. There is however a new  moderate left pleural effusion and associated atelectasis. There is  also a new mild right pleural effusion.    OLIVA PAN MD   Cysto 9/27/18  DATE OF SERVICE: 9/27/2018  PREOPERATIVE DIAGNOSIS: Left Ureteral Stone  POSTOPERATIVE DIAGNOSIS: Left Ureteral Stone    PROCEDURES PERFORMED:   1. Cystourethroscopy  2. Left retrograde pyelography with interpretation of intraoperative fluoroscopic imaging  3. Left ureteral stent placement    Echo 9/25/18  Interpretation Summary     The left ventricle is normal in structure, function and size.  The left ventricular ejection fraction is normal.G1 dd present  There is trace to mild tricuspid regurgitation.  Aortic sclerosis but no significant valve issues.  Pulmonary hypertension.  Compared to the last echo done 1/2009 the pulmonary " pressure is higher.      Most Recent Lab Results In EPIC (For Non-EPIC Providers):    Most Recent 3 CBC's:  Recent Labs   Lab Test  10/04/18   0558  10/03/18   0624  10/02/18   0639   WBC  9.3  10.3  9.5   HGB  11.9*  11.7*  12.1*   MCV  91  91  92   PLT  389  463*  491*   Troponin:  0.061/0.063  Most Recent 3 BMP's:  Recent Labs   Lab Test  10/04/18   0558  10/03/18   0624  10/02/18   0639   NA  140  140  140   POTASSIUM  4.0  3.6  3.8   CHLORIDE  106  105  107   CO2  30  30  28   BUN  14  13  12   CR  1.03  1.14  1.07   ANIONGAP  4  5  5   ARYAN  8.7  8.9  8.7   GLC  88  88  85     Most Recent 3 INR's:  Recent Labs   Lab Test  08/28/18   0946 09/24/15 09/03/15   INR  1.09  1.8*  2.2*     Most Recent 2 LFT's:  Recent Labs   Lab Test  10/03/18   0624  09/29/18   0652   AST  28  34   ALT  29  33   ALKPHOS  112  136   BILITOTAL  0.4  0.6     Most Recent Cholesterol Panel:  Recent Labs   Lab Test  09/25/18   0602   CHOL  96   LDL  54   HDL  19*   TRIG  113     Most Recent 6 Bacteria Isolates From Any Culture (See EPIC Reports for Culture Details):  Recent Labs   Lab Test  09/28/18   1332  09/28/18   1331  09/27/18   1512  09/24/18   2050  09/24/18   1203  09/16/18   2303   CULT  No growth  No growth  No anaerobes isolated  Heavy growth  Denise albicans / dubliniensis  Candida albicans and Candida dubliniensis are not routinely speciated  Susceptibility testing not routinely done  *  <1000 colonies/mL  urogenital jeanette  Susceptibility testing not routinely done    Cultured on the 3rd day of incubation:  Candida albicans  *  Critical Value/Significant Value, preliminary result only, called to and read back by  Willow Nesbitt RN, @0807 09/27/18.DH.    No growth  No growth

## 2018-10-04 NOTE — PLAN OF CARE
Problem: Patient Care Overview  Goal: Plan of Care/Patient Progress Review  Outcome: Adequate for Discharge Date Met: 10/04/18  Patient alert, ambulated in halls with out O2 and remained 92%/ Recd order for discharge. Pt/ family have understanding of discharge instructions. Pt will go to [facility] Pt has all belongings. Discharge to with all belongings with family transport.

## 2018-10-05 NOTE — PROGRESS NOTES
Clinic Care Coordination Contact  Care Coordination Transition Communication    Referral Source: IP Handoff    Clinical Data: Patient was hospitalized at [facility] from 9.24.2018 to 10.4.2018 with diagnosis of encephalopathy and pna complicated by hypoxia.     Transition to Facility:              Fac[facility] Name: [facility].              Contact name and phone number/fax:    . [phone], Fax: [phone]    Plan: SW Care Coordinator will await notification from facility staff informing SW Care Coordinator of patient's discharge plans/needs. SW Care Coordinator will review chart and outreach to facility staff every 4 weeks and as needed.     Vincent Steel [facility]  Clinic Care Coordinator  Ph. [phone]  [email or web address]

## 2018-10-05 NOTE — LETTER
[facility]   To:             Please give to facility    From:   Vincent Steel  [facility]  Care Coordinator   [facility]     Patient Name:  Edison Boss YOB: 1924   Admit date: 10.4.2018      *Information Needed:  Please contact me when the patient will discharge (or if they will move to long term care)- include the discharge date, disposition, and main diagnosis   - If the patient is discharged with home care services, please provide the name of the agency    Also- Please inform me if a care conference is being held.   Phone, Fax or Email with information       Vincent Steel, [facility]  Clinic Care Coordinator  Ph. [phone]  [email or web address]

## 2018-10-08 NOTE — LETTER
10/8/2018        RE: Edison Boss  [address]  [facility] [address]        [place] [facility]  PRIMARY CARE PROVIDER AND CLINIC:  Ganzer, Peter J [address] / [facility] [address]  Chief Complaint   Patient presents with     Hospital F/U     [facility] Record Number:  [id number]  Place of Service where encounter took place:  [facility]  ([facility]) [[id number]]    HPI:    Edison Boss is a 89 year old  (10/19/1924),admitted to the above facility from  [facility].  Hospital stay 9/27/2018 through 10/4/2018.  Admitted to this facility for  rehab, medical management and nursing care.  HPI information obtained from: facility chart records, facility staff and patient report.      Hospital course  Summary of Stay: Edison Boss is a 89 year old male with a history of prior prostate cancer treated with brachy therapy and XRT, chronic indwelling tolliver catheter, paroxysmal AF (not anticoagulated) and SSS s/p ablation and dual chamber PPM, HTN/hlp,  admitted on 9/24/2018 with encephalopathy felt most consistent with infectious process and suspected pna complicated by hypoxia.   Recent PMH is notable for hospitalization at this facility 9/16/19 for pna and candidal UTI-discharged with appropriate levofloxacin and fluconazole.       He was placed on IV pip-tazo and vanco in the setting of sepsis but acutely decompensated on the morning of 9/27/18.  He had been complaining of some back pain in the setting of recent falling and thought that this was initially just MSK.  But given clinical decline CAP CT pursued and positive for obstructing left ureteral stone.  He was urgently taken for cysto and stent placement and since then has continued to improve.      His cultures are notable for heavy grown candida albicans from renal pelvic fluid in addition to yeast cultured from 1/2 blood cultures from day of admission.      My conclusion is  that he had this ureteral stone for some time, and on prior admission was appropriately treated with fluconazole but given stone it was inadequate, and now that obstruction removed he can fully respond to therapy.  I think abx for pna likely red-herring and not true infection.  He's dramatically improved although remains deconditioned.       His hospitalization has been complicated by iatrogenic volume overload in the setting of appropriate treatment for sepsis.  He  responded well to IV furosemide and is euvolemic at discharge      He had hypoxic respiratory failure, likely MF in etiology-acute sepsis, pulmonary edema, and  suspected compressive atelectasis which has subsequently completely resolved      Problem List:   1. Fungemia: with candida- micafungin switched over to fluconazole 10/1.  At discharge can switch to oral for 2 more weeks at 400 mg every day, then decrease to 100 mg every day through stone removal and for a week after  2. pna vs renal pelvic infection: covered for nosocomial coverage with linezolid and pip-tazo.  Continued through weekend per weekend ID doc.  Most likely source though of sepsis is his yeast (interestingly he was treated with fluconazole at discharge 9/19)-but doubt that could adequately take care of renal pelvis infection in setting of obstructing stone.  Linezolid discontinued 10/1, micafungin converted to fluconazole on 10/1, pip-tazo discontinued 10/3.  Discharge with 2 weeks fluconazole 400 mg, then 100 mg every day until urologic intervention and through any procedure and then for an additional week 3. Abdominal hernia:  Reducible so no indication for acute intervention  4. Iatrogenic volume overload with hypoxemia in the setting of G1dd:  Resolved with IV diuresis  5. G1dd:  With ongoing HTN-started BB  6. Elevated troponin:  Demand ischemia (Type 2 NSTEMI) in setting of sepsis, asa initiated, no indication for further evaluation.  Of note echo without wma  7. Hypoxic  respiratory failure:  Pulmonary edema and ? pna and atelectasis-resolved.  Per RN 94 % at rest 92 % with exertion so no need for discharge O2  7. Hematuria:  Mild-likely combination of factors including stone/tolliver/heparin. Would cont heparin as high risk for dvt given prolonged hospitalization but will not require this at discharge  8. MADELYN:  Resolved-back to wnl even in the setting of iv diuresis.  Suspect 2/2 ATN from sepsis  9. Sepsis:  Tachycardia/leukocytosis-2/2 candida infection behind obstructed ureter and fungemia.  Resolved  10. Htn:  pta meds amlodipine 5 mg-resumed as at home and BPs inadequately controlled - started metop 25 mg bid and BPs still on high side, will have close f/u in TCU, can easily titrate up on BB despite hx of SSS/bradycardia as have PPM in place  11. Depression:  Continued on paroxetine now that off linezolid  12. PAF/SSS paced rhythm by tele.  Not on chronic AC-started on asa this hospitalization   13. Thrombocytosis  A bit disconcerting as almost everything points to improvement but elevated platelet count (in the absence of PCV primary or secondary) is usually due to inflammatory process.  now normalizing  14. Pre-Op:  I have completed a history and physical exam, as long as patient remains stable this should serve as his pre procedural H and P.  He is moderate risk for complications in setting of age and MMP but clearly benefits outweigh the risks  Current issues are:      Pneumonia due to infectious organism, unspecified laterality, unspecified part of lung  Hematuria, unspecified type  Patient was hospitalized with pneumonia and uti with sepsis. See hospital course below. Today he denies changes in breathing. He has a catheter in place. He is scheduled for cystoscopy on  10/11/18 for stone removal. He had a preop during hospitalization and all lab work and EKG and xray's. He will not need further testing per the clinic.     Sinoatrial node dysfunction (H)  Pacemaker  S/p  pacemaker in 2/2018.    Essential hypertension with goal blood pressure less than 140/90  Patient denies headaches or dizziness. His blood pressure remains in goal range. He remains on amlodipine and metoprolol.     Mild persistent asthma without complication  Patient denies wheezing, cough of shortness of breath. He remains on ventolin inhaler as needed.     Gastroesophageal reflux disease without esophagitis  Patient denies reflux or chest pain. He stated he does not have any nausea or vomiting. He remains on omeprazole    RAMÓN (generalized anxiety disorder)  Patient denies anxiety or depression. He was previously on Paxil, which was stopped during hospitalization and while on fluconazole per hospital discharge orders. Will need to reevaluate after procedure and stop of fluconazole.       CODE STATUS/ADVANCE DIRECTIVES DISCUSSION:   DNR only  Patient's living condition: lives alone    ALLERGIES:Ceftriaxone; Bactrim; Levaquin; Zithromax [azithromycin]; and Macrodantin [nitrofuran derivatives]  PAST MEDICAL HISTORY:  has a past medical history of Calculus of kidney; Chronic infection; Chronic pain; Esophageal reflux; Hyperlipidaemia; Hypertension; Malignant neoplasm of prostate (H) (8/14/2002); Mild persistent asthma; Paroxysmal A-fib (H); Sinus node dysfunction (H); Sleep apnea; and Unspecified cerebral artery occlusion with cerebral infarction. He also has no past medical history of Asymptomatic human immunodeficiency virus (HIV) infection status (H); Blood in semen; Cerebral palsy (H); Complication of anesthesia; Congenital renal agenesis and dysgenesis; Difficult intubation; Epididymitis, bilateral; Epididymitis, left; Epididymitis, right; Goiter; Gout; Hernia, abdominal; History of blood transfusion; History of spinal cord injury; History of thrombophlebitis; Horseshoe kidney; Hydrocephalus; Malignant hyperthermia; Mumps; Orchitis, epididymitis, and epididymo-orchitis, with abscess; Palpitations; Parkinsons  disease (H); Penile discharge; PONV (postoperative nausea and vomiting); Prostate infection; Spider veins; Spina bifida (H); Spinal headache; STD (sexually transmitted disease); Swelling of testicle; Tethered cord (H); or Tuberculosis.  PAST SURGICAL HISTORY:  has a past surgical history that includes NONSPECIFIC PROCEDURE (1980's); NONSPECIFIC PROCEDURE (1985, 5/2005); NONSPECIFIC PROCEDURE (1/2002); REMOVE TONSILS/ADENOIDS,<13 Y/O (~1928); NONSPECIFIC PROCEDURE (~1999); NONSPECIFIC PROCEDURE; NONSPECIFIC PROCEDURE; Cystoscopy, inject collagen, combined (6/6/2012); Cystoscopy, inject collagen, combined (3/22/2013); Implant pacemaker; Cystoscopy, inject collagen, combined (8/8/2014); Cystoscopy, inject collagen, combined (N/A, 8/12/2015); Cystoscopy, inject collagen, combined (N/A, 12/8/2016); Cystoscopy; Penis surgery; Prostate surgery; and Cystoscopy, retrogrades, insert stent ureter(s), combined (Left, 9/27/2018).  FAMILY HISTORY: family history includes Cancer in his brother, brother, and brother; Cerebrovascular Disease in his brother; Family History Negative in his father and mother; HEART DISEASE in his mother.  SOCIAL HISTORY:  reports that he has quit smoking. He has a 40.00 pack-year smoking history. He has never used smokeless tobacco. He reports that he does not drink alcohol or use illicit drugs.    Post Discharge Medication Reconciliation Status: discharge medications reconciled, continue medications without change.  Current Outpatient Prescriptions   Medication Sig Dispense Refill     albuterol (PROVENTIL HFA: VENTOLIN HFA) 108 (90 BASE) MCG/ACT inhaler Inhale 2 puffs into the lungs every 6 hours as needed for shortness of breath / dyspnea. 1 Inhaler 1     amLODIPine (NORVASC) 5 MG tablet TAKE 1 TABLET(5 MG) BY MOUTH DAILY 90 tablet 3     clobetasol (TEMOVATE) 0.05 % ointment daily as needed   2     Colloidal Oatmeal (AVEENO ECZEMA THERAPY) 1 % CREA Externally apply topically once a week And as needed  for dry skin       fluconazole (DIFLUCAN) 200 MG tablet Take 2 tablets (400 mg) by mouth daily for 14 days 28 tablet 0     lidocaine (LMX4) 4 % CREA 4% topical cream Apply topically daily as needed       metoprolol tartrate (LOPRESSOR) 25 MG tablet Take 1 tablet (25 mg) by mouth 2 times daily 60 tablet      Misc Natural Products (COLON CLEANSER PO) Take 1 capsule by mouth daily Advanced Colon Care II       Multiple Vitamins-Minerals (PRESERVISION AREDS) CAPS Take 1 capsule by mouth 2 times daily 180 capsule 3     omeprazole (PRILOSEC) 20 MG CR capsule Take 20 mg by mouth daily as needed       aspirin 81 MG EC tablet Take 1 tablet (81 mg) by mouth daily (Patient not taking: Reported on 10/9/2018)       [START ON 10/19/2018] fluconazole (DIFLUCAN) 100 MG tablet Take 1 tablet (100 mg) by mouth daily Start taking 100 mg every day when you have completed the 2 weeks of 400 mg per day, you need to take 100 mg per day through stone removal procedure and for about a week afterwards (Patient not taking: Reported on 10/9/2018) 15 tablet 0     PARoxetine (PAXIL) 30 MG tablet Take 0.5 tablets (15 mg) by mouth At Bedtime Hold until fluconazole completed (Patient not taking: Reported on 10/9/2018) 30 tablet        ROS:  10 point ROS of systems including Constitutional, Eyes, Respiratory, Cardiovascular, Gastroenterology, Genitourinary, Integumentary, Musculoskeletal, Psychiatric were all negative except for pertinent positives noted in my HPI.    Exam:  /76  Pulse 68  Temp 97.6  F (36.4  C)  Resp 16  Wt 157 lb 6.4 oz (71.4 kg)  SpO2 95%  BMI 22.58 kg/m2  GENERAL APPEARANCE:  Alert, in no distress  ENT:  Mouth and posterior oropharynx normal, moist mucous membranes, [place]  RESP:  respiratory effort and palpation of chest normal, lungs clear to auscultation , no respiratory distress, diminished breath sounds bilateral bases  CV:  Palpation and auscultation of heart done , regular rate and rhythm, no murmur, rub, or  gallop, peripheral edema 1+ in bilateral lower extremities  ABDOMEN:  bowel sounds normal, soft, non-tender  M/S:   Gait and station abnormal assist of 1 with transfers  SKIN:  Inspection of skin and subcutaneous tissue baseline, Palpation of skin and subcutaneous tissue baseline  PSYCH:  oriented X 3, memory impaired , affect and mood normal    Lab/Diagnostic data:  CBC RESULTS:   Recent Labs   Lab Test  10/04/18   0558  10/03/18   0624   WBC  9.3  10.3   RBC  4.06*  3.99*   HGB  11.9*  11.7*   HCT  37.1*  36.2*   MCV  91  91   MCH  29.3  29.3   MCHC  32.1  32.3   RDW  17.5*  17.1*   PLT  389  463*       Last Basic Metabolic Panel:  Recent Labs   Lab Test  10/04/18   0558  10/03/18   0624   NA  140  140   POTASSIUM  4.0  3.6   CHLORIDE  106  105   ARYAN  8.7  8.9   CO2  30  30   BUN  14  13   CR  1.03  1.14   GLC  88  88       Liver Function Studies -   Recent Labs   Lab Test  10/03/18   0624  09/29/18   0652   PROTTOTAL  6.8  6.8   ALBUMIN  2.2*  1.9*   BILITOTAL  0.4  0.6   ALKPHOS  112  136   AST  28  34   ALT  29  33       TSH   Date Value Ref Range Status   08/10/2017 3.74 0.40 - 4.00 mU/L Final   10/20/2016 1.42 0.40 - 4.00 mU/L Final     Lab Results   Component Value Date    A1C 5.5 11/30/2012       ASSESSMENT/PLAN:  (J18.9) Pneumonia due to infectious organism, unspecified laterality, unspecified part of lung  (primary encounter diagnosis)  (R31.9) Hematuria, unspecified type  Comment: Acute condition  Plan: Improving, continue with fluconazole 200 mg until procedure then 100 mg for one week. May have light breakfast at 6 am NPO at 7 am, able to have sips of water until 1100 on 10/11/18.    (I49.5) Sinoatrial node dysfunction (H), s/p PPM  (Z95.0) Pacemaker  Comment: Chronic  Plan: PPM    (I10) Essential hypertension with goal blood pressure less than 140/90  Comment: Chronic, controlled with medications  Plan: Continue with amlodipine and metoprolol    (J45.30) Mild persistent asthma without  complication  Comment: chronic  Plan: continue with ventolin inhaler as needed    (K21.9) Gastroesophageal reflux disease without esophagitis  Comment: Chronic, controlled  Plan: Continue with omeprazole    (F41.1) RAMÓN (generalized anxiety disorder)  Comment: chronic  Plan: Need to reassess after fluconazole is stopped      Electronically signed by:  Jen Araujo NP          Sincerely,        Jen Araujo NP

## 2018-10-09 PROBLEM — R31.9 HEMATURIA, UNSPECIFIED TYPE: Status: ACTIVE | Noted: 2018-01-01

## 2018-10-09 NOTE — TELEPHONE ENCOUNTER
Called [facility] and informed staff that yes ok to proceed with Botox procedure per Dr Lilly.Twila Rocha LPN

## 2018-10-09 NOTE — PLAN OF CARE
Spoke with Ivonne at [facility] and Jen (Nurse Practitioner) regarding Edison's surgery scheduled for 10/11/18. The family plans on transporting patient. NPO status and medications day of surgery discussed with Nurse Practitioner. The 81 mg Aspirin has not been stopped, took last dose 10/09/18. They will hold the Aspirin starting now. Dr. Lilly's office called regarding Aspirin use.

## 2018-10-09 NOTE — PROGRESS NOTES
[facility]  PRIMARY CARE PROVIDER AND CLINIC:  Porfirio Terrell [address] [address] / [facility] [address]  Chief Complaint   Patient presents with     Hospital F/U     [facility] Record Number:  [id number]  Place of Service where encounter took place:  [facility]  (On license of [facility]) [[id number]]    HPI:    Edison Boss is a 89 year old  (10/19/1924),admitted to the above facility from  [facility].  Hospital stay 9/27/2018 through 10/4/2018.  Admitted to this facility for  rehab, medical management and nursing care.  HPI information obtained from: facility chart records, facility staff and patient report.      Hospital course  Summary of Stay: Edison Boss is a 89 year old male with a history of prior prostate cancer treated with brachy therapy and XRT, chronic indwelling tolliver catheter, paroxysmal AF (not anticoagulated) and SSS s/p ablation and dual chamber PPM, HTN/hlp,  admitted on 9/24/2018 with encephalopathy felt most consistent with infectious process and suspected pna complicated by hypoxia.   Recent PMH is notable for hospitalization at this facility 9/16/19 for pna and candidal UTI-discharged with appropriate levofloxacin and fluconazole.       He was placed on IV pip-tazo and vanco in the setting of sepsis but acutely decompensated on the morning of 9/27/18.  He had been complaining of some back pain in the setting of recent falling and thought that this was initially just MSK.  But given clinical decline CAP CT pursued and positive for obstructing left ureteral stone.  He was urgently taken for cysto and stent placement and since then has continued to improve.      His cultures are notable for heavy grown candida albicans from renal pelvic fluid in addition to yeast cultured from 1/2 blood cultures from day of admission.      My conclusion is that he had this ureteral stone for some time, and on prior admission was appropriately treated with  fluconazole but given stone it was inadequate, and now that obstruction removed he can fully respond to therapy.  I think abx for pna likely red-herring and not true infection.  He's dramatically improved although remains deconditioned.       His hospitalization has been complicated by iatrogenic volume overload in the setting of appropriate treatment for sepsis.  He  responded well to IV furosemide and is euvolemic at discharge      He had hypoxic respiratory failure, likely MF in etiology-acute sepsis, pulmonary edema, and  suspected compressive atelectasis which has subsequently completely resolved      Problem List:   1. Fungemia: with candida- micafungin switched over to fluconazole 10/1.  At discharge can switch to oral for 2 more weeks at 400 mg every day, then decrease to 100 mg every day through stone removal and for a week after  2. pna vs renal pelvic infection: covered for nosocomial coverage with linezolid and pip-tazo.  Continued through weekend per weekend ID doc.  Most likely source though of sepsis is his yeast (interestingly he was treated with fluconazole at discharge 9/19)-but doubt that could adequately take care of renal pelvis infection in setting of obstructing stone.  Linezolid discontinued 10/1, micafungin converted to fluconazole on 10/1, pip-tazo discontinued 10/3.  Discharge with 2 weeks fluconazole 400 mg, then 100 mg every day until urologic intervention and through any procedure and then for an additional week 3. Abdominal hernia:  Reducible so no indication for acute intervention  4. Iatrogenic volume overload with hypoxemia in the setting of G1dd:  Resolved with IV diuresis  5. G1dd:  With ongoing HTN-started BB  6. Elevated troponin:  Demand ischemia (Type 2 NSTEMI) in setting of sepsis, asa initiated, no indication for further evaluation.  Of note echo without wma  7. Hypoxic respiratory failure:  Pulmonary edema and ? pna and atelectasis-resolved.  Per RN 94 % at rest 92 % with  exertion so no need for discharge O2  7. Hematuria:  Mild-likely combination of factors including stone/tolliver/heparin. Would cont heparin as high risk for dvt given prolonged hospitalization but will not require this at discharge  8. MADELYN:  Resolved-back to wnl even in the setting of iv diuresis.  Suspect 2/2 ATN from sepsis  9. Sepsis:  Tachycardia/leukocytosis-2/2 candida infection behind obstructed ureter and fungemia.  Resolved  10. Htn:  pta meds amlodipine 5 mg-resumed as at home and BPs inadequately controlled - started metop 25 mg bid and BPs still on high side, will have close f/u in TCU, can easily titrate up on BB despite hx of SSS/bradycardia as have PPM in place  11. Depression:  Continued on paroxetine now that off linezolid  12. PAF/SSS paced rhythm by tele.  Not on chronic AC-started on asa this hospitalization   13. Thrombocytosis  A bit disconcerting as almost everything points to improvement but elevated platelet count (in the absence of PCV primary or secondary) is usually due to inflammatory process.  now normalizing  14. Pre-Op:  I have completed a history and physical exam, as long as patient remains stable this should serve as his pre procedural H and P.  He is moderate risk for complications in setting of age and MMP but clearly benefits outweigh the risks  Current issues are:      Pneumonia due to infectious organism, unspecified laterality, unspecified part of lung  Hematuria, unspecified type  Patient was hospitalized with pneumonia and uti with sepsis. See hospital course below. Today he denies changes in breathing. He has a catheter in place. He is scheduled for cystoscopy on  10/11/18 for stone removal. He had a preop during hospitalization and all lab work and EKG and xray's. He will not need further testing per the clinic.     Sinoatrial node dysfunction (H)  Pacemaker  S/p pacemaker in 2/2018.    Essential hypertension with goal blood pressure less than 140/90  Patient denies  headaches or dizziness. His blood pressure remains in goal range. He remains on amlodipine and metoprolol.     Mild persistent asthma without complication  Patient denies wheezing, cough of shortness of breath. He remains on ventolin inhaler as needed.     Gastroesophageal reflux disease without esophagitis  Patient denies reflux or chest pain. He stated he does not have any nausea or vomiting. He remains on omeprazole    RAMÓN (generalized anxiety disorder)  Patient denies anxiety or depression. He was previously on Paxil, which was stopped during hospitalization and while on fluconazole per hospital discharge orders. Will need to reevaluate after procedure and stop of fluconazole.       CODE STATUS/ADVANCE DIRECTIVES DISCUSSION:   DNR only  Patient's living condition: lives alone    ALLERGIES:Ceftriaxone; Bactrim; Levaquin; Zithromax [azithromycin]; and Macrodantin [nitrofuran derivatives]  PAST MEDICAL HISTORY:  has a past medical history of Calculus of kidney; Chronic infection; Chronic pain; Esophageal reflux; Hyperlipidaemia; Hypertension; Malignant neoplasm of prostate (H) (8/14/2002); Mild persistent asthma; Paroxysmal A-fib (H); Sinus node dysfunction (H); Sleep apnea; and Unspecified cerebral artery occlusion with cerebral infarction. He also has no past medical history of Asymptomatic human immunodeficiency virus (HIV) infection status (H); Blood in semen; Cerebral palsy (H); Complication of anesthesia; Congenital renal agenesis and dysgenesis; Difficult intubation; Epididymitis, bilateral; Epididymitis, left; Epididymitis, right; Goiter; Gout; Hernia, abdominal; History of blood transfusion; History of spinal cord injury; History of thrombophlebitis; Horseshoe kidney; Hydrocephalus; Malignant hyperthermia; Mumps; Orchitis, epididymitis, and epididymo-orchitis, with abscess; Palpitations; Parkinsons disease (H); Penile discharge; PONV (postoperative nausea and vomiting); Prostate infection; Spider veins;  Spina bifida (H); Spinal headache; STD (sexually transmitted disease); Swelling of testicle; Tethered cord (H); or Tuberculosis.  PAST SURGICAL HISTORY:  has a past surgical history that includes NONSPECIFIC PROCEDURE (1980's); NONSPECIFIC PROCEDURE (1985, 5/2005); NONSPECIFIC PROCEDURE (1/2002); REMOVE TONSILS/ADENOIDS,<11 Y/O (~1928); NONSPECIFIC PROCEDURE (~1999); NONSPECIFIC PROCEDURE; NONSPECIFIC PROCEDURE; Cystoscopy, inject collagen, combined (6/6/2012); Cystoscopy, inject collagen, combined (3/22/2013); Implant pacemaker; Cystoscopy, inject collagen, combined (8/8/2014); Cystoscopy, inject collagen, combined (N/A, 8/12/2015); Cystoscopy, inject collagen, combined (N/A, 12/8/2016); Cystoscopy; Penis surgery; Prostate surgery; and Cystoscopy, retrogrades, insert stent ureter(s), combined (Left, 9/27/2018).  FAMILY HISTORY: family history includes Cancer in his brother, brother, and brother; Cerebrovascular Disease in his brother; Family History Negative in his father and mother; HEART DISEASE in his mother.  SOCIAL HISTORY:  reports that he has quit smoking. He has a 40.00 pack-year smoking history. He has never used smokeless tobacco. He reports that he does not drink alcohol or use illicit drugs.    Post Discharge Medication Reconciliation Status: discharge medications reconciled, continue medications without change.  Current Outpatient Prescriptions   Medication Sig Dispense Refill     albuterol (PROVENTIL HFA: VENTOLIN HFA) 108 (90 BASE) MCG/ACT inhaler Inhale 2 puffs into the lungs every 6 hours as needed for shortness of breath / dyspnea. 1 Inhaler 1     amLODIPine (NORVASC) 5 MG tablet TAKE 1 TABLET(5 MG) BY MOUTH DAILY 90 tablet 3     clobetasol (TEMOVATE) 0.05 % ointment daily as needed   2     Colloidal Oatmeal (AVEENO ECZEMA THERAPY) 1 % CREA Externally apply topically once a week And as needed for dry skin       fluconazole (DIFLUCAN) 200 MG tablet Take 2 tablets (400 mg) by mouth daily for 14 days  28 tablet 0     lidocaine (LMX4) 4 % CREA 4% topical cream Apply topically daily as needed       metoprolol tartrate (LOPRESSOR) 25 MG tablet Take 1 tablet (25 mg) by mouth 2 times daily 60 tablet      Misc Natural Products (COLON CLEANSER PO) Take 1 capsule by mouth daily Advanced Colon Care II       Multiple Vitamins-Minerals (PRESERVISION AREDS) CAPS Take 1 capsule by mouth 2 times daily 180 capsule 3     omeprazole (PRILOSEC) 20 MG CR capsule Take 20 mg by mouth daily as needed       aspirin 81 MG EC tablet Take 1 tablet (81 mg) by mouth daily (Patient not taking: Reported on 10/9/2018)       [START ON 10/19/2018] fluconazole (DIFLUCAN) 100 MG tablet Take 1 tablet (100 mg) by mouth daily Start taking 100 mg every day when you have completed the 2 weeks of 400 mg per day, you need to take 100 mg per day through stone removal procedure and for about a week afterwards (Patient not taking: Reported on 10/9/2018) 15 tablet 0     PARoxetine (PAXIL) 30 MG tablet Take 0.5 tablets (15 mg) by mouth At Bedtime Hold until fluconazole completed (Patient not taking: Reported on 10/9/2018) 30 tablet        ROS:  10 point ROS of systems including Constitutional, Eyes, Respiratory, Cardiovascular, Gastroenterology, Genitourinary, Integumentary, Musculoskeletal, Psychiatric were all negative except for pertinent positives noted in my HPI.    Exam:  /76  Pulse 68  Temp 97.6  F (36.4  C)  Resp 16  Wt 157 lb 6.4 oz (71.4 kg)  SpO2 95%  BMI 22.58 kg/m2  GENERAL APPEARANCE:  Alert, in no distress  ENT:  Mouth and posterior oropharynx normal, moist mucous membranes, [place]  RESP:  respiratory effort and palpation of chest normal, lungs clear to auscultation , no respiratory distress, diminished breath sounds bilateral bases  CV:  Palpation and auscultation of heart done , regular rate and rhythm, no murmur, rub, or gallop, peripheral edema 1+ in bilateral lower extremities  ABDOMEN:  bowel sounds normal, soft, non-tender  M/S:    Gait and station abnormal assist of 1 with transfers  SKIN:  Inspection of skin and subcutaneous tissue baseline, Palpation of skin and subcutaneous tissue baseline  PSYCH:  oriented X 3, memory impaired , affect and mood normal    Lab/Diagnostic data:  CBC RESULTS:   Recent Labs   Lab Test  10/04/18   0558  10/03/18   0624   WBC  9.3  10.3   RBC  4.06*  3.99*   HGB  11.9*  11.7*   HCT  37.1*  36.2*   MCV  91  91   MCH  29.3  29.3   MCHC  32.1  32.3   RDW  17.5*  17.1*   PLT  389  463*       Last Basic Metabolic Panel:  Recent Labs   Lab Test  10/04/18   0558  10/03/18   0624   NA  140  140   POTASSIUM  4.0  3.6   CHLORIDE  106  105   ARYAN  8.7  8.9   CO2  30  30   BUN  14  13   CR  1.03  1.14   GLC  88  88       Liver Function Studies -   Recent Labs   Lab Test  10/03/18   0624  09/29/18   0652   PROTTOTAL  6.8  6.8   ALBUMIN  2.2*  1.9*   BILITOTAL  0.4  0.6   ALKPHOS  112  136   AST  28  34   ALT  29  33       TSH   Date Value Ref Range Status   08/10/2017 3.74 0.40 - 4.00 mU/L Final   10/20/2016 1.42 0.40 - 4.00 mU/L Final     Lab Results   Component Value Date    A1C 5.5 11/30/2012       ASSESSMENT/PLAN:  (J18.9) Pneumonia due to infectious organism, unspecified laterality, unspecified part of lung  (primary encounter diagnosis)  (R31.9) Hematuria, unspecified type  Comment: Acute condition  Plan: Improving, continue with fluconazole 200 mg until procedure then 100 mg for one week. May have light breakfast at 6 am NPO at 7 am, able to have sips of water until 1100 on 10/11/18.    (I49.5) Sinoatrial node dysfunction (H), s/p PPM  (Z95.0) Pacemaker  Comment: Chronic  Plan: PPM    (I10) Essential hypertension with goal blood pressure less than 140/90  Comment: Chronic, controlled with medications  Plan: Continue with amlodipine and metoprolol    (J45.30) Mild persistent asthma without complication  Comment: chronic  Plan: continue with ventolin inhaler as needed    (K21.9) Gastroesophageal reflux disease without  esophagitis  Comment: Chronic, controlled  Plan: Continue with omeprazole    (F41.1) RAMÓN (generalized anxiety disorder)  Comment: chronic  Plan: Need to reassess after fluconazole is stopped      Electronically signed by:  Jen Araujo NP

## 2018-10-09 NOTE — TELEPHONE ENCOUNTER
----- Message from Michael Lilly MD sent at 10/9/2018 12:33 PM CDT -----  yes  ----- Message -----     From: Twila Rocha LPN     Sent: 10/9/2018  10:09 AM       To: Michael Lilly MD, [place] Siddiqi from [facility] pre surgery called. Edison  is now in an TCU and they have not been holding his 81 mg of Asprin . They will hold it today and tomorrow. He is having Botox injections on Thursday. Is this still ok to proceed ?  Ania's call back # [phone]

## 2018-10-09 NOTE — OR NURSING
Passed on message to LUIS Li that per Dr. Lilly ok to proceed with surgery as planned, pt not to take aspirin from now until surgery date.

## 2018-10-15 NOTE — LETTER
"    10/15/2018        RE: Edison Boss  [address]  [place] [address]        [facility]    Chief Complaint   Patient presents with     custodial Acute       [facility] Record Number:  [id number]  Place of Service where encounter took place:  [facility]  (S) [[id number]]    HPI:    Edison Boss is a 89 year old  (10/19/1924), who is being seen today for an episodic care visit.  HPI information obtained from: facility chart records, facility staff, patient report and [facility] chart review.     Today's concern is:  1. Hand edema    staff report patient with ongoing edema and some minimal redness and bruising top of left hand. Has been present since admission. No known injury. Some pain and some warmth per patient. No fevers.   Lab Results   Component Value Date    WBC 9.3 10/04/2018    WBC 10.3 10/03/2018   Recent BP 10/04-10/15: 109-187/60-78 mmHg    REVIEW OF SYSTEMS:  4 point ROS including Respiratory, CV, GI and , other than that noted in the HPI,  is negative    OBJECTIVE:   /65  Pulse 64  Temp 98.7  F (37.1  C)  Resp 17  Ht 5' 10\" (1.778 m)  Wt 155 lb (70.3 kg)  SpO2 94%  BMI 22.24 kg/m2  GENERAL APPEARANCE:  Alert, in no distress  On room air, pleasant  Left hand: increased warmth compared to right hand. Top of hand with redness and some edema around base of thumb and knuckles with some bruising noted.     ASSESSMENT/PLAN:  Hand edema  Ongoing issue since admission per staff. No injury noted or reported.     Check CBC with diff, BMP, uric acid and sed rate and f/u with results. Monitor for worsened redness. Treat for pain as needed.     Electronically signed by:  JYOTI Salazar CNP      Sincerely,        JYOTI Salazar CNP    "

## 2018-10-16 NOTE — PROGRESS NOTES
PRIMARY CARE PROVIDER AND CLINIC RESPONSIBLE:  Porfirio Terrell, [address] [facility] [address] / [facility] [id number]        ADMISSION HISTORY AND PHYSICAL EXAMINATION     Chief Complaint   Patient presents with     Hospital F/U         HISTORY OF PRESENT ILLNESS:  89 year old male, (10/19/1924), [facility] of medical care and rehab.    Pt admitted [facility] 9/27 to 10/4 for encephalopathy due to suspected PNA. Then found to have a L ureteral stone and s/p cysto and stent placement. Blood Cx grew candida.    Pt denies any fevers/chills/chest pain/SOB. Has L hand pain and warmth. No trauma to L hand.    Please see Jen Araujo's CNP admit noted dated 10/8 for details of admission, past medical history, family history, allergies, medication list, social history and other details pertinent with this admission. Hospital admission and dc summary reviewed.      Past Medical History:   Diagnosis Date     Calculus of kidney     in dwelling tolliver     Chronic infection     UTI     Chronic pain     lower pain, perineum     Esophageal reflux      Hyperlipidaemia      Hypertension      Malignant neoplasm of prostate (H) 8/14/2002    Brachytherapy, then external radiation. chronic indwelling catheter     Mild persistent asthma     with URI     Paroxysmal A-fib (H)     paroxysmal     Sinus node dysfunction (H)     sp DDD PM     Sleep apnea     ?   snores     Unspecified cerebral artery occlusion with cerebral infarction        Past Surgical History:   Procedure Laterality Date     C NONSPECIFIC PROCEDURE  1980's    Kidney stone surgery     C NONSPECIFIC PROCEDURE  1985, 5/2005    TURP x 2     C NONSPECIFIC PROCEDURE  1/2002    Brachytherapy     C NONSPECIFIC PROCEDURE  ~1999    Left rotator cuff repair     C NONSPECIFIC PROCEDURE      Bilateral cataract surgery     C NONSPECIFIC PROCEDURE      EGD/dilation twice     CYSTOSCOPY       CYSTOSCOPY, INJECT COLLAGEN, COMBINED  6/6/2012    Procedure:COMBINED  CYSTOSCOPY, INJECT BULKING AGENT; VIDEO CYSTOSCOPY WITH BOTOX INJECTIONS  ; Surgeon:BRIAN ADAN; [facility]     CYSTOSCOPY, INJECT COLLAGEN, COMBINED  3/22/2013    Procedure: COMBINED CYSTOSCOPY, INJECT BULKING AGENT;  VIDEO CYSTOSCOPY, BOTOX INJECTION;  Surgeon: Brian Adan MD;  Location: [facility] OR     CYSTOSCOPY, INJECT COLLAGEN, COMBINED  8/8/2014    Procedure: COMBINED CYSTOSCOPY, INJECT BULKING AGENT;  Surgeon: Brian Adan MD;  Location: [facility] OR     CYSTOSCOPY, INJECT COLLAGEN, COMBINED N/A 8/12/2015    Procedure: COMBINED CYSTOSCOPY, INJECT BULKING AGENT;  Surgeon: Brian Adan MD;  Location: [facility]     CYSTOSCOPY, INJECT COLLAGEN, COMBINED N/A 12/8/2016    Procedure: COMBINED CYSTOSCOPY, INJECT BULKING AGENT;  Surgeon: Brian Adan MD;  Location: [facility] OR     CYSTOSCOPY, RETROGRADES, INSERT STENT URETER(S), COMBINED Left 9/27/2018    Procedure: COMBINED CYSTOSCOPY, RETROGRADES, INSERT STENT URETER(S);  1. Cystourethroscopy  2. Left retrograde pyelography with interpretation of intraoperative fluoroscopic imaging  3. Left ureteral stent placement;  Surgeon: Froylan Richards MD;  Location: [facility] OR     HC REMOVE TONSILS/ADENOIDS,<11 Y/O  ~1928    T & A <12y.o.     IMPLANT PACEMAKER       PENIS SURGERY       PROSTATE SURGERY         Current Outpatient Prescriptions   Medication Sig     albuterol (PROVENTIL HFA: VENTOLIN HFA) 108 (90 BASE) MCG/ACT inhaler Inhale 2 puffs into the lungs every 6 hours as needed for shortness of breath / dyspnea.     amLODIPine (NORVASC) 5 MG tablet TAKE 1 TABLET(5 MG) BY MOUTH DAILY     aspirin 81 MG EC tablet Take 1 tablet (81 mg) by mouth daily     clobetasol (TEMOVATE) 0.05 % ointment daily as needed      Colloidal Oatmeal (AVEENO ECZEMA THERAPY) 1 % CREA Externally apply topically once a week And as needed for dry skin     [START ON 10/19/2018] fluconazole (DIFLUCAN) 100 MG tablet Take 1 tablet (100 mg) by mouth daily Start taking 100 mg every day  when you have completed the 2 weeks of 400 mg per day, you need to take 100 mg per day through stone removal procedure and for about a week afterwards     fluconazole (DIFLUCAN) 200 MG tablet Take 2 tablets (400 mg) by mouth daily for 14 days     lidocaine (LMX4) 4 % CREA 4% topical cream Apply topically daily as needed     metoprolol tartrate (LOPRESSOR) 25 MG tablet Take 1 tablet (25 mg) by mouth 2 times daily     Multiple Vitamins-Minerals (PRESERVISION AREDS) CAPS Take 1 capsule by mouth 2 times daily     omeprazole (PRILOSEC) 20 MG CR capsule Take 20 mg by mouth daily as needed     PARoxetine (PAXIL) 30 MG tablet Take 0.5 tablets (15 mg) by mouth At Bedtime Hold until fluconazole completed (Patient not taking: Reported on 10/9/2018)     No current facility-administered medications for this visit.        Allergies   Allergen Reactions     Ceftriaxone Itching     Bactrim Hives     Levaquin Diarrhea     Oral only, can tolerate IV     Zithromax [Azithromycin]      Macrodantin [Nitrofuran Derivatives] Rash     Took recently with no problems       Social History     Social History     Marital status:      Spouse name: Alysa     Number of children: 8     Years of education: N/A     Occupational History      Retired     Social History Main Topics     Smoking status: Former Smoker     Packs/day: 1.00     Years: 40.00     Smokeless tobacco: Never Used      Comment: QUIT 1978     Alcohol use No     Drug use: No     Sexual activity: No     Other Topics Concern     Parent/Sibling W/ Cabg, Mi Or Angioplasty Before 65f 55m? No     Social History Narrative    .Living situation (location, living with others?):Lives with wife in [facility]    Activities of daily living (e.g., dressing, eating, walking):Independent        Instrumental activities of daily living (e.g., finance management, housekeeping, meal planning/ prep): Wife and kids help with preparing meals, shopping, driving, climbing stairs, complex  "decisions                 Meaningful Activities (e.g., hobbies, work): loves music, uses the computer, likes crossword         Support:Wife and daughter        Community Resources Used/ Interested in: Referred to [facility]          Information reviewed:  Medications, vital signs, orders, nursing notes, problem list, hospital information.     ROS: All 10 point review of system completed, those pertinent positive, please see H&P, the remaining ROS is negative.    /66  Pulse 64  Temp 98.7  F (37.1  C)  Resp 17  Ht 5' 10\" (1.778 m)  Wt 155 lb (70.3 kg)  SpO2 94%  BMI 22.24 kg/m2    PHYSICAL EXAMINATION:   GENERAL:  No acute distress. Laying in bed.  SKIN:  Dry and warm.  There is no rash, lesions, ulcers or juandice at area of skin examined.  HEENT:  Head without trauma.  Pupils round, reactive. Exam of conjunctiva and lids are normal. Sclera without icterus. There is no oral thrush.  NECK:  Supple.  There is no cervical adenopathy, no thyromegaly. No jugular venous distension.  CHEST: No reproducible chest tenderness.   LUNGS:  Normal respiratory effort. Lungs are Clear on ascultation.  HEART:  Regular rate and rhythm.  No murmur, gallops or rubs auscultated.  ABDOMEN:  Soft, bowel sounds positive.  There is no tenderness or guarding. + tolliver.  EXTREMITIES: No edema. + warmth of L hand and some ttp in the wrist.  NEUROLOGIC:  Alert and oriented to self and situation.    Lab/Diagnostic data:  Reviewed    Lab Results   Component Value Date    WBC 7.3 10/19/2018     Lab Results   Component Value Date    RBC 3.69 10/19/2018     Lab Results   Component Value Date    HGB 10.7 10/19/2018     Lab Results   Component Value Date    HCT 34.2 10/19/2018     Lab Results   Component Value Date    MCV 93 10/19/2018     Lab Results   Component Value Date    MCH 29.0 10/19/2018     Lab Results   Component Value Date    MCHC 31.3 10/19/2018     Lab Results   Component Value Date    RDW 17.4 10/19/2018     Lab Results "   Component Value Date     10/19/2018     Last Comprehensive Metabolic Panel:  Sodium   Date Value Ref Range Status   10/19/2018 139 133 - 144 mmol/L Final     Potassium   Date Value Ref Range Status   10/19/2018 4.1 3.4 - 5.3 mmol/L Final     Chloride   Date Value Ref Range Status   10/19/2018 106 94 - 109 mmol/L Final     Carbon Dioxide   Date Value Ref Range Status   10/19/2018 27 20 - 32 mmol/L Final     Anion Gap   Date Value Ref Range Status   10/19/2018 6 3 - 14 mmol/L Final     Glucose   Date Value Ref Range Status   10/19/2018 83 70 - 99 mg/dL Final     Urea Nitrogen   Date Value Ref Range Status   10/19/2018 15 7 - 30 mg/dL Final     Creatinine   Date Value Ref Range Status   10/19/2018 1.06 0.66 - 1.25 mg/dL Final     GFR Estimate   Date Value Ref Range Status   10/19/2018 65 >60 mL/min/1.7m2 Final     Comment:     Non  GFR Calc     Calcium   Date Value Ref Range Status   10/19/2018 8.2 (L) 8.5 - 10.1 mg/dL Final       ASSESSMENT / PLAN:     Sepsis, due to unspecified organism (H)  - Blood and renal pelvic Cx grew candida.  - s/p ureteral stent placement 9/27.  - On diflucan.  - f/u with urology as scheduled.  - Had chronic indwelling tolliver for hx of prostate ca.    Acute encephalopathy  - Improved, likely due to sepsis.    Depression, unspecified depression type  - On paxil.    Benign essential hypertension  - On norvasc and metoprolol.    Pain of left hand  - ? Gout versus cellulitis, check CBC, ESR, uric acid and start colchicine and levaquin.    Physical deconditioning  -Plan: PT/OT, fall precautions. Care conference with patient and family for the progress of rehab and disposition issues will be discussed as planned. Rehab evaluation and other evaluations including CPT are at rehab logs, to be reviewed separately.  Fall risk assessment as well as cognitive evaluation will be formed during rehab stay if indicated.      Other problems with same care. Primary care doctor and other  specialists to address those chronic problems in next clinic appointment to be scheduled upon discharge from the TCU.    Total time spent with patient visit was 45 min including patient visit, review of past records, 1/2 time on patients counseling and coordinating care.        Lexy Nieves MD

## 2018-10-16 NOTE — LETTER
10/16/2018        RE: Edison Boss  [address]  [place] [address]          PRIMARY CARE PROVIDER AND CLINIC RESPONSIBLE:  Porfirio Terrell, [address]  / [facility] [address]        ADMISSION HISTORY AND PHYSICAL EXAMINATION     Chief Complaint   Patient presents with     Hospital F/U         HISTORY OF PRESENT ILLNESS:  89 year old male, (10/19/1924), [facility].    Pt admitted [facility] 9/27 to 10/4 for encephalopathy due to suspected PNA. Then found to have a L ureteral stone and s/p cysto and stent placement. Blood Cx grew candida.    Pt denies any fevers/chills/chest pain/SOB. Has L hand pain and warmth. No trauma to L hand.    Please see Jen Araujo's CNP admit noted dated 10/8 for details of admission, past medical history, family history, allergies, medication list, social history and other details pertinent with this admission. Hospital admission and dc summary reviewed.      Past Medical History:   Diagnosis Date     Calculus of kidney     in dwelling tolliver     Chronic infection     UTI     Chronic pain     lower pain, perineum     Esophageal reflux      Hyperlipidaemia      Hypertension      Malignant neoplasm of prostate (H) 8/14/2002    Brachytherapy, then external radiation. chronic indwelling catheter     Mild persistent asthma     with URI     Paroxysmal A-fib (H)     paroxysmal     Sinus node dysfunction (H)     sp DDD PM     Sleep apnea     ?   snores     Unspecified cerebral artery occlusion with cerebral infarction        Past Surgical History:   Procedure Laterality Date     C NONSPECIFIC PROCEDURE  1980's    Kidney stone surgery     C NONSPECIFIC PROCEDURE  1985, 5/2005    TURP x 2     C NONSPECIFIC PROCEDURE  1/2002    Brachytherapy     C NONSPECIFIC PROCEDURE  ~1999    Left rotator cuff repair     C NONSPECIFIC PROCEDURE      Bilateral cataract surgery     C NONSPECIFIC PROCEDURE      EGD/dilation  twice     CYSTOSCOPY       CYSTOSCOPY, INJECT COLLAGEN, COMBINED  6/6/2012    Procedure:COMBINED CYSTOSCOPY, INJECT BULKING AGENT; VIDEO CYSTOSCOPY WITH BOTOX INJECTIONS  ; Surgeon:BRIAN ADAN; Location:[facility]     CYSTOSCOPY, INJECT COLLAGEN, COMBINED  3/22/2013    Procedure: COMBINED CYSTOSCOPY, INJECT BULKING AGENT;  VIDEO CYSTOSCOPY, BOTOX INJECTION;  Surgeon: Brian Adan MD;  Location: [facility]     CYSTOSCOPY, INJECT COLLAGEN, COMBINED  8/8/2014    Procedure: COMBINED CYSTOSCOPY, INJECT BULKING AGENT;  Surgeon: Brian Adan MD;  Location: [facility]     CYSTOSCOPY, INJECT COLLAGEN, COMBINED N/A 8/12/2015    Procedure: COMBINED CYSTOSCOPY, INJECT BULKING AGENT;  Surgeon: Brian Adan MD;  Location: [facility]     CYSTOSCOPY, INJECT COLLAGEN, COMBINED N/A 12/8/2016    Procedure: COMBINED CYSTOSCOPY, INJECT BULKING AGENT;  Surgeon: Brian Adan MD;  Location: [facility] OR     CYSTOSCOPY, RETROGRADES, INSERT STENT URETER(S), COMBINED Left 9/27/2018    Procedure: COMBINED CYSTOSCOPY, RETROGRADES, INSERT STENT URETER(S);  1. Cystourethroscopy  2. Left retrograde pyelography with interpretation of intraoperative fluoroscopic imaging  3. Left ureteral stent placement;  Surgeon: Froylan Richards MD;  Location: [facility] OR     HC REMOVE TONSILS/ADENOIDS,<13 Y/O  ~1928    T & A <12y.o.     IMPLANT PACEMAKER       PENIS SURGERY       PROSTATE SURGERY         Current Outpatient Prescriptions   Medication Sig     albuterol (PROVENTIL HFA: VENTOLIN HFA) 108 (90 BASE) MCG/ACT inhaler Inhale 2 puffs into the lungs every 6 hours as needed for shortness of breath / dyspnea.     amLODIPine (NORVASC) 5 MG tablet TAKE 1 TABLET(5 MG) BY MOUTH DAILY     aspirin 81 MG EC tablet Take 1 tablet (81 mg) by mouth daily     clobetasol (TEMOVATE) 0.05 % ointment daily as needed      Colloidal Oatmeal (AVEENO ECZEMA THERAPY) 1 % CREA Externally apply topically once a week And as needed for dry skin     [START ON 10/19/2018]  fluconazole (DIFLUCAN) 100 MG tablet Take 1 tablet (100 mg) by mouth daily Start taking 100 mg every day when you have completed the 2 weeks of 400 mg per day, you need to take 100 mg per day through stone removal procedure and for about a week afterwards     fluconazole (DIFLUCAN) 200 MG tablet Take 2 tablets (400 mg) by mouth daily for 14 days     lidocaine (LMX4) 4 % CREA 4% topical cream Apply topically daily as needed     metoprolol tartrate (LOPRESSOR) 25 MG tablet Take 1 tablet (25 mg) by mouth 2 times daily     Multiple Vitamins-Minerals (PRESERVISION AREDS) CAPS Take 1 capsule by mouth 2 times daily     omeprazole (PRILOSEC) 20 MG CR capsule Take 20 mg by mouth daily as needed     PARoxetine (PAXIL) 30 MG tablet Take 0.5 tablets (15 mg) by mouth At Bedtime Hold until fluconazole completed (Patient not taking: Reported on 10/9/2018)     No current facility-administered medications for this visit.        Allergies   Allergen Reactions     Ceftriaxone Itching     Bactrim Hives     Levaquin Diarrhea     Oral only, can tolerate IV     Zithromax [Azithromycin]      Macrodantin [Nitrofuran Derivatives] Rash     Took recently with no problems       Social History     Social History     Marital status:      Spouse name: Alysa     Number of children: 8     Years of education: N/A     Occupational History      Retired     Social History Main Topics     Smoking status: Former Smoker     Packs/day: 1.00     Years: 40.00     Smokeless tobacco: Never Used      Comment: QUIT 1978     Alcohol use No     Drug use: No     Sexual activity: No     Other Topics Concern     Parent/Sibling W/ Cabg, Mi Or Angioplasty Before 65f 55m? No     Social History Narrative    .Living situation (location, living with others?):Lives with wife in [facility]    Activities of daily living (e.g., dressing, eating, walking):Independent        Instrumental activities of daily living (e.g., finance management, housekeeping, meal  "planning/ prep): Wife and kids help with preparing meals, shopping, driving, climbing stairs, complex decisions                 Meaningful Activities (e.g., hobbies, work): loves music, uses the computer, likes crossword         Support:Wife and daughter        Community Resources Used/ Interested in: Referred to [employer]          Information reviewed:  Medications, vital signs, orders, nursing notes, problem list, hospital information.     ROS: All 10 point review of system completed, those pertinent positive, please see H&P, the remaining ROS is negative.    /66  Pulse 64  Temp 98.7  F (37.1  C)  Resp 17  Ht 5' 10\" (1.778 m)  Wt 155 lb (70.3 kg)  SpO2 94%  BMI 22.24 kg/m2    PHYSICAL EXAMINATION:   GENERAL:  No acute distress. Laying in bed.  SKIN:  Dry and warm.  There is no rash, lesions, ulcers or juandice at area of skin examined.  HEENT:  Head without trauma.  Pupils round, reactive. Exam of conjunctiva and lids are normal. Sclera without icterus. There is no oral thrush.  NECK:  Supple.  There is no cervical adenopathy, no thyromegaly. No jugular venous distension.  CHEST: No reproducible chest tenderness.   LUNGS:  Normal respiratory effort. Lungs are Clear on ascultation.  HEART:  Regular rate and rhythm.  No murmur, gallops or rubs auscultated.  ABDOMEN:  Soft, bowel sounds positive.  There is no tenderness or guarding. + tolliver.  EXTREMITIES: No edema. + warmth of L hand and some ttp in the wrist.  NEUROLOGIC:  Alert and oriented to self and situation.    Lab/Diagnostic data:  Reviewed    Lab Results   Component Value Date    WBC 7.3 10/19/2018     Lab Results   Component Value Date    RBC 3.69 10/19/2018     Lab Results   Component Value Date    HGB 10.7 10/19/2018     Lab Results   Component Value Date    HCT 34.2 10/19/2018     Lab Results   Component Value Date    MCV 93 10/19/2018     Lab Results   Component Value Date    MCH 29.0 10/19/2018     Lab Results   Component Value Date    MCHC " 31.3 10/19/2018     Lab Results   Component Value Date    RDW 17.4 10/19/2018     Lab Results   Component Value Date     10/19/2018     Last Comprehensive Metabolic Panel:  Sodium   Date Value Ref Range Status   10/19/2018 139 133 - 144 mmol/L Final     Potassium   Date Value Ref Range Status   10/19/2018 4.1 3.4 - 5.3 mmol/L Final     Chloride   Date Value Ref Range Status   10/19/2018 106 94 - 109 mmol/L Final     Carbon Dioxide   Date Value Ref Range Status   10/19/2018 27 20 - 32 mmol/L Final     Anion Gap   Date Value Ref Range Status   10/19/2018 6 3 - 14 mmol/L Final     Glucose   Date Value Ref Range Status   10/19/2018 83 70 - 99 mg/dL Final     Urea Nitrogen   Date Value Ref Range Status   10/19/2018 15 7 - 30 mg/dL Final     Creatinine   Date Value Ref Range Status   10/19/2018 1.06 0.66 - 1.25 mg/dL Final     GFR Estimate   Date Value Ref Range Status   10/19/2018 65 >60 mL/min/1.7m2 Final     Comment:     Non  GFR Calc     Calcium   Date Value Ref Range Status   10/19/2018 8.2 (L) 8.5 - 10.1 mg/dL Final       ASSESSMENT / PLAN:     Sepsis, due to unspecified organism (H)  - Blood and renal pelvic Cx grew candida.  - s/p ureteral stent placement 9/27.  - On diflucan.  - f/u with urology as scheduled.  - Had chronic indwelling tolliver for hx of prostate ca.    Acute encephalopathy  - Improved, likely due to sepsis.    Depression, unspecified depression type  - On paxil.    Benign essential hypertension  - On norvasc and metoprolol.    Pain of left hand  - ? Gout versus cellulitis, check CBC, ESR, uric acid and start colchicine and levaquin.    Physical deconditioning  -Plan: PT/OT, fall precautions. Care conference with patient and family for the progress of rehab and disposition issues will be discussed as planned. Rehab evaluation and other evaluations including CPT are at rehab logs, to be reviewed separately.  Fall risk assessment as well as cognitive evaluation will be formed  during rehab stay if indicated.      Other problems with same care. Primary care doctor and other specialists to address those chronic problems in next clinic appointment to be scheduled upon discharge from the TCU.    Total time spent with patient visit was 45 min including patient visit, review of past records, 1/2 time on patients counseling and coordinating care.        Lexy Nieves MD      Sincerely,        Lexy Nieves MD

## 2018-10-17 PROBLEM — A41.9 SEPSIS (H): Status: ACTIVE | Noted: 2018-01-01

## 2018-10-17 NOTE — ED PROVIDER NOTES
History     Chief Complaint:  Flank Pain      The history is provided by a relative and medical records.    The patient's history is provided primarily by one of his daughters, a Registered Nurse.     Edison Boss is a 89 year old male who presents with his children for evaluation of flank pain and some penile pain. The patient was at a TCU and presents via EMS because he was been noted to be declining in his mentation over the past day and flank pain. The patient was hospitalized here within the past few weeks for UTI with fungemia and obstructing left ureteral stone status post ureteral stent placement. The patient had been noted to be improving on p.o. fluconazole until yesterday when he developed a low grade fever. When the patient developed this fever, he was also noted to have slight change in baseline mentation  Then this morning, the patient started to complain of some pain in his right flank. This morning, the patient did not have any changes in his mentation but was in immense pain per the family. The patient was brought in by EMS who provided Morphine after which he became more sleepy according to his family.   Of note: The patient's family also expresses some concern that he might be septic. He was noted to have  Fungemia with candida during his hospital stay.  Micafungin was switched over to fluconazole 10/1 at discharge the patient was switched to oral for 2 more weeks at 400 mg every day, then decrease to 100 mg every day through stone removal and for a week after and the patient's family report that he is still taking the medication.     The patient was normally gets around with a walker before his hospitalization. He was previously living alone in an assisted living facility. The family denies any recent coughing, or diarrhea only nausea this morning.       Allergies:  Ceftriaxone  Bactrim  Levaquin  Zithromax  Macrodantin    Medications:    Acetaminophen PO  Albuterol  "  Norvasc  Aspirin 81 Mg Ec Tablet  Temovate  Colchicine  Diflucan  Lidocaine   Lopressor  Multiple Vitamins-Minerals   Omeprazole   Paxil    Past Medical History:    Malignant neoplasm of prostate (H)  Osteoporosis  Esophageal reflux  Essential hypertension with goal blood pressure less than 140/90  Mild persistent asthma  Insomnia  Restless legs syndrome (RLS)  Chronic atrial fibrillation (H)  SBO (small bowel obstruction) (H)  Cerebral infarction (H)  Sinoatrial node dysfunction (H)  Pacemaker in place  RAMÓN (generalized anxiety disorder)  MCI (mild cognitive impairment)  Pneumonia due to infectious organism, unspecified laterality, unspecified part of lung  Acute encephalopathy  Hematuria, unspecified type  Calculus of kidney    Past Surgical History:    Kidney Stone Surgery   Turp X 2   Brachytherapy  Left Rotator Cuff Repair  Bilateral Cataract Surgery   Egd/Dilation Twice  Cystoscopy   Cystoscopy, Inject Collagen, Combined   Cystoscopy, Retrogrades, Insert Stent Ureter(S), Combined   Cystourethroscopy  Left Retrograde Pyelography With Interpretation Of Intraoperative Fluoroscopic Imaging  Left Ureteral Stent Placement  Hc Remove Tonsils/Adenoids,<13 Y/O   Implant Pacemaker   Penis Surgery   Prostate Surgery     Family History:    Cancer  Cerebrovascular disease    Social History:  The patient  reports that he has quit smoking. He has a 40.00 pack-year smoking history. He has never used smokeless tobacco. He reports that he does not drink alcohol or use illicit drugs.   Marital Status:   [5]     Review of Systems   Unable to perform ROS: Mental status change     Physical Exam     Vital signs    Estimated body mass index is 22.24 kg/(m^2) as calculated from the following:    Height as of an earlier encounter on 10/17/18: 1.778 m (5' 10\").    Weight as of an earlier encounter on 10/17/18: 70.3 kg (155 lb).    Patient Vitals for the past 24 hrs:   BP Temp Temp src Pulse Heart Rate Resp SpO2   10/17/18 2154 " 143/65 96.4  F (35.8  C) Oral - 62 18 97 %   10/17/18 2130 144/68 - - - - - 97 %   10/17/18 2115 148/65 - - - - - 98 %   10/17/18 2100 143/66 - - - - - 99 %   10/17/18 2045 155/69 - - - - - 98 %   10/17/18 2030 144/68 - - - - - 99 %   10/17/18 2015 141/81 - - - - - 100 %   10/17/18 2000 146/67 - - - - - 97 %   10/17/18 1945 140/68 - - - - - 98 %   10/17/18 1930 142/64 - - - - - 98 %   10/17/18 1915 129/57 - - - - - 97 %   10/17/18 1900 132/57 - - - - - 96 %   10/17/18 1845 132/57 - - - - - 97 %   10/17/18 1830 140/63 - - - - - 95 %   10/17/18 1815 149/63 - - - - - 97 %   10/17/18 1745 149/76 - - - - - 95 %   10/17/18 1710 - 101.9  F (38.8  C) Rectal - - - -   10/17/18 1645 150/61 - - - - - 95 %   10/17/18 1630 143/60 - - - - - 98 %   10/17/18 1615 141/56 - - - - - 97 %   10/17/18 1600 155/70 - - - - - 96 %   10/17/18 1514 160/75 98.1  F (36.7  C) Oral 71 - 18 95 %          Physical Exam  VS: Reviewed per above  HENT: Mucous membranes dry  EYES: sclera anicteric  CV: Rate as noted, regular rhythm.   RESP: Effort normal. Breath sounds are normal bilaterally.  GI: Right flank tenderness, not distended.  NEURO: Alert but oriented only to person in the context of being evaluated after morphine administration, moving all extremities  MSK: No deformity of the extremities  SKIN: Warm and dry      Emergency Department Course   ECG (19:27:57):  Rate 60 bpm. NY interval *. QRS duration 148. QT/QTc 452/4520. P-R-T axes 43 34 21. AV Dual paced rhythm. Abnormal EKG.Interpreted at 2000 by Deshawn Nice MD.     Imaging:  XR Chest 2 Views   Final Result   IMPRESSION: Improved infiltrate and pleural effusions.          ARUN RODRIGUEZ MD      CT Abdomen Pelvis w Contrast   Preliminary Result   IMPRESSION: Left ureteral stent in place without left hydronephrosis.   Proximal left ureteral stone is probably unchanged in position from   9/27/2018. This scan is otherwise unchanged in appearance compared to   9/27/2018 and 8/28/2018.          I communicated the results of the imaging studies with the patient's family present at the bedside who expressed understanding of these findings.      Laboratory:  UA: Urine Blood Moderate A, Protein Albumin Urine 100, Leukocyte Esterase Urine Large A, WBC >182, RBC 55, WBC Clumps Present, Bacteria Few, Mucous Urine Present, otherwise within normal limits  Urine Culture: pending     Blood Culture: pending x 2     ISTAT gases lactate shaquille POCT: pH Venous 7.46, PCO2 Venous 37, PO2 Venous 48, Bicarbonate Venous 27, O2 Sat Venous 86, Lactic Acid 0.9    CBC: WBC 16.6, RBC 4.04, HGB 11.6, 'HCT 37.4, MCHC 31.0, RDW 17.7, Absolute Neutrophil 13.6, Absolute Monocytes 2.0, otherwise within normal limits   CMP: Glucose 111, BUN 32, Creatinine 1.40, GFR Estimate 47, Albumin 2.7, otherwise within normal limits    Lipase 160    Interventions:  1610: Morphine 4mg IV  1610: Zofran 4mg IV  1710:  Lactated ringers bolus 2109 mL given  1741: Tylenol suppository 650mg Rectal  1951: Zyvox 600mg IV  2040: Zosyn 3.375  2105: Fluconazole    Emergency Department Course:  Past medical records, nursing notes, and vitals reviewed.  1632: I performed an exam of the patient and obtained history, as documented above.      EKG was obtained, findings as above.     IV inserted and blood drawn for the above work up to be conducted.     The patient was sent for a imaging studies while in the emergency department, findings above.    I consulted with Dr. Lawrence of Urology    I also consulted with Dr. Ma of Infectious disease.    Findings and plan explained to the Patient and son and daughter who consents to admission.     Discussed the patient with Dr. Ferris, who will admit the patient to an adult Med/Surg bed for further monitoring, evaluation, and treatment.    Impression & Plan    Medical Decision Making:  Patient presents the ER with fever, flank pain, slight change in mentation.  Upon initial assessment patient is febrile per rectal  temperature.  Point-of-care lactate is within normal limits and there is no evidence of severe sepsis.  He has no nuchal rigidity to suggest meningitis.  There is no recent cough or cold to suggest pneumonia.  Given recent history of obstructing left ureteral stone status post ureteral stent as well as fungemia thought secondary to urinary source, I was concerned for persistent UTI versus infected stone.  Repeat CT imaging of the abdomen and pelvis did not show significant change from prior and there was no evidence of hydronephrosis.  I discussed the case with on-call urologist who will evaluate the patient as inpatient but did not plan for any emergent interventions.  Chest x-ray was unchanged from prior as well.  He was given broad-spectrum antibiotics including Zosyn and linezolid which the patient had received at his last hospitalization.  Discussed the case with on-call ID physician who recommended IV fluconazole as it was unlikely that patient would have Candida strain resistant to fluconazole.  Patient remained stable while in the ER prior to admission.    Diagnosis:    ICD-10-CM    1. Complicated UTI (urinary tract infection) N39.0 Blood culture     Blood culture       Disposition:  Admitted to Hospitalist service.     IManny, am serving as a scribe at 4:32 PM on 10/17/2018 to document services personally performed by Deshawn Nice MD based on my observations and the provider's statements to me.    [facility] EMERGENCY DEPARTMENT       Deshawn Nice MD  10/17/18 2126

## 2018-10-17 NOTE — LETTER
Transition Communication Hand-off for Care Transitions to Next Level of Care Provider    Name: Edison Boss  : 10/19/1924  MRN #: [id number]  Primary Care Provider: Porfirio Terrell MD  Primary Care MD Name: Porfirio Terrell  Primary Clinic: [address] [facility] [address]  [facility] [address]  Primary Care Clinic Name: [facility]  Reason for Hospitalization:  Complicated UTI (urinary tract infection) [N39.0]  Admit Date/Time: 10/17/2018  3:09 PM  Discharge Date: 10/24/18  Payor Source: Payor: MEDICARE / Plan: MEDICARE / Product Type: Medicare /     Readmission Assessment Measure (UGO) Risk Score/category: ELEVATED    Plan of Care Goals/Milestone Events:            Reason for Communication Hand-off Referral: Fragility  Difficulty understanding plan of care    Discharge Plan:       Concern for non-adherence with plan of care:   NO  Discharge Needs Assessment:      Already enrolled in Tele-monitoring program and name of program:  na  Follow-up specialty is recommended: Yes    Follow-up plan:  Future Appointments  Date Time Provider Department Center   2018 1:45 PM SANDRA TECH1 [facility]   2018 3:00 PM Michael Lilly MD [facility]       Any outstanding tests or procedures:         Follow Up and recommended labs and tests       You will have the procedure to remove the stone on 10/30/18.  You will remain on Ciprofloxacin and Fluconazole for 3 full days after the procedure                   Key Recommendations:  Family very concerned about his decline and readmission to the hospital--hoping he will become well enough to return to TCU and be able to eventually live independently again-pt has been able to manage on his own fairly well since his wife  in March of this year.  No specific concerns at this time. dc'd to [facility] TCU    Selma Donato    AVS/Discharge Summary is the source of truth; this is a helpful guide for improved communication of patient story

## 2018-10-17 NOTE — IP AVS SNAPSHOT
"    [place] [address] MEDICAL SURGICAL: [phone]                                              INTERAGENCY TRANSFER FORM - PHYSICIAN ORDERS   10/17/2018                    Hospital Admission Date: 10/17/2018  JOSELIN VAN   : 10/19/1924  Sex: Male        Attending Provider: Virginie Ferris MD     Allergies:  Ceftriaxone, Bactrim, Zithromax [Azithromycin], Levaquin, Macrodantin [Nitrofuran Derivatives]    Infection:  None   Service:  GENERAL MEDI    Ht:  1.727 m (5' 8\")   Wt:  70.1 kg (154 lb 9.6 oz)   Admission Wt:  72.3 kg (159 lb 6.4 oz)    BMI:  23.51 kg/m 2   BSA:  1.83 m 2            Patient PCP Information     Provider PCP Type    Porfirio Terrell MD, MD General      ED Clinical Impression     Diagnosis Description Comment Added By Time Added    Complicated UTI (urinary tract infection) [N39.0] Complicated UTI (urinary tract infection) [N39.0]  Deshawn Nice MD 10/17/2018  7:15 PM      Hospital Problems as of 10/24/2018              Priority Class Noted POA    Sepsis (H) Medium  10/17/2018 Yes      Non-Hospital Problems as of 10/24/2018              Priority Class Noted    Malignant neoplasm of prostate (H) Medium  2002    Other specified disorder of skin Medium  2004    Personal history of other diseases of circulatory system Medium  Unknown    Osteoporosis Medium  Unknown    Esophageal reflux Medium  Unknown    Essential hypertension with goal blood pressure less than 140/90 Medium  2006    Mild persistent asthma Medium  Unknown    Insomnia Medium  2007    Restless legs syndrome (RLS) Medium  2007    HYPERLIPIDEMIA LDL GOAL <100 Medium  10/31/2010    Chronic atrial fibrillation (H) Medium  2012    SBO (small bowel obstruction) (H) Medium  2012    Cerebral infarction (H) Medium  2014    Sinoatrial node dysfunction (H) Medium  Unknown    Pacemaker Medium  Unknown    RAMÓN (generalized anxiety disorder) Medium  2016    MCI (mild cognitive impairment) " Medium  10/20/2016    Pneumonia due to infectious organism, unspecified laterality, unspecified part of lung Medium  9/17/2018    Acute encephalopathy Medium  9/24/2018    Hematuria, unspecified type Medium  10/9/2018      Code Status History     Date Active Date Inactive Code Status Order ID Comments User Context    10/24/2018  9:39 AM  DNR/DNI [id number]  Shahnaz Shook MD Outpatient    10/17/2018  9:59 PM 10/24/2018  9:39 AM DNR/DNI [id number]  Virginie Ferris MD Inpatient    10/4/2018 11:15 AM 10/17/2018  9:59 PM DNR [id number]  Virginie Ferris MD Outpatient    9/25/2018  9:19 AM 10/4/2018 11:15 AM DNR/DNI [id number]  Mu Barron MD Inpatient    9/24/2018  4:27 PM 9/25/2018  9:19 AM Full Code [id number]  Corona Arguello, DO Inpatient    9/17/2018 12:09 AM 9/19/2018  3:58 PM Full Code [id number]  Corona Arguello, DO Inpatient    12/1/2012  3:07 PM 9/17/2018 12:09 AM Full Code [id number]  Porfirio Terrell MD Outpatient    11/30/2012  2:30 AM 12/1/2012  3:07 PM Full Code [id number]  Bharat Lopez MD Inpatient         Medication Review      START taking        Dose / Directions Comments    ciprofloxacin 500 MG tablet   Commonly known as:  CIPRO   Indication:  Urinary Tract Infection   Used for:  Complicated UTI (urinary tract infection)        Dose:  500 mg   Take 1 tablet (500 mg) by mouth every 12 hours   Quantity:  20 tablet   Refills:  0        oxyCODONE IR 5 MG tablet   Commonly known as:  ROXICODONE   Used for:  Complicated UTI (urinary tract infection)        Dose:  5 mg   Take 1 tablet (5 mg) by mouth every 8 hours as needed for severe pain   Quantity:  6 tablet   Refills:  0          CONTINUE these medications which may have CHANGED, or have new prescriptions. If we are uncertain of the size of tablets/capsules you have at home, strength may be listed as something that might have changed.        Dose / Directions Comments    fluconazole 100 MG tablet   Commonly known as:  DIFLUCAN   This may  have changed:    - additional instructions  - Another medication with the same name was removed. Continue taking this medication, and follow the directions you see here.   Used for:  Candidemia (H)        Dose:  100 mg   Take 1 tablet (100 mg) by mouth daily   Quantity:  10 tablet   Refills:  0          CONTINUE these medications which have NOT CHANGED        Dose / Directions Comments    ACETAMINOPHEN PO        Dose:  1000 mg   Take 1,000 mg by mouth 3 times daily as needed for pain   Refills:  0        albuterol 108 (90 Base) MCG/ACT inhaler   Commonly known as:  PROAIR HFA/PROVENTIL HFA/VENTOLIN HFA   Used for:  Mild persistent asthma        Dose:  2 puff   Inhale 2 puffs into the lungs every 6 hours as needed for shortness of breath / dyspnea.   Quantity:  1 Inhaler   Refills:  1        amLODIPine 5 MG tablet   Commonly known as:  NORVASC   Used for:  Essential hypertension with goal blood pressure less than 140/90        TAKE 1 TABLET(5 MG) BY MOUTH DAILY   Quantity:  90 tablet   Refills:  3        AVEENO ECZEMA THERAPY 1 % Crea   Generic drug:  Colloidal Oatmeal        Externally apply topically once a week And as needed for dry skin   Refills:  0        clobetasol 0.05 % ointment   Commonly known as:  TEMOVATE        Apply topically daily as needed   Refills:  2        lidocaine 4 % Crea cream   Commonly known as:  LMX4        Apply topically daily as needed   Refills:  0        metoprolol tartrate 25 MG tablet   Commonly known as:  LOPRESSOR   Used for:  Benign essential hypertension        Dose:  25 mg   Take 1 tablet (25 mg) by mouth 2 times daily   Quantity:  60 tablet   Refills:  0        omeprazole 20 MG CR capsule   Commonly known as:  priLOSEC        Dose:  20 mg   Take 20 mg by mouth 2 times daily   Refills:  0        PARoxetine 30 MG tablet   Commonly known as:  PAXIL   Used for:  Current mild episode of major depressive disorder, unspecified whether recurrent (H)        Dose:  15 mg   Take 0.5  tablets (15 mg) by mouth At Bedtime Hold until fluconazole completed   Quantity:  30 tablet   Refills:  0        PRESERVISION AREDS Caps   Used for:  Macular degeneration        Dose:  1 capsule   Take 1 capsule by mouth 2 times daily   Quantity:  180 capsule   Refills:  3          STOP taking     COLCHICINE PO                   Summary of Visit     Reason for your hospital stay       You were hospitalized for a recurrent urinary tract infection and stone.             After Care     Activity - Up with assistive device           Advance Diet as Tolerated       Follow this diet upon discharge: Orders Placed This Encounter      Room Service      Combination Diet Regular Diet Adult       General info for SNF       Length of Stay Estimate: Short Term Care: Estimated # of Days 31-90  Condition at Discharge: Improving  Level of care:skilled   Rehabilitation Potential: Good  Admission H&P remains valid and up-to-date: Yes  Recent Chemotherapy: N/A  Use Nursing Home Standing Orders: Yes       Mantoux instructions       Give two-step Mantoux (PPD) Per Facility Policy Yes             Referrals     Occupational Therapy Adult Consult       Evaluate and treat as clinically indicated.    Reason:  weakness       Physical Therapy Adult Consult       Evaluate and treat as clinically indicated.    Reason:  deconditioning             Your next 10 appointments already scheduled     Oct 30, 2018   Procedure with Michael Lilly MD   [facility] (--)    [address] [facility] [address]   [phone]            Nov 05, 2018  1:45 PM CST   Remote PPM Check with SANDRA TECH1   [facility])    [address] [place], [place] [id number]  [facility] [address] [phone] OPT 2           This appointment is for a remote check of your pacemaker.  This is not an appointment at the office.            Nov 13, 2018  3:00 PM CST   Cystoscopy with Michael Lilly MD, UB  CYF   [facility])    [address] [facility]  [address]  [facility] [address] [phone]              Follow-Up Appointment Instructions     Future Labs/Procedures    Follow Up and recommended labs and tests     Comments:    You will have the procedure to remove the stone on 10/30/18.  You will remain on Ciprofloxacin and Fluconazole for 3 full days after the procedure.      Follow-Up Appointment Instructions     Follow Up and recommended labs and tests       You will have the procedure to remove the stone on 10/30/18.  You will remain on Ciprofloxacin and Fluconazole for 3 full days after the procedure.             Statement of Approval     Ordered          10/24/18 0940  I have reviewed and agree with all the recommendations and orders detailed in this document.  EFFECTIVE NOW     Approved and electronically signed by:  Shahnaz Shook MD

## 2018-10-17 NOTE — PROGRESS NOTES
[facility]    Chief Complaint   Patient presents with     custodial Acute       [facility] Record Number:  [id number]  Place of Service where encounter took place:  [facility]  (S) [[id number]]    HPI:    Edison Boss is a 89 year old  (10/19/1924), who is being seen today for an episodic care visit.  HPI information obtained from: facility chart records, facility staff, patient report and [facility] chart review.    Today's concern is:  Leukocytosis, unspecified type  Acute gouty arthritis  Patient with worsened weakness, leukocytosis in the past three days. Initially presented with redness, swelling, pain left hand suspicious for gout. Uric acid normal level but sed rate elevated. Started on colchicine yesterday and redness/pain of left hand resolved today. However, WBC remain more elevated.   Lab Results   Component Value Date    WBC 14.8 10/17/2018    WBC 12.7 10/16/2018        Pneumonia due to infectious organism, unspecified laterality, unspecified part of lung  Mild persistent asthma without complication  Hematuria, unspecified type  Ureteral stone  MADELYN (acute kidney injury) (H)  Hx of asthma, currently on room air but sats are trending down, today 91% when checked. Recently hospitalized with ureteral stone, fungemia with candida- micafungin now on oral antifungal through stone removal and for a week after. Also had pna vs renal pelvic infection and was covered for nosocomial coverage with linezolid and pip-tazo.  Most likely source of sepsis is his yeast in setting of obstructing stone.  Linezolid discontinued 10/1, micafungin converted to fluconazole on 10/1, pip-tazo discontinued 10/3.  Discharge with 2 weeks fluconazole 400 mg, then 100 mg every day until urologic intervention and through any procedure and then for an additional week   --at this time remains on Fluconazole, tolliver in place, low grade fevers and increase in WBC. Will work up with UA/UC  today, CBC 10/18, CXR and start Levaquin today. Discussed plan with Dr Nieves and sent update to ID Dr Lobo.     Sinoatrial node dysfunction (H), s/p PPM  Pacemaker  Hypervolemia, unspecified hypervolemia type  Essential hypertension with goal blood pressure less than 140/90  Recent wt 10/14: 155.0lbs and BP 10/4-10/17: 109-187/60-78 mmHg  Takes Norvasc, aspiring and metoprolol.     ALLERGIES: Ceftriaxone; Bactrim; Levaquin; Zithromax [azithromycin]; and Macrodantin [nitrofuran derivatives]  Past Medical, Surgical, Family and Social History reviewed and updated in [facility].    Current Outpatient Prescriptions   Medication Sig Dispense Refill     ACETAMINOPHEN PO Take 1,000 mg by mouth 3 times daily as needed for pain       albuterol (PROVENTIL HFA: VENTOLIN HFA) 108 (90 BASE) MCG/ACT inhaler Inhale 2 puffs into the lungs every 6 hours as needed for shortness of breath / dyspnea. 1 Inhaler 1     amLODIPine (NORVASC) 5 MG tablet TAKE 1 TABLET(5 MG) BY MOUTH DAILY 90 tablet 3     aspirin 81 MG EC tablet Take 1 tablet (81 mg) by mouth daily       clobetasol (TEMOVATE) 0.05 % ointment Apply topically daily as needed   2     COLCHICINE PO Give 1.2 mg by mouth in the afternoon for gout until 10/16/2018 23:59 AND Give 0.6 mg by mouth in the afternoon for gout until 10/16/2018 23:59 One hour after1.2 mg dose AND Give 0.6 mg by mouth in the morning for gout for 6 Days       Colloidal Oatmeal (AVEENO ECZEMA THERAPY) 1 % CREA Externally apply topically once a week And as needed for dry skin       [START ON 10/19/2018] fluconazole (DIFLUCAN) 100 MG tablet Take 1 tablet (100 mg) by mouth daily Start taking 100 mg every day when you have completed the 2 weeks of 400 mg per day, you need to take 100 mg per day through stone removal procedure and for about a week afterwards 15 tablet 0     fluconazole (DIFLUCAN) 200 MG tablet Take 2 tablets (400 mg) by mouth daily for 14 days 28 tablet 0     lidocaine (LMX4) 4 % CREA 4% topical cream  "Apply topically daily as needed       metoprolol tartrate (LOPRESSOR) 25 MG tablet Take 1 tablet (25 mg) by mouth 2 times daily 60 tablet      Multiple Vitamins-Minerals (PRESERVISION AREDS) CAPS Take 1 capsule by mouth 2 times daily 180 capsule 3     omeprazole (PRILOSEC) 20 MG CR capsule Take 20 mg by mouth daily as needed       PARoxetine (PAXIL) 30 MG tablet Take 0.5 tablets (15 mg) by mouth At Bedtime Hold until fluconazole completed (Patient not taking: Reported on 10/9/2018) 30 tablet      Medications reviewed:  Medications reconciled to facility chart and changes were made to reflect current medications as identified as above med list. Below are the changes that were made:   Medications stopped since last EPIC medication reconciliation:   There are no discontinued medications.    Medications started since last [facility] medication reconciliation:  Orders Placed This Encounter   Medications     ACETAMINOPHEN PO     Sig: Take 1,000 mg by mouth 3 times daily as needed for pain     COLCHICINE PO     Sig: Give 1.2 mg by mouth in the afternoon for gout until 10/16/2018 23:59 AND Give 0.6 mg by mouth in the afternoon for gout until 10/16/2018 23:59 One hour after 1.2 mg dose AND Give 0.6 mg by mouth in the morning for gout for 6 Days       REVIEW OF SYSTEMS:  10 point ROS of systems including Constitutional, Eyes, Respiratory, Cardiovascular, Gastroenterology, Genitourinary, Integumentary, Musculoskeletal, Psychiatric were all negative except for pertinent positives noted in my HPI.    Physical Exam:  /67  Pulse 60  Temp 99.6  F (37.6  C)  Resp 30  Ht 5' 10\" (1.778 m)  Wt 155 lb (70.3 kg)  SpO2 91%  BMI 22.24 kg/m2  GENERAL APPEARANCE:  Somnolent, in no distress, pleasant, cooperative, oriented x self place and family  EYES:  EOM, lids, pupils and irises normal, sclera clear and conjunctiva normal, no discharge or mattering on lids or lashes noted  ENT:  Mouth normal, moist mucous membranes, nose normal " without drainage or crusting, external ears without lesions, hearing acuity impaired bilaterally  RESP:  respiratory effort and palpation of chest normal, no chest wall tenderness, no respiratory distress, Lung sounds diminished at bases, patient is on room air at this time  CV:  Palpation and auscultation of heart done, rate and rhythm controlled and regular today. Edema none bilateral lower extremities.   ABDOMEN:  normal bowel sounds, soft, nontender.  M/S:   Gait and station walks with assist , no tenderness or swelling of the joints; able to move all extremities   NEURO: cranial nerves 2-12 grossly intact, no facial asymmetry, no speech deficits and able to follow directions, moves all extremities symmetrically  PSYCH:  insight and judgement at baseline, memory forgetful, affect and mood normal     Recent Labs:   CBC RESULTS:   Recent Labs   Lab Test  10/17/18   1010  10/16/18   1050   WBC  14.8*  12.7*   RBC  3.70*  4.01*   HGB  10.9*  11.7*   HCT  33.3*  36.1*   MCV  90  90   MCH  29.5  29.2   MCHC  32.7  32.4   RDW  18.0*  18.0*   PLT  250  259       Last Basic Metabolic Panel:  Recent Labs   Lab Test  10/16/18   1050  10/08/18   1155   NA  134  141   POTASSIUM  4.6  4.3   CHLORIDE  103  109   ARYAN  9.1  9.2   CO2  24  25   BUN  30  17   CR  1.15  1.06   GLC  92  71       Liver Function Studies -   Recent Labs   Lab Test  10/03/18   0624  09/29/18   0652   PROTTOTAL  6.8  6.8   ALBUMIN  2.2*  1.9*   BILITOTAL  0.4  0.6   ALKPHOS  112  136   AST  28  34   ALT  29  33       Assessment/Plan:  Pneumonia due to infectious organism, unspecified laterality, unspecified part of lung  Hematuria, unspecified type  Mild persistent asthma without complication  Leukocytosis, unspecified type  Acute gouty arthritis  Ureteral stone  Worsened status with acute on chronic issues. ?respiratory vs urinary infection recurrence. Will work up as noted below. Updated TCU MD, ID via EPIC note and daughter Red in person re: status  and plan.     Sinoatrial node dysfunction (H), s/p PPM  Pacemaker  Essential hypertension with goal blood pressure less than 140/90  Chronic issues, stable at this time. Monitor, meds/vs as ordered.     MADELYN (acute kidney injury) (H)  Hypervolemia, unspecified hypervolemia type  Improved last check. F/U with BMP PRN, monitor wt and vs.     Orders:  1. Levaquin 500 mg PO today, then 250 mg PO daily x 6 days starting 10/18 diagnosis leukocytosis  2. CBC with diff on 10/18  3. Replace tolliver today, then send in UA/UC sample  4. CXR two views today.   5. BC x 2 on 10/18 and pro-calcitonin on 10/18 diagnosis fever    Total time spent with patient visit was 45 min including patient visit and review of past records. Greater than 50% of total time spent with counseling and coordinating care due to review of history, current status, concerns and POC to address them as noted above.      Electronically signed by  JYOTI Salazar CNP

## 2018-10-17 NOTE — IP AVS SNAPSHOT
"` `           [place] [address] MEDICAL SURGICAL: [phone]                                              INTERAGENCY TRANSFER FORM - NURSING   10/17/2018                    Hospital Admission Date: 10/17/2018  JOSELIN VAN   : 10/19/1924  Sex: Male        Attending Provider: Virginie Ferris MD     Allergies:  Ceftriaxone, Bactrim, Zithromax [Azithromycin], Levaquin, Macrodantin [Nitrofuran Derivatives]    Infection:  None   Service:  GENERAL MEDI    Ht:  1.727 m (5' 8\")   Wt:  70.1 kg (154 lb 9.6 oz)   Admission Wt:  72.3 kg (159 lb 6.4 oz)    BMI:  23.51 kg/m 2   BSA:  1.83 m 2            Patient PCP Information     Provider PCP Type    Porfirio Terrell MD, MD General      Current Code Status     Date Active Code Status Order ID Comments User Context       Prior      Code Status History     Date Active Date Inactive Code Status Order ID Comments User Context    10/24/2018  9:39 AM  DNR/DNI [id number]  Shahnaz Shook MD Outpatient    10/17/2018  9:59 PM 10/24/2018  9:39 AM DNR/DNI [id number]  Virginie Ferris MD Inpatient    10/4/2018 11:15 AM 10/17/2018  9:59 PM DNR [id number]  Virginie Ferris MD Outpatient    2018  9:19 AM 10/4/2018 11:15 AM DNR/DNI [id number]  Mu Barron MD Inpatient    2018  4:27 PM 2018  9:19 AM Full Code [id number]  Corona Arguello,  Inpatient    2018 12:09 AM 2018  3:58 PM Full Code [id number]  Corona Arguello, DO Inpatient    2012  3:07 PM 2018 12:09 AM Full Code [id number]  Porfirio Terrell MD Outpatient    2012  2:30 AM 2012  3:07 PM Full Code [id number]  Bharat Lopez MD Inpatient      Advance Directives        Scanned docmt in ACP Activity?           Yes, scanned ACP docmt        Hospital Problems as of 10/24/2018              Priority Class Noted POA    Sepsis (H) Medium  10/17/2018 Yes      Non-Hospital Problems as of 10/24/2018              Priority Class Noted    Malignant neoplasm of prostate (H) Medium  " 8/14/2002    Other specified disorder of skin Medium  9/1/2004    Personal history of other diseases of circulatory system Medium  Unknown    Osteoporosis Medium  Unknown    Esophageal reflux Medium  Unknown    Essential hypertension with goal blood pressure less than 140/90 Medium  6/6/2006    Mild persistent asthma Medium  Unknown    Insomnia Medium  4/4/2007    Restless legs syndrome (RLS) Medium  11/14/2007    HYPERLIPIDEMIA LDL GOAL <100 Medium  10/31/2010    Chronic atrial fibrillation (H) Medium  7/2/2012    SBO (small bowel obstruction) (H) Medium  11/30/2012    Cerebral infarction (H) Medium  2/14/2014    Sinoatrial node dysfunction (H) Medium  Unknown    Pacemaker Medium  Unknown    RAMÓN (generalized anxiety disorder) Medium  5/5/2016    MCI (mild cognitive impairment) Medium  10/20/2016    Pneumonia due to infectious organism, unspecified laterality, unspecified part of lung Medium  9/17/2018    Acute encephalopathy Medium  9/24/2018    Hematuria, unspecified type Medium  10/9/2018      Immunizations     Name Date      DTaP, Unspecified 05/26/16     FLU 6-35 months 10/10/12     Flu 65+ Years 10/05/15     Flu, Unspecified 10/22/98     Flu, Unspecified 10/04/96     Flu, Unspecified 10/25/95     Influenza (High Dose) 3 valent vaccine 10/02/18     Influenza (High Dose) 3 valent vaccine 10/20/17     Influenza (High Dose) 3 valent vaccine 10/10/14     Influenza (High Dose) 3 valent vaccine 11/06/13     Influenza (High Dose) 3 valent vaccine 10/05/10     Influenza (IIV3) PF 10/10/12     Influenza (IIV3) PF 10/27/11     Influenza (IIV3) PF 10/25/07     Influenza (IIV3) PF 10/01/06     Influenza (IIV3) PF 10/15/05     Influenza (IIV3) PF 10/14/04     Influenza (IIV3) PF 11/19/03     Influenza (IIV3) PF 11/11/02     Influenza (IIV3) PF 10/25/01     Influenza (IIV3) PF 11/30/00     Influenza (IIV3) PF 11/04/99     Pneumo Conj 13-V (2010&after) 01/07/16     Pneumococcal 23 valent 10/01/12     Pneumococcal 23 valent  10/12/10     Pneumococcal 23 valent 12/01/99     TD (ADULT, 7+) 08/18/05     TDAP Vaccine (Adacel) 05/26/16     Td (Adult), Adsorbed 08/18/05     Tetanus 10/01/04     Tetanus 02/17/94          END      ASSESSMENT     Discharge Profile Flowsheet     EXPECTED DISCHARGE     Resources List  Skilled Nursing Facility 10/23/18 1302    Expected Discharge Date  10/24/18 ([facility] TCU from previous admit here w/d/c 10/4) 10/23/18 1133   Skilled Nursing Facility  [facility] [phone], Fax: [phone] 10/23/18 1302    DISCHARGE NEEDS ASSESSMENT     PAS Number  [id number] 10/04/18 1350    Readmission Within The Last 30 Days  previous discharge plan unsuccessful 10/18/18 1553   SKIN      Equipment Currently Used at Home  walker, rolling 10/19/18 1050   Inspection of bony prominences  Full 10/24/18 0206    # of Referrals Placed by CTS  Post Acute Facilities 09/25/18 1356   Inspection under devices  Full except (identify device(s) not inspected) 10/24/18 0206    Primary Care Clinic Name  [facility] 10/18/18 1509   Skin WDL  WDL 10/24/18 0206    Primary Care MD Name  Porfirio Terrell 10/18/18 1509   Skin Temperature  warm 10/24/18 0206    GASTROINTESTINAL (ADULT,PEDIATRIC,OB)     Skin Moisture  dry 10/24/18 0206    GI WDL  WDL 10/24/18 0206   Skin Elasticity  slow return to original state 10/24/18 0206    Abdominal Appearance  rounded 10/24/18 0206   Skin Integrity  bruise(s);scab(s);scar(s) 10/24/18 0206    All Quadrants Bowel Sounds  audible and active in all quadrants 10/24/18 0206   Skin Color/Characteristics  pale 10/24/18 0206    Last Bowel Movement  10/23/18 10/24/18 0206   Not Inspected under devices  Other (Securing device) 10/24/18 0206    GI Signs/Symptoms  no gastrointestinal signs/symptoms 10/24/18 0206   SAFETY      Passing flatus  yes 10/24/18 0206   Safety WDL  WDL 10/24/18 0206    COMMUNICATION ASSESSMENT     All Alarms  alarm(s) activated and audible 10/24/18 0206    Patient's communication  "style  spoken language (English or Bilingual) 10/17/18 2206   Safety Factors  ID band on;upper side rails raised x 2;call light in reach;wheels locked;bed in low position 10/24/18 0206    FINAL RESOURCES                        Assessment WDL (Within Defined Limits) Definitions           Safety WDL     Effective: 09/28/15    Row Information: <b>WDL Definition:</b> Bed in low position, wheels locked; call light in reach; upper side rails up x 2; ID band on<br> <font color=\"gray\"><i>Item=AS safety wdl>>List=AS safety wdl>>Version=F14</i></font>      Skin WDL     Effective: 09/28/15    Row Information: <b>WDL Definition:</b> Warm; dry; intact; elastic; without discoloration; pressure points without redness<br> <font color=\"gray\"><i>Item=AS skin wdl>>List=AS skin wdl>>Version=F14</i></font>      Vitals     Vital Signs Flowsheet     VITAL SIGNS     Side Effects Monitoring: Respiratory Quality  R 10/24/18 0852    Temp  97.4  F (36.3  C) 10/24/18 0846   Side Effects Monitoring: Respiratory Depth  N 10/24/18 0852    Temp src  Oral 10/24/18 0846   Side Effects Monitoring: Sedation Level  1 10/24/18 0852    Resp  18 10/24/18 0846   CHRISTINE COMA SCALE      Pulse  60 10/24/18 0014   Best Eye Response  4-->(E4) spontaneous 10/22/18 0120    Heart Rate  60 10/24/18 0846   Best Motor Response  6-->(M6) obeys commands 10/22/18 0120    Pulse/Heart Rate Source  Monitor 10/24/18 0014   Best Verbal Response  5-->(V5) oriented 10/22/18 0120    BP  145/58 10/24/18 0846   [place] Coma Scale Score  15 10/22/18 0120    BP Location  Right arm 10/24/18 0846   HEIGHT AND WEIGHT      OXYGEN THERAPY     Height  1.727 m (5' 8\") 10/23/18 0653    SpO2  94 % 10/24/18 0846   Height Method  Stated 10/23/18 0653    O2 Device  None (Room air) 10/24/18 0846   Weight  70.1 kg (154 lb 9.6 oz) 10/24/18 0449    Oxygen Delivery  1 LPM 10/19/18 0727   Weight Method  Bed scale 10/24/18 0449    PACEMAKER     Bed Scale  Standard (fitted sheet, draw sheet/ " pad, cover/flat sheet, blanket, two pillows) 10/19/18 0537    Pacemaker  Permanent 10/24/18 0206   BSA (Calculated - sq m)  1.87 10/23/18 0653    PAIN/COMFORT     BMI (Calculated)  24.42 10/23/18 0653    Patient Currently in Pain  denies 10/24/18 0852   POSITIONING      Preferred Pain Scale  number (Numeric Rating Pain Scale) 10/24/18 0852   Body Position  side-lying, left 10/24/18 0929    Patient's Stated Pain Goal  No pain 10/24/18 0852   Head of Bed (HOB)  HOB at 20 degrees 10/24/18 0929    0-10 Pain Scale  0 10/24/18 0852   Positioning/Transfer Devices  pillows 10/24/18 0929    Word Pain Scale  4 10/24/18 0017   Chair  Upright in chair 10/22/18 1813    Pain Location  Back 10/24/18 0017   DAILY CARE      Pain Orientation  Right;Lower 10/24/18 0017   Activity Management  up in chair 10/24/18 0930    Pain Descriptors  Aching 10/24/18 0157   Activity Assistance Provided  assistance, 2 people 10/24/18 0930    Pain Management Interventions  analgesia administered 10/24/18 0157   Assistive Device Utilized  other (see comments) (joel steady) 10/24/18 0930    Pain Intervention(s)  Medication (See eMAR) 10/24/18 0157   Additional Documentation  Activity Device Assistance (Row) 10/24/18 0206    Response to Interventions  Decrease in pain 10/24/18 0156   ECG      ANALGESIA SIDE EFFECTS MONITORING     ECG Rhythm  Atrial fibrillation (Hr 60s per tele tech) 10/20/18 0654            Patient Lines/Drains/Airways Status    Active LINES/DRAINS/AIRWAYS     Name: Placement date: Placement time: Site: Days: Last dressing change:    Urethral Catheter Coude 16 fr 09/27/18   1509   Coude   26     Urethral Catheter Coude 16 fr 10/17/18   1820   Coude   6     Peripheral IV 10/20/18 Right Upper forearm 10/20/18   2256   Upper forearm   3     Wound 09/26/18 Anterior;Inner;Left;Lower Knee Abrasion(s) 09/26/18      Knee   28     Incision/Surgical Site 03/22/13 Urethral meatus 03/22/13   1325    2041     Incision/Surgical Site 12/08/16 Groin  12/08/16   1230    684             Patient Lines/Drains/Airways Status    Active PICC/CVC     None            Intake/Output Detail Report     Date Intake     Output Net    Shift P.O. I.V. IV Piggyback Total Urine Total       Noc 10/22/18 2300 - 10/23/18 0659 200 -- -- 200 950 950 -750    Day 10/23/18 0700 - 10/23/18 1459 -- -- -- -- -- -- 0    Pepper 10/23/18 1500 - 10/23/18 2259 -- -- -- -- 425 425 -425    Noc 10/23/18 2300 - 10/24/18 0659 -- -- -- -- 250 250 -250    Day 10/24/18 0700 - 10/24/18 1459 -- -- -- -- -- -- 0      Last Void/BM       Most Recent Value    Urine Occurrence 1 at 10/22/2018 1854    Stool Occurrence 1 at 10/22/2018 2100      Case Management/Discharge Planning     Case Management/Discharge Planning Flowsheet     REFERRAL INFORMATION     Description of Support System  Involved 10/18/18 1509    Admission Type  inpatient 10/18/18 1509   COPING/STRESS      Arrived From  other (see comments) 10/18/18 1509   Major Change/Loss/Stressor  denies 10/17/18 2206    Referral Source  -- (Admitted from [id number] at [facility]) 10/18/18 1509   EXPECTED DISCHARGE      # of Referrals Placed by CTS  Post Acute Facilities 09/25/18 1356   Expected Discharge Date  10/24/18 ([facility] TCU from previous admit here w/d/c 10/4) 10/23/18 1133    Reason For Consult  care coordination/care conference 10/18/18 1509   DISCHARGE PLANNING      Record Reviewed  medical record 10/18/18 1509   Readmission Within The Last 30 Days  previous discharge plan unsuccessful 10/18/18 1553    CTS Assigned to Case  Jodi 10/18/18 1509   FINAL RESOURCES      [facility] 10/18/18 1509   Equipment Currently Used at Home  walker, rolling 10/19/18 1050    Primary Care MD Name  Porfirio Terrell 10/18/18 1509   Resources List  Skilled Nursing Facility 10/23/18 1302    LIVING ENVIRONMENT     Skilled Nursing Facility  [facility] [phone], Fax: [phone] 10/23/18 1302    Lives  With  alone 10/19/18 1050   PAS Number  [id number] 10/04/18 1350    Living Arrangements  independent living facility 10/19/18 1050   ABUSE RISK SCREEN      Provides Primary Care For  no one 10/18/18 1509   QUESTION TO PATIENT:  Has a member of your family or a partner(now or in the past) intimidated, hurt, manipulated, or controlled you in any way?  no 10/17/18 2206    Able to Return to Prior Living Arrangements  other (see comments) (No bedhold at [facility] but would like to return there if possibl) 10/18/18 1509   QUESTION TO PATIENT: Do you feel safe going back to the place where you are living?  yes 10/17/18 2206    ASSESSMENT OF FAMILY/SOCIAL SUPPORT     OBSERVATION: Is there reason to believe there has been maltreatment of a vulnerable adult (ie. Physical/Sexual/Emotional abuse, self neglect, lack of adequate food, shelter, medical care, or financial exploitation)?  no 10/17/18 2206    Marital Status   10/18/18 1509   OTHER      Who is your support system?  Children 10/18/18 1509   Are you depressed or being treated for depression?  Yes 10/17/18 2206

## 2018-10-17 NOTE — IP AVS SNAPSHOT
` `     [place] [address] MEDICAL SURGICAL: [phone]            Medication Administration Report for Edison Boss as of 10/24/18 1249   Legend:    Given Hold Not Given Due Canceled Entry Other Actions    Time Time (Time) Time  Time-Action       Inactive    Active    Linked        Medications 10/18/18 10/19/18 10/20/18 10/21/18 10/22/18 10/23/18 10/24/18    acetaminophen (TYLENOL) tablet 650 mg  Dose: 650 mg  Freq: EVERY 4 HOURS PRN Route: PO  PRN Reason: mild pain  Start: 10/17/18 2156   Admin Instructions: Alternate ibuprofen (if ordered) with acetaminophen.  Maximum acetaminophen dose from all sources = 75 mg/kg/day not to exceed 4 grams/day.    Admin. Amount: 2 tablet (2 × 325 mg tablet)  Last Admin: 10/24/18 0025  Dispense Loc:  ADS MS5E     0551 (650 mg)-Given        1219 (650 mg)-Given        0040 (650 mg)-Given       1050 (650 mg)-Given        0853 (650 mg)-Given        0830 (650 mg)-Given       2059 (650 mg)-Given        1838 (650 mg)-Given        0025 (650 mg)-Given           albuterol (PROAIR HFA/PROVENTIL HFA/VENTOLIN HFA) 108 (90 Base) MCG/ACT inhaler 2 puff  Dose: 2 puff  Freq: EVERY 6 HOURS PRN Route: IN  PRN Reason: shortness of breath / dyspnea  Start: 10/17/18 2321   Admin. Amount: 2 puff  Dispense Loc:  Main Pharmacy               amLODIPine (NORVASC) tablet 5 mg  Dose: 5 mg  Freq: DAILY Route: PO  Start: 10/18/18 0900   Admin. Amount: 1 tablet (1 × 5 mg tablet)  Last Admin: 10/24/18 0853  Dispense Loc:  ADS MS5E     0808 (5 mg)-Given        0846 (5 mg)-Given        1009 (5 mg)-Given        0817 (5 mg)-Given        0830 (5 mg)-Given        0750 (5 mg)-Given               0853 (5 mg)-Given           bisacodyl (DULCOLAX) EC tablet 5 mg  Dose: 5 mg  Freq: DAILY PRN Route: PO  PRN Reason: constipation  Start: 10/17/18 2154   Admin Instructions: Do not crush or chew. Swallow whole. May titrate 1 tablet each day until response. Maximum of 3 tablets daily. Hold for loose stools. This  is the first step of a three step constipation treatment.    Admin. Amount: 1 tablet (1 × 5 mg tablet)  Last Admin: 10/22/18 0830  Dispense Loc: [facility] ADS MS5E         0830 (5 mg)-Given            Or  bisacodyl (DULCOLAX) EC tablet 10 mg  Dose: 10 mg  Freq: DAILY PRN Route: PO  PRN Reason: constipation  Start: 10/17/18 2154   Admin Instructions: Do not crush or chew. Swallow whole. May titrate 1 tablet each day until response. Maximum of 3 tablets daily. Hold for loose stools. This is the first step of a three step constipation treatment.    Admin. Amount: 2 tablet (2 × 5 mg tablet)  Dispense Loc: [facility] ADS MS5[facility]                     Or  bisacodyl (DULCOLAX) EC tablet 15 mg  Dose: 15 mg  Freq: DAILY PRN Route: PO  PRN Reason: constipation  Start: 10/17/18 2154   Admin Instructions: Do not crush or chew. Swallow whole. May titrate 1 tablet each day until response. Maximum of 3 tablets daily. Hold for loose stools. This is the first step of a three step constipation treatment.    Admin. Amount: 3 tablet (3 × 5 mg tablet)  Dispense Loc: [facility] ADS MS5E                      bisacodyl (DULCOLAX) Suppository 10 mg  Dose: 10 mg  Freq: DAILY PRN Route: RE  PRN Reason: constipation  Start: 10/17/18 2154   Admin Instructions: Hold for loose stools.  This is the third step of a three step constipation treatment.    Admin. Amount: 1 suppository (1 × 10 mg suppository)  Dispense Loc: [facility] ADS MS5E               ciprofloxacin (CIPRO) tablet 500 mg  Dose: 500 mg  Freq: EVERY 12 HOURS SCHEDULED Route: PO  Indications of Use: URINARY TRACT INFECTION  Start: 10/22/18 2000   Admin. Amount: 1 tablet (1 × 500 mg tablet)  Last Admin: 10/24/18 0853  Dispense Loc: [facility] ADS MS5E         1956 (500 mg)-Given        0750 (500 mg)-Given       2026 (500 mg)-Given        0853 (500 mg)-Given       [ ] 2000           fluconazole (DIFLUCAN) tablet 100 mg  Dose: 100 mg  Freq: EVERY 24 HOURS Route: PO  Indications of Use: CANDIDIASIS  Start: 10/22/18 1600   Admin.  "Amount: 1 tablet (1 × 100 mg tablet)  Last Admin: 10/23/18 1550  Dispense Loc: [facility] ADS MS5E         1600 (100 mg)-Given        1550 (100 mg)-Given        [ ] 1600           HYDROmorphone (PF) (DILAUDID) injection 0.2 mg  Dose: 0.2 mg  Freq: EVERY 2 HOURS PRN Route: IV  PRN Reason: other  PRN Comment: pain control or improvement in physical function. Hold dose for analgesic side effects.  Start: 10/17/18 2157   Admin Instructions: Give IF unable to tolerate oral option for pain control<br>Notify provider to assess for uncontrolled pain or analgesic side effects. Hold while on PCA or with regular IV opioid dosing  For ordered  IV doses 0.1-4 mg give IV Push undiluted. Administer each 2mg over 2-5 minutes.    Admin. Amount: 0.2 mg  Last Admin: 10/21/18 1716  Dispense Loc: [facility] ADS MS5E        1716 (0.2 mg)-Given [C]              lidocaine (LMX4) cream  Freq: EVERY 1 HOUR PRN Route: Top  PRN Reason: pain  PRN Comment: with VAD insertion or accessing implanted port.  Start: 10/17/18 2154   Admin Instructions: Do NOT give if patient has a history of allergy to any local anesthetic or any \"gustavo\" product.   Apply 30 minutes prior to VAD insertion or port access.  MAX Dose:  2.5 g (  of 5 g tube)    Dispense Loc: [facility] ADS MS5E               lidocaine 1 % 1 mL  Dose: 1 mL  Freq: EVERY 1 HOUR PRN Route: OTHER  PRN Comment: mild pain with VAD insertion or accessing implanted port  Start: 10/17/18 2154   Admin Instructions: Do NOT give if patient has a history of allergy to any local anesthetic or any \"gustavo\" product. MAX dose 1 mL subcutaneous OR intradermal in divided doses.    Admin. Amount: 1 mL  Dispense Loc: [facility] Floor Stock  Volume: 2 mL               magnesium hydroxide (MILK OF MAGNESIA) suspension 30 mL  Dose: 30 mL  Freq: DAILY PRN Route: PO  PRN Reason: constipation  Start: 10/17/18 2154   Admin Instructions: Hold for loose stools.  This is the second step of a three step constipation treatment.    Admin. Amount: 30 " mL  Dispense Loc: [facility] ADS MS5E  Volume: 30 mL               melatonin tablet 1 mg  Dose: 1 mg  Freq: AT BEDTIME PRN Route: PO  PRN Reason: sleep  Start: 10/17/18 2154   Admin Instructions: Do not give unless at least 6 hours of uninterrupted sleep is expected.    Admin. Amount: 1 tablet (1 × 1 mg tablet)  Dispense Loc: [facility] ADS MS5E               metoprolol tartrate (LOPRESSOR) tablet 25 mg  Dose: 25 mg  Freq: 2 TIMES DAILY Route: PO  Start: 10/17/18 2330   Admin. Amount: 1 tablet (1 × 25 mg tablet)  Last Admin: 10/24/18 0853  Dispense Loc: RH ADS MS5E     0001 (25 mg)-Given       0808 (25 mg)-Given       2152 (25 mg)-Given        0846 (25 mg)-Given       2112 (25 mg)-Given        1009 (25 mg)-Given       2056 (25 mg)-Given        0817 (25 mg)-Given       2108 (25 mg)-Given        0830 (25 mg)-Given       1956 (25 mg)-Given               0751 (25 mg)-Given              2027 (25 mg)-Given        0853 (25 mg)-Given       [ ] 2100           naloxone (NARCAN) injection 0.1-0.4 mg  Dose: 0.1-0.4 mg  Freq: EVERY 2 MIN PRN Route: IV  PRN Reason: opioid reversal  Start: 10/17/18 2154   Admin Instructions: For respiratory rate LESS than or EQUAL to 8.  Partial reversal dose:  0.1 mg titrated q 2 minutes for Analgesia Side Effects Monitoring Sedation Level of 3 (frequently drowsy, arousable, drifts to sleep during conversation).Full reversal dose:  0.4 mg bolus for Analgesia Side Effects Monitoring Sedation Level of 4 (somnolent, minimal or no response to stimulation).  For ordered IV doses 0.1-2mg give IVP. Give each 0.4mg over 15 seconds in emergency situations. For non-emergent situations further dilute in 9mL of NS to facilitate titration of response.    Admin. Amount: 0.1-0.4 mg = 0.25-1 mL Conc: 0.4 mg/mL  Dispense Loc: RH ADS MS5E  Volume: 1 mL               omeprazole (priLOSEC) CR capsule 20 mg  Dose: 20 mg  Freq: 2 TIMES DAILY BEFORE MEALS Route: PO  Start: 10/18/18 0730   Admin. Amount: 1 capsule (1 × 20 mg  capsule)  Last Admin: 10/24/18 0639  Dispense Loc: RH ADS MS5E     0641 (20 mg)-Given       1642 (20 mg)-Given        0740 (20 mg)-Given       (1647)-Not Given        0646 (20 mg)-Given       1602 (20 mg)-Given        0817 (20 mg)-Given       1720 (20 mg)-Given        0641 (20 mg)-Given       1600 (20 mg)-Given        0710 (20 mg)-Given       1551 (20 mg)-Given        0639 (20 mg)-Given       [ ] 1630           ondansetron (ZOFRAN-ODT) ODT tab 4 mg  Dose: 4 mg  Freq: EVERY 6 HOURS PRN Route: PO  PRN Reasons: nausea,vomiting  Start: 10/17/18 2154   Admin Instructions: This is Step 1 of nausea and vomiting management.  If nausea not resolved in 15 minutes, go to Step 2 prochlorperazine (COMPAZINE). Do not push through foil backing. Peel back foil and gently remove. Place on tongue immediately. Administration with liquid unnecessary  With dry hands, peel back foil backing and gently remove tablet; do not push oral disintegrating tablet through foil backing; administer immediately on tongue and oral disintegrating tablet dissolves in seconds; then swallow with saliva; liquid not required.    Admin. Amount: 1 tablet (1 × 4 mg tablet)  Dispense Loc: RH ADS MS5E              Or  ondansetron (ZOFRAN) injection 4 mg  Dose: 4 mg  Freq: EVERY 6 HOURS PRN Route: IV  PRN Reasons: nausea,vomiting  Start: 10/17/18 2154   Admin Instructions: This is Step 1 of nausea and vomiting management.  If nausea not resolved in 15 minutes, go to Step 2 prochlorperazine (COMPAZINE).  Irritant. For ordered IV doses 0.1-4 mg, give IV Push undiluted over 2-5 minutes.    Admin. Amount: 4 mg = 2 mL Conc: 4 mg/2 mL  Dispense Loc: RH ADS MS5E  Infused Over: 2-5 Minutes  Volume: 2 mL               oxyCODONE IR (ROXICODONE) tablet 5 mg  Dose: 5 mg  Freq: EVERY 3 HOURS PRN Route: PO  PRN Reason: other  PRN Comment: pain control or improvement in physical function. Hold dose for analgesic side effects.  Start: 10/17/18 3951   Admin Instructions: Start  with the lowest dose.  May adjust dose by 5 mg every 3 hours as needed. Notify provider to assess for uncontrolled pain or analgesic side effects. Hold while on PCA or with regular IV opioid dosing.    Admin. Amount: 1 tablet (1 × 5 mg tablet)  Last Admin: 10/21/18 2108  Dispense Loc:  ADS MS5E       2111 (5 mg)-Given        0007 (5 mg)-Given       2108 (5 mg)-Given              PARoxetine (PAXIL) 5 mg, PARoxetine (PAXIL) 10 mg  Dose: 15 mg  Freq: HOLD Route: PO  PRN Comment: See Admin Instructions  Start: 10/17/18 2345   Admin Instructions: ON HOLD until course of Fluconazole is completed. Frequency before Hold: at bedtime.    Admin. Amount: 1 half-tab (1 × 5 mg half-tab) + 1 tablet (1 × 10 mg tablet)  Dispense Loc:  Main Pharmacy   Mixture Administration Information:   Medication Type Amount   PARoxetine 5 mg TABS Medications 5 mg   PARoxetine 10 MG TABS Medications 10 mg                       sodium chloride (PF) 0.9% PF flush 3 mL  Dose: 3 mL  Freq: EVERY 8 HOURS Route: IK  Start: 10/17/18 2200   Admin Instructions: And Q1H PRN, to lock peripheral IV dormant line.    Admin. Amount: 3 mL  Last Admin: 10/24/18 0853  Dispense Loc: [facility] Stock  Volume: 4 mL     (0557)-Not Given       (1422)-Not Given       2156 (3 mL)-Given        0728 (3 mL)-Given       1417 (3 mL)-Given       (2108)-Not Given        (0653)-Not Given [C]       1602 (3 mL)-Given               (0819)-Not Given       (1625)-Not Given        (0001)-Not Given       0723 (3 mL)-Given       1600 (3 mL)-Given       2101 (3 mL)-Given               0755 (3 mL)-Given       1551 (3 mL)-Given        0015 (3 mL)-Given       0853 (3 mL)-Given       [ ] 1600           sodium chloride (PF) 0.9% PF flush 3 mL  Dose: 3 mL  Freq: EVERY 1 HOUR PRN Route: IK  PRN Reason: line flush  PRN Comment: for peripheral IV flush post IV meds  Start: 10/17/18 2154   Admin. Amount: 3 mL  Last Admin: 10/22/18 1245  Dispense Loc: [facility] hospital Floor Stock  Volume: 4 mL         1245 (3  mL)-Given            Discontinued Medications  Medications 10/18/18 10/19/18 10/20/18 10/21/18 10/22/18 10/23/18 10/24/18         Dose: 400 mg  Freq: EVERY 24 HOURS Route: IV  Indications of Use: CANDIDEMIA  Last Dose: 400 mg (10/19/18 1926)  Start: 10/18/18 1800   End: 10/22/18 1453   Admin. Amount: 400 mg = 200 mL Conc: 2 mg/mL  Last Admin: 10/21/18 1719  Dispense Loc:  Main Pharmacy  Infused Over: 60 Minutes  Volume: 200 mL     1757 (400 mg)-New Bag        1926 (400 mg)-New Bag        1847 (400 mg)-New Bag        1719 (400 mg)-New Bag        1453-Med Discontinued           Dose: 100 mg  Freq: DAILY Route: PO  Indications of Use: CANDIDIASIS  Start: 10/22/18 1500   End: 10/22/18 1458   Admin. Amount: 2 tablet (2 × 50 mg tablet)  Dispense Loc:  ADS MS5E         1458-Med Discontinued           Dose: 3.375 g  Freq: EVERY 6 HOURS Route: IV  Indications of Use: URINARY TRACT INFECTION  Last Dose: 3.375 g (10/22/18 1245)  Start: 10/18/18 0200   End: 10/22/18 1451   Admin. Amount: 3.375 g = 50 mL Conc: 3.375 g/50 mL  Last Admin: 10/22/18 1245  Dispense Loc:  Main Pharmacy  Infused Over: 30 Minutes  Volume: 50 mL     0225 (3.375 g)-New Bag       0806 (3.375 g)-New Bag       1429 (3.375 g)-New Bag       1949 (3.375 g)-New Bag        0155 (3.375 g)-New Bag       0842 (3.375 g)-New Bag       1417 (3.375 g)-New Bag       2107 (3.375 g)-New Bag        0138 (3.375 g)-New Bag       1009 (3.375 g)-New Bag       1602 (3.375 g)-New Bag       2258 (3.375 g)-New Bag        0418 (3.375 g)-New Bag       1017 (3.375 g)-New Bag       1821 (3.375 g)-New Bag       2352 (3.375 g)-New Bag        0640 (3.375 g)-New Bag       1245 (3.375 g)-New Bag       1451-Med Discontinued

## 2018-10-17 NOTE — ED NOTES
3:24 PM Holding orders placed for next shift.    Abrupt onset of flank pain.  Has a stent in place per Dr Lilly.    Briefly visited with patient and family at the bedside.    Patient comes from [facility].     Sia Soto MD  10/17/18 9782

## 2018-10-17 NOTE — IP AVS SNAPSHOT
"    [place] [address] MEDICAL SURGICAL: [phone]                                              INTERAGENCY TRANSFER FORM - LAB / IMAGING / EKG / EMG RESULTS   10/17/2018                    Hospital Admission Date: 10/17/2018  JOSELIN VAN   : 10/19/1924  Sex: Male        Attending Provider: Virginie Ferris MD     Allergies:  Ceftriaxone, Bactrim, Zithromax [Azithromycin], Levaquin, Macrodantin [Nitrofuran Derivatives]    Infection:  None   Service:  GENERAL MEDI    Ht:  1.727 m (5' 8\")   Wt:  70.1 kg (154 lb 9.6 oz)   Admission Wt:  72.3 kg (159 lb 6.4 oz)    BMI:  23.51 kg/m 2   BSA:  1.83 m 2            Patient PCP Information     Provider PCP Type    Porfirio Terrell MD, MD General         Lab Results - 3 Days      Creatinine [[id number]]  Resulted: 10/24/18 0749, Result status: Final result    Ordering provider: Kanu Anderson [facility]  10/24/18 0000 Resulting lab: [facility]    Specimen Information    Type Source Collected On   Blood  10/24/18 0651          Components       Value Reference Range Flag Lab   Creatinine 1.02 0.66 - 1.25 mg/dL  FrRd   GFR Estimate 68 >60 mL[facility]1.7m2  [facility]   Comment:  Non  GFR Calc   GFR Estimate If Black 82 >60 mL/min/1.7m2  FrRd   Comment:   GFR Calc            Creatinine [[id number]]  Resulted: 10/23/18 0834, Result status: Final result    Ordering provider: Kanu Anderson [facility]  10/23/18 0000 Resulting lab: [facility]    Specimen Information    Type Source Collected On   Blood  10/23/18 0811          Components       Value Reference Range Flag Lab   Creatinine 0.94 0.66 - 1.25 mg/dL  FrRd   GFR Estimate 75 >60 mL/min/1.7m2  FrRd   Comment:  Non  GFR Calc   GFR Estimate If Black >90 >60 mL/min/1.7m2  FrRdHs   Comment:   GFR Calc            Blood culture [[id number]]  Resulted: 10/23/18 0338, Result status: Final result    Ordering provider: Deshawn Nice MD  " 10/17/18 1652 Resulting lab: [facility]    Specimen Information    Type Source Collected On   Blood  10/17/18 1609   Comment:  Left Arm          Components       Value Reference Range Flag Lab   Specimen Description Blood Left Arm      Special Requests Aerobic and anaerobic bottles received   75   Culture Micro No growth   75            Blood culture [[id number]]  Resulted: 10/23/18 0338, Result status: Final result    Ordering provider: Deshawn Nice MD  10/17/18 1653 Resulting lab: [facility]    Specimen Information    Type Source Collected On   Blood  10/17/18 1828   Comment:  Right Arm          Components       Value Reference Range Flag Lab   Specimen Description Blood Right Arm      Special Requests Aerobic and anaerobic bottles received   75   Culture Micro No growth   75            Urine Culture Aerobic Bacterial [[id number]]  Resulted: 10/22/18 2051, Result status: Final result    Ordering provider: Mu Barron MD  10/21/18 1118 Resulting lab: [facility]    Specimen Information    Type Source Collected On   Catheterized Urine Urine catheter 10/21/18 1225          Components       Value Reference Range Flag Lab   Specimen Description Catheterized Urine      Special Requests Specimen received in preservative   75   Culture Micro No growth   225            Basic metabolic panel [[id number]]  Resulted: 10/22/18 0733, Result status: Final result    Ordering provider: Mu Barron MD  10/22/18 0000 Resulting lab: [facility]    Specimen Information    Type Source Collected On   Blood  10/22/18 0644          Components       Value Reference Range Flag Lab   Sodium 140 133 - 144 mmol/L  FrRdHs   Potassium 3.7 3.4 - 5.3 mmol/L  FrRdHs   Chloride 109 94 - 109 mmol/L  FrRdHs   Carbon Dioxide 26 20 - 32 mmol/L  FrRdHs   Anion Gap 5 3 - 14 mmol/L  FrRdHs   Glucose 82 70 - 99 mg/dL  FrRdHs   Urea  Nitrogen 9 7 - 30 mg/dL  FrRdHs   Creatinine 0.98 0.66 - 1.25 mg/dL  FrRdHs   GFR Estimate 71 >60 mL/min/1.7m2  FrRdHs   Comment:  Non  GFR Calc   GFR Estimate If Black 86 >60 mL/min/1.7m2  FrRdHs   Comment:  African American GFR Calc   Calcium 8.7 8.5 - 10.1 mg/dL  FrRdHs            CBC with platelets [[id number]] (Abnormal)  Resulted: 10/21/18 1157, Result status: Final result    Ordering provider: Mu Barron MD  10/21/18 1119 Resulting lab: [facility]    Specimen Information    Type Source Collected On   Blood  10/21/18 1134          Components       Value Reference Range Flag Lab   WBC 6.7 4.0 - 11.0 10e9/L  FrRdHs   RBC Count 3.91 4.4 - 5.9 10e12/L L FrRdHs   Hemoglobin 11.4 13.3 - 17.7 g/dL L FrRdHs   Hematocrit 36.0 40.0 - 53.0 % L FrRdHs   MCV 92 78 - 100 fl  FrRdHs   MCH 29.2 26.5 - 33.0 pg  FrRdHs   MCHC 31.7 31.5 - 36.5 g/dL  FrRdHs   RDW 17.5 10.0 - 15.0 % H FrRdHs   Platelet Count 365 150 - 450 10e9/L  FrRdHs            Creatinine [[id number]]  Resulted: 10/21/18 0744, Result status: Final result    Ordering provider: Kanu Anderson RPH  10/21/18 0000 Resulting lab: [facility]    Specimen Information    Type Source Collected On   Blood  10/21/18 0710          Components       Value Reference Range Flag Lab   Creatinine 0.97 0.66 - 1.25 mg/dL  FrRdHs   GFR Estimate 72 >60 mL/min/1.7m2  FrRdHs   Comment:  Non  GFR Calc   GFR Estimate If Black 88 >60 mL/min/1.7m2  FrRdHs   Comment:   GFR Calc            Testing Performed By     Lab - Abbreviation Name Director Address Valid Date Range    12 - FrRdHs [facility] Unknown [address] [facility] [address] 05/08/15 1057 - Present    75 - Unknown [facility] Unknown [address] [facility] [address] 01/15/15 1019 - Present    225 - Unknown [facility] Unknown [address] [facility]  [address] 12/19/14 0954 - Present            Unresulted Labs     None      Encounter-Level Documents:     There are no encounter-level documents.      Order-Level Documents:     There are no order-level documents.

## 2018-10-17 NOTE — IP AVS SNAPSHOT
` ` Patient Information     Patient Name Sex     Edison Boss ([id number]) Male 10/19/1924       Room Bed    [address] [place] [address]      Patient Demographics     Address Phone E-mail Address    [address] [address]   [place] [address] [phone] (Home) *Preferred*  NONE (Work)  [phone] (Mobile) [email or web address]      Patient Ethnicity & Race     Ethnic Group Patient Race    American White      Emergency Contact(s)     Name Relation Home Work Mobile    Red Saldana Daughter [phone]    Bea Boss Daughter [phone]    Concepcion Navarrete Daughter [phone]      Documents on File        Status Date Received Description       Documents for the Patient    Insurance Card  ()      Face Sheet Received () 08     Insurance Card  () 03     Insurance Card  () 04     External Medication Information Consent Accepted () 06/10/09     Face Sheet Received () 09     External Medication Information Consent Accepted () 06/30/10     Patient ID Received () 11     Consent for Services - Hospital/Clinic Received () 11/02/10     Privacy Notice - [place] Received 11/30/10     Other Received 03/10/11 BC COB    Consent for Services - Hospital/Clinic Received () 11     External Medication Information Consent Accepted () 11     Insurance Card Received () 11     CMS IM for Patient Signature Received 18     Consent for Services - Hospital/Clinic Received () 12     Insurance Card  () 05/15/12     Patient ID Received () 12     Consent for Services - Hospital/Clinic Received () 12     Business/Insurance/Care Coordination/Health Form - Patient.1 Received 12     Business/Insurance/Care Coordination/Health Form - Patient.1       External Medication Information Consent Accepted  12     Consent for EHR Access Received 13 Copied from existing Consent for services - C/HOD collected on 2012    [facility] Specified Other       HIM CHRISTIAN Authorization  13 RAC    HIM CHRISTIAN Authorization  13 [facility]    Consent for Services - Hospital/Clinic Received () 13     Insurance Card Received () 16 Medicare Part A and B/ BLUE [place] AND [facility]    Insurance Card  ()      Consent for Services - Hospital/Clinic Received () 14     Consent for Services - Hospital/Clinic       Patient ID Received () 16     Consent for Services - Hospital/Clinic Received () 07/14/15     Consent for Services - Hospital/Clinic Received () 07/28/15     Insurance Card Received () 08/11/15 [facility]     Insurance Card  ()  Medicare A&B    Consent for Services/Privacy Notice - Hospital/Clinic Received () 16     HIM CHRISTIAN Authorization - File Only Received 02/10/16 Email    Consent to Communicate Received 02/10/16 Auth to Discuss PHI    HIM CHRISTIAN Authorization - File Only Received 02/10/16 Records to Red Saldana - if requested.    Physical Therapy Certification Received 02/17/16 2/10/16-3/9/16    Consent for Services/Privacy Notice - Hospital/Clinic Received ()      Consent for Services/Privacy Notice - Hospital/Clinic Received () 16 [facility]    Business/Insurance/Care Coordination/Health Form - Patient    [facility] Pre-Visit Questionnaire    Insurance Card Received 10/04/16 MEDICARE     Insurance Card Received 10/04/16 [facility] CHRISTIAN Authorization  10/18/16 [place] [facility]    [place] Received 18 [place]    Business/Insurance/Care Coordination/Health Form - Patient  17 ATTENDANCE AGREEMENT- [facility]    Business/Insurance/Care Coordination/Health Form - Patient  05/15/17 [facility]  PAROXETINE HCL APPROVAL 10/24/16 - 10/24/17    Consent for Services/Privacy Notice - Hospital/Clinic Received () 17     Care Everywhere Prospective Auth Received 10/21/17     Insurance Card Received 17 BCBS     Consent for Services - Hospital and Clinic Received 18     HIE Auth Received 18     Insurance Card Received 18 MEDICARE    Insurance Card Received 18 BCBS    Advance Directives and Living Will Received 10/09/18 POLST 10/03/18    Advance Directives and Living Will Received 10/10/18 Health Care Directive 06/23/15    Advance Directives and Living Will Not Received  Validation of AD 06/23/15    Privacy Notice - [place] Received (Deleted) 11     Face Sheet  (Deleted)      Consent for Services - Hospital/Clinic  (Deleted)      Insurance Card  (Deleted) 05/15/12     Consent for Services - Hospital/Clinic  (Deleted)      Insurance Card Received (Deleted) 17     Patient Photo   Photo of Patient       Documents for the Encounter    CMS IM for Patient Signature Received 10/18/18       Admission Information     Attending Provider Admitting Provider Admission Type Admission Date/Time    Virginie Ferris MD Dorn, Karen T, MD Emergency 10/17/18  1509    Discharge Date Hospital Service Auth/Cert Status Service Area     General [facility] SERVICES    Unit Room/Bed Admission Status        5 MEDICAL SURGICAL 0501/0501- Admission (Confirmed)       Admission     Complaint    Sepsis (H)      Hospital Account     Name Acct ID Class Status Primary Coverage    Edison Boss [id number] Inpatient Open MEDICARE - MEDICARE            Guarantor Account (for Hospital Account #[id number])     Name Relation to Pt Service Area Active? Acct Type    Edison Boss  FCS Yes Personal/Family    Address Phone          [address] [place] [address] [phone](H)              Coverage Information (for Hospital Account  #[id number])     1. MEDICARE/MEDICARE     F/O Payor/Plan Precert #    MEDICARE/MEDICARE     Subscriber Subscriber #    Edison Boss [id number]    Address Phone    [employer]  [address]  [place] [address] [phone]          2. [place]     F/O Payor/Plan Precert #    [place]     Subscriber Subscriber #    Edison Boss [id number]    Address Phone    [address] [address]  [place], [place] [id number] [phone]

## 2018-10-17 NOTE — IP AVS SNAPSHOT
` `     [place] [address] MEDICAL SURGICAL: [phone]                 INTERAGENCY TRANSFER FORM - NOTES (H&P, Discharge Summary, Consults, Procedures, Therapies)   10/17/2018                    Hospital Admission Date: 10/17/2018  JOSELIN VAN   : 10/19/1924  Sex: Male        Patient PCP Information     Provider PCP Type    Porfirio Terrell MD, MD General         History & Physicals      H&P by Virginie Ferris MD at 10/17/2018  8:56 PM     Author:  Virginie Ferris MD Service:  Hospitalist Author Type:  Physician    Filed:  10/17/2018 11:46 PM Date of Service:  10/17/2018  8:56 PM Creation Time:  10/17/2018  8:35 PM    Status:  Addendum :  Virginie Ferris MD (Physician)         History and Physical     Joselin Van MRN# [id number]   YOB: 1924 Age: 89 year old      Date of Admission:  10/17/2018    Primary care provider: Porfirio Terrell          Assessment and Plan:   Joselin Van is a 89 year old male with a history of prior prostate cancer treated with brachy therapy and XRT, chronic indwelling tolliver catheter, paroxysmal AF (not anticoagulated) and SSS s/p ablation and dual chamber PPM, htn, and hlp.  He has a recent complicated PMH and this will be his 3rd admission in a month.     He was hospitalized -2018 for generalized weakness with falls and diagnosed with pna and a candidal UTI.  He was ultimately treated with levofloxacin and fluconazole and discharged home.  He returned and hospitalized from -10/4/18 ultimately diagnosed with candida fungemia with sepsis in setting of an obstructing left ureteral stone.  He required urgent cystoscopy with stent placement and stone pushed back so as not to obstruct.  He was treated initially with broad spectrum abx (pip-tazo/vanco) but when fungemia diagnosed placed on IV micafungin, and then discharged with 2 weeks of fluconazole-then to decrease from 400 mg to 100 mg until 7 days after stone  removed    He returns to the ER with 2 days of increasing flank pain, fever, weakness, confusion most consistent with recurrent complicated UTI, concern for infected stone    1.  Sepsis: MADELYN/fever to 102/leukocytosis-IVF for now, no indication for pressors, I think UTI again    2.  UTI:  UA inflammatory, has stent and stone in left ureter,[KD1.1] flank pain is actually on the right side-? Etiology-ureteral irritation in setting of acute infection??[KD1.2]  Will have urology see, I do wonder if the stone is infected (? Polymicrobial) and if it needs to be definitively dealt with in order to control the infection.  Cont broad spectrum abx with pip-tazo/vanco, and fluconazole (ER MD spoke with ID on call) and request both urologic and ID assistance.     3.  MADELYN:  ATN from sepsis although has a prerenal appearance based on BUN/Creat ratio[KD1.1],[KD1.2] IVF fluids overnight and recheck in am    4.  Htn:[KD1.1]  Cont home regimen of amlodipine 5 mg every day and metoprolol 25 mg bid    5.[KD1.3]  GI dd[KD1.1]-monitor fluid status    6.[KD1.3]  Depression[KD1.1]:  Cont paroxetine    7.[KD1.3]  PAF/SSS[KD1.1]-pacer in place, Not on AC[KD1.3]    PPX:[KD1.1]PCD[KD1.3]  Code: DNR/I  Dispo:[KD1.1]admit inpt[KD1.3]              Chief Complaint:   Flank pain, fever, weakness, confusion        History of Present Illness:   Edison Boss is a 89 year old male with a history of prior prostate cancer treated with brachy therapy and XRT, chronic indwelling tolliver catheter, paroxysmal AF (not anticoagulated) and SSS s/p ablation and dual chamber PPM, htn, and hlp.  He has a recent complicated PMH and this will be his 3rd admission in a month.     He was hospitalized 9/16-9/19/2018 for generalized weakness with falls and diagnosed with pna and a candidal UTI.  He was ultimately treated with levofloxacin and fluconazole and discharged home.  He returned and hospitalized from 9/24-10/4/18 ultimately diagnosed with candida  fungemia with sepsis in setting of an obstructing left ureteral stone.  He required urgent cystoscopy with stent placement and stone pushed back so as not to obstruct.  He was treated initially with broad spectrum abx (pip-tazo/vanco) but when fungemia diagnosed placed on IV micafungin, and then discharged with 2 weeks of fluconazole-then to decrease from 400 mg to 100 mg until 7 days after stone removed[KD1.1].    He discharged to TCU, and was doing quite well up until one day ago.  Daughters noticed that he seemed a bit more confused and out of it but he denied any pain or any kind of localizing symptoms.  And today he began complaining of right-sided flank pain, was clearly getting weaker needing assist of 2 people instead of 1, he had associated nausea and poor p.o. intake but no diarrhea or vomiting.  His daughter spent like he was continuing to get worse ultimately opting to cough EMS for ambulance to bring him into the emergency room for further evaluation.  He denies nasal congestion earache or sore throat, no neck stiffness no abdominal pain no diarrhea, no cough or shortness of breath, no skin rashes.  He had a rather swollen left hand a few days ago while in TCU that was diagnosed as gout and treated accordingly with significant improvement.  His chronic indwelling Christianson catheter once in the sense of frequency or irritation with urination.  His biggest issue is this right-sided back pain/flank pain    In the ER vital signs are notable for mild hypertension and a fever to 101.9, labs show acute kidney injury with BUN and creatinine of 32 and 1.4 a significant increase in his creatinine from yesterday, a climbing white count over the past 3 days currently at 16.6 with a neutrophilia, chest x-ray was completed and actually looks like he is clearing some of the fluid from his last hospitalization, urinalysis is grossly positive, and CT of the abdomen and pelvis shows no renal injury, no hydronephrosis on the  right, right sided inguinal hernia without evidence of strangulation, left ureteral stent in place without hydronephrosis and a left ureteral stone in similar position.[KD1.3]      The history is obtained in discussion with the ER provider Dr Nice           Past Medical History:[KD1.1]     Past Medical History:   Diagnosis Date     Chronic indwelling Christianson catheter      Chronic infection     UTI     Chronic pain     lower pain, perineum     Esophageal reflux      Fungemia 09/2018    candida-from UTI and infected left ureteral stone     Hyperlipidaemia      Hypertension      Malignant neoplasm of prostate (H) 8/14/2002    Brachytherapy, then external radiation. chronic indwelling catheter     Mild persistent asthma     with URI     Paroxysmal A-fib (H)     not on AC     Prostate cancer (H)     treated with brachytherapy and xrt     Sinus node dysfunction (H)     sp DDD PM     Sleep apnea     ?   snores     Unspecified cerebral artery occlusion with cerebral infarction      Ureterolithiasis 09/2018    with hydronephrosis, sepsis 2/2 candidal UTI[KD1.4]             Past Surgical History:[KD1.1]     Past Surgical History:   Procedure Laterality Date     C NONSPECIFIC PROCEDURE  1980's    Kidney stone surgery     C NONSPECIFIC PROCEDURE  1985, 5/2005    TURP x 2     C NONSPECIFIC PROCEDURE  1/2002    Brachytherapy     C NONSPECIFIC PROCEDURE  ~1999    Left rotator cuff repair     C NONSPECIFIC PROCEDURE      Bilateral cataract surgery     C NONSPECIFIC PROCEDURE      EGD/dilation twice     CYSTOSCOPY       CYSTOSCOPY, INJECT COLLAGEN, COMBINED  6/6/2012    Procedure:COMBINED CYSTOSCOPY, INJECT BULKING AGENT; VIDEO CYSTOSCOPY WITH BOTOX INJECTIONS  ; Surgeon:BRIAN ADAN; Location: OR     CYSTOSCOPY, INJECT COLLAGEN, COMBINED  3/22/2013    Procedure: COMBINED CYSTOSCOPY, INJECT BULKING AGENT;  VIDEO CYSTOSCOPY, BOTOX INJECTION;  Surgeon: Brian Adan MD;  Location:  OR     CYSTOSCOPY, INJECT COLLAGEN,  COMBINED  2014    Procedure: COMBINED CYSTOSCOPY, INJECT BULKING AGENT;  Surgeon: Michael Lilly MD;  Location: [facility] OR     CYSTOSCOPY, INJECT COLLAGEN, COMBINED N/A 2015    Procedure: COMBINED CYSTOSCOPY, INJECT BULKING AGENT;  Surgeon: Michael Lilly MD;  Location: [facility] OR     CYSTOSCOPY, INJECT COLLAGEN, COMBINED N/A 2016    Procedure: COMBINED CYSTOSCOPY, INJECT BULKING AGENT;  Surgeon: Michael Lilly MD;  Location: [facility] OR     CYSTOSCOPY, RETROGRADES, INSERT STENT URETER(S), COMBINED Left 2018    Procedure: COMBINED CYSTOSCOPY, RETROGRADES, INSERT STENT URETER(S);  1. Cystourethroscopy  2. Left retrograde pyelography with interpretation of intraoperative fluoroscopic imaging  3. Left ureteral stent placement;  Surgeon: Froylan Richards MD;  Location: [facility] OR     HC REMOVE TONSILS/ADENOIDS,<13 Y/O  ~    T & A <12y.o.     IMPLANT PACEMAKER       PENIS SURGERY       PROSTATE SURGERY[KD1.4]               Social History:[KD1.1]     Social History   Substance Use Topics     Smoking status: Former Smoker     Packs/day: 1.00     Years: 40.00     Smokeless tobacco: Never Used      Comment: QUIT      Alcohol use No[KD1.4]   Had been living independently up until last hospitalization at which time he did his own cath cares, currently [facility].  Was up with A of 1, but declined again to needing at least 2 people to help.  Code:  DNR/I          Family History:[KD1.1]     Family History   Problem Relation Age of Onset     Family History Negative Father       age 91     HEART DISEASE Mother      pacemaker     Family History Negative Mother       in her sleep 89     Cancer Brother      oldest brother - lung cancer,  age 74     Cancer Brother      3rd brother - lymphoma,  age 76     Cancer Brother      2nd brother (Carrington)- prostate cancer, born 1920.      Cerebrovascular Disease Brother      Brother (Carrington), born in [KD1.4]             Allergies:[KD1.1]     Allergies   Allergen Reactions     Ceftriaxone Itching     Bactrim Hives     Zithromax [Azithromycin]      Levaquin Rash     Oral only, can tolerate IV     Macrodantin [Nitrofuran Derivatives] Rash     Took recently with no problems[KD1.4]             Medications:[KD1.1]     Prior to Admission medications    Medication Sig Last Dose Taking? Auth Provider   ACETAMINOPHEN PO Take 1,000 mg by mouth 3 times daily as needed for pain  Yes Reported, Patient   albuterol (PROVENTIL HFA: VENTOLIN HFA) 108 (90 BASE) MCG/ACT inhaler Inhale 2 puffs into the lungs every 6 hours as needed for shortness of breath / dyspnea.  Yes Porfirio Terrell MD   amLODIPine (NORVASC) 5 MG tablet TAKE 1 TABLET(5 MG) BY MOUTH DAILY 10/17/2018 at Unknown time Yes Porfirio Terrell MD   clobetasol (TEMOVATE) 0.05 % ointment Apply topically daily as needed   Yes Lewis Nuñez   COLCHICINE PO Give 1.2 mg by mouth in the afternoon for gout until 10/16/2018 23:59 AND Give 0.6 mg by mouth in the afternoon for gout until 10/16/2018 23:59 One hour after1.2 mg dose AND Give 0.6 mg by mouth in the morning for gout for 6 Days  Yes Reported, Patient   Colloidal Oatmeal (AVEENO ECZEMA THERAPY) 1 % CREA Externally apply topically once a week And as needed for dry skin  Yes Reported, Patient   fluconazole (DIFLUCAN) 100 MG tablet Take 1 tablet (100 mg) by mouth daily Start taking 100 mg every day when you have completed the 2 weeks of 400 mg per day, you need to take 100 mg per day through stone removal procedure and for about a week afterwards new at not strt yet Yes Virginie Ferris MD   fluconazole (DIFLUCAN) 200 MG tablet Take 2 tablets (400 mg) by mouth daily for 14 days 10/17/2018 at Unknown time Yes Virginie Ferris MD   lidocaine (LMX4) 4 % CREA 4% topical cream Apply topically daily as needed  Yes Reported, Patient   metoprolol tartrate (LOPRESSOR) 25 MG tablet Take 1 tablet (25 mg) by mouth 2 times daily 10/17/2018 at am Yes  Virginie Ferris MD   Multiple Vitamins-Minerals (PRESERVISION AREDS) CAPS Take 1 capsule by mouth 2 times daily 10/17/2018 at am Yes Porfirio Terrell MD   omeprazole (PRILOSEC) 20 MG CR capsule Take 20 mg by mouth 2 times daily  10/17/2018 at am Yes Unknown, Entered By History   PARoxetine (PAXIL) 30 MG tablet Take 0.5 tablets (15 mg) by mouth At Bedtime Hold until fluconazole completed on hold  Virginie Ferris MD[KD1.5]                 Review of Systems:   A Comprehensive greater than 10 system review of systems was carried out.  Pertinent positives and negatives are noted above.  Otherwise negative for contributory information.           Physical Exam:[KD1.1]   Blood pressure 140/68, pulse 71, temperature 101.9  F (38.8  C), temperature source Rectal, resp. rate 18, SpO2 98 %.[KD1.6]  Exam:    General:  Pleasant nad looks stated age[KD1.1], looks uncomfortable and will hold his back with movement (right side)[KD1.3]  HEENT:  Head nc/at sclera clear PERRL O/P:  Moist mucus membranes no posterior pharyngeal erythema or exudate.  Neck is supple  Lungs: cta b nl effort   CV:  RRR no m/r/g no le edema  Abd:  S/nt/nd no r/g  Neuro:  Cn 2-12 grossly intact and strength is intact in b ue and le[KD1.1] but clearly weaker than on discharge[KD1.3]  Alert and oriented affect appropriate   Skin:  W/d no c/c               Data:          Lab Results   Component Value Date     10/17/2018    Lab Results   Component Value Date    CHLORIDE 102 10/17/2018    Lab Results   Component Value Date    BUN 32 10/17/2018      Lab Results   Component Value Date    POTASSIUM 4.5 10/17/2018    Lab Results   Component Value Date    CO2 28 10/17/2018    Lab Results   Component Value Date    CR 1.40 10/17/2018[KD1.1]        Lab Results   Component Value Date    NTBNPI 7974 (H) 09/24/2018     Lab Results   Component Value Date     (H) 10/17/2018     Lab Results   Component Value Date    AST 45 10/17/2018    ALT 36 10/17/2018    ALKPHOS  122 10/17/2018    BILITOTAL 0.9 10/17/2018    BILICONJ 0.0 2005[KD1.4]   Lactic Acid 0.9  Lipase 160    VB.46/37/48    UA:  Mod blood, large LE, > 182 wbc, 55 rbc, few bacteria, WBC clumps         Imaging:     CXR:  To my read-left pleural effusion almost resolved,no discrete infiltrate identified[KD1.1]          Recent Results (from the past 24 hour(s))   CT Abdomen Pelvis w Contrast    Narrative    CT ABDOMEN AND PELVIS WITH CONTRAST 10/17/2018 5:34 PM     HISTORY: History of ureteral stent, flank pain.     CONTRAST DOSE: 78mL Isovue-370    Radiation dose for this scan was reduced using automated exposure  control, adjustment of the mA and/or kV according to patient size, or  iterative reconstruction technique.    FINDINGS: Left ureteral stent is in place, new since 2018. There  is no hydronephrosis. Proximal ureteral stone adjacent to the stent is  noted, probably unchanged in position from 2018. Bilateral renal  cysts and left renal stones are again noted. Bowel-containing right  inguinal hernia is again noted without evidence of incarceration or  bowel obstruction. No free peritoneal fluid or air. The remainder of  the CT also appears unchanged. Christianson catheter is in place. Left  adrenal 1.9 cm nodule is noted.      Impression    IMPRESSION: Left ureteral stent in place without left hydronephrosis.  Proximal left ureteral stone is probably unchanged in position from  2018. This scan is otherwise unchanged in appearance compared to  2018 and 2018.   XR Chest 2 Views    Narrative    XR CHEST 2 VW   10/17/2018 5:38 PM     HISTORY: Altered mental status.    COMPARISON: Film dated 2018    FINDINGS: Left cardiac device is in place. Cardiac silhouette is at  the upper limits of normal. There is been an improvement in the left  pleural effusion when compared to 2018. No significant pleural  fluid is seen on today's film. Mild right basilar opacity consistent  with a small  area of infiltrate or atelectasis.  There is also a small  area of infiltrate or fibrosis in the right midlung zone. The  pulmonary vasculature is normal.  The bones and soft tissues are  unremarkable.      Impression    IMPRESSION: Improved infiltrate and pleural effusions.        ARUN RODRIGUEZ MD[KD1.7]          Revision History        User Key Date/Time User Provider Type Action    > KD1.2 10/17/2018 11:46 PM Virginie Ferris MD Physician Addend     KD1.5 10/17/2018 11:34 PM Virginie Ferris MD Physician Sign     KD1.3 10/17/2018 11:08 PM Virginie Ferris MD Physician      KD1.7 10/17/2018  8:53 PM Virginie Ferris MD Physician      KD1.4 10/17/2018  8:52 PM Virginie Ferris MD Physician      KD1.6 10/17/2018  8:36 PM Virginie Ferris MD Physician      KD1.1 10/17/2018  8:35 PM Virginie Ferris MD Physician                   Discharge Summaries     No notes of this type exist for this encounter.         Consult Notes      Consults signed by Kannan Lobo MD at 10/18/2018  5:21 PM      Author:  Kannan Lobo MD Service:  Infectious Disease Author Type:  Physician    Filed:  10/18/2018  5:21 PM Date of Service:  10/18/2018  4:27 PM Creation Time:  10/18/2018  5:17 PM    Status:  Signed :  Kannan Lobo MD (Physician)         Consult Date:  10/18/2018      INFECTIOUS DISEASE CONSULTATION       LOCATION:  Room 501, [facility]      REFERRING PHYSICIAN:  Virginie Ferris MD      IMPRESSIONS:   1.  A 89-year-old male, known to me from recent hospitalization, currently admitted with recurrent low-grade sepsis, right flank discomfort, presumed pyelo/recurrent kidney and obstruction-related issues, urine is grossly abnormal, although urine culture so far negative, as are blood cultures.   2.  Recent admission with major sepsis, appeared life-threatened, ended up with the finding of candidemia due to right-sided obstruction, had a stent in place with obstruction relieved and antifungal treatment, clinically improved,  back to near baseline as recently as a few days ago.   3.  Prior infiltrates and possible aspiration, has some mild hypoxia currently, but never really found major aspiration and unlikely pneumonia currently.   4.  Prior cerebrovascular infarction, but cognitive function relatively intact long-term, as is neurologic status.   5.  Ongoing abdominal hernia that has required reduction on occasion.   6.  Gallstones.   7.  LEVAQUIN, ZITHROMAX, BACTRIM AND CEFTRIAXONE ALL LISTED AS ALLERGIES, SOME ARE JUST GI INTOLERANCE.      RECOMMENDATIONS:   1.  Agree with broad-spectrum coverage currently and back to IV fluconazole for now.   2.  Discussed with Dr. Lilly, plan is to remove the stent and eventual plans with the stones unclear exactly what is going on currently, but given he is having flank pain in that same area, have to assume further infection-related issues.   3.  Await the current cultures and adjust.  Of note, his prior candidemia included no Candida growing in the urine at that time, was probably obstructed behind it and thus did not grow in the urine, but it did grow from the kidney when the stent was placed.  Currently, his mentation is somewhat improved and he is somewhat better than at admission.  No other focal or localizing symptoms, including no new diarrhea or other pain sites.      PAST MEDICAL HISTORY:  He has a permanent pacemaker in place.  This raises some concern given the candidemia, but no evidence that it is infected either then or currently and current blood cultures are negative.  Has had an indwelling Christianson catheter for 10 years, has had some urine infection issues, but they have not been a prominent feature.  The current stones and obstruction as noted, prior prostate cancer.      ALLERGIES:  MULTIPLE LISTED ANTIBIOTIC ALLERGIES, MOSTLY INTOLERANCES, TOLERATES PENICILLINS DESPITE THE LISTED CEFTRIAXONE ALLERGY.      SOCIAL AND FAMILY HISTORY:  Extensive health care contact recently without  known resistant pathogens.  Lives in a senior living-type arrangement.      MEDICATIONS:  As listed.      REVIEW OF SYSTEMS:  Some flank pain still present.  His cognition is not quite back to baseline, but not nearly as confused as at admission.  No other focal symptoms.      PHYSICAL EXAMINATION:   GENERAL:  The patient appears his stated age, does not look as toxic as last episode, but back on oxygen.  Cognition seems relatively baseline.   HEENT:  No thrush or intraoral lesions.  Pupils are reactive.   NECK:  Supple and nontender, no lymphadenopathy or thyromegaly.   HEART:  Regular rhythm, no particular murmur.   LUNGS:  Clear bilaterally.   ABDOMEN:  Soft, nontender, no hepatosplenomegaly.  He does have some slight tenderness in the right abdomen and into the right flank area to percussion.   EXTREMITIES:  No major rash or skin lesions.     NEUROLOGIC:  No major neurologic abnormalities.      LABORATORY DATA:  Urine grossly abnormal, but urine culture so far negative.  Blood cultures so far negative.  White count had gone down to normal prior to his prior discharge, currently is at 16,000.  CT scan did not show new major obstruction, no abscess, maybe some slight stranding in the area.      Thank you very much for consultation.  I will follow the patient with you.         KANNAN SANTOS MD             D: 10/18/2018   T: 10/18/2018   MT: CODEY      Name:     JOSELIN VAN   MRN:      [id number]        Account:       [id number]   :      10/19/1924           Consult Date:  10/18/2018      Document: [id number]       cc: Porfirio Terrell MD   [SD1.1]   Revision History        User Key Date/Time User Provider Type Action    > SD1.1 10/18/2018  5:21 PM Kannan Santos MD Physician Sign            Consults signed by Michael Lilly MD at 10/18/2018  4:39 PM      Author:  Michael Lilly MD Service:  Urology Author Type:  Physician    Filed:  10/18/2018  4:39 PM Date of Service:  10/18/2018 12:31 PM Creation  Time:  10/18/2018 12:56 PM    Status:  Signed :  Brian Lilly MD (Physician)         Consult Date:  10/18/2018      UROLOGY CONSULTATION       The patient is a 89-year-old male who is in because of fever.  This is his third hospital admission for infection in the last 6 weeks.  He was treated for a bacterial infection and then developed a Candida sepsis.  He now is in with fever but has defervesced overnight on IV antibiotic and has stable vital signs.  He is alert and oriented.      He has been a patient of mine for several years and underwent radiation to the prostate for prostate cancer years ago.  Because of urinary incontinence, he has an indwelling Christianson that is changed monthly and he occasionally requires intravesical Botox because of severe bladder spasticity.  At his last admission, he was found to have a proximal left ureteral stone and underwent cystoscopy and stent placement.      OTHER PAST MEDICAL HISTORY:  Acute renal insufficiency, hypertension, GERD, depression, paroxysmal atrial fibrillation with pacer.  He is not anticoagulated.      On exam, the patient is alert and oriented.  His son and daughter are present.  His urine is clear.  His vital signs are stable.  Examination of the genitalia reveals an indwelling Christianson and enlargement of the right scrotum from a right inguinal hernia.  There is no evidence of cellulitis.      LABORATORY DATA:  This morning's electrolytes are normal, creatinine 1.06, normal serum calcium.  White blood cell count down to 10,200, hemoglobin 10.4, and platelets 225,000.  Urine and blood cultures pending.      ASSESSMENT:   1.  Recurrent urosepsis, clinically stable.     2.  Indirect right inguinal hernia.   3.  Left proximal ureteral calculus that is stented.      PLAN:   1.  Treatment of sepsis.   2.  Cystoscopy with exchange of double-J stent and left ESWL when urine is sterile.         BRIAN LILLY JR, MD             D: 10/18/2018   T: 10/18/2018    MT: CC      Name:     JOSELIN VAN   MRN:      [id number]        Account:       [id number]   :      10/19/1924           Consult Date:  10/18/2018      Document: [id number]       cc: Porfirio Lilly Jr, MD   [WK1.1]   Revision History        User Key Date/Time User Provider Type Action    > WK1.1 10/18/2018  4:39 PM Michael Lilly MD Physician Sign            Consults by Kannan Lobo MD at 10/18/2018  2:51 PM     Author:  Kannan Lobo MD Service:  Infectious Disease Author Type:  Physician    Filed:  10/18/2018  2:51 PM Date of Service:  10/18/2018  2:51 PM Creation Time:  10/18/2018  2:50 PM    Status:  Signed :  Kannan Lobo MD (Physician)         ID consult dictated IMP 1 89 yo male recent candidemia, still on tx R ureteral stone/obstruction as cause, now recurrent sepsis, abnormal urine, pain, imaging neg    REC agree broad coverage , await cxs[SD1.1]     Revision History        User Key Date/Time User Provider Type Action    > SD1.1 10/18/2018  2:51 PM Kannan Lobo MD Physician Sign                     Progress Notes - Physician (Notes from 10/21/18 through 10/24/18)      Progress Notes by Deng Lay MD at 10/23/2018  5:14 PM     Author:  Deng Lay MD Service:  Hospitalist Author Type:  Physician    Filed:  10/23/2018  5:36 PM Date of Service:  10/23/2018  5:14 PM Creation Time:  10/23/2018  5:14 PM    Status:  Signed :  Deng Lay MD (Physician)         [facility]    Hospitalist Progress Note      Assessment & Plan      Joselin Van is a 89 year old male with a history of prior prostate cancer treated with brachy therapy and XRT, chronic indwelling tolliver catheter, paroxysmal AF (not anticoagulated) and SSS s/p ablation and dual chamber PPM, htn, and hlp.  He has been hospitalized 3 times, now, in the last month.  He was hospitalized from 18 through 2018 and treated for pneumonia and a candidal  UTI with Levaquin and Fluconazole. He ultimately discharged home.  He returned and was hospitalized from 9/2418 through 10/4/18 with candida fungemia with sepsis in setting of an obstructing left ureteral stone.  He required urgent cystoscopy with stent placement and stone was pushed back so as not to obstruct the ureter.  He was treated initially with broad spectrum abx (pip-tazo/vanco) but when fungemia was diagnosed he was placed on IV micafungin.  He discharged to transitional care with 2 weeks of fluconazole 400 mg daily until stone extraction followed by 100 mg daily for 7 days after stone removed.  He returned to the ED  On 10/17/18 for evaluation of 2 days of increasing flank pain, fever, weakness, and confusion most consistent with recurrent complicated UTI, and concern for infected stone.  After admission he was seen by ID and Urology.  He was initially treated with Zosyn and Fluconazole.  Zosyn was ultimately changed to Cipro. ID is recommending Cipro 500 mg PO BID and Fluconazole 100 mg PO daily until 3 days after stone removed. I spoke with Urology this afternoon and it sounds like his Urologic procedure cannot be done until next Tuesday.  Dr. Lilly was going to speak with Edison and his family about this today.      Problem list:      1.    Acute sepsis secondary to complicated UTI: Patient is afebrile, symptoms minimally better.  Per ID, continue oral Cipro and fluconazole until 3 days after stone removed.  Infectious disease service input appreciated.  Urology to schedule repeat cystosocopy for stone removal.        2.  UTI: UCx on 10/17 grew klebsiella (quinolone susceptible).  UCx on 10/21 showed no growth.      3.  Left ureteral stone with stent in place.  See discussion above.        4.  MADELYN:  Suspect due to dehydration and ATN from UTI and sepsis.  Resolved.       5.  HTN:  Cont home regimen of amlodipine 5 mg every day and metoprolol 25 mg bid.      6.  Depression:  Continue Paroxetine as  prior to admission.      7.  PAF/SSS-pacer in place, Not on anticoagulation     8. SOB and hypoxia - suspect multifactorial etiology due to sepsis and fluid overload. Improved with lasix.  Monitor      DVT Prophylaxis: Pneumatic Compression Devices  Code Status: DNR/DNI  Expected discharge: 1-2 days, recommended to transitional care unit once safe disposition plan/ TCU bed available. SW following.     Deng Lay MD    Interval History   Feeling pretty well. No new problems.  Frustrated that procedure cannot be done quickly     -Data reviewed today: I reviewed all new labs and imaging results over the last 24 hours. I personally reviewed no images or EKG's today.    Physical Exam   Temp: 97  F (36.1  C) Temp src: Oral BP: 152/63 Pulse: 66 Heart Rate: 60 Resp: 18 SpO2: 97 % O2 Device: None (Room air)    Vitals:    10/21/18 0419 10/22/18 0644 10/23/18 0651   Weight: 74.4 kg (164 lb) 72.7 kg (160 lb 3.2 oz) 72.7 kg (160 lb 4.4 oz)     Vital Signs with Ranges  Temp:  [96.4  F (35.8  C)-97  F (36.1  C)] 97  F (36.1  C)  Pulse:  [66] 66  Heart Rate:  [60-61] 60  Resp:  [16-20] 18  BP: (152-165)/(63-72) 152/63  SpO2:  [95 %-97 %] 97 %  I/O last 3 completed shifts:  In: 200 [P.O.:200]  Out: 1375 [Urine:1375]    GENERAL:  Comfortable. Cooperative.  PSYCH: pleasant, oriented, No acute distress.  EYES: PERRLA, Normal conjunctiva.  HEART:  Regular rate and rhythm. No JVD. Pulses normal. No edema.  LUNGS:  Clear to auscultation, normal Respiratory effort.  ABDOMEN:  Soft, no hepatosplenomegaly, normal bowel sounds.  EXTREMETIES: No clubbing, cyanosis or ischemia  SKIN:  Dry to touch, No rash.       Medications       amLODIPine  5 mg Oral Daily     ciprofloxacin  500 mg Oral Q12H NAKIA     fluconazole  100 mg Oral Q24H     metoprolol tartrate  25 mg Oral BID     omeprazole  20 mg Oral BID AC     sodium chloride (PF)  3 mL Intracatheter Q8H       Data     Recent Labs  Lab 10/23/18  0811 10/22/18  0644 10/21/18  1134  10/21/18  0710  10/19/18  0713 10/18/18  0749 10/17/18  1609   WBC  --   --  6.7  --   --  7.3 10.2 16.6*   HGB  --   --  11.4*  --   --  10.7* 10.4* 11.6*   MCV  --   --  92  --   --  93 93 93   PLT  --   --  365  --   --  245 225 264   NA  --  140  --   --   --  139 138 137   POTASSIUM  --  3.7  --   --   --  4.1 4.1 4.5   CHLORIDE  --  109  --   --   --  106 107 102   CO2  --  26  --   --   --  27 26 28   BUN  --  9  --   --   --  15 20 32*   CR 0.94 0.98  --  0.97  < > 1.06 1.06 1.40*   ANIONGAP  --  5  --   --   --  6 5 7   ARYAN  --  8.7  --   --   --  8.2* 8.1* 9.0   GLC  --  82  --   --   --  83 99 111*   ALBUMIN  --   --   --   --   --   --   --  2.7*   PROTTOTAL  --   --   --   --   --   --   --  7.9   BILITOTAL  --   --   --   --   --   --   --  0.9   ALKPHOS  --   --   --   --   --   --   --  122   ALT  --   --   --   --   --   --   --  36   AST  --   --   --   --   --   --   --  45   LIPASE  --   --   --   --   --   --   --  160   < > = values in this interval not displayed.    No results found for this or any previous visit (from the past 24 hour(s)).[DS1.1]       Revision History        User Key Date/Time User Provider Type Action    > DS1.1 10/23/2018  5:36 PM Deng Lay MD Physician Sign            Progress Notes by Mu Barron MD at 10/22/2018  3:10 PM     Author:  Mu Barron MD Service:  Hospitalist Author Type:  Physician    Filed:  10/23/2018  5:03 PM Date of Service:  10/22/2018  3:10 PM Creation Time:  10/22/2018  3:11 PM    Status:  Addendum :  Mu Barron MD (Physician)                        [facility]  Hospitalist Progress Note  Name: Edison Boss    MRN: [id number]  YOB: 1924    Age: 89 year old  Date of admission: 10/17/2018  Primary care provider: Porfirio Terrell      Reason for Stay (Diagnosis):   Acute sepsis secondary to complicated urinary tract infection         Assessment and Plan:      Summary of Stay:   Edison Boss is a 89 year old male with a history of prior prostate cancer treated with brachy therapy and XRT, chronic indwelling tolliver catheter, paroxysmal AF (not anticoagulated) and SSS s/p ablation and dual chamber PPM, htn, and hlp.  He has a recent complicated PMH and this will be his 3rd admission within a month.      He was hospitalized 9/16-9/19/2018 for generalized weakness with falls and diagnosed with pna and a candidal UTI.  He was ultimately treated with levofloxacin and fluconazole and discharged home.  He returned and hospitalized from 9/24-10/4/18 ultimately diagnosed with candida fungemia with sepsis in setting of an obstructing left ureteral stone.  He required urgent cystoscopy with stent placement and stone pushed back so as not to obstruct.  He was treated initially with broad spectrum abx (pip-tazo/vanco) but when fungemia diagnosed placed on IV micafungin, and then discharged with 2 weeks of fluconazole-then to decrease from 400 mg to 100 mg until 7 days after stone removed     He returns to the ER with 2 days of increasing flank pain, fever, weakness, confusion most consistent with recurrent complicated UTI, concern for infected stone    Hospital course     Patient was seen by ID and urology for complex tract infection treatment.  Patient has been on IV antibiotic including antifungal previous fungal candidemia and Zosyn as well due to multiple allergies.  Patient remained afebrile and his condition was significantly better.  Repeat urine culture was negative so far and I spoke with Dr. Lobo of infectious disease follows Dr. Lora of urology who is covering for Dr. Lilly.  The plan is to discuss with Dr. Lilly tomorrow on October 23 regarding the date for urologic procedure.  IV antibiotic was changed to oral ciprofloxacin 500 mg p.o. twice a day as well as flucan 100 mg p.o. daily through urology procedure date and then for 3 more days after that.  Family was updated  regarding his care plan.  After final recommendation by urology is obtained patient can be discharged to TCU.         1.    Acute sepsis secondary to complicated UTI: Patient is afebrile, symptoms minimally better.  IV antibiotic to be changed to oral Cipro and fluconazole oral.  Infectious disease service input appreciated.  Urology to follow patient tomorrow       2.  UTI:   Do note that stent and stone in left ureter, flank pain is actually on the right side-? Etiology-ureteral irritation in setting of acute infection??   Urology and infectious disease input and consultation appreciated.  Plan on stent exchange once urine is sterile.  Further care planning is deferred to urology service.  I spoke with Dr. Lilly's partner Dr. Lora and patient will be seen by Dr. Lilly tomorrow.  Family members updated[MA1.1]    Patient has indwelling Christianson catheter for the last 26 days but urinary tract infection association with Christianson catheter placement is not entirely clear as patient has urolithiasis and stent in place.[MA1.2]     3.  MADELYN:   Suspect prerenal based on BUN/Creat ratio but could be a component of ATN also with sepsis.  Creatinine improved with IV fluid challenge.  Continue to monitor for now.      4.  Htn:  Cont home regimen of amlodipine 5 mg every day and metoprolol 25 mg bid     5.  Depression:  Cont paroxetine     6.  PAF/SSS-pacer in place, Not on anticoagulation    7.SOB and hypoxia - suspect multifactorial etiology - sepsis , fluid overload   -- improved with lasix  , monitor       DVT Prophylaxis: Pneumatic Compression Devices  Code Status: DNR / DNI  Discharge Dispo: Back to TCU when able  Estimated Disch Date / # of Days until Disch: Likely tissue in the next 1 or 2 days after seen by urology service and in consultation with family regarding care plan.  Daughter at the bedside and updated on plan of cares.  Time spent: Greater than 35 minutes[MA1.1]    Addendum   I spoke with Daughter Red who is RN  " and updated her care plan.[MA1.3]        Interval History (Subjective):      Patient is seen and examined by me today and medical record reviewed.Overnight events noted and care discussed with nursing staff.  Patient is feeling well this morning.  History not reliable as patient is confused at times.  Patient's family by the name of Bea who is a daughter in the room and asked several questions regarding patient care.  Requested urology service to contact them and I did call urology service and finally contacted Dr. Lora who stated that Dr. Lilly will be seeing patient tomorrow.         Physical Exam:      Vital signs:[MA1.1]  Temp: 97.5  F (36.4  C) Temp src: Oral BP: 159/72   Heart Rate: 61 Resp: 20 SpO2: 94 % O2 Device: None (Room air) Oxygen Delivery: 1 LPM   Weight: 72.7 kg (160 lb 3.2 oz)  Estimated body mass index is 22.99 kg/(m^2) as calculated from the following:    Height as of an earlier encounter on 10/17/18: 1.778 m (5' 10\").    Weight as of this encounter: 72.7 kg (160 lb 3.2 oz).    I/O last 3 completed shifts:  In: 1058 [P.O.:520; I.V.:538]  Out: 2150 [Urine:2150]  Vitals:    10/18/18 0558 10/19/18 0537 10/20/18 0609 10/21/18 0419   Weight: 72.3 kg (159 lb 6.4 oz) 74.2 kg (163 lb 9.6 oz) 72.6 kg (160 lb) 74.4 kg (164 lb)    10/22/18 0644   Weight: 72.7 kg (160 lb 3.2 oz)[MA1.4]       Constitutional: Alert , no   distress   Respiratory: Nl work of breathing. Clear to auscultation bilaterally, no crackles or wheezing   Cardiovascular: Regular rate and rhythm, normal S1 and S2, and no murmur noted   Abdomen: Normal bowel sounds, soft, non-distended, some right flank tenderness   Skin: No rashes, no cyanosis, dry to touch   Neuro: CN 2-12 intact, no localizing weakness   Extremities: No edema, normal range of motion   HEENT Normocephalic, atraumatic, normal nasal turbinates; oropharynx clear   Neck Supple; nl inspection; trachea midline; no thryomegaly   Psychiatric:  Somnolent.  Flat affect         "  Medications:      All current medications were reviewed with changes reflected in problem list.         Data:      All new lab and imaging data was reviewed.   Labs:[MA1.1]    Recent Labs  Lab 10/21/18  1225 10/17/18  1845 10/17/18  1828 10/17/18  1609   CULT Culture negative < 24 hours, reincubate <10,000 colonies/mLKlebsiella oxytoca* No growth after 5 days No growth after 5 days       Recent Labs  Lab 10/21/18  1134 10/19/18  0713 10/18/18  0749   WBC 6.7 7.3 10.2   HGB 11.4* 10.7* 10.4*   HCT 36.0* 34.2* 32.9*   MCV 92 93 93    245 225       Recent Labs  Lab 10/22/18  0644 10/21/18  0710 10/20/18  0628 10/19/18  0713 10/18/18  0749 10/17/18  1609     --   --  139 138 137   POTASSIUM 3.7  --   --  4.1 4.1 4.5   CHLORIDE 109  --   --  106 107 102   CO2 26  --   --  27 26 28   ANIONGAP 5  --   --  6 5 7   GLC 82  --   --  83 99 111*   BUN 9  --   --  15 20 32*   CR 0.98 0.97 1.02 1.06 1.06 1.40*   GFRESTIMATED 71 72 68 65 65 47*   GFRESTBLACK 86 88 82 79 79 57*   ARYAN 8.7  --   --  8.2* 8.1* 9.0   PROTTOTAL  --   --   --   --   --  7.9   ALBUMIN  --   --   --   --   --  2.7*   BILITOTAL  --   --   --   --   --  0.9   ALKPHOS  --   --   --   --   --  122   AST  --   --   --   --   --  45   ALT  --   --   --   --   --  36[MA1.4]      Imaging:   Recent Results (from the past 24 hour(s))   CT Abdomen Pelvis w Contrast    Narrative    CT ABDOMEN AND PELVIS WITH CONTRAST 10/17/2018 5:34 PM     HISTORY: History of ureteral stent, flank pain.     CONTRAST DOSE: 78mL Isovue-370    Radiation dose for this scan was reduced using automated exposure  control, adjustment of the mA and/or kV according to patient size, or  iterative reconstruction technique.    FINDINGS: Left ureteral stent is in place, new since 9/27/2018. There  is no hydronephrosis. Proximal ureteral stone adjacent to the stent is  noted, probably unchanged in position from 9/27/2018. Bilateral renal  cysts and left renal stones are again noted.  Bowel-containing right  inguinal hernia is again noted without evidence of incarceration or  bowel obstruction. No free peritoneal fluid or air. The remainder of  the CT also appears unchanged. Christianson catheter is in place. Left  adrenal 1.9 cm nodule is noted.      Impression    IMPRESSION: Left ureteral stent in place without left hydronephrosis.  Proximal left ureteral stone is probably unchanged in position from  9/27/2018. This scan is otherwise unchanged in appearance compared to  9/27/2018 and 8/28/2018.    VLADIMIR GUILLAUME MD   XR Chest 2 Views    Narrative    XR CHEST 2 VW   10/17/2018 5:38 PM     HISTORY: Altered mental status.    COMPARISON: Film dated 9/29/2018    FINDINGS: Left cardiac device is in place. Cardiac silhouette is at  the upper limits of normal. There is been an improvement in the left  pleural effusion when compared to 9/29/2018. No significant pleural  fluid is seen on today's film. Mild right basilar opacity consistent  with a small area of infiltrate or atelectasis.  There is also a small  area of infiltrate or fibrosis in the right midlung zone. The  pulmonary vasculature is normal.  The bones and soft tissues are  unremarkable.      Impression    IMPRESSION: Improved infiltrate and pleural effusions.        ARUN RODRIGUEZ MD[MA1.1]          Revision History        User Key Date/Time User Provider Type Action    > MA1.2 10/23/2018  5:03 PM Mu Barron MD Physician Addend     MA1.3 10/22/2018  3:51 PM Mu Barron MD Physician Addend     MA1.4 10/22/2018  3:20 PM Mu Barron MD Physician Sign     MA1.1 10/22/2018  3:10 PM Mu Barron MD Physician             Progress Notes by Kannan Lobo MD at 10/23/2018  3:16 PM     Author:  Kannan Lobo MD Service:  Infectious Disease Author Type:  Physician    Filed:  10/23/2018  3:16 PM Date of Service:  10/23/2018  3:16 PM Creation Time:  10/23/2018  3:16 PM    Status:  Signed :  Kannan Lobo MD (Physician)      "    [facility]  Infectious Disease Progress Note          Assessment and Plan:   IMPRESSIONS:   1.  A 89-year-old male, known to me from recent hospitalization, currently admitted with recurrent low-grade sepsis, right flank discomfort, presumed pyelo/recurrent kidney and obstruction-related issues, urine is grossly abnormal, although urine culture so far negative, as are blood cultures.   2.  Recent admission with major sepsis, appeared life-threatened, ended up with the finding of candidemia due to right-sided obstruction, had a stent in place with obstruction relieved and antifungal treatment, clinically improved, back to near baseline as recently as a few days ago.   3.  Prior infiltrates and possible aspiration, has some mild hypoxia currently, but never really found major aspiration and unlikely pneumonia currently.   4.  Prior cerebrovascular infarction, but cognitive function relatively intact long-term, as is neurologic status.   5.  Ongoing abdominal hernia that has required reduction on occasion.   6.  Gallstones.   7.  LEVAQUIN, ZITHROMAX, BACTRIM AND CEFTRIAXONE ALL LISTED AS ALLERGIES, SOME ARE JUST GI INTOLERANCE.       RECOMMENDATIONS:   1. cipro 500 bid and flucon 100mg po daily thru the eventual stone procedure and 3 days more then stop  2.  Despite lev listed allergy cipro several times wo issue        Interval History:   no new complaints and doing well; no cp, sob, n/v/d, or abd pain. Still flank pain T down cx as above cx only kleb UC              Medications:       amLODIPine  5 mg Oral Daily     ciprofloxacin  500 mg Oral Q12H NAKIA     fluconazole  100 mg Oral Q24H     metoprolol tartrate  25 mg Oral BID     omeprazole  20 mg Oral BID AC     sodium chloride (PF)  3 mL Intracatheter Q8H                  Physical Exam:   Blood pressure 153/67, pulse 66, temperature 96.8  F (36  C), temperature source Axillary, resp. rate 20, height 1.727 m (5' 8\"), weight 72.7 kg (160 lb " 4.4 oz), SpO2 95 %.  Wt Readings from Last 2 Encounters:   10/23/18 72.7 kg (160 lb 4.4 oz)   10/17/18 70.3 kg (155 lb)     Vital Signs with Ranges  Temp:  [96.2  F (35.7  C)-96.8  F (36  C)] 96.8  F (36  C)  Pulse:  [66] 66  Heart Rate:  [60-61] 60  Resp:  [16-20] 20  BP: (153-165)/(62-72) 153/67  SpO2:  [95 %] 95 %    Constitutional: Awake, alert, cooperative, no apparent distress   Lungs: Clear to auscultation bilaterally, no crackles or wheezing   Cardiovascular: Regular rate and rhythm, normal S1 and S2, and no murmur noted   Abdomen: Normal bowel sounds, soft, non-distended, non-tender   Skin: No rashes, no cyanosis, no edema   Other:           Data:   All microbiology laboratory data reviewed.  Recent Labs   Lab Test  10/21/18   1134  10/19/18   0713  10/18/18   0749   WBC  6.7  7.3  10.2   HGB  11.4*  10.7*  10.4*   HCT  36.0*  34.2*  32.9*   MCV  92  93  93   PLT  365  245  225     Recent Labs   Lab Test  10/23/18   0811  10/22/18   0644  10/21/18   0710   CR  0.94  0.98  0.97     Recent Labs   Lab Test  10/16/18   1050   SED  47*     Recent Labs   Lab Test  10/21/18   1225  10/17/18   1845  10/17/18   1828  10/17/18   1609  09/28/18   1332  09/28/18   1331  09/27/18   1512  09/24/18   2050  09/24/18   1203   CULT  No growth  <10,000 colonies/mL  Klebsiella oxytoca  *  No growth  No growth  No growth  No growth  No anaerobes isolated  Heavy growth  Denise albicans / dubliniensis  Candida albicans and Candida dubliniensis are not routinely speciated  Susceptibility testing not routinely done  *  <1000 colonies/mL  urogenital jeanette  Susceptibility testing not routinely done    Cultured on the 3rd day of incubation:  Candida albicans  *  Critical Value/Significant Value, preliminary result only, called to and read back by  Willow Nesbitt RN, @0552 09/27/18.DH.    No growth[SD1.1]          Revision History        User Key Date/Time User Provider Type Action    > SD1.1 10/23/2018  3:16 PM Kannan Lobo MD  Physician Sign            Progress Notes by White, Corinne C, LSW at 10/23/2018  1:10 PM     Author:  White, Corinne C, LSW Service:  (none) Author Type:      Filed:  10/23/2018  2:06 PM Date of Service:  10/23/2018  1:10 PM Creation Time:  10/23/2018  1:10 PM    Status:  Addendum :  White, Corinne C, LSW ()         Discharge Planner   Discharge Plans in progress: PT has TCU bed for tomorrow at [facility].   Barriers to discharge plan: Room not open till 1300  Follow up plan: Will update pt and family and determine transport needs.[CW1.1]      10/23/18 1300   [facility] [phone], Fax: [phone][CW1.2]          Entered by: Corinne C. White 10/23/2018 1:10 PM[CW1.1]     CM: Initially family wanted to provide transport .   [facility] called and requested W/C transport and aware of cost   H/E transport set up for 1300  PT was only in TCU for short time. Previous PASS is valid[CW1.3]     Revision History        User Key Date/Time User Provider Type Action    > CW1.3 10/23/2018  2:06 PM White, Corinne C, LSW  Addend     CW1.2 10/23/2018  1:11 PM White, Corinne C, LSW  Sign     CW1.1 10/23/2018  1:10 PM White, Corinne C, LSW              Progress Notes by White, Corinne C, LSW at 10/22/2018 10:29 AM     Author:  White, Corinne C, LSW Service:  (none) Author Type:      Filed:  10/22/2018  5:03 PM Date of Service:  10/22/2018 10:29 AM Creation Time:  10/22/2018 10:29 AM    Status:  Addendum :  White, Corinne C, LSW ()         Discharge Planner   Discharge Plans in progress: [facility] to update pt not ready to discharge today   Barriers to discharge plan: following   Follow up plan: TCU at discharge . PT has used 13 days of his Medicare Benefit when at [facility]       Entered by: Corinne C. White 10/22/2018 10:29 AM[CW1.1]     CM: Pt is  being followed by [facility][CW1.2]     Revision History        User Key Date/Time User Provider Type Action    > CW1.2 10/22/2018  5:03 PM White, Corinne C, LSW  Addend     CW1.1 10/22/2018 10:30 AM White, Corinne C, LSW  Sign            Progress Notes by Kannan Lobo MD at 10/22/2018  2:51 PM     Author:  Kannan Lobo MD Service:  Infectious Disease Author Type:  Physician    Filed:  10/22/2018  2:52 PM Date of Service:  10/22/2018  2:51 PM Creation Time:  10/22/2018  2:51 PM    Status:  Signed :  Kannan Lobo MD (Physician)         [facility]  Infectious Disease Progress Note          Assessment and Plan:   IMPRESSIONS:   1.  A 89-year-old male, known to me from recent hospitalization, currently admitted with recurrent low-grade sepsis, right flank discomfort, presumed pyelo/recurrent kidney and obstruction-related issues, urine is grossly abnormal, although urine culture so far negative, as are blood cultures.   2.  Recent admission with major sepsis, appeared life-threatened, ended up with the finding of candidemia due to right-sided obstruction, had a stent in place with obstruction relieved and antifungal treatment, clinically improved, back to near baseline as recently as a few days ago.   3.  Prior infiltrates and possible aspiration, has some mild hypoxia currently, but never really found major aspiration and unlikely pneumonia currently.   4.  Prior cerebrovascular infarction, but cognitive function relatively intact long-term, as is neurologic status.   5.  Ongoing abdominal hernia that has required reduction on occasion.   6.  Gallstones.   7.  LEVAQUIN, ZITHROMAX, BACTRIM AND CEFTRIAXONE ALL LISTED AS ALLERGIES, SOME ARE JUST GI INTOLERANCE.       RECOMMENDATIONS:   1. To cipro 500 bid and flucon 100mg po daily thru stone procedure and 3 days more then stop  2.  Despite lev listed allergy cipro several times wo issue        Interval History:    no new complaints and doing well; no cp, sob, n/v/d, or abd pain. Still flank pain T down cx as above cx only kleb UC              Medications:       amLODIPine  5 mg Oral Daily     ciprofloxacin  500 mg Oral Q12H NAKIA     fluconazole  400 mg Intravenous Q24H     metoprolol tartrate  25 mg Oral BID     omeprazole  20 mg Oral BID AC     sodium chloride (PF)  3 mL Intracatheter Q8H                  Physical Exam:   Blood pressure 159/72, pulse 71, temperature 97.5  F (36.4  C), temperature source Oral, resp. rate 20, weight 72.7 kg (160 lb 3.2 oz), SpO2 94 %.  Wt Readings from Last 2 Encounters:   10/22/18 72.7 kg (160 lb 3.2 oz)   10/17/18 70.3 kg (155 lb)     Vital Signs with Ranges  Temp:  [96.8  F (36  C)-97.9  F (36.6  C)] 97.5  F (36.4  C)  Heart Rate:  [58-61] 61  Resp:  [16-20] 20  BP: (150-163)/(60-72) 159/72  SpO2:  [93 %-97 %] 94 %    Constitutional: Awake, alert, cooperative, no apparent distress   Lungs: Clear to auscultation bilaterally, no crackles or wheezing   Cardiovascular: Regular rate and rhythm, normal S1 and S2, and no murmur noted   Abdomen: Normal bowel sounds, soft, non-distended, non-tender   Skin: No rashes, no cyanosis, no edema   Other:           Data:   All microbiology laboratory data reviewed.  Recent Labs   Lab Test  10/21/18   1134  10/19/18   0713  10/18/18   0749   WBC  6.7  7.3  10.2   HGB  11.4*  10.7*  10.4*   HCT  36.0*  34.2*  32.9*   MCV  92  93  93   PLT  365  245  225     Recent Labs   Lab Test  10/22/18   0644  10/21/18   0710  10/20/18   0628   CR  0.98  0.97  1.02     Recent Labs   Lab Test  10/16/18   1050   SED  47*     Recent Labs   Lab Test  10/21/18   1225  10/17/18   1845  10/17/18   1828  10/17/18   1609  09/28/18   1332  09/28/18   1331  09/27/18   1512  09/24/18 2050 09/24/18   1203   CULT  Culture negative < 24 hours, reincubate  <10,000 colonies/mL  Klebsiella oxytoca  *  No growth after 5 days  No growth after 5 days  No growth  No growth  No anaerobes  isolated  Heavy growth  Denise albicans / dubliniensis  Candida albicans and Candida dubliniensis are not routinely speciated  Susceptibility testing not routinely done  *  <1000 colonies/mL  urogenital jeanette  Susceptibility testing not routinely done    Cultured on the 3rd day of incubation:  Candida albicans  *  Critical Value/Significant Value, preliminary result only, called to and read back by  Willow Nesbitt RN, @1714 09/27/18.DH.    No growth[SD1.1]          Revision History        User Key Date/Time User Provider Type Action    > SD1.1 10/22/2018  2:52 PM Kannan Lobo MD Physician Sign            Progress Notes by Mu Barron MD at 10/22/2018 10:22 AM     Author:  Mu Barron MD Service:  Hospitalist Author Type:  Physician    Filed:  10/22/2018 10:26 AM Date of Service:  10/22/2018 10:22 AM Creation Time:  10/22/2018 10:22 AM    Status:  Signed :  Mu Barron MD (Physician)           Patient was seen and examined by me today and medical record reviewed.Plan of care was discussed with patient/family and staff.  Complete progress note will follow.Please refer to my note for detail progress at a later time     Interval History/objective :    Edison Boss is  doing well; no cp, sob, n/v/d, or abd pain.    Pain controlled and I spoke with varun , daughter who requested Urology consulted today     I spoke with Dr Lobo regarding care plan     Urine recultured and negative so far           Current problem/Assessment:      Stable       Plan for today       Re-consult urology and ID for care plan     Family questions addressed     Discharge planning based on consultants[MA1.1]        Revision History        User Key Date/Time User Provider Type Action    > MA1.1 10/22/2018 10:26 AM Mu Barron MD Physician Sign            Progress Notes by Mu Barron MD at 10/21/2018  1:53 PM     Author:  Mu Barron MD Service:  Hospitalist Author Type:  Physician     Filed:  10/21/2018  1:56 PM Date of Service:  10/21/2018  1:53 PM Creation Time:  10/21/2018  1:53 PM    Status:  Signed :  Mu Barron MD (Physician)                        [facility]  Hospitalist Progress Note  Name: Edison Boss    MRN: [id number]  YOB: 1924    Age: 89 year old  Date of admission: 10/17/2018  Primary care provider: Porfirio Terrell      Reason for Stay (Diagnosis): Acute sepsis secondary to complicated urinary tract infection         Assessment and Plan:      Summary of Stay:  Edison Boss is a 89 year old male with a history of prior prostate cancer treated with brachy therapy and XRT, chronic indwelling tolliver catheter, paroxysmal AF (not anticoagulated) and SSS s/p ablation and dual chamber PPM, htn, and hlp.  He has a recent complicated PMH and this will be his 3rd admission within a month.      He was hospitalized 9/16-9/19/2018 for generalized weakness with falls and diagnosed with pna and a candidal UTI.  He was ultimately treated with levofloxacin and fluconazole and discharged home.  He returned and hospitalized from 9/24-10/4/18 ultimately diagnosed with candida fungemia with sepsis in setting of an obstructing left ureteral stone.  He required urgent cystoscopy with stent placement and stone pushed back so as not to obstruct.  He was treated initially with broad spectrum abx (pip-tazo/vanco) but when fungemia diagnosed placed on IV micafungin, and then discharged with 2 weeks of fluconazole-then to decrease from 400 mg to 100 mg until 7 days after stone removed     He returns to the ER with 2 days of increasing flank pain, fever, weakness, confusion most consistent with recurrent complicated UTI, concern for infected stone     1.    Acute sepsis secondary to complicated UTI: MADELYN/fever to 102/leukocytosis-IVF for now, no indication for pressors, suspect UTI again.     2.  UTI:   Do note that stent and stone in left ureter,  "flank pain is actually on the right side-? Etiology-ureteral irritation in setting of acute infection??   Urology and infectious disease input and consultation appreciated.  Plan on stent exchange once urine is sterile.  Otherwise, continue broad spectrum abx with pip-tazo/vanco, and fluconazole.  Klebsiella in urine cultures.  ID following and assisting with abx plan . Will discuss with Dr Lobo      3.  MADELYN:   Suspect prerenal based on BUN/Creat ratio but could be a component of ATN also with sepsis.  Creatinine improved with IV fluid challenge.  Continue to monitor for now.      4.  Htn:  Cont home regimen of amlodipine 5 mg every day and metoprolol 25 mg bid     5.  Depression:  Cont paroxetine     6.  PAF/SSS-pacer in place, Not on anticoagulation    7.SOB and hypoxia - suspect multifactorial etiology - sepsis , fluid overload   -- improved with lasix  , monitor       DVT Prophylaxis: Pneumatic Compression Devices  Code Status: DNR / DNI  Discharge Dispo: Back to TCU when able  Estimated Disch Date / # of Days until Disch: Anticipate in next few  days in the hospital depending on the course of sepsis and need for ureteral stent exchange if needed to be done while inpatient.    Daughter at the bedside and updated on plan of cares.  Time spent: Greater than 35 minutes      Interval History (Subjective):      Patient is seen and examined by me today and medical record reviewed.Overnight events noted and care discussed with nursing staff.  No fever   Has some dizziness when out of bed                Physical Exam:      Vital signs:  Temp: 97.3  F (36.3  C) Temp src: Oral BP: 150/72   Heart Rate: 64 Resp: 16 SpO2: 93 % O2 Device: None (Room air) Oxygen Delivery: 1 LPM   Weight: 74.4 kg (164 lb)  Estimated body mass index is 23.53 kg/(m^2) as calculated from the following:    Height as of an earlier encounter on 10/17/18: 1.778 m (5' 10\").    Weight as of this encounter: 74.4 kg (164 lb).    I/O last 3 completed " shifts:  In: -   Out: 2100 [Urine:2100]  Vitals:    10/18/18 0558 10/19/18 0537 10/20/18 0609 10/21/18 0419   Weight: 72.3 kg (159 lb 6.4 oz) 74.2 kg (163 lb 9.6 oz) 72.6 kg (160 lb) 74.4 kg (164 lb)       Constitutional: Alert , mild  distress   Respiratory: Nl work of breathing. Clear to auscultation bilaterally, no crackles or wheezing   Cardiovascular: Regular rate and rhythm, normal S1 and S2, and no murmur noted   Abdomen: Normal bowel sounds, soft, non-distended, some right flank tenderness   Skin: No rashes, no cyanosis, dry to touch   Neuro: CN 2-12 intact, no localizing weakness   Extremities: No edema, normal range of motion   HEENT Normocephalic, atraumatic, normal nasal turbinates; oropharynx clear   Neck Supple; nl inspection; trachea midline; no thryomegaly   Psychiatric:  Somnolent.  Flat affect          Medications:      All current medications were reviewed with changes reflected in problem list.         Data:      All new lab and imaging data was reviewed.   Labs:    Recent Labs  Lab 10/17/18  1845 10/17/18  1828 10/17/18  1609   CULT <10,000 colonies/mLKlebsiella oxytoca* No growth after 3 days No growth after 3 days       Recent Labs  Lab 10/21/18  1134 10/19/18  0713 10/18/18  0749   WBC 6.7 7.3 10.2   HGB 11.4* 10.7* 10.4*   HCT 36.0* 34.2* 32.9*   MCV 92 93 93    245 225       Recent Labs  Lab 10/21/18  0710 10/20/18  0628 10/19/18  0713 10/18/18  0749 10/17/18  1609   NA  --   --  139 138 137   POTASSIUM  --   --  4.1 4.1 4.5   CHLORIDE  --   --  106 107 102   CO2  --   --  27 26 28   ANIONGAP  --   --  6 5 7   GLC  --   --  83 99 111*   BUN  --   --  15 20 32*   CR 0.97 1.02 1.06 1.06 1.40*   GFRESTIMATED 72 68 65 65 47*   GFRESTBLACK 88 82 79 79 57*   ARYAN  --   --  8.2* 8.1* 9.0   PROTTOTAL  --   --   --   --  7.9   ALBUMIN  --   --   --   --  2.7*   BILITOTAL  --   --   --   --  0.9   ALKPHOS  --   --   --   --  122   AST  --   --   --   --  45   ALT  --   --   --   --  36       Imaging:   Recent Results (from the past 24 hour(s))   CT Abdomen Pelvis w Contrast    Narrative    CT ABDOMEN AND PELVIS WITH CONTRAST 10/17/2018 5:34 PM     HISTORY: History of ureteral stent, flank pain.     CONTRAST DOSE: 78mL Isovue-370    Radiation dose for this scan was reduced using automated exposure  control, adjustment of the mA and/or kV according to patient size, or  iterative reconstruction technique.    FINDINGS: Left ureteral stent is in place, new since 9/27/2018. There  is no hydronephrosis. Proximal ureteral stone adjacent to the stent is  noted, probably unchanged in position from 9/27/2018. Bilateral renal  cysts and left renal stones are again noted. Bowel-containing right  inguinal hernia is again noted without evidence of incarceration or  bowel obstruction. No free peritoneal fluid or air. The remainder of  the CT also appears unchanged. Christianson catheter is in place. Left  adrenal 1.9 cm nodule is noted.      Impression    IMPRESSION: Left ureteral stent in place without left hydronephrosis.  Proximal left ureteral stone is probably unchanged in position from  9/27/2018. This scan is otherwise unchanged in appearance compared to  9/27/2018 and 8/28/2018.    VLADIMIR GUILLAUME MD   XR Chest 2 Views    Narrative    XR CHEST 2 VW   10/17/2018 5:38 PM     HISTORY: Altered mental status.    COMPARISON: Film dated 9/29/2018    FINDINGS: Left cardiac device is in place. Cardiac silhouette is at  the upper limits of normal. There is been an improvement in the left  pleural effusion when compared to 9/29/2018. No significant pleural  fluid is seen on today's film. Mild right basilar opacity consistent  with a small area of infiltrate or atelectasis.  There is also a small  area of infiltrate or fibrosis in the right midlung zone. The  pulmonary vasculature is normal.  The bones and soft tissues are  unremarkable.      Impression    IMPRESSION: Improved infiltrate and pleural effusions.        ARUN RODRIGUEZ  MD[MA1.1]          Revision History        User Key Date/Time User Provider Type Action    > MA1.1 10/21/2018  1:56 PM Mu Barron MD Physician Sign            Progress Notes by Xochilt Carreno at 10/21/2018  9:36 AM     Author:  Xochilt Carreno Service:  (none) Author Type:      Filed:  10/21/2018 11:41 AM Date of Service:  10/21/2018  9:36 AM Creation Time:  10/21/2018  9:36 AM    Status:  Addendum :  Xochilt Carreno ()         SWS:    Discharge Planner   Discharge Plans in progress: Spoke with Karmen at [facility] who reports no beds available until Tues, Wed, and Friday. Apparently she reports that they decline pt's if they have no bed available therefore stated I would need to make a new referral, however [employer] did not allow me to make another referral to [facility] because it stating there was already one made.  I also spoke with pt regarding needing more TCU options in which he referred me to his dtr Concepcion.  I contacted Concepcion via phone to ask for other TCU preferences.  She requested [facility], [facility] and [facility] in no particular order. They are requesting a private room and are aware of cost.  Referrals have been made.    Follow up plan: Will f/u with TCU's on bed availability.  Once TCU is determined SW will need to complete a PAS       Entered by: Xochilt Carreno 10/21/2018 9:36 AM[JD1.1]       ADDENDUM:    [place] back from [place] at [facility] and they have accepted pt[JD1.2]     Revision History        User Key Date/Time User Provider Type Action    > JD1.2 10/21/2018 11:41 AM Xochilt Carreno  Addend     JD1.1 10/21/2018  9:41 AM Xochilt Carreno  Sign            Progress Notes by Kannan Lobo MD at 10/21/2018 10:43 AM     Author:  Kannan Lobo MD Service:  Infectious Disease Author Type:  Physician    Filed:  10/21/2018 10:48 AM Date of Service:  10/21/2018 10:43 AM Creation Time:  10/21/2018 10:43 AM    Status:  Signed :   Kannan Lobo MD (Physician)         [facility]  Infectious Disease Progress Note          Assessment and Plan:   IMPRESSIONS:   1.  A 89-year-old male, known to me from recent hospitalization, currently admitted with recurrent low-grade sepsis, right flank discomfort, presumed pyelo/recurrent kidney and obstruction-related issues, urine is grossly abnormal, although urine culture so far negative, as are blood cultures.   2.  Recent admission with major sepsis, appeared life-threatened, ended up with the finding of candidemia due to right-sided obstruction, had a stent in place with obstruction relieved and antifungal treatment, clinically improved, back to near baseline as recently as a few days ago.   3.  Prior infiltrates and possible aspiration, has some mild hypoxia currently, but never really found major aspiration and unlikely pneumonia currently.   4.  Prior cerebrovascular infarction, but cognitive function relatively intact long-term, as is neurologic status.   5.  Ongoing abdominal hernia that has required reduction on occasion.   6.  Gallstones.   7.  LEVAQUIN, ZITHROMAX, BACTRIM AND CEFTRIAXONE ALL LISTED AS ALLERGIES, SOME ARE JUST GI INTOLERANCE.       RECOMMENDATIONS:   1.  Continue   IV fluconazole and zosyn. But only sens kleb in cx so far and likely focus on that    2.  Discussed with Dr. Lilly, plan is to remove the stent and eventual plans with the stones unclear exactly what is going on currently, but given he is having flank pain in that same area, have to assume further infection-related issues.   3.  Much improved, likely po OK and if still Oct 30 stone plan tx 2 weeks thru that, likely diflucan also even if not in current cx  4 Looks like some slow down in disposition options, as it turns out po conversion not that easy with R(amp/ceph)/age(nitrofurantoin out) and allergies(quinolones/sulfa/ceph), not clear lev issue, if Gi / low dose cipro attempt if not an  option prob full dose augmentin (int sens)            Interval History:   no new complaints and doing well; no cp, sob, n/v/d, or abd pain. Still flank pain T down cx as above cx only kleb UC              Medications:       amLODIPine  5 mg Oral Daily     fluconazole  400 mg Intravenous Q24H     metoprolol tartrate  25 mg Oral BID     omeprazole  20 mg Oral BID AC     piperacillin-tazobactam  3.375 g Intravenous Q6H     sodium chloride (PF)  3 mL Intracatheter Q8H                  Physical Exam:   Blood pressure 150/72, pulse 71, temperature 97.3  F (36.3  C), temperature source Oral, resp. rate 16, weight 74.4 kg (164 lb), SpO2 93 %.  Wt Readings from Last 2 Encounters:   10/21/18 74.4 kg (164 lb)   10/17/18 70.3 kg (155 lb)     Vital Signs with Ranges  Temp:  [96.1  F (35.6  C)-97.6  F (36.4  C)] 97.3  F (36.3  C)  Heart Rate:  [57-69] 64  Resp:  [16-18] 16  BP: (147-167)/(65-72) 150/72  SpO2:  [93 %-96 %] 93 %    Constitutional: Awake, alert, cooperative, no apparent distress   Lungs: Clear to auscultation bilaterally, no crackles or wheezing   Cardiovascular: Regular rate and rhythm, normal S1 and S2, and no murmur noted   Abdomen: Normal bowel sounds, soft, non-distended, non-tender   Skin: No rashes, no cyanosis, no edema   Other:           Data:   All microbiology laboratory data reviewed.  Recent Labs   Lab Test  10/19/18   0713  10/18/18   0749  10/17/18   1609   WBC  7.3  10.2  16.6*   HGB  10.7*  10.4*  11.6*   HCT  34.2*  32.9*  37.4*   MCV  93  93  93   PLT  245  225  264     Recent Labs   Lab Test  10/21/18   0710  10/20/18   0628  10/19/18   0713   CR  0.97  1.02  1.06     Recent Labs   Lab Test  10/16/18   1050   SED  47*     Recent Labs   Lab Test  10/17/18   1845  10/17/18   1828  10/17/18   1609  09/28/18   1332  09/28/18   1331  09/27/18   1512  09/24/18   2050  09/24/18   1203  09/16/18   2303   CULT  <10,000 colonies/mL  Klebsiella oxytoca  *  No growth after 3 days  No growth after 3 days  No  growth  No growth  No anaerobes isolated  Heavy growth  Denise albicans / dubliniensis  Candida albicans and Candida dubliniensis are not routinely speciated  Susceptibility testing not routinely done  *  <1000 colonies/mL  urogenital jeanette  Susceptibility testing not routinely done    Cultured on the 3rd day of incubation:  Candida albicans  *  Critical Value/Significant Value, preliminary result only, called to and read back by  Willow Nesbitt RN, @1714 09/27/18.DH.    No growth  No growth[SD1.1]          Revision History        User Key Date/Time User Provider Type Action    > SD1.1 10/21/2018 10:48 AM Kannan Lobo MD Physician Sign                  Procedure Notes     No notes of this type exist for this encounter.      Progress Notes - Therapies (Notes from 10/21/18 through 10/24/18)     No notes of this type exist for this encounter.

## 2018-10-17 NOTE — ED NOTES
[facility]  ED Nurse Handoff Report    Edison Boss is a 89 year old male   ED Chief complaint: Flank Pain  . ED Diagnosis:   Final diagnoses:   None     Allergies:   Allergies   Allergen Reactions     Ceftriaxone Itching     Bactrim Hives     Levaquin Diarrhea     Oral only, can tolerate IV     Zithromax [Azithromycin]      Macrodantin [Nitrofuran Derivatives] Rash     Took recently with no problems       Code Status: Prior  Activity level - Baseline/Home:  Stand with Assist. Activity Level - Current:   Stand with Assist of 2. Lift room needed: No. Bariatric: No   Needed: No   Isolation: No. Infection: Not Applicable.     Vital Signs:   Vitals:    10/17/18 1630 10/17/18 1645 10/17/18 1710 10/17/18 1745   BP: 143/60 150/61  149/76   Pulse:       Resp:       Temp:   101.9  F (38.8  C)    TempSrc:   Rectal    SpO2: 98% 95%  95%       Cardiac Rhythm:  ,      Pain level: 0-10 Pain Scale: 8  Patient confused: No. Patient Falls Risk: Yes.   Elimination Status: Urethral catheter (tolliver) in place; refer to orders to discontinue as per protocol    Patient Report - Initial Complaint: Flank pain. Focused Assessment: Flank and abdominal pain. Febrile.   Tests Performed: Labs,imaging. Abnormal Results:   Labs Ordered and Resulted from Time of ED Arrival Up to the Time of Departure from the ED   CBC WITH PLATELETS DIFFERENTIAL - Abnormal; Notable for the following:        Result Value    WBC 16.6 (*)     RBC Count 4.04 (*)     Hemoglobin 11.6 (*)     Hematocrit 37.4 (*)     MCHC 31.0 (*)     RDW 17.7 (*)     Absolute Neutrophil 13.6 (*)     Absolute Monocytes 2.0 (*)     All other components within normal limits   COMPREHENSIVE METABOLIC PANEL - Abnormal; Notable for the following:     Glucose 111 (*)     Urea Nitrogen 32 (*)     Creatinine 1.40 (*)     GFR Estimate 47 (*)     GFR Estimate If Black 57 (*)     Albumin 2.7 (*)     All other components within normal limits   ISTAT  GASES LACTATE  SHANNAN POCT - Abnormal; Notable for the following:     Ph Venous 7.46 (*)     PCO2 Venous 37 (*)     PO2 Venous 48 (*)     All other components within normal limits   LIPASE   ROUTINE UA WITH MICROSCOPIC   PERIPHERAL IV CATHETER   ISTAT CG4 GASES LACTATE SHANNAN NURSING POCT   PULSE OXIMETRY NURSING   MEASURE URINE OUTPUT   PATIENT CARE ORDER   CARDIAC CONTINUOUS MONITORING   BLOOD CULTURE   BLOOD CULTURE     XR Chest 2 Views   Final Result   IMPRESSION: Improved infiltrate and pleural effusions.          ARUN RODRIGUEZ MD      CT Abdomen Pelvis w Contrast   Preliminary Result   IMPRESSION: Left ureteral stent in place without left hydronephrosis.   Proximal left ureteral stone is probably unchanged in position from   9/27/2018. This scan is otherwise unchanged in appearance compared to   9/27/2018 and 8/28/2018.        .   Treatments provided: Fluids  Family Comments: Family at bedside.  OBS brochure/video discussed/provided to patient:  No  ED Medications:   Medications   lactated ringers infusion (not administered)   lidocaine (XYLOCAINE) 2 % topical gel (not administered)   ondansetron (ZOFRAN) injection 4 mg (4 mg Intravenous Given 10/17/18 1610)   0.9% sodium chloride BOLUS (500 mLs Intravenous New Bag 10/17/18 1609)   morphine (PF) injection 4 mg (4 mg Intravenous Given 10/17/18 1610)   lactated ringers BOLUS 2,109 mL (2,109 mLs Intravenous New Bag 10/17/18 1710)   acetaminophen (TYLENOL) Suppository 650 mg (650 mg Rectal Given 10/17/18 1741)   0.9% sodium chloride BOLUS (0 mLs Intravenous Stopped 10/17/18 1725)   iopamidol (ISOVUE-370) solution 500 mL (78 mLs Intravenous Given 10/17/18 1724)     Drips infusing:  Yes  For the majority of the shift, the patient's behavior Green. Interventions performed were NA.     Severe Sepsis OR Septic Shock Diagnosis Present: No      ED Nurse Name/Phone Number: Tavo Mcadams,   6:30 PM    RECEIVING UNIT ED HANDOFF REVIEW    Above ED Nurse Handoff Report was reviewed: Yes  Reviewed  by: Cassy Driscoll on October 17, 2018 at 8:43 PM

## 2018-10-17 NOTE — LETTER
10/17/2018        RE: Edison Boss  [address]  [facility] [address]        [place] [facility]    Chief Complaint   Patient presents with     custodial Acute       [facility] Medical Record Number:  [id number]  Place of Service where encounter took place:  [facility]  (S) [[id number]]    HPI:    Edison Boss is a 89 year old  (10/19/1924), who is being seen today for an episodic care visit.  HPI information obtained from: facility chart records, facility staff, patient report and [facility] chart review.    Today's concern is:  Leukocytosis, unspecified type  Acute gouty arthritis  Patient with worsened weakness, leukocytosis in the past three days. Initially presented with redness, swelling, pain left hand suspicious for gout. Uric acid normal level but sed rate elevated. Started on colchicine yesterday and redness/pain of left hand resolved today. However, WBC remain more elevated.   Lab Results   Component Value Date    WBC 14.8 10/17/2018    WBC 12.7 10/16/2018        Pneumonia due to infectious organism, unspecified laterality, unspecified part of lung  Mild persistent asthma without complication  Hematuria, unspecified type  Ureteral stone  MADELYN (acute kidney injury) (H)  Hx of asthma, currently on room air but sats are trending down, today 91% when checked. Recently hospitalized with ureteral stone, fungemia with candida- micafungin now on oral antifungal through stone removal and for a week after. Also had pna vs renal pelvic infection and was covered for nosocomial coverage with linezolid and pip-tazo.  Most likely source of sepsis is his yeast in setting of obstructing stone.  Linezolid discontinued 10/1, micafungin converted to fluconazole on 10/1, pip-tazo discontinued 10/3.  Discharge with 2 weeks fluconazole 400 mg, then 100 mg every day until urologic intervention and through any procedure and then for an additional week   --at this time  remains on Fluconazole, tolliver in place, low grade fevers and increase in WBC. Will work up with UA/UC today, CBC 10/18, CXR and start Levaquin today. Discussed plan with Dr Nieves and sent update to ID Dr Lobo.     Sinoatrial node dysfunction (H), s/p PPM  Pacemaker  Hypervolemia, unspecified hypervolemia type  Essential hypertension with goal blood pressure less than 140/90  Recent wt 10/14: 155.0lbs and BP 10/4-10/17: 109-187/60-78 mmHg  Takes Norvasc, aspiring and metoprolol.     ALLERGIES: Ceftriaxone; Bactrim; Levaquin; Zithromax [azithromycin]; and Macrodantin [nitrofuran derivatives]  Past Medical, Surgical, Family and Social History reviewed and updated in [facility].    Current Outpatient Prescriptions   Medication Sig Dispense Refill     ACETAMINOPHEN PO Take 1,000 mg by mouth 3 times daily as needed for pain       albuterol (PROVENTIL HFA: VENTOLIN HFA) 108 (90 BASE) MCG/ACT inhaler Inhale 2 puffs into the lungs every 6 hours as needed for shortness of breath / dyspnea. 1 Inhaler 1     amLODIPine (NORVASC) 5 MG tablet TAKE 1 TABLET(5 MG) BY MOUTH DAILY 90 tablet 3     aspirin 81 MG EC tablet Take 1 tablet (81 mg) by mouth daily       clobetasol (TEMOVATE) 0.05 % ointment Apply topically daily as needed   2     COLCHICINE PO Give 1.2 mg by mouth in the afternoon for gout until 10/16/2018 23:59 AND Give 0.6 mg by mouth in the afternoon for gout until 10/16/2018 23:59 One hour after1.2 mg dose AND Give 0.6 mg by mouth in the morning for gout for 6 Days       Colloidal Oatmeal (AVEENO ECZEMA THERAPY) 1 % CREA Externally apply topically once a week And as needed for dry skin       [START ON 10/19/2018] fluconazole (DIFLUCAN) 100 MG tablet Take 1 tablet (100 mg) by mouth daily Start taking 100 mg every day when you have completed the 2 weeks of 400 mg per day, you need to take 100 mg per day through stone removal procedure and for about a week afterwards 15 tablet 0     fluconazole (DIFLUCAN) 200 MG tablet Take 2  "tablets (400 mg) by mouth daily for 14 days 28 tablet 0     lidocaine (LMX4) 4 % CREA 4% topical cream Apply topically daily as needed       metoprolol tartrate (LOPRESSOR) 25 MG tablet Take 1 tablet (25 mg) by mouth 2 times daily 60 tablet      Multiple Vitamins-Minerals (PRESERVISION AREDS) CAPS Take 1 capsule by mouth 2 times daily 180 capsule 3     omeprazole (PRILOSEC) 20 MG CR capsule Take 20 mg by mouth daily as needed       PARoxetine (PAXIL) 30 MG tablet Take 0.5 tablets (15 mg) by mouth At Bedtime Hold until fluconazole completed (Patient not taking: Reported on 10/9/2018) 30 tablet      Medications reviewed:  Medications reconciled to facility chart and changes were made to reflect current medications as identified as above med list. Below are the changes that were made:   Medications stopped since last EPIC medication reconciliation:   There are no discontinued medications.    Medications started since last [facility] medication reconciliation:  Orders Placed This Encounter   Medications     ACETAMINOPHEN PO     Sig: Take 1,000 mg by mouth 3 times daily as needed for pain     COLCHICINE PO     Sig: Give 1.2 mg by mouth in the afternoon for gout until 10/16/2018 23:59 AND Give 0.6 mg by mouth in the afternoon for gout until 10/16/2018 23:59 One hour after 1.2 mg dose AND Give 0.6 mg by mouth in the morning for gout for 6 Days       REVIEW OF SYSTEMS:  10 point ROS of systems including Constitutional, Eyes, Respiratory, Cardiovascular, Gastroenterology, Genitourinary, Integumentary, Musculoskeletal, Psychiatric were all negative except for pertinent positives noted in my HPI.    Physical Exam:  /67  Pulse 60  Temp 99.6  F (37.6  C)  Resp 30  Ht 5' 10\" (1.778 m)  Wt 155 lb (70.3 kg)  SpO2 91%  BMI 22.24 kg/m2  GENERAL APPEARANCE:  Somnolent, in no distress, pleasant, cooperative, oriented x self place and family  EYES:  EOM, lids, pupils and irises normal, sclera clear and conjunctiva normal, no " discharge or mattering on lids or lashes noted  ENT:  Mouth normal, moist mucous membranes, nose normal without drainage or crusting, external ears without lesions, hearing acuity impaired bilaterally  RESP:  respiratory effort and palpation of chest normal, no chest wall tenderness, no respiratory distress, Lung sounds diminished at bases, patient is on room air at this time  CV:  Palpation and auscultation of heart done, rate and rhythm controlled and regular today. Edema none bilateral lower extremities.   ABDOMEN:  normal bowel sounds, soft, nontender.  M/S:   Gait and station walks with assist , no tenderness or swelling of the joints; able to move all extremities   NEURO: cranial nerves 2-12 grossly intact, no facial asymmetry, no speech deficits and able to follow directions, moves all extremities symmetrically  PSYCH:  insight and judgement at baseline, memory forgetful, affect and mood normal     Recent Labs:   CBC RESULTS:   Recent Labs   Lab Test  10/17/18   1010  10/16/18   1050   WBC  14.8*  12.7*   RBC  3.70*  4.01*   HGB  10.9*  11.7*   HCT  33.3*  36.1*   MCV  90  90   MCH  29.5  29.2   MCHC  32.7  32.4   RDW  18.0*  18.0*   PLT  250  259       Last Basic Metabolic Panel:  Recent Labs   Lab Test  10/16/18   1050  10/08/18   1155   NA  134  141   POTASSIUM  4.6  4.3   CHLORIDE  103  109   ARYAN  9.1  9.2   CO2  24  25   BUN  30  17   CR  1.15  1.06   GLC  92  71       Liver Function Studies -   Recent Labs   Lab Test  10/03/18   0624  09/29/18   0652   PROTTOTAL  6.8  6.8   ALBUMIN  2.2*  1.9*   BILITOTAL  0.4  0.6   ALKPHOS  112  136   AST  28  34   ALT  29  33       Assessment/Plan:  Pneumonia due to infectious organism, unspecified laterality, unspecified part of lung  Hematuria, unspecified type  Mild persistent asthma without complication  Leukocytosis, unspecified type  Acute gouty arthritis  Ureteral stone  Worsened status with acute on chronic issues. ?respiratory vs urinary infection  recurrence. Will work up as noted below. Updated TCU MD, ID via EPIC note and daughter Red in person re: status and plan.     Sinoatrial node dysfunction (H), s/p PPM  Pacemaker  Essential hypertension with goal blood pressure less than 140/90  Chronic issues, stable at this time. Monitor, meds/vs as ordered.     MADELYN (acute kidney injury) (H)  Hypervolemia, unspecified hypervolemia type  Improved last check. F/U with BMP PRN, monitor wt and vs.     Orders:  1. Levaquin 500 mg PO today, then 250 mg PO daily x 6 days starting 10/18 diagnosis leukocytosis  2. CBC with diff on 10/18  3. Replace tolliver today, then send in UA/UC sample  4. CXR two views today.   5. BC x 2 on 10/18 and pro-calcitonin on 10/18 diagnosis fever    Total time spent with patient visit was 45 min including patient visit and review of past records. Greater than 50% of total time spent with counseling and coordinating care due to review of history, current status, concerns and POC to address them as noted above.      Electronically signed by  JYOTI Salazar CNP                      Sincerely,        JYOTI Salazar CNP

## 2018-10-17 NOTE — ED NOTES
Bed: ED20  Expected date: 10/17/18  Expected time: 3:03 PM  Means of arrival: Ambulance  Comments:  CLAUDIA2

## 2018-10-17 NOTE — ED TRIAGE NOTES
Patient comes in for evaluation of right flank and abdominal pain started today. Patient comes in via EMS from TCU. ABCs intact.

## 2018-10-18 NOTE — CONSULTS
ID consult dictated IMP 1 89 yo male recent candidemia, still on tx R ureteral stone/obstruction as cause, now recurrent sepsis, abnormal urine, pain, imaging neg    REC agree broad coverage , await cxs

## 2018-10-18 NOTE — PLAN OF CARE
Problem: Urinary Tract Infection (Adult)  Goal: Signs and Symptoms of Listed Potential Problems Will be Absent, Minimized or Managed (Urinary Tract Infection)  Signs and symptoms of listed potential problems will be absent, minimized or managed by discharge/transition of care (reference Urinary Tract Infection (Adult) CPG).   Outcome: No Change  Pt remains hospitalized for UTI/  Vital signs stable, currently on 1L NC.   PIV saline locked after NS bolus completed. Continues IV Zosyn and Vanco  Up with assist x 2 not out of bed this shift.   On tele monitoring.   [place] in place, adequate output, scrotum swollen, redness.  C/O severe abdominal pain at start of shift 9/10 Oxycodone x 1 given with relief.  Tylenol x 1 given at end of shift pain 7/10.  Consults: urology, ID.

## 2018-10-18 NOTE — PHARMACY-ADMISSION MEDICATION HISTORY
Admission medication history interview status for this patient is complete. See [facility] admission navigator for allergy information, prior to admission medications and immunization status.     Medication history interview source(s):daughter  Medication history resources (including written lists, pill bottles, clinic record):None    Changes made to PTA medication list:  Added: none  Deleted: asa  Changed: prilosec    Actions taken by pharmacist (provider contacted, etc):None     Additional medication history information:paxil on hold d/t interaction with diflucan    Medication reconciliation/reorder completed by provider prior to medication history? No    For patients on insulin therapy: no (Yes/No)   Lantus/levemir/NPH/Mix 70/30 dose: ___ in AM/PM or twice daily   Sliding scale Novolog Y/N   If Yes, do you have a baseline novolog pre-meal dose: ______units with meals   Patients eat three meals a day: Y/N ---  How many episodes of hypoglycemia (low blood glucose) do you have weekly: ---   How many missed doses do you have a week: ---  How many times do you check your blood glucose per day: ---  Any Barriers to therapy: cost of medications/comfortable with giving injections (if applicable)/ comfortable and confident with current diabetes regimen ---      Prior to Admission medications    Medication Sig Last Dose Taking? Auth Provider   ACETAMINOPHEN PO Take 1,000 mg by mouth 3 times daily as needed for pain  Yes Reported, Patient   albuterol (PROVENTIL HFA: VENTOLIN HFA) 108 (90 BASE) MCG/ACT inhaler Inhale 2 puffs into the lungs every 6 hours as needed for shortness of breath / dyspnea.  Yes Porfirio Terrell MD   amLODIPine (NORVASC) 5 MG tablet TAKE 1 TABLET(5 MG) BY MOUTH DAILY 10/17/2018 at Unknown time Yes Porfirio Terrell MD   clobetasol (TEMOVATE) 0.05 % ointment Apply topically daily as needed   Yes Lewis Nñuez   COLCHICINE PO Give 1.2 mg by mouth in the afternoon for gout until 10/16/2018 23:59 AND  Give 0.6 mg by mouth in the afternoon for gout until 10/16/2018 23:59 One hour after1.2 mg dose AND Give 0.6 mg by mouth in the morning for gout for 6 Days  Yes Reported, Patient   Colloidal Oatmeal (AVEENO ECZEMA THERAPY) 1 % CREA Externally apply topically once a week And as needed for dry skin  Yes Reported, Patient   fluconazole (DIFLUCAN) 100 MG tablet Take 1 tablet (100 mg) by mouth daily Start taking 100 mg every day when you have completed the 2 weeks of 400 mg per day, you need to take 100 mg per day through stone removal procedure and for about a week afterwards new at not strt yet Yes Virginie Ferris MD   fluconazole (DIFLUCAN) 200 MG tablet Take 2 tablets (400 mg) by mouth daily for 14 days 10/17/2018 at Unknown time Yes Virginie Ferris MD   lidocaine (LMX4) 4 % CREA 4% topical cream Apply topically daily as needed  Yes Reported, Patient   metoprolol tartrate (LOPRESSOR) 25 MG tablet Take 1 tablet (25 mg) by mouth 2 times daily 10/17/2018 at am Yes Virginie Ferris MD   Multiple Vitamins-Minerals (PRESERVISION AREDS) CAPS Take 1 capsule by mouth 2 times daily 10/17/2018 at am Yes Porfirio Terrell MD   omeprazole (PRILOSEC) 20 MG CR capsule Take 20 mg by mouth 2 times daily  10/17/2018 at am Yes Unknown, Entered By History   PARoxetine (PAXIL) 30 MG tablet Take 0.5 tablets (15 mg) by mouth At Bedtime Hold until fluconazole completed on hold  Virginie Ferris MD

## 2018-10-18 NOTE — PLAN OF CARE
Problem: Urinary Tract Infection (Adult)  Goal: Signs and Symptoms of Listed Potential Problems Will be Absent, Minimized or Managed (Urinary Tract Infection)  Signs and symptoms of listed potential problems will be absent, minimized or managed by discharge/transition of care (reference Urinary Tract Infection (Adult) CPG).  Outcome: No Change  Pt arrived to floor from ER at 2145, family was present. DNR/DNI. IV SL. Chronic tolliver replaced in ER. Up with assist of 2 or lift. Regular diet. Pt has pacemaker and was placed on tele. Pt has blanchable redness on coccyx, pillows for repositioning in use. Incontinent of bowel and bladder in ER. Glasses at bedside. Taking IV Zosyn, Vancomycin, and Fluconazole. Plan for discharge TBD.

## 2018-10-18 NOTE — H&P
History and Physical     Edison Boss MRN# [id number]   YOB: 1924 Age: 89 year old      Date of Admission:  10/17/2018    Primary care provider: Porfirio Terrell          Assessment and Plan:   Edison Boss is a 89 year old male with a history of prior prostate cancer treated with brachy therapy and XRT, chronic indwelling tolliver catheter, paroxysmal AF (not anticoagulated) and SSS s/p ablation and dual chamber PPM, htn, and hlp.  He has a recent complicated PMH and this will be his 3rd admission in a month.     He was hospitalized 9/16-9/19/2018 for generalized weakness with falls and diagnosed with pna and a candidal UTI.  He was ultimately treated with levofloxacin and fluconazole and discharged home.  He returned and hospitalized from 9/24-10/4/18 ultimately diagnosed with candida fungemia with sepsis in setting of an obstructing left ureteral stone.  He required urgent cystoscopy with stent placement and stone pushed back so as not to obstruct.  He was treated initially with broad spectrum abx (pip-tazo/vanco) but when fungemia diagnosed placed on IV micafungin, and then discharged with 2 weeks of fluconazole-then to decrease from 400 mg to 100 mg until 7 days after stone removed    He returns to the ER with 2 days of increasing flank pain, fever, weakness, confusion most consistent with recurrent complicated UTI, concern for infected stone    1.  Sepsis: MADELYN/fever to 102/leukocytosis-IVF for now, no indication for pressors, I think UTI again    2.  UTI:  UA inflammatory, has stent and stone in left ureter, flank pain is actually on the right side-? Etiology-ureteral irritation in setting of acute infection??  Will have urology see, I do wonder if the stone is infected (? Polymicrobial) and if it needs to be definitively dealt with in order to control the infection.  Cont broad spectrum abx with pip-tazo/vanco, and fluconazole (ER MD spoke with ID on call) and request both  urologic and ID assistance.     3.  MADELYN:  ATN from sepsis although has a prerenal appearance based on BUN/Creat ratio, IVF fluids overnight and recheck in am    4.  Htn:  Cont home regimen of amlodipine 5 mg every day and metoprolol 25 mg bid    5.  GI dd-monitor fluid status    6.  Depression:  Cont paroxetine    7.  PAF/SSS-pacer in place, Not on AC    PPX:PCD  Code: DNR/I  Dispo:admit inpt              Chief Complaint:   Flank pain, fever, weakness, confusion        History of Present Illness:   Edison Boss is a 89 year old male with a history of prior prostate cancer treated with brachy therapy and XRT, chronic indwelling tolliver catheter, paroxysmal AF (not anticoagulated) and SSS s/p ablation and dual chamber PPM, htn, and hlp.  He has a recent complicated PMH and this will be his 3rd admission in a month.     He was hospitalized 9/16-9/19/2018 for generalized weakness with falls and diagnosed with pna and a candidal UTI.  He was ultimately treated with levofloxacin and fluconazole and discharged home.  He returned and hospitalized from 9/24-10/4/18 ultimately diagnosed with candida fungemia with sepsis in setting of an obstructing left ureteral stone.  He required urgent cystoscopy with stent placement and stone pushed back so as not to obstruct.  He was treated initially with broad spectrum abx (pip-tazo/vanco) but when fungemia diagnosed placed on IV micafungin, and then discharged with 2 weeks of fluconazole-then to decrease from 400 mg to 100 mg until 7 days after stone removed.    He discharged to TCU, and was doing quite well up until one day ago.  Daughters noticed that he seemed a bit more confused and out of it but he denied any pain or any kind of localizing symptoms.  And today he began complaining of right-sided flank pain, was clearly getting weaker needing assist of 2 people instead of 1, he had associated nausea and poor p.o. intake but no diarrhea or vomiting.  His daughter spent  like he was continuing to get worse ultimately opting to cough EMS for ambulance to bring him into the emergency room for further evaluation.  He denies nasal congestion earache or sore throat, no neck stiffness no abdominal pain no diarrhea, no cough or shortness of breath, no skin rashes.  He had a rather swollen left hand a few days ago while in TCU that was diagnosed as gout and treated accordingly with significant improvement.  His chronic indwelling Christianson catheter once in the sense of frequency or irritation with urination.  His biggest issue is this right-sided back pain/flank pain    In the ER vital signs are notable for mild hypertension and a fever to 101.9, labs show acute kidney injury with BUN and creatinine of 32 and 1.4 a significant increase in his creatinine from yesterday, a climbing white count over the past 3 days currently at 16.6 with a neutrophilia, chest x-ray was completed and actually looks like he is clearing some of the fluid from his last hospitalization, urinalysis is grossly positive, and CT of the abdomen and pelvis shows no renal injury, no hydronephrosis on the right, right sided inguinal hernia without evidence of strangulation, left ureteral stent in place without hydronephrosis and a left ureteral stone in similar position.      The history is obtained in discussion with the ER provider Dr Nice           Past Medical History:     Past Medical History:   Diagnosis Date     Chronic indwelling Christianson catheter      Chronic infection     UTI     Chronic pain     lower pain, perineum     Esophageal reflux      Fungemia 09/2018    candida-from UTI and infected left ureteral stone     Hyperlipidaemia      Hypertension      Malignant neoplasm of prostate (H) 8/14/2002    Brachytherapy, then external radiation. chronic indwelling catheter     Mild persistent asthma     with URI     Paroxysmal A-fib (H)     not on AC     Prostate cancer (H)     treated with brachytherapy and xrt      Sinus node dysfunction (H)     sp DDD PM     Sleep apnea     ?   snores     Unspecified cerebral artery occlusion with cerebral infarction      Ureterolithiasis 09/2018    with hydronephrosis, sepsis 2/2 candidal UTI             Past Surgical History:     Past Surgical History:   Procedure Laterality Date     C NONSPECIFIC PROCEDURE  1980's    Kidney stone surgery     C NONSPECIFIC PROCEDURE  1985, 5/2005    TURP x 2     C NONSPECIFIC PROCEDURE  1/2002    Brachytherapy     C NONSPECIFIC PROCEDURE  ~1999    Left rotator cuff repair     C NONSPECIFIC PROCEDURE      Bilateral cataract surgery     C NONSPECIFIC PROCEDURE      EGD/dilation twice     CYSTOSCOPY       CYSTOSCOPY, INJECT COLLAGEN, COMBINED  6/6/2012    Procedure:COMBINED CYSTOSCOPY, INJECT BULKING AGENT; VIDEO CYSTOSCOPY WITH BOTOX INJECTIONS  ; Surgeon:BRIAN ADAN; Location: OR     CYSTOSCOPY, INJECT COLLAGEN, COMBINED  3/22/2013    Procedure: COMBINED CYSTOSCOPY, INJECT BULKING AGENT;  VIDEO CYSTOSCOPY, BOTOX INJECTION;  Surgeon: Brian Adan MD;  Location: [facility] OR     CYSTOSCOPY, INJECT COLLAGEN, COMBINED  8/8/2014    Procedure: COMBINED CYSTOSCOPY, INJECT BULKING AGENT;  Surgeon: Brian Adan MD;  Location:  OR     CYSTOSCOPY, INJECT COLLAGEN, COMBINED N/A 8/12/2015    Procedure: COMBINED CYSTOSCOPY, INJECT BULKING AGENT;  Surgeon: Brian Adan MD;  Location:  OR     CYSTOSCOPY, INJECT COLLAGEN, COMBINED N/A 12/8/2016    Procedure: COMBINED CYSTOSCOPY, INJECT BULKING AGENT;  Surgeon: Brian Adan MD;  Location:  OR     CYSTOSCOPY, RETROGRADES, INSERT STENT URETER(S), COMBINED Left 9/27/2018    Procedure: COMBINED CYSTOSCOPY, RETROGRADES, INSERT STENT URETER(S);  1. Cystourethroscopy  2. Left retrograde pyelography with interpretation of intraoperative fluoroscopic imaging  3. Left ureteral stent placement;  Surgeon: Froylan Richards MD;  Location:  OR     HC REMOVE TONSILS/ADENOIDS,<11 Y/O  ~1928    T & A  <12y.o.     IMPLANT PACEMAKER       PENIS SURGERY       PROSTATE SURGERY               Social History:     Social History   Substance Use Topics     Smoking status: Former Smoker     Packs/day: 1.00     Years: 40.00     Smokeless tobacco: Never Used      Comment: QUIT      Alcohol use No   Had been living independently up until last hospitalization at which time he did his own cath cares, currently [facility].  Was up with A of 1, but declined again to needing at least 2 people to help.  Code:  DNR/I          Family History:     Family History   Problem Relation Age of Onset     Family History Negative Father       age 91     HEART DISEASE Mother      pacemaker     Family History Negative Mother       in her sleep 89     Cancer Brother      oldest brother - lung cancer,  age 74     Cancer Brother      3rd brother - lymphoma,  age 76     Cancer Brother      2nd brother (Carrington)- prostate cancer, born 192.      Cerebrovascular Disease Brother      Brother (Carrington), born in             Allergies:     Allergies   Allergen Reactions     Ceftriaxone Itching     Bactrim Hives     Zithromax [Azithromycin]      Levaquin Rash     Oral only, can tolerate IV     Macrodantin [Nitrofuran Derivatives] Rash     Took recently with no problems             Medications:     Prior to Admission medications    Medication Sig Last Dose Taking? Auth Provider   ACETAMINOPHEN PO Take 1,000 mg by mouth 3 times daily as needed for pain  Yes Reported, Patient   albuterol (PROVENTIL HFA: VENTOLIN HFA) 108 (90 BASE) MCG/ACT inhaler Inhale 2 puffs into the lungs every 6 hours as needed for shortness of breath / dyspnea.  Yes Porfirio Terrell MD   amLODIPine (NORVASC) 5 MG tablet TAKE 1 TABLET(5 MG) BY MOUTH DAILY 10/17/2018 at Unknown time Yes Porfirio Terrell MD   clobetasol (TEMOVATE) 0.05 % ointment Apply topically daily as needed   Yes Lewis Nuñez   COLCHICINE PO Give 1.2 mg by  mouth in the afternoon for gout until 10/16/2018 23:59 AND Give 0.6 mg by mouth in the afternoon for gout until 10/16/2018 23:59 One hour after1.2 mg dose AND Give 0.6 mg by mouth in the morning for gout for 6 Days  Yes Reported, Patient   Colloidal Oatmeal (AVEENO ECZEMA THERAPY) 1 % CREA Externally apply topically once a week And as needed for dry skin  Yes Reported, Patient   fluconazole (DIFLUCAN) 100 MG tablet Take 1 tablet (100 mg) by mouth daily Start taking 100 mg every day when you have completed the 2 weeks of 400 mg per day, you need to take 100 mg per day through stone removal procedure and for about a week afterwards new at not strt yet Yes Virginie Ferris MD   fluconazole (DIFLUCAN) 200 MG tablet Take 2 tablets (400 mg) by mouth daily for 14 days 10/17/2018 at Unknown time Yes Virginie Ferris MD   lidocaine (LMX4) 4 % CREA 4% topical cream Apply topically daily as needed  Yes Reported, Patient   metoprolol tartrate (LOPRESSOR) 25 MG tablet Take 1 tablet (25 mg) by mouth 2 times daily 10/17/2018 at am Yes Virginie Ferris MD   Multiple Vitamins-Minerals (PRESERVISION AREDS) CAPS Take 1 capsule by mouth 2 times daily 10/17/2018 at am Yes Porfirio Terrell MD   omeprazole (PRILOSEC) 20 MG CR capsule Take 20 mg by mouth 2 times daily  10/17/2018 at am Yes Unknown, Entered By History   PARoxetine (PAXIL) 30 MG tablet Take 0.5 tablets (15 mg) by mouth At Bedtime Hold until fluconazole completed on hold  Virginie Ferris MD                 Review of Systems:   A Comprehensive greater than 10 system review of systems was carried out.  Pertinent positives and negatives are noted above.  Otherwise negative for contributory information.           Physical Exam:   Blood pressure 140/68, pulse 71, temperature 101.9  F (38.8  C), temperature source Rectal, resp. rate 18, SpO2 98 %.  Exam:    General:  Pleasant nad looks stated age, looks uncomfortable and will hold his back with movement (right side)  HEENT:  Head nc/at  sclera clear PERRL O/P:  Moist mucus membranes no posterior pharyngeal erythema or exudate.  Neck is supple  Lungs: cta b nl effort   CV:  RRR no m/r/g no le edema  Abd:  S/nt/nd no r/g  Neuro:  Cn 2-12 grossly intact and strength is intact in b ue and le but clearly weaker than on discharge  Alert and oriented affect appropriate   Skin:  W/d no c/c               Data:          Lab Results   Component Value Date     10/17/2018    Lab Results   Component Value Date    CHLORIDE 102 10/17/2018    Lab Results   Component Value Date    BUN 32 10/17/2018      Lab Results   Component Value Date    POTASSIUM 4.5 10/17/2018    Lab Results   Component Value Date    CO2 28 10/17/2018    Lab Results   Component Value Date    CR 1.40 10/17/2018        Lab Results   Component Value Date    NTBNPI 7974 (H) 2018     Lab Results   Component Value Date     (H) 10/17/2018     Lab Results   Component Value Date    AST 45 10/17/2018    ALT 36 10/17/2018    ALKPHOS 122 10/17/2018    BILITOTAL 0.9 10/17/2018    BILICONJ 0.0 2005   Lactic Acid 0.9  Lipase 160    VB.46/37/48    UA:  Mod blood, large LE, > 182 wbc, 55 rbc, few bacteria, WBC clumps         Imaging:     CXR:  To my read-left pleural effusion almost resolved,no discrete infiltrate identified          Recent Results (from the past 24 hour(s))   CT Abdomen Pelvis w Contrast    Narrative    CT ABDOMEN AND PELVIS WITH CONTRAST 10/17/2018 5:34 PM     HISTORY: History of ureteral stent, flank pain.     CONTRAST DOSE: 78mL Isovue-370    Radiation dose for this scan was reduced using automated exposure  control, adjustment of the mA and/or kV according to patient size, or  iterative reconstruction technique.    FINDINGS: Left ureteral stent is in place, new since 2018. There  is no hydronephrosis. Proximal ureteral stone adjacent to the stent is  noted, probably unchanged in position from 2018. Bilateral renal  cysts and left renal stones are  again noted. Bowel-containing right  inguinal hernia is again noted without evidence of incarceration or  bowel obstruction. No free peritoneal fluid or air. The remainder of  the CT also appears unchanged. Christianson catheter is in place. Left  adrenal 1.9 cm nodule is noted.      Impression    IMPRESSION: Left ureteral stent in place without left hydronephrosis.  Proximal left ureteral stone is probably unchanged in position from  9/27/2018. This scan is otherwise unchanged in appearance compared to  9/27/2018 and 8/28/2018.   XR Chest 2 Views    Narrative    XR CHEST 2 VW   10/17/2018 5:38 PM     HISTORY: Altered mental status.    COMPARISON: Film dated 9/29/2018    FINDINGS: Left cardiac device is in place. Cardiac silhouette is at  the upper limits of normal. There is been an improvement in the left  pleural effusion when compared to 9/29/2018. No significant pleural  fluid is seen on today's film. Mild right basilar opacity consistent  with a small area of infiltrate or atelectasis.  There is also a small  area of infiltrate or fibrosis in the right midlung zone. The  pulmonary vasculature is normal.  The bones and soft tissues are  unremarkable.      Impression    IMPRESSION: Improved infiltrate and pleural effusions.        ARUN RODRIGUEZ MD

## 2018-10-18 NOTE — PROGRESS NOTES
Care Transition Initial Assessment - RN    Met with pt's re Bea and son Abdiaziz at the bedside-verified demographics as correct.  Pt's wife passed away in March of this year and they took good care of each other per Bea and totally independent at his independent living apt at [facility] until the admission previous to this one--last discharge from here on 10/4/18 and went to [facility] for rehab.  He was transported via ambulance to [facility] ED per family request as he appeared very ill to them but they note the doctor or NP didn't agree that the pt needed to be sent to the hospital so they called an ambulance themselves.  Bea notes [facility] has been a good experience for them except for this one thing which they plan to address.  They would prefer he go back there at discharge if there is a bed available--private room is the request.  Pt is admitted w/UTI/Sepsis and is a readmit - same dx as before.  At his independent living, he has no out of home services-gets one meal a day there and manages on his own otherwise.  He has 8 children-5 live close and 3 do not.  DATA   Active Problems:    Sepsis (H)     UTi    Cognitive Status: Mild cognitive impairment.  Primary Care Clinic Name: [facility]  Primary Care MD Name: Porfirio Terrell  Contact information and PCP information verified: Yes  Lives With: facility resident  Living Arrangements: independent living facility at [facility] in [place] (Currently in TCU at [facility] but no bed hold)     Description of Support System: Involved   Who is your support system?: Children       Insurance concerns: No    ASSESSMENT  Patient currently receives the following services:  None       Identified issues/concerns regarding health management: Family very concerned about his decline and readmission to the hospital--hoping he will become well enough to return to TCU and be able to eventually live independently again-pt has been able to manage on his own fairly well since his wife  in  March of this year.  No specific concerns at this time.    PLAN  Patient anticipates discharging back to TCU .     Appointments: No appt w/PCP to be made as pt will discharge back to a TCU.    Care  (CTS) will continue to follow as needed.

## 2018-10-18 NOTE — ED NOTES
"THis tech removed existing tolliver catheter and replaced with new 16fr coude.  Insertion was successful with 0 problems despite pt's history of prostate problems and difficulty of inserting catheters.  Pt's daughter was insistent that only 5mls saline be placed in the cath balloon.  She stated that \" Dr. Kahler has orders for both [facility] and [facility] that only 5mls is to go in the balloon due to his bladder being small and scarred.\"  This tech followed her request as to not injure the pt.  Immediate urine return.  "

## 2018-10-18 NOTE — CONSULTS
Consult Date:  10/18/2018      UROLOGY CONSULTATION       The patient is a 89-year-old male who is in because of fever.  This is his third hospital admission for infection in the last 6 weeks.  He was treated for a bacterial infection and then developed a Candida sepsis.  He now is in with fever but has defervesced overnight on IV antibiotic and has stable vital signs.  He is alert and oriented.      He has been a patient of mine for several years and underwent radiation to the prostate for prostate cancer years ago.  Because of urinary incontinence, he has an indwelling Christianson that is changed monthly and he occasionally requires intravesical Botox because of severe bladder spasticity.  At his last admission, he was found to have a proximal left ureteral stone and underwent cystoscopy and stent placement.      OTHER PAST MEDICAL HISTORY:  Acute renal insufficiency, hypertension, GERD, depression, paroxysmal atrial fibrillation with pacer.  He is not anticoagulated.      On exam, the patient is alert and oriented.  His son and daughter are present.  His urine is clear.  His vital signs are stable.  Examination of the genitalia reveals an indwelling Christianson and enlargement of the right scrotum from a right inguinal hernia.  There is no evidence of cellulitis.      LABORATORY DATA:  This morning's electrolytes are normal, creatinine 1.06, normal serum calcium.  White blood cell count down to 10,200, hemoglobin 10.4, and platelets 225,000.  Urine and blood cultures pending.      ASSESSMENT:   1.  Recurrent urosepsis, clinically stable.     2.  Indirect right inguinal hernia.   3.  Left proximal ureteral calculus that is stented.      PLAN:   1.  Treatment of sepsis.   2.  Cystoscopy with exchange of double-J stent and left ESWL when urine is sterile.         BRIAN ADAN JR, MD             D: 10/18/2018   T: 10/18/2018   MT: BASIM      Name:     JOSELIN VAN   MRN:      [id number]        Account:        [id number]   :      10/19/1924           Consult Date:  10/18/2018      Document: [id number]       cc: Porfirio Lilly Jr, MD

## 2018-10-18 NOTE — PROGRESS NOTES
[facility]  Hospitalist Progress Note  Name: Edison Boss    MRN: [id number]  YOB: 1924    Age: 89 year old  Date of admission: 10/17/2018  Primary care provider: Porfirio Terrell      Reason for Stay (Diagnosis): Acute sepsis secondary to complicated urinary tract infection         Assessment and Plan:      Summary of Stay:  Edison Boss is a 89 year old male with a history of prior prostate cancer treated with brachy therapy and XRT, chronic indwelling tolliver catheter, paroxysmal AF (not anticoagulated) and SSS s/p ablation and dual chamber PPM, htn, and hlp.  He has a recent complicated PMH and this will be his 3rd admission within a month.      He was hospitalized 9/16-9/19/2018 for generalized weakness with falls and diagnosed with pna and a candidal UTI.  He was ultimately treated with levofloxacin and fluconazole and discharged home.  He returned and hospitalized from 9/24-10/4/18 ultimately diagnosed with candida fungemia with sepsis in setting of an obstructing left ureteral stone.  He required urgent cystoscopy with stent placement and stone pushed back so as not to obstruct.  He was treated initially with broad spectrum abx (pip-tazo/vanco) but when fungemia diagnosed placed on IV micafungin, and then discharged with 2 weeks of fluconazole-then to decrease from 400 mg to 100 mg until 7 days after stone removed     He returns to the ER with 2 days of increasing flank pain, fever, weakness, confusion most consistent with recurrent complicated UTI, concern for infected stone     1.    Acute sepsis secondary to complicated UTI: MADELYN/fever to 102/leukocytosis-IVF for now, no indication for pressors, suspect UTI again.     2.  UTI:   Do note that stent and stone in left ureter, flank pain is actually on the right side-? Etiology-ureteral irritation in setting of acute infection??   Urology and infectious disease input and consultation appreciated.   "Plan on stent exchange once urine is sterile.  Otherwise, continue broad spectrum abx with pip-tazo/vanco, and fluconazole.  Await urine cultures.     3.  MADELYN:   Suspect prerenal based on BUN/Creat ratio but could be a component of ATN also with sepsis.  Creatinine improved with IV fluid challenge.  Continue to monitor for now.     4.  Htn:  Cont home regimen of amlodipine 5 mg every day and metoprolol 25 mg bid     5.  Depression:  Cont paroxetine     6.  PAF/SSS-pacer in place, Not on anticoagulation      DVT Prophylaxis: Pneumatic Compression Devices  Code Status: DNR / DNI  Discharge Dispo: Back to TCU when able  Estimated Disch Date / # of Days until Disch: Anticipate 3-5 days in the hospital depending on the course of sepsis and need for ureteral stent exchange if needed to be done while inpatient.    Daughter at the bedside and updated on plan of cares.  Time spent: Greater than 35 minutes      Interval History (Subjective):      Limited historian as patient is lethargic but does answer appropriately to questions.         Physical Exam:      Vital signs:  Temp: 95.5  F (35.3  C) Temp src: Oral BP: 139/63   Heart Rate: 61 Resp: 20 SpO2: 96 % O2 Device: Nasal cannula Oxygen Delivery: 1 LPM   Weight: 72.3 kg (159 lb 6.4 oz)  Estimated body mass index is 22.87 kg/(m^2) as calculated from the following:    Height as of an earlier encounter on 10/17/18: 1.778 m (5' 10\").    Weight as of this encounter: 72.3 kg (159 lb 6.4 oz).    I/O last 3 completed shifts:  In: 1156 [P.O.:510; IV Piggyback:646]  Out: 375 [Urine:375]  Vitals:    10/18/18 0558   Weight: 72.3 kg (159 lb 6.4 oz)       Constitutional:  Somnolent, appears lethargic, cooperative, no apparent distress   Respiratory: Nl work of breathing. Clear to auscultation bilaterally, no crackles or wheezing   Cardiovascular: Regular rate and rhythm, normal S1 and S2, and no murmur noted   Abdomen: Normal bowel sounds, soft, non-distended, some right flank tenderness "   Skin: No rashes, no cyanosis, dry to touch   Neuro: CN 2-12 intact, no localizing weakness   Extremities: No edema, normal range of motion   HEENT Normocephalic, atraumatic, normal nasal turbinates; oropharynx clear   Neck Supple; nl inspection; trachea midline; no thryomegaly   Psychiatric:  Somnolent.  Flat affect          Medications:      All current medications were reviewed with changes reflected in problem list.         Data:      All new lab and imaging data was reviewed.   Labs:    Recent Labs  Lab 10/17/18  1845 10/17/18  1828 10/17/18  1609   CULT Culture negative < 24 hours, reincubate No growth after 18 hours No growth after 18 hours       Recent Labs  Lab 10/18/18  0749 10/17/18  1609 10/17/18  1010   WBC 10.2 16.6* 14.8*   HGB 10.4* 11.6* 10.9*   HCT 32.9* 37.4* 33.3*   MCV 93 93 90    264 250       Recent Labs  Lab 10/18/18  0749 10/17/18  1609 10/16/18  1050    137 134   POTASSIUM 4.1 4.5 4.6   CHLORIDE 107 102 103   CO2 26 28 24   ANIONGAP 5 7 7   GLC 99 111* 92   BUN 20 32* 30   CR 1.06 1.40* 1.15   GFRESTIMATED 65 47* 59*   GFRESTBLACK 79 57* 72   ARYAN 8.1* 9.0 9.1   PROTTOTAL  --  7.9  --    ALBUMIN  --  2.7*  --    BILITOTAL  --  0.9  --    ALKPHOS  --  122  --    AST  --  45  --    ALT  --  36  --       Imaging:   Recent Results (from the past 24 hour(s))   CT Abdomen Pelvis w Contrast    Narrative    CT ABDOMEN AND PELVIS WITH CONTRAST 10/17/2018 5:34 PM     HISTORY: History of ureteral stent, flank pain.     CONTRAST DOSE: 78mL Isovue-370    Radiation dose for this scan was reduced using automated exposure  control, adjustment of the mA and/or kV according to patient size, or  iterative reconstruction technique.    FINDINGS: Left ureteral stent is in place, new since 9/27/2018. There  is no hydronephrosis. Proximal ureteral stone adjacent to the stent is  noted, probably unchanged in position from 9/27/2018. Bilateral renal  cysts and left renal stones are again noted.  Bowel-containing right  inguinal hernia is again noted without evidence of incarceration or  bowel obstruction. No free peritoneal fluid or air. The remainder of  the CT also appears unchanged. Christianson catheter is in place. Left  adrenal 1.9 cm nodule is noted.      Impression    IMPRESSION: Left ureteral stent in place without left hydronephrosis.  Proximal left ureteral stone is probably unchanged in position from  9/27/2018. This scan is otherwise unchanged in appearance compared to  9/27/2018 and 8/28/2018.    VLADIMIR GUILLAUME MD   XR Chest 2 Views    Narrative    XR CHEST 2 VW   10/17/2018 5:38 PM     HISTORY: Altered mental status.    COMPARISON: Film dated 9/29/2018    FINDINGS: Left cardiac device is in place. Cardiac silhouette is at  the upper limits of normal. There is been an improvement in the left  pleural effusion when compared to 9/29/2018. No significant pleural  fluid is seen on today's film. Mild right basilar opacity consistent  with a small area of infiltrate or atelectasis.  There is also a small  area of infiltrate or fibrosis in the right midlung zone. The  pulmonary vasculature is normal.  The bones and soft tissues are  unremarkable.      Impression    IMPRESSION: Improved infiltrate and pleural effusions.        MD Marti CORTEZ [phone]

## 2018-10-18 NOTE — CONSULTS
Consult Date:  10/18/2018      INFECTIOUS DISEASE CONSULTATION       LOCATION:  Room 501, [facility]      REFERRING PHYSICIAN:  Virginie Ferris MD      IMPRESSIONS:   1.  A 89-year-old male, known to me from recent hospitalization, currently admitted with recurrent low-grade sepsis, right flank discomfort, presumed pyelo/recurrent kidney and obstruction-related issues, urine is grossly abnormal, although urine culture so far negative, as are blood cultures.   2.  Recent admission with major sepsis, appeared life-threatened, ended up with the finding of candidemia due to right-sided obstruction, had a stent in place with obstruction relieved and antifungal treatment, clinically improved, back to near baseline as recently as a few days ago.   3.  Prior infiltrates and possible aspiration, has some mild hypoxia currently, but never really found major aspiration and unlikely pneumonia currently.   4.  Prior cerebrovascular infarction, but cognitive function relatively intact long-term, as is neurologic status.   5.  Ongoing abdominal hernia that has required reduction on occasion.   6.  Gallstones.   7.  LEVAQUIN, ZITHROMAX, BACTRIM AND CEFTRIAXONE ALL LISTED AS ALLERGIES, SOME ARE JUST GI INTOLERANCE.      RECOMMENDATIONS:   1.  Agree with broad-spectrum coverage currently and back to IV fluconazole for now.   2.  Discussed with Dr. Lilly, plan is to remove the stent and eventual plans with the stones unclear exactly what is going on currently, but given he is having flank pain in that same area, have to assume further infection-related issues.   3.  Await the current cultures and adjust.  Of note, his prior candidemia included no Candida growing in the urine at that time, was probably obstructed behind it and thus did not grow in the urine, but it did grow from the kidney when the stent was placed.  Currently, his mentation is somewhat improved and he is somewhat better than at admission.  No other focal or  localizing symptoms, including no new diarrhea or other pain sites.      PAST MEDICAL HISTORY:  He has a permanent pacemaker in place.  This raises some concern given the candidemia, but no evidence that it is infected either then or currently and current blood cultures are negative.  Has had an indwelling Christianson catheter for 10 years, has had some urine infection issues, but they have not been a prominent feature.  The current stones and obstruction as noted, prior prostate cancer.      ALLERGIES:  MULTIPLE LISTED ANTIBIOTIC ALLERGIES, MOSTLY INTOLERANCES, TOLERATES PENICILLINS DESPITE THE LISTED CEFTRIAXONE ALLERGY.      SOCIAL AND FAMILY HISTORY:  Extensive health care contact recently without known resistant pathogens.  Lives in a senior living-type arrangement.      MEDICATIONS:  As listed.      REVIEW OF SYSTEMS:  Some flank pain still present.  His cognition is not quite back to baseline, but not nearly as confused as at admission.  No other focal symptoms.      PHYSICAL EXAMINATION:   GENERAL:  The patient appears his stated age, does not look as toxic as last episode, but back on oxygen.  Cognition seems relatively baseline.   HEENT:  No thrush or intraoral lesions.  Pupils are reactive.   NECK:  Supple and nontender, no lymphadenopathy or thyromegaly.   HEART:  Regular rhythm, no particular murmur.   LUNGS:  Clear bilaterally.   ABDOMEN:  Soft, nontender, no hepatosplenomegaly.  He does have some slight tenderness in the right abdomen and into the right flank area to percussion.   EXTREMITIES:  No major rash or skin lesions.     NEUROLOGIC:  No major neurologic abnormalities.      LABORATORY DATA:  Urine grossly abnormal, but urine culture so far negative.  Blood cultures so far negative.  White count had gone down to normal prior to his prior discharge, currently is at 16,000.  CT scan did not show new major obstruction, no abscess, maybe some slight stranding in the area.      Thank you very much for  consultation.  I will follow the patient with you.         OLIVA SANTOS MD             D: 10/18/2018   T: 10/18/2018   MT: CODEY      Name:     JOSELIN VAN   MRN:      [id number]        Account:       [id number]   :      10/19/1924           Consult Date:  10/18/2018      Document: [id number]       cc: Porfirio Terrell MD

## 2018-10-18 NOTE — PLAN OF CARE
Problem: Patient Care Overview  Goal: Plan of Care/Patient Progress Review  Patient alert to self and situation. VSS, 1L 02. Denies pain. Assist of 2 mobility. Regular diet with  Poor appetite. Spasms present, cause of leaking from around tolliver site. Hernia present. Red blanchable coccyx, barrier cream applied. IV zosyn. Family present for  Most of shift. Urology and ID consults. Tele NSR with PVCs.

## 2018-10-19 NOTE — PROGRESS NOTES
"   10/19/18 1000   Quick Adds   Type of Visit Initial PT Evaluation   Living Environment   Lives With alone   Living Arrangements independent living facility   Home Accessibility no concerns   Number of Stairs to Enter Home 0   Number of Stairs Within Home 0   Living Environment Comment Pt lives alone in ILF apartment, no stairs, elevator access, walk in shower with shower chair, RTS.   Self-Care   Usual Activity Tolerance good   Current Activity Tolerance poor   Equipment Currently Used at Home walker, rolling   Functional Level Prior   Ambulation 1-->assistive equipment   Transferring 0-->independent   Toileting 0-->independent   Bathing 1-->assistive equipment   Dressing 0-->independent   Prior Functional Level Comment Per chart review from previous admission in 9/2018: \"Son assisted with providing PLOF. Patient uses 4WW for all mobility at baseline. Patient receives all meals at facility, children assist with laundry, driving, daughter (who is an RN) assists with setting up meds. Increased services available if needed.\"    General Information   Onset of Illness/Injury or Date of Surgery - Date 10/17/18   Referring Physician Her MD   Patient/Family Goals Statement To get stronger   Pertinent History of Current Problem (include personal factors and/or comorbidities that impact the POC) 89 year old male admitted with UTI and sepsis.    Precautions/Limitations fall precautions;oxygen therapy device and L/min  (1L via NC)   Cognitive Status Examination   Orientation person;place   Level of Consciousness alert   Follows Commands and Answers Questions able to follow single-step instructions  (Increased response time noted. )   Personal Safety and Judgment impaired  (Needs assist and cues for safe mobility)   Pain Assessment   Patient Currently in Pain (Pt reports \"all over\" pain)   Posture    Posture Forward head position;Kyphosis   Range of Motion (ROM)   ROM Comment UE/LE AROM WFLs.    Strength   Strength Comments " "Decreased generally; needs physical assist with mobility as noted below.    Transfer Skills   Transfer Comments Sit to stand from low recliner chair with mod A   Gait   Gait Comments Ambulates one step with each LE and min A, FWW.    Balance   Balance Comments Requires bilat UE support on FWW   General Therapy Interventions   Planned Therapy Interventions bed mobility training;gait training;strengthening;transfer training;progressive activity/exercise;home program guidelines   Clinical Impression   Criteria for Skilled Therapeutic Intervention yes, treatment indicated   PT Diagnosis Impaired gait, generalized weakness   Influenced by the following impairments Pain, decreased activity tolerance, weakness, impaired balance, confusion/safety   Functional limitations due to impairments Decreased IND with functional transfers   Clinical Presentation Stable/Uncomplicated   Clinical Presentation Rationale Stable per chart   Clinical Decision Making (Complexity) Low complexity   Therapy Frequency` 5 times/week   Predicted Duration of Therapy Intervention (days/wks) One week   Anticipated Discharge Disposition Transitional Care Facility   Risk & Benefits of therapy have been explained Yes   Patient, Family & other staff in agreement with plan of care Yes   [facility] [employer] \"6 Clicks\"   2016, Trustees of [facility], under license to [employer].  All rights reserved.   6 Clicks Short Forms Basic Mobility Inpatient Short Form   [facility] [employer]  \"6 Clicks\" V.2 Basic Mobility Inpatient Short Form   1. Turning from your back to your side while in a flat bed without using bedrails? 3 - A Little   2. Moving from lying on your back to sitting on the side of a flat bed without using bedrails? 2 - A Lot   3. Moving to and from a bed to a chair (including a wheelchair)? 3 - A Little   4. Standing up from a chair using your arms (e.g., wheelchair, or bedside chair)? 2 - A Lot   5. To walk in hospital room? 3 " - A Little   6. Climbing 3-5 steps with a railing? 1 - Total   Basic Mobility Raw Score (Score out of 24.Lower scores equate to lower levels of function) 14   Total Evaluation Time   Total Evaluation Time (Minutes) 10

## 2018-10-19 NOTE — PROGRESS NOTES
Patient CARE assumed and patient was seen and examined by me this morning.  89-year-old male with complex urinary tract infection with stent in place.  Currently receiving IV antibiotic with Zosyn and vancomycin as well as antifungal treatment by infectious disease direction.  Patient is up in chair but appears to be in respiratory distress with decreased entry and increased work of breathing.  Patient requires nasal oxygen.  His labs and vital signs reviewed as well as his weight.  Suspect he has lung infection as well as slight volume overload.  Continue antibiotic, administer 20 mg IV Lasix, continue to monitor weight.  Continue supplemental oxygen.  Please review my detailed progress note at a later time.

## 2018-10-19 NOTE — PLAN OF CARE
Problem: Urinary Tract Infection (Adult)  Goal: Signs and Symptoms of Listed Potential Problems Will be Absent, Minimized or Managed (Urinary Tract Infection)  Signs and symptoms of listed potential problems will be absent, minimized or managed by discharge/transition of care (reference Urinary Tract Infection (Adult) CPG).   Outcome: No Change  Pt remains hospitalized for UTI.   Vital signs stable, currently on 1L NC, denies any pain.   Up with assist x 2 with joel steady.   PIV saline locked. Continues IV Zosyn.   On tele monitoring. SR 60  Christianson with adequate output.   PT/OT/SW/Urology/ID  Will continue to monitor.

## 2018-10-19 NOTE — PLAN OF CARE
"Problem: Patient Care Overview  Goal: Plan of Care/Patient Progress Review  OT- eval completed and treatment initiated. Patient is a 89 year old male admitted with UTI and sepsis. Refer to H&P for details, this is patient's 3rd admission to hospital since September 2018, who recently returned home from TCU stay. Per chart review from previous admission in 9/2018: \"Son assisted with providing PLOF. Patient uses 4WW for all mobility at baseline. Patient receives all meals at facility, children assist with laundry, driving, daughter (who is an RN) assists with setting up meds. Increased services available if needed.\"     Discharge Planner OT   Patient plan for discharge: not stated  Current status: patient was soundly sleeping upon OT arrival. Increased time taken for patient awaken to meet with OT. Patient not deemed safe due to increased fatigue and note confusion for transfers, so eval was limited. Patient noted to have increased difficulty with recalling information. Patient stated \"i think it's my birthday today\" and he was correct. Patient also accurate with place \"[facility]\". Patient had difficulty answering simple questions, decreased alertness at times and was deemed unsafe as a result for functional transfer to chair. Patient indicated he didn't want breakfast which appeared to be pre-ordered from post-it note on menu. Increased time for OT to work through menu to find choices patient would prefer. Patient requiring OT to frequently repeat choices for patient to process through. Noted slower processing, further cognitive testing would be appropriate at later date once antibiotics have cleared infection.     Also noted coughing when drinking from straw, none noted when drinking from cup, however delayed swallow reflex noted- speech eval maybe indicated.   Barriers to return to prior living situation: Level of A with functional transfers, decreased ability to care for self, confusion, decreased activity " tolerance, weakness, current medical condition  Recommendations for discharge: TCU  Rationale for recommendations: Patient functioning below baseline. Will need continued OT services to address strength,ADL's, cognition and activity tolerance deficits prior to returning to his ILF.       Entered by: Viridiana Coleman 10/19/2018 3:18 PM

## 2018-10-19 NOTE — PROGRESS NOTES
" 10/19/18 3765   Quick Adds   Type of Visit Initial Occupational Therapy Evaluation   Living Environment   Lives With alone   Living Arrangements independent living facility   Home Accessibility no concerns   Number of Stairs to Enter Home 0   Number of Stairs Within Home 0   Living Environment Comment Pt lives alone in ILF apartment, no stairs, elevator access, walk in shower with shower chair, RTS.   Self-Care   Dominant Hand right  (preferred to use L hand to drink from cup)   Usual Activity Tolerance good   Current Activity Tolerance poor   Equipment Currently Used at Home walker, rolling;raised toilet;shower chair   Functional Level Prior   Ambulation 1-->assistive equipment   Transferring 0-->independent   Toileting 0-->independent   Bathing 1-->assistive equipment   Dressing 0-->independent   Eating 0-->independent   Communication 0-->understands/communicates without difficulty   Swallowing 0-->swallows foods/liquids without difficulty   Cognition 1 - attention or memory deficits   Fall history within last six months yes   Number of times patient has fallen within last six months 4   Which of the above functional risks had a recent onset or change? ambulation;transferring;toileting;bathing;dressing;cognition;swallowing   Prior Functional Level Comment Per chart review from previous admission in 9/2018: \"Son assisted with providing PLOF. Patient uses 4WW for all mobility at baseline. Patient receives all meals at facility, children assist with laundry, driving, daughter (who is an RN) assists with setting up meds. Increased services available if needed.\"    General Information   Onset of Illness/Injury or Date of Surgery - Date 10/17/18   Referring Physician  Her   Patient/Family Goals Statement not stated   Additional Occupational Profile Info/Pertinent History of Current Problem 89 year old male admitted with UTI and sepsis. Refer to H&P for details, this is patient's 3rd admission to hospital since " "September 2018.   Precautions/Limitations fall precautions   General Observations patient was soundly sleeping upon OT arrival. Increased time taken for patient awaken to meet with OT. Patient not deemed safe due to increased fatigue and note confusion for transfers, so evin brambila limited   Cognitive Status Examination   Orientation person;place;time  (aware it was his birthday today, \"[facility]\")   Level of Consciousness alert  (but very sleepy)   Memory impaired   Cognitive Comment difficult to fully assess due to patietn fatigue level   Pain Assessment   Patient Currently in Pain No   Range of Motion (ROM)   ROM Comment decreased ROM in B shoulder, distal movements WFL's   Strength   Strength Comments general weakness noted. poor activity tolerance   Hand Strength   Hand Strength Comments adequate to hold and drink from cup   Muscle Tone Assessment   Muscle Tone Quick Adds No deficits were identified   Coordination   Coordination Comments adequate to retrieve cup from side table with L hand,  hold and drink from cup   Transfer Skills   Transfer Comments mobility deferred to next session as not deemed safe due ot patient's increased fatigue levels   Eating/Self Feeding   Level of [place]: Eating (noted coughing with drinking from straw)   Instrumental Activities of Daily Living (IADL)   IADL Comments completed by family   Activities of Daily Living Analysis   Impairments Contributing to Impaired Activities of Daily Living cognition impaired;strength decreased   General Therapy Interventions   Planned Therapy Interventions strengthening;progressive activity/exercise;cognition;ADL retraining   Clinical Impression   Criteria for Skilled Therapeutic Interventions Met yes, treatment indicated   OT Diagnosis decreased ADL's   Influenced by the following impairments cognition impaired;strength decreased   Assessment of Occupational Performance 3-5 Performance Deficits   Identified Performance Deficits decreased " "ADL's- dsg, toileting, bathing, functional mobility   Clinical Decision Making (Complexity) Moderate complexity   Therapy Frequency 5 times/wk   Predicted Duration of Therapy Intervention (days/wks) 5 days   Anticipated Discharge Disposition Transitional Care Facility   Risks and Benefits of Treatment have been explained. Yes   Patient, Family & other staff in agreement with plan of care Yes   [facility] TM \"6 Clicks\"   2016, Trustees of [facility], under license to [employer].  All rights reserved.   6 Clicks Short Forms Daily Activity Inpatient Short Form   [facility]  \"6 Clicks\" Daily Activity Inpatient Short Form   1. Putting on and taking off regular lower body clothing? 1 - Total   2. Bathing (including washing, rinsing, drying)? 2 - A Lot   3. Toileting, which includes using toilet, bedpan or urinal? 2 - A Lot   4. Putting on and taking off regular upper body clothing? 2 - A Lot   5. Taking care of personal grooming such as brushing teeth? 3 - A Little   6. Eating meals? 3 - A Little   Daily Activity Raw Score (Score out of 24.Lower scores equate to lower levels of function) 13   Total Evaluation Time   Total Evaluation Time (Minutes) 10     "

## 2018-10-19 NOTE — PROGRESS NOTES
[facility]  Hospitalist Progress Note  Name: Edison Boss    MRN: [id number]  YOB: 1924    Age: 89 year old  Date of admission: 10/17/2018  Primary care provider: Porfirio Terrell      Reason for Stay (Diagnosis): Acute sepsis secondary to complicated urinary tract infection         Assessment and Plan:      Summary of Stay:  Edison Boss is a 89 year old male with a history of prior prostate cancer treated with brachy therapy and XRT, chronic indwelling tolliver catheter, paroxysmal AF (not anticoagulated) and SSS s/p ablation and dual chamber PPM, htn, and hlp.  He has a recent complicated PMH and this will be his 3rd admission within a month.      He was hospitalized 9/16-9/19/2018 for generalized weakness with falls and diagnosed with pna and a candidal UTI.  He was ultimately treated with levofloxacin and fluconazole and discharged home.  He returned and hospitalized from 9/24-10/4/18 ultimately diagnosed with candida fungemia with sepsis in setting of an obstructing left ureteral stone.  He required urgent cystoscopy with stent placement and stone pushed back so as not to obstruct.  He was treated initially with broad spectrum abx (pip-tazo/vanco) but when fungemia diagnosed placed on IV micafungin, and then discharged with 2 weeks of fluconazole-then to decrease from 400 mg to 100 mg until 7 days after stone removed     He returns to the ER with 2 days of increasing flank pain, fever, weakness, confusion most consistent with recurrent complicated UTI, concern for infected stone     1.    Acute sepsis secondary to complicated UTI: MADELYN/fever to 102/leukocytosis-IVF for now, no indication for pressors, suspect UTI again.     2.  UTI:   Do note that stent and stone in left ureter, flank pain is actually on the right side-? Etiology-ureteral irritation in setting of acute infection??   Urology and infectious disease input and consultation appreciated.   Plan on stent exchange once urine is sterile.  Otherwise, continue broad spectrum abx with pip-tazo/vanco, and fluconazole.  Await urine cultures.     3.  MADELYN:   Suspect prerenal based on BUN/Creat ratio but could be a component of ATN also with sepsis.  Creatinine improved with IV fluid challenge.  Continue to monitor for now.     4.  Htn:  Cont home regimen of amlodipine 5 mg every day and metoprolol 25 mg bid     5.  Depression:  Cont paroxetine     6.  PAF/SSS-pacer in place, Not on anticoagulation    7.SOB and hypoxia - suspect multifactorial etiology - sepsis , fluid overload   -- will adm lasix , monitor       DVT Prophylaxis: Pneumatic Compression Devices  Code Status: DNR / DNI  Discharge Dispo: Back to TCU when able  Estimated Disch Date / # of Days until Disch: Anticipate 3-5 days in the hospital depending on the course of sepsis and need for ureteral stent exchange if needed to be done while inpatient.    Daughter at the bedside and updated on plan of cares.  Time spent: Greater than 35 minutes      Interval History (Subjective):        Patient CARE assumed and patient was seen and examined by me this morning.  89-year-old male with complex urinary tract infection with stent in place.  Currently receiving IV antibiotic with Zosyn and vancomycin as well as antifungal treatment by infectious disease direction.  Patient is up in chair but appears to be in respiratory distress with decreased entry and increased work of breathing.  Patient requires nasal oxygen.  His labs and vital signs reviewed as well as his weight.  Suspect he has lung infection as well as slight volume overload.                               Physical Exam:      Vital signs:  Temp: 98.8  F (37.1  C) Temp src: Oral BP: 142/62   Heart Rate: 60 Resp: 19 SpO2: 96 % O2 Device: None (Room air) Oxygen Delivery: 1 LPM   Weight: 74.2 kg (163 lb 9.6 oz)  Estimated body mass index is 23.47 kg/(m^2) as calculated from the following:    Height as of  "an earlier encounter on 10/17/18: 1.778 m (5' 10\").    Weight as of this encounter: 74.2 kg (163 lb 9.6 oz).    I/O last 3 completed shifts:  In: 510 [P.O.:510]  Out: 1425 [Urine:1425]  Vitals:    10/18/18 0558 10/19/18 0537   Weight: 72.3 kg (159 lb 6.4 oz) 74.2 kg (163 lb 9.6 oz)       Constitutional: Alert , mild  distress   Respiratory: Nl work of breathing. Clear to auscultation bilaterally, no crackles or wheezing   Cardiovascular: Regular rate and rhythm, normal S1 and S2, and no murmur noted   Abdomen: Normal bowel sounds, soft, non-distended, some right flank tenderness   Skin: No rashes, no cyanosis, dry to touch   Neuro: CN 2-12 intact, no localizing weakness   Extremities: No edema, normal range of motion   HEENT Normocephalic, atraumatic, normal nasal turbinates; oropharynx clear   Neck Supple; nl inspection; trachea midline; no thryomegaly   Psychiatric:  Somnolent.  Flat affect          Medications:      All current medications were reviewed with changes reflected in problem list.         Data:      All new lab and imaging data was reviewed.   Labs:    Recent Labs  Lab 10/17/18  1845 10/17/18  1828 10/17/18  1609   CULT <10,000 colonies/mLKlebsiella oxytocaSusceptibility testing in progress* No growth after 2 days No growth after 2 days       Recent Labs  Lab 10/19/18  0713 10/18/18  0749 10/17/18  1609   WBC 7.3 10.2 16.6*   HGB 10.7* 10.4* 11.6*   HCT 34.2* 32.9* 37.4*   MCV 93 93 93    225 264       Recent Labs  Lab 10/19/18  0713 10/18/18  0749 10/17/18  1609    138 137   POTASSIUM 4.1 4.1 4.5   CHLORIDE 106 107 102   CO2 27 26 28   ANIONGAP 6 5 7   GLC 83 99 111*   BUN 15 20 32*   CR 1.06 1.06 1.40*   GFRESTIMATED 65 65 47*   GFRESTBLACK 79 79 57*   ARYAN 8.2* 8.1* 9.0   PROTTOTAL  --   --  7.9   ALBUMIN  --   --  2.7*   BILITOTAL  --   --  0.9   ALKPHOS  --   --  122   AST  --   --  45   ALT  --   --  36      Imaging:   Recent Results (from the past 24 hour(s))   CT Abdomen Pelvis w " Contrast    Narrative    CT ABDOMEN AND PELVIS WITH CONTRAST 10/17/2018 5:34 PM     HISTORY: History of ureteral stent, flank pain.     CONTRAST DOSE: 78mL Isovue-370    Radiation dose for this scan was reduced using automated exposure  control, adjustment of the mA and/or kV according to patient size, or  iterative reconstruction technique.    FINDINGS: Left ureteral stent is in place, new since 9/27/2018. There  is no hydronephrosis. Proximal ureteral stone adjacent to the stent is  noted, probably unchanged in position from 9/27/2018. Bilateral renal  cysts and left renal stones are again noted. Bowel-containing right  inguinal hernia is again noted without evidence of incarceration or  bowel obstruction. No free peritoneal fluid or air. The remainder of  the CT also appears unchanged. Christianson catheter is in place. Left  adrenal 1.9 cm nodule is noted.      Impression    IMPRESSION: Left ureteral stent in place without left hydronephrosis.  Proximal left ureteral stone is probably unchanged in position from  9/27/2018. This scan is otherwise unchanged in appearance compared to  9/27/2018 and 8/28/2018.    VLADIMIR GUILLAUME MD   XR Chest 2 Views    Narrative    XR CHEST 2 VW   10/17/2018 5:38 PM     HISTORY: Altered mental status.    COMPARISON: Film dated 9/29/2018    FINDINGS: Left cardiac device is in place. Cardiac silhouette is at  the upper limits of normal. There is been an improvement in the left  pleural effusion when compared to 9/29/2018. No significant pleural  fluid is seen on today's film. Mild right basilar opacity consistent  with a small area of infiltrate or atelectasis.  There is also a small  area of infiltrate or fibrosis in the right midlung zone. The  pulmonary vasculature is normal.  The bones and soft tissues are  unremarkable.      Impression    IMPRESSION: Improved infiltrate and pleural effusions.        ARUN RODRIGUEZ MD

## 2018-10-19 NOTE — PROGRESS NOTES
[facility]  Infectious Disease Progress Note          Assessment and Plan:   IMPRESSIONS:   1.  A 89-year-old male, known to me from recent hospitalization, currently admitted with recurrent low-grade sepsis, right flank discomfort, presumed pyelo/recurrent kidney and obstruction-related issues, urine is grossly abnormal, although urine culture so far negative, as are blood cultures.   2.  Recent admission with major sepsis, appeared life-threatened, ended up with the finding of candidemia due to right-sided obstruction, had a stent in place with obstruction relieved and antifungal treatment, clinically improved, back to near baseline as recently as a few days ago.   3.  Prior infiltrates and possible aspiration, has some mild hypoxia currently, but never really found major aspiration and unlikely pneumonia currently.   4.  Prior cerebrovascular infarction, but cognitive function relatively intact long-term, as is neurologic status.   5.  Ongoing abdominal hernia that has required reduction on occasion.   6.  Gallstones.   7.  LEVAQUIN, ZITHROMAX, BACTRIM AND CEFTRIAXONE ALL LISTED AS ALLERGIES, SOME ARE JUST GI INTOLERANCE.       RECOMMENDATIONS:   1.  Continue broad-spectrum coverage currently and  IV fluconazole for now. But only sens kleb in cx so far  2.  Discussed with Dr. Lilly, plan is to remove the stent and eventual plans with the stones unclear exactly what is going on currently, but given he is having flank pain in that same area, have to assume further infection-related issues.   3.  Await final current cultures and adjust.  Of note, his prior candidemia included no Candida growing in the urine at that time, was probably obstructed behind it and thus did not grow in the urine, but it did grow from the kidney when the stent was placed.  Currently, his mentation is somewhat improved and he is somewhat better than at admission.  No other focal or localizing symptoms, including no  new diarrhea or other pain sites.             Interval History:   no new complaints and doing well; no cp, sob, n/v/d, or abd pain. Still flank pain T down cx as above              Medications:       amLODIPine  5 mg Oral Daily     fluconazole  400 mg Intravenous Q24H     metoprolol tartrate  25 mg Oral BID     omeprazole  20 mg Oral BID AC     piperacillin-tazobactam  3.375 g Intravenous Q6H     sodium chloride (PF)  3 mL Intracatheter Q8H     vancomycin (VANCOCIN) IV  1,500 mg Intravenous Q24H                  Physical Exam:   Blood pressure 128/60, pulse 71, temperature 97.3  F (36.3  C), temperature source Oral, resp. rate 20, weight 74.2 kg (163 lb 9.6 oz), SpO2 92 %.  Wt Readings from Last 2 Encounters:   10/19/18 74.2 kg (163 lb 9.6 oz)   10/17/18 70.3 kg (155 lb)     Vital Signs with Ranges  Temp:  [97.3  F (36.3  C)-98.8  F (37.1  C)] 97.3  F (36.3  C)  Heart Rate:  [58-62] 58  Resp:  [19-20] 20  BP: (128-147)/(60-68) 128/60  SpO2:  [92 %-96 %] 92 %    Constitutional: Awake, alert, cooperative, no apparent distress   Lungs: Clear to auscultation bilaterally, no crackles or wheezing   Cardiovascular: Regular rate and rhythm, normal S1 and S2, and no murmur noted   Abdomen: Normal bowel sounds, soft, non-distended, non-tender   Skin: No rashes, no cyanosis, no edema   Other:           Data:   All microbiology laboratory data reviewed.  Recent Labs   Lab Test  10/19/18   0713  10/18/18   0749  10/17/18   1609   WBC  7.3  10.2  16.6*   HGB  10.7*  10.4*  11.6*   HCT  34.2*  32.9*  37.4*   MCV  93  93  93   PLT  245  225  264     Recent Labs   Lab Test  10/19/18   0713  10/18/18   0749  10/17/18   1609   CR  1.06  1.06  1.40*     Recent Labs   Lab Test  10/16/18   1050   SED  47*     Recent Labs   Lab Test  10/17/18   1845  10/17/18   1828  10/17/18   1609  09/28/18   1332  09/28/18   1331  09/27/18   1512  09/24/18   2050  09/24/18   1203  09/16/18   2303   CULT  <10,000 colonies/mL  Klebsiella  oxytoca  Susceptibility testing in progress  *  No growth after 2 days  No growth after 2 days  No growth  No growth  No anaerobes isolated  Heavy growth  Denise albicans / dubliniensis  Candida albicans and Candida dubliniensis are not routinely speciated  Susceptibility testing not routinely done  *  <1000 colonies/mL  urogenital jeanette  Susceptibility testing not routinely done    Cultured on the 3rd day of incubation:  Candida albicans  *  Critical Value/Significant Value, preliminary result only, called to and read back by  Willow Nesbitt RN, @0638 09/27/18.DH.    No growth  No growth

## 2018-10-19 NOTE — PLAN OF CARE
Problem: Patient Care Overview  Goal: Plan of Care/Patient Progress Review  Outcome: Improving  VSS, afebrile. Tylenol x1 for pain. Up with assist of 2 and sera steady. Christianson in place with good output. Urine cultures positive for klebsiella oxytoca. Continuing IV zosyn, vanco, diflucan.

## 2018-10-19 NOTE — PLAN OF CARE
Problem: Patient Care Overview  Goal: Plan of Care/Patient Progress Review  PT: Orders received, evaluation completed and treatment initiated. 89 year old male admitted with UTI and sepsis. Pt has had three admissions this month. Was at TCU prior to admission. At baseline, pt is mod I with a 4WW in an independent living apartment.     Discharge Planner PT   Patient plan for discharge: Return to TCU  Current status: Sit to stand from low recliner with mod A x 1. Ambulates 8' x 2 reps with FWW and min A. Mild ADLER noted. SpO2 remains >90% on RA with activity. Does drop <90% at rest on RA, placed back on 1L via NC. Mild confusion noted throughout session.    Barriers to return to prior living situation: Level of A with functional transfers, decreased ability to care for self, confusion, decreased activity tolearnce, weakness.   Recommendations for discharge: TCU  Rationale for recommendations: Patient functioning below baseline. Will need continued PT services to address strength, balance and activity tolerance deficits prior to returning to his ILF.       Entered by: Garrett Vang 10/19/2018 11:07 AM

## 2018-10-19 NOTE — PROGRESS NOTES
Discharge Planner   Discharge Plans in progress: will sent TCU referral back to [facility] for possible TCU needs for Monday   Barriers to discharge plan: CC met with pt and family and provide packed for additional choices. Family requested private room. Came from [facility] but did not bed hold   Follow up plan: Will send referral for Monday based on Md note        Entered by: Corinne C. White 10/19/2018 11:21 AM

## 2018-10-19 NOTE — PLAN OF CARE
Problem: Patient Care Overview  Goal: Plan of Care/Patient Progress Review  Outcome: No Change  A&O x4, at times gets a bit confused with the right words. VSS. Up with 2 assist and joel steady, up to chair x1. Tolerating a regular diet, eating 25%. On 1L O2 via NC, sating in the low-mid 90's. Chronic tolliver catheter with adequate output, having intermittent bladder spasms sometimes with urine bypassing. IV SL. On IV Zosyn, Vanco, and Fluconazole. No bm this shift, coccyx red and blanchable, repositioning every 2 hrs. Plan for discharge to TCU when appropriate.

## 2018-10-20 NOTE — PLAN OF CARE
Problem: Patient Care Overview  Goal: Plan of Care/Patient Progress Review  Discharge Planner OT   Patient plan for discharge: Not stated  Current status: Pt completed sit > stand from chair to Ana Laura Steady x2 trials requiring mod A to boost each trial. Pt able to tolerate increased standing time within Ana Laura Steady with CGA. Pt required mod A for safe descent to chair and toilet from Ana Laura Steady. Increased time required for all tasks due to pain, assist required for RLE management. Needs frequent reminders/vcs for task completion, step by step instructions with decreased carryover noted. Daughter present and supportive.   Barriers to return to prior living situation: Level of A with functional transfers, decreased ability to care for self, confusion, decreased activity tolerance, weakness, current medical condition  Recommendations for discharge: TCU  Rationale for recommendations: Pt continues to require increased assist for ADLs and transfers and would benefit from ongoing skilled OT to maximize safety        Entered by: Lenora Montaño 10/20/2018 10:58 AM

## 2018-10-20 NOTE — PLAN OF CARE
Problem: Patient Care Overview  Goal: Plan of Care/Patient Progress Review  Patient alert and oriented, up Ax2 and joel steady, denies pain and nausea. Tolerating diet, fair appetite. On tele, per tele tech A-paced with occasional PVCs. Christianson in place putting out adequate amounts, bypassing urine. On RA sats in the mid 90s. Blood pressure 171/66 at HS, scheduled medications given, recheck 148/62. Continuing IV Zosyn, Vanco, and Diflucan. Discharge pending.

## 2018-10-20 NOTE — PROGRESS NOTES
[facility]  Infectious Disease Progress Note          Assessment and Plan:   IMPRESSIONS:   1.  A 89-year-old male, known to me from recent hospitalization, currently admitted with recurrent low-grade sepsis, right flank discomfort, presumed pyelo/recurrent kidney and obstruction-related issues, urine is grossly abnormal, although urine culture so far negative, as are blood cultures.   2.  Recent admission with major sepsis, appeared life-threatened, ended up with the finding of candidemia due to right-sided obstruction, had a stent in place with obstruction relieved and antifungal treatment, clinically improved, back to near baseline as recently as a few days ago.   3.  Prior infiltrates and possible aspiration, has some mild hypoxia currently, but never really found major aspiration and unlikely pneumonia currently.   4.  Prior cerebrovascular infarction, but cognitive function relatively intact long-term, as is neurologic status.   5.  Ongoing abdominal hernia that has required reduction on occasion.   6.  Gallstones.   7.  LEVAQUIN, ZITHROMAX, BACTRIM AND CEFTRIAXONE ALL LISTED AS ALLERGIES, SOME ARE JUST GI INTOLERANCE.       RECOMMENDATIONS:   1.  Continue   IV fluconazole and zosyn. But only sens kleb in cx so far and likely focus on that  discontinue vanco  2.  Discussed with Dr. Lilly, plan is to remove the stent and eventual plans with the stones unclear exactly what is going on currently, but given he is having flank pain in that same area, have to assume further infection-related issues.   3.  Much improved, likely po tomorrow and if still Oct 30 stone plan tx 2 weeks thru that, likely diflucan also even if not in current cx            Interval History:   no new complaints and doing well; no cp, sob, n/v/d, or abd pain. Still flank pain T down cx as above cx only kleb UC              Medications:       amLODIPine  5 mg Oral Daily     fluconazole  400 mg Intravenous Q24H      metoprolol tartrate  25 mg Oral BID     omeprazole  20 mg Oral BID AC     piperacillin-tazobactam  3.375 g Intravenous Q6H     sodium chloride (PF)  3 mL Intracatheter Q8H                  Physical Exam:   Blood pressure 152/69, pulse 71, temperature 96.8  F (36  C), temperature source Oral, resp. rate 20, weight 72.6 kg (160 lb), SpO2 93 %.  Wt Readings from Last 2 Encounters:   10/20/18 72.6 kg (160 lb)   10/17/18 70.3 kg (155 lb)     Vital Signs with Ranges  Temp:  [96.8  F (36  C)-97.5  F (36.4  C)] 96.8  F (36  C)  Heart Rate:  [56-63] 61  Resp:  [20] 20  BP: (128-171)/(60-69) 152/69  SpO2:  [92 %-96 %] 93 %    Constitutional: Awake, alert, cooperative, no apparent distress   Lungs: Clear to auscultation bilaterally, no crackles or wheezing   Cardiovascular: Regular rate and rhythm, normal S1 and S2, and no murmur noted   Abdomen: Normal bowel sounds, soft, non-distended, non-tender   Skin: No rashes, no cyanosis, no edema   Other:           Data:   All microbiology laboratory data reviewed.  Recent Labs   Lab Test  10/19/18   0713  10/18/18   0749  10/17/18   1609   WBC  7.3  10.2  16.6*   HGB  10.7*  10.4*  11.6*   HCT  34.2*  32.9*  37.4*   MCV  93  93  93   PLT  245  225  264     Recent Labs   Lab Test  10/20/18   0628  10/19/18   0713  10/18/18   0749   CR  1.02  1.06  1.06     Recent Labs   Lab Test  10/16/18   1050   SED  47*     Recent Labs   Lab Test  10/17/18   1845  10/17/18   1828  10/17/18   1609  09/28/18   1332  09/28/18   1331  09/27/18   1512  09/24/18   2050  09/24/18   1203  09/16/18   2303   CULT  <10,000 colonies/mL  Klebsiella oxytoca  *  No growth after 3 days  No growth after 3 days  No growth  No growth  No anaerobes isolated  Heavy growth  Denise albicans / dubliniensis  Candida albicans and Candida dubliniensis are not routinely speciated  Susceptibility testing not routinely done  *  <1000 colonies/mL  urogenital jeanette  Susceptibility testing not routinely done    Cultured on the  3rd day of incubation:  Candida albicans  *  Critical Value/Significant Value, preliminary result only, called to and read back by  Willow Nesbitt RN, @2664 09/27/18.DH.    No growth  No growth

## 2018-10-20 NOTE — PHARMACY-VANCOMYCIN DOSING SERVICE
Pharmacy Vancomycin Note  Date of Service 2018  Patient's  10/19/1924   89 year old, male    Indication: Sepsis  Goal Trough Level: 15-20 mg/L  Day of Therapy: 3  Current Vancomycin regimen:  1500 mg IV q24h    Current estimated CrCl = Estimated Creatinine Clearance: 44.7 mL/min (based on Cr of 1.06).    Creatinine for last 3 days  10/17/2018:  4:09 PM Creatinine 1.40 mg/dL  10/18/2018:  7:49 AM Creatinine 1.06 mg/dL  10/19/2018:  7:13 AM Creatinine 1.06 mg/dL    Recent Vancomycin Levels (past 3 days)  10/19/2018:  9:17 PM Vancomycin Level 12.9 mg/L    Vancomycin IV Administrations (past 72 hours)                   vancomycin (VANCOCIN) 1,500 mg in sodium chloride 0.9 % 250 mL intermittent infusion (mg) 1,500 mg New Bag 10/19/18 2142     1,500 mg New Bag 10/18/18 2152     1,500 mg New Bag 10/17/18 225                Nephrotoxins and other renal medications (Future)    Start     Dose/Rate Route Frequency Ordered Stop    10/18/18 0200  piperacillin-tazobactam (ZOSYN) infusion 3.375 g     Comments:  Pharmacy can adjust dose based on renal function.    3.375 g  100 mL/hr over 30 Minutes Intravenous EVERY 6 HOURS 10/17/18 2159      10/17/18 2230  vancomycin (VANCOCIN) 1,500 mg in sodium chloride 0.9 % 250 mL intermittent infusion      1,500 mg  over 90 Minutes Intravenous EVERY 24 HOURS 10/17/18 2216               Contrast Orders - past 72 hours (72h ago through future)    Start     Dose/Rate Route Frequency Ordered Stop    10/17/18 1718  iopamidol (ISOVUE-370) solution 500 mL      500 mL Intravenous ONCE 10/17/18 1717 10/17/18 1724          Interpretation of levels and current regimen:  Trough level is  Subtherapeutic but done before 3rd dose. Projected to be within range of 15-20 after 3rd dose given    Has serum creatinine changed > 50% in last 72 hours: No    Urine output:  good urine output    Renal Function: Stable    Plan:  1.  Continue Current Dose  2.  Pharmacy will check trough levels as  appropriate in 1-3 Days.    3. Serum creatinine levels will be ordered daily for the first week of therapy and at least twice weekly for subsequent weeks.      Kanu Anderson        .

## 2018-10-20 NOTE — PLAN OF CARE
Problem: Patient Care Overview  Goal: Plan of Care/Patient Progress Review      SLP - Swallow evaluation orders received after pt noted to cough with thin liquids during OT session yesterday. Pt known to SLP from two admissions last month. Bedside swallow evaluations completed on 9/18 and 9/25 mild oral-pharyngeal dysphagia c/w age related changes and recommendations for regular diet/thin liquids using chin tuck for liquids. Per OT notes, pt coughing with thin liquids when using straw; no overt s/sx of aspiration when drinking from a cup. Chart reviewed and discussed with RN. Pt using chin tuck with reminders from family, no overt s/sx of aspiration or dysphagia concerns by RN when pt using straw and chin tuck. Formal swallow evaluation not indicated at this time as pt otherwise tolerating regular diet/thin liquids with previously recommended strategies consistent with two recent SLP evaluations last month. Admit with UTI, no new pna. Will discontinue swallow eval orders at this time. Please reconsult if change in status, difficulty tolerating PO.

## 2018-10-20 NOTE — PROGRESS NOTES
[facility]  Hospitalist Progress Note  Name: Edison Boss    MRN: [id number]  YOB: 1924    Age: 89 year old  Date of admission: 10/17/2018  Primary care provider: Porfirio Terrell      Reason for Stay (Diagnosis): Acute sepsis secondary to complicated urinary tract infection         Assessment and Plan:      Summary of Stay:  Edison Boss is a 89 year old male with a history of prior prostate cancer treated with brachy therapy and XRT, chronic indwelling tolliver catheter, paroxysmal AF (not anticoagulated) and SSS s/p ablation and dual chamber PPM, htn, and hlp.  He has a recent complicated PMH and this will be his 3rd admission within a month.      He was hospitalized 9/16-9/19/2018 for generalized weakness with falls and diagnosed with pna and a candidal UTI.  He was ultimately treated with levofloxacin and fluconazole and discharged home.  He returned and hospitalized from 9/24-10/4/18 ultimately diagnosed with candida fungemia with sepsis in setting of an obstructing left ureteral stone.  He required urgent cystoscopy with stent placement and stone pushed back so as not to obstruct.  He was treated initially with broad spectrum abx (pip-tazo/vanco) but when fungemia diagnosed placed on IV micafungin, and then discharged with 2 weeks of fluconazole-then to decrease from 400 mg to 100 mg until 7 days after stone removed     He returns to the ER with 2 days of increasing flank pain, fever, weakness, confusion most consistent with recurrent complicated UTI, concern for infected stone     1.    Acute sepsis secondary to complicated UTI: MADELYN/fever to 102/leukocytosis-IVF for now, no indication for pressors, suspect UTI again.     2.  UTI:   Do note that stent and stone in left ureter, flank pain is actually on the right side-? Etiology-ureteral irritation in setting of acute infection??   Urology and infectious disease input and consultation appreciated.   "Plan on stent exchange once urine is sterile.  Otherwise, continue broad spectrum abx with pip-tazo/vanco, and fluconazole.  Klebsiella in urine cultures.  ID following and assisting with abx plan      3.  MADELYN:   Suspect prerenal based on BUN/Creat ratio but could be a component of ATN also with sepsis.  Creatinine improved with IV fluid challenge.  Continue to monitor for now.     4.  Htn:  Cont home regimen of amlodipine 5 mg every day and metoprolol 25 mg bid     5.  Depression:  Cont paroxetine     6.  PAF/SSS-pacer in place, Not on anticoagulation    7.SOB and hypoxia - suspect multifactorial etiology - sepsis , fluid overload   -- will adm lasix , monitor       DVT Prophylaxis: Pneumatic Compression Devices  Code Status: DNR / DNI  Discharge Dispo: Back to TCU when able  Estimated Disch Date / # of Days until Disch: Anticipate 2-3 days in the hospital depending on the course of sepsis and need for ureteral stent exchange if needed to be done while inpatient.    Daughter at the bedside and updated on plan of cares.  Time spent: Greater than 35 minutes      Interval History (Subjective):      Patient is seen and examined by me today and medical record reviewed.Overnight events noted and care discussed with nursing staff.    doing well; no cp, sob, n/v/d, or abd pain.               Physical Exam:      Vital signs:  Temp: 96.8  F (36  C) Temp src: Oral BP: 152/69   Heart Rate: 61 Resp: 20 SpO2: 93 % O2 Device: None (Room air) Oxygen Delivery: 1 LPM   Weight: 72.6 kg (160 lb)  Estimated body mass index is 22.96 kg/(m^2) as calculated from the following:    Height as of an earlier encounter on 10/17/18: 1.778 m (5' 10\").    Weight as of this encounter: 72.6 kg (160 lb).    I/O last 3 completed shifts:  In: 240 [P.O.:240]  Out: 1950 [Urine:1950]  Vitals:    10/18/18 0558 10/19/18 0537 10/20/18 0609   Weight: 72.3 kg (159 lb 6.4 oz) 74.2 kg (163 lb 9.6 oz) 72.6 kg (160 lb)       Constitutional: Alert , mild  distress "   Respiratory: Nl work of breathing. Clear to auscultation bilaterally, no crackles or wheezing   Cardiovascular: Regular rate and rhythm, normal S1 and S2, and no murmur noted   Abdomen: Normal bowel sounds, soft, non-distended, some right flank tenderness   Skin: No rashes, no cyanosis, dry to touch   Neuro: CN 2-12 intact, no localizing weakness   Extremities: No edema, normal range of motion   HEENT Normocephalic, atraumatic, normal nasal turbinates; oropharynx clear   Neck Supple; nl inspection; trachea midline; no thryomegaly   Psychiatric:  Somnolent.  Flat affect          Medications:      All current medications were reviewed with changes reflected in problem list.         Data:      All new lab and imaging data was reviewed.   Labs:    Recent Labs  Lab 10/17/18  1845 10/17/18  1828 10/17/18  1609   CULT <10,000 colonies/mLKlebsiella oxytoca* No growth after 3 days No growth after 3 days       Recent Labs  Lab 10/19/18  0713 10/18/18  0749 10/17/18  1609   WBC 7.3 10.2 16.6*   HGB 10.7* 10.4* 11.6*   HCT 34.2* 32.9* 37.4*   MCV 93 93 93    225 264       Recent Labs  Lab 10/20/18  0628 10/19/18  0713 10/18/18  0749 10/17/18  1609   NA  --  139 138 137   POTASSIUM  --  4.1 4.1 4.5   CHLORIDE  --  106 107 102   CO2  --  27 26 28   ANIONGAP  --  6 5 7   GLC  --  83 99 111*   BUN  --  15 20 32*   CR 1.02 1.06 1.06 1.40*   GFRESTIMATED 68 65 65 47*   GFRESTBLACK 82 79 79 57*   ARYAN  --  8.2* 8.1* 9.0   PROTTOTAL  --   --   --  7.9   ALBUMIN  --   --   --  2.7*   BILITOTAL  --   --   --  0.9   ALKPHOS  --   --   --  122   AST  --   --   --  45   ALT  --   --   --  36      Imaging:   Recent Results (from the past 24 hour(s))   CT Abdomen Pelvis w Contrast    Narrative    CT ABDOMEN AND PELVIS WITH CONTRAST 10/17/2018 5:34 PM     HISTORY: History of ureteral stent, flank pain.     CONTRAST DOSE: 78mL Isovue-370    Radiation dose for this scan was reduced using automated exposure  control, adjustment of the mA  and/or kV according to patient size, or  iterative reconstruction technique.    FINDINGS: Left ureteral stent is in place, new since 9/27/2018. There  is no hydronephrosis. Proximal ureteral stone adjacent to the stent is  noted, probably unchanged in position from 9/27/2018. Bilateral renal  cysts and left renal stones are again noted. Bowel-containing right  inguinal hernia is again noted without evidence of incarceration or  bowel obstruction. No free peritoneal fluid or air. The remainder of  the CT also appears unchanged. Christianson catheter is in place. Left  adrenal 1.9 cm nodule is noted.      Impression    IMPRESSION: Left ureteral stent in place without left hydronephrosis.  Proximal left ureteral stone is probably unchanged in position from  9/27/2018. This scan is otherwise unchanged in appearance compared to  9/27/2018 and 8/28/2018.    VLADIMIR GUILLAUME MD   XR Chest 2 Views    Narrative    XR CHEST 2 VW   10/17/2018 5:38 PM     HISTORY: Altered mental status.    COMPARISON: Film dated 9/29/2018    FINDINGS: Left cardiac device is in place. Cardiac silhouette is at  the upper limits of normal. There is been an improvement in the left  pleural effusion when compared to 9/29/2018. No significant pleural  fluid is seen on today's film. Mild right basilar opacity consistent  with a small area of infiltrate or atelectasis.  There is also a small  area of infiltrate or fibrosis in the right midlung zone. The  pulmonary vasculature is normal.  The bones and soft tissues are  unremarkable.      Impression    IMPRESSION: Improved infiltrate and pleural effusions.        ARUN RODRIGUEZ MD

## 2018-10-20 NOTE — PLAN OF CARE
Problem: Patient Care Overview  Goal: Plan of Care/Patient Progress Review  Outcome: No Change  VSS, afebrile. Up with assist of 2 and sera steady. Tylenol x1 for discomfort. Christianson in place with adequate output. Continuing zosyn, vanco, diflucan. Tolerating diet fair appetite. Will continue to monitor.

## 2018-10-20 NOTE — PLAN OF CARE
Problem: Patient Care Overview  Goal: Plan of Care/Patient Progress Review  Outcome: No Change  Pt vitals stable overnight. Tele afib rate 60s (according to tele tech). Had some generalized pain, relieved with prn tylenol. Slept well. Had adequate output through tolliver, no bypassing this shift. Continues on IV zosyn.

## 2018-10-21 NOTE — PROGRESS NOTES
[facility]  Hospitalist Progress Note  Name: Edison Boss    MRN: [id number]  YOB: 1924    Age: 89 year old  Date of admission: 10/17/2018  Primary care provider: Porfirio Terrell      Reason for Stay (Diagnosis): Acute sepsis secondary to complicated urinary tract infection         Assessment and Plan:      Summary of Stay:  Edison Boss is a 89 year old male with a history of prior prostate cancer treated with brachy therapy and XRT, chronic indwelling tolliver catheter, paroxysmal AF (not anticoagulated) and SSS s/p ablation and dual chamber PPM, htn, and hlp.  He has a recent complicated PMH and this will be his 3rd admission within a month.      He was hospitalized 9/16-9/19/2018 for generalized weakness with falls and diagnosed with pna and a candidal UTI.  He was ultimately treated with levofloxacin and fluconazole and discharged home.  He returned and hospitalized from 9/24-10/4/18 ultimately diagnosed with candida fungemia with sepsis in setting of an obstructing left ureteral stone.  He required urgent cystoscopy with stent placement and stone pushed back so as not to obstruct.  He was treated initially with broad spectrum abx (pip-tazo/vanco) but when fungemia diagnosed placed on IV micafungin, and then discharged with 2 weeks of fluconazole-then to decrease from 400 mg to 100 mg until 7 days after stone removed     He returns to the ER with 2 days of increasing flank pain, fever, weakness, confusion most consistent with recurrent complicated UTI, concern for infected stone     1.    Acute sepsis secondary to complicated UTI: MADELYN/fever to 102/leukocytosis-IVF for now, no indication for pressors, suspect UTI again.     2.  UTI:   Do note that stent and stone in left ureter, flank pain is actually on the right side-? Etiology-ureteral irritation in setting of acute infection??   Urology and infectious disease input and consultation appreciated.   "Plan on stent exchange once urine is sterile.  Otherwise, continue broad spectrum abx with pip-tazo/vanco, and fluconazole.  Klebsiella in urine cultures.  ID following and assisting with abx plan . Will discuss with Dr Lobo      3.  MADELYN:   Suspect prerenal based on BUN/Creat ratio but could be a component of ATN also with sepsis.  Creatinine improved with IV fluid challenge.  Continue to monitor for now.      4.  Htn:  Cont home regimen of amlodipine 5 mg every day and metoprolol 25 mg bid     5.  Depression:  Cont paroxetine     6.  PAF/SSS-pacer in place, Not on anticoagulation    7.SOB and hypoxia - suspect multifactorial etiology - sepsis , fluid overload   -- improved with lasix  , monitor       DVT Prophylaxis: Pneumatic Compression Devices  Code Status: DNR / DNI  Discharge Dispo: Back to TCU when able  Estimated Disch Date / # of Days until Disch: Anticipate in next few  days in the hospital depending on the course of sepsis and need for ureteral stent exchange if needed to be done while inpatient.    Daughter at the bedside and updated on plan of cares.  Time spent: Greater than 35 minutes      Interval History (Subjective):      Patient is seen and examined by me today and medical record reviewed.Overnight events noted and care discussed with nursing staff.  No fever   Has some dizziness when out of bed                Physical Exam:      Vital signs:  Temp: 97.3  F (36.3  C) Temp src: Oral BP: 150/72   Heart Rate: 64 Resp: 16 SpO2: 93 % O2 Device: None (Room air) Oxygen Delivery: 1 LPM   Weight: 74.4 kg (164 lb)  Estimated body mass index is 23.53 kg/(m^2) as calculated from the following:    Height as of an earlier encounter on 10/17/18: 1.778 m (5' 10\").    Weight as of this encounter: 74.4 kg (164 lb).    I/O last 3 completed shifts:  In: -   Out: 2100 [Urine:2100]  Vitals:    10/18/18 0558 10/19/18 0537 10/20/18 0609 10/21/18 0419   Weight: 72.3 kg (159 lb 6.4 oz) 74.2 kg (163 lb 9.6 oz) 72.6 kg " (160 lb) 74.4 kg (164 lb)       Constitutional: Alert , mild  distress   Respiratory: Nl work of breathing. Clear to auscultation bilaterally, no crackles or wheezing   Cardiovascular: Regular rate and rhythm, normal S1 and S2, and no murmur noted   Abdomen: Normal bowel sounds, soft, non-distended, some right flank tenderness   Skin: No rashes, no cyanosis, dry to touch   Neuro: CN 2-12 intact, no localizing weakness   Extremities: No edema, normal range of motion   HEENT Normocephalic, atraumatic, normal nasal turbinates; oropharynx clear   Neck Supple; nl inspection; trachea midline; no thryomegaly   Psychiatric:  Somnolent.  Flat affect          Medications:      All current medications were reviewed with changes reflected in problem list.         Data:      All new lab and imaging data was reviewed.   Labs:    Recent Labs  Lab 10/17/18  1845 10/17/18  1828 10/17/18  1609   CULT <10,000 colonies/mLKlebsiella oxytoca* No growth after 3 days No growth after 3 days       Recent Labs  Lab 10/21/18  1134 10/19/18  0713 10/18/18  0749   WBC 6.7 7.3 10.2   HGB 11.4* 10.7* 10.4*   HCT 36.0* 34.2* 32.9*   MCV 92 93 93    245 225       Recent Labs  Lab 10/21/18  0710 10/20/18  0628 10/19/18  0713 10/18/18  0749 10/17/18  1609   NA  --   --  139 138 137   POTASSIUM  --   --  4.1 4.1 4.5   CHLORIDE  --   --  106 107 102   CO2  --   --  27 26 28   ANIONGAP  --   --  6 5 7   GLC  --   --  83 99 111*   BUN  --   --  15 20 32*   CR 0.97 1.02 1.06 1.06 1.40*   GFRESTIMATED 72 68 65 65 47*   GFRESTBLACK 88 82 79 79 57*   ARYAN  --   --  8.2* 8.1* 9.0   PROTTOTAL  --   --   --   --  7.9   ALBUMIN  --   --   --   --  2.7*   BILITOTAL  --   --   --   --  0.9   ALKPHOS  --   --   --   --  122   AST  --   --   --   --  45   ALT  --   --   --   --  36      Imaging:   Recent Results (from the past 24 hour(s))   CT Abdomen Pelvis w Contrast    Narrative    CT ABDOMEN AND PELVIS WITH CONTRAST 10/17/2018 5:34 PM     HISTORY: History  of ureteral stent, flank pain.     CONTRAST DOSE: 78mL Isovue-370    Radiation dose for this scan was reduced using automated exposure  control, adjustment of the mA and/or kV according to patient size, or  iterative reconstruction technique.    FINDINGS: Left ureteral stent is in place, new since 9/27/2018. There  is no hydronephrosis. Proximal ureteral stone adjacent to the stent is  noted, probably unchanged in position from 9/27/2018. Bilateral renal  cysts and left renal stones are again noted. Bowel-containing right  inguinal hernia is again noted without evidence of incarceration or  bowel obstruction. No free peritoneal fluid or air. The remainder of  the CT also appears unchanged. Christianson catheter is in place. Left  adrenal 1.9 cm nodule is noted.      Impression    IMPRESSION: Left ureteral stent in place without left hydronephrosis.  Proximal left ureteral stone is probably unchanged in position from  9/27/2018. This scan is otherwise unchanged in appearance compared to  9/27/2018 and 8/28/2018.    VLADIMIR GUILLAUME MD   XR Chest 2 Views    Narrative    XR CHEST 2 VW   10/17/2018 5:38 PM     HISTORY: Altered mental status.    COMPARISON: Film dated 9/29/2018    FINDINGS: Left cardiac device is in place. Cardiac silhouette is at  the upper limits of normal. There is been an improvement in the left  pleural effusion when compared to 9/29/2018. No significant pleural  fluid is seen on today's film. Mild right basilar opacity consistent  with a small area of infiltrate or atelectasis.  There is also a small  area of infiltrate or fibrosis in the right midlung zone. The  pulmonary vasculature is normal.  The bones and soft tissues are  unremarkable.      Impression    IMPRESSION: Improved infiltrate and pleural effusions.        ARUN RODRIGUEZ MD

## 2018-10-21 NOTE — PLAN OF CARE
Problem: Patient Care Overview  Goal: Plan of Care/Patient Progress Review  Ambulatory Status:  Pt up A2 and joel steady  VS:  hypertension  Pain:  Oxycodone for groin pain  Resp: LS clear  GI:  denies nausea.  Regular diet.  BS active.  Passing flatus.  Last BM 10/17.  :  tolliver  Tx:  Zosyn and diflucan  Consults:  PT/OT, ID and urology  Disposition:  tbd

## 2018-10-21 NOTE — PLAN OF CARE
Pt up Ax2 with joel steady. LS clear. BS+ Christianson with adequate output. UC sent. Up in chair for brkfst. Tolerating regular diet, denies nausea, appetite good. PIV SL. C/o pain in RLE & groin, gave  tyl x1. R groin swollen, inguinal hernia.

## 2018-10-21 NOTE — PROGRESS NOTES
SWS:    Discharge Planner   Discharge Plans in progress: Spoke with Karmen at [facility] who reports no beds available until Tues, Wed, and Friday. Apparently she reports that they decline pt's if they have no bed available therefore stated I would need to make a new referral, however [employer]IC did not allow me to make another referral to [facility] because it stating there was already one made.  I also spoke with pt regarding needing more TCU options in which he referred me to his dtr Concepcion.  I contacted Concepcion via phone to ask for other TCU preferences.  She requested [facility], [facility] and [facility] in no particular order. They are requesting a private room and are aware of cost.  Referrals have been made.    Follow up plan: Will f/u with TCU's on bed availability.  Once TCU is determined SW will need to complete a PAS       Entered by: Xochilt Carreno 10/21/2018 9:36 AM       ADDENDUM:    [place] back from Lorena at [facility] and they have accepted pt

## 2018-10-21 NOTE — PLAN OF CARE
Problem: Patient Care Overview  Goal: Plan of Care/Patient Progress Review  Patient alert and oriented x4. Denies nausea, pain 8/10 this evening, oxycodone x1 given. Scrotal edema noted after patient sat in chair for a few hours eating dinner meal. Christianson in place, adequate amounts out, no bypassing noted this shift. On RA sats in the mid 90s. Continuing IV Zosyn, and Diflucan. On tele. Tolerating diet, good appetite. Discharge pending.

## 2018-10-21 NOTE — PROGRESS NOTES
[facility]  Infectious Disease Progress Note          Assessment and Plan:   IMPRESSIONS:   1.  A 89-year-old male, known to me from recent hospitalization, currently admitted with recurrent low-grade sepsis, right flank discomfort, presumed pyelo/recurrent kidney and obstruction-related issues, urine is grossly abnormal, although urine culture so far negative, as are blood cultures.   2.  Recent admission with major sepsis, appeared life-threatened, ended up with the finding of candidemia due to right-sided obstruction, had a stent in place with obstruction relieved and antifungal treatment, clinically improved, back to near baseline as recently as a few days ago.   3.  Prior infiltrates and possible aspiration, has some mild hypoxia currently, but never really found major aspiration and unlikely pneumonia currently.   4.  Prior cerebrovascular infarction, but cognitive function relatively intact long-term, as is neurologic status.   5.  Ongoing abdominal hernia that has required reduction on occasion.   6.  Gallstones.   7.  LEVAQUIN, ZITHROMAX, BACTRIM AND CEFTRIAXONE ALL LISTED AS ALLERGIES, SOME ARE JUST GI INTOLERANCE.       RECOMMENDATIONS:   1.  Continue   IV fluconazole and zosyn. But only sens kleb in cx so far and likely focus on that    2.  Discussed with Dr. Lilly, plan is to remove the stent and eventual plans with the stones unclear exactly what is going on currently, but given he is having flank pain in that same area, have to assume further infection-related issues.   3.  Much improved, likely po OK and if still Oct 30 stone plan tx 2 weeks thru that, likely diflucan also even if not in current cx  4 Looks like some slow down in disposition options, as it turns out po conversion not that easy with R(amp/ceph)/age(nitrofurantoin out) and allergies(quinolones/sulfa/ceph), not clear lev issue, if Gi / low dose cipro attempt if not an option prob full dose augmentin (int  sens)            Interval History:   no new complaints and doing well; no cp, sob, n/v/d, or abd pain. Still flank pain T down cx as above cx only kleb UC              Medications:       amLODIPine  5 mg Oral Daily     fluconazole  400 mg Intravenous Q24H     metoprolol tartrate  25 mg Oral BID     omeprazole  20 mg Oral BID AC     piperacillin-tazobactam  3.375 g Intravenous Q6H     sodium chloride (PF)  3 mL Intracatheter Q8H                  Physical Exam:   Blood pressure 150/72, pulse 71, temperature 97.3  F (36.3  C), temperature source Oral, resp. rate 16, weight 74.4 kg (164 lb), SpO2 93 %.  Wt Readings from Last 2 Encounters:   10/21/18 74.4 kg (164 lb)   10/17/18 70.3 kg (155 lb)     Vital Signs with Ranges  Temp:  [96.1  F (35.6  C)-97.6  F (36.4  C)] 97.3  F (36.3  C)  Heart Rate:  [57-69] 64  Resp:  [16-18] 16  BP: (147-167)/(65-72) 150/72  SpO2:  [93 %-96 %] 93 %    Constitutional: Awake, alert, cooperative, no apparent distress   Lungs: Clear to auscultation bilaterally, no crackles or wheezing   Cardiovascular: Regular rate and rhythm, normal S1 and S2, and no murmur noted   Abdomen: Normal bowel sounds, soft, non-distended, non-tender   Skin: No rashes, no cyanosis, no edema   Other:           Data:   All microbiology laboratory data reviewed.  Recent Labs   Lab Test  10/19/18   0713  10/18/18   0749  10/17/18   1609   WBC  7.3  10.2  16.6*   HGB  10.7*  10.4*  11.6*   HCT  34.2*  32.9*  37.4*   MCV  93  93  93   PLT  245  225  264     Recent Labs   Lab Test  10/21/18   0710  10/20/18   0628  10/19/18   0713   CR  0.97  1.02  1.06     Recent Labs   Lab Test  10/16/18   1050   SED  47*     Recent Labs   Lab Test  10/17/18   1845  10/17/18   1828  10/17/18   1609  09/28/18   1332  09/28/18   1331  09/27/18   1512  09/24/18   2050  09/24/18   1203  09/16/18   2303   CULT  <10,000 colonies/mL  Klebsiella oxytoca  *  No growth after 3 days  No growth after 3 days  No growth  No growth  No anaerobes  isolated  Heavy growth  Denise albicans / dubliniensis  Candida albicans and Candida dubliniensis are not routinely speciated  Susceptibility testing not routinely done  *  <1000 colonies/mL  urogenital jeanette  Susceptibility testing not routinely done    Cultured on the 3rd day of incubation:  Candida albicans  *  Critical Value/Significant Value, preliminary result only, called to and read back by  Willow Nesbitt RN, @[id number] 09/27/18.DH.    No growth  No growth

## 2018-10-22 NOTE — PLAN OF CARE
Problem: Patient Care Overview  Goal: Plan of Care/Patient Progress Review  PT: Pt attempted for session this AM. Pt resting in bed at this time and requesting PT return at a later time. Will attempt back as time/schedule allows.     2nd attempt: Pt attempted again this PM, pt first with nursing completing toileting, then with ID MD.

## 2018-10-22 NOTE — PROGRESS NOTES
Discharge Planner   Discharge Plans in progress: Called [facility] briseyda and Kvng to update pt not ready to discharge today   Barriers to discharge plan: following   Follow up plan: TCU at discharge . PT has used 13 days of his Medicare Benefit when at [facility]       Entered by: Corinne C. White 10/22/2018 10:29 AM     CM: Pt is being followed by [facility]

## 2018-10-22 NOTE — PROGRESS NOTES
Patient was seen and examined by me today and medical record reviewed.Plan of care was discussed with patient/family and staff.  Complete progress note will follow.Please refer to my note for detail progress at a later time     Interval History/objective :    Edison Boss is  doing well; no cp, sob, n/v/d, or abd pain.    Pain controlled and I spoke with varun , daughter who requested Urology consulted today     I spoke with Dr Lobo regarding care plan     Urine recultured and negative so far           Current problem/Assessment:      Stable       Plan for today       Re-consult urology and ID for care plan     Family questions addressed     Discharge planning based on consultants

## 2018-10-22 NOTE — PLAN OF CARE
Problem: Patient Care Overview  Goal: Plan of Care/Patient Progress Review  Ambulatory Status:  Pt up A2 and joel steady  VS:  HTN  Pain:  Denies pain  Resp: LS clear  GI:  denies nausea.  Reg diet.  BS hypoactive.  Passing flatus.  Last BM 10/17.  :  Christianson   Tx:  Zosyn and Diflucan  Consults:  Urology, ID, PT/OT  Disposition:  tbd

## 2018-10-22 NOTE — PLAN OF CARE
Problem: Patient Care Overview  Goal: Plan of Care/Patient Progress Review  Outcome: Improving  VSS, afebrile. Tylenol x1 for right leg/flank pain. Constipation- dulcolax 5mg tab given this am and had 2 BM this shift. Christianson in place, bypassing. Tolerating diet with fair appetite. Plan to discharge to tcu when able.

## 2018-10-22 NOTE — PROGRESS NOTES
[facility]  Hospitalist Progress Note  Name: Edison Boss    MRN: [id number]  YOB: 1924    Age: 89 year old  Date of admission: 10/17/2018  Primary care provider: Porfirio Terrell      Reason for Stay (Diagnosis):   Acute sepsis secondary to complicated urinary tract infection         Assessment and Plan:      Summary of Stay:  Edison Boss is a 89 year old male with a history of prior prostate cancer treated with brachy therapy and XRT, chronic indwelling tolliver catheter, paroxysmal AF (not anticoagulated) and SSS s/p ablation and dual chamber PPM, htn, and hlp.  He has a recent complicated PMH and this will be his 3rd admission within a month.      He was hospitalized 9/16-9/19/2018 for generalized weakness with falls and diagnosed with pna and a candidal UTI.  He was ultimately treated with levofloxacin and fluconazole and discharged home.  He returned and hospitalized from 9/24-10/4/18 ultimately diagnosed with candida fungemia with sepsis in setting of an obstructing left ureteral stone.  He required urgent cystoscopy with stent placement and stone pushed back so as not to obstruct.  He was treated initially with broad spectrum abx (pip-tazo/vanco) but when fungemia diagnosed placed on IV micafungin, and then discharged with 2 weeks of fluconazole-then to decrease from 400 mg to 100 mg until 7 days after stone removed     He returns to the ER with 2 days of increasing flank pain, fever, weakness, confusion most consistent with recurrent complicated UTI, concern for infected stone    Hospital course     Patient was seen by ID and urology for complex tract infection treatment.  Patient has been on IV antibiotic including antifungal previous fungal candidemia and Zosyn as well due to multiple allergies.  Patient remained afebrile and his condition was significantly better.  Repeat urine culture was negative so far and I spoke with Dr. Lobo of  infectious disease follows Dr. Lora of urology who is covering for Dr. Lilly.  The plan is to discuss with Dr. iLlly tomorrow on October 23 regarding the date for urologic procedure.  IV antibiotic was changed to oral ciprofloxacin 500 mg p.o. twice a day as well as flucan 100 mg p.o. daily through urology procedure date and then for 3 more days after that.  Family was updated regarding his care plan.  After final recommendation by urology is obtained patient can be discharged to TCU.         1.    Acute sepsis secondary to complicated UTI: Patient is afebrile, symptoms minimally better.  IV antibiotic to be changed to oral Cipro and fluconazole oral.  Infectious disease service input appreciated.  Urology to follow patient tomorrow       2.  UTI:   Do note that stent and stone in left ureter, flank pain is actually on the right side-? Etiology-ureteral irritation in setting of acute infection??   Urology and infectious disease input and consultation appreciated.  Plan on stent exchange once urine is sterile.  Further care planning is deferred to urology service.  I spoke with Dr. Lilly's partner Dr. Lora and patient will be seen by Dr. Lilly tomorrow.  Family members updated    Patient has indwelling Christianson catheter for the last 26 days but urinary tract infection association with Christianson catheter placement is not entirely clear as patient has urolithiasis and stent in place.     3.  MADELYN:   Suspect prerenal based on BUN/Creat ratio but could be a component of ATN also with sepsis.  Creatinine improved with IV fluid challenge.  Continue to monitor for now.      4.  Htn:  Cont home regimen of amlodipine 5 mg every day and metoprolol 25 mg bid     5.  Depression:  Cont paroxetine     6.  PAF/SSS-pacer in place, Not on anticoagulation    7.SOB and hypoxia - suspect multifactorial etiology - sepsis , fluid overload   -- improved with lasix  , monitor       DVT Prophylaxis: Pneumatic Compression Devices  Code  "Status: DNR / DNI  Discharge Dispo: Back to TCU when able  Estimated Disch Date / # of Days until Disch: Likely tissue in the next 1 or 2 days after seen by urology service and in consultation with family regarding care plan.  Daughter at the bedside and updated on plan of cares.  Time spent: Greater than 35 minutes    Addendum   I spoke with Daughter Red who is RN  and updated her care plan.        Interval History (Subjective):      Patient is seen and examined by me today and medical record reviewed.Overnight events noted and care discussed with nursing staff.  Patient is feeling well this morning.  History not reliable as patient is confused at times.  Patient's family by the name of Bea who is a daughter in the room and asked several questions regarding patient care.  Requested urology service to contact them and I did call urology service and finally contacted Dr. Lora who stated that Dr. Lilly will be seeing patient tomorrow.         Physical Exam:      Vital signs:  Temp: 97.5  F (36.4  C) Temp src: Oral BP: 159/72   Heart Rate: 61 Resp: 20 SpO2: 94 % O2 Device: None (Room air) Oxygen Delivery: 1 LPM   Weight: 72.7 kg (160 lb 3.2 oz)  Estimated body mass index is 22.99 kg/(m^2) as calculated from the following:    Height as of an earlier encounter on 10/17/18: 1.778 m (5' 10\").    Weight as of this encounter: 72.7 kg (160 lb 3.2 oz).    I/O last 3 completed shifts:  In: 1058 [P.O.:520; I.V.:538]  Out: 2150 [Urine:2150]  Vitals:    10/18/18 0558 10/19/18 0537 10/20/18 0609 10/21/18 0419   Weight: 72.3 kg (159 lb 6.4 oz) 74.2 kg (163 lb 9.6 oz) 72.6 kg (160 lb) 74.4 kg (164 lb)    10/22/18 0644   Weight: 72.7 kg (160 lb 3.2 oz)       Constitutional: Alert , no   distress   Respiratory: Nl work of breathing. Clear to auscultation bilaterally, no crackles or wheezing   Cardiovascular: Regular rate and rhythm, normal S1 and S2, and no murmur noted   Abdomen: Normal bowel sounds, soft, non-distended, some " right flank tenderness   Skin: No rashes, no cyanosis, dry to touch   Neuro: CN 2-12 intact, no localizing weakness   Extremities: No edema, normal range of motion   HEENT Normocephalic, atraumatic, normal nasal turbinates; oropharynx clear   Neck Supple; nl inspection; trachea midline; no thryomegaly   Psychiatric:  Somnolent.  Flat affect          Medications:      All current medications were reviewed with changes reflected in problem list.         Data:      All new lab and imaging data was reviewed.   Labs:    Recent Labs  Lab 10/21/18  1225 10/17/18  1845 10/17/18  1828 10/17/18  1609   CULT Culture negative < 24 hours, reincubate <10,000 colonies/mLKlebsiella oxytoca* No growth after 5 days No growth after 5 days       Recent Labs  Lab 10/21/18  1134 10/19/18  0713 10/18/18  0749   WBC 6.7 7.3 10.2   HGB 11.4* 10.7* 10.4*   HCT 36.0* 34.2* 32.9*   MCV 92 93 93    245 225       Recent Labs  Lab 10/22/18  0644 10/21/18  0710 10/20/18  0628 10/19/18  0713 10/18/18  0749 10/17/18  1609     --   --  139 138 137   POTASSIUM 3.7  --   --  4.1 4.1 4.5   CHLORIDE 109  --   --  106 107 102   CO2 26  --   --  27 26 28   ANIONGAP 5  --   --  6 5 7   GLC 82  --   --  83 99 111*   BUN 9  --   --  15 20 32*   CR 0.98 0.97 1.02 1.06 1.06 1.40*   GFRESTIMATED 71 72 68 65 65 47*   GFRESTBLACK 86 88 82 79 79 57*   ARYAN 8.7  --   --  8.2* 8.1* 9.0   PROTTOTAL  --   --   --   --   --  7.9   ALBUMIN  --   --   --   --   --  2.7*   BILITOTAL  --   --   --   --   --  0.9   ALKPHOS  --   --   --   --   --  122   AST  --   --   --   --   --  45   ALT  --   --   --   --   --  36      Imaging:   Recent Results (from the past 24 hour(s))   CT Abdomen Pelvis w Contrast    Narrative    CT ABDOMEN AND PELVIS WITH CONTRAST 10/17/2018 5:34 PM     HISTORY: History of ureteral stent, flank pain.     CONTRAST DOSE: 78mL Isovue-370    Radiation dose for this scan was reduced using automated exposure  control, adjustment of the mA  and/or kV according to patient size, or  iterative reconstruction technique.    FINDINGS: Left ureteral stent is in place, new since 9/27/2018. There  is no hydronephrosis. Proximal ureteral stone adjacent to the stent is  noted, probably unchanged in position from 9/27/2018. Bilateral renal  cysts and left renal stones are again noted. Bowel-containing right  inguinal hernia is again noted without evidence of incarceration or  bowel obstruction. No free peritoneal fluid or air. The remainder of  the CT also appears unchanged. Christianson catheter is in place. Left  adrenal 1.9 cm nodule is noted.      Impression    IMPRESSION: Left ureteral stent in place without left hydronephrosis.  Proximal left ureteral stone is probably unchanged in position from  9/27/2018. This scan is otherwise unchanged in appearance compared to  9/27/2018 and 8/28/2018.    VLADIMIR GUILLAUME MD   XR Chest 2 Views    Narrative    XR CHEST 2 VW   10/17/2018 5:38 PM     HISTORY: Altered mental status.    COMPARISON: Film dated 9/29/2018    FINDINGS: Left cardiac device is in place. Cardiac silhouette is at  the upper limits of normal. There is been an improvement in the left  pleural effusion when compared to 9/29/2018. No significant pleural  fluid is seen on today's film. Mild right basilar opacity consistent  with a small area of infiltrate or atelectasis.  There is also a small  area of infiltrate or fibrosis in the right midlung zone. The  pulmonary vasculature is normal.  The bones and soft tissues are  unremarkable.      Impression    IMPRESSION: Improved infiltrate and pleural effusions.        ARUN RODRIGUEZ MD

## 2018-10-22 NOTE — PROGRESS NOTES
[facility]  Infectious Disease Progress Note          Assessment and Plan:   IMPRESSIONS:   1.  A 89-year-old male, known to me from recent hospitalization, currently admitted with recurrent low-grade sepsis, right flank discomfort, presumed pyelo/recurrent kidney and obstruction-related issues, urine is grossly abnormal, although urine culture so far negative, as are blood cultures.   2.  Recent admission with major sepsis, appeared life-threatened, ended up with the finding of candidemia due to right-sided obstruction, had a stent in place with obstruction relieved and antifungal treatment, clinically improved, back to near baseline as recently as a few days ago.   3.  Prior infiltrates and possible aspiration, has some mild hypoxia currently, but never really found major aspiration and unlikely pneumonia currently.   4.  Prior cerebrovascular infarction, but cognitive function relatively intact long-term, as is neurologic status.   5.  Ongoing abdominal hernia that has required reduction on occasion.   6.  Gallstones.   7.  LEVAQUIN, ZITHROMAX, BACTRIM AND CEFTRIAXONE ALL LISTED AS ALLERGIES, SOME ARE JUST GI INTOLERANCE.       RECOMMENDATIONS:   1. To cipro 500 bid and flucon 100mg po daily thru stone procedure and 3 days more then stop  2.  Despite lev listed allergy cipro several times wo issue        Interval History:   no new complaints and doing well; no cp, sob, n/v/d, or abd pain. Still flank pain T down cx as above cx only kleb UC              Medications:       amLODIPine  5 mg Oral Daily     ciprofloxacin  500 mg Oral Q12H NAKIA     fluconazole  400 mg Intravenous Q24H     metoprolol tartrate  25 mg Oral BID     omeprazole  20 mg Oral BID AC     sodium chloride (PF)  3 mL Intracatheter Q8H                  Physical Exam:   Blood pressure 159/72, pulse 71, temperature 97.5  F (36.4  C), temperature source Oral, resp. rate 20, weight 72.7 kg (160 lb 3.2 oz), SpO2 94 %.  Wt Readings  from Last 2 Encounters:   10/22/18 72.7 kg (160 lb 3.2 oz)   10/17/18 70.3 kg (155 lb)     Vital Signs with Ranges  Temp:  [96.8  F (36  C)-97.9  F (36.6  C)] 97.5  F (36.4  C)  Heart Rate:  [58-61] 61  Resp:  [16-20] 20  BP: (150-163)/(60-72) 159/72  SpO2:  [93 %-97 %] 94 %    Constitutional: Awake, alert, cooperative, no apparent distress   Lungs: Clear to auscultation bilaterally, no crackles or wheezing   Cardiovascular: Regular rate and rhythm, normal S1 and S2, and no murmur noted   Abdomen: Normal bowel sounds, soft, non-distended, non-tender   Skin: No rashes, no cyanosis, no edema   Other:           Data:   All microbiology laboratory data reviewed.  Recent Labs   Lab Test  10/21/18   1134  10/19/18   0713  10/18/18   0749   WBC  6.7  7.3  10.2   HGB  11.4*  10.7*  10.4*   HCT  36.0*  34.2*  32.9*   MCV  92  93  93   PLT  365  245  225     Recent Labs   Lab Test  10/22/18   0644  10/21/18   0710  10/20/18   0628   CR  0.98  0.97  1.02     Recent Labs   Lab Test  10/16/18   1050   SED  47*     Recent Labs   Lab Test  10/21/18   1225  10/17/18   1845  10/17/18   1828  10/17/18   1609  09/28/18   1332  09/28/18   1331  09/27/18   1512  09/24/18   2050  09/24/18   1203   CULT  Culture negative < 24 hours, reincubate  <10,000 colonies/mL  Klebsiella oxytoca  *  No growth after 5 days  No growth after 5 days  No growth  No growth  No anaerobes isolated  Heavy growth  Denise albicans / dubliniensis  Candida albicans and Candida dubliniensis are not routinely speciated  Susceptibility testing not routinely done  *  <1000 colonies/mL  urogenital jeanette  Susceptibility testing not routinely done    Cultured on the 3rd day of incubation:  Candida albicans  *  Critical Value/Significant Value, preliminary result only, called to and read back by  Willow Nesbitt RN, @[id number] 09/27/18.DH.    No growth

## 2018-10-23 NOTE — PLAN OF CARE
Problem: Patient Care Overview  Goal: Plan of Care/Patient Progress Review  VS /62 (BP Location: Right arm)  Pulse 71  Temp 96.2  F (35.7  C) (Oral)  Resp 20  Wt 72.7 kg (160 lb 3.2 oz)  SpO2 95%  BMI 22.99 kg/m2  Lung sounds Clear  O2 Room air  Tele A-paced  GI/ BM x2 today, tolliver in place  Tolerating regular diet  IVF Saline locked   Activity up with 2 assist and joel steady  Pain R flank and leg pain - PRN medications available. Increases with movement.   Patient/family centered care Plan of care discussed with patient and children at bedside.   Discharge plan TBD - Urology following

## 2018-10-23 NOTE — PLAN OF CARE
Problem: Patient Care Overview  Goal: Individualization & Mutuality  Outcome: Improving  Up in chair for meals using joel steady-----good appetite---tolliver draining well ----   Planning discharge tomorrow

## 2018-10-23 NOTE — PLAN OF CARE
Problem: Patient Care Overview  Goal: Plan of Care/Patient Progress Review  Discharge Planner OT   Patient plan for discharge: to TCU    Current status: After set-up, min A for vc's and A to wash back for light bathing. Min A to donning gown, suspect due mainly to visual impairments and difficulty distinguishing armholes in gown, however once started, patient was able to complete. Noted patient moves very slowly and increased time provided to allow patient increased I with task. Noted adequate strength and dexterity to manage wash basin, washcloth and washing of face and UB. Min A to manage application of lotion into patient's hand, however patient able to apply lotion to hands only, declined offer to apply in arms or back. RN staff reported family was A with feeding, spoke with son and patient who reported patient is typically I, however visual impairments do play a role in difficulty and patient may need A at times. Encouraged staff to aid in food selections which increase an opportunity for greater I as well as taking time to A with set-up to accommodate for visual impairments. With Ana Larua Steady. min A to stand and transfer to EOB. Max A of 2 to transfer from sit EOB to supine and reposition in bed for comfort. Noted SOB with bathing task and increased respiration with transfer, however son who was present for transfer reports this is mainly due to fear and anxiety. Normal respiration noted after transfers. Throughout session, patient demonstrated difficulty with selecting with offered multiple choices, beyond yes and no to single choice.  Barriers to return to prior living situation: Level of A with functional transfers, decreased ability to care for self, confusion, decreased activity tolerance, weakness, current medical condition  Recommendations for discharge: TCU  Rationale for recommendations: Pt continues to require increased assist for ADLs and transfers and would benefit from ongoing skilled OT to  maximize safety        Entered by: Viridiana Coleman 10/23/2018 11:43 AM

## 2018-10-23 NOTE — PROGRESS NOTES
Discharge Planner   Discharge Plans in progress: PT has TCU bed for tomorrow at [facility].   Barriers to discharge plan: Room not open till 1300  Follow up plan: Will update pt and family and determine transport needs.      10/23/18 1300   [facility] [phone], Fax: [phone]          Entered by: Corinne C. White 10/23/2018 1:10 PM     CM: Initially family wanted to provide transport .   Son David called and requested W/C transport and aware of cost   H/E transport set up for 1300  PT was only in TCU for short time. Previous PASS is valid

## 2018-10-23 NOTE — PROGRESS NOTES
[facility]    Hospitalist Progress Note      Assessment & Plan      Edison Boss is a 89 year old male with a history of prior prostate cancer treated with brachy therapy and XRT, chronic indwelling tolliver catheter, paroxysmal AF (not anticoagulated) and SSS s/p ablation and dual chamber PPM, htn, and hlp.  He has been hospitalized 3 times, now, in the last month.  He was hospitalized from 9/16/18 through 9/19/2018 and treated for pneumonia and a candidal UTI with Levaquin and Fluconazole. He ultimately discharged home.  He returned and was hospitalized from 9/2418 through 10/4/18 with candida fungemia with sepsis in setting of an obstructing left ureteral stone.  He required urgent cystoscopy with stent placement and stone was pushed back so as not to obstruct the ureter.  He was treated initially with broad spectrum abx (pip-tazo/vanco) but when fungemia was diagnosed he was placed on IV micafungin.  He discharged to transitional care with 2 weeks of fluconazole 400 mg daily until stone extraction followed by 100 mg daily for 7 days after stone removed.  He returned to the ED  On 10/17/18 for evaluation of 2 days of increasing flank pain, fever, weakness, and confusion most consistent with recurrent complicated UTI, and concern for infected stone.  After admission he was seen by ID and Urology.  He was initially treated with Zosyn and Fluconazole.  Zosyn was ultimately changed to Cipro. ID is recommending Cipro 500 mg PO BID and Fluconazole 100 mg PO daily until 3 days after stone removed. I spoke with Urology this afternoon and it sounds like his Urologic procedure cannot be done until next Tuesday.  Dr. Lilly was going to speak with Edison and his family about this today.      Problem list:      1.    Acute sepsis secondary to complicated UTI: Patient is afebrile, symptoms minimally better.  Per ID, continue oral Cipro and fluconazole until 3 days after stone removed.  Infectious disease  service input appreciated.  Urology to schedule repeat cystosocopy for stone removal.        2.  UTI: UCx on 10/17 grew klebsiella (quinolone susceptible).  UCx on 10/21 showed no growth.      3.  Left ureteral stone with stent in place.  See discussion above.        4.  MADELYN:  Suspect due to dehydration and ATN from UTI and sepsis.  Resolved.       5.  HTN:  Cont home regimen of amlodipine 5 mg every day and metoprolol 25 mg bid.      6.  Depression:  Continue Paroxetine as prior to admission.      7.  PAF/SSS-pacer in place, Not on anticoagulation     8. SOB and hypoxia - suspect multifactorial etiology due to sepsis and fluid overload. Improved with lasix.  Monitor      DVT Prophylaxis: Pneumatic Compression Devices  Code Status: DNR/DNI  Expected discharge: 1-2 days, recommended to transitional care unit once safe disposition plan/ TCU bed available. SW following.     Deng Lay MD    Interval History   Feeling pretty well. No new problems.  Frustrated that procedure cannot be done quickly     -Data reviewed today: I reviewed all new labs and imaging results over the last 24 hours. I personally reviewed no images or EKG's today.    Physical Exam   Temp: 97  F (36.1  C) Temp src: Oral BP: 152/63 Pulse: 66 Heart Rate: 60 Resp: 18 SpO2: 97 % O2 Device: None (Room air)    Vitals:    10/21/18 0419 10/22/18 0644 10/23/18 0651   Weight: 74.4 kg (164 lb) 72.7 kg (160 lb 3.2 oz) 72.7 kg (160 lb 4.4 oz)     Vital Signs with Ranges  Temp:  [96.4  F (35.8  C)-97  F (36.1  C)] 97  F (36.1  C)  Pulse:  [66] 66  Heart Rate:  [60-61] 60  Resp:  [16-20] 18  BP: (152-165)/(63-72) 152/63  SpO2:  [95 %-97 %] 97 %  I/O last 3 completed shifts:  In: 200 [P.O.:200]  Out: 1375 [Urine:1375]    GENERAL:  Comfortable. Cooperative.  PSYCH: pleasant, oriented, No acute distress.  EYES: PERRLA, Normal conjunctiva.  HEART:  Regular rate and rhythm. No JVD. Pulses normal. No edema.  LUNGS:  Clear to auscultation, normal Respiratory  effort.  ABDOMEN:  Soft, no hepatosplenomegaly, normal bowel sounds.  EXTREMETIES: No clubbing, cyanosis or ischemia  SKIN:  Dry to touch, No rash.       Medications       amLODIPine  5 mg Oral Daily     ciprofloxacin  500 mg Oral Q12H NAKIA     fluconazole  100 mg Oral Q24H     metoprolol tartrate  25 mg Oral BID     omeprazole  20 mg Oral BID AC     sodium chloride (PF)  3 mL Intracatheter Q8H       Data     Recent Labs  Lab 10/23/18  0811 10/22/18  0644 10/21/18  1134 10/21/18  0710  10/19/18  0713 10/18/18  0749 10/17/18  1609   WBC  --   --  6.7  --   --  7.3 10.2 16.6*   HGB  --   --  11.4*  --   --  10.7* 10.4* 11.6*   MCV  --   --  92  --   --  93 93 93   PLT  --   --  365  --   --  245 225 264   NA  --  140  --   --   --  139 138 137   POTASSIUM  --  3.7  --   --   --  4.1 4.1 4.5   CHLORIDE  --  109  --   --   --  106 107 102   CO2  --  26  --   --   --  27 26 28   BUN  --  9  --   --   --  15 20 32*   CR 0.94 0.98  --  0.97  < > 1.06 1.06 1.40*   ANIONGAP  --  5  --   --   --  6 5 7   ARYAN  --  8.7  --   --   --  8.2* 8.1* 9.0   GLC  --  82  --   --   --  83 99 111*   ALBUMIN  --   --   --   --   --   --   --  2.7*   PROTTOTAL  --   --   --   --   --   --   --  7.9   BILITOTAL  --   --   --   --   --   --   --  0.9   ALKPHOS  --   --   --   --   --   --   --  122   ALT  --   --   --   --   --   --   --  36   AST  --   --   --   --   --   --   --  45   LIPASE  --   --   --   --   --   --   --  160   < > = values in this interval not displayed.    No results found for this or any previous visit (from the past 24 hour(s)).

## 2018-10-23 NOTE — PLAN OF CARE
Problem: Patient Care Overview  Goal: Plan of Care/Patient Progress Review  Outcome: No Change  Pt confused, alarms on. VSS. Denies pain. Tele: A-paced w/PVC's (60's). Chrsitianson patent, adequate UOP, 2 loose stools, incontinent of BM. Repo @ least Q2hrs. Up w/2 assist w/joel steady. PIV SL. Tx: Cipro & diflucan po. Consults: Urology & ID.

## 2018-10-23 NOTE — PLAN OF CARE
Problem: Urinary Tract Infection (Adult)  Goal: Signs and Symptoms of Listed Potential Problems Will be Absent, Minimized or Managed (Urinary Tract Infection)  Signs and symptoms of listed potential problems will be absent, minimized or managed by discharge/transition of care (reference Urinary Tract Infection (Adult) CPG).   Outcome: No Change  Assumed care from 1900 to 0000  A&Ox4, forgetful, slow to respond  up Ax2 w/joel steady & GB  LDA: PIV SL  Vitals: stable, RA  Pain: while moving, PRN tylenol  Tele: Apaced  GI/: Christianson patent, incont BM x2  Plan: Urology consult, UC pending  Will continue to monitor

## 2018-10-23 NOTE — PROGRESS NOTES
"[facility]  Infectious Disease Progress Note          Assessment and Plan:   IMPRESSIONS:   1.  A 89-year-old male, known to me from recent hospitalization, currently admitted with recurrent low-grade sepsis, right flank discomfort, presumed pyelo/recurrent kidney and obstruction-related issues, urine is grossly abnormal, although urine culture so far negative, as are blood cultures.   2.  Recent admission with major sepsis, appeared life-threatened, ended up with the finding of candidemia due to right-sided obstruction, had a stent in place with obstruction relieved and antifungal treatment, clinically improved, back to near baseline as recently as a few days ago.   3.  Prior infiltrates and possible aspiration, has some mild hypoxia currently, but never really found major aspiration and unlikely pneumonia currently.   4.  Prior cerebrovascular infarction, but cognitive function relatively intact long-term, as is neurologic status.   5.  Ongoing abdominal hernia that has required reduction on occasion.   6.  Gallstones.   7.  LEVAQUIN, ZITHROMAX, BACTRIM AND CEFTRIAXONE ALL LISTED AS ALLERGIES, SOME ARE JUST GI INTOLERANCE.       RECOMMENDATIONS:   1. cipro 500 bid and flucon 100mg po daily thru the eventual stone procedure and 3 days more then stop  2.  Despite lev listed allergy cipro several times wo issue        Interval History:   no new complaints and doing well; no cp, sob, n/v/d, or abd pain. Still flank pain T down cx as above cx only kleb UC              Medications:       amLODIPine  5 mg Oral Daily     ciprofloxacin  500 mg Oral Q12H NAKIA     fluconazole  100 mg Oral Q24H     metoprolol tartrate  25 mg Oral BID     omeprazole  20 mg Oral BID AC     sodium chloride (PF)  3 mL Intracatheter Q8H                  Physical Exam:   Blood pressure 153/67, pulse 66, temperature 96.8  F (36  C), temperature source Axillary, resp. rate 20, height 1.727 m (5' 8\"), weight 72.7 kg (160 lb 4.4 " oz), SpO2 95 %.  Wt Readings from Last 2 Encounters:   10/23/18 72.7 kg (160 lb 4.4 oz)   10/17/18 70.3 kg (155 lb)     Vital Signs with Ranges  Temp:  [96.2  F (35.7  C)-96.8  F (36  C)] 96.8  F (36  C)  Pulse:  [66] 66  Heart Rate:  [60-61] 60  Resp:  [16-20] 20  BP: (153-165)/(62-72) 153/67  SpO2:  [95 %] 95 %    Constitutional: Awake, alert, cooperative, no apparent distress   Lungs: Clear to auscultation bilaterally, no crackles or wheezing   Cardiovascular: Regular rate and rhythm, normal S1 and S2, and no murmur noted   Abdomen: Normal bowel sounds, soft, non-distended, non-tender   Skin: No rashes, no cyanosis, no edema   Other:           Data:   All microbiology laboratory data reviewed.  Recent Labs   Lab Test  10/21/18   1134  10/19/18   0713  10/18/18   0749   WBC  6.7  7.3  10.2   HGB  11.4*  10.7*  10.4*   HCT  36.0*  34.2*  32.9*   MCV  92  93  93   PLT  365  245  225     Recent Labs   Lab Test  10/23/18   0811  10/22/18   0644  10/21/18   0710   CR  0.94  0.98  0.97     Recent Labs   Lab Test  10/16/18   1050   SED  47*     Recent Labs   Lab Test  10/21/18   1225  10/17/18   1845  10/17/18   1828  10/17/18   1609  09/28/18   1332  09/28/18   1331  09/27/18   1512  09/24/18   2050  09/24/18   1203   CULT  No growth  <10,000 colonies/mL  Klebsiella oxytoca  *  No growth  No growth  No growth  No growth  No anaerobes isolated  Heavy growth  Denise albicans / dubliniensis  Candida albicans and Candida dubliniensis are not routinely speciated  Susceptibility testing not routinely done  *  <1000 colonies/mL  urogenital jeanette  Susceptibility testing not routinely done    Cultured on the 3rd day of incubation:  Candida albicans  *  Critical Value/Significant Value, preliminary result only, called to and read back by  Willow Nesbitt RN, @2592 09/27/18.DH.    No growth

## 2018-10-23 NOTE — PLAN OF CARE
Problem: Patient Care Overview  Goal: Plan of Care/Patient Progress Review  PT: Working with OT will re-attempt as able.

## 2018-10-24 NOTE — PLAN OF CARE
Problem: Patient Care Overview  Goal: Plan of Care/Patient Progress Review  Occupational Therapy Discharge Summary    Reason for therapy discharge:    Discharged to transitional care facility.    Progress towards therapy goal(s). See goals on Care Plan in [facility] electronic health record for goal details.  Goals not met.  Barriers to achieving goals:   limited tolerance for therapy and discharge from facility.    Therapy recommendation(s):    Continued therapy is recommended.  Rationale/Recommendations:  TCU previously recommended, pt is transferring today.

## 2018-10-24 NOTE — PLAN OF CARE
Problem: Patient Care Overview  Goal: Plan of Care/Patient Progress Review  Outcome: No Change  Pt confused, alarms on. VSS. Reports R flank pain, tylenol given x1. Tele: A-paced w/PVC's (60's). Christianson patent, by passing, brief wet x2 adequate UOP, BM smears. Repo @ least Q2hrs. Up w/2 assist w/joel steady. PIV SL. Tx: Cipro & diflucan po. Consults: Urology & ID.

## 2018-10-24 NOTE — DISCHARGE SUMMARY
[facility]  Discharge Summary  Name: Edison Boss    MRN: [id number]  YOB: 1924    Age: 89 year old  Date of Discharge:  10/24/2018  Date of Admission: 10/17/2018  Primary Care Provider: Porfirio Terrell  Discharge Physician:  Shahnaz Shook MD  Discharging Service:  Hospitalist      Discharge Diagnoses:  1.  Sepsis secondary to complicated UTI  2.  Left ureteral stone with stent in place  3.  Acute kidney injury   4.  Hypertension  5.  Depression  6.  Paroxysmal A. fib/sick sinus syndrome status post permanent pacemaker  7.  Shortness of breath and hypoxia     Hospital Course:  Edison Boss is a 89 year old male with a history of prior prostate cancer treated with brachy therapy and XRT, chronic indwelling tolliver catheter, paroxysmal AF (not anticoagulated) and SSS s/p ablation and dual chamber PPM, htn, and hlp.      He has been hospitalized 3 times, now, in the last month.  He was hospitalized from 9/16/18 through 9/19/2018 and treated for pneumonia and a candidal UTI with Levaquin and Fluconazole. He ultimately discharged home.      He returned and was hospitalized from 9/24/18 through 10/4/18 with candida fungemia with sepsis in setting of an obstructing left ureteral stone.  He required urgent cystoscopy with stent placement and stone was pushed back so as not to obstruct the ureter.  He was treated initially with broad spectrum abx (pip-tazo/vanco) but when fungemia was diagnosed he was placed on IV micafungin.  He discharged to transitional care with 2 weeks of fluconazole 400 mg daily until stone extraction followed by 100 mg daily for 7 days after stone removed.      He was admitted 10/17/18 with 2 days of increasing flank pain, fever, weakness, and confusion and was found to have recurrent complicated UTI, and concern for infected stone.  After admission he was seen by ID and Urology.  He was initially treated with Zosyn and Fluconazole.  Zosyn was ultimately  changed to Cipro. ID is recommending Cipro 500 mg PO BID and Fluconazole 100 mg PO daily until 3 days after stone removed. Patient is scheduled for lithotripsy on 10/30/18.  He will discharge to TCU today.      1.  Acute sepsis secondary to complicated UTI: Patient is afebrile, symptoms minimally better.  Per ID, continue oral Cipro and fluconazole until 3 days after stone removed.  Infectious disease service input appreciated.  Urology will perform repeat cystoscopy for stone removal on 10/30/18.        2.  UTI: UCx on 10/17 grew klebsiella (quinolone susceptible).  UCx on 10/21 showed no growth.    As above he will remain on Cipro and fluconazole for 3 days after his stone is removed which is scheduled on 10/30/18.     3.  Left ureteral stone with stent in place.  See discussion above.        4.  MADELYN:  Suspect due to pre renal azotemia from dehydration and sepsis.  Resolved.       5.  HTN:  Cont home regimen of amlodipine 5 mg every day and metoprolol 25 mg bid.      6.  Depression:  Continue Paroxetine as prior to admission.      7.  PAF/SSS-pacer in place, Not on anticoagulation      8. SOB and hypoxia - suspect multifactorial etiology due to sepsis and fluid overload. Improved with lasix.       # Discharge Pain Plan:   - During his hospitalization, Edison experienced pain due to stone.  The pain plan for discharge was discussed with Edison and the plan was created in a collaborative fashion.    - Chronic/continued opioids: Oxycodone         Discharge Disposition:  Discharged to short-term care facility     Allergies:  Allergies   Allergen Reactions     Ceftriaxone Itching     Bactrim Hives     Zithromax [Azithromycin]      Levaquin Rash     Oral only, can tolerate IV     Macrodantin [Nitrofuran Derivatives] Rash     Took recently with no problems        Condition on Discharge:  Discharge condition: Stable   Discharge vitals: Blood pressure 145/58, pulse 60, temperature 97.4  F (36.3  C), temperature source  "Oral, resp. rate 18, height 1.727 m (5' 8\"), weight 70.1 kg (154 lb 9.6 oz), SpO2 94 %.   Code status on discharge: DNR / DNI     History of Illness:  See detailed admission note for full details.    Physical Exam:  Blood pressure 145/58, pulse 60, temperature 97.4  F (36.3  C), temperature source Oral, resp. rate 18, height 1.727 m (5' 8\"), weight 70.1 kg (154 lb 9.6 oz), SpO2 94 %.  Wt Readings from Last 1 Encounters:   10/24/18 70.1 kg (154 lb 9.6 oz)     General: Alert, awake, no acute distress. Sitting up in a chair eating breakfast.  HEENT: Normocephalic, atraumatic, eyes anicteric and without scleral injection, EOMI, MMM.  Cardiac: RRR, normal S1, S2.  No m/g/r. No LE edema.  Pulmonary: Normal chest rise, normal work of breathing.  Lungs CTAB without crackles or wheezing  Abdomen: soft, non-tender, non-distended.  Normoactive BS.  No guarding or rebound tenderness.  Extremities: no deformities.  Warm, well perfused.  Skin: no rashes or lesions noted.  Warm and Dry.  Neuro: No focal deficits noted.  Speech clear.  Weak but moving all extremities in bed.  Psych: Appropriate affect. Alert to person and place.    Procedures other than Imaging:  IV antibiotics     Imaging:  Results for orders placed or performed during the hospital encounter of 10/17/18   XR Chest 2 Views    Narrative    XR CHEST 2 VW   10/17/2018 5:38 PM     HISTORY: Altered mental status.    COMPARISON: Film dated 9/29/2018    FINDINGS: Left cardiac device is in place. Cardiac silhouette is at  the upper limits of normal. There is been an improvement in the left  pleural effusion when compared to 9/29/2018. No significant pleural  fluid is seen on today's film. Mild right basilar opacity consistent  with a small area of infiltrate or atelectasis.  There is also a small  area of infiltrate or fibrosis in the right midlung zone. The  pulmonary vasculature is normal.  The bones and soft tissues are  unremarkable.      Impression    IMPRESSION: " Improved infiltrate and pleural effusions.        ARUN RODRIGUEZ MD   CT Abdomen Pelvis w Contrast    Narrative    CT ABDOMEN AND PELVIS WITH CONTRAST 10/17/2018 5:34 PM     HISTORY: History of ureteral stent, flank pain.     CONTRAST DOSE: 78mL [id number]    Radiation dose for this scan was reduced using automated exposure  control, adjustment of the mA and/or kV according to patient size, or  iterative reconstruction technique.    FINDINGS: Left ureteral stent is in place, new since 9/27/2018. There  is no hydronephrosis. Proximal ureteral stone adjacent to the stent is  noted, probably unchanged in position from 9/27/2018. Bilateral renal  cysts and left renal stones are again noted. Bowel-containing right  inguinal hernia is again noted without evidence of incarceration or  bowel obstruction. No free peritoneal fluid or air. The remainder of  the CT also appears unchanged. Christianson catheter is in place. Left  adrenal 1.9 cm nodule is noted.      Impression    IMPRESSION: Left ureteral stent in place without left hydronephrosis.  Proximal left ureteral stone is probably unchanged in position from  9/27/2018. This scan is otherwise unchanged in appearance compared to  9/27/2018 and 8/28/2018.    VLADIMIR GUILLAUME MD     *Note: Due to a large number of results and/or encounters for the requested time period, some results have not been displayed. A complete set of results can be found in Results Review.        Consultations:  Consultations This Hospital Stay   UROLOGY IP CONSULT  INFECTIOUS DISEASES IP CONSULT  PHARMACY TO DOSE VANCO  SOCIAL WORK IP CONSULT  PHYSICAL THERAPY ADULT IP CONSULT  OCCUPATIONAL THERAPY ADULT IP CONSULT  SPEECH LANGUAGE PATH ADULT IP CONSULT  SOCIAL WORK IP CONSULT  UROLOGY IP CONSULT  PHYSICAL THERAPY ADULT IP CONSULT  OCCUPATIONAL THERAPY ADULT IP CONSULT     Recent Lab Results:    Recent Labs  Lab 10/21/18  1134 10/19/18  0713 10/18/18  0749   WBC 6.7 7.3 10.2   HGB 11.4* 10.7* 10.4*   HCT 36.0*  34.2* 32.9*   MCV 92 93 93    245 225       Recent Labs  Lab 10/24/18  0651 10/23/18  0811 10/22/18  0644  10/19/18  0713 10/18/18  0749   NA  --   --  140  --  139 138   POTASSIUM  --   --  3.7  --  4.1 4.1   CHLORIDE  --   --  109  --  106 107   CO2  --   --  26  --  27 26   ANIONGAP  --   --  5  --  6 5   GLC  --   --  82  --  83 99   BUN  --   --  9  --  15 20   CR 1.02 0.94 0.98  < > 1.06 1.06   GFRESTIMATED 68 75 71  < > 65 65   GFRESTBLACK 82 >90 86  < > 79 79   ARYAN  --   --  8.7  --  8.2* 8.1*   < > = values in this interval not displayed.       Pending Results:    Unresulted Labs Ordered in the Past 30 Days of this Admission     No orders found from 8/18/2018 to 10/18/2018.           Discharge Instructions and Follow-Up:   Discharge Orders     General info for SNF   Length of Stay Estimate: Short Term Care: Estimated # of Days 31-90  Condition at Discharge: Improving  Level of care:skilled   Rehabilitation Potential: Good  Admission H&P remains valid and up-to-date: Yes  Recent Chemotherapy: N/A  Use Nursing Home Standing Orders: Yes     Mantoux instructions   Give two-step Mantoux (PPD) Per Facility Policy Yes     Follow Up and recommended labs and tests   You will have the procedure to remove the stone on 10/30/18.  You will remain on Ciprofloxacin and Fluconazole for 3 full days after the procedure.     Reason for your hospital stay   You were hospitalized for a recurrent urinary tract infection and stone.     Activity - Up with assistive device     DNR/DNI     Physical Therapy Adult Consult   Evaluate and treat as clinically indicated.    Reason:  deconditioning     Occupational Therapy Adult Consult   Evaluate and treat as clinically indicated.    Reason:  weakness     Advance Diet as Tolerated   Follow this diet upon discharge: Orders Placed This Encounter     Room Service     Combination Diet Regular Diet Adult       Discharge Medications   Current Discharge Medication List      START taking  these medications    Details   ciprofloxacin (CIPRO) 500 MG tablet Take 1 tablet (500 mg) by mouth every 12 hours  Qty: 20 tablet, Refills: 0    Associated Diagnoses: Complicated UTI (urinary tract infection)      oxyCODONE IR (ROXICODONE) 5 MG tablet Take 1 tablet (5 mg) by mouth every 8 hours as needed for severe pain  Qty: 6 tablet, Refills: 0    Associated Diagnoses: Complicated UTI (urinary tract infection)         CONTINUE these medications which have CHANGED    Details   fluconazole (DIFLUCAN) 100 MG tablet Take 1 tablet (100 mg) by mouth daily  Qty: 10 tablet, Refills: 0    Associated Diagnoses: Candidemia (H)         CONTINUE these medications which have NOT CHANGED    Details   ACETAMINOPHEN PO Take 1,000 mg by mouth 3 times daily as needed for pain      albuterol (PROVENTIL HFA: VENTOLIN HFA) 108 (90 BASE) MCG/ACT inhaler Inhale 2 puffs into the lungs every 6 hours as needed for shortness of breath / dyspnea.  Qty: 1 Inhaler, Refills: 1    Associated Diagnoses: Mild persistent asthma      amLODIPine (NORVASC) 5 MG tablet TAKE 1 TABLET(5 MG) BY MOUTH DAILY  Qty: 90 tablet, Refills: 3    Associated Diagnoses: Essential hypertension with goal blood pressure less than 140/90      clobetasol (TEMOVATE) 0.05 % ointment Apply topically daily as needed   Refills: 2      Colloidal Oatmeal (AVEENO ECZEMA THERAPY) 1 % CREA Externally apply topically once a week And as needed for dry skin      lidocaine (LMX4) 4 % CREA 4% topical cream Apply topically daily as needed      metoprolol tartrate (LOPRESSOR) 25 MG tablet Take 1 tablet (25 mg) by mouth 2 times daily  Qty: 60 tablet    Associated Diagnoses: Benign essential hypertension      Multiple Vitamins-Minerals (PRESERVISION AREDS) CAPS Take 1 capsule by mouth 2 times daily  Qty: 180 capsule, Refills: 3    Associated Diagnoses: Macular degeneration      omeprazole (PRILOSEC) 20 MG CR capsule Take 20 mg by mouth 2 times daily       PARoxetine (PAXIL) 30 MG tablet Take  0.5 tablets (15 mg) by mouth At Bedtime Hold until fluconazole completed  Qty: 30 tablet    Associated Diagnoses: Current mild episode of major depressive disorder, unspecified whether recurrent (H)         STOP taking these medications       COLCHICINE PO Comments:   Reason for Stopping:               Time Spent on this Encounter   I, Shahnaz Shook, personally saw the patient today and spent greater than 30 minutes discharging this patient.    Shahnaz Shook MD

## 2018-10-24 NOTE — PROGRESS NOTES
"[facility]  Infectious Disease Progress Note          Assessment and Plan:   IMPRESSIONS:   1.  A 89-year-old male, known to me from recent hospitalization, currently admitted with recurrent low-grade sepsis, right flank discomfort, presumed pyelo/recurrent kidney and obstruction-related issues, urine is grossly abnormal, although urine culture so far negative, as are blood cultures.   2.  Recent admission with major sepsis, appeared life-threatened, ended up with the finding of candidemia due to right-sided obstruction, had a stent in place with obstruction relieved and antifungal treatment, clinically improved, back to near baseline as recently as a few days ago.   3.  Prior infiltrates and possible aspiration, has some mild hypoxia currently, but never really found major aspiration and unlikely pneumonia currently.   4.  Prior cerebrovascular infarction, but cognitive function relatively intact long-term, as is neurologic status.   5.  Ongoing abdominal hernia that has required reduction on occasion.   6.  Gallstones.   7.  LEVAQUIN, ZITHROMAX, BACTRIM AND CEFTRIAXONE ALL LISTED AS ALLERGIES, SOME ARE JUST GI INTOLERANCE.       RECOMMENDATIONS:   1. cipro 500 bid and flucon 100mg po daily thru the eventual stone procedure and 3 days more after that then stop  2.  Despite lev listed allergy cipro several times wo issue        Interval History:   no new complaints and doing well; no cp, sob, n/v/d, or abd pain. Still flank pain T down cx as above cx only kleb UC              Medications:       amLODIPine  5 mg Oral Daily     ciprofloxacin  500 mg Oral Q12H NAKIA     fluconazole  100 mg Oral Q24H     metoprolol tartrate  25 mg Oral BID     omeprazole  20 mg Oral BID AC     sodium chloride (PF)  3 mL Intracatheter Q8H                  Physical Exam:   Blood pressure 145/58, pulse 60, temperature 97.4  F (36.3  C), temperature source Oral, resp. rate 18, height 1.727 m (5' 8\"), weight 70.1 kg " (154 lb 9.6 oz), SpO2 94 %.  Wt Readings from Last 2 Encounters:   10/24/18 70.1 kg (154 lb 9.6 oz)   10/17/18 70.3 kg (155 lb)     Vital Signs with Ranges  Temp:  [96.4  F (35.8  C)-97.4  F (36.3  C)] 97.4  F (36.3  C)  Pulse:  [60] 60  Heart Rate:  [60] 60  Resp:  [16-18] 18  BP: (145-155)/(58-73) 145/58  SpO2:  [92 %-97 %] 94 %    Constitutional: Awake, alert, cooperative, no apparent distress   Lungs: Clear to auscultation bilaterally, no crackles or wheezing   Cardiovascular: Regular rate and rhythm, normal S1 and S2, and no murmur noted   Abdomen: Normal bowel sounds, soft, non-distended, non-tender   Skin: No rashes, no cyanosis, no edema   Other:           Data:   All microbiology laboratory data reviewed.  Recent Labs   Lab Test  10/21/18   1134  10/19/18   0713  10/18/18   0749   WBC  6.7  7.3  10.2   HGB  11.4*  10.7*  10.4*   HCT  36.0*  34.2*  32.9*   MCV  92  93  93   PLT  365  245  225     Recent Labs   Lab Test  10/24/18   0651  10/23/18   0811  10/22/18   0644   CR  1.02  0.94  0.98     Recent Labs   Lab Test  10/16/18   1050   SED  47*     Recent Labs   Lab Test  10/21/18   1225  10/17/18   1845  10/17/18   1828  10/17/18   1609  09/28/18   1332  09/28/18   1331  09/27/18   1512  09/24/18   2050  09/24/18   1203   CULT  No growth  <10,000 colonies/mL  Klebsiella oxytoca  *  No growth  No growth  No growth  No growth  No anaerobes isolated  Heavy growth  Denise albicans / dubliniensis  Candida albicans and Candida dubliniensis are not routinely speciated  Susceptibility testing not routinely done  *  <1000 colonies/mL  urogenital jeanette  Susceptibility testing not routinely done    Cultured on the 3rd day of incubation:  Candida albicans  *  Critical Value/Significant Value, preliminary result only, called to and read back by  Willow Nesbitt RN, @[phone] 09/27/18.DH.    No growth

## 2018-10-24 NOTE — PLAN OF CARE
Problem: Patient Care Overview  Goal: Plan of Care/Patient Progress Review  PT- attempted to see, is awaiting discharge to TCU at 1300 today. Did not wish to participate in skilled PT today. Per chart review goals are not met and PT established recommendation was for TCU.  Recommends to PT for discharge due to the above.

## 2018-10-24 NOTE — PROGRESS NOTES
Transition Communication Hand-off for Care Transitions to Next Level of Care Provider    Name: Edison Boss  : 10/19/1924  MRN #: [id number]  Primary Care Provider: Porfirio Terrell MD  Primary Care MD Name: Porfirio Terrell  Primary Clinic: [address] [facility] [address]  [facility] [address]  Primary Care Clinic Name: [facility]  Reason for Hospitalization:  Complicated UTI (urinary tract infection) [N39.0]  Admit Date/Time: 10/17/2018  3:09 PM  Discharge Date: 10/24/18  Payor Source: Payor: MEDICARE / Plan: MEDICARE / Product Type: Medicare /     Readmission Assessment Measure (UGO) Risk Score/category: ELEVATED    Plan of Care Goals/Milestone Events:            Reason for Communication Hand-off Referral: Fragility  Difficulty understanding plan of care    Discharge Plan:       Concern for non-adherence with plan of care:   NO  Discharge Needs Assessment:      Already enrolled in Tele-monitoring program and name of program:  na  Follow-up specialty is recommended: Yes    Follow-up plan:  Future Appointments  Date Time Provider Department Center   2018 1:45 PM [facility]DR[facility] TECH1 [facility]   2018 3:00 PM Michael Lilly MD [facility]       Any outstanding tests or procedures:         Follow Up and recommended labs and tests       You will have the procedure to remove the stone on 10/30/18.  You will remain on Ciprofloxacin and Fluconazole for 3 full days after the procedure                   Key Recommendations:  Family very concerned about his decline and readmission to the hospital--hoping he will become well enough to return to TCU and be able to eventually live independently again-pt has been able to manage on his own fairly well since his wife  in March of this year.  No specific concerns at this time. dc'd to [facility] TCU    Selma Donato    AVS/Discharge Summary is the source of truth; this is a helpful guide for improved communication of patient story

## 2018-10-24 NOTE — PROGRESS NOTES
Discharge orders faxed to [facility].  Transportation set up for 1300 per SW.      UGO ELEVATED.      Oct 30, 2018   Procedure with Michael Lilly MD   [facility] (--)    [address] [facility] [address]   [phone]        [place] AMANDA CTS 1554

## 2018-10-24 NOTE — PLAN OF CARE
Problem: Patient Care Overview  Goal: Plan of Care/Patient Progress Review  Outcome: Improving  Alert and oriented, forgetful at times.  Ax2 joel steady.   VSS.  97% on RA.    LS clear, denies SOB.   BS active x4, passing flatus.   Christianson position, drained 425 ml clear yellow urine.   Coccyx blanchable red, mepilex applied and repositioning.   Tylenol x1 for pain in R lower back related to stone.   PO cipro and diflucan.   Plan for stone removal next Tuesday.   Discharge next 1-2 days.   Will continue to monitor.

## 2018-10-25 NOTE — PROGRESS NOTES
Clinic Care Coordination Contact  Care Coordination Transition Communication    Referral Source: IP Handoff    Clinical Data: Patient was hospitalized at [facility] from 10.17.2018 to 10.24.2018 with diagnosis of Sepsis secondary to complicated UTI.     Transition to Facility:              Facility Name: [facility]              Contact name and phone number/fax:    Ph. [phone], Fax: [phone]    Plan:  Care Coordinator will await notification from facility staff informing  Care Coordinator of patient's discharge plans/needs. SW Care Coordinator will review chart and outreach to facility staff every 4 weeks and as needed.     Vincent [employer] [facility]  Clinic Care Coordinator  Ph. [phone]  [email or web address]

## 2018-10-25 NOTE — LETTER
10/25/2018        RE: Edison Boss  [address]  [facility] [address]        [place] [facility]  PRIMARY CARE PROVIDER AND CLINIC:  Ganzer, Peter J [address]  / [facility] [address]  Chief Complaint   Patient presents with     Hospital F/U     [place] Medical Record Number:  [id number]  Place of Service where encounter took place:  [facility]  ([facility]) [[id number]]    HPI:    Edison Boss is a 89 year old  (10/19/1924),admitted to the above facility from  [facility].  Hospital stay 10/17/2018 through 10/24/2018.  Admitted to this facility for  rehab, medical management and nursing care.  HPI information obtained from: facility chart records.  Current issues are:         Sepsis, due to unspecified organism (H)  UTI (urinary tract infection)  Ureteral stone  MADELYN (acute kidney injury) (H)  Benign essential hypertension  Depression, unspecified depression type  Paroxysmal atrial fibrillation (H)  SOB (shortness of breath)  Hypoxia  Physical deconditioning         Hospital Course:  Edison Boss is a 89 year old male with a history of prior prostate cancer treated with brachy therapy and XRT, chronic indwelling tolliver catheter, paroxysmal AF (not anticoagulated) and SSS s/p ablation and dual chamber PPM, htn, and hlp.       He has been hospitalized 3 times, now, in the last month.  He was hospitalized from 9/16/18 through 9/19/2018 and treated for pneumonia and a candidal UTI with Levaquin and Fluconazole. He ultimately discharged home.       He returned and was hospitalized from 9/24/18 through 10/4/18 with candida fungemia with sepsis in setting of an obstructing left ureteral stone.  He required urgent cystoscopy with stent placement and stone was pushed back so as not to obstruct the ureter.  He was treated initially with broad spectrum abx (pip-tazo/vanco) but when fungemia was diagnosed he was placed on IV micafungin.  He  "discharged to transitional care with 2 weeks of fluconazole 400 mg daily until stone extraction followed by 100 mg daily for 7 days after stone removed.       He was admitted 10/17/18 with 2 days of increasing flank pain, fever, weakness, and confusion and was found to have recurrent complicated UTI, and concern for infected stone.  After admission he was seen by ID and Urology.  He was initially treated with Zosyn and Fluconazole.  Zosyn was ultimately changed to Cipro. ID is recommending Cipro 500 mg PO BID and Fluconazole 100 mg PO daily until 3 days after stone removed. Patient is scheduled for lithotripsy on 10/30/18.  He will discharge to TCU today.      1.  Acute sepsis secondary to complicated UTI: Patient is afebrile, symptoms minimally better.  Per ID, continue oral Cipro and fluconazole until 3 days after stone removed.  Infectious disease service input appreciated.  Urology will perform repeat cystoscopy for stone removal on 10/30/18.       2.  UTI: UCx on 10/17 grew klebsiella (quinolone susceptible).  UCx on 10/21 showed no growth.    As above he will remain on Cipro and fluconazole for 3 days after his stone is removed which is scheduled on 10/30/18.      3.  Left ureteral stone with stent in place.  See discussion above.        4.  MADELYN:  Suspect due to dehydration and ATN from UTI and sepsis.  Resolved.       5.  HTN:  Cont home regimen of amlodipine 5 mg every day and metoprolol 25 mg bid.      6.  Depression:  Continue Paroxetine as prior to admission.      7.  PAF/SSS-pacer in place, Not on anticoagulation      8. SOB and hypoxia - suspect multifactorial etiology due to sepsis and fluid overload. Improved with lasix.           Alert, calm, NAD. Report some pain to RLE which he states has been going on for  \"a long time\" Usually just uses acetaminophen at home and finds it somewhat helpful. Reports chronic indwelling catheter. Denies SOB, chest pain, fever or chills. Reports appetite okay. States " did not sleep very well last night and relates this to the transition. States normally has not trouble with sleep. VS reviewed- SBP elevated. Patient denies associated s/s.     CODE STATUS/ADVANCE DIRECTIVES DISCUSSION:   DNR only  Patient's living condition: lives alone    ALLERGIES:Ceftriaxone; Bactrim; Zithromax [azithromycin]; Levaquin; and Macrodantin [nitrofuran derivatives]  PAST MEDICAL HISTORY:  has a past medical history of Chronic indwelling Christianson catheter; Chronic infection; Chronic pain; Esophageal reflux; Fungemia (09/2018); Hyperlipidaemia; Hypertension; Malignant neoplasm of prostate (H) (8/14/2002); Mild persistent asthma; Paroxysmal A-fib (H); Prostate cancer (H); Sinus node dysfunction (H); Sleep apnea; Unspecified cerebral artery occlusion with cerebral infarction; and Ureterolithiasis (09/2018). He also has no past medical history of Asymptomatic human immunodeficiency virus (HIV) infection status (H); Blood in semen; Cerebral palsy (H); Complication of anesthesia; Congenital renal agenesis and dysgenesis; Difficult intubation; Epididymitis, bilateral; Epididymitis, left; Epididymitis, right; Goiter; Gout; Hernia, abdominal; History of blood transfusion; History of spinal cord injury; History of thrombophlebitis; Horseshoe kidney; Hydrocephalus; Malignant hyperthermia; Mumps; Orchitis, epididymitis, and epididymo-orchitis, with abscess; Palpitations; Parkinsons disease (H); Penile discharge; PONV (postoperative nausea and vomiting); Prostate infection; Spider veins; Spina bifida (H); Spinal headache; STD (sexually transmitted disease); Swelling of testicle; Tethered cord (H); or Tuberculosis.  PAST SURGICAL HISTORY:  has a past surgical history that includes NONSPECIFIC PROCEDURE (1980's); NONSPECIFIC PROCEDURE (1985, 5/2005); NONSPECIFIC PROCEDURE (1/2002); REMOVE TONSILS/ADENOIDS,<13 Y/O (~1928); NONSPECIFIC PROCEDURE (~1999); NONSPECIFIC PROCEDURE; NONSPECIFIC PROCEDURE; Cystoscopy, inject  collagen, combined (6/6/2012); Cystoscopy, inject collagen, combined (3/22/2013); Implant pacemaker; Cystoscopy, inject collagen, combined (8/8/2014); Cystoscopy, inject collagen, combined (N/A, 8/12/2015); Cystoscopy, inject collagen, combined (N/A, 12/8/2016); Cystoscopy; Penis surgery; Prostate surgery; and Cystoscopy, retrogrades, insert stent ureter(s), combined (Left, 9/27/2018).  FAMILY HISTORY: family history includes Cancer in his brother, brother, and brother; Cerebrovascular Disease in his brother; Family History Negative in his father and mother; HEART DISEASE in his mother.  SOCIAL HISTORY:  reports that he has quit smoking. He has a 40.00 pack-year smoking history. He has never used smokeless tobacco. He reports that he does not drink alcohol or use illicit drugs.    Post Discharge Medication Reconciliation Status: discharge medications reconciled and changed, per note/orders (see AVS).  Current Outpatient Prescriptions   Medication Sig Dispense Refill     ACETAMINOPHEN PO Take 1,000 mg by mouth 3 times daily as needed for pain       albuterol (PROVENTIL HFA: VENTOLIN HFA) 108 (90 BASE) MCG/ACT inhaler Inhale 2 puffs into the lungs every 6 hours as needed for shortness of breath / dyspnea. 1 Inhaler 1     amLODIPine (NORVASC) 5 MG tablet TAKE 1 TABLET(5 MG) BY MOUTH DAILY 90 tablet 3     ciprofloxacin (CIPRO) 500 MG tablet Take 1 tablet (500 mg) by mouth every 12 hours 20 tablet 0     clobetasol (TEMOVATE) 0.05 % ointment Apply topically daily as needed   2     Colloidal Oatmeal (AVEENO ECZEMA THERAPY) 1 % CREA Externally apply topically once a week And as needed for dry skin       fluconazole (DIFLUCAN) 100 MG tablet Take 1 tablet (100 mg) by mouth daily 10 tablet 0     lidocaine (LMX4) 4 % CREA 4% topical cream Apply topically daily as needed       metoprolol tartrate (LOPRESSOR) 25 MG tablet Take 1 tablet (25 mg) by mouth 2 times daily 60 tablet      Multiple Vitamins-Minerals (PRESERVISION AREDS)  "CAPS Take 1 capsule by mouth 2 times daily 180 capsule 3     omeprazole (PRILOSEC) 20 MG CR capsule Take 20 mg by mouth 2 times daily        oxyCODONE IR (ROXICODONE) 5 MG tablet Take 1 tablet (5 mg) by mouth every 8 hours as needed for severe pain 6 tablet 0     PARoxetine (PAXIL) 30 MG tablet Take 0.5 tablets (15 mg) by mouth At Bedtime Hold until fluconazole completed 30 tablet        ROS:  10 point ROS of systems including Constitutional, Eyes, Respiratory, Cardiovascular, Gastroenterology, Genitourinary, Integumentary, Musculoskeletal, Psychiatric were all negative except for pertinent positives noted in my HPI.    Exam:  BP (!) 168/92  Pulse 60  Temp 98.7  F (37.1  C)  Resp 18  Ht 5' 6\" (1.676 m)  Wt 156 lb (70.8 kg)  SpO2 94%  BMI 25.18 kg/m2    GENERAL APPEARANCE: Alert, in no distress   ENT: Mouth and posterior oropharynx normal, moist mucous membranes   EYES: EOM, conjunctivae, lids, pupils and irises normal   NECK: No adenopathy,masses or thyromegaly   RESP: respiratory effort and palpation of chest normal, Lungs clear to auscultation  CV: Palpation and auscultation of heart done , regular rate and rhythm, no murmur, rub, or gallop, peripheral edema - none noted  ABDOMEN: normal bowel sounds, soft, nontender, no hepatosplenomegaly or other masses   : palpation of bladder WNL - ARAMBULA catheter draining clear yellow urine  M/S: Gait and station normal   Digits and nails normal   SKIN: Inspection of skin and subcutaneous tissue baseline, Palpation of skin and subcutaneous tissue baseline-   NEURO: Cranial nerves 2-12 are normal tested and grossly at patient's baseline, Examination of sensation by touch normal   PSYCH: oriented X 3, affect and mood normal             BP 10/24-10/25: 168-191/70-77 mmHg    Lab/Diagnostic data:    CBC RESULTS:   Recent Labs   Lab Test  10/21/18   1134  10/19/18   0713   WBC  6.7  7.3   RBC  3.91*  3.69*   HGB  11.4*  10.7*   HCT  36.0*  34.2*   MCV  92  93   MCH  29.2  " 29.0   MCHC  31.7  31.3*   RDW  17.5*  17.4*   PLT  365  245       Last Basic Metabolic Panel:  Recent Labs   Lab Test  10/24/18   0651  10/23/18   0811  10/22/18   0644   10/19/18   0713   NA   --    --   140   --   139   POTASSIUM   --    --   3.7   --   4.1   CHLORIDE   --    --   109   --   106   ARYAN   --    --   8.7   --   8.2*   CO2   --    --   26   --   27   BUN   --    --   9   --   15   CR  1.02  0.94  0.98   < >  1.06   GLC   --    --   82   --   83    < > = values in this interval not displayed.       Liver Function Studies -   Recent Labs   Lab Test  10/17/18   1609  10/03/18   0624   PROTTOTAL  7.9  6.8   ALBUMIN  2.7*  2.2*   BILITOTAL  0.9  0.4   ALKPHOS  122  112   AST  45  28   ALT  36  29       ASSESSMENT/PLAN:  Sepsis, due to unspecified organism (H)  UTI (urinary tract infection)  Ureteral stone  (UTI- klebsiella and candida- last UC 10/21 no growth)  S/p left urteral stent placement  Inpatient.   - complete Cipro and Flucanozole course  - f/w urology for lithotripsy 10/30  - Christianson cath cares/monitoring per nsg. (chronic indwelling)      MADELYN (acute kidney injury) (H)  - 2/2 above  - resolved    Benign essential hypertension  - SBP elevated  - will increase metoprolol - add hold parameters  - continu jane amlodipine    Depression, unspecified depression type  - situational stressors in play  - monitor mood and behaviors  - ACP prn  - continue on Paxil (on hold until abx completed)    Paroxysmal atrial fibrillation (H)  - h/o sliding scale- has dual chamber pacer    SOB (shortness of breath)  Hypoxia  - though 2/2 above and is resolved  - observe resp status, VS    Physical deconditioning  - 2/2 above  - lives at [facility]  - PT/OT  - ongoing discharge planning, SW follow and care conferences per unit protocol               Electronically signed by:  JYOTI Wade CNP                    Sincerely,        JYOTI Wade CNP

## 2018-10-25 NOTE — LETTER
[facility]   To:             Please give to facility    From:   Vincent Steel  [facility]  Care Coordinator   [facility]     Patient Name:  Edison Boss YOB: 1924   Admit date: 10.24.2018      *Information Needed:  Please contact me when the patient will discharge (or if they will move to long term care)- include the discharge date, disposition, and main diagnosis   - If the patient is discharged with home care services, please provide the name of the agency    Also- Please inform me if a care conference is being held.   Phone, Fax or Email with information       iVncent Steel, [facility]  Clinic Care Coordinator  Ph. [phone]  [email or web address]

## 2018-10-29 NOTE — PROGRESS NOTES
PRIMARY CARE PROVIDER AND CLINIC RESPONSIBLE:  Porfirio Terrell, [address] [facility] [address] / [facility] [address]        ADMISSION HISTORY AND PHYSICAL EXAMINATION     Chief Complaint   Patient presents with     Hospital F/U         HISTORY OF PRESENT ILLNESS:  89 year old male, (10/19/1924), [facility] and [facility].    Pt admitted [facility] 10/17 to 10/24 for sepsis due to UTI and L ureteral stone s/p cystoscopy and stent placement.     Pt denies any fevers/chills/chest pain/worsening SOB.     Please see Lulu Damian's CNP admit noted dated 10/25 for details of admission, past medical history, family history, allergies, medication list, social history and other details pertinent with this admission. Hospital admission and dc summary reviewed.      Past Medical History:   Diagnosis Date     Chronic indwelling Christianson catheter      Chronic infection     UTI     Chronic pain     lower pain, perineum     Esophageal reflux      Fungemia 09/2018    candida-from UTI and infected left ureteral stone     Hyperlipidaemia      Hypertension      Malignant neoplasm of prostate (H) 8/14/2002    Brachytherapy, then external radiation. chronic indwelling catheter     Mild persistent asthma     with URI     Paroxysmal A-fib (H)     not on AC     Prostate cancer (H)     treated with brachytherapy and xrt     Sinus node dysfunction (H)     sp DDD PM     Sleep apnea     ?   snores     Unspecified cerebral artery occlusion with cerebral infarction      Ureterolithiasis 09/2018    with hydronephrosis, sepsis 2/2 candidal UTI       Past Surgical History:   Procedure Laterality Date     C NONSPECIFIC PROCEDURE  1980's    Kidney stone surgery     C NONSPECIFIC PROCEDURE  1985, 5/2005    TURP x 2     C NONSPECIFIC PROCEDURE  1/2002    Brachytherapy     C NONSPECIFIC PROCEDURE  ~1999    Left rotator cuff repair     C NONSPECIFIC PROCEDURE      Bilateral cataract surgery     C NONSPECIFIC PROCEDURE       EGD/dilation twice     CYSTOSCOPY       CYSTOSCOPY, INJECT COLLAGEN, COMBINED  6/6/2012    Procedure:COMBINED CYSTOSCOPY, INJECT BULKING AGENT; VIDEO CYSTOSCOPY WITH BOTOX INJECTIONS  ; Surgeon:BRIAN ADAN; Location:[facility] OR     CYSTOSCOPY, INJECT COLLAGEN, COMBINED  3/22/2013    Procedure: COMBINED CYSTOSCOPY, INJECT BULKING AGENT;  VIDEO CYSTOSCOPY, BOTOX INJECTION;  Surgeon: Brian Adan MD;  Location: [facility] OR     CYSTOSCOPY, INJECT COLLAGEN, COMBINED  8/8/2014    Procedure: COMBINED CYSTOSCOPY, INJECT BULKING AGENT;  Surgeon: Brian Adan MD;  Location: [facility] OR     CYSTOSCOPY, INJECT COLLAGEN, COMBINED N/A 8/12/2015    Procedure: COMBINED CYSTOSCOPY, INJECT BULKING AGENT;  Surgeon: Brian Adan MD;  Location: [facility] OR     CYSTOSCOPY, INJECT COLLAGEN, COMBINED N/A 12/8/2016    Procedure: COMBINED CYSTOSCOPY, INJECT BULKING AGENT;  Surgeon: Brian Adan MD;  Location: [facility] OR     CYSTOSCOPY, RETROGRADES, INSERT STENT URETER(S), COMBINED Left 9/27/2018    Procedure: COMBINED CYSTOSCOPY, RETROGRADES, INSERT STENT URETER(S);  1. Cystourethroscopy  2. Left retrograde pyelography with interpretation of intraoperative fluoroscopic imaging  3. Left ureteral stent placement;  Surgeon: Froylan Richards MD;  Location: [facility] OR     HC REMOVE TONSILS/ADENOIDS,<13 Y/O  ~1928    T & A <12y.o.     IMPLANT PACEMAKER       PENIS SURGERY       PROSTATE SURGERY         Current Outpatient Prescriptions   Medication Sig     ACETAMINOPHEN PO Take 1,000 mg by mouth 3 times daily as needed for pain     albuterol (PROVENTIL HFA: VENTOLIN HFA) 108 (90 BASE) MCG/ACT inhaler Inhale 2 puffs into the lungs every 6 hours as needed for shortness of breath / dyspnea.     amLODIPine (NORVASC) 5 MG tablet TAKE 1 TABLET(5 MG) BY MOUTH DAILY     ciprofloxacin (CIPRO) 500 MG tablet Take 1 tablet (500 mg) by mouth every 12 hours     clobetasol (TEMOVATE) 0.05 % ointment Apply topically daily as needed      Colloidal Oatmeal  (AVEENO ECZEMA THERAPY) 1 % CREA Externally apply topically once a week And as needed for dry skin     fluconazole (DIFLUCAN) 100 MG tablet Take 1 tablet (100 mg) by mouth daily     lidocaine (LMX4) 4 % CREA 4% topical cream Apply topically daily as needed     METOPROLOL TARTRATE PO Take 50 mg by mouth 2 times daily     Multiple Vitamins-Minerals (PRESERVISION AREDS) CAPS Take 1 capsule by mouth 2 times daily     omeprazole (PRILOSEC) 20 MG CR capsule Take 20 mg by mouth 2 times daily      oxyCODONE IR (ROXICODONE) 5 MG tablet Take 1 tablet (5 mg) by mouth every 8 hours as needed for severe pain     PARoxetine (PAXIL) 30 MG tablet Take 0.5 tablets (15 mg) by mouth At Bedtime Hold until fluconazole completed     senna-docusate (SENOKOT-S;PERICOLACE) 8.6-50 MG per tablet Take 1 tablet by mouth 2 times daily as needed for constipation     No current facility-administered medications for this visit.        Allergies   Allergen Reactions     Ceftriaxone Itching     Bactrim Hives     Zithromax [Azithromycin]      Levaquin Rash     Oral only, can tolerate IV     Macrodantin [Nitrofuran Derivatives] Rash     Took recently with no problems       Social History     Social History     Marital status:      Spouse name: Alysa     Number of children: 8     Years of education: N/A     Occupational History      Retired     Social History Main Topics     Smoking status: Former Smoker     Packs/day: 1.00     Years: 40.00     Smokeless tobacco: Never Used      Comment: QUIT 1978     Alcohol use No     Drug use: No     Sexual activity: No     Other Topics Concern     Parent/Sibling W/ Cabg, Mi Or Angioplasty Before 65f 55m? No     Social History Narrative    .Living situation (location, living with others?):Lives with wife in [facility]    Activities of daily living (e.g., dressing, eating, walking):Independent        Instrumental activities of daily living (e.g., finance management, housekeeping, meal planning/ prep):  "Wife and kids help with preparing meals, shopping, driving, climbing stairs, complex decisions                 Meaningful Activities (e.g., hobbies, work): loves music, uses the computer, likes crossword         Support:Wife and daughter        Community Resources Used/ Interested in: Referred to [facility]          Information reviewed:  Medications, vital signs, orders, nursing notes, problem list, hospital information.     ROS: All 10 point review of system completed, those pertinent positive, please see H&P, the remaining ROS is negative.    /84  Pulse 61  Temp 97.9  F (36.6  C)  Resp 18  Ht 5' 6\" (1.676 m)  Wt 156 lb (70.8 kg)  SpO2 93%  BMI 25.18 kg/m2    PHYSICAL EXAMINATION:   GENERAL:  No acute distress. Laying in bed.  SKIN:  Dry and warm.  There is no rash, lesions, ulcers or juandice at area of skin examined.  HEENT:  Head without trauma.  Pupils round, reactive. Exam of conjunctiva and lids are normal. Sclera without icterus. There is no oral thrush.  NECK:  Supple.  There is no cervical adenopathy, no thyromegaly. No jugular venous distension.  CHEST: No reproducible chest tenderness.   LUNGS:  Normal respiratory effort. Lungs are Clear on ascultation.  HEART:  Regular rate and rhythm.  No murmur, gallops or rubs auscultated.  ABDOMEN:  Soft, bowel sounds positive.  There is no tenderness or guarding.   EXTREMITIES: No edema.   NEUROLOGIC:  Alert and oriented to self and situation.    Lab/Diagnostic data:  Reviewed    Lab Results   Component Value Date    WBC 7.5 10/26/2018     Lab Results   Component Value Date    RBC 4.06 10/26/2018     Lab Results   Component Value Date    HGB 11.6 10/26/2018     Lab Results   Component Value Date    HCT 36.3 10/26/2018     Lab Results   Component Value Date    MCV 89 10/26/2018     Lab Results   Component Value Date    MCH 28.6 10/26/2018     Lab Results   Component Value Date    MCHC 32.0 10/26/2018     Lab Results   Component Value Date    RDW 18.0 " 10/26/2018     Lab Results   Component Value Date     10/26/2018         Last Comprehensive Metabolic Panel:  Sodium   Date Value Ref Range Status   10/26/2018 143 133 - 144 mmol/L Final     Potassium   Date Value Ref Range Status   10/26/2018 3.7 3.4 - 5.3 mmol/L Final     Chloride   Date Value Ref Range Status   10/26/2018 108 94 - 109 mmol/L Final     Carbon Dioxide   Date Value Ref Range Status   10/26/2018 30 20 - 32 mmol/L Final     Anion Gap   Date Value Ref Range Status   10/26/2018 5 3 - 14 mmol/L Final     Glucose   Date Value Ref Range Status   10/26/2018 82 70 - 99 mg/dL Final     Urea Nitrogen   Date Value Ref Range Status   10/26/2018 16 7 - 30 mg/dL Final     Creatinine   Date Value Ref Range Status   10/26/2018 0.98 0.66 - 1.25 mg/dL Final     GFR Estimate   Date Value Ref Range Status   10/26/2018 71 >60 mL/min/1.7m2 Final     Comment:     Non  GFR Calc     Calcium   Date Value Ref Range Status   10/26/2018 9.4 8.5 - 10.1 mg/dL Final       ASSESSMENT / PLAN:     Sepsis, due to unspecified organism (H)  - due to UTI.  - On cipro and diflucan.  - will be getting stent removal and lithotripsy.    Essential hypertension with goal blood pressure less than 140/90  - On metoprolol and norvasc.    Depression, unspecified depression type  - on paxil.    Physical deconditioning  -Plan: PT/OT, fall precautions. Care conference with patient and family for the progress of rehab and disposition issues will be discussed as planned. Rehab evaluation and other evaluations including CPT are at rehab logs, to be reviewed separately.  Fall risk assessment as well as cognitive evaluation will be formed during rehab stay if indicated.      Other problems with same care. Primary care doctor and other specialists to address those chronic problems in next clinic appointment to be scheduled upon discharge from the TCU.    Total time spent with patient visit was 32 min including patient visit, review of  past records, 1/2 time on patients counseling and coordinating care.        Lexy Nieves MD

## 2018-10-29 NOTE — LETTER
10/29/2018        RE: Edison Boss  [address]  [facility] [address]          PRIMARY CARE PROVIDER AND CLINIC RESPONSIBLE:  Porfirio Terrell, [address] / [facility] [address]        ADMISSION HISTORY AND PHYSICAL EXAMINATION     Chief Complaint   Patient presents with     Hospital F/U         HISTORY OF PRESENT ILLNESS:  89 year old male, (10/19/1924), admitted to the [facility] for continuation of medical care and rehab.    Pt admitted [facility] 10/17 to 10/24 for sepsis due to UTI and L ureteral stone s/p cystoscopy and stent placement.     Pt denies any fevers/chills/chest pain/worsening SOB.     Please see Lulu Damian's CNP admit noted dated 10/25 for details of admission, past medical history, family history, allergies, medication list, social history and other details pertinent with this admission. Hospital admission and dc summary reviewed.      Past Medical History:   Diagnosis Date     Chronic indwelling Christianson catheter      Chronic infection     UTI     Chronic pain     lower pain, perineum     Esophageal reflux      Fungemia 09/2018    candida-from UTI and infected left ureteral stone     Hyperlipidaemia      Hypertension      Malignant neoplasm of prostate (H) 8/14/2002    Brachytherapy, then external radiation. chronic indwelling catheter     Mild persistent asthma     with URI     Paroxysmal A-fib (H)     not on AC     Prostate cancer (H)     treated with brachytherapy and xrt     Sinus node dysfunction (H)     sp DDD PM     Sleep apnea     ?   snores     Unspecified cerebral artery occlusion with cerebral infarction      Ureterolithiasis 09/2018    with hydronephrosis, sepsis 2/2 candidal UTI       Past Surgical History:   Procedure Laterality Date     C NONSPECIFIC PROCEDURE  1980's    Kidney stone surgery     C NONSPECIFIC PROCEDURE  1985, 5/2005    TURP x 2     C NONSPECIFIC PROCEDURE  1/2002    Brachytherapy     C NONSPECIFIC PROCEDURE  ~1999    Left  rotator cuff repair     C NONSPECIFIC PROCEDURE      Bilateral cataract surgery     C NONSPECIFIC PROCEDURE      EGD/dilation twice     CYSTOSCOPY       CYSTOSCOPY, INJECT COLLAGEN, COMBINED  6/6/2012    Procedure:COMBINED CYSTOSCOPY, INJECT BULKING AGENT; VIDEO CYSTOSCOPY WITH BOTOX INJECTIONS  ; Surgeon:BRIAN ADAN; Location:[facility] OR     CYSTOSCOPY, INJECT COLLAGEN, COMBINED  3/22/2013    Procedure: COMBINED CYSTOSCOPY, INJECT BULKING AGENT;  VIDEO CYSTOSCOPY, BOTOX INJECTION;  Surgeon: Brian Adan MD;  Location: [facility] OR     CYSTOSCOPY, INJECT COLLAGEN, COMBINED  8/8/2014    Procedure: COMBINED CYSTOSCOPY, INJECT BULKING AGENT;  Surgeon: Brian Adan MD;  Location: [facility] OR     CYSTOSCOPY, INJECT COLLAGEN, COMBINED N/A 8/12/2015    Procedure: COMBINED CYSTOSCOPY, INJECT BULKING AGENT;  Surgeon: Brian Adan MD;  Location: [facility] OR     CYSTOSCOPY, INJECT COLLAGEN, COMBINED N/A 12/8/2016    Procedure: COMBINED CYSTOSCOPY, INJECT BULKING AGENT;  Surgeon: Brian Adan MD;  Location: [facility] OR     CYSTOSCOPY, RETROGRADES, INSERT STENT URETER(S), COMBINED Left 9/27/2018    Procedure: COMBINED CYSTOSCOPY, RETROGRADES, INSERT STENT URETER(S);  1. Cystourethroscopy  2. Left retrograde pyelography with interpretation of intraoperative fluoroscopic imaging  3. Left ureteral stent placement;  Surgeon: Froylan Richards MD;  Location: [facility] OR     HC REMOVE TONSILS/ADENOIDS,<13 Y/O  ~1928    T & A <12y.o.     IMPLANT PACEMAKER       PENIS SURGERY       PROSTATE SURGERY         Current Outpatient Prescriptions   Medication Sig     ACETAMINOPHEN PO Take 1,000 mg by mouth 3 times daily as needed for pain     albuterol (PROVENTIL HFA: VENTOLIN HFA) 108 (90 BASE) MCG/ACT inhaler Inhale 2 puffs into the lungs every 6 hours as needed for shortness of breath / dyspnea.     amLODIPine (NORVASC) 5 MG tablet TAKE 1 TABLET(5 MG) BY MOUTH DAILY     ciprofloxacin (CIPRO) 500 MG tablet Take 1 tablet (500 mg) by mouth  every 12 hours     clobetasol (TEMOVATE) 0.05 % ointment Apply topically daily as needed      Colloidal Oatmeal (AVEENO ECZEMA THERAPY) 1 % CREA Externally apply topically once a week And as needed for dry skin     fluconazole (DIFLUCAN) 100 MG tablet Take 1 tablet (100 mg) by mouth daily     lidocaine (LMX4) 4 % CREA 4% topical cream Apply topically daily as needed     METOPROLOL TARTRATE PO Take 50 mg by mouth 2 times daily     Multiple Vitamins-Minerals (PRESERVISION AREDS) CAPS Take 1 capsule by mouth 2 times daily     omeprazole (PRILOSEC) 20 MG CR capsule Take 20 mg by mouth 2 times daily      oxyCODONE IR (ROXICODONE) 5 MG tablet Take 1 tablet (5 mg) by mouth every 8 hours as needed for severe pain     PARoxetine (PAXIL) 30 MG tablet Take 0.5 tablets (15 mg) by mouth At Bedtime Hold until fluconazole completed     senna-docusate (SENOKOT-S;PERICOLACE) 8.6-50 MG per tablet Take 1 tablet by mouth 2 times daily as needed for constipation     No current facility-administered medications for this visit.        Allergies   Allergen Reactions     Ceftriaxone Itching     Bactrim Hives     Zithromax [Azithromycin]      Levaquin Rash     Oral only, can tolerate IV     Macrodantin [Nitrofuran Derivatives] Rash     Took recently with no problems       Social History     Social History     Marital status:      Spouse name: Alysa     Number of children: 8     Years of education: N/A     Occupational History      Retired     Social History Main Topics     Smoking status: Former Smoker     Packs/day: 1.00     Years: 40.00     Smokeless tobacco: Never Used      Comment: QUIT 1978     Alcohol use No     Drug use: No     Sexual activity: No     Other Topics Concern     Parent/Sibling W/ Cabg, Mi Or Angioplasty Before 65f 55m? No     Social History Narrative    .Living situation (location, living with others?):Lives with wife in [facility]    Activities of daily living (e.g., dressing, eating, walking):Independent  "       Instrumental activities of daily living (e.g., finance management, housekeeping, meal planning/ prep): Wife and kids help with preparing meals, shopping, driving, climbing stairs, complex decisions                 Meaningful Activities (e.g., hobbies, work): loves music, uses the computer, likes crossword         Support:Wife and daughter        Community Resources Used/ Interested in: Referred to [facility]          Information reviewed:  Medications, vital signs, orders, nursing notes, problem list, hospital information.     ROS: All 10 point review of system completed, those pertinent positive, please see H&P, the remaining ROS is negative.    /84  Pulse 61  Temp 97.9  F (36.6  C)  Resp 18  Ht 5' 6\" (1.676 m)  Wt 156 lb (70.8 kg)  SpO2 93%  BMI 25.18 kg/m2    PHYSICAL EXAMINATION:   GENERAL:  No acute distress. Laying in bed.  SKIN:  Dry and warm.  There is no rash, lesions, ulcers or juandice at area of skin examined.  HEENT:  Head without trauma.  Pupils round, reactive. Exam of conjunctiva and lids are normal. Sclera without icterus. There is no oral thrush.  NECK:  Supple.  There is no cervical adenopathy, no thyromegaly. No jugular venous distension.  CHEST: No reproducible chest tenderness.   LUNGS:  Normal respiratory effort. Lungs are Clear on ascultation.  HEART:  Regular rate and rhythm.  No murmur, gallops or rubs auscultated.  ABDOMEN:  Soft, bowel sounds positive.  There is no tenderness or guarding.   EXTREMITIES: No edema.   NEUROLOGIC:  Alert and oriented to self and situation.    Lab/Diagnostic data:  Reviewed    Lab Results   Component Value Date    WBC 7.5 10/26/2018     Lab Results   Component Value Date    RBC 4.06 10/26/2018     Lab Results   Component Value Date    HGB 11.6 10/26/2018     Lab Results   Component Value Date    HCT 36.3 10/26/2018     Lab Results   Component Value Date    MCV 89 10/26/2018     Lab Results   Component Value Date    MCH 28.6 10/26/2018     Lab " Results   Component Value Date    MCHC 32.0 10/26/2018     Lab Results   Component Value Date    RDW 18.0 10/26/2018     Lab Results   Component Value Date     10/26/2018         Last Comprehensive Metabolic Panel:  Sodium   Date Value Ref Range Status   10/26/2018 143 133 - 144 mmol/L Final     Potassium   Date Value Ref Range Status   10/26/2018 3.7 3.4 - 5.3 mmol/L Final     Chloride   Date Value Ref Range Status   10/26/2018 108 94 - 109 mmol/L Final     Carbon Dioxide   Date Value Ref Range Status   10/26/2018 30 20 - 32 mmol/L Final     Anion Gap   Date Value Ref Range Status   10/26/2018 5 3 - 14 mmol/L Final     Glucose   Date Value Ref Range Status   10/26/2018 82 70 - 99 mg/dL Final     Urea Nitrogen   Date Value Ref Range Status   10/26/2018 16 7 - 30 mg/dL Final     Creatinine   Date Value Ref Range Status   10/26/2018 0.98 0.66 - 1.25 mg/dL Final     GFR Estimate   Date Value Ref Range Status   10/26/2018 71 >60 mL/min/1.7m2 Final     Comment:     Non  GFR Calc     Calcium   Date Value Ref Range Status   10/26/2018 9.4 8.5 - 10.1 mg/dL Final       ASSESSMENT / PLAN:     Sepsis, due to unspecified organism (H)  - due to UTI.  - On cipro and diflucan.  - will be getting stent removal and lithotripsy.    Essential hypertension with goal blood pressure less than 140/90  - On metoprolol and norvasc.    Depression, unspecified depression type  - on paxil.    Physical deconditioning  -Plan: PT/OT, fall precautions. Care conference with patient and family for the progress of rehab and disposition issues will be discussed as planned. Rehab evaluation and other evaluations including CPT are at rehab logs, to be reviewed separately.  Fall risk assessment as well as cognitive evaluation will be formed during rehab stay if indicated.      Other problems with same care. Primary care doctor and other specialists to address those chronic problems in next clinic appointment to be scheduled upon  discharge from the TCU.    Total time spent with patient visit was 32 min including patient visit, review of past records, 1/2 time on patients counseling and coordinating care.        Lexy Nieves MD      Sincerely,        Lexy Nieves MD

## 2018-10-30 NOTE — ANESTHESIA POSTPROCEDURE EVALUATION
Patient: Edison Hennessy Karyn    Procedure(s):  Left Extracorporeal Shock Wave Lithotripsy,Video Cystoscopy with Double J Stent Exchange    Diagnosis:Renal calculi  Diagnosis Additional Information: left renal calculi, recurrent uti      Anesthesia Type:  General, LMA    Note:  Anesthesia Post Evaluation    Patient location during evaluation: PACU  Patient participation: Able to fully participate in evaluation  Level of consciousness: awake  Pain management: adequate  Airway patency: patent  Cardiovascular status: acceptable  Respiratory status: acceptable  Hydration status: acceptable  PONV: controlled     Anesthetic complications: None          Last vitals:  Vitals:    10/30/18 1610 10/30/18 1615 10/30/18 1620   BP: 151/68 146/67 153/69   Pulse:      Resp: 16 11 14   Temp:      SpO2: 100% 100% 100%         Electronically Signed By: Lorne Khan MD  October 30, 2018  4:36 PM

## 2018-10-30 NOTE — IP AVS SNAPSHOT
MRN:[id number]                      After Visit Summary   10/30/2018    Edison Boss    MRN: [id number]           Thank you!     Thank you for choosing [facility] for your care. Our goal is always to provide you with excellent care. Hearing back from our patients is one way we can continue to improve our services. Please take a few minutes to complete the written survey that you may receive in the mail after you visit. If you would like to speak to someone directly about your visit please contact Patient Relations at [phone]. Thank you!          Patient Information     Date Of Birth          10/19/1924        About your hospital stay     You were admitted on:  October 30, 2018 You last received care in the:  [facility] PreOP/PostOP    You were discharged on:  October 30, 2018       Who to Call     For medical emergencies, please call 911.  For non-urgent questions about your medical care, please call your primary care provider or clinic, [phone]  For questions related to your surgery, please call your surgery clinic        Attending Provider     Provider Specialty    Michael Lilly MD Urology       Primary Care Provider Office Phone # Fax #    Porfirio Terrell [phone]      Your next 10 appointments already scheduled     Nov 05, 2018  1:45 PM CST   Remote PPM Check with SANDRA TECH1   [facility] ([facility])    [address] [place] [address] [address]  [facility] [id number] [phone] OPT 2           This appointment is for a remote check of your pacemaker.  This is not an appointment at the office.            Nov 13, 2018  3:00 PM CST   Cystoscopy with Michael Lilly MD, UB CYF   [facility] ([facility])    [address]  [address]  [facility] [address] [phone]              Further instructions from your care team        GENERAL ANESTHESIA OR SEDATION ADULT DISCHARGE INSTRUCTIONS   SPECIAL PRECAUTIONS FOR 24 HOURS AFTER SURGERY    IT IS NOT UNUSUAL TO FEEL LIGHT-HEADED OR FAINT, UP TO 24 HOURS AFTER SURGERY OR WHILE TAKING PAIN MEDICATION.  IF YOU HAVE THESE SYMPTOMS; SIT FOR A FEW MINUTES BEFORE STANDING AND HAVE SOMEONE ASSIST YOU WHEN YOU GET UP TO WALK OR USE THE BATHROOM.    YOU SHOULD REST AND RELAX FOR THE NEXT 24 HOURS AND YOU MUST MAKE ARRANGEMENTS TO HAVE SOMEONE STAY WITH YOU FOR AT LEAST 24 HOURS AFTER YOUR DISCHARGE.  AVOID HAZARDOUS AND STRENUOUS ACTIVITIES.  DO NOT MAKE IMPORTANT DECISIONS FOR 24 HOURS.    DO NOT DRIVE ANY VEHICLE OR OPERATE MECHANICAL EQUIPMENT FOR 24 HOURS FOLLOWING THE END OF YOUR SURGERY.  EVEN THOUGH YOU MAY FEEL NORMAL, YOUR REACTIONS MAY BE AFFECTED BY THE MEDICATION YOU HAVE RECEIVED.    DO NOT DRINK ALCOHOLIC BEVERAGES FOR 24 HOURS FOLLOWING YOUR SURGERY.    DRINK CLEAR LIQUIDS (APPLE JUICE, GINGER ALE, 7-UP, BROTH, ETC.).  PROGRESS TO YOUR REGULAR DIET AS YOU FEEL ABLE.    YOU MAY HAVE A DRY MOUTH, A SORE THROAT, MUSCLES ACHES OR TROUBLE SLEEPING.  THESE SHOULD GO AWAY AFTER 24 HOURS.    CALL YOUR DOCTOR FOR ANY OF THE FOLLOWING:  SIGNS OF INFECTION (FEVER, GROWING TENDERNESS AT THE SURGERY SITE, A LARGE AMOUNT OF DRAINAGE OR BLEEDING, SEVERE PAIN, FOUL-SMELLING DRAINAGE, REDNESS OR SWELLING.    IT HAS BEEN OVER 8 TO 10 HOURS SINCE SURGERY AND YOU ARE STILL NOT ABLE TO URINATE (PASS WATER).       CYSTOSCOPY DISCHARGE INSTRUCTIONS  [facility] / UROLOGY  [facility]  [phone]    YOU MAY GO BACK TO YOUR NORMAL DIET AND ACTIVITY, UNLESS YOUR DOCTOR TELLS YOU NOT TO.    FOR THE NEXT TWO DAYS, YOU MAY NOTICE:    SOME BLOOD IN YOUR URINE.  SOME BURNING WHEN YOU URINATE (USE THE TOILET).  AN URGE TO URINATE MORE OFTEN.  BLADDER SPASMS.    THESE ARE NORMAL AFTER THE PROCEDURE.  THEY SHOULD GO AWAY AFTER A DAY OR TWO.  TO RELIEVE THESE PROBLEMS:     DRINK 6 TO 8  LARGE GLASSES OF WATER EACH DAY (INCLUDES DRINKS AT MEALS).  THIS WILL HELP CLEAR THE URINE.    TAKE WARM BATHS TO RELIEVE PAIN AND BLADDER SPASMS.  DO NOT ADD ANYTHING TO THE BATH WATER.    YOUR DOCTOR MAY PRESCRIBE PAIN MEDICINE.  YOU MAY ALSO TAKE TYLENOL (ACETAMINOPHEN) FOR PAIN.    CALL YOUR SURGEON IF YOU HAVE:    A FEVER OVER 101 DEGREES.  CHECK YOUR TEMPERATURE UNDER YOUR TONGUE.    CHILLS.    FAILURE TO URINATE (NO URINE COMES OUT WHEN YOU TRY TO USE THE TOILET).  TRY SOAKING IN A BATHTUB FULL OF WARM WATER.  IF STILL NO URINE, CALL YOUR DOCTOR.    A LOT OF BLOOD IN THE URINE, OR BLOOD CLOTS LARGER THAN A NICKEL.      PAIN IN THE BACK OR BELLY AREA (ABDOMEN).    PAIN OR SPASMS THAT ARE NOT RELIEVED BY WARM TUB BATHS AND PAIN MEDICINE.      SEVERE PAIN, BURNING OR OTHER PROBLEMS WHILE PASSING URINE.    PAIN THAT GETS WORSE AFTER TWO DAYS.        STENT INFORMATION/DISCHARGE INSTRUCTIONS   o [facility] / UROLOGY  [facility]  [phone]    During surgery, a stent may be placed in the ureter.  The ureter is the tube that drains urine from the kidney to the bladder.  The stent is placed to dilate (open) the ureter so stone fragments can pass easily through the ureter or to decrease ureteral swelling after surgery or to relieve an obstruction.      The stent is made of silicone.  The upper end of the stent curls in the kidney while the lower end rests in the bladder.    While the stent is in place you may experience the following symptoms:  Blood and/or small blood clots in the urine  Bladder spasms (frequency and urgency of urination)  Discomfort or aching in the back or side where the stent is  Burning or discomfort at the end of urine stream    To decrease these symptoms you should:  Take antispasmodic medication as prescribed (Detrol, Ditropan, etc.)  Drink plenty of fluids but avoid caffeine and citrus (include cranberry)  If you are having discomfort in back or side, decrease  activity    Please call your physician or the physician on call if you experience:  Fever greater than 101 degrees  Severe pain not relieved by pain medication or rest    Please make an appointment for the removal of the stent according to your physician's instructions.          Pending Results     No orders found from 10/28/2018 to 10/31/2018.            Admission Information     Date & Time Provider Department Dept. Phone    10/30/2018 Michael Lilly MD [facility] PreOP/PostOP [phone]      Your Vitals Were     Blood Pressure Pulse Temperature Respirations Pulse Oximetry       138/66 (BP Location: Right arm) 61 97.4  F (36.3  C) (Temporal) 12 92%       MyChart Information     [employer] gives you secure access to your electronic health record. If you see a primary care provider, you can also send messages to your care team and make appointments. If you have questions, please call your primary care clinic.  If you do not have a primary care provider, please call [phone] and they will assist you.        Care EveryWhere ID     This is your Care EveryWhere ID. This could be used by other organizations to access your [place] medical records  [id number]        Equal Access to Services     EMERITA CADE : Shellie Denson, darek griffith, yony gilliam. So [facility] [phone].    ATENCIÓN: Si habla español, tiene a lauren disposición servicios gratuitos de asistencia lingüística. [facility] [phone].    We comply with applicable federal civil rights laws and [place] laws. We do not discriminate on the basis of race, color, national origin, age, disability, sex, sexual orientation, or gender identity.               Review of your medicines      CONTINUE these medicines which have NOT CHANGED        Dose / Directions    ACETAMINOPHEN PO        Dose:  1000 mg   Take 1,000 mg by mouth 3 times daily as needed for pain   Refills:  0        albuterol 108 (90 Base) MCG/ACT inhaler   Commonly known as:  PROAIR HFA/PROVENTIL HFA/VENTOLIN HFA   Used for:  Mild persistent asthma        Dose:  2 puff   Inhale 2 puffs into the lungs every 6 hours as needed for shortness of breath / dyspnea.   Quantity:  1 Inhaler   Refills:  1       amLODIPine 5 MG tablet   Commonly known as:  NORVASC   Used for:  Essential hypertension with goal blood pressure less than 140/90        TAKE 1 TABLET(5 MG) BY MOUTH DAILY   Quantity:  90 tablet   Refills:  3       AVEENO ECZEMA THERAPY 1 % Crea   Generic drug:  Colloidal Oatmeal        Externally apply topically once a week And as needed for dry skin   Refills:  0       ciprofloxacin 500 MG tablet   Commonly known as:  CIPRO   Indication:  Urinary Tract Infection   Used for:  Complicated UTI (urinary tract infection)        Dose:  500 mg   Take 1 tablet (500 mg) by mouth every 12 hours   Quantity:  20 tablet   Refills:  0       clobetasol 0.05 % ointment   Commonly known as:  TEMOVATE        Apply topically daily as needed   Refills:  2       fluconazole 100 MG tablet   Commonly known as:  DIFLUCAN   Used for:  Candidemia (H)        Dose:  100 mg   Take 1 tablet (100 mg) by mouth daily   Quantity:  10 tablet   Refills:  0       lidocaine 4 % Crea cream   Commonly known as:  LMX4        Apply topically daily as needed   Refills:  0       METOPROLOL TARTRATE PO        Dose:  50 mg   Take 50 mg by mouth 2 times daily   Refills:  0       omeprazole 20 MG CR capsule   Commonly known as:  priLOSEC        Dose:  20 mg   Take 20 mg by mouth 2 times daily   Refills:  0       oxyCODONE IR 5 MG tablet   Commonly known as:  ROXICODONE   Used for:  Complicated UTI (urinary tract infection)        Dose:  5 mg   Take 1 tablet (5 mg) by mouth every 8 hours as needed for severe pain   Quantity:  6 tablet   Refills:  0       PARoxetine 30 MG tablet   Commonly known as:  PAXIL   Used for:  Current mild episode of major depressive disorder,  unspecified whether recurrent (H)        Dose:  15 mg   Take 0.5 tablets (15 mg) by mouth At Bedtime Hold until fluconazole completed   Quantity:  30 tablet   Refills:  0       PRESERVISION AREDS Caps   Used for:  Macular degeneration        Dose:  1 capsule   Take 1 capsule by mouth 2 times daily   Quantity:  180 capsule   Refills:  3       senna-docusate 8.6-50 MG per tablet   Commonly known as:  SENOKOT-S;PERICOLACE        Dose:  1 tablet   Take 1 tablet by mouth 2 times daily as needed for constipation   Refills:  0                Protect others around you: Learn how to safely use, store and throw away your medicines at www.disposemymeds.org.             Medication List: This is a list of all your medications and when to take them. Check marks below indicate your daily home schedule. Keep this list as a reference.      Medications           Morning Afternoon Evening Bedtime As Needed    ACETAMINOPHEN PO   Take 1,000 mg by mouth 3 times daily as needed for pain                                albuterol 108 (90 Base) MCG/ACT inhaler   Commonly known as:  PROAIR HFA/PROVENTIL HFA/VENTOLIN HFA   Inhale 2 puffs into the lungs every 6 hours as needed for shortness of breath / dyspnea.                                amLODIPine 5 MG tablet   Commonly known as:  NORVASC   TAKE 1 TABLET(5 MG) BY MOUTH DAILY                                AVEENO ECZEMA THERAPY 1 % Crea   Externally apply topically once a week And as needed for dry skin   Generic drug:  Colloidal Oatmeal                                ciprofloxacin 500 MG tablet   Commonly known as:  CIPRO   Take 1 tablet (500 mg) by mouth every 12 hours                                clobetasol 0.05 % ointment   Commonly known as:  TEMOVATE   Apply topically daily as needed                                fluconazole 100 MG tablet   Commonly known as:  DIFLUCAN   Take 1 tablet (100 mg) by mouth daily                                lidocaine 4 % Crea cream   Commonly known  as:  LMX4   Apply topically daily as needed                                METOPROLOL TARTRATE PO   Take 50 mg by mouth 2 times daily                                omeprazole 20 MG CR capsule   Commonly known as:  priLOSEC   Take 20 mg by mouth 2 times daily                                oxyCODONE IR 5 MG tablet   Commonly known as:  ROXICODONE   Take 1 tablet (5 mg) by mouth every 8 hours as needed for severe pain                                PARoxetine 30 MG tablet   Commonly known as:  PAXIL   Take 0.5 tablets (15 mg) by mouth At Bedtime Hold until fluconazole completed                                PRESERVISION AREDS Caps   Take 1 capsule by mouth 2 times daily                                senna-docusate 8.6-50 MG per tablet   Commonly known as:  SENOKOT-S;PERICOLACE   Take 1 tablet by mouth 2 times daily as needed for constipation

## 2018-10-30 NOTE — IP AVS SNAPSHOT
[facility] PreOP/PostOP    [address]    [facility] [address]    Phone:  [phone]    Fax:  [phone]                                       After Visit Summary   10/30/2018    Edison Boss    MRN: [id number]           After Visit Summary Signature Page     I have received my discharge instructions, and my questions have been answered. I have discussed any challenges I see with this plan with the nurse or doctor.    ..........................................................................................................................................  Patient/Patient Representative Signature      ..........................................................................................................................................  Patient Representative Print Name and Relationship to Patient    ..................................................               ................................................  Date                                   Time    ..........................................................................................................................................  Reviewed by Signature/Title    ...................................................              ..............................................  Date                                               Time          22EPIC Rev 08/18

## 2018-10-30 NOTE — BRIEF OP NOTE
[facility] Brief Operative Note    Pre-operative diagnosis: Renal calculi   Post-operative diagnosis left renal calculi, recurrent uti     Procedure: Procedure(s):  Left Extracorporeal Shock Wave Lithotripsy,Video Cystoscopy with Double J Stent Exchange   Surgeon(s): Surgeon(s) and Role: Michael Lilly MD - Primary   Estimated blood loss: 25cc    Specimens: none   Findings: 3300 shocks

## 2018-10-30 NOTE — ANESTHESIA PREPROCEDURE EVALUATION
PAC NOTE:       ANESTHESIA PRE EVALUATION:  Anesthesia Evaluation     . Pt has had prior anesthetic. Type: General    No history of anesthetic complications          ROS/MED HX    ENT/Pulmonary:     (+)sleep apnea, Intermittent asthma , . .   (-) COPD and recent URI   Neurologic:     (+)CVA     Cardiovascular:     (+) Dyslipidemia, hypertension----. : . . . pacemaker :. dysrhythmias . pulmonary hypertension, Previous cardiac testing Echodate:9/18results:The left ventricle is normal in structure, function and size.  The left ventricular ejection fraction is normal.  There is trace to mild tricuspid regurgitation.  Aortic sclerosis but no significant valve issues.  Pulmonary hypertension.date: results:ECG reviewed date:10/18 results:paced date: results:         (-) valvular problems/murmurs   METS/Exercise Tolerance:     Hematologic: Comments: Lab Test        10/26/18     10/21/18     10/19/18      --          08/28/18      --          09/24/15     09/03/15                       0630          1134          0713           --           0946           --                                               WBC          7.5          6.7          7.3            < >        8.7            < >         --           --           HGB          11.6*        11.4*        10.7*          < >        14.9           < >         --           --           MCV          89           92           93             < >        91             < >         --           --           PLT          368          365          245            < >        283            < >         --           --           INR           --           --           --           --          1.09          --          1.8*         2.2*           < > = values in this interval not displayed.                  Lab Test        10/26/18     10/24/18     10/23/18     10/22/18      --          10/19/18                       0630          0651          0811          0644           --            0713          NA           143           --           --          140           --          139           POTASSIUM    3.7           --           --          3.7           --          4.1           CHLORIDE     108           --           --          109           --          106           CO2          30            --           --          26            --          27            BUN          16            --           --          9             --          15            CR           0.98         1.02         0.94         0.98           < >        1.06          ANIONGAP     5             --           --          5             --          6             ARYAN          9.4           --           --          8.7           --          8.2*          GLC          82            --           --          82            --          83             < > = values in this interval not displayed.                - neg hematologic  ROS       Musculoskeletal:  - neg musculoskeletal ROS       GI/Hepatic:     (+) GERD Asymptomatic on medication,      (-) hepatitis and liver disease   Renal/Genitourinary:     (+) Nephrolithiasis ,    (-) renal disease   Endo:  - neg endo ROS       Psychiatric:  - neg psychiatric ROS       Infectious Disease:  - neg infectious disease ROS       Malignancy:      - no malignancy   Other:    - neg other ROS                 Physical Exam  Normal systems: cardiovascular, pulmonary and dental    Airway   Mallampati: III  TM distance: >3 FB  Neck ROM: full    Dental     Cardiovascular   Rhythm and rate: regular and normal  (-) no friction rub, no systolic click and no murmur    Pulmonary    breath sounds clear to auscultation(-) no rhonchi, no decreased breath sounds, no wheezes, no rales and no stridor             Anesthesia Plan      History & Physical Review  History and physical reviewed and following examination; no interval change.    ASA Status:  3 .    NPO Status:  > 8 hours    Plan for General and LMA with  Intravenous induction. Maintenance will be Balanced.    PONV prophylaxis:  Ondansetron (or other 5HT-3) and Dexamethasone or Solumedrol       Postoperative Care  Postoperative pain management:  IV analgesics.      Consents  Anesthetic plan, risks, benefits and alternatives discussed with:  Patient or representative, Patient and Daughter/Son..                            .

## 2018-10-30 NOTE — ANESTHESIA CARE TRANSFER NOTE
Patient: Edison Hennessy Karyn    Procedure(s):  Left Extracorporeal Shock Wave Lithotripsy,Video Cystoscopy with Double J Stent Exchange    Diagnosis: Renal calculi  Diagnosis Additional Information: No value filed.    Anesthesia Type:   General, LMA     Note:  Airway :Face Mask  Patient transferred to:PACU  Handoff Report: Identifed the Patient, Identified the Reponsible Provider, Reviewed the pertinent medical history, Discussed the surgical course, Reviewed Intra-OP anesthesia mangement and issues during anesthesia, Set expectations for post-procedure period and Allowed opportunity for questions and acknowledgement of understanding      Vitals: (Last set prior to Anesthesia Care Transfer)    CRNA VITALS  10/30/2018 1535 - 10/30/2018 1609      10/30/2018             NIBP: 136/56    NIBP Mean: 94    Resp Rate (observed): (!)  2                Electronically Signed By: JYOTI Woodruff CRNA  October 30, 2018  4:09 PM

## 2018-10-30 NOTE — DISCHARGE INSTRUCTIONS
GENERAL ANESTHESIA OR SEDATION ADULT DISCHARGE INSTRUCTIONS   SPECIAL PRECAUTIONS FOR 24 HOURS AFTER SURGERY    IT IS NOT UNUSUAL TO FEEL LIGHT-HEADED OR FAINT, UP TO 24 HOURS AFTER SURGERY OR WHILE TAKING PAIN MEDICATION.  IF YOU HAVE THESE SYMPTOMS; SIT FOR A FEW MINUTES BEFORE STANDING AND HAVE SOMEONE ASSIST YOU WHEN YOU GET UP TO WALK OR USE THE BATHROOM.    YOU SHOULD REST AND RELAX FOR THE NEXT 24 HOURS AND YOU MUST MAKE ARRANGEMENTS TO HAVE SOMEONE STAY WITH YOU FOR AT LEAST 24 HOURS AFTER YOUR DISCHARGE.  AVOID HAZARDOUS AND STRENUOUS ACTIVITIES.  DO NOT MAKE IMPORTANT DECISIONS FOR 24 HOURS.    DO NOT DRIVE ANY VEHICLE OR OPERATE MECHANICAL EQUIPMENT FOR 24 HOURS FOLLOWING THE END OF YOUR SURGERY.  EVEN THOUGH YOU MAY FEEL NORMAL, YOUR REACTIONS MAY BE AFFECTED BY THE MEDICATION YOU HAVE RECEIVED.    DO NOT DRINK ALCOHOLIC BEVERAGES FOR 24 HOURS FOLLOWING YOUR SURGERY.    DRINK CLEAR LIQUIDS (APPLE JUICE, GINGER ALE, 7-UP, BROTH, ETC.).  PROGRESS TO YOUR REGULAR DIET AS YOU FEEL ABLE.    YOU MAY HAVE A DRY MOUTH, A SORE THROAT, MUSCLES ACHES OR TROUBLE SLEEPING.  THESE SHOULD GO AWAY AFTER 24 HOURS.    CALL YOUR DOCTOR FOR ANY OF THE FOLLOWING:  SIGNS OF INFECTION (FEVER, GROWING TENDERNESS AT THE SURGERY SITE, A LARGE AMOUNT OF DRAINAGE OR BLEEDING, SEVERE PAIN, FOUL-SMELLING DRAINAGE, REDNESS OR SWELLING.    IT HAS BEEN OVER 8 TO 10 HOURS SINCE SURGERY AND YOU ARE STILL NOT ABLE TO URINATE (PASS WATER).       CYSTOSCOPY DISCHARGE INSTRUCTIONS  [facility] / UROLOGY  [facility]  [phone]    YOU MAY GO BACK TO YOUR NORMAL DIET AND ACTIVITY, UNLESS YOUR DOCTOR TELLS YOU NOT TO.    FOR THE NEXT TWO DAYS, YOU MAY NOTICE:    SOME BLOOD IN YOUR URINE.  SOME BURNING WHEN YOU URINATE (USE THE TOILET).  AN URGE TO URINATE MORE OFTEN.  BLADDER SPASMS.    THESE ARE NORMAL AFTER THE PROCEDURE.  THEY SHOULD GO AWAY AFTER A DAY OR TWO.  TO RELIEVE THESE PROBLEMS:     DRINK 6 TO 8 LARGE  GLASSES OF WATER EACH DAY (INCLUDES DRINKS AT MEALS).  THIS WILL HELP CLEAR THE URINE.    TAKE WARM BATHS TO RELIEVE PAIN AND BLADDER SPASMS.  DO NOT ADD ANYTHING TO THE BATH WATER.    YOUR DOCTOR MAY PRESCRIBE PAIN MEDICINE.  YOU MAY ALSO TAKE TYLENOL (ACETAMINOPHEN) FOR PAIN.    CALL YOUR SURGEON IF YOU HAVE:    A FEVER OVER 101 DEGREES.  CHECK YOUR TEMPERATURE UNDER YOUR TONGUE.    CHILLS.    FAILURE TO URINATE (NO URINE COMES OUT WHEN YOU TRY TO USE THE TOILET).  TRY SOAKING IN A BATHTUB FULL OF WARM WATER.  IF STILL NO URINE, CALL YOUR DOCTOR.    A LOT OF BLOOD IN THE URINE, OR BLOOD CLOTS LARGER THAN A NICKEL.      PAIN IN THE BACK OR BELLY AREA (ABDOMEN).    PAIN OR SPASMS THAT ARE NOT RELIEVED BY WARM TUB BATHS AND PAIN MEDICINE.      SEVERE PAIN, BURNING OR OTHER PROBLEMS WHILE PASSING URINE.    PAIN THAT GETS WORSE AFTER TWO DAYS.        STENT INFORMATION/DISCHARGE INSTRUCTIONS   o [facility] / UROLOGY  [facility]  [phone]    During surgery, a stent may be placed in the ureter.  The ureter is the tube that drains urine from the kidney to the bladder.  The stent is placed to dilate (open) the ureter so stone fragments can pass easily through the ureter or to decrease ureteral swelling after surgery or to relieve an obstruction.      The stent is made of silicone.  The upper end of the stent curls in the kidney while the lower end rests in the bladder.    While the stent is in place you may experience the following symptoms:  Blood and/or small blood clots in the urine  Bladder spasms (frequency and urgency of urination)  Discomfort or aching in the back or side where the stent is  Burning or discomfort at the end of urine stream    To decrease these symptoms you should:  Take antispasmodic medication as prescribed (Detrol, Ditropan, etc.)  Drink plenty of fluids but avoid caffeine and citrus (include cranberry)  If you are having discomfort in back or side, decrease  activity    Please call your physician or the physician on call if you experience:  Fever greater than 101 degrees  Severe pain not relieved by pain medication or rest    Please make an appointment for the removal of the stent according to your physician's instructions.

## 2018-10-31 NOTE — OP NOTE
Procedure Date: 10/30/2018      PREOPERATIVE DIAGNOSIS:  Left renal calculi, recurrent urinary tract infections.      POSTOPERATIVE DIAGNOSIS:  Left renal calculi, recurrent urinary tract infections.      SURGEON:  Michael Lilly Jr, MD      ANESTHESIA:  General laryngeal mask.      ESTIMATED BLOOD LOSS:  25 mL.      PROCEDURES:  Video cystoscopy, exchange left double-J stent, left renal lithotripsy.      INDICATIONS:  The patient is a 89-year-old male who I have seen for years after seed implants for prostate cancer and a spastic neurogenic bladder that is managed with indwelling Christianson and periodic Botox injections.  The patient has been troubled by urinary tract infections and urosepsis with both bacterial and fungal elements.  He now presents for left renal lithotripsy.  He was admitted a month ago and had a large stone in the proximal left ureter.  This was stented by Dr. Richards.  He has been treated now for both bacterial and fungal urinary tract infection and is currently on Diflucan and Cipro.  The procedure, alternatives, risks and follow-up were carefully discussed with the patient and his family.      PROCEDURE:  The patient was taken to the lithotripter suite and placed supine on the litho table.  After adequate general laryngeal mask anesthesia, the patient was moved to the end of the table and placed in lithotomy in Ronaldo stirrups.  His genitalia were prepped and draped in sterile fashion after removing his Christianson catheter.  Cystoscopy was carried out with a 22-Jordanian Storz cystoscope with 30 degree lens and video and water as an irrigant.  The left double J stent was grasped and brought out to the urethral meatus and catheterized with a Glidewire and the old stent removed.  The Glidewire was then backloaded onto the cystoscope and the cystoscope passed back into the bladder and a new 6-Jordanian x 26 cm Polaris stent was placed in the left ureter and in good position in the left renal pelvis and bladder  under fluoroscopic control.  There were 3 stones in the kidney that required lithotripsy.  After taking the patient out of lithotomy and replacing his Christianson catheter, he was moved up on the table and his stones were digitized at the F2 focal point separately.  A total of 3300 shocks was delivered to these 3 stones with good fragmentation on followup films.  There was minimal blood in the urine at the end of the procedure.  He tolerated the procedure well and went to the recovery room in stable condition and will complete his antibiotic course of Cipro and Diflucan and follow up with me in 3 weeks for a KUB and possible stent removal.         BRIAN ADAN JR, MD             D: 10/30/2018   T: 10/31/2018   MT: [id number]      Name:     JOSELIN VAN   MRN:      [id number]        Account:        [id number]   :      10/19/1924           Procedure Date: 10/30/2018      Document: [id number]       cc: Porfirio Terrell MD

## 2018-11-02 NOTE — PROGRESS NOTES
[facility]    Chief Complaint   Patient presents with     MIKAL       [facility] Medical Record Number:  [id number]  Place of Service where encounter took place:  [facility] [[id number]]    HPI:    Edison Boss is a 89 year old  (10/19/1924), who is being seen today for an episodic care visit.  HPI information obtained from: facility chart records.Today's concern is:     Sepsis, due to unspecified organism (H)  Urinary tract infection without hematuria, site unspecified  Ureteral stone  MADELYN (acute kidney injury) (H)  Benign essential hypertension  Depression, unspecified depression type  Paroxysmal atrial fibrillation (H)  SOB (shortness of breath)  Hypoxia  Physical deconditioning     Alert,calm, NAD. Denies fever, chills. Denies SOB, chest pain. Reports pain to leg much better since lithotripsy. Denies further bladder spasms.   VSS    ALLERGIES: Ceftriaxone; Bactrim; Zithromax [azithromycin]; Levaquin; and Macrodantin [nitrofuran derivatives]  Past Medical, Surgical, Family and Social History reviewed and updated in [facility].    Current Outpatient Prescriptions   Medication Sig Dispense Refill     ACETAMINOPHEN PO Take 1,000 mg by mouth 3 times daily as needed for pain       albuterol (PROVENTIL HFA: VENTOLIN HFA) 108 (90 BASE) MCG/ACT inhaler Inhale 2 puffs into the lungs every 6 hours as needed for shortness of breath / dyspnea. 1 Inhaler 1     amLODIPine (NORVASC) 5 MG tablet TAKE 1 TABLET(5 MG) BY MOUTH DAILY 90 tablet 3     ciprofloxacin (CIPRO) 500 MG tablet Take 1 tablet (500 mg) by mouth every 12 hours 20 tablet 0     clobetasol (TEMOVATE) 0.05 % ointment Apply topically daily as needed   2     Colloidal Oatmeal (AVEENO ECZEMA THERAPY) 1 % CREA Externally apply topically once a week And as needed for dry skin       fluconazole (DIFLUCAN) 100 MG tablet Take 1 tablet (100 mg) by mouth daily 10 tablet 0     lidocaine (LMX4) 4 % CREA 4% topical cream Apply topically  "daily as needed       METOPROLOL TARTRATE PO Take 50 mg by mouth 2 times daily       Multiple Vitamins-Minerals (PRESERVISION AREDS) CAPS Take 1 capsule by mouth 2 times daily 180 capsule 3     omeprazole (PRILOSEC) 20 MG CR capsule Take 20 mg by mouth 2 times daily        opium-belladonna (B&O SUPPRETTES) 30-16.2 MG per suppository Place 30 mg rectally 2 times daily as needed for moderate to severe pain       PARoxetine (PAXIL) 30 MG tablet Take 0.5 tablets (15 mg) by mouth At Bedtime Hold until fluconazole completed 30 tablet      senna-docusate (SENOKOT-S;PERICOLACE) 8.6-50 MG per tablet Take 1 tablet by mouth 2 times daily as needed for constipation       Medications reviewed:  Medications reconciled to facility chart and changes were made to reflect current medications as identified as above med list. Below are the changes that were made:   Medications stopped since last EPIC medication reconciliation:   Medications Discontinued During This Encounter   Medication Reason     oxyCODONE IR (ROXICODONE) 5 MG tablet Discontinued by another Health Care Provider       Medications started since last [facility] medication reconciliation:  Orders Placed This Encounter   Medications     opium-belladonna (B&O SUPPRETTES) 30-16.2 MG per suppository     Sig: Place 30 mg rectally 2 times daily as needed for moderate to severe pain         REVIEW OF SYSTEMS:  4 point ROS including Respiratory, CV, GI and , other than that noted in the HPI,  is negative    Physical Exam:  /74  Pulse 62  Temp 98  F (36.7  C)  Resp 18  Ht 5' 6\" (1.676 m)  Wt 156 lb (70.8 kg)  SpO2 97%  BMI 25.18 kg/m2    Resp: Effort WNL, LSCTA   CV: S1-2, no S3-4, no murmurs noted- trace LE edema  Abd- soft, nontender, BS +  - Christianson catheter draining clear yellow urine  Musc- FERNÁNDEZ  Psych- alert, calm, pleasant       BP 10/26-11/02: 119-177/65-84 mmHg    Recent Labs:     CBC RESULTS:   Recent Labs   Lab Test  10/26/18   0630  10/21/18   1134   WBC  7.5  " 6.7   RBC  4.06*  3.91*   HGB  11.6*  11.4*   HCT  36.3*  36.0*   MCV  89  92   MCH  28.6  29.2   MCHC  32.0  31.7   RDW  18.0*  17.5*   PLT  368  365       Last Basic Metabolic Panel:  Recent Labs   Lab Test  10/26/18   0630  10/24/18   0651   10/22/18   0644   NA  143   --    --   140   POTASSIUM  3.7   --    --   3.7   CHLORIDE  108   --    --   109   ARYAN  9.4   --    --   8.7   CO2  30   --    --   26   BUN  16   --    --   9   CR  0.98  1.02   < >  0.98   GLC  82   --    --   82    < > = values in this interval not displayed.       Liver Function Studies -   Recent Labs   Lab Test  10/17/18   1609  10/03/18   0624   PROTTOTAL  7.9  6.8   ALBUMIN  2.7*  2.2*   BILITOTAL  0.9  0.4   ALKPHOS  122  112   AST  45  28   ALT  36  29       Assessment/Plan:  Sepsis, due to unspecified organism (H)  UTI (urinary tract infection)  Ureteral stone  (UTI- klebsiella and candida- last UC 10/21 no growth)  S/p left urteral stent placement  Inpatient.   - completed Cipro and Flucanozole course  - f/w urology for lithotripsy -with good results. Follow again as directed  - Christianson cath cares/monitoring per nsg. (chronic indwelling)        MADELYN (acute kidney injury) (H)  - 2/2 above  - resolved     Benign essential hypertension  - SBP slight elevated- metoprolol just increaed  - cont on increased metoprolol - with hold parameters  - continue on amlodipine  - follow clinically     Depression, unspecified depression type  - situational stressors in play  - monitor mood and behaviors  - ACP prn  - continue on Paxil (on hold until abx completed)     Paroxysmal atrial fibrillation (H)  - h/o sick sinus syndrome - has dual chamber pacer     SOB (shortness of breath)  Hypoxia  - though 2/2 above and is resolved  - observe resp status, VS     Physical deconditioning  - 2/2 above  - lives at [facility]  - PT/OT  - ongoing discharge planning, SW follow and care conferences per unit protocol      Electronically signed by  Lulu Damian,  APRN CNP

## 2018-11-02 NOTE — LETTER
11/2/2018        RE: Edison Boss  [address]  [facility] [address]        [place] [facility]    Chief Complaint   Patient presents with     RECHECK       [place] Medical Record Number:  [id number]  Place of Service where encounter took place:  [facility]  (S) [[id number]]    HPI:    Edison Boss is a 89 year old  (10/19/1924), who is being seen today for an episodic care visit.  HPI information obtained from: facility chart records.Today's concern is:     Sepsis, due to unspecified organism (H)  Urinary tract infection without hematuria, site unspecified  Ureteral stone  MADELYN (acute kidney injury) (H)  Benign essential hypertension  Depression, unspecified depression type  Paroxysmal atrial fibrillation (H)  SOB (shortness of breath)  Hypoxia  Physical deconditioning     Alert,calm, NAD. Denies fever, chills. Denies SOB, chest pain. Reports pain to leg much better since lithotripsy. Denies further bladder spasms.   VSS    ALLERGIES: Ceftriaxone; Bactrim; Zithromax [azithromycin]; Levaquin; and Macrodantin [nitrofuran derivatives]  Past Medical, Surgical, Family and Social History reviewed and updated in [employer].    Current Outpatient Prescriptions   Medication Sig Dispense Refill     ACETAMINOPHEN PO Take 1,000 mg by mouth 3 times daily as needed for pain       albuterol (PROVENTIL HFA: VENTOLIN HFA) 108 (90 BASE) MCG/ACT inhaler Inhale 2 puffs into the lungs every 6 hours as needed for shortness of breath / dyspnea. 1 Inhaler 1     amLODIPine (NORVASC) 5 MG tablet TAKE 1 TABLET(5 MG) BY MOUTH DAILY 90 tablet 3     ciprofloxacin (CIPRO) 500 MG tablet Take 1 tablet (500 mg) by mouth every 12 hours 20 tablet 0     clobetasol (TEMOVATE) 0.05 % ointment Apply topically daily as needed   2     Colloidal Oatmeal (AVEENO ECZEMA THERAPY) 1 % CREA Externally apply topically once a week And as needed for dry skin       fluconazole (DIFLUCAN) 100 MG tablet Take 1  "tablet (100 mg) by mouth daily 10 tablet 0     lidocaine (LMX4) 4 % CREA 4% topical cream Apply topically daily as needed       METOPROLOL TARTRATE PO Take 50 mg by mouth 2 times daily       Multiple Vitamins-Minerals (PRESERVISION AREDS) CAPS Take 1 capsule by mouth 2 times daily 180 capsule 3     omeprazole (PRILOSEC) 20 MG CR capsule Take 20 mg by mouth 2 times daily        opium-belladonna (B&O SUPPRETTES) 30-16.2 MG per suppository Place 30 mg rectally 2 times daily as needed for moderate to severe pain       PARoxetine (PAXIL) 30 MG tablet Take 0.5 tablets (15 mg) by mouth At Bedtime Hold until fluconazole completed 30 tablet      senna-docusate (SENOKOT-S;PERICOLACE) 8.6-50 MG per tablet Take 1 tablet by mouth 2 times daily as needed for constipation       Medications reviewed:  Medications reconciled to facility chart and changes were made to reflect current medications as identified as above med list. Below are the changes that were made:   Medications stopped since last EPIC medication reconciliation:   Medications Discontinued During This Encounter   Medication Reason     oxyCODONE IR (ROXICODONE) 5 MG tablet Discontinued by another Health Care Provider       Medications started since last [facility] medication reconciliation:  Orders Placed This Encounter   Medications     opium-belladonna (B&O SUPPRETTES) 30-16.2 MG per suppository     Sig: Place 30 mg rectally 2 times daily as needed for moderate to severe pain         REVIEW OF SYSTEMS:  4 point ROS including Respiratory, CV, GI and , other than that noted in the HPI,  is negative    Physical Exam:  /74  Pulse 62  Temp 98  F (36.7  C)  Resp 18  Ht 5' 6\" (1.676 m)  Wt 156 lb (70.8 kg)  SpO2 97%  BMI 25.18 kg/m2    Resp: Effort WNL, LSCTA   CV: S1-2, no S3-4, no murmurs noted- trace LE edema  Abd- soft, nontender, BS +  - Christianson catheter draining clear yellow urine  Musc- FERNÁNDEZ  Psych- alert, calm, pleasant       BP 10/26-11/02: 119-177/65-84 " mmHg    Recent Labs:     CBC RESULTS:   Recent Labs   Lab Test  10/26/18   0630  10/21/18   1134   WBC  7.5  6.7   RBC  4.06*  3.91*   HGB  11.6*  11.4*   HCT  36.3*  36.0*   MCV  89  92   MCH  28.6  29.2   MCHC  32.0  31.7   RDW  18.0*  17.5*   PLT  368  365       Last Basic Metabolic Panel:  Recent Labs   Lab Test  10/26/18   0630  10/24/18   0651   10/22/18   0644   NA  143   --    --   140   POTASSIUM  3.7   --    --   3.7   CHLORIDE  108   --    --   109   ARYAN  9.4   --    --   8.7   CO2  30   --    --   26   BUN  16   --    --   9   CR  0.98  1.02   < >  0.98   GLC  82   --    --   82    < > = values in this interval not displayed.       Liver Function Studies -   Recent Labs   Lab Test  10/17/18   1609  10/03/18   0624   PROTTOTAL  7.9  6.8   ALBUMIN  2.7*  2.2*   BILITOTAL  0.9  0.4   ALKPHOS  122  112   AST  45  28   ALT  36  29       Assessment/Plan:  Sepsis, due to unspecified organism (H)  UTI (urinary tract infection)  Ureteral stone  (UTI- klebsiella and candida- last UC 10/21 no growth)  S/p left urteral stent placement  Inpatient.   - completed Cipro and Flucanozole course  - f/w urology for lithotripsy -with good results. Follow again as directed  - Christianson cath cares/monitoring per nsg. (chronic indwelling)        MADELYN (acute kidney injury) (H)  - 2/2 above  - resolved     Benign essential hypertension  - SBP slight elevated- metoprolol just increaed  - cont on increased metoprolol - with hold parameters  - continue on amlodipine  - follow clinically     Depression, unspecified depression type  - situational stressors in play  - monitor mood and behaviors  - ACP prn  - continue on Paxil (on hold until abx completed)     Paroxysmal atrial fibrillation (H)  - h/o sick sinus syndrome - has dual chamber pacer     SOB (shortness of breath)  Hypoxia  - though 2/2 above and is resolved  - observe resp status, VS     Physical deconditioning  - 2/2 above  - lives at [facility]  - PT/OT  - ongoing discharge  planning, SW follow and care conferences per unit protocol      Electronically signed by  JYOTI Wade CNP                      Sincerely,        JYOTI Wade CNP

## 2018-11-06 NOTE — PROGRESS NOTES
[place] [facility]    Chief Complaint   Patient presents with     alf Acute       [place] Medical Record Number:  [id number]  Place of Service where encounter took place:  [facility]  (Counts include 234 beds at the [facility]) [[id number]]    HPI:    Edison Boss is a 89 year old  (10/19/1924), who is being seen today for an episodic care visit.  HPI information obtained from: facility chart records.Today's concern is:     Recurrent episodes of unresponsiveness  Bilateral recurrent inguinal hernia without obstruction or gangrene  Pain of right lower extremity     - per daughter Red- patient has had 3 episodes wherein his eyes are open but he briefly does not respond and then returns to normal. Nsg witnessed such an event today and noted patient was also unable to life right leg or arm. This then resolved. Currently patient is alert, calm, NAD. Neuro performed and unremarkable. Patient reports some mild discomfort with straight leg raise. Of note has h/o inguinal hernia which was recently noted and incidentally successfully reduced and when nurse was attempting to palpate area. Patient has no current acute tenderness and has been moving bowels without issue.     ALLERGIES: Ceftriaxone; Bactrim; Zithromax [azithromycin]; Levaquin; and Macrodantin [nitrofuran derivatives]  Past Medical, Surgical, Family and Social History reviewed and updated in [facility].    Current Outpatient Prescriptions   Medication Sig Dispense Refill     ACETAMINOPHEN PO Take 1,000 mg by mouth 3 times daily as needed for pain       albuterol (PROVENTIL HFA: VENTOLIN HFA) 108 (90 BASE) MCG/ACT inhaler Inhale 2 puffs into the lungs every 6 hours as needed for shortness of breath / dyspnea. 1 Inhaler 1     amLODIPine (NORVASC) 5 MG tablet TAKE 1 TABLET(5 MG) BY MOUTH DAILY 90 tablet 3     clobetasol (TEMOVATE) 0.05 % ointment Apply topically daily as needed   2     Colloidal Oatmeal (AVEENO ECZEMA THERAPY) 1 % CREA Externally apply topically once  "a week And as needed for dry skin       lidocaine (LMX4) 4 % CREA 4% topical cream Apply topically daily as needed       METOPROLOL TARTRATE PO Take 50 mg by mouth 2 times daily       Multiple Vitamins-Minerals (PRESERVISION AREDS) CAPS Take 1 capsule by mouth 2 times daily 180 capsule 3     omeprazole (PRILOSEC) 20 MG CR capsule Take 20 mg by mouth 2 times daily        opium-belladonna (B&O SUPPRETTES) 30-16.2 MG per suppository Place 30 mg rectally 2 times daily as needed for moderate to severe pain       PARoxetine (PAXIL) 30 MG tablet Take 0.5 tablets (15 mg) by mouth At Bedtime Hold until fluconazole completed 30 tablet      senna-docusate (SENOKOT-S;PERICOLACE) 8.6-50 MG per tablet Take 1 tablet by mouth 2 times daily as needed for constipation       Medications reviewed:  Medications reconciled to facility chart and changes were made to reflect current medications as identified as above med list. Below are the changes that were made:   Medications stopped since last EPIC medication reconciliation:   Medications Discontinued During This Encounter   Medication Reason     ciprofloxacin (CIPRO) 500 MG tablet Discontinued by another Health Care Provider     fluconazole (DIFLUCAN) 100 MG tablet Discontinued by another Health Care Provider       Medications started since last [facility] medication reconciliation:  No orders of the defined types were placed in this encounter.        REVIEW OF SYSTEMS:  4 point ROS including Respiratory, CV, GI and , other than that noted in the HPI,  is negative    Physical Exam:  /71  Pulse 66  Temp 97.9  F (36.6  C)  Resp 18  Ht 5' 6\" (1.676 m)  Wt 156 lb (70.8 kg)  SpO2 95%  BMI 25.18 kg/m2    Resp: Effort WNL, LSCTA   CV: S1-2, no S3-4, no murmurs noted- VSS- no edema  Abd- soft, nontender, BS +  Musc- FERNÁNDEZ  Neuro: intact  Psych- alert, calm, pleasant      Recent Labs:     CBC RESULTS:   Recent Labs   Lab Test  10/26/18   0630  10/21/18   1134   WBC  7.5  6.7   RBC  4.06* "  3.91*   HGB  11.6*  11.4*   HCT  36.3*  36.0*   MCV  89  92   MCH  28.6  29.2   MCHC  32.0  31.7   RDW  18.0*  17.5*   PLT  368  365       Last Basic Metabolic Panel:  Recent Labs   Lab Test  10/26/18   0630  10/24/18   0651   10/22/18   0644   NA  143   --    --   140   POTASSIUM  3.7   --    --   3.7   CHLORIDE  108   --    --   109   ARYAN  9.4   --    --   8.7   CO2  30   --    --   26   BUN  16   --    --   9   CR  0.98  1.02   < >  0.98   GLC  82   --    --   82    < > = values in this interval not displayed.       Liver Function Studies -   Recent Labs   Lab Test  10/17/18   1609  10/03/18   0624   PROTTOTAL  7.9  6.8   ALBUMIN  2.7*  2.2*   BILITOTAL  0.9  0.4   ALKPHOS  122  112   AST  45  28   ALT  36  29       Assessment/Plan:  Recurrent episodes of unresponsiveness  *-with recent full stroke w/u 8/2018- ? absence sz,   - discussed with  Dr. Nieves and patient and patients daughter Red  - Recommend f/w [facility] for further w/u - patient and family will discuss if would like to pursue  - will start low dose EC ASA  - continue to observe neuros, VS      Right recurrent inguinal hernia without obstruction or gangrene  - recently reduced, some discomfort to area thereafter but abd exam negative  - follow bowel pattern/clinically    Pain of right lower extremity  - ? 2/2 hernia as above  - continue on current pain regimen  - PT/OT  -  Follow clinically          Electronically signed by  JYOTI Wade CNP

## 2018-11-06 NOTE — LETTER
11/6/2018        RE: Edison Boss  [address]  [facility] [address]        [place] [facility]    Chief Complaint   Patient presents with     long term Acute       [place] Medical Record Number:  [id number]  Place of Service where encounter took place:  [facility]  ([facility]) [[id number]]    HPI:    Edison Boss is a 89 year old  (10/19/1924), who is being seen today for an episodic care visit.  HPI information obtained from: facility chart records.Today's concern is:     Recurrent episodes of unresponsiveness  Bilateral recurrent inguinal hernia without obstruction or gangrene  Pain of right lower extremity     - per daughter Red- patient has had 3 episodes wherein his eyes are open but he briefly does not respond and then returns to normal. Nsg witnessed such an event today and noted patient was also unable to life right leg or arm. This then resolved. Currently patient is alert, calm, NAD. Neuro performed and unremarkable. Patient reports some mild discomfort with straight leg raise. Of note has h/o inguinal hernia which was recently noted and incidentally successfully reduced and when nurse was attempting to palpate area. Patient has no current acute tenderness and has been moving bowels without issue.     ALLERGIES: Ceftriaxone; Bactrim; Zithromax [azithromycin]; Levaquin; and Macrodantin [nitrofuran derivatives]  Past Medical, Surgical, Family and Social History reviewed and updated in [facility].    Current Outpatient Prescriptions   Medication Sig Dispense Refill     ACETAMINOPHEN PO Take 1,000 mg by mouth 3 times daily as needed for pain       albuterol (PROVENTIL HFA: VENTOLIN HFA) 108 (90 BASE) MCG/ACT inhaler Inhale 2 puffs into the lungs every 6 hours as needed for shortness of breath / dyspnea. 1 Inhaler 1     amLODIPine (NORVASC) 5 MG tablet TAKE 1 TABLET(5 MG) BY MOUTH DAILY 90 tablet 3     clobetasol (TEMOVATE) 0.05 % ointment Apply topically  "daily as needed   2     Colloidal Oatmeal (AVEENO ECZEMA THERAPY) 1 % CREA Externally apply topically once a week And as needed for dry skin       lidocaine (LMX4) 4 % CREA 4% topical cream Apply topically daily as needed       METOPROLOL TARTRATE PO Take 50 mg by mouth 2 times daily       Multiple Vitamins-Minerals (PRESERVISION AREDS) CAPS Take 1 capsule by mouth 2 times daily 180 capsule 3     omeprazole (PRILOSEC) 20 MG CR capsule Take 20 mg by mouth 2 times daily        opium-belladonna (B&O SUPPRETTES) 30-16.2 MG per suppository Place 30 mg rectally 2 times daily as needed for moderate to severe pain       PARoxetine (PAXIL) 30 MG tablet Take 0.5 tablets (15 mg) by mouth At Bedtime Hold until fluconazole completed 30 tablet      senna-docusate (SENOKOT-S;PERICOLACE) 8.6-50 MG per tablet Take 1 tablet by mouth 2 times daily as needed for constipation       Medications reviewed:  Medications reconciled to facility chart and changes were made to reflect current medications as identified as above med list. Below are the changes that were made:   Medications stopped since last EPIC medication reconciliation:   Medications Discontinued During This Encounter   Medication Reason     ciprofloxacin (CIPRO) 500 MG tablet Discontinued by another Health Care Provider     fluconazole (DIFLUCAN) 100 MG tablet Discontinued by another Health Care Provider       Medications started since last [facility] medication reconciliation:  No orders of the defined types were placed in this encounter.        REVIEW OF SYSTEMS:  4 point ROS including Respiratory, CV, GI and , other than that noted in the HPI,  is negative    Physical Exam:  /71  Pulse 66  Temp 97.9  F (36.6  C)  Resp 18  Ht 5' 6\" (1.676 m)  Wt 156 lb (70.8 kg)  SpO2 95%  BMI 25.18 kg/m2    Resp: Effort WNL, LSCTA   CV: S1-2, no S3-4, no murmurs noted- VSS- no edema  Abd- soft, nontender, BS +  Musc- FERNÁNDEZ  Neuro: intact  Psych- alert, calm, pleasant      Recent " Labs:     CBC RESULTS:   Recent Labs   Lab Test  10/26/18   0630  10/21/18   1134   WBC  7.5  6.7   RBC  4.06*  3.91*   HGB  11.6*  11.4*   HCT  36.3*  36.0*   MCV  89  92   MCH  28.6  29.2   MCHC  32.0  31.7   RDW  18.0*  17.5*   PLT  368  365       Last Basic Metabolic Panel:  Recent Labs   Lab Test  10/26/18   0630  10/24/18   0651   10/22/18   0644   NA  143   --    --   140   POTASSIUM  3.7   --    --   3.7   CHLORIDE  108   --    --   109   ARYAN  9.4   --    --   8.7   CO2  30   --    --   26   BUN  16   --    --   9   CR  0.98  1.02   < >  0.98   GLC  82   --    --   82    < > = values in this interval not displayed.       Liver Function Studies -   Recent Labs   Lab Test  10/17/18   1609  10/03/18   0624   PROTTOTAL  7.9  6.8   ALBUMIN  2.7*  2.2*   BILITOTAL  0.9  0.4   ALKPHOS  122  112   AST  45  28   ALT  36  29       Assessment/Plan:  Recurrent episodes of unresponsiveness  *-with recent full stroke w/u 8/2018- ? absence sz,   - discussed with  Dr. Nieves and patient and patients daughter Red  - Recommend f/w [facility] for further w/u - patient and family will discuss if would like to pursue  - will start low dose EC ASA  - continue to observe neuros, VS      Right recurrent inguinal hernia without obstruction or gangrene  - recently reduced, some discomfort to area thereafter but abd exam negative  - follow bowel pattern/clinically    Pain of right lower extremity  - ? 2/2 hernia as above  - continue on current pain regimen  - PT/OT  -  Follow clinically          Electronically signed by  JYOTI Wade CNP                      Sincerely,        JYOTI Wade CNP

## 2018-11-08 NOTE — PROGRESS NOTES
[place] [facility]    Chief Complaint   Patient presents with     RLE weakness       [place] Medical Record Number:  [id number]  Place of Service where encounter took place:  No question data found.    HPI:    Edison Boss is a 89 year old  (10/19/1924) with h/o CVA, and recent intermittent very brief unresponsive episodes with associated intermittent RLE weakness x last week - who is being seen today for an episodic care visit.  HPI information obtained from: facility chart records, facility staff, patient report, [facility] chart review and family/first contact Red report.Today's concern is:     Pain of right lower extremity  Right leg weakness  Myoclonus     PT and nurse report noting myoclonus to RLE and now weakness, numbness and inability to move foot- started at approximately 1:45 p.m. Patient was alert and responsive during the event. Writer went to assess patient and was still unable to move RLE and reporting numbness to lower leg. VSS, denied pain at the time but did receive medication for right hip pain earlier in day and reports this was effective.   There has been no known recent trauma    Earlier this week similar events and writer discussed with MD and patient's daughter possibility of TIAs vs Seizures. Lw dose ASA was started and patient and family were discussing whether or not they wanted to follow with neurology for further workup given age and recent frequent hospitalizations. It was decided not to make further medication changes at that time     Per staff patient was moving about, standing and up per usual routine this a.m.          Patie    ALLERGIES: Ceftriaxone; Bactrim; Zithromax [azithromycin]; Levaquin; and Macrodantin [nitrofuran derivatives]  Past Medical, Surgical, Family and Social History reviewed and updated in [facility].    Current Outpatient Prescriptions   Medication Sig Dispense Refill     ACETAMINOPHEN PO Take 1,000 mg by mouth 3 times daily as needed for  pain       albuterol (PROVENTIL HFA: VENTOLIN HFA) 108 (90 BASE) MCG/ACT inhaler Inhale 2 puffs into the lungs every 6 hours as needed for shortness of breath / dyspnea. 1 Inhaler 1     amLODIPine (NORVASC) 5 MG tablet TAKE 1 TABLET(5 MG) BY MOUTH DAILY 90 tablet 3     clobetasol (TEMOVATE) 0.05 % ointment Apply topically daily as needed   2     Colloidal Oatmeal (AVEENO ECZEMA THERAPY) 1 % CREA Externally apply topically once a week And as needed for dry skin       lidocaine (LMX4) 4 % CREA 4% topical cream Apply topically daily as needed       METOPROLOL TARTRATE PO Take 50 mg by mouth 2 times daily       Multiple Vitamins-Minerals (PRESERVISION AREDS) CAPS Take 1 capsule by mouth 2 times daily 180 capsule 3     omeprazole (PRILOSEC) 20 MG CR capsule Take 20 mg by mouth 2 times daily        opium-belladonna (B&O SUPPRETTES) 30-16.2 MG per suppository Place 30 mg rectally 2 times daily as needed for moderate to severe pain       PARoxetine (PAXIL) 30 MG tablet Take 0.5 tablets (15 mg) by mouth At Bedtime Hold until fluconazole completed 30 tablet      senna-docusate (SENOKOT-S;PERICOLACE) 8.6-50 MG per tablet Take 1 tablet by mouth 2 times daily as needed for constipation       Medications reviewed:  Medications reconciled to facility chart and changes were made to reflect current medications as identified as above med list. Below are the changes that were made:   Medications stopped since last EPIC medication reconciliation:   There are no discontinued medications.    Medications started since last [facility] medication reconciliation:  No orders of the defined types were placed in this encounter.        REVIEW OF SYSTEMS:  4 point ROS including Respiratory, CV, GI and , other than that noted in the HPI,  is negative    Physical Exam:  /79  Pulse 60  Temp 97.9  F (36.6  C) (Tympanic)  Resp 18  Wt 154 lb 4.8 oz (70 kg)  BMI 24.9 kg/m2  Alert, talkative, NAD  Resp: Effort WNL, LSCTA   CV: VS as above,  decreased RLE sensation   Abd- soft, nontender, BS +  Musc- RLE weakness, n/t - is warm to touch   Neuro- CN 2- 12 gross normal, moving RUE and Left UE and LE 4/5, Unable to move RLE, decreased sensation.   Psych- alert,oriented x 3, calm, pleasant      Recent Labs:     CBC RESULTS:   Recent Labs   Lab Test  10/26/18   0630  10/21/18   1134   WBC  7.5  6.7   RBC  4.06*  3.91*   HGB  11.6*  11.4*   HCT  36.3*  36.0*   MCV  89  92   MCH  28.6  29.2   MCHC  32.0  31.7   RDW  18.0*  17.5*   PLT  368  365       Last Basic Metabolic Panel:  Recent Labs   Lab Test  10/26/18   0630  10/24/18   0651   10/22/18   0644   NA  143   --    --   140   POTASSIUM  3.7   --    --   3.7   CHLORIDE  108   --    --   109   ARYAN  9.4   --    --   8.7   CO2  30   --    --   26   BUN  16   --    --   9   CR  0.98  1.02   < >  0.98   GLC  82   --    --   82    < > = values in this interval not displayed.       Liver Function Studies -   Recent Labs   Lab Test  10/17/18   1609  10/03/18   0624   PROTTOTAL  7.9  6.8   ALBUMIN  2.7*  2.2*   BILITOTAL  0.9  0.4   ALKPHOS  122  112   AST  45  28   ALT  36  29       TSH   Date Value Ref Range Status   08/10/2017 3.74 0.40 - 4.00 mU/L Final   10/20/2016 1.42 0.40 - 4.00 mU/L Final   ]    Lab Results   Component Value Date    A1C 5.5 11/30/2012         Assessment/Plan:     Pain of right lower extremity  Right leg weakness  Myoclonus     Discussed with patient and daughter Red goode increasing frequency and lengthening duration of these events will send to [facility] ED for eval and tx.       Electronically signed by  JYOTI Wade CNP

## 2018-11-08 NOTE — ED PROVIDER NOTES
"  History     Chief Complaint:  Generalized Weakness      HPI   History is limited secondary to the patient's cognitive impairment. History supplemented by EMS report and the patient's daughter at bedside.    Edison Boss is a 89 year old male with a complex medical history pertinent for hyperlipidemia, hypertension, atrial fibrillation with a pacemaker in situ, stroke, sepsis, encephalopathy, and cognitive impairment who presents to the ED via EMS for evaluation of generalized weakness. Per the patient's daughter, he has been complaining of right leg \"heaviness\" and pain intermittently for the past 6 weeks. The patient's daughter states that his complaints have increased the past couple of days and today he had increased weakness in his right leg, and thus EMS was called to bring the patient to the ED. Upon arrival at the scene, EMS found the patient to have a transient right leg tremor that he denies is present at baseline. Here in the ED, the patient experienced another rhythmic tremorous episode and reports that he first experienced the tremor while in bed this morning. The patient also presents with a catheter, which has been in place for the past 15 years. The patient denies having any leg pain or back pain. He does have a history of hernia and reports some intermittent abdominal pain associated with this. Of note, the patient has been in TCU for the past 2 weeks secondary to sepsis after a UTI.    Allergies:  Ceftriaxone  Bactrim  Zithromax [Azithromycin]  Levaquin  Macrodantin [Nitrofuran Derivatives]    Medications:    Albuterol  Temovate  Colloidal oatmeal  Lidocaine  Metoprolol tartrate  Omeprazole  Opium-Belladonna  Paxil  Senokot    Past Medical History:    Chronic indwelling Christianson catheter   Chronic infection   Chronic pain   Esophageal reflux   Fungemia   Hyperlipidaemia   Hypertension   Malignant neoplasm of prostate (H)   Mild persistent asthma   Paroxysmal atrial fibrillation " "(H)    Sleep apnea   Unspecified cerebral artery occlusion with cerebral infarction   Ureterolithiasis   Osteoporosis  Insomnia  Restless legs syndrome (RLS)  SBO (small bowel obstruction) (H)  Cerebral infarction (H)  Sinoatrial node dysfunction (H)  RAMÓN (generalized anxiety disorder)  MCI (mild cognitive impairment)  Pneumonia due to infectious organism, unspecified laterality, unspecified part of lung  Acute encephalopathy  UTI  Sepsis (H)    Past Surgical History:    Kidney Stone Surgery  Turp   Brachytherapy  Left Rotator Cuff Repair   Bilateral Cataract Surgery  EGD  Botox Injection  Cystoscopy, Inject Collagen, Combined   Combined Cystoscopy, Retrogrades, Insert Stent Ureter(S)   Extracorporeal Shock Wave Lithotripsy, Cystoscopy, Insert Stent Ureter(S), Combined   Remove Tonsils/Adenoids  Implant Pacemaker   Penis Surgery   Prostate Surgery     Family History:    Heart disease  Lung cancer  Lymphoma  Prostate cancer  Cerebrovascular disease    Social History:  Former smoker   Negative for alcohol use.   Marital Status:   [5]  Currently resides in a transitional care facility.    Review of Systems   Gastrointestinal: Positive for abdominal pain.   Musculoskeletal: Positive for myalgias. Negative for back pain.   Neurological: Positive for tremors and weakness.   All other systems reviewed and are negative.      Physical Exam     Patient Vitals for the past 24 hrs:   BP Temp Temp src Heart Rate Resp SpO2 Height Weight   11/08/18 1700 150/73 - - - - 95 % - -   11/08/18 1637 - - - - - 93 % - -   11/08/18 1600 143/70 - - - - 97 % - -   11/08/18 1546 163/75 97.4  F (36.3  C) Oral 60 18 98 % 1.753 m (5' 9\") 71.7 kg (158 lb)        Physical Exam  Constitutional: Patient is well appearing. No distress.  Head: Atraumatic.  Mouth/Throat: Oropharynx is clear and moist. No oropharyngeal exudate.  Eyes: Conjunctivae and EOM are normal. No scleral icterus.  Neck: Normal range of motion. Neck supple.   Cardiovascular: " Normal rate, regular rhythm, normal heart sounds and intact distal pulses.   Pulmonary/Chest: Breath sounds normal. No respiratory distress.  Abdominal: Soft. Bowel sounds are normal. No distension. No tenderness. No rebound or guarding.   Musculoskeletal: Normal range of motion. No edema or tenderness.   Neurological: Alert and orientated to person, place, and time. Weakness of the right lower extremity. Inability to oppose gravity. While in the room witnessed a rhythmic muscle spasm of the entire right leg, which could represent almost a focal seizure.  Skin: Warm and dry. No rash noted. Not diaphoretic.      Emergency Department Course   Imaging:  Radiographic findings were communicated with the patient and family who voiced understanding of the findings.  CT Lumbar Spine without contrast:   1. Chronic/old superior endplate compression fracture of L2. No  evidence of acute fracture.  2. At L2-L3 there is mild central stenosis. Mild to moderate bilateral  foraminal stenosis.  3. At L3-L4 there is grade 1 degenerative spondylolisthesis. Moderate  central stenosis. Severe left and moderate to severe right foraminal  stenosis.  4. At L4-L5 there is mild to moderate central stenosis. Severe right  and moderate left foraminal stenosis.  5. At L5-S1 mild central stenosis. Severe right and mild to moderate  left foraminal stenosis. as per radiology.    CT Head without contrast:   Diffuse cerebral volume loss and cerebral white matter  changes consistent with chronic small vessel ischemic disease. No  evidence for acute intracranial pathology.    Radiation dose for this scan was reduced using automated exposure  control, adjustment of the mA and/or kV according to patient size, or  iterative reconstruction technique. as per radiology.     Laboratory:  CBC: WBC: 11.0, HGB: 11.6 (L), PLT: 298   BMP: GFR 60 (L), o/w WNL (Creatinine: 1.14)    Emergency Department Course:  Nursing notes and vitals reviewed. (7312) I performed  an exam of the patient as documented above.     Blood drawn. This was sent to the lab for further testing, results above.    The patient was sent for a head CT and lumbar spine CT while in the emergency department, findings above.     (1645) I rechecked the patient and discussed the results of his workup thus far.     (1652) I consulted with the hospitalist services regarding the patient's history and presentation here in the emergency department. They advised that we ask the patient's family about their wishes for the patient' disposition.     (1655) I rechecked the patient, and at this time his family felt comfortable with his discharge back to the TCU.    (1700) I spoke with the staff at the patient's transitional care unit, and they were agreeable to accept the patient back for further cares.     Findings and plan explained to the Patient and daughter. Patient discharged home with instructions regarding supportive care, medications, and reasons to return. The importance of close follow-up was reviewed. The patient was prescribed a Medrol Dosepak.    I personally reviewed the laboratory results with the Patient and daughter and answered all related questions prior to discharge.      Impression & Plan    Medical Decision Making:  Edison Boss is a 89 year old male who presents for evaluation of right-sided leg weakness and tremor.  He has no associated symptoms today. The workup here in the emergency room was to evaluate for infections, metabolic, electrolyte, neurologic or cardiovascular causes.  Of note, there are no signs of outside of spine causing the symptoms.  In addition, I do not believe symptoms are due to CVA, TIA, myasthesia gravis, myopathy or acute coronary syndromes and there are no indications at this point for a further workup with a benign history, exam and laboratory tests.      The leg will not oppose gravity and the weaikness has been fluctuating for weeks to months with continuous  2-4 days.  I discussed with NSG not surgical and medrol dosepak and change rehab routine.  I discussed with family that this may be the new normal and they are accepting.    I believe supportive outpatient management is indicated; will have them followup with NSG in 1 week for a recheck. Return for any additional symptoms or worsening weakness.  The patient's lumbar CT scan does show evidence of of lumbar disc disease, and I discussed the anatomy of this as well as the expected course of recovery with the family. Reviewed role of PT,pain meds, NSAID's, ice and time.  Reviewed warning signs or symptoms which he understands.Return for any additional symptoms or worsening weakness.  All questions and concerns were answered. The patient was discharged home and recommended to follow up with his primary physician and return with any new or worsening symptoms.      Diagnosis:    ICD-10-CM    1. Right leg weakness R29.898    2. Lumbar disc disorder M51.9        Disposition:  discharged to his TCU via ambulance    Discharge Medications:  New Prescriptions    METHYLPREDNISOLONE (MEDROL DOSEPAK) 4 MG TABLET    Follow package instructions       Scribe Disclosure:  I, Graciela Javier, am serving as a scribe on 11/8/2018 at 3:44 PM to personally document services performed by Issa Shah MD based on my observations and the provider's statements to me.      Graciela Javier  11/8/2018   [facility] EMERGENCY DEPARTMENT       Issa Shah MD  11/08/18 5338

## 2018-11-08 NOTE — ED AVS SNAPSHOT
[facility]    [address]    [facility] [address]    Phone:  [phone]    Fax:  [phone]                                       Edison Boss   MRN: [id number]    Department:  [facility]   Date of Visit:  11/8/2018           After Visit Summary Signature Page     I have received my discharge instructions, and my questions have been answered. I have discussed any challenges I see with this plan with the nurse or doctor.    ..........................................................................................................................................  Patient/Patient Representative Signature      ..........................................................................................................................................  Patient Representative Print Name and Relationship to Patient    ..................................................               ................................................  Date                                   Time    ..........................................................................................................................................  Reviewed by Signature/Title    ...................................................              ..............................................  Date                                               Time          22EPIC Rev 08/18

## 2018-11-08 NOTE — ED AVS SNAPSHOT
[facility] Emergency Department    [address] [place] [address]    Phone:  [phone]    Fax:  [phone]                                       Edison Boss   MRN: [id number]    Department:  [facility] Emergency Department   Date of Visit:  11/8/2018           Patient Information     Date Of Birth          10/19/1924        Your diagnoses for this visit were:     Right leg weakness     Lumbar disc disorder        You were seen by Issa Shah MD.      Follow-up Information     Follow up with Raine Lowry, JYOTI MARCIAL. Schedule an appointment as soon as possible for a visit in 1 week.    Specialty:  Nurse Practitioner - Family    Contact information:    [address]  [place] [address]  [phone]          Discharge Instructions         Degenerative Disk Disease    Spinal disks are gel-filled cushions between the bones, or vertebrae, of the spine. The disks act like shock absorbers. Over time, the disks may break down. This is called degenerative disk disease.  This condition can affect the neck or back. It is one of the most common causes of low back pain. The pain often remains localized to the lower back or neck. Muscle spasm is often present and adds to the pain.  Disk degeneration is a natural part of aging. But it is not painful for most people. It may also occur as a result of repeated minor injuries due to daily activities, sports, or accidents. It may lead to osteoarthritis of the spine. Back pain related to disk disease may come and go. Or it may become chronic and last for months or years. The disk may bulge or rupture. This is called a slipped disk or herniated disk. That can put pressure on a nearby spinal nerve and cause neck or back pain that spreads down one arm or leg.  X-rays, CT scan, or an MRI scan may help to diagnose this condition. For acute pain, treatment includes anti-inflammatory medicines, muscle relaxants,  rest, ice, or heat. Strong prescription pain medicines, called opioids, may be needed for short-term treatment if pain suddenly gets worse. Opioid medicines can be addictive. So they are not advised for long-term pain management. Other types of medicines are preferred. Surgery is generally not used to treat this condition unless there is a complication.    Home care    For neck pain: Use a comfortable pillow that supports the head and keeps the spine in a neutral position. Your head should not be tilted forward or backward.    For back pain: Avoid sitting for long periods of time. This puts more stress on the lower back than standing or walking. Starting a regular exercise program to strengthen the supporting muscles of the spine will make it easier to live with degenerative disk disease.    Apply an ice pack over the injured area for no more than 15 to 20 minutes. Do this every 3 to 6 hours for the first 24 to 48 hours. To make an ice pack, put ice cubes in a plastic bag that seals at the top. Wrap the bag in a clean, thin towel or cloth. Never put ice or an ice pack directly on the skin. Keep using ice packs to ease pain and swelling as needed. After 48 hours, apply heat (warm shower or warm bath) for 20 minutes several times a day, or switch between ice and heat.     You may use over-the-counter pain medicine to control pain, unless another pain medicine was prescribed. If you have chronic liver or kidney disease or ever had a stomach ulcer or GI bleeding, talk with your provider before using these medicines.  Follow-up care  Follow up with your healthcare provider, or as directed.  If X-rays, a CT scan or an MRI scan were done, you will be notified of any new findings that may affect your care.  When to seek medical advice  Contact your healthcare provider right away if any of these occur:    Increasing back pain    Your foot drags when you walk, a condition called foot drop    You have new weakness, numbness,  or pain in one or both arms or legs    Loss of bowel or bladder control    Numbness or tingling in the buttock or groin area  Date Last Reviewed: 3/1/2018    9925-9034 [employer] [address] [place] [address]. All rights reserved. This information is not intended as a substitute for professional medical care. Always follow your healthcare professional's instructions.          Discharge References/Attachments     NEUROPATHY, PERIPHERAL (ENGLISH)      Your next 10 appointments already scheduled     Nov 13, 2018  2:15 PM CST   XR KUB with [id number]    ([facility])    [address]  [facility] [address] [phone]           How do I prepare for my exam? (Food and drink instructions) No Food and Drink Restrictions.  How do I prepare for my exam? (Other instructions) You do not need to do anything special for this exam.  What should I wear: Wear comfortable clothes.  How long does the exam take: Most scans take less than 5 minutes.  What should I bring: Bring a list of your medicines, including vitamins, minerals and over-the-counter drugs. It is safest to leave personal items at home.  Do I need a :  No  is needed.  What do I need to tell my doctor: Tell your doctor if there s any chance you are pregnant.  What should I do after the exam: No restrictions, You may resume normal activities.  What is this test: An image of a specific body part shown in shades of black and white.  Who should I call with questions: If you have any questions, please call the Imaging Department where you will have your exam. Directions, parking instructions, and other information is available on our website, [email or web address].            Nov 13, 2018  3:00 PM CST   Cystoscopy with Michael Lilly MD, UB CYF   [facility] ([facility])    [address]  [facility] [address]   [phone]            Dec 04, 2018  1:15 PM CST   Remote PPM Check with SANDRA TECH1   [facility] ([facility])    [address]  [place] [address] [phone] OPT 2           This appointment is for a remote check of your pacemaker.  This is not an appointment at the office.              24 Hour Appointment Hotline       To make an appointment at any [facility], call [phone] ([phone]). If you don't have a family doctor or clinic, we will help you find one. [facility] are conveniently located to serve the needs of you and your family.             Review of your medicines      START taking        Dose / Directions Last dose taken    methylPREDNISolone 4 MG tablet   Commonly known as:  MEDROL DOSEPAK   Quantity:  21 tablet        Follow package instructions   Refills:  0          Our records show that you are taking the medicines listed below. If these are incorrect, please call your family doctor or clinic.        Dose / Directions Last dose taken    ACETAMINOPHEN PO   Dose:  1000 mg        Take 1,000 mg by mouth 3 times daily as needed for pain   Refills:  0        albuterol 108 (90 Base) MCG/ACT inhaler   Commonly known as:  PROAIR HFA/PROVENTIL HFA/VENTOLIN HFA   Dose:  2 puff   Quantity:  1 Inhaler        Inhale 2 puffs into the lungs every 6 hours as needed for shortness of breath / dyspnea.   Refills:  1        amLODIPine 5 MG tablet   Commonly known as:  NORVASC   Quantity:  90 tablet        TAKE 1 TABLET(5 MG) BY MOUTH DAILY   Refills:  3        AVEENO ECZEMA THERAPY 1 % Crea   Generic drug:  Colloidal Oatmeal        Externally apply topically once a week And as needed for dry skin   Refills:  0        clobetasol 0.05 % ointment   Commonly known as:  TEMOVATE        Apply topically daily as needed   Refills:  2        lidocaine 4 % Crea cream   Commonly known as:  LMX4         Apply topically daily as needed   Refills:  0        METOPROLOL TARTRATE PO   Dose:  50 mg        Take 50 mg by mouth 2 times daily   Refills:  0        omeprazole 20 MG CR capsule   Commonly known as:  priLOSEC   Dose:  20 mg        Take 20 mg by mouth 2 times daily   Refills:  0        opium-belladonna 30-16.2 MG per suppository   Commonly known as:  B&O SUPPRETTES   Dose:  30 mg        Place 30 mg rectally 2 times daily as needed for moderate to severe pain   Refills:  0        PARoxetine 30 MG tablet   Commonly known as:  PAXIL   Dose:  15 mg   Quantity:  30 tablet        Take 0.5 tablets (15 mg) by mouth At Bedtime Hold until fluconazole completed   Refills:  0        PRESERVISION AREDS Caps   Dose:  1 capsule   Quantity:  180 capsule        Take 1 capsule by mouth 2 times daily   Refills:  3        senna-docusate 8.6-50 MG per tablet   Commonly known as:  SENOKOT-S;PERICOLACE   Dose:  1 tablet        Take 1 tablet by mouth 2 times daily as needed for constipation   Refills:  0                Prescriptions were sent or printed at these locations (1 Prescription)                   Other Prescriptions                Printed at Department/Unit printer (1 of 1)         methylPREDNISolone (MEDROL DOSEPAK) 4 MG tablet                Procedures and tests performed during your visit     Basic metabolic panel    CBC with platelets differential    CT Head w/o Contrast    Lumbar spine CT w/o contrast      Orders Needing Specimen Collection     None      Pending Results     Date and Time Order Name Status Description    11/8/2018 1553 Lumbar spine CT w/o contrast Preliminary     11/8/2018 1553 CT Head w/o Contrast Preliminary             Pending Culture Results     No orders found from 11/6/2018 to 11/9/2018.            Pending Results Instructions     If you had any lab results that were not finalized at the time of your Discharge, you can call the ED Lab Result RN at [phone]. You will be contacted by this team  for any positive Lab results or changes in treatment. The nurses are available 7 days a week from 10A to 6:30P.  You can leave a message 24 hours per day and they will return your call.        Test Results From Your Hospital Stay        11/8/2018  4:12 PM      Component Results     Component Value Ref Range & Units Status    WBC 11.0 4.0 - 11.0 10e9/L Final    RBC Count 4.00 (L) 4.4 - 5.9 10e12/L Final    Hemoglobin 11.6 (L) 13.3 - 17.7 g/dL Final    Hematocrit 36.8 (L) 40.0 - 53.0 % Final    MCV 92 78 - 100 fl Final    MCH 29.0 26.5 - 33.0 pg Final    MCHC 31.5 31.5 - 36.5 g/dL Final    RDW 18.2 (H) 10.0 - 15.0 % Final    Platelet Count 298 150 - 450 10e9/L Final    Diff Method Automated Method  Final    % Neutrophils 69.6 % Final    % Lymphocytes 11.2 % Final    % Monocytes 15.8 % Final    % Eosinophils 2.2 % Final    % Basophils 0.6 % Final    % Immature Granulocytes 0.6 % Final    Nucleated RBCs 0 0 /100 Final    Absolute Neutrophil 7.7 1.6 - 8.3 10e9/L Final    Absolute Lymphocytes 1.2 0.8 - 5.3 10e9/L Final    Absolute Monocytes 1.7 (H) 0.0 - 1.3 10e9/L Final    Absolute Eosinophils 0.2 0.0 - 0.7 10e9/L Final    Absolute Basophils 0.1 0.0 - 0.2 10e9/L Final    Abs Immature Granulocytes 0.1 0 - 0.4 10e9/L Final    Absolute Nucleated RBC 0.0  Final         11/8/2018  4:27 PM      Component Results     Component Value Ref Range & Units Status    Sodium 138 133 - 144 mmol/L Final    Potassium 4.9 3.4 - 5.3 mmol/L Final    Chloride 104 94 - 109 mmol/L Final    Carbon Dioxide 30 20 - 32 mmol/L Final    Anion Gap 4 3 - 14 mmol/L Final    Glucose 96 70 - 99 mg/dL Final    Urea Nitrogen 24 7 - 30 mg/dL Final    Creatinine 1.14 0.66 - 1.25 mg/dL Final    GFR Estimate 60 (L) >60 mL/min/1.7m2 Final    Non  GFR Calc    GFR Estimate If Black 72 >60 mL/min/1.7m2 Final    African American GFR Calc    Calcium 9.4 8.5 - 10.1 mg/dL Final         11/8/2018  4:37 PM      Narrative     CT OF THE HEAD WITHOUT  CONTRAST 11/8/2018 4:23 PM     COMPARISON: Head CT 9/24/2018    HISTORY: Right leg weakness with intermittent rhythmic tremor, has  pacemaker.     TECHNIQUE: 5 mm thick axial CT images of the head were acquired  without IV contrast material.    FINDINGS: There is moderate diffuse cerebral volume loss. There are  subtle patchy areas of decreased density in the cerebral white matter  bilaterally that are consistent with sequela of chronic small vessel  ischemic disease.    The ventricles and basal cisterns are within normal limits in  configuration given the degree of cerebral volume loss. There is no  midline shift. There are no extra-axial fluid collections.    No intracranial hemorrhage, mass or recent infarct.    The visualized paranasal sinuses are well-aerated. There is no  mastoiditis. There are no fractures of the visualized bones.        Impression     IMPRESSION: Diffuse cerebral volume loss and cerebral white matter  changes consistent with chronic small vessel ischemic disease. No  evidence for acute intracranial pathology.      Radiation dose for this scan was reduced using automated exposure  control, adjustment of the mA and/or kV according to patient size, or  iterative reconstruction technique.         11/8/2018  4:39 PM      Narrative     CT LUMBAR SPINE WITHOUT CONTRAST  11/8/2018 4:24 PM     HISTORY: Right leg weakness with intermittent rhythmic tremor, has  pacemaker.     TECHNIQUE: Axial images of the lumbar spine were obtained without  intravenous contrast. Multiplanar reformations were performed.   Radiation dose for this scan was reduced using automated exposure  control, adjustment of the mA and/or kV according to patient size, or  iterative reconstruction technique.    COMPARISON: None.    FINDINGS: Reconstructed images demonstrate old superior endplate  compression fracture of L2. No evidence of acute compression. Diffuse  osteopenia. Mild right convex lumbar scoliosis. Axial scans  were  performed from T12 to sacrum. Vertebra are numbered assuming 5  lumbar-type vertebra.    T12-L1:  No disc herniation or stenosis. Facet joints are  unremarkable.    L1-L2: No disc herniation or stenosis. Anterior osteophyte formation  at this level.     L2-L3:  Degenerative disc disease with vacuum disc phenomenon. Mild  disc bulge. Mild facet and ligamentum flavum hypertrophy. Mild central  stenosis. Mild to moderate bilateral foraminal stenosis.     L3-L4:  Grade 1 degenerative spondylolisthesis with disc bulge and  osteophytic ridging. Severe left and moderate to severe right  foraminal stenosis. Moderate central stenosis.     L4-L5:  Degenerative disc disease with vacuum disc phenomenon. Mild  disc bulge and facet and ligamentum flavum hypertrophy. Mild to  moderate central stenosis. Severe right and moderate left foraminal  stenosis.     L5-S1:  Advanced degenerative disc disease with vacuum disc  phenomenon. Moderate right and mild left facet degenerative changes.  Mild disc bulge. Mild central stenosis. Severe right foraminal  stenosis. Mild to moderate left foraminal stenosis.      There is a large left renal cortical cyst. Atherosclerotic changes of  the aorta without evidence of aneurysm.        Impression     IMPRESSION:    1. Chronic/old superior endplate compression fracture of L2. No  evidence of acute fracture.  2. At L2-L3 there is mild central stenosis. Mild to moderate bilateral  foraminal stenosis.  3. At L3-L4 there is grade 1 degenerative spondylolisthesis. Moderate  central stenosis. Severe left and moderate to severe right foraminal  stenosis.  4. At L4-L5 there is mild to moderate central stenosis. Severe right  and moderate left foraminal stenosis.  5. At L5-S1 mild central stenosis. Severe right and mild to moderate  left foraminal stenosis.                  Clinical Quality Measure: Blood Pressure Screening     Your blood pressure was checked while you were in the emergency  department today. The last reading we obtained was  BP: 143/70 . Please read the guidelines below about what these numbers mean and what you should do about them.  If your systolic blood pressure (the top number) is less than 120 and your diastolic blood pressure (the bottom number) is less than 80, then your blood pressure is normal. There is nothing more that you need to do about it.  If your systolic blood pressure (the top number) is 120-139 or your diastolic blood pressure (the bottom number) is 80-89, your blood pressure may be higher than it should be. You should have your blood pressure rechecked within a year by a primary care provider.  If your systolic blood pressure (the top number) is 140 or greater or your diastolic blood pressure (the bottom number) is 90 or greater, you may have high blood pressure. High blood pressure is treatable, but if left untreated over time it can put you at risk for heart attack, stroke, or kidney failure. You should have your blood pressure rechecked by a primary care provider within the next 4 weeks.  If your provider in the emergency department today gave you specific instructions to follow-up with your doctor or provider even sooner than that, you should follow that instruction and not wait for up to 4 weeks for your follow-up visit.        Thank you for choosing [facility]       Thank you for choosing [facility] for your care. Our goal is always to provide you with excellent care. Hearing back from our patients is one way we can continue to improve our services. Please take a few minutes to complete the written survey that you may receive in the mail after you visit with us. Thank you!        [employer] Information     "[employer]" gives you secure access to your electronic health record. If you see a primary care provider, you can also send messages to your care team and make appointments. If you have questions, please call your primary care clinic.  If you do not have a primary  care provider, please call [phone] and they will assist you.        Care EveryWhere ID     This is your Care EveryWhere ID. This could be used by other organizations to access your [facility] medical records  [id number]        Equal Access to Services     EMERITA CADE : Shellie Denson, wasalvador griffith, gen kaalmiguel chamberlain, yony nunn. So [facility] [phone].    ATENCIÓN: Si habla español, tiene a lauren disposición servicios gratuitos de asistencia lingüística. Llame al [phone].    We comply with applicable federal civil rights laws and [place] laws. We do not discriminate on the basis of race, color, national origin, age, disability, sex, sexual orientation, or gender identity.            After Visit Summary       This is your record. Keep this with you and show to your community pharmacist(s) and doctor(s) at your next visit.

## 2018-11-08 NOTE — ED NOTES
Bed: ED27  Expected date: 11/8/18  Expected time: 3:32 PM  Means of arrival: Ambulance  Comments:  BV1

## 2018-11-08 NOTE — ED TRIAGE NOTES
Pt arrives to the ER via EMS from [facility]. Recently dx and treated for UTI and staff notes increased weakness today which prompted the call to ems. Patient presents with tolliver leg bag. EMS reports tremors to R side which is new.

## 2018-11-08 NOTE — DISCHARGE INSTRUCTIONS
Degenerative Disk Disease    Spinal disks are gel-filled cushions between the bones, or vertebrae, of the spine. The disks act like shock absorbers. Over time, the disks may break down. This is called degenerative disk disease.  This condition can affect the neck or back. It is one of the most common causes of low back pain. The pain often remains localized to the lower back or neck. Muscle spasm is often present and adds to the pain.  Disk degeneration is a natural part of aging. But it is not painful for most people. It may also occur as a result of repeated minor injuries due to daily activities, sports, or accidents. It may lead to osteoarthritis of the spine. Back pain related to disk disease may come and go. Or it may become chronic and last for months or years. The disk may bulge or rupture. This is called a slipped disk or herniated disk. That can put pressure on a nearby spinal nerve and cause neck or back pain that spreads down one arm or leg.  X-rays, CT scan, or an MRI scan may help to diagnose this condition. For acute pain, treatment includes anti-inflammatory medicines, muscle relaxants, rest, ice, or heat. Strong prescription pain medicines, called opioids, may be needed for short-term treatment if pain suddenly gets worse. Opioid medicines can be addictive. So they are not advised for long-term pain management. Other types of medicines are preferred. Surgery is generally not used to treat this condition unless there is a complication.    Home care    For neck pain: Use a comfortable pillow that supports the head and keeps the spine in a neutral position. Your head should not be tilted forward or backward.    For back pain: Avoid sitting for long periods of time. This puts more stress on the lower back than standing or walking. Starting a regular exercise program to strengthen the supporting muscles of the spine will make it easier to live with degenerative disk disease.    Apply an ice pack over  the injured area for no more than 15 to 20 minutes. Do this every 3 to 6 hours for the first 24 to 48 hours. To make an ice pack, put ice cubes in a plastic bag that seals at the top. Wrap the bag in a clean, thin towel or cloth. Never put ice or an ice pack directly on the skin. Keep using ice packs to ease pain and swelling as needed. After 48 hours, apply heat (warm shower or warm bath) for 20 minutes several times a day, or switch between ice and heat.     You may use over-the-counter pain medicine to control pain, unless another pain medicine was prescribed. If you have chronic liver or kidney disease or ever had a stomach ulcer or GI bleeding, talk with your provider before using these medicines.  Follow-up care  Follow up with your healthcare provider, or as directed.  If X-rays, a CT scan or an MRI scan were done, you will be notified of any new findings that may affect your care.  When to seek medical advice  Contact your healthcare provider right away if any of these occur:    Increasing back pain    Your foot drags when you walk, a condition called foot drop    You have new weakness, numbness, or pain in one or both arms or legs    Loss of bowel or bladder control    Numbness or tingling in the buttock or groin area  Date Last Reviewed: 3/1/2018    [id number] [employer]. [address] [place] [address], [place] [address]. All rights reserved. This information is not intended as a substitute for professional medical care. Always follow your healthcare professional's instructions.

## 2018-11-08 NOTE — ED NOTES
Cleaned pt's skin tears on hands and placed steri-strips on right hand.  Ambulated pt - pt walked well, steady, even gait.  Minimal pain in low back.

## 2018-11-09 NOTE — PROGRESS NOTES
"[place] [place] [facility]  PRIMARY CARE PROVIDER AND CLINIC:  Porfirio Terrell [address] [place] [address] / [facility] [id number]  Chief Complaint   Patient presents with     ER F/U     [place] Medical Record Number:  [id number]  Place of Service where encounter took place:  [facility]  ([facility]) [[id number]]    HPI:    Edison Boss is a 89 year old  (10/19/1924),admitted to the above facility from  [facility] on 11/08/2018.  Admitted to this facility for  rehab, medical management and nursing care.  HPI information obtained from: facility chart records, facility staff, patient report and [facility] chart review.  Current issues are:      Right leg weaknes  Lumbar disc disorder  Tremors       ED Course:  Edison Boss is a 89 year old male with a complex medical history pertinent for hyperlipidemia, hypertension, atrial fibrillation with a pacemaker in situ, stroke, sepsis, encephalopathy, and cognitive impairment who presents to the ED via EMS for evaluation of generalized weakness. Per the patient's daughter, he has been complaining of right leg \"heaviness\" and pain intermittently for the past 6 weeks. The patient's daughter states that his complaints have increased the past couple of days and today he had increased weakness in his right leg, and thus EMS was called to bring the patient to the ED. Upon arrival at the scene, EMS found the patient to have a transient right leg tremor that he denies is present at baseline. Here in the ED, the patient experienced another rhythmic tremorous episode and reports that he first experienced the tremor while in bed this morning. The patient also presents with a catheter, which has been in place for the past 15 years. The patient denies having any leg pain or back pain. He does have a history of hernia and reports some intermittent abdominal pain associated with this. Of note, the patient has been in TCU for the past " 2 weeks secondary to sepsis after a UTI.   --------------    Today patient found in bed. Alert, calm, NAD. Daughter Bea at bedside. Patient reports no tremors noted overnight. Denies pain at this time. Denies SOB, chest pain or dizziness.    CODE STATUS/ADVANCE DIRECTIVES DISCUSSION:   DNR / DNI  Patient's living condition: lives alone    ALLERGIES:Ceftriaxone; Bactrim; Zithromax [azithromycin]; Levaquin; and Macrodantin [nitrofuran derivatives]  PAST MEDICAL HISTORY:  has a past medical history of Chronic indwelling Christianson catheter; Chronic infection; Chronic pain; Esophageal reflux; Fungemia (09/2018); Hyperlipidaemia; Hypertension; Malignant neoplasm of prostate (H) (8/14/2002); Mild persistent asthma; Paroxysmal A-fib (H); Prostate cancer (H); Sinus node dysfunction (H); Sleep apnea; Unspecified cerebral artery occlusion with cerebral infarction; and Ureterolithiasis (09/2018). He also has no past medical history of Asymptomatic human immunodeficiency virus (HIV) infection status (H); Blood in semen; Cerebral palsy (H); Complication of anesthesia; Congenital renal agenesis and dysgenesis; Difficult intubation; Epididymitis, bilateral; Epididymitis, left; Epididymitis, right; Goiter; Gout; Hernia, abdominal; History of blood transfusion; History of spinal cord injury; History of thrombophlebitis; Horseshoe kidney; Hydrocephalus; Malignant hyperthermia; Mumps; Orchitis, epididymitis, and epididymo-orchitis, with abscess; Palpitations; Parkinsons disease (H); Penile discharge; PONV (postoperative nausea and vomiting); Prostate infection; Spider veins; Spina bifida (H); Spinal headache; STD (sexually transmitted disease); Swelling of testicle; Tethered cord (H); or Tuberculosis.  PAST SURGICAL HISTORY:  has a past surgical history that includes NONSPECIFIC PROCEDURE (1980's); NONSPECIFIC PROCEDURE (1985, 5/2005); NONSPECIFIC PROCEDURE (1/2002); REMOVE TONSILS/ADENOIDS,<11 Y/O (~1928); NONSPECIFIC PROCEDURE (~1999);  NONSPECIFIC PROCEDURE; NONSPECIFIC PROCEDURE; Cystoscopy, inject collagen, combined (6/6/2012); Cystoscopy, inject collagen, combined (3/22/2013); Implant pacemaker; Cystoscopy, inject collagen, combined (8/8/2014); Cystoscopy, inject collagen, combined (N/A, 8/12/2015); Cystoscopy, inject collagen, combined (N/A, 12/8/2016); Cystoscopy; Penis surgery; Prostate surgery; Cystoscopy, retrogrades, insert stent ureter(s), combined (Left, 9/27/2018); and Extracorporeal shock wave lithotripsy, cystoscopy, insert stent ureter(s), combined (Left, 10/30/2018).  FAMILY HISTORY: family history includes Cancer in his brother, brother, and brother; Cerebrovascular Disease in his brother; Family History Negative in his father and mother; HEART DISEASE in his mother.  SOCIAL HISTORY:  reports that he has quit smoking. He has a 40.00 pack-year smoking history. He has never used smokeless tobacco. He reports that he does not drink alcohol or use illicit drugs.    Post Discharge Medication Reconciliation Status: discharge medications reconciled and changed, per note/orders (see AVS).  Current Outpatient Prescriptions   Medication Sig Dispense Refill     ACETAMINOPHEN PO Take 1,000 mg by mouth 3 times daily as needed for pain       albuterol (PROVENTIL HFA: VENTOLIN HFA) 108 (90 BASE) MCG/ACT inhaler Inhale 2 puffs into the lungs every 6 hours as needed for shortness of breath / dyspnea. 1 Inhaler 1     amLODIPine (NORVASC) 5 MG tablet TAKE 1 TABLET(5 MG) BY MOUTH DAILY 90 tablet 3     ASPIRIN PO Take 81 mg by mouth daily       clobetasol (TEMOVATE) 0.05 % ointment Apply topically daily as needed   2     Colloidal Oatmeal (AVEENO ECZEMA THERAPY) 1 % CREA Externally apply topically once a week And as needed for dry skin       lidocaine (LMX4) 4 % CREA 4% topical cream Apply topically daily as needed       methylPREDNISolone (MEDROL DOSEPAK) 4 MG tablet Follow package instructions 21 tablet 0     METOPROLOL TARTRATE PO Take 50 mg by  "mouth 2 times daily       Multiple Vitamins-Minerals (PRESERVISION AREDS) CAPS Take 1 capsule by mouth 2 times daily 180 capsule 3     omeprazole (PRILOSEC) 20 MG CR capsule Take 20 mg by mouth 2 times daily        opium-belladonna (B&O SUPPRETTES) 30-16.2 MG per suppository Place 30 mg rectally 2 times daily as needed for moderate to severe pain       PARoxetine (PAXIL) 30 MG tablet Take 0.5 tablets (15 mg) by mouth At Bedtime Hold until fluconazole completed 30 tablet      senna-docusate (SENOKOT-S;PERICOLACE) 8.6-50 MG per tablet Take 1 tablet by mouth 2 times daily as needed for constipation         ROS:  10 point ROS of systems including Constitutional, Eyes, Respiratory, Cardiovascular, Gastroenterology, Genitourinary, Integumentary, Musculoskeletal, Psychiatric were all negative except for pertinent positives noted in my HPI.    Exam:  /77  Pulse 61  Temp 98.3  F (36.8  C)  Resp 14  Ht 5' 9\" (1.753 m)  Wt 154 lb 6.4 oz (70 kg)  SpO2 92%  BMI 22.8 kg/m2    GENERAL APPEARANCE: Alert, in no distress   ENT: Mouth and posterior oropharynx normal, moist mucous membranes   EYES: EOM, conjunctivae, lids, pupils and irises normal   NECK: No adenopathy,masses or thyromegaly   RESP: respiratory effort and palpation of chest normal, Lungs clear to auscultation  CV: Palpation and auscultation of heart done , regular rate and rhythm, no murmur, rub, or gallop, peripheral edema - no edema  ABDOMEN: normal bowel sounds, soft, nontender, no hepatosplenomegaly or other masses   : palpation of bladder WNL - chronic Christianson catheter draining clear yellow urine  M/S: Gait and station normal   Digits and nails normal -FERNÁNDEZ in a.m, later again unable to move RLE and tremors noted intermittently  SKIN: Inspection of skin and subcutaneous tissue baseline, Palpation of skin and subcutaneous tissue baseline  NEURO: Cranial nerves 2-12 are normal tested and grossly at patient's baseline, Examination of sensation by touch " normal   PSYCH: oriented X 3, affect and mood normal - sometimes slow to respond            BP 11/2-11/9: 126-176/57-80 mmHg    Lab/Diagnostic data:     CBC RESULTS:   Recent Labs   Lab Test  11/08/18   1557  10/26/18   0630   WBC  11.0  7.5   RBC  4.00*  4.06*   HGB  11.6*  11.6*   HCT  36.8*  36.3*   MCV  92  89   MCH  29.0  28.6   MCHC  31.5  32.0   RDW  18.2*  18.0*   PLT  298  368       Last Basic Metabolic Panel:  Recent Labs   Lab Test  11/08/18   1557  10/26/18   0630   NA  138  143   POTASSIUM  4.9  3.7   CHLORIDE  104  108   ARYAN  9.4  9.4   CO2  30  30   BUN  24  16   CR  1.14  0.98   GLC  96  82       Liver Function Studies -   Recent Labs   Lab Test  10/17/18   1609  10/03/18   0624   PROTTOTAL  7.9  6.8   ALBUMIN  2.7*  2.2*   BILITOTAL  0.9  0.4   ALKPHOS  122  112   AST  45  28   ALT  36  29       ASSESSMENT/PLAN:  Right leg weakness  Lumbar disc disorder  Tremor  *- issues with way original order came in for Medrol dose pack resulting in patient receiving two tabs last night and nothing since. Writer spoke with pharmacy regarding recommendations for proceeding with dose pack - will give first days total dose now (later afternoon) and start with day 2 and so on tomorrow a.m.  - monitor for side effects steroids  - follow pain, function RLE closely  - continue current pain regimen, rehab and ongoing discharge planning as per unit routine       - daughter Bea aware patient will likely need placement at discharge - is in agreement with current plan      Electronically signed by:  JYOTI Wade CNP

## 2018-11-10 PROBLEM — R56.9 SEIZURE (H): Status: ACTIVE | Noted: 2018-01-01

## 2018-11-10 NOTE — ED PROVIDER NOTES
History     Chief Complaint:  Tremors    HPI   Edison Boss is a 89 year old male with history of hypertension, atrial fibrillation with a pacemaker in situ, stroke, sepsis, encephalopathy, and cognitive impairment who presents by EMS from TCU to the emergency department today with tremors, heaviness, and weakness in his right leg.  This has been going on for several weeks according to the patient.  He was seen in the ED on 11/8/18, at which time CT of the lumbar spine, and CT of the head were performed.  The CT of his lumbar spine did demonstrate evidence of spinal stenosis, and spondylolisthesis.  He was prescribed a Medrol Dosepak, and was instructed to try heat and stimulator, but has not tried stimulator yet.  Here in the ED, is continuing to experience spasms in his right leg as well as low degree pain. The patient also presents with a catheter, which has been in place for the past 15 years. He denies back pain or falls.  No vision changes, headache, neck pain, dysarthria, or other weakness or numbness.  Of note, the patient has been in TCU for the past 2 weeks secondary to sepsis after a UTI.  He denies fever, chills, cough, abdominal pain, chest pain, SOB.  The patient's daughter arrived who states that the patient has had episodes of brief blank facial staring the last several days as well.  She further describes that he was not able to use the leg yesterday morning, before regaining function yesterday afternoon, and then losing it again.  They have also noticed intermittent similar symptoms in his right arm over the last 2 days.  Allergies:  Ceftriaxone  Bactrim  Zithromax [Azithromycin]  Levaquin  Macrodantin [Nitrofuran Derivatives]    Medications:    Acetaminophen Po  Albuterol (Proventil Hfa: Ventolin Hfa) 108 (90 Base) Mcg/Act Inhaler  Amlodipine (Norvasc) 5 Mg Tablet  Aspirin Po  Clobetasol (Temovate) 0.05 % Ointment  Colloidal Oatmeal (Aveeno Eczema Therapy) 1 % Crea  Lidocaine (Lmx4) 4  % Crea 4% Topical Cream  Methylprednisolone (Medrol Dosepak) 4 Mg Tablet  Metoprolol Tartrate Po  Multiple Vitamins-Minerals (Preservision Areds) Caps  Omeprazole (Prilosec) 20 Mg Cr Capsule  Opium-Belladonna (B&O Supprettes) 30-16.2 Mg Per Suppository  Paroxetine (Paxil) 30 Mg Tablet  Senna-Docusate (Senokot-S;Pericolace) 8.6-50 Mg Per Tablet    Past Medical History:    Sepsis (H)  Hematuria, unspecified type  Acute encephalopathy  Pneumonia due to infectious organism, unspecified laterality, unspecified part of lung  MCI (mild cognitive impairment)  RAMÓN (generalized anxiety disorder)  Sinoatrial node dysfunction (H)  Pacemaker  Cerebral infarction (H)  SBO (small bowel obstruction) (H)  Chronic atrial fibrillation (H)  HYPERLIPIDEMIA LDL GOAL <100  Restless legs syndrome (RLS)  Insomnia  Mild persistent asthma  Essential hypertension with goal blood pressure less than 140/90  Esophageal reflux  Personal history of other diseases of circulatory system  Osteoporosis  Other specified disorder of skin  Malignant neoplasm of prostate (H)  Chronic indwelling Christianson catheter   Chronic infection   Chronic pain   Esophageal reflux   Fungemia   Hyperlipidemia   Hypertension   Malignant neoplasm of prostate (H)   Mild persistent asthma   Paroxysmal A-fib (H)   Prostate cancer (H)   Sinus node dysfunction (H)   Sleep apnea   Unspecified cerebral artery occlusion with cerebral infarction   Ureterolithiasis     Past Surgical History:    Kidney stone surgery  TURP  Brachytherapy  Left rotator cuff repair  Cataract surgery   EGD/Dilation   Cystoscopy   Lithotripsy  Tonsil removal   Implant pacemaker  Penis surgery  Prostate surgery     Family History:    Heart disease  Cancer   Cancer  Cerebrovascular disease    Social History:  The patient was alone.  Smoking Status: Former  Smokeless Tobacco: Never  Alcohol Use: No    Marital Status:      Review of Systems   Constitutional: Negative for chills and fever.   Neurological:  Positive for weakness. Negative for dizziness, syncope, speech difficulty, numbness and headaches.   All other systems reviewed and are negative.    Physical Exam     Patient Vitals for the past 24 hrs:   BP Temp Temp src Pulse Heart Rate Resp SpO2   11/10/18 1100 148/76 - - - - - -   11/10/18 1045 94/88 - - - - - 98 %   11/10/18 1030 (!) 146/119 - - - - - 91 %   11/10/18 1015 150/75 - - - - - 97 %   11/10/18 1000 (!) 155/95 - - - - - 95 %   11/10/18 0958 156/82 97.8  F (36.6  C) Oral 60 60 18 97 %      Physical Exam  General: Elderly-appearing male.  Resting comfortably on gurney.    Head:  Scalp is NC/AT  Eyes:  No scleral icterus, PERRL, EOMI   ENT:  The external nose and ears are normal.   Neck:  Normal range of motion without rigidity.  CV:  Regular rate and rhythm    Grade 2 out of 6 systolic murmur best heard at the base.  Resp:  Breath sounds are clear bilaterally.  No crackles, wheezes, rhonchi.    Non-labored, no retractions or accessory muscle use  GI:  Abdomen is soft, no distension, no tenderness, no masses. No peritoneal signs.    :  No suprapubic or flank tenderness  MS:  No lower extremity edema.    No midline cervical, thoracic, or lumbar tenderness  Skin:  Warm and dry, No rash or lesions noted.  Neuro: Oriented x 3. No gross motor deficits.    1/5 strength in the right lower extremity.  Strength and sensation grossly intact in other extremities.     Cranial nerves 2-12 intact.    GCS: 15    Normal finger to nose and heel to shin testing    Reflexes 2+ bilaterally in lower extremities.    Involuntary spastic contractions of muscles of lower leg with clonus.  Psych:  Awake, alert, pleasantly confused.    Emergency Department Course   Laboratory:  Laboratory findings were communicated with the patient who voiced understanding of the findings.  BMP: 115(H) Glucose o/w WNL (Creatinine 1.03)   Magnesium: 2.3  CBC: AWNL (WBC 10.6, HGB 13.1(L), )     Emergency Department Course:  Nursing notes  and vitals reviewed.  1006: I performed an exam of the patient as documented above.   IV was inserted and blood was drawn for laboratory testing, results above.  1103: Findings and plan explained to the Patient who consents to admission. Discussed the patient with Dr. Angel, who will admit the patient to a Med/Surg bed for further monitoring, evaluation, and treatment.   1203: I spoke with Dr. Villegas of the Neurology service regarding patient's presentation, findings, and plan of care.   I personally reviewed the laboratory results with the Patient and answered all related questions prior to admission.    Impression & Plan    Medical Decision Makin-year-old male who presents with tremors and weakness in his right leg.  Patient history and records reviewed.  On examination, the patient is awake and well-appearing, afebrile with normal vitals.  He is pleasantly confused consistent with his known baseline.  On exam he has weakness with muscle spasms and clonus to the right lower extremity.  His neurologic exam is otherwise unremarkable without any focal deficits to the other extremities, cranial nerves, dysarthria and he denies headache or neck pain.  Lab workup was performed  as above which was unremarkable and demonstrated no electrolyte abnormalities.      Uncertain the cause of the patient's symptoms, differential includes atypical myoclonic seizures, lesion of spinal cord or nerve impingement, Stewart's paralysis, Hemiballismus from CNS process, etc.  Given the duration of symptoms and otherwise normal neurologic exam patient not candidate for TPA, interventional radiology and therefore no indication for emergent stroke imaging.  Unremarkable CT head 2 days prior.  Believe patient should be admitted to hospital for further evaluation, possible EEG monitoring, additional imaging.  Dr. Tim discussed with neurology prior to admission.  Patient was discussed with Dr. Angel, who will admit to a medical bed.  Patient  consents to admission and all questions answered.        Diagnosis:    ICD-10-CM    1. Right leg weakness R29.898 Basic metabolic panel     Magnesium     CBC with platelets differential     CBC with platelets differential     CANCELED: CBC with platelets differential   2. 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    11/10/2018   [facility] EMERGENCY DEPARTMENT       Quinton Guerra PA-C  11/10/18 2625

## 2018-11-10 NOTE — SAFE
Call CT to inquire about time frame for the patient to have his CT done. Tech stated 40+ minutes before patient can have scan done. TREASURE Andrews

## 2018-11-10 NOTE — PHARMACY-ADMISSION MEDICATION HISTORY
Admission medication history interview status for this patient is complete. See [facility] admission navigator for allergy information, prior to admission medications and immunization status.     Medication history interview source(s):Patient here from [facility]  Medication history resources (including written lists, pill bottles, clinic record): MAR from [facility]  Primary pharmacy:n/a    Changes made to PTA medication list:  Added: none  Deleted: none  Changed: none    Actions taken by pharmacist (provider contacted, etc):None     Additional medication history information:None    Medication reconciliation/reorder completed by provider prior to medication history? No    Do you take OTC medications (eg tylenol, ibuprofen, fish oil, eye/ear drops, etc)? N(Y/N)    For patients on insulin therapy: N (Y/N)        Prior to Admission medications    Medication Sig Last Dose Taking? Auth Provider   ACETAMINOPHEN PO Take 1,000 mg by mouth 3 times daily as needed for pain 11/10/2018 at 0630 Yes Reported, Patient   amLODIPine (NORVASC) 5 MG tablet TAKE 1 TABLET(5 MG) BY MOUTH DAILY 11/10/2018 at 08 Yes Porfirio Terrell MD   ASPIRIN PO Take 81 mg by mouth daily 11/10/2018 at 08 Yes Reported, Patient   Colloidal Oatmeal (AVEENO ECZEMA THERAPY) 1 % CREA Externally apply topically once a week On Wednesday after shower And as needed for dry skin Past Week at Unknown time Yes Reported, Patient   lidocaine (LMX4) 4 % CREA 4% topical cream Apply topically daily as needed Past Week at Unknown time Yes Reported, Patient   methylPREDNISolone (MEDROL DOSEPAK) 4 MG tablet Follow package instructions Started 11/8/18 at T 4mg @08 Yes Issa Shah MD   METOPROLOL TARTRATE PO Take 50 mg by mouth 2 times daily 11/10/2018 at am Yes Reported, Patient   Multiple Vitamins-Minerals (PRESERVISION AREDS) CAPS Take 1 capsule by mouth 2 times daily 11/10/2018 at am Yes Porfirio Terrell MD   omeprazole (PRILOSEC) 20 MG CR capsule Take 20  mg by mouth 2 times daily  11/10/2018 at am Yes Unknown, Entered By History   PARoxetine (PAXIL) 30 MG tablet Take 0.5 tablets (15 mg) by mouth At Bedtime Hold until fluconazole completed 11/9/2018 at 2000 Yes Virginie Ferris MD   albuterol (PROVENTIL HFA: VENTOLIN HFA) 108 (90 BASE) MCG/ACT inhaler Inhale 2 puffs into the lungs every 6 hours as needed for shortness of breath / dyspnea. Unknown at Unknown time  Porfirio Terrell MD   clobetasol (TEMOVATE) 0.05 % ointment Apply topically daily as needed  Unknown at Unknown time  Lewis Nuñez   opium-belladonna (B&O SUPPRETTES) 30-16.2 MG per suppository Place 30 mg rectally 2 times daily as needed for moderate to severe pain Unknown at Unknown time  Reported, Patient   senna-docusate (SENOKOT-S;PERICOLACE) 8.6-50 MG per tablet Take 1 tablet by mouth 2 times daily as needed for constipation Unknown at Unknown time  Reported, Patient

## 2018-11-10 NOTE — IP AVS SNAPSHOT
MRN:[id number]                      After Visit Summary   11/10/2018    Edison Boss    MRN: [id number]           Thank you!     Thank you for choosing [facility] for your care. Our goal is always to provide you with excellent care. Hearing back from our patients is one way we can continue to improve our services. Please take a few minutes to complete the written survey that you may receive in the mail after you visit. If you would like to speak to someone directly about your visit please contact Patient Relations at [phone]. Thank you!          Patient Information     Date Of Birth          10/19/1924        Designated Caregiver       Most Recent Value    Caregiver    Will someone help with your care after discharge? yes    Name of designated caregiver Concepcion    Phone number of caregiver [phone]    Caregiver address [address] [place] [address]      About your hospital stay     You were admitted on:  November 10, 2018 You last received care in the:  [facility]    You were discharged on:  November 24, 2018        Reason for your hospital stay       Encephalopathy, Seizure                  Who to Call     For medical emergencies, please call 911.  For non-urgent questions about your medical care, please call your primary care provider or clinic, [phone]          Attending Provider     Provider Specialty    Viridiana Tim MD Emergency Medicine    [facility], Elyssa Yi MD Internal Medicine       Primary Care Provider Office Phone # Fax #    Porfirio Terrell [phone]      After Care Instructions     Activity       Your activity upon discharge: activity as tolerated            Diet       Follow this diet upon discharge: Orders Placed This Encounter      Snacks/Supplements Adult: Boost Plus; With Meals      Regular Diet Adult (1:1 supervision with intake (family ok to provide if present))                  Follow-up  "Appointments     Follow-up and recommended labs and tests        Follow up Hospice today, follow up with PCP as needed.                  Your next 10 appointments already scheduled     Dec 04, 2018  1:15 PM CST   Remote PPM Check with SANDRA TECH1   [facility]   [place] ([facility])    [address]  [place] [address] [phone] OPT 2           This appointment is for a remote check of your pacemaker.  This is not an appointment at the office.              Further instructions from your care team       Please call Dr. Terrell's office at [phone] to schedule a hospital follow-up appointment within 7-10 days of discharge. Please bring your hospital discharge instructions and any new medications with you to your appointment.    Pending Results     Date and Time Order Name Status Description    11/12/2018 1033 Fungus Culture, non-blood Tube 2 Preliminary             Statement of Approval     Ordered          11/23/18 2012  I have reviewed and agree with all the recommendations and orders detailed in this document.  EFFECTIVE NOW     Approved and electronically signed by:  Seven Nuno MD             Admission Information     Date & Time Provider Department Dept. Phone    11/10/2018 Elyssa Angel MD [facility] [phone]      Your Vitals Were     Blood Pressure Pulse Temperature Respirations Height Weight    173/60 (BP Location: Right arm) 60 96.1  F (35.6  C) (Oral) 16 1.753 m (5' 9\") 82.1 kg (181 lb)    Pulse Oximetry BMI (Body Mass Index)                95% 26.73 kg/m2          MyChart Information     [employer] gives you secure access to your electronic health record. If you see a primary care provider, you can also send messages to your care team and make appointments. If you have questions, please call your primary care clinic.  If you do not have a primary care provider, please call [phone] and they will " assist you.        Care EveryWhere ID     This is your Care EveryWhere ID. This could be used by other organizations to access your [facility] medical records  [id number]        Equal Access to Services     EMERITA CADE : Hadii aad ku hadmontezdione Denson, wajeannetteda alizeshebaha, lisata kaectorda bulmaro, yony bartonjosué kirkemma ibrahim kami nunn. So [facility] [phone].    ATENCIÓN: Si habla español, tiene a lauren disposición servicios gratuitos de asistencia lingüística. Llame al [phone].    We comply with applicable federal civil rights laws and [place] laws. We do not discriminate on the basis of race, color, national origin, age, disability, sex, sexual orientation, or gender identity.               Review of your medicines      START taking        Dose / Directions    artificial saliva Soln solution   Used for:  Dry mouth        Dose:  1-2 spray   Swish and spit 1-2 mLs (1-2 sprays) in mouth every hour as needed for dry mouth   Quantity:  1 Bottle   Refills:  0       atropine 1 % ophthalmic solution   Used for:  Hospice care patient        Dose:  2-4 drop   Take 2-4 drops by mouth, place under tongue or place inside cheek every 2 hours as needed for other (terminal respiratory secretions) Not for ophthalmic use.   Quantity:  5 mL   Refills:  1       bisacodyl 10 MG Suppository   Commonly known as:  DULCOLAX   Used for:  Hospice care patient        Unwrap and insert 1 suppository rectally twice daily as needed for constipation.   Quantity:  12 suppository   Refills:  1       diazepam 5 mg Supp   Used for:  Partial seizure, motor (H)        Dose:  5-10 mg   Place 1-2 suppositories (5-10 mg) rectally every 30 minutes as needed for seizures   Quantity:  20 suppository   Refills:  0       haloperidol 2 MG/ML (HIGH CONC) solution   Commonly known as:  HALDOL   Used for:  Hospice care patient        Dose:  0.5-1 mg   Take 0.25-0.5 mLs (0.5-1 mg) by mouth, place under tongue or insert rectally every 6 hours as needed for  agitation (nausea)   Quantity:  30 mL   Refills:  1       HYDROmorphone (HIGH CONC) 10 mg/mL Liqd oral   Commonly known as:  DILAUDID   Used for:  Hospice care patient        Dose:  1-2 mg   Take 0.1-0.2 mLs (1-2 mg) by mouth or place under tongue every 2 hours as needed for moderate to severe pain (and/or??shortness of breath)   Quantity:  30 mL   Refills:  0       lacosamide 200 MG Tabs tablet   Commonly known as:  VIMPAT        Dose:  200 mg   Take 1 tablet (200 mg) by mouth 2 times daily   Quantity:  60 tablet   Refills:  0       levETIRAcetam 1000 MG tablet   Commonly known as:  KEPPRA        Dose:  1000 mg   Take 1 tablet (1,000 mg) by mouth 2 times daily   Quantity:  60 tablet   Refills:  0       MEDICATION INSTRUCTION   Used for:  Hospice care patient        If care facility cannot accept or use ranges, facility is instructed to use lower end of dosing range   Refills:  0         CONTINUE these medicines which may have CHANGED, or have new prescriptions. If we are uncertain of the size of tablets/capsules you have at home, strength may be listed as something that might have changed.        Dose / Directions    * ACETAMINOPHEN PO   This may have changed:  Another medication with the same name was added. Make sure you understand how and when to take each.        Dose:  1000 mg   Take 1,000 mg by mouth 3 times daily as needed for pain   Refills:  0       * acetaminophen 650 MG suppository   Commonly known as:  TYLENOL   This may have changed:  You were already taking a medication with the same name, and this prescription was added. Make sure you understand how and when to take each.   Used for:  Hospice care patient        Dose:  650 mg   Place 1 suppository (650 mg) rectally every 4 hours as needed for fever or mild pain (Do not exceed 4000 mg total acetaminophen per day.) Unwrap prior to insertion.   Quantity:  12 suppository   Refills:  1       metoprolol tartrate 25 MG tablet   Commonly known as:  LOPRESSOR    This may have changed:  how much to take   Used for:  Essential hypertension with goal blood pressure less than 140/90        Dose:  12.5 mg   Take 0.5 tablets (12.5 mg) by mouth 2 times daily   Quantity:  30 tablet   Refills:  0       * Notice:  This list has 2 medication(s) that are the same as other medications prescribed for you. Read the directions carefully, and ask your doctor or other care provider to review them with you.      CONTINUE these medicines which have NOT CHANGED        Dose / Directions    albuterol 108 (90 Base) MCG/ACT inhaler   Commonly known as:  PROAIR HFA/PROVENTIL HFA/VENTOLIN HFA   Used for:  Mild persistent asthma        Dose:  2 puff   Inhale 2 puffs into the lungs every 6 hours as needed for shortness of breath / dyspnea.   Quantity:  1 Inhaler   Refills:  1       amLODIPine 5 MG tablet   Commonly known as:  NORVASC   Used for:  Essential hypertension with goal blood pressure less than 140/90        TAKE 1 TABLET(5 MG) BY MOUTH DAILY   Quantity:  90 tablet   Refills:  3       ASPIRIN PO        Dose:  81 mg   Take 81 mg by mouth daily   Refills:  0       AVEENO ECZEMA THERAPY 1 % Crea   Generic drug:  Colloidal Oatmeal        Externally apply topically once a week On Wednesday after shower And as needed for dry skin   Refills:  0       clobetasol 0.05 % ointment   Commonly known as:  TEMOVATE        Apply topically daily as needed   Refills:  2       lidocaine 4 % Crea cream   Commonly known as:  LMX4        Apply topically daily as needed   Refills:  0       omeprazole 20 MG CR capsule   Commonly known as:  priLOSEC        Dose:  20 mg   Take 20 mg by mouth 2 times daily   Refills:  0       PARoxetine 30 MG tablet   Commonly known as:  PAXIL   Used for:  Current mild episode of major depressive disorder, unspecified whether recurrent (H)        Dose:  15 mg   Take 0.5 tablets (15 mg) by mouth At Bedtime Hold until fluconazole completed   Quantity:  30 tablet   Refills:  0        senna-docusate 8.6-50 MG per tablet   Commonly known as:  SENOKOT-S;PERICOLACE        Dose:  1 tablet   Take 1 tablet by mouth 2 times daily as needed for constipation   Refills:  0         STOP taking     methylPREDNISolone 4 MG tablet   Commonly known as:  MEDROL DOSEPAK           opium-belladonna 30-16.2 MG per suppository   Commonly known as:  B&O SUPPRETTES           PRESERVISION AREDS Caps                Where to get your medicines      These medications were sent to [facility] [address] [address]  [address] [address] [address]     Phone:  [phone]     artificial saliva Soln solution    metoprolol tartrate 25 MG tablet         Some of these will need a paper prescription and others can be bought over the counter. Ask your nurse if you have questions.     Bring a paper prescription for each of these medications     acetaminophen 650 MG suppository    atropine 1 % ophthalmic solution    bisacodyl 10 MG Suppository    diazepam 5 mg Supp    haloperidol 2 MG/ML (HIGH CONC) solution    HYDROmorphone (HIGH CONC) 10 mg/mL Liqd oral    lacosamide 200 MG Tabs tablet    levETIRAcetam 1000 MG tablet       You don't need a prescription for these medications     MEDICATION INSTRUCTION                Protect others around you: Learn how to safely use, store and throw away your medicines at www.disposemymeds.org.        Information about OPIOIDS     PRESCRIPTION OPIOIDS: WHAT YOU NEED TO KNOW   We gave you an opioid (narcotic) pain medicine. It is important to manage your pain, but opioids are not always the best choice. You should first try all the other options your care team gave you. Take this medicine for as short a time (and as few doses) as possible.    Some activities can increase your pain, such as bandage changes or therapy sessions. It may help to take your pain medicine 30 to 60 minutes before these activities. Reduce your stress by getting enough sleep,  working on hobbies you enjoy and practicing relaxation or meditation. Talk to your care team about ways to manage your pain beyond prescription opioids.    These medicines have risks:    DO NOT drive when on new or higher doses of pain medicine. These medicines can affect your alertness and reaction times, and you could be arrested for driving under the influence (DUI). If you need to use opioids long-term, talk to your care team about driving.    DO NOT operate heavy machinery    DO NOT do any other dangerous activities while taking these medicines.    DO NOT drink any alcohol while taking these medicines.     If the opioid prescribed includes acetaminophen, DO NOT take with any other medicines that contain acetaminophen. Read all labels carefully. Look for the word  acetaminophen  or  Tylenol.  Ask your pharmacist if you have questions or are unsure.    You can get addicted to pain medicines, especially if you have a history of addiction (chemical, alcohol or substance dependence). Talk to your care team about ways to reduce this risk.    All opioids tend to cause constipation. Drink plenty of water and eat foods that have a lot of fiber, such as fruits, vegetables, prune juice, apple juice and high-fiber cereal. Take a laxative (Miralax, milk of magnesia, Colace, Senna) if you don t move your bowels at least every other day. Other side effects include upset stomach, sleepiness, dizziness, throwing up, tolerance (needing more of the medicine to have the same effect), physical dependence and slowed breathing.    Store your pills in a secure place, locked if possible. We will not replace any lost or stolen medicine. If you don t finish your medicine, please throw away (dispose) as directed by your pharmacist. The [place] [employer]ution [employer] has more information about safe disposal: https://www.pca.[facility].[place].us/living-green/managing-unwanted-medications             Medication List: This is a list of all your  medications and when to take them. Check marks below indicate your daily home schedule. Keep this list as a reference.      Medications           Morning Afternoon Evening Bedtime As Needed    * ACETAMINOPHEN PO   Take 1,000 mg by mouth 3 times daily as needed for pain   Last time this was given:  1,000 mg on 11/22/2018 10:57 AM                                * acetaminophen 650 MG suppository   Commonly known as:  TYLENOL   Place 1 suppository (650 mg) rectally every 4 hours as needed for fever or mild pain (Do not exceed 4000 mg total acetaminophen per day.) Unwrap prior to insertion.   Last time this was given:  650 mg on 11/11/2018  9:16 PM                                albuterol 108 (90 Base) MCG/ACT inhaler   Commonly known as:  PROAIR HFA/PROVENTIL HFA/VENTOLIN HFA   Inhale 2 puffs into the lungs every 6 hours as needed for shortness of breath / dyspnea.                                amLODIPine 5 MG tablet   Commonly known as:  NORVASC   TAKE 1 TABLET(5 MG) BY MOUTH DAILY   Last time this was given:  5 mg on 11/11/2018  5:50 PM                                artificial saliva Soln solution   Swish and spit 1-2 mLs (1-2 sprays) in mouth every hour as needed for dry mouth   Last time this was given:  2 sprays on 11/21/2018  4:30 AM                                ASPIRIN PO   Take 81 mg by mouth daily   Last time this was given:  81 mg on 11/24/2018  9:50 AM                                atropine 1 % ophthalmic solution   Take 2-4 drops by mouth, place under tongue or place inside cheek every 2 hours as needed for other (terminal respiratory secretions) Not for ophthalmic use.                                AVEENO ECZEMA THERAPY 1 % Crea   Externally apply topically once a week On Wednesday after shower And as needed for dry skin   Generic drug:  Colloidal Oatmeal                                bisacodyl 10 MG Suppository   Commonly known as:  DULCOLAX   Unwrap and insert 1 suppository rectally twice daily as  needed for constipation.                                clobetasol 0.05 % ointment   Commonly known as:  TEMOVATE   Apply topically daily as needed                                diazepam 5 mg Supp   Place 1-2 suppositories (5-10 mg) rectally every 30 minutes as needed for seizures                                haloperidol 2 MG/ML (HIGH CONC) solution   Commonly known as:  HALDOL   Take 0.25-0.5 mLs (0.5-1 mg) by mouth, place under tongue or insert rectally every 6 hours as needed for agitation (nausea)                                HYDROmorphone (HIGH CONC) 10 mg/mL Liqd oral   Commonly known as:  DILAUDID   Take 0.1-0.2 mLs (1-2 mg) by mouth or place under tongue every 2 hours as needed for moderate to severe pain (and/or??shortness of breath)                                lacosamide 200 MG Tabs tablet   Commonly known as:  VIMPAT   Take 1 tablet (200 mg) by mouth 2 times daily   Last time this was given:  200 mg on 11/24/2018  9:49 AM                                levETIRAcetam 1000 MG tablet   Commonly known as:  KEPPRA   Take 1 tablet (1,000 mg) by mouth 2 times daily   Last time this was given:  1,000 mg on 11/24/2018  9:50 AM                                lidocaine 4 % Crea cream   Commonly known as:  LMX4   Apply topically daily as needed                                MEDICATION INSTRUCTION   If care facility cannot accept or use ranges, facility is instructed to use lower end of dosing range                                metoprolol tartrate 25 MG tablet   Commonly known as:  LOPRESSOR   Take 0.5 tablets (12.5 mg) by mouth 2 times daily   Last time this was given:  12.5 mg on 11/24/2018  9:50 AM                                omeprazole 20 MG CR capsule   Commonly known as:  priLOSEC   Take 20 mg by mouth 2 times daily   Last time this was given:  20 mg on 11/24/2018  9:49 AM                                PARoxetine 30 MG tablet   Commonly known as:  PAXIL   Take 0.5 tablets (15 mg) by mouth At  Bedtime Hold until fluconazole completed   Last time this was given:  15 mg on 11/24/2018  9:49 AM                                senna-docusate 8.6-50 MG per tablet   Commonly known as:  SENOKOT-S;PERICOLACE   Take 1 tablet by mouth 2 times daily as needed for constipation                                * Notice:  This list has 2 medication(s) that are the same as other medications prescribed for you. Read the directions carefully, and ask your doctor or other care provider to review them with you.

## 2018-11-10 NOTE — ED TRIAGE NOTES
Pt comes from TCU with tremors on right side of body. Seen in ED two days ago for same issues, supposed to try heat and stimulator but EMS states facility has not yet. Pt denies pain. ABC's intact, alert and oriented to self.

## 2018-11-10 NOTE — IP AVS SNAPSHOT
` `           [facility]TH[facility]: [phone]            Medication Administration Report for Edison Boss as of 11/24/18 1131   Legend:    Given Hold Not Given Due Canceled Entry Other Actions    Time Time (Time) Time  Time-Action       Inactive    Active    Linked        Medications 11/18/18 11/19/18 11/20/18 11/21/18 11/22/18 11/23/18 11/24/18    - MEDICATION INSTRUCTIONS -  Freq: CONTINUOUS PRN Route: XX  PRN Comment: OK to hold oral medications if patient unable due to mental status.  Start: 11/20/18 1701   Dispense Loc: Non Rx dispense               acetaminophen (TYLENOL) solution 1,000 mg  Dose: 1,000 mg  Freq: EVERY 8 HOURS PRN Route: PO  PRN Reasons: mild pain,fever  Start: 11/21/18 1020   Admin Instructions: Maximum acetaminophen dose from all sources= 75 mg/kg/day not to exceed 4 grams/day.    Admin. Amount: 1,000 mg = 31.25 mL Conc: 32 mg/mL  Last Admin: 11/22/18 1057  Dispense Loc: [facility]  Volume: 40.625 mL        1104 (1,000 mg)-Given        1057 (1,000 mg)-Given             acetaminophen (TYLENOL) Suppository 975 mg  Dose: 975 mg  Freq: EVERY 8 HOURS PRN Route: RE  PRN Reason: fever  Start: 11/21/18 1600   Admin Instructions: Maximum acetaminophen dose from all sources = 75 mg/kg/day not to exceed 4 grams/day.    Admin. Amount: 1.5 suppository (1.5 × 650 mg suppository)  Dispense Loc: [facility]               albuterol (PROAIR HFA/PROVENTIL HFA/VENTOLIN HFA) 108 (90 Base) MCG/ACT inhaler 2 puff  Dose: 2 puff  Freq: EVERY 6 HOURS PRN Route: IN  PRN Reason: shortness of breath / dyspnea  Start: 11/11/18 1659   Admin. Amount: 2 puff  Dispense Loc: [facility]               artificial saliva (BIOTENE MT) solution 1-2 spray  Dose: 1-2 spray  Freq: EVERY 1 HOUR PRN Route: SWISH & SPIT  PRN Reason: dry mouth  Start: 11/19/18 1540   Admin Instructions: OK to keep at bedside and family to administer    Admin. Amount: 1-2 spray = 1-2 mL Conc: 1 spray/mL  Last Admin: 11/21/18  0430  Dispense Loc:  Main Pharmacy  Volume: 44.3 mL      1701 (2 spray)-Given         0430 (2 spray)-Given [C]              aspirin chewable tablet 81 mg  Dose: 81 mg  Freq: DAILY Route: ORAL OR FEED  Start: 18 0800   Admin. Amount: 1 tablet (1 × 81 mg tablet)  Last Admin: 18 0950  Dispense Loc:  ADS MS6[facility]     0918 (81 mg)-Given        0802 (81 mg)-Given        (1216)-Not Given [C]        (1128)-Not Given [C]        0911 (81 mg)-Given        0934 (81 mg)-Given        0950 (81 mg)-Given           dextrose 10 % 1,000 mL infusion  Freq: CONTINUOUS PRN Route: IV  PRN Comment: Hypoglycemia prevention  Start: 18 1257   Admin Instructions: For Hypoglycemia Prevention for patients on long-acting subcutaneous basal insulin (Glargine, Detemir, NPH) or continuous insulin infusion. Whenever nutrition support is held or interrupted:   1) Infuse IV D10W at nutrition support rate  2) Notify provider for further instructions    Dispense Loc: [facility] Floor Stock  Volume: 1,000 mL   Mixture Administration Information:   Medication Type Amount   dextrose 10 % SOLN Base 1,000 mL                       diazepam suppository 5-10 mg  Dose: 5-10 mg  Freq: EVERY 30 MIN PRN Route: RE  PRN Reason: seizures  Start: 18   Admin Instructions: Unwrap and insert 1-2 suppositories rectally every 30 minutes until seizure stops.    Admin. Amount: 1-2 suppository (1-2 × 5 mg suppository)  Dispense Loc:  Main Pharmacy  Administrations Remainin               haloperidol lactate (HALDOL) injection 0.5-1 mg  Dose: 0.5-1 mg  Freq: EVERY 1 HOUR PRN Route: IV  PRN Reason: agitation  Start: 18 1654   Admin Instructions: Call provider if agitation not relieved at these doses.  Contraindicated in Parkinsonism.  For ordered doses up to 5 mg, drug can be give IV Push undiluted over 1 minute.    Admin. Amount: 0.5-1 mg = 0.1-0.2 mL Conc: 5 mg/mL  Dispense Loc:  ADS MS6[facility]  Infused Over: 1 Minutes  Volume: 1 mL                "lacosamide (VIMPAT) tablet 200 mg  Dose: 200 mg  Freq: 2 TIMES DAILY Route: PO  Start: 11/21/18 2000   Admin. Amount: 1 tablet (1 × 200 mg tablet)  Last Admin: 11/24/18 0949  Dispense Loc: [employer] [facility]        2207 (200 mg)-Given        0910 (200 mg)-Given       2202 (200 mg)-Given        0930 (200 mg)-Given       2129 (200 mg)-Given        0949 (200 mg)-Given       [ ] 2000           levETIRAcetam (KEPPRA) tablet 1,000 mg  Dose: 1,000 mg  Freq: 2 TIMES DAILY Route: PO  Start: 11/23/18 2000   Admin. Amount: 2 tablet (2 × 500 mg tablet)  Last Admin: 11/24/18 0950  Dispense Loc:  ADS MS6E          2129 (1,000 mg)-Given        0950 (1,000 mg)-Given       [ ] 2000           lidocaine (LMX4) kit  Freq: EVERY 1 HOUR PRN Route: Top  PRN Reason: moderate pain  PRN Comment: with VAD insertion or accessing implanted port.  Start: 11/13/18 1609   Admin Instructions: Do NOT give if patient has a history of allergy to any local anesthetic or any \"gustavo\" product. Apply 30 minutes prior to VAD insertion or port access.   MAX Dose: 2.5 g (  of 5 g tube)                     Dispense Loc: [facility]               lidocaine 1 % 0.5-5 mL  Dose: 0.5-5 mL  Freq: EVERY 1 HOUR PRN Route: OTHER  PRN Comment: mild pain with VAD insertion or accessing implanted port.  Start: 11/13/18 1609   Admin Instructions: Do NOT give if patient has a history of allergy to any local anesthetic or any \"gustavo\" product. MAX dose 1 mL subcutaneous OR intradermal in divided doses.    Admin. Amount: 0.5-5 mL  Dispense Loc: [facility] Floor Stock  Volume: 5 mL               loperamide (IMODIUM) capsule 2 mg  Dose: 2 mg  Freq: 4 TIMES DAILY PRN Route: PO  PRN Reason: diarrhea  Start: 11/19/18 1204   Admin. Amount: 1 capsule (1 × 2 mg capsule)  Dispense Loc: [facility]               metoprolol tartrate (LOPRESSOR) half-tab 12.5 mg  Dose: 12.5 mg  Freq: 2 TIMES DAILY Route: PO  Start: 11/21/18 2000   Admin. Amount: 1 half-tab (1 × 12.5 mg half-tab)  Last Admin: " 11/24/18 0950  Dispense Loc:  ADS MS6E        2050 (12.5 mg)-Given        0911 (12.5 mg)-Given       2202 (12.5 mg)-Given        0929 (12.5 mg)-Given       2129 (12.5 mg)-Given        0950 (12.5 mg)-Given       [ ] 2000           miconazole (MICATIN; MICRO GUARD) 2 % powder  Freq: EVERY 1 HOUR PRN Route: Top  PRN Reason: other  PRN Comment: topical candidiasis  Start: 11/18/18 0201   Admin Instructions: Apply to affected area.      Last Admin: 11/22/18 1122  Dispense Loc:  Main Pharmacy         1122 ( )-Given             naloxone (NARCAN) injection 0.1-0.4 mg  Dose: 0.1-0.4 mg  Freq: EVERY 2 MIN PRN Route: IV  PRN Reason: opioid reversal  Start: 11/10/18 1301   Admin Instructions: For respiratory rate LESS than or EQUAL to 8.  Partial reversal dose:  0.1 mg titrated q 2 minutes for Analgesia Side Effects Monitoring Sedation Level of 3 (frequently drowsy, arousable, drifts to sleep during conversation).Full reversal dose:  0.4 mg bolus for Analgesia Side Effects Monitoring Sedation Level of 4 (somnolent, minimal or no response to stimulation).  For ordered IV doses 0.1-2mg give IVP. Give each 0.4mg over 15 seconds in emergency situations. For non-emergent situations further dilute in 9mL of NS to facilitate titration of response.    Admin. Amount: 0.1-0.4 mg = 0.25-1 mL Conc: 0.4 mg/mL  Dispense Loc:  ADS MS6E  Volume: 1 mL               omeprazole (priLOSEC) CR capsule 20 mg  Dose: 20 mg  Freq: EVERY MORNING BEFORE BREAKFAST Route: PO  Start: 11/22/18 0730   Admin. Amount: 1 capsule (1 × 20 mg capsule)  Last Admin: 11/24/18 0949  Dispense Loc:  ADS MS6E         0911 (20 mg)-Given        0929 (20 mg)-Given        0949 (20 mg)-Given           ondansetron (ZOFRAN-ODT) ODT tab 4 mg  Dose: 4 mg  Freq: EVERY 6 HOURS PRN Route: PO  PRN Reasons: nausea,vomiting  Start: 11/10/18 1301   Admin Instructions: This is Step 1 of nausea and vomiting management.  If nausea not resolved in 15 minutes, go to Step 2  prochlorperazine (COMPAZINE). Do not push through foil backing. Peel back foil and gently remove. Place on tongue immediately. Administration with liquid unnecessary  With dry hands, peel back foil backing and gently remove tablet; do not push oral disintegrating tablet through foil backing; administer immediately on tongue and oral disintegrating tablet dissolves in seconds; then swallow with saliva; liquid not required.    Admin. Amount: 1 tablet (1 × 4 mg tablet)  Dispense Loc:  ADS MS6E              Or  ondansetron (ZOFRAN) injection 4 mg  Dose: 4 mg  Freq: EVERY 6 HOURS PRN Route: IV  PRN Reasons: nausea,vomiting  Start: 11/10/18 1301   Admin Instructions: This is Step 1 of nausea and vomiting management.  If nausea not resolved in 15 minutes, go to Step 2 prochlorperazine (COMPAZINE).  Irritant. For ordered IV doses 0.1-4 mg, give IV Push undiluted over 2-5 minutes.    Admin. Amount: 4 mg = 2 mL Conc: 4 mg/2 mL  Dispense Loc:  ADS MS6E  Infused Over: 2-5 Minutes  Volume: 2 mL               opium-belladonna (B&O SUPPRETTES) 30-16.2 MG per suppository 1 suppository  Dose: 30 mg  Freq: 2 TIMES DAILY PRN Route: RE  Start: 11/10/18 2021   Admin. Amount: 1 suppository  Last Admin: 11/12/18 1953  Dispense Loc:  Main Pharmacy               PARoxetine (PAXIL) half-tab 15 mg  Dose: 15 mg  Freq: DAILY Route: ORAL OR FEED  Start: 11/15/18 0800   Admin. Amount: 1 half-tab (1 × 5 mg half-tab) + 1 tablet (1 × 10 mg tablet)  Last Admin: 11/24/18 0949  Dispense Loc:  ADS MS6E   Mixture Administration Information:   Medication Type Amount   PARoxetine 5 mg TABS Medications 5 mg   PARoxetine 10 MG TABS Medications 10 mg             0918 (15 mg)-Given        0802 (15 mg)-Given        (1001)-Not Given [C]        (1132)-Not Given        0911 (15 mg)-Given        0925 (15 mg)-Given        0949 (15 mg)-Given           phenol (CHLORASEPTIC) 1.4 % spray 1 mL  Dose: 1 spray  Freq: EVERY 1 HOUR PRN Route: MT  PRN Reason: sore  throat  PRN Comment: without fever  Start: 11/15/18 1408   Admin. Amount: 1 mL = 1 spray  Last Admin: 18 0816  Dispense Loc: [facility]  Volume: 177 mL               sodium chloride (PF) 0.9% PF flush 10 mL  Dose: 10 mL  Freq: EVERY 8 HOURS Route: IK  Start: 18 1615   Admin Instructions: And Q1H PRN, to lock peripheral midline IV catheter dormant lumen. Recommended to flush Q8H each lumen even during infusions    Admin. Amount: 10 mL  Last Admin: 18 0307  Dispense Loc: [facility] Floor Stock  Volume: 10 mL   Current Line: Midline Catheter Single Lumen    0556 (10 mL)-Given       1340 (10 mL)-Given       2003 (10 mL)-Given        (0746)-Not Given [C]       (1433)-Not Given       2257 (10 mL)-Given        0659 (10 mL)-Given       (1217)-Not Given       2123 (10 mL)-Given        0714 (10 mL)-Given       (1414)-Not Given       2049 (10 mL)-Given        0529 (10 mL)-Given       (1330)-Not Given       2214 (10 mL)-Given        0430 (10 mL)-Given       1455 (10 mL)-Given       2128 (10 mL)-Given        0307 (10 mL)-Given       [ ] 1600           sodium chloride (PF) 0.9% PF flush 10-20 mL  Dose: 10-20 mL  Freq: EVERY 1 HOUR PRN Route: IK  PRN Reasons: line flush,post meds or blood draw  PRN Comment: to flush each peripheral midline IV catheter lumen  Start: 18 1609   Admin Instructions: 10 mL post IV meds;  20 mL post blood draw    Admin. Amount: 10-20 mL  Last Admin: 18 2348  Dispense Loc: [facility] Floor Stock  Volume: 20 mL   Current Line: Midline Catheter Single Lumen             Discontinued Medications  Medications 18         Dose: 30 mL  Freq: ONCE Route: IV  Start: 11/10/18 1730   End: 18 1331   Admin. Amount: 30 mL  Dispense Loc: [facility] Floor Stock  Administrations Remainin  Volume: 50 mL          1331-Med Discontinued          Dose: 975 mg  Freq: EVERY 8 HOURS SCHEDULED Route: RE  Start: 18   End: 18 7619    Admin Instructions: Maximum acetaminophen dose from all sources = 75 mg/kg/day not to exceed 4 grams/day.    Admin. Amount: 1.5 suppository (1.5 × 650 mg suppository)  Dispense Loc:  ADS MS6E                                           1550-Med Discontinued            Dose: 200 mg  Freq: 2 TIMES DAILY Route: IV  Last Dose: 200 mg (11/21/18 1222)  Start: 11/19/18 2200   End: 11/21/18 1550   Admin Instructions: Pt has order for po/iv, iv to be used when unable to take po    Admin. Amount: 200 mg  Last Admin: 11/21/18 1222  Dispense Loc:  Main Pharmacy  Infused Over: 30-60 Minutes  Volume: 100 mL   Mixture Administration Information:   Medication Type Amount   lacosamide 200 MG/20ML SOLN Additives 200 mg   sodium chloride 0.9 % SOLN Base 100 mL           Current Line: Midline Catheter Single Lumen     2344 (200 mg)-New Bag        0958 (200 mg)-New Bag       (2100)-Not Given [C]       2122 (200 mg)-New Bag [C]        1222 (200 mg)-New Bag       1550-Med Discontinued            Dose: 200 mg  Freq: 2 TIMES DAILY Route: ORAL OR FEED  Start: 11/16/18 2000   End: 11/21/18 1550   Admin Instructions: Please give equivalent IV dose if pt unable to take PO dose    Admin. Amount: 200 mg = 20 mL Conc: 10 mg/mL  Last Admin: 11/19/18 0802  Dispense Loc:  Main Pharmacy  Volume: 20 mL     0918 (200 mg)-Given       1957 (200 mg)-Given        0802 (200 mg)-Given       (2203)-Not Given [C]        (1000)-Not Given [C]       (2035)-Not Given [C]        (1226)-Not Given       1550-Med Discontinued            Dose: 1,500 mg  Freq: EVERY 12 HOURS Route: IV  Start: 11/19/18 2200   End: 11/21/18 1550   Admin Instructions: Pt has order for iv and po, the iv is for when he is unable to take po    Admin. Amount: 1,500 mg = 100 mL Conc: 15 mg/mL  Last Admin: 11/21/18 1033  Dispense Loc:  Main Pharmacy  Infused Over: 15 Minutes  Volume: 100 mL   Current Line: Midline Catheter Single Lumen     2257 (1,500 mg)-Given        1039 (1,500  mg)-Given       2212 (1,500 mg)-Given        1033 (1,500 mg)-Given       1550-Med Discontinued            Dose: 1,500 mg  Freq: 2 TIMES DAILY Route: ORAL OR FEED  Start: 18   End: 18 155   Admin Instructions: Please give equivalent IV dose if unable to take PO dose    Admin. Amount: 1,500 mg = 15 mL Conc: 100 mg/mL  Last Admin: 18 08  Dispense Loc:  Main Pharmacy  Volume: 15 mL     0918 (1,500 mg)-Given       195 (1,500 mg)-Given        0801 (1,500 mg)-Given       ()-Not Given [C]        (1000)-Not Given [C]       ()-Not Given [C]        (1130)-Not Given       1550-Med Discontinued            Dose: 1,500 mg  Freq: 2 TIMES DAILY Route: PO  Start: 18   End: 18 114   Admin. Amount: 3 tablet (3 × 500 mg tablet)  Last Admin: 18 0925  Dispense Loc:  ADS MS6E         (1,500 mg)-Given        0910 (1,500 mg)-Given       2201 (1,500 mg)-Given        0925 (1,500 mg)-Given       1148-Med Discontinued          Dose: 0.5-5 mL  Freq: ONCE PRN Route: OTHER  PRN Comment: mild pain For local anesthetic during PICC insertion.  Start: 18   End: 18 1330   Admin Instructions: Give Sub-Q/Intradermal.  Give in divided doses as needed.    Admin. Amount: 0.5-5 mL  Dispense Loc: [facility] Floor Stock  Administrations Remainin  Volume: 5 mL          1330-Med Discontinued          Dose: 10 mL  Freq: ONCE Route: ID  Start: 18 114   End: 18   Admin. Amount: 10 mL  Dispense Loc: [facility] Floor Stock  Administrations Remainin  Volume: 10 mL          1330-Med Discontinued          Dose: 12.5 mg  Freq: 2 TIMES DAILY Route: ORAL OR FEED  Start: 18   End: 18 155   Admin Instructions: Shake well.  Hold if SBP < 110  Or HR < 55    Admin. Amount: 12.5 mg = 1.25 mL Conc: 10 mg/mL  Last Admin: 18 0802  Dispense Loc:  Main Pharmacy  Volume: 1.25 mL     09 (12.5 mg)-Given        (12.5 mg)-Given        08 (12.5 mg)-Given        ()-Not Given [C]        (121)-Not Given [C]       ()-Not Given [C]        (1131)-Not Given       1550-Med Discontinued            Freq: EVERY 1 HOUR PRN Route: Top  PRN Reason: other  PRN Comment: topical candidiasis  Start: 18   End: 18   Admin Instructions: Apply to affected area.      Dispense Loc:  Main Pharmacy          1331-Med Discontinued          Dose: 20 mg  Freq: EVERY MORNING BEFORE BREAKFAST Route: ORAL OR FEED  Start: 18 0800   End: 18 1550   Admin Instructions: Shake well.    Admin. Amount: 20 mg = 10 mL Conc: 2 mg/mL  Last Admin: 18 08  Dispense Loc:  Main Pharmacy  Volume: 10 mL     0918 (20 mg)-Given        0802 (20 mg)-Given        (100)-Not Given [C]        (1131)-Not Given       1550-Med Discontinued            Dose: 5-50 mL  Freq: ONCE PRN Route: IK  PRN Reason: line flush  PRN Comment: to flush each lumen with line placement  Start: 18   End: 18   Admin Instructions: May repeat x 1    Admin. Amount: 5-50 mL  Dispense Loc: [facility] Floor Stock  Administrations Remainin  Volume: 50 mL          -Med Discontinued     Medications 18

## 2018-11-10 NOTE — LETTER
Key Recommendations:  CTS identifies pt as high risk due to High UGO. Patient was admitted on 11/10/18 with LE weakness and tremors. Patient had a lithotripsy on 10/30/18. Patient has had 3 ER visits within the last 6 months. Patient had 3 hospital admissions during September-October for recurrent UTI, sepsis, fungemia, and ureteral stone.   [facility]/ decline in mobility. Per chart review patient was requiring a wheelchair for mobility prior to admission.   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   Patient had several consults during hospitalization. Recommendations are ***      Jen Wills    AVS/Discharge Summary is the source of truth; this is a helpful guide for improved communication of patient story

## 2018-11-10 NOTE — LETTER
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]  Right leg weakness [R29.898]  Admit Date/Time: 11/10/2018  9:53 AM  Discharge Date: 18  Payor Source: Payor: MEDICARE / Plan: MEDICARE / Product Type: Medicare /     Readmission Assessment Measure (UGO) Risk Score/category: High         Reason for Communication Hand-off Referral: Fragility    Discharge Plan:  Discharge Plan:       Most Recent Value    Disposition Comments Discharge plan to be determined pending MD determination of discharge needs           Concern for non-adherence with plan of care:   NO  Discharge Needs Assessment:  Needs       Most Recent Value    # of Referrals Placed by [facility] [[facility]]          Already enrolled in Tele-monitoring program and name of program:  NA  Follow-up specialty is recommended: No    Follow-up plan:  Future Appointments  Date Time Provider Department Center   2018 1:15 PM SANDRA TECH1 [facility]       Any outstanding tests or procedures:              Key Recommendations:  CTS identifies pt as high risk due to High UGO. Patient has had 3 ER visits within the last 6 months. Patient had 3 hospital admissions during September-October for recurrent UTI, sepsis, fungemia, and ureteral stone.   [facility] change/ decline in mobility. Per chart review patient was requiring a wheelchair for mobility prior to admission.   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   Patient had several consults during hospitalization. Pt and family elected to have hospice care. Pt  discharged to his dtr [facility] home w/ [facility].     Jen Wills/Mame Leslie RN CTS    AVS/Discharge Summary is the source of truth; this is a helpful guide for improved communication of patient story

## 2018-11-10 NOTE — ED NOTES
Bed: ED19  Expected date: 11/10/18  Expected time: 9:47 AM  Means of arrival: Ambulance  Comments:  BV1- 89y, M, localized tremors R leg

## 2018-11-10 NOTE — PROGRESS NOTES
Urology call service called regarding surgical orders placed. Waiting for a call back.     Call back received, orders will be gone through and pre procedure orders will be edited as necessary.

## 2018-11-10 NOTE — SAFE
CT called & asked if patient would be seen before 5 pm. CT tech (timbo) confirms that patient can come to CT now. Patient assisted to stretcher using Marily air mat with x2 assist. Patient transported by Thor to the CT. TREASURE [place]

## 2018-11-10 NOTE — IP AVS SNAPSHOT
"          [facility]: [phone]                                              INTERAGENCY TRANSFER FORM - PHYSICIAN ORDERS   11/10/2018                    Hospital Admission Date: 11/10/2018  JOSELIN VAN   : 10/19/1924  Sex: Male        Attending Provider: Elyssa Angel MD     Allergies:  Ceftriaxone, Bactrim, Zithromax [Azithromycin], Levaquin, Macrodantin [Nitrofuran Derivatives]    Infection:  None   Service:  GENERAL MEDI    Ht:  1.753 m (5' 9\")   Wt:  82.1 kg (181 lb)   Admission Wt:  70.1 kg (154 lb 9.6 oz)    BMI:  26.73 kg/m 2   BSA:  2 m 2            Patient PCP Information     Provider PCP Type    Porfirio Terrell MD, MD General      ED Clinical Impression     Diagnosis Description Comment Added By Time Added    Right leg weakness [R29.898] Right leg weakness [R29.898]  Viridiana Tim MD 11/10/2018 11:02 AM    Partial seizure, 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, 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]  Viridiana Tim MD 11/10/2018 11:03 AM      Hospital Problems as of 2018              Priority Class Noted POA    Seizure (H) Medium  11/10/2018 Yes      Non-Hospital Problems as of 2018              Priority Class Noted    Malignant neoplasm of prostate (H) Medium  2002    Other specified disorder of skin Medium  2004    Personal history of other diseases of circulatory system Medium  Unknown    Osteoporosis Medium  Unknown    Esophageal reflux Medium  Unknown    Essential hypertension with goal blood pressure less than 140/90 Medium  2006    Mild persistent asthma Medium  Unknown    Insomnia Medium  2007    Restless legs syndrome (RLS) Medium  2007    HYPERLIPIDEMIA LDL GOAL <100 Medium  10/31/2010    Chronic atrial fibrillation (H) Medium  2012    SBO (small bowel obstruction) (H) Medium  2012    Cerebral infarction (H) Medium  2014    Sinoatrial node dysfunction (H) Medium  Unknown    Pacemaker Medium  Unknown    RAMÓN (generalized " anxiety disorder) Medium  5/5/2016    MCI (mild cognitive impairment) Medium  10/20/2016    Pneumonia due to infectious organism, unspecified laterality, unspecified part of lung Medium  9/17/2018    Acute encephalopathy Medium  9/24/2018    Hematuria, unspecified type Medium  10/9/2018    Sepsis (H) Medium  10/17/2018      Code Status History     Date Active Date Inactive Code Status Order ID Comments User Context    11/23/2018 11:35 AM  DNR/DNI [id number]  Seven Nuno MD Outpatient    11/10/2018  1:01 PM 11/23/2018 11:35 AM DNR/DNI [id number]  Elyssa Angel MD Inpatient    10/24/2018  9:39 AM 11/10/2018  1:01 PM DNR/DNI [id number]  Shahnaz Shook MD Outpatient    10/17/2018  9:59 PM 10/24/2018  9:39 AM DNR/DNI [id number]  Virginie Ferris MD Inpatient    10/4/2018 11:15 AM 10/17/2018  9:59 PM DNR [id number]  Virginie Ferris MD Outpatient    9/25/2018  9:19 AM 10/4/2018 11:15 AM DNR/DNI [id number]  Mu Barron MD Inpatient    9/24/2018  4:27 PM 9/25/2018  9:19 AM Full Code [id number]  Corona Arguello,  Inpatient    9/17/2018 12:09 AM 9/19/2018  3:58 PM Full Code [id number]  Coorna Arguello, DO Inpatient    12/1/2012  3:07 PM 9/17/2018 12:09 AM Full Code [id number]  Porfirio Terrell MD Outpatient    11/30/2012  2:30 AM 12/1/2012  3:07 PM Full Code [id number]  Bharat Lopez MD Inpatient         Medication Review      START taking        Dose / Directions Comments    artificial saliva Soln solution   Used for:  Dry mouth        Dose:  1-2 spray   Swish and spit 1-2 mLs (1-2 sprays) in mouth every hour as needed for dry mouth   Quantity:  1 Bottle   Refills:  0    May send down bottle here and re label       atropine 1 % ophthalmic solution   Used for:  Hospice care patient        Dose:  2-4 drop   Take 2-4 drops by mouth, place under tongue or place inside cheek every 2 hours as needed for other (terminal respiratory secretions) Not for ophthalmic use.   Quantity:  5 mL  "  Refills:  1        bisacodyl 10 MG Suppository   Commonly known as:  DULCOLAX   Used for:  Hospice care patient        Unwrap and insert 1 suppository rectally twice daily as needed for constipation.   Quantity:  12 suppository   Refills:  1        diazepam 5 mg Supp   Used for:  Partial seizure, motor (H)        Dose:  5-10 mg   Place 1-2 suppositories (5-10 mg) rectally every 30 minutes as needed for seizures   Quantity:  20 suppository   Refills:  0    Already dispensed from compounding pharmacy. Patient using as\"home supply\" here.  I believe compounding pharmacy stil needs this hard copy.       haloperidol 2 MG/ML (HIGH CONC) solution   Commonly known as:  HALDOL   Used for:  Hospice care patient        Dose:  0.5-1 mg   Take 0.25-0.5 mLs (0.5-1 mg) by mouth, place under tongue or insert rectally every 6 hours as needed for agitation (nausea)   Quantity:  30 mL   Refills:  1        HYDROmorphone (HIGH CONC) 10 mg/mL Liqd oral   Commonly known as:  DILAUDID   Used for:  Hospice care patient        Dose:  1-2 mg   Take 0.1-0.2 mLs (1-2 mg) by mouth or place under tongue every 2 hours as needed for moderate to severe pain (and/or??shortness of breath)   Quantity:  30 mL   Refills:  0        lacosamide 200 MG Tabs tablet   Commonly known as:  VIMPAT        Dose:  200 mg   Take 1 tablet (200 mg) by mouth 2 times daily   Quantity:  60 tablet   Refills:  0        levETIRAcetam 1000 MG tablet   Commonly known as:  KEPPRA        Dose:  1000 mg   Take 1 tablet (1,000 mg) by mouth 2 times daily   Quantity:  60 tablet   Refills:  0        MEDICATION INSTRUCTION   Used for:  Hospice care patient        If care facility cannot accept or use ranges, facility is instructed to use lower end of dosing range   Refills:  0          CONTINUE these medications which may have CHANGED, or have new prescriptions. If we are uncertain of the size of tablets/capsules you have at home, strength may be listed as something that might have " changed.        Dose / Directions Comments    * ACETAMINOPHEN PO   This may have changed:  Another medication with the same name was added. Make sure you understand how and when to take each.        Dose:  1000 mg   Take 1,000 mg by mouth 3 times daily as needed for pain   Refills:  0        * acetaminophen 650 MG suppository   Commonly known as:  TYLENOL   This may have changed:  You were already taking a medication with the same name, and this prescription was added. Make sure you understand how and when to take each.   Used for:  Hospice care patient        Dose:  650 mg   Place 1 suppository (650 mg) rectally every 4 hours as needed for fever or mild pain (Do not exceed 4000 mg total acetaminophen per day.) Unwrap prior to insertion.   Quantity:  12 suppository   Refills:  1        metoprolol tartrate 25 MG tablet   Commonly known as:  LOPRESSOR   This may have changed:  how much to take   Used for:  Essential hypertension with goal blood pressure less than 140/90        Dose:  12.5 mg   Take 0.5 tablets (12.5 mg) by mouth 2 times daily   Quantity:  30 tablet   Refills:  0        * Notice:  This list has 2 medication(s) that are the same as other medications prescribed for you. Read the directions carefully, and ask your doctor or other care provider to review them with you.      CONTINUE these medications which have NOT CHANGED        Dose / Directions Comments    albuterol 108 (90 Base) MCG/ACT inhaler   Commonly known as:  PROAIR HFA/PROVENTIL HFA/VENTOLIN HFA   Used for:  Mild persistent asthma        Dose:  2 puff   Inhale 2 puffs into the lungs every 6 hours as needed for shortness of breath / dyspnea.   Quantity:  1 Inhaler   Refills:  1        amLODIPine 5 MG tablet   Commonly known as:  NORVASC   Used for:  Essential hypertension with goal blood pressure less than 140/90        TAKE 1 TABLET(5 MG) BY MOUTH DAILY   Quantity:  90 tablet   Refills:  3        ASPIRIN PO        Dose:  81 mg   Take 81 mg by  mouth daily   Refills:  0        AVEENO ECZEMA THERAPY 1 % Crea   Generic drug:  Colloidal Oatmeal        Externally apply topically once a week On Wednesday after shower And as needed for dry skin   Refills:  0        clobetasol 0.05 % ointment   Commonly known as:  TEMOVATE        Apply topically daily as needed   Refills:  2        lidocaine 4 % Crea cream   Commonly known as:  LMX4        Apply topically daily as needed   Refills:  0        omeprazole 20 MG CR capsule   Commonly known as:  priLOSEC        Dose:  20 mg   Take 20 mg by mouth 2 times daily   Refills:  0        PARoxetine 30 MG tablet   Commonly known as:  PAXIL   Used for:  Current mild episode of major depressive disorder, unspecified whether recurrent (H)        Dose:  15 mg   Take 0.5 tablets (15 mg) by mouth At Bedtime Hold until fluconazole completed   Quantity:  30 tablet   Refills:  0        senna-docusate 8.6-50 MG per tablet   Commonly known as:  SENOKOT-S;PERICOLACE        Dose:  1 tablet   Take 1 tablet by mouth 2 times daily as needed for constipation   Refills:  0          STOP taking     methylPREDNISolone 4 MG tablet   Commonly known as:  MEDROL DOSEPAK           opium-belladonna 30-16.2 MG per suppository   Commonly known as:  B&O SUPPRETTES           PRESERVISION AREDS Caps                     Further instructions from your care team       Please call Dr. Terrell's office at [phone] to schedule a hospital follow-up appointment within 7-10 days of discharge. Please bring your hospital discharge instructions and any new medications with you to your appointment.    Summary of Visit     Reason for your hospital stay       Encephalopathy, Seizure             After Care     Activity       Your activity upon discharge: activity as tolerated       Diet       Follow this diet upon discharge: Orders Placed This Encounter      Snacks/Supplements Adult: Boost Plus; With Meals      Regular Diet Adult (1:1 supervision with intake (family ok  to provide if present))             Your next 10 appointments already scheduled     Dec 04, 2018  1:15 PM CST   Remote PPM Check with SANDRA TECH1   [facility] ([facility])    [id number]  [place] [address] [phone] OPT 2           This appointment is for a remote check of your pacemaker.  This is not an appointment at the office.              Follow-Up Appointment Instructions     Future Labs/Procedures    Follow-up and recommended labs and tests      Comments:    Follow up Hospice today, follow up with PCP as needed.      Follow-Up Appointment Instructions     Follow-up and recommended labs and tests        Follow up Hospice today, follow up with PCP as needed.             Statement of Approval     Ordered          11/23/18 1437  I have reviewed and agree with all the recommendations and orders detailed in this document.  EFFECTIVE NOW     Approved and electronically signed by:  Seven Nuno MD

## 2018-11-10 NOTE — IP AVS SNAPSHOT
[facility]    [address]    [facility] [address]    Phone:  [phone]    Fax:  [phone]                                       After Visit Summary   11/10/2018    Edison Boss    MRN: [id number]           After Visit Summary Signature Page     I have received my discharge instructions, and my questions have been answered. I have discussed any challenges I see with this plan with the nurse or doctor.    ..........................................................................................................................................  Patient/Patient Representative Signature      ..........................................................................................................................................  Patient Representative Print Name and Relationship to Patient    ..................................................               ................................................  Date                                   Time    ..........................................................................................................................................  Reviewed by Signature/Title    ...................................................              ..............................................  Date                                               Time          22EPIC Rev 08/18

## 2018-11-10 NOTE — PROVIDER NOTIFICATION
Dr Calero wants to discuss if CT is best scan for this patient, call back number [phone], ordering provider of CT scans notified.

## 2018-11-10 NOTE — TELEPHONE ENCOUNTER
Patient with new onset spasms/tremor right foot/lower leg  Started this AM, has happened four times and lasts 30 seconds. Good pedal pulses, no changes in leg color, warmth. No change in sensation. Today unable to walk with therapy due to spasms. Recent electrolytes WNL   Lab Results   Component Value Date    POTASSIUM 4.9 11/08/2018    POTASSIUM 3.7 10/26/2018     PLAN  Massage leg for comfort. Apply heat for comfort to extremity x 20 min  BMP stat  Vistaril 25 mg PO tid PRN spams. Staff to update provider if not effective.   Update family of above. Update NP with any further changes/needs.     Electronically signed by JYOTI Salazar GNP     ADDENDUM  At 0930 called back by staff - patient is increasingly jerky, whole body tremor, unable to manage - asking for ED eval. NP gave OK to transfer to ED.     Electronically signed by JYOTI Salazar GNP

## 2018-11-10 NOTE — PLAN OF CARE
Problem: Patient Care Overview  Goal: Plan of Care/Patient Progress Review  Outcome: No Change  Patient alert and disoriented to time and place.  No reports of pain, except when having spasms  Peripheral IV infusing continuous fluids  NPO diet until swallow study  Plan for CT scans this evening  Urinary catheter positional and patient came with, chronic  VSS, has pacemaker  Buttocks has mepilex covering blanchable redness  Contiue with plan of care

## 2018-11-10 NOTE — IP AVS SNAPSHOT
` ` Patient Information     Patient Name Sex     Edison Boss ([id number]) Male 10/19/1924       Room Bed    [address] [place], [place] [address]-      Patient Demographics     Address Phone E-mail Address    [address]  [facility] [address] [phone] (Home)  [phone] (Mobile) *Preferred* [email or web address]      Patient Ethnicity & Race     Ethnic Group Patient Race    American White      Emergency Contact(s)     Name Relation Home Work Mobile    Red Saldana Daughter [phone] none [phone]    Bea Boss Daughter [phone] none [phone]    Concepcion Navarrete Daughter [phone]      Documents on File        Status Date Received Description       Documents for the Patient    Insurance Card  ()      Face Sheet Received () 08     Insurance Card  () 03     Insurance Card  () 04     External Medication Information Consent Accepted () 06/10/09     Face Sheet Received () 09     External Medication Information Consent Accepted () 06/30/10     Patient ID Received () 11     Consent for Services - Hospital/Clinic Received () 11/02/10     Privacy Notice - [place] Received 11/30/10     Other Received 03/10/11 BC COB    Consent for Services - Hospital/Clinic Received () 11     External Medication Information Consent Accepted () 11     Insurance Card Received () 11     CMS IM for Patient Signature Received 18     Consent for Services - Hospital/Clinic Received () 12     Insurance Card  () 05/15/12     Patient ID Received () 12     Consent for Services - Hospital/Clinic Received () 12     Business/Insurance/Care Coordination/Health Form - Patient.1 Received 12     Business/Insurance/Care Coordination/Health Form - Patient.1       External Medication Information Consent Accepted  12     Consent for EHR Access Received 13 Copied from existing Consent for services - C/HOD collected on 2012    [facility] Specified Other       HIM CHRISTIAN Authorization  13 RAC    HIM CHRISTIAN Authorization  13 [facility]    Consent for Services - Hospital/Clinic Received () 13     Insurance Card Received () 16 Medicare Part A and B/ BLUE [place] AND [facility]    Insurance Card  ()      Consent for Services - Hospital/Clinic Received () 14     Consent for Services - Hospital/Clinic       Patient ID Received () 16     Consent for Services - Hospital/Clinic Received () 07/14/15     Consent for Services - Hospital/Clinic Received () 07/28/15     Insurance Card Received () 08/11/15 [facility]     Insurance Card  ()  Medicare A&B    Consent for Services/Privacy Notice - Hospital/Clinic Received () 16     HIM CHRISTIAN Authorization - File Only Received 02/10/16 Email    Consent to Communicate Received 02/10/16 Auth to Discuss PHI    HIM CHRISTIAN Authorization - File Only Received 02/10/16 Records to Red Saldana - if requested.    Physical Therapy Certification Received 02/17/16 2/10/16-3/9/16    Consent for Services/Privacy Notice - Hospital/Clinic Received ()      Consent for Services/Privacy Notice - Hospital/Clinic Received () 16 [facility]    Business/Insurance/Care Coordination/Health Form - Patient    [facility] Pre-Visit Questionnaire    Insurance Card Received 10/04/16 MEDICARE     Insurance Card Received 10/04/16 [facility] CHRISTIAN Authorization  10/18/16 [place] [facility]    Patient ID Received 18 [place] lifetime    Business/Insurance/Care Coordination/Health Form - Patient  17 ATTENDANCE AGREEMENT- [facility]    Business/Insurance/Care Coordination/Health Form - Patient  05/15/17 [facility]  PAROXETINE HCL APPROVAL 10/24/16 - 10/24/17    Consent for Services/Privacy Notice - Hospital/Clinic Received () 17     Care Everywhere Prospective Auth Received 10/21/17     Insurance Card Received 17 BCBS     Consent for Services - Hospital and Clinic Received 18     HIE Auth Received 18     Insurance Card Received 18 MEDICARE    Insurance Card Received 18 BCBS    Advance Directives and Living Will Received 10/09/18 POLST 10/03/18    Advance Directives and Living Will Received 10/10/18 Health Care Directive 06/23/15    Advance Directives and Living Will Not Received  Validation of AD 06/23/15    Advance Directives and Living Will Received 10/27/18 POLST 10/08/18    Privacy Notice - [place] Received (Deleted) 11     Face Sheet  (Deleted)      Consent for Services - Hospital/Clinic  (Deleted)      Insurance Card  (Deleted) 05/15/12     Consent for Services - Hospital/Clinic  (Deleted)      Insurance Card Received (Deleted) 17     Patient Photo   Photo of Patient       Documents for the Encounter    CMS IM for Patient Signature Received 18     CMS IM for Patient Signature Received 18 [facility] IMM reviewed w/ dtr      Admission Information     Attending Provider Admitting Provider Admission Type Admission Date/Time    Elyssa Angel MD ChaparroDeng pedroza MD Emergency 11/10/18  0953    Discharge Date Hospital Service Auth/Cert Status Service Area     General [facility] SERVICES    Unit Room/Bed Admission Status        [facility]  Admission (Confirmed)       Admission     Complaint    Seizure (H)      Hospital Account     Name Acct ID Class Status Primary Coverage    Edison Boss [id number] Inpatient Open MEDICARE - MEDICARE            Guarantor Account (for Hospital Account #[id number])     Name Relation to Pt Service Area Active? Acct Type    Edison Boss  FCS Yes  Personal/Family    Address Phone          [address]  [place] [address] [phone](H)              Coverage Information (for Hospital Account #[id number])     1. MEDICARE/MEDICARE     F/O Payor/Plan Precert #    MEDICARE/MEDICARE     Subscriber Subscriber #    Edison Boss [id number]    Address Phone    [employer]  [address]  [place] [address] [phone]          2. [place]     F/O Payor/Plan Precert #    [place]     Subscriber Subscriber #    Karyn Edison Raymond [id number]    Address Phone    [address] [address]  [place] [address] [phone]

## 2018-11-10 NOTE — ED NOTES
[facility]  ED Nurse Handoff Report    Edison Boss is a 89 year old male   ED Chief complaint: Tremors  . ED Diagnosis:   Final diagnoses:   Right leg weakness   Partial seizure, motor (H)     Allergies:   Allergies   Allergen Reactions     Ceftriaxone Itching     Bactrim Hives     Zithromax [Azithromycin]      Levaquin Rash     Oral only, can tolerate IV     Macrodantin [Nitrofuran Derivatives] Rash     Took recently with no problems       Code Status: DNR / DNI  Activity level - Baseline/Home:  Stand with Assist of 2. Activity Level - Current:   Stand with Assist of 2. Lift room needed: No. Bariatric: No   Needed: No   Isolation: No. Infection: Not Applicable.     Vital Signs:   Vitals:    11/10/18 1015 11/10/18 1030 11/10/18 1045 11/10/18 1100   BP: 150/75 (!) 146/119 94/88 148/76   Pulse:       Resp:       Temp:       TempSrc:       SpO2: 97% 91% 98%        Cardiac Rhythm:  ,      Pain level: 0-10 Pain Scale: 0  Patient confused: No. Patient Falls Risk: Yes.   Elimination Status: Urethral catheter (tolliver) in place; refer to orders to discontinue as per protocol    Patient Report - Initial Complaint: tremors. Focused Assessment: intermittent uncontrolled jerking of right arm and right leg   Tests Performed: labs. Abnormal Results:   Labs Ordered and Resulted from Time of ED Arrival Up to the Time of Departure from the ED   BASIC METABOLIC PANEL - Abnormal; Notable for the following:        Result Value    Glucose 115 (*)     All other components within normal limits   CBC WITH PLATELETS DIFFERENTIAL - Abnormal; Notable for the following:     Hemoglobin 13.1 (*)     RDW 18.5 (*)     Absolute Neutrophil 8.9 (*)     All other components within normal limits   MAGNESIUM     .   Treatments provided: none  Family Comments: supportive at bedside  OBS brochure/video discussed/provided to patient:  No  ED Medications: Medications - No data to display  Drips infusing:  No  For the  majority of the shift, the patient's behavior Green. Interventions performed were none.     Severe Sepsis OR Septic Shock Diagnosis Present: No      ED Nurse Name/Phone Number: Jessica LISA Scanlon,   11:18 AM    RECEIVING UNIT ED HANDOFF REVIEW    Above ED Nurse Handoff Report was reviewed: Yes  Reviewed by: Natacha Phelan on November 10, 2018 at 12:32 PM

## 2018-11-11 NOTE — PROGRESS NOTES
Spoke with Nai at [facility] who reported pt is not bed hold. Family  patient belonging already.

## 2018-11-11 NOTE — CONSULTS
"Neurology Consult Note  [facility]       [2018]                                                                                       Admission Date: 11/10/2018  Hospital Day: 2  Code Status: DNR/DNI    Patient: Edison Boss      : 10/19/1924  MRN:  [id number]     CC:      Chief Complaint   Patient presents with     Tremors       Consult Request:  Referring Provider:  Deng Mayfield MD    Indication for Consultation: Right leg more than arm twitching.    Primary Care Provider:  Porfirio Terrell MD        HPI:  Edison Boss is a 89 year old yo [facility] male admitted yesterday for altered mental status, severe UTI, and jerking of the right leg more than arm. The jerking movements apparently were initially observed in his nursing home at least 4 days prior to admission. His daughter reports that he had complained of right leg heaviness about two months ago, cause uncertain. To treat his frequent twitching he was given IV Valium last night, with apparent transient suppression of the twitching. He reports pain associated with the twitching. His daughter reports that he has had episodes of \"blank face\" and \"staring\" in the weeks prior to admission. He has no history of epilepsy, and no family history of epilepsy. He has no history of stroke or metastatic cancer. Head CT with and without contrast shows no evidence of mass or enhancement that could underlie his twitching. He has been diagnosed with mild cognitive impairment, and his head CT shows clear evidence of bilateral frontotemporal atrophy, with bilaterally enlarged temporal ventricles.    A complete review of symptoms was performed including vascular, infectious, cardiovascular, pulmonary, gastrointestinal, endocrinological, hematologic, dermatologic, musculoskeletal, and neurological. All were normal except as above.    CURRENT PROBLEM LIST:  Patient Active Problem List    Diagnosis Date Noted "     Seizure (H) 11/10/2018     Priority: Medium     Sepsis (H) 10/17/2018     Priority: Medium     Hematuria, unspecified type 10/09/2018     Priority: Medium     Acute encephalopathy 09/24/2018     Priority: Medium     Pneumonia due to infectious organism, unspecified laterality, unspecified part of lung 09/17/2018     Priority: Medium     MCI (mild cognitive impairment) 10/20/2016     Priority: Medium     Patient with history of cerebral infarct: now with mild cognitive changes affecting executive function compounded by depression       RAMÓN (generalized anxiety disorder) 05/05/2016     Priority: Medium     Sinoatrial node dysfunction (H)      Priority: Medium     Pacemaker      Priority: Medium     Cerebral infarction (H) 02/14/2014     Priority: Medium     Diagnosis updated by automated process. Provider to review and confirm.       SBO (small bowel obstruction) (H) 11/30/2012     Priority: Medium     Chronic atrial fibrillation (H) 07/02/2012     Priority: Medium     HYPERLIPIDEMIA LDL GOAL <100 10/31/2010     Priority: Medium     Restless legs syndrome (RLS) 11/14/2007     Priority: Medium     Insomnia 04/04/2007     Priority: Medium     Problem list name updated by automated process. Provider to review       Mild persistent asthma      Priority: Medium     Takes Singulair, Spiriva        Essential hypertension with goal blood pressure less than 140/90 06/06/2006     Priority: Medium     Problem list name updated by automated process. 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 Provider to review       Other specified disorder of skin 09/01/2004     Priority: Medium     Malignant neoplasm of prostate (H) 08/14/2002     Priority: Medium       PAST  MEDICAL HISTORY:  ALLERGIES:   Allergies   Allergen Reactions     Ceftriaxone Itching     Bactrim Hives     Zithromax [Azithromycin]      Levaquin Rash     Oral only, can tolerate IV     Macrodantin [Nitrofuran Derivatives] Rash     Took recently with no problems     Tobacco:    History   Smoking Status     Former Smoker     Packs/day: 1.00     Years: 40.00   Smokeless Tobacco     Never Used     Comment: QUIT 1978     Alcohol:    Alcohol use No     MEDICATIONS:       CURRENTLY SCHEDULED MEDICATIONS     sodium chloride 0.9%  30 mL Intravenous Once     aspirin  81 mg Oral Daily     ciprofloxacin  400 mg Intravenous Q12H     metoprolol tartrate  25 mg Oral BID     omeprazole  20 mg Oral QAM AC     PARoxetine  15 mg Oral Daily          HOME MEDICATIONS  Prescriptions Prior to Admission   Medication Sig Dispense Refill Last Dose     ACETAMINOPHEN PO Take 1,000 mg by mouth 3 times daily as needed for pain   11/10/2018 at 0630     amLODIPine (NORVASC) 5 MG tablet TAKE 1 TABLET(5 MG) BY MOUTH DAILY 90 tablet 3 11/10/2018 at 08     ASPIRIN PO Take 81 mg by mouth daily   11/10/2018 at 08     Colloidal Oatmeal (AVEENO ECZEMA THERAPY) 1 % CREA Externally apply topically once a week On Wednesday after shower And as needed for dry skin   Past Week at Unknown time     lidocaine (LMX4) 4 % CREA 4% topical cream Apply topically daily as needed   Past Week at Unknown time     methylPREDNISolone (MEDROL DOSEPAK) 4 MG tablet Follow package instructions 21 tablet 0 Started 11/8/18 at T 4mg @08     METOPROLOL TARTRATE PO Take 50 mg by mouth 2 times daily   11/10/2018 at am     Multiple Vitamins-Minerals (PRESERVISION AREDS) CAPS Take 1 capsule by mouth 2 times daily 180 capsule 3 11/10/2018 at am     omeprazole (PRILOSEC) 20 MG CR capsule Take 20 mg by mouth 2 times daily    11/10/2018 at am     PARoxetine (PAXIL) 30 MG tablet Take 0.5 tablets (15 mg) by mouth At Bedtime Hold until fluconazole completed 30 tablet  11/9/2018 at 2000      albuterol (PROVENTIL HFA: VENTOLIN HFA) 108 (90 BASE) MCG/ACT inhaler Inhale 2 puffs into the lungs every 6 hours as needed for shortness of breath / dyspnea. 1 Inhaler 1 Unknown at Unknown time     clobetasol (TEMOVATE) 0.05 % ointment Apply topically daily as needed   2 Unknown at Unknown time     opium-belladonna (B&O SUPPRETTES) 30-16.2 MG per suppository Place 30 mg rectally 2 times daily as needed for moderate to severe pain   Unknown at Unknown time     senna-docusate (SENOKOT-S;PERICOLACE) 8.6-50 MG per tablet Take 1 tablet by mouth 2 times daily as needed for constipation   Unknown at Unknown time     MEDICAL HISTORY  Past Medical History:   Diagnosis Date     Chronic indwelling Christianson catheter      Chronic infection     UTI     Chronic pain     lower pain, perineum     Esophageal reflux      Fungemia 09/2018    candida-from UTI and infected left ureteral stone     Hyperlipidaemia      Hypertension      Malignant neoplasm of prostate (H) 8/14/2002    Brachytherapy, then external radiation. chronic indwelling catheter     Mild persistent asthma     with URI     Paroxysmal A-fib (H)     not on AC     Prostate cancer (H)     treated with brachytherapy and xrt     Sinus node dysfunction (H)     sp DDD PM     Sleep apnea     ?   snores     Unspecified cerebral artery occlusion with cerebral infarction      Ureterolithiasis 09/2018    with hydronephrosis, sepsis 2/2 candidal UTI     SURGICAL HISTORY  Past Surgical History:   Procedure Laterality Date     C NONSPECIFIC PROCEDURE  1980's    Kidney stone surgery     C NONSPECIFIC PROCEDURE  1985, 5/2005    TURP x 2     C NONSPECIFIC PROCEDURE  1/2002    Brachytherapy     C NONSPECIFIC PROCEDURE  ~1999    Left rotator cuff repair     C NONSPECIFIC PROCEDURE      Bilateral cataract surgery     C NONSPECIFIC PROCEDURE      EGD/dilation twice     CYSTOSCOPY       CYSTOSCOPY, INJECT COLLAGEN, COMBINED  6/6/2012    Procedure:COMBINED CYSTOSCOPY, INJECT BULKING AGENT; VIDEO  CYSTOSCOPY WITH BOTOX INJECTIONS  ; Surgeon:BRIAN ADAN; Location:[facility]     CYSTOSCOPY, INJECT COLLAGEN, COMBINED  3/22/2013    Procedure: COMBINED CYSTOSCOPY, INJECT BULKING AGENT;  VIDEO CYSTOSCOPY, BOTOX INJECTION;  Surgeon: Brian Adan MD;  Location: [facility]     CYSTOSCOPY, INJECT COLLAGEN, COMBINED  2014    Procedure: COMBINED CYSTOSCOPY, INJECT BULKING AGENT;  Surgeon: Brian Adan MD;  Location: [facility]     CYSTOSCOPY, INJECT COLLAGEN, COMBINED N/A 2015    Procedure: COMBINED CYSTOSCOPY, INJECT BULKING AGENT;  Surgeon: Brian Adan MD;  Location: [facility]     CYSTOSCOPY, INJECT COLLAGEN, COMBINED N/A 2016    Procedure: COMBINED CYSTOSCOPY, INJECT BULKING AGENT;  Surgeon: Brian Adan MD;  Location: [facility]     CYSTOSCOPY, RETROGRADES, INSERT STENT URETER(S), COMBINED Left 2018    Procedure: COMBINED CYSTOSCOPY, RETROGRADES, INSERT STENT URETER(S);  1. Cystourethroscopy  2. Left retrograde pyelography with interpretation of intraoperative fluoroscopic imaging  3. Left ureteral stent placement;  Surgeon: Froylan Richards MD;  Location: [facility]     EXTRACORPOREAL SHOCK WAVE LITHOTRIPSY, CYSTOSCOPY, INSERT STENT URETER(S), COMBINED Left 10/30/2018    Procedure: Left extracorporeal shock wave lithotripsy, video cystoscopy with double J stent exchange;  Surgeon: Brian Adan MD;  Location: [facility] OR     HC REMOVE TONSILS/ADENOIDS,<13 Y/O  ~1928    T & A <12y.o.     IMPLANT PACEMAKER       PENIS SURGERY       PROSTATE SURGERY       FAMILY HISTORY    Family History   Problem Relation Age of Onset     Family History Negative Father       age 91     HEART DISEASE Mother      pacemaker     Family History Negative Mother       in her sleep 103     Cancer Brother      oldest brother - lung cancer,  age 74     Cancer Brother      3rd brother - lymphoma,  age 76     Cancer Brother      2nd brother (Carrington)- prostate cancer, born 1920.      Cerebrovascular  "Disease Brother      Brother (Carrington), born in 1920     SOCIAL HISTORY  Social History     Social History     Marital status:      Spouse name: Alysa     Number of children: 8     Years of education: N/A     Occupational History      Retired     Social History Main Topics     Smoking status: Former Smoker     Packs/day: 1.00     Years: 40.00     Smokeless tobacco: Never Used      Comment: QUIT 1978     Alcohol use No     Drug use: No     Sexual activity: No     Other Topics Concern     Parent/Sibling W/ Cabg, Mi Or Angioplasty Before 65f 55m? No     Social History Narrative    .Living situation (location, living with others?):Lives with wife in [facility]    Activities of daily living (e.g., dressing, eating, walking):Independent        Instrumental activities of daily living (e.g., finance management, housekeeping, meal planning/ prep): Wife and kids help with preparing meals, shopping, driving, climbing stairs, complex decisions                 Meaningful Activities (e.g., hobbies, work): loves music, uses the computer, likes [email or web address]         Support:Wife and daughter        Community Resources Used/ Interested in: Referred to Herb         GENERAL EXAMINATION:    He is an elderly, well-developed, well-nourished man, 5' 9\" and 154 lbs 9.6 oz. Blood pressure is 165/69. Peripheral pulses are intact at 60 and regular. Conjunctivae are normal. His oropharynx is without lesions. Skin examination is unremarkable. He has no pretibial edema, rashes, or unusual lesions. Nail examination shows no clubbing, cyanosis, or deformities. Respiratory examination is unremarkable, with rate of 16, unlabored. He is afebrile.         Height: 175.3 cm (5' 9\")     Temp: 95.7  F (35.4  C)   Weight: 70.1 kg (154 lb 9.6 oz)    Temp src: Oral         BP: 165/69   Heart Rate: 58     Estimated body mass index is 22.83 kg/(m^2) as calculated from the following:    Height as of this encounter: 1.753 m (5' 9\").    Weight as of " this encounter: 70.1 kg (154 lb 9.6 oz).    Resp: 16   SpO2: 97 %   O2 Device: None (Room air)     Blood Pressure:   BP Readings from Last 3 Encounters:   18 165/69   18 163/77   18 150/73       T24 : Temp (24hrs), Av.6  F (35.9  C), Min:95.7  F (35.4  C), Max:97.8  F (36.6  C)     Skin examination is unremarkable, with no edema or unusual lesions.      NEUROLOGICAL EXAMINATION  Mental Status:  He is awake, but drowsy. He is slow in answering questions. He is largely able to follow simple commands, though during his myoclonic twitching he is slow to respond, cut can clearly do so.His speech is spontaneous and fluent. He has no aphasia or dysarthria. Memory appears impaired.    During the examination, he developed several episodes of acute onset right leg stiffness lasting 15-30 seconds, followed by rhythmic twitching of his right leg and right arm, 2-3 per second, but also involving his left leg, with the same twitching, with less amplitude, but the same frequency as the right. HE was able to follow some commands during these spells, which lasted no more than 2-3 minutes, with complete relaxation of his limbs at the termination of the movements. His ability to communicate improved after the spells.     Station and Gait: He is confined to bed.     Skull and Spine: His head is atraumatic. His spine is non-tender to palpation.     Cranial Nerves: Visual fields are full. Extraocular muscles are intact. Pursuits are smooth and saccades are of normal speed. He has no nystagmus in the horizontal or vertical planes. His face is symmetric at rest and with movement. He has no ptosis. His tongue is midline. Hearing is intact.    Motor: Muscle bulk is reduced generally, without clear focal weakness. Muscle tone is increased in both legs, more notable on the right. Plantars are clearly extensor on the right, flexor on the left. Ankle dorsiflexion appears normal and symmetric.  strength is also normal  and symmetric. . Muscles are non-tender to palpation.     Sensory: Sensation appears symmetric in his arms and legs, though he has previously spoken of numbness in his distal right leg.     Coordination: He has no resting or action tremor.       .  LABORATORY RESULTS      SMA-7:  Recent Labs   Lab Test  11/11/18   0706  11/10/18   1043  11/08/18   1557  10/26/18   0630   NA  142  140  138  143   POTASSIUM  4.2  4.6  4.9  3.7   CHLORIDE  111*  105  104  108   CO2  25  29  30  30   GLC  98  115*  96  82   BUN  24  24  24  16   CR  1.03  1.03  1.14  0.98   ARYAN  8.7  9.7  9.4  9.4     Recent Labs   Lab Test  11/10/18   1043  09/26/18   0707   MAG  2.3  2.6*     CBC:      Recent Labs  Lab 11/11/18  0706 11/10/18  1043 11/08/18  1557   WBC 9.9 10.6 11.0   RBC 4.02* 4.48 4.00*   HGB 11.6* 13.1* 11.6*   HCT 37.6* 40.8 36.8*   MCV 94 91 92    406 298   U/A:    Recent Labs   Lab Test  11/10/18   2050   10/19/16   1129   COLOR  Yellow   < >  Yellow   APPEARANCE  Cloudy   < >  Clear   URINEGLC  Negative   < >  Negative   URINEBILI  Negative   < >  Small  This is an unconfirmed screening test result. A positive result may be false.  *   URINEKETONE  Negative   < >  Trace*   SG  1.015   < >  >1.030   UBLD  Negative   < >  Large*   URINEPH  5.0   < >  5.5   PROTEIN  100*   < >  >=300*   UROBILINOGEN   --    --   1.0   NITRITE  Negative   < >  Positive*   LEUKEST  Large*   < >  Large*   RBCU  86*   < >   --    WBCU  >182*   < >   --     < > = values in this interval not displayed.     No results found for: MICROALBUMIN, CREATCONC    Cholesterol Panel: Lab Results   Component Value Date    CHOL 96 09/25/2018    HDL 19 (L) 09/25/2018    LDL 54 09/25/2018    TRIG 113 09/25/2018    CHOLHDLRATIO 3.6 11/23/2008    VLDL 21 11/23/2008     Lipase:   Lab Results   Component Value Date    LIPASE 160 10/17/2018     HgA1c:   Hemoglobin A1C   Date Value Ref Range Status   11/30/2012 5.5 4.3 - 6.0 % Final     TSH: No results for input(s):  "TSH in the last 168 hours.  CK: No results found for: CKTOTAL  No results found for: CKTOTAL, CKMB, TROPN  Ammonia:  No lab results found.  Vitamin B12:   Recent Labs   Lab Test  10/20/16   1516  09/12/12   1326   B12  367  339     Homocysteine: No results found for: HOMOCYSTEINE  Vitamin D: No results for input(s): VITDT in the last 168 hours.  RPR: @LABALLVALUES(RPR:1)@  ESR:   Recent Labs   Lab Test  10/16/18   1050  05/08/15   1510   SED  47*  7     CRP:   Lab Results   Component Value Date    .0 09/24/2018    CRP <2.9 05/08/2015     IVAN: No lab results found.    ANCA: No lab results found.   No results for input(s): NTBNPI, NTBNP in the last 168 hours.  No lab results found in last 7 days.    Invalid input(s): TROP, TROPONINIES  Lab Results   Component Value Date    TROPI 0.063 (H) 09/24/2018    TROPI 0.061 (H) 09/24/2018    TROPI 0.016 09/16/2018        COAGULATION PANEL  --Lupus anticoagulant:  No results found for: LUPINT  --MTHFR:     Lab Results   Component Value Date    PATH  04/25/2005     CASE: [id number]    Patient Name: JOSELIN VAN  MR#: [id number]  Specimen #: [id number]  Collected: 4/25/2005  Received: 4/25/2005  Reported: 4/27/2005 16:24  Ordering Phy(s): JENNIFER WILL              SPECIMEN(S):  Prostate transurethral resection (TUR)    FINAL DIAGNOSIS:  Prostate, transurethral resection--  1.          Atrophic prostate glands with radiation effect.  2.          No evidence of carcinoma.  3.          Calcified material (stones) present.    Electronically signed out by:    Ike Saenz M.D.      CLINICAL HISTORY:  Prostate Tissue.      GROSS:  The specimen, labeled \"prostate tissue\", consists of 30 small fragments  of tan-pink prostate chips which weigh 3 gm.  AS/2C SML/kd    MICROSCOPIC:  Multiple prostate chips are examined.  There is abundant atrophic  appearing prostatic epithelium arranged in lobules.  The glands are  moderately enlarged and have a slit-like " configuration.  Immunohistochemical stains are positive for high molecular weigh  cytokeratin and there is nuclear staining for P63.  The cells are  negative for P504s.  These immunohistochemical findings confirm the  benign nature of the glands.  The nuclear morphology is abnormal.  Numerous angulated large hypochromatic cells are noted.  The findings  are consistent with radiation.  There is some calcified material  present.    SML/kd  DT/4-27-05     --Antithrombin III:  No results found for: ANTCH  --FIbrinogen -  --Factor 7 -  --Factor 8 -  --Protein S free:   No results found for: PSF  --Protein C:   No results found for: PCCH  --Activated Protein C  No results found for: ARPCR  No results found for: ARPCN  No results found for: APCINT  --Cardiolipin antibodies   No results found for: CARG  No results found for: WHITNEY  --Beta-2 glycoprotein antibodies   No results found for: B2IGG  No results found for: B2IGM  --CRP Cardiac Risk:    Lab Results   Component Value Date    .0 (H) 09/24/2018     --Homocysteine:  No results found for: HOMOCYSTEINE  --Methylmalonic acid:  No components found for: METHYLAMOLINC ACID  --Platelet Function P2Y12: No results found for: PRU  --Platelet Function ASA: No results found for: ASA  INR:  No lab results found in last 7 days.    ABG: No lab results found in last 7 days.  Blood Cultures:   Recent Labs  Lab 11/10/18  2050   CULT PENDING       No results found for: CTYP   No results found for: CSFPROTEIN   No components found for: CGLU1     Depakote level:   No lab results found.  Dilantin Level:    No lab results found.  Phenobarbital Level:   No lab results found.  Lamotrigine  Level:    No lab results found.  Lamotrigine Free Level:   No lab results found.  Tegretol level:    No lab results found.  Keppra  Level:    No lab results found.    Ethanol Level: @LABALLVALUES(ETOH:1)@    IMAGING RESULTS   Xr Chest 2 Views    Result Date: 10/17/2018  XR CHEST 2 VW   10/17/2018 5:38  PM HISTORY: Altered mental status. COMPARISON: Film dated 9/29/2018 FINDINGS: Left cardiac device is in place. Cardiac silhouette is at the upper limits of normal. There is been an improvement in the left pleural effusion when compared to 9/29/2018. No significant pleural fluid is seen on today's film. Mild right basilar opacity consistent with a small area of infiltrate or atelectasis.  There is also a small area of infiltrate or fibrosis in the right midlung zone. The pulmonary vasculature is normal.  The bones and soft tissues are unremarkable.     IMPRESSION: Improved infiltrate and pleural effusions.    ARUN RODRIGUEZ MD    Ct Abdomen Pelvis W Contrast    Result Date: 10/17/2018  CT ABDOMEN AND PELVIS WITH CONTRAST 10/17/2018 5:34 PM HISTORY: History of ureteral stent, flank pain. CONTRAST DOSE: 78mL [id number] Radiation dose for this scan was reduced using automated exposure control, adjustment of the mA and/or kV according to patient size, or iterative reconstruction technique. FINDINGS: Left ureteral stent is in place, new since 9/27/2018. There is no hydronephrosis. Proximal ureteral stone adjacent to the stent is noted, probably unchanged in position from 9/27/2018. Bilateral renal cysts and left renal stones are again noted. Bowel-containing right inguinal hernia is again noted without evidence of incarceration or bowel obstruction. No free peritoneal fluid or air. The remainder of the CT also appears unchanged. Christianson catheter is in place. Left adrenal 1.9 cm nodule is noted.     IMPRESSION: Left ureteral stent in place without left hydronephrosis. Proximal left ureteral stone is probably unchanged in position from 9/27/2018. This scan is otherwise unchanged in appearance compared to 9/27/2018 and 8/28/2018. VLADIMIR GUILLAUME MD    Ct Cervical Spine W Contrast    Result Date: 11/10/2018  CT CERVICAL SPINE WITH CONTRAST 11/10/2018 5:18 PM HISTORY:  Right-sided movement disorder.   TECHNIQUE:  Axial images of the  cervical spine were obtained without intravenous contrast. Coronal and sagittal reformations were performed.  Radiation dose for this scan was reduced using automated exposure control, adjustment of the mA and/or kV according to patient size, or iterative reconstruction technique. COMPARISON: Scan dated 9/17/2018. FINDINGS: There are 7  cervical type vertebrae used for the purpose of this dictation. There is reversal of the cervical lordosis.   There is ankylosis or fusion of the C5 and C6 vertebral bodies. C1-C2:  Calcified pannus is seen posterior to the odontoid. There is loss of joint space between the odontoid and anterior arch of C1. These findings can be due to pseudogout. C2-C3:  Mild annular disc bulge. Severe degenerative change in the left facet joint. C3-C4:  Severe degenerative changes in the facet joints. Central canal lower limits of normal. Moderate-severe bilateral foraminal stenosis. C4-C5:  Mild bulge. Severe degenerative change in the facet joints. Central canal mildly narrowed. Moderate right foraminal stenosis and mild left foraminal stenosis. C5-C6:  Ankylosis of the vertebral bodies. Central canal and neural foramen are patent. C6-C7:  Severe degenerative change with complete loss of disc space. Central canal and neural foramen are patent. C7-T1:  Central canal and neural foramen are patent.     IMPRESSION: 1. No acute pathology. No fractures are identified. 2. Multilevel degenerative change. When compared to 9/17/2018, there has been no significant change. ARUN RODRIGUEZ MD    Ct Head W Contrast    Result Date: 11/10/2018  CT HEAD W CONTRAST   11/10/2018 5:18 PM HISTORY: R leg weakness; TECHNIQUE:  Axial images of the head without 70 mLIV contrast material. Radiation dose for this scan was reduced using automated exposure control, adjustment of the mA and/or kV according to patient size, or iterative reconstruction technique. COMPARISON: CT dated 11/8/2018 FINDINGS: The ventricles are normal in  size, shape and configuration. The brain parenchyma and subarachnoid spaces are normal. There is no evidence of intracranial hemorrhage, mass, acute infarct or anomaly. The visualized portions of the sinuses and mastoids appear normal. There is no evidence of trauma.     IMPRESSION:  No acute pathology, no bleed, mass, or infarcts are seen. No enhancing lesions are identified.  ARUN RODRIGUEZ MD    Ct Head W/o Contrast    Result Date: 11/8/2018  CT OF THE HEAD WITHOUT CONTRAST 11/8/2018 4:23 PM COMPARISON: Head CT 9/24/2018 HISTORY: Right leg weakness with intermittent rhythmic tremor, has pacemaker. TECHNIQUE: 5 mm thick axial CT images of the head were acquired without IV contrast material. FINDINGS: There is moderate diffuse cerebral volume loss. There are subtle patchy areas of decreased density in the cerebral white matter bilaterally that are consistent with sequela of chronic small vessel ischemic disease. The ventricles and basal cisterns are within normal limits in configuration given the degree of cerebral volume loss. There is no midline shift. There are no extra-axial fluid collections. No intracranial hemorrhage, mass or recent infarct. The visualized paranasal sinuses are well-aerated. There is no mastoiditis. There are no fractures of the visualized bones.     IMPRESSION: Diffuse cerebral volume loss and cerebral white matter changes consistent with chronic small vessel ischemic disease. No evidence for acute intracranial pathology. Radiation dose for this scan was reduced using automated exposure control, adjustment of the mA and/or kV according to patient size, or iterative reconstruction technique. ISSAC SINGLETON MD    Lumbar Spine Ct W/o Contrast    Result Date: 11/8/2018  CT LUMBAR SPINE WITHOUT CONTRAST  11/8/2018 4:24 PM HISTORY: Right leg weakness with intermittent rhythmic tremor, has pacemaker.  TECHNIQUE: Axial images of the lumbar spine were obtained without intravenous contrast. Multiplanar  reformations were performed. Radiation dose for this scan was reduced using automated exposure control, adjustment of the mA and/or kV according to patient size, or iterative reconstruction technique. COMPARISON: None. FINDINGS: Reconstructed images demonstrate old superior endplate compression fracture of L2. No evidence of acute compression. Diffuse osteopenia. Mild right convex lumbar scoliosis. Axial scans were performed from T12 to sacrum. Vertebra are numbered assuming 5 lumbar-type vertebra. T12-L1:  No disc herniation or stenosis. Facet joints are unremarkable. L1-L2: No disc herniation or stenosis. Anterior osteophyte formation at this level. L2-L3:  Degenerative disc disease with vacuum disc phenomenon. Mild disc bulge. Mild facet and ligamentum flavum hypertrophy. Mild central stenosis. Mild to moderate bilateral foraminal stenosis. L3-L4:  Grade 1 degenerative spondylolisthesis with disc bulge and osteophytic ridging. Severe left and moderate to severe right foraminal stenosis. Moderate central stenosis. L4-L5:  Degenerative disc disease with vacuum disc phenomenon. Mild disc bulge and facet and ligamentum flavum hypertrophy. Mild to moderate central stenosis. Severe right and moderate left foraminal stenosis. L5-S1:  Advanced degenerative disc disease with vacuum disc phenomenon. Moderate right and mild left facet degenerative changes. Mild disc bulge. Mild central stenosis. Severe right foraminal stenosis. Mild to moderate left foraminal stenosis.  There is a large left renal cortical cyst. Atherosclerotic changes of the aorta without evidence of aneurysm.     IMPRESSION:  1. Chronic/old superior endplate compression fracture of L2. No evidence of acute fracture. 2. At L2-L3 there is mild central stenosis. Mild to moderate bilateral foraminal stenosis. 3. At L3-L4 there is grade 1 degenerative spondylolisthesis. Moderate central stenosis. Severe left and moderate to severe right foraminal stenosis. 4.  At L4-L5 there is mild to moderate central stenosis. Severe right and moderate left foraminal stenosis. 5. At L5-S1 mild central stenosis. Severe right and mild to moderate left foraminal stenosis.     Xr Kub    Result Date: 10/30/2018  XR KUB 10/30/2018 1:26 PM HISTORY: Kidney stone. COMPARISON: Abdomen and pelvis CT, 10/17/2018 FINDINGS: Left nephroureteral stent appears appropriately positioned. The stone which was previously seen in the proximal left ureter appears to have migrated back into the lower pole of the left kidney. There is no specific evidence of a currently obstructive calculus.     IMPRESSION: Previously seen proximal left ureteral stone appears to have migrated back into the lower pole of the left kidney. OLIVA FERREIRA MD      Recent Results (from the past 24 hour(s))   CT Cervical Spine w Contrast    Narrative    CT CERVICAL SPINE WITH CONTRAST 11/10/2018 5:18 PM     HISTORY:  Right-sided movement disorder.       TECHNIQUE:  Axial images of the cervical spine were obtained without  intravenous contrast. Coronal and sagittal reformations were  performed.  Radiation dose for this scan was reduced using automated  exposure control, adjustment of the mA and/or kV according to patient  size, or iterative reconstruction technique.    COMPARISON: Scan dated 9/17/2018.    FINDINGS: There are 7  cervical type vertebrae used for the purpose of  this dictation. There is reversal of the cervical lordosis.   There is  ankylosis or fusion of the C5 and C6 vertebral bodies.    C1-C2:  Calcified pannus is seen posterior to the odontoid. There is  loss of joint space between the odontoid and anterior arch of C1.  These findings can be due to pseudogout.     C2-C3:  Mild annular disc bulge. Severe degenerative change in the  left facet joint.     C3-C4:  Severe degenerative changes in the facet joints. Central canal  lower limits of normal. Moderate-severe bilateral foraminal stenosis.     C4-C5:  Mild bulge.  Severe degenerative change in the facet joints.  Central canal mildly narrowed. Moderate right foraminal stenosis and  mild left foraminal stenosis.    C5-C6:  Ankylosis of the vertebral bodies. Central canal and neural  foramen are patent.     C6-C7:  Severe degenerative change with complete loss of disc space.  Central canal and neural foramen are patent.    C7-T1:  Central canal and neural foramen are patent.      Impression    IMPRESSION:   1. No acute pathology. No fractures are identified.  2. Multilevel degenerative change. When compared to 9/17/2018, there  has been no significant change.     ARUN RODRIGUEZ MD   CT Head w Contrast    Narrative    CT HEAD W CONTRAST   11/10/2018 5:18 PM     HISTORY: R leg weakness;     TECHNIQUE:  Axial images of the head without 70 mLIV contrast  material. Radiation dose for this scan was reduced using automated  exposure control, adjustment of the mA and/or kV according to patient  size, or iterative reconstruction technique.    COMPARISON: CT dated 11/8/2018    FINDINGS: The ventricles are normal in size, shape and configuration.  The brain parenchyma and subarachnoid spaces are normal. There is no  evidence of intracranial hemorrhage, mass, acute infarct or anomaly.  The visualized portions of the sinuses and mastoids appear normal.  There is no evidence of trauma.      Impression    IMPRESSION:  No acute pathology, no bleed, mass, or infarcts are seen.  No enhancing lesions are identified.         ARUN RODRIGUEZ MD     _____________________________________________________________________________    EKG:        ASSESSMENT     1. Myoclonic jerking, intermittent, right arm and leg predominant but occurring on the left side as well, with apprently mild alteration of consciousness during the spell. These spells appear to represent seizure activity, with focal onset on the right and secondary generalization, brief. They appear to be myoclonic in nature, cause uncertain. Head CT with  and without contrast shows no evidence of focal pathology to account for this.  2. Generalized cerebral atrophy.  3. Diagnosis of mild cognitive impairment.    RECOMMENDATIONS   1. I have recommended stat infusion of Keppra 1000 mg to see if this aborts the myoclonic/epileptic activity. If he does not have suppression of the spells, I would recommend IV Valium 2.5 mgm but with likely continued Keppra 1000 mg IV bid, at least for now.  2. Additional imaging is not required. He cannot undergo MRI, and the CT with and without contrast shows no focal pathology/enahncement that can account for his seizures.  3. Continue therapy for his underlying UTI.        Adam Villegas M.D., Ph.D.    [facility]

## 2018-11-11 NOTE — SAFE
Paged Dr Angel & Hospitalist concerning family Request for NPO status change & medications for Pain management.

## 2018-11-11 NOTE — PLAN OF CARE
Problem: Pain, Acute (Adult)  Goal: Identify Related Risk Factors and Signs and Symptoms  Related risk factors and signs and symptoms are identified upon initiation of [employer] Clinical Practice Guideline (CPG).  PRIMARY DIAGNOSIS: Seizure    GOALS TO BE MET BEFORE DISCHARGE  1. Orthostatic performed: N/A    2. Tolerating PO medications: Yes. Patient's family Requesting that NPO status be changed to Regular diet & Swallow Eval be cancelled. Dr. Angel web-paged.    3. Return to near baseline physical activity: No. Intermittent Spasms & tremors continue    4. Cleared for discharge by consultants (if involved): No. Neurology/Wound/PT/OT consult pending    Discharge Planner Nurse   Safe discharge environment identified: No. Intermittent spasms & tremors continue.  Barriers to discharge: Yes  Patient alert to self. Confused. Chronic tolliver has a leak that occurs around the base of meatus/penile area. (+) CSM BUE/BLE. No respiratory or cardiac distress voiced or noted. 1920 Telemetry show SR w\PVCs.

## 2018-11-11 NOTE — PLAN OF CARE
Problem: Patient Care Overview  Goal: Plan of Care/Patient Progress Review  PT: Orders received, chart reviewed. Spoke with RN who reports R side continues to tremor every 3-5 minutes, and can last for ~20 seconds. Neuro is consulted. Will hold until post neuro consult.

## 2018-11-11 NOTE — PROGRESS NOTES
[facility]  Hospitalist Progress Note  Name: Edison Boss    MRN: [id number]  YOB: 1924    Age: 89 year old  Date of admission: 11/10/2018  Primary care provider: Porfirio Terrell      Reason for Stay (Diagnosis): Right-sided involuntary myoclonic jerking         Assessment and Plan:      Summary of Stay: Patient is a 89-year-old male with a complex history of prostate cancer, CVA, nephrolithiasis with a complicated UTI who was actually hospitalized on 9/24/18 with notable candidal UTI and candidal fungemia with 2 subsequent hospitalizations since then who presents here with uncontrollable right sided leg heaviness and involuntary jerking concerning for partial complex seizure.  This will be his fourth readmission since 9/24/18.  Other past medical history includes paroxysmal atrial fibrillation, sick sinus syndrome status post pacemaker, hypertension, dyslipidemia, chronic pain, GERD, and some degree of cognitive impairment.    Problem List/Plan:  1. Right-sided involuntary myoclonic jerking: neurology input appreciated.  Do agree that this is suspicious for a partial complex seizure.  Seems to be a little bit less according to family after patient was started on Keppra.  Continue Keppra for now.  Will defer if further neurologic workup is needed to neurology.  2. UTI: Started on ciprofloxacin.  Notable history of complicated UTI secondary to nephrolithiasis, now status post lithotripsy and double-J stent exchange on 10/30/18 by urology.  Also note relative recent history of complicated candidal UTI back in September with candidal fungemia requiring IV micafungin followed by fluconazole.  Will check blood cultures and await final urine cultures.  3. History of CVA: Chronically on aspirin  4. Hypertension: Continue metoprolol and will add back chronic amlodipine  5. Depressive disorder: Continue Paxil      DVT Prophylaxis: Pneumatic Compression Devices  Code  "Status: DNR / DNI  Discharge Dispo: Patient was at [facility] but now daughters have given a bed hold. To be determined.  Estimated Disch Date / # of Days until Disch: Suspect 2-3 days once seizure is better controlled    Time spent: Greater than 40 minutes  2 daughters were at the bedside and updated on plan of cares  We will keep inpatient status      Interval History (Subjective):      Limited historian secondary to lethargy         Physical Exam:      Vital signs:  Temp: 97.3  F (36.3  C) Temp src: Axillary BP: 144/53   Heart Rate: 62 Resp: 18 SpO2: 97 % O2 Device: None (Room air)   Height: 175.3 cm (5' 9\") Weight: 70.1 kg (154 lb 9.6 oz)  Estimated body mass index is 22.83 kg/(m^2) as calculated from the following:    Height as of this encounter: 1.753 m (5' 9\").    Weight as of this encounter: 70.1 kg (154 lb 9.6 oz).    I/O last 3 completed shifts:  In: 200 [P.O.:200]  Out: 585 [Urine:585]  Vitals:    11/10/18 1301   Weight: 70.1 kg (154 lb 9.6 oz)       Constitutional: Awake but appears lethargic.  Otherwise, no apparent distress   Respiratory: Nl work of breathing. Clear to auscultation bilaterally, no crackles or wheezing   Cardiovascular: Regular rate and rhythm, normal S1 and S2, and no murmur noted       Skin: No rashes, no cyanosis, dry to touch   Neuro: CN 2-12 intact, at the end of the interview, patient did have some grimacing followed by right upper and lower extremity myoclonic jerking   Extremities: No edema, normal range of motion   HEENT Normocephalic, atraumatic, normal nasal turbinates; oropharynx clear   Neck Supple; nl inspection; trachea midline; no thryomegaly   Psychiatric:  Unable to assess secondary to lethargy          Medications:      All current medications were reviewed with changes reflected in problem list.         Data:      All new lab and imaging data was reviewed.   Labs:    Recent Labs  Lab 11/10/18  2050   CULT PENDING       Recent Labs  Lab 11/11/18  0706 11/10/18  1043 " 11/08/18  1557   WBC 9.9 10.6 11.0   HGB 11.6* 13.1* 11.6*   HCT 37.6* 40.8 36.8*   MCV 94 91 92    406 298       Recent Labs  Lab 11/11/18  0706 11/10/18  1043 11/08/18  1557    140 138   POTASSIUM 4.2 4.6 4.9   CHLORIDE 111* 105 104   CO2 25 29 30   ANIONGAP 6 6 4   GLC 98 115* 96   BUN 24 24 24   CR 1.03 1.03 1.14   GFRESTIMATED 67 67 60*   GFRESTBLACK 81 81 72   ARYAN 8.7 9.7 9.4   MAG  --  2.3  --        Recent Labs  Lab 11/10/18  2050   COLOR Yellow   APPEARANCE Cloudy   URINEGLC Negative   URINEBILI Negative   URINEKETONE Negative   SG 1.015   UBLD Negative   URINEPH 5.0   PROTEIN 100*   NITRITE Negative   LEUKEST Large*   RBCU 86*   WBCU >182*      Imaging:   No results found for this or any previous visit (from the past 24 hour(s)).    Marti Gomez [phone]

## 2018-11-11 NOTE — CONSULTS
CTS identifies pt as high risk due to High UGO. Patient was admitted on 11/10/18 with LE weakness and tremors. Patient had a lithotripsy on 10/30/18. Patient has had 3 ER visits within the last 6 months. Patient had 3 hospital admissions during September-October for recurrent UTI, sepsis, fungemia, and ureteral stone.   [facility] with a recent change/ decline in mobility. Per chart review patient was requiring a wheelchair for mobility prior to admission.   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   Hospital follow up appointment was not scheduled for the pt at this time as discharge plan is unknown. AVS discharge instructions were updated for the pt.     Handoff will be given to PCP clinic at discharge.     CM will continue to follow patient for any additional discharge needs.     Jen SALDIVAR  Care Transition Services  [phone]

## 2018-11-11 NOTE — PLAN OF CARE
Problem: Patient Care Overview  Goal: Plan of Care/Patient Progress Review  OT- Orders received, chart reviewed. Spoke with PT who reports RN states R side continues to tremor every 3-5 minutes, and can last for ~20 seconds. Neuro is consulted, neuro recommended stat infusion of Keppra 1000 mg to see if this aborts the myoclonic/epileptic activity. Will hold OT to allow for medical management and assess if patient is appropriate to initiate OT tomorrow.

## 2018-11-11 NOTE — PLAN OF CARE
Problem: Patient Care Overview  Goal: Plan of Care/Patient Progress Review  Outcome: Improving  Orientation: Alert and oriented x4, intermittent confusion, forgetful, bed alarm on for increased safety.  VSS. 97% on RA.   Tele: NSR w/PVCs. HR 58.   LS: clear, denies SOB/ADLER  GI: BS audible/active x4, tolerating PO. Passing gas. No BM this shift. Denies N/V.   : Adequate urine output. Yes, tolliver catheter patent  Skin: Wound on coccyx, covered with foam dressing, CDI.  Activity: Total assist/lift. Pt slept comfortably once pain under control  Pain: 10/10 percocet given with no relief this shift. Pt grimacing, moaning, and crying. Intermittent Rt side tremors/muscle spasms visibly noted, pain noted with these movements. MD notified, new order for a one time dose of valium 2.5 mg. Pt resting comfortably after dose of valium given.  Plan: Neurology/PT/OT/WOC, pain control, improve mobility, safe discharge planning, discharge TBD. Continue with current cares.

## 2018-11-11 NOTE — PLAN OF CARE
Problem: Patient Care Overview  Goal: Plan of Care/Patient Progress Review  Outcome: No Change  Patient alert/sleepy this shift  Peripheral IV infusing continuous fluids  Pain uncontrolled with percocet, valium given x1 this shift  Giles started this shift, some relief noted in duration and frequency in spasms  Cipro for UTI  Tele: A paced with frequent PVCs per tele tech  VSS, on room air  Tolerating diet, fair appetite this shift  Up assist x2 with repositions  WOC to see patient   Continue with plan of care

## 2018-11-11 NOTE — DISCHARGE INSTRUCTIONS
Please call Dr. Terrell's office at [phone] to schedule a hospital follow-up appointment within 7-10 days of discharge. Please bring your hospital discharge instructions and any new medications with you to your appointment.

## 2018-11-11 NOTE — PROGRESS NOTES
Cross Cover    Called about patient's diet, family upset about NPO status. Admitted for right leg weakness and shaking episode. CT head and cervical spine resulted noted and negative for acute pathology.  Pt passe bedside swallow screen by nursing staff. Will advance to regular diet. Discussed with RN    UPDATE:  Asked by nursing staff to talk to family to discuss plan of care and pain medications as they are very concerned about patient's muscle spasms, and asking for something for pain. I reviewed the previous records and spoke to the family at length.     They mention that patient has significant decline in his overall condition for the last 2 months. He has been having issues with right leg heaviness and recently also having issues with recurrent UTI's and renal stones (for which he had lithotripsy), Recently seen in ER for right leg weakness, and now having jerking movements of right side of the body. He had these during my interview, with rhythmic low frequency clonic movements of whole right side of the body. They are requesting something for anxiety and muscle spasm, mention that pt has done well with diazepam in the past.     Updated family on the working plan of care, the differential of stroke, partial seizures, myoclonus etc. The rapid decline in his health over last 2 months does bring up question of vascular dementia. CT head is negative although certainly cannot rule out small CVA and unable to undergo MRI due to pacemaker. CT C-spine was also negative for acute changes. I also spoke with a family member who is a nurse, over the phone.     -- One time dose of IV Diazepam 2.5 mg. Discussed risk of increased confusion in elderly   -- Percocet PRN for back pain if needed, again family mentions he has tolerated this well in past  -- Resume B&O suppository   -- Check a UA to r/o UTI, recently completed course of ABx   -- May be helpful to obtain EEG in the morning during these episodes   -- Defer further  management to rounding MD and neurology.       UPDATE: UA is markedly abnormal, will start IV ciprofloxacin pending cultures

## 2018-11-11 NOTE — SAFE
Paged Dr Angel concerning family Request for NPO status change & medications for Pain management.

## 2018-11-12 NOTE — PROGRESS NOTES
Consent for lumbar puncture obtained by Dr. Canales.  Pt tolerated 9 ml's clear fluid well.  Continuous seizure activy thru out exam.  Sterile dressing to site.  Family updated and pt transporterd back to IP room per cart with family at side.  Post instructions to chart.

## 2018-11-12 NOTE — PROGRESS NOTES
[facility]  Hospitalist Progress Note  Name: Edison Boss    MRN: [id number]  YOB: 1924    Age: 89 year old  Date of admission: 11/10/2018  Primary care provider: Porfirio Terrell      Reason for Stay (Diagnosis): Right-sided involuntary myoclonic jerking         Assessment and Plan:      Summary of Stay: Patient is a 89-year-old male with a complex history of prostate cancer, CVA, nephrolithiasis with a complicated UTI who was actually hospitalized on 9/24/18 with notable candidal UTI and candidal fungemia with 2 subsequent hospitalizations since then who presents here with uncontrollable right sided leg heaviness and involuntary jerking concerning for partial complex seizure.  This will be his fourth readmission since 9/24/18.  Other past medical history includes paroxysmal atrial fibrillation, sick sinus syndrome status post pacemaker, hypertension, dyslipidemia, chronic pain, GERD, and some degree of cognitive impairment.    Problem List/Plan:  1. Right-sided involuntary myoclonic jerking: neurology input appreciated.  Do agree that this is suspicious for a partial complex seizure.  Neurology input appreciated.  Did recommend an LP to rule out possible infection given recent serious from fungemia.  LP performed and results reviewed.  No significant inflammatory cells to suggest a meningoencephalitis process.  Cultures were sent including fungal CSF cultures.  Also appreciate neurology input.  We will add Vimpat to Keppra.  Check Keppra levels in the morning.  PRN Ativan as Valium is on shortage per pharmacy.  2. UTI: Started on ciprofloxacin.  Notable history of complicated UTI secondary to nephrolithiasis, now status post lithotripsy and double-J stent exchange on 10/30/18 by urology.  Also note relative recent history of complicated candidal UTI back in September with candidal fungemia requiring IV micafungin followed by fluconazole.  Will check blood  "cultures and await final urine cultures.  CSF cultures for fungus is also ordered.  3. History of CVA: Chronically on aspirin but will hold for now until after procedure  4. Hypertension: As patient is unable to swallow safely, will put patient on scheduled IV metoprolol.  5. Depressive disorder: Continue Paxil if able to swallow safely but could be held      DVT Prophylaxis: Pneumatic Compression Devices  Code Status: DNR / DNI  Discharge Dispo: Patient was at [facility] but now daughters have given a bed hold. To be determined.  Estimated Disch Date / # of Days until Disch: Suspect 2-3 days once seizure is better controlled    Time spent: Greater than 40 minutes  1 daughter, son-in-law, and son was at the bedside and updated on plan of cares        Interval History (Subjective):      Limited historian secondary to lethargy         Physical Exam:      Vital signs:  Temp: 96.5  F (35.8  C) Temp src: Axillary BP: (P) 131/58 Pulse: 60 Heart Rate: (P) 61 Resp: (P) 18 SpO2: (P) 97 % O2 Device: (P) Nasal cannula Oxygen Delivery: 2 LPM Height: 175.3 cm (5' 9\") Weight: 70.1 kg (154 lb 9.6 oz)  Estimated body mass index is 22.83 kg/(m^2) as calculated from the following:    Height as of this encounter: 1.753 m (5' 9\").    Weight as of this encounter: 70.1 kg (154 lb 9.6 oz).    I/O last 3 completed shifts:  In: 434 [P.O.:50; I.V.:384]  Out: 760 [Urine:760]  Vitals:    11/10/18 1301   Weight: 70.1 kg (154 lb 9.6 oz)       Constitutional: Awake but appears lethargic.  Otherwise, no apparent distress   Respiratory: Nl work of breathing. Clear to auscultation bilaterally, no crackles or wheezing   Cardiovascular: Regular rate and rhythm, normal S1 and S2, and no murmur noted       Skin: No rashes, no cyanosis, dry to touch   Neuro:  Do note that patient still has involuntary jerking of the right leg and upper extremity.  Unable to assess fully as patient is lethargict   Extremities: No edema, normal range of motion   HEENT " Normocephalic, atraumatic, normal nasal turbinates; oropharynx clear   Neck Supple; nl inspection; trachea midline; no thryomegaly   Psychiatric:  Unable to assess secondary to lethargy          Medications:      All current medications were reviewed with changes reflected in problem list.         Data:      All new lab and imaging data was reviewed.   Labs:    Recent Labs  Lab 11/12/18  1220 11/11/18  1724 11/11/18  1713 11/10/18  2050   CULT PENDING  PENDING No growth after 13 hours No growth after 13 hours 50,000 to 100,000 colonies/mLNon lactose fermenting gram negative rods*  Culture in progress       Recent Labs  Lab 11/12/18  0630 11/11/18  0706 11/10/18  1043   WBC 7.6 9.9 10.6   HGB 11.4* 11.6* 13.1*   HCT 36.7* 37.6* 40.8   MCV 94 94 91    360 406       Recent Labs  Lab 11/11/18  0706 11/10/18  1043 11/08/18  1557    140 138   POTASSIUM 4.2 4.6 4.9   CHLORIDE 111* 105 104   CO2 25 29 30   ANIONGAP 6 6 4   GLC 98 115* 96   BUN 24 24 24   CR 1.03 1.03 1.14   GFRESTIMATED 67 67 60*   GFRESTBLACK 81 81 72   ARYAN 8.7 9.7 9.4   MAG  --  2.3  --        Recent Labs  Lab 11/10/18  2050   COLOR Yellow   APPEARANCE Cloudy   URINEGLC Negative   URINEBILI Negative   URINEKETONE Negative   SG 1.015   UBLD Negative   URINEPH 5.0   PROTEIN 100*   NITRITE Negative   LEUKEST Large*   RBCU 86*   WBCU >182*      Imaging:   No results found for this or any previous visit (from the past 24 hour(s)).    Marti Gomez -[phone]

## 2018-11-12 NOTE — PLAN OF CARE
Problem: Patient Care Overview  Goal: Plan of Care/Patient Progress Review  Outcome: Improving  Orientation: Alert and oriented to self only, forgetful, bed alarm on for increased safety.  VSS. 95% on RA.   Tele: A paced w/PVC. HR 58.   LS: clear, denies SOB/ADLER  GI: BS audible/active x4, tolerating PO. Passing gas. No BM this shift. Denies N/V.   : Adequate urine output. Yes, tolliver catheter patent  Skin: Wound on coccyx, covered with foam dressing, CDI.  Activity: Total assist/lift. Pt slept comfortably between cares. Intermittent bouts of pain with spasms. Spasms still present, but minimal through the night.  Pain: 10/10 w/spasms.   Plan: Neurology/PT/OT/WOC following, pain control, improve mobility, IV keppra, safe discharge planning, discharge TBD. Continue with current cares.

## 2018-11-12 NOTE — PROGRESS NOTES
[facility] Nurse Inpatient Adult Pressure Injury (PI) Wound Assessment     Assessment of PI(s) on pt's:   Upper Buttocks    Data:   Patient History:      per MD note(s):  89-year-old gentleman with a complex medical history including prostate cancer, nephrolithiasis, urinary retention (has an indwelling Christianson catheter) who recently underwent lithotripsy with a redo cystoscopy with double-J stent exchange on 10/30.  He also has a history of a CVA, paroxysmal atrial fibrillation, status post permanent pacemaker, hypertension, dyslipidemia, chronic pain, ventral hernia with gastroesophageal reflux disease, restless leg syndrome, and cognitive impairment.  [facility] 2 months.  He presented to the emergency room on  complaining of right leg feeling heavy and weak and that it first started over 6 weeks ago.  He was evaluated with a CT of the head that was negative, and after a negative examination he was sent back to the TCU.  He comes back today with complaints of right lower leg and arm tremors.  These first started about a week ago and have been more frequent and intense causing them to seek further attention.  The patient has not been able to ambulate since last being seen on , and is mostly now wheelchair bound.  Prior to that, he was using a walker.  Denies any trauma, similar complaints, or previous history of seizures.  The patient is unable to tell me if there is any numbness, but clearly feels he cannot move that leg.        Mattress:  Standard , Atmos Air mattress  Current pressure relieving devices:  Mepilex dressing and Pillows    Moisture Management:  Diaper and Urinary Catheter    Catheter secured? Yes    Current Diet / Nutrition:           Active Diet Order      Regular Diet Adult    Pepe Assessment and sub scores:   Pepe Score  Av.9  Min: 15  Max: 23   Labs:   Recent Labs   Lab Test  18   0630   10/17/18   1622    09/24/18   1204   08/28/18   0946   11/30/12   1311   ALBUMIN   --    --   2.7*   < >   --    < >   --    < >  3.4   HGB  11.4*   < >  11.6*   < >  12.2*   < >  14.9   < >  14.4   INR   --    --    --    --    --    --   1.09   < >  2.81*   WBC  7.6   < >  16.6*   < >  24.6*   < >  8.7   < >  7.0   A1C   --    --    --    --    --    --    --    --   5.5   CRP   --    --    --    --   168.0*   --    --    < >   --     < > = values in this interval not displayed.                                                                                                                          Pressure Injury Assessment  (location #1):   Gluteal cleft/ coccyx    Wound History:   See above; pt has a tolliver with residual overflow leakage around cuff occasionally; pt shaking fairly consistently with seizure activity    Specific Dimensions (length x width x depth, in cm) :   Skin intact with slow to non blanchable erythema after directly turning pt over to side to left buttocks/ IT, very superficial erosion of epidermis to coccyx with blanchable erythema.     Periwound Skin: intact,      Color: normal and consistent with surrounding tissue    Temperature  normal     Drainage:  none     Pain:  unable to assess , lethargic         Intervention:     Patient's chart evaluated.      Pepe Interventions:  Current Pepe Interventions and Care Plan reviewed and updated, appropriate at this time.    Wound was assessed.    Wound Care: was done: Removal of existing dressing    Visual inspection, re-application of clean dressing; repositioned to side,    Orders  Written    Supplies  floor stock and discussed with RN    Discussed plan of care with Family and Nurse           Assessment:     Pressure Injury to left buttocks Stage 1 slow to non blanchable intact skin present on admission.     PI on coccyx and upper buttocks not found; skin intact with very superficial erosion suspect due to moisture and friction      Status: wound  initial  assessment and stable, Asymptomatic;          Plan:     Nursing to notify the Provider(s) and re-consult the WOC Nurse if wound(s) deteriorate(s)or if the wound care plan needs reevaluation.    Plan for wound care to wound located on coccyx: Every 3-5days    1. Wipe skin clean; apply Mepilex 4x4 to flat dry skin, date    2. If Incontinence does not allow dressing to adhere for > 1 day: change POC: stop Mepilex and begin to use Critic aid paste BID and PRN    3. Pressure Injury Prevention: turn pt side to side Q 2hrs, float heels and pad elbows, Sween to intact dry skin PRN    WOC Nurse will return: weekly

## 2018-11-12 NOTE — PROGRESS NOTES
"Neurology Quick Note  [facility]       [2018]           Edison Bsos        89 year old yo male   Admission Date: 11/10/2018  Hospital Day: 3  Code Status: DNR/DNI    S:  Still having partial seizures. His next dose of Keppra is not scheduled until 11 am this morning.    O:  Having right body stiffness/tremors frequently this morning, improved after recent Valium IV. Blood culture positive for gram negative rods.    BLOOD PRESSURE:   BP Readings from Last 3 Encounters:   18 168/73   18 163/77   18 150/73     PULSE: 60    Height:  5' 9\"       Weight:  154 lbs 9.6 oz    Temperature: 95.8  Tmax 24 : Temp (24hrs), Av.3  F (35.7  C), Min:95.5  F (35.3  C), Max:97.3  F (36.3  C)    Vitals: Ranges  Temp:  [95.5  F (35.3  C)-97.3  F (36.3  C)] 95.8  F (35.4  C)  Pulse:  [60] 60  Heart Rate:  [58-68] 67  Resp:  [16-18] 16  BP: (125-168)/(51-73) 168/73  SpO2:  [95 %-97 %] 95 %    Medications:    sodium chloride 0.9%  30 mL Intravenous Once     amLODIPine  5 mg Oral Daily     aspirin  81 mg Oral Daily     ciprofloxacin  400 mg Intravenous Q12H     levETIRAcetam  1,000 mg Intravenous Q12H     metoprolol tartrate  25 mg Oral BID     omeprazole  20 mg Oral BID     PARoxetine  15 mg Oral Daily         LABORATORY RESULTS       SMA-7:    Recent Labs  Lab 18  0706 11/10/18  1043 18  1557    140 138   POTASSIUM 4.2 4.6 4.9   CHLORIDE 111* 105 104   CO2 25 29 30   GLC 98 115* 96   BUN 24 24 24   CR 1.03 1.03 1.14     CMP:    Recent Labs  Lab 18  0706 11/10/18  1043 18  1557   ARYAN 8.7 9.7 9.4   MAG  --  2.3  --      CBC:      Recent Labs  Lab 18  0630 18  0706 11/10/18  1043 18  1557   WBC 7.6 9.9 10.6 11.0   RBC 3.91* 4.02* 4.48 4.00*   HGB 11.4* 11.6* 13.1* 11.6*   HCT 36.7* 37.6* 40.8 36.8*   MCV 94 94 91 92    360 406 298     U/A:    Lab Test  11/10/18   2050   COLOR  Yellow   APPEARANCE  Cloudy "   URINEGLC  Negative   URINEBILI  Negative   URINEKETONE  Negative   SG  1.015   UBLD  Negative   URINEPH  5.0   PROTEIN  100*   UROBILINOGEN   --    NITRITE  Negative   LEUKEST  Large*   RBCU  86*   WBCU  >182*   HgA1c:   Hemoglobin A1C   Date Value Ref Range Status   11/30/2012 5.5 4.3 - 6.0 % Final   Ammonia:  No lab results found.  Vitamin B12:   Recent Labs   Lab Test  10/20/16   1516  09/12/12   1326   B12  367  339   Blood Cultures:   Recent Labs  Lab 11/11/18  1724 11/11/18  1713 11/10/18  2050   CULT No growth after 4 hours No growth after 4 hours 50,000 to 100,000 colonies/mLNon lactose fermenting gram negative rods*  Culture in progress     Cholesterol Panel: Lab Results   Component Value Date    CHOL 96 09/25/2018    HDL 19 (L) 09/25/2018    LDL 54 09/25/2018    TRIG 113 09/25/2018    CHOLHDLRATIO 3.6 11/23/2008    VLDL 21 11/23/2008     Keppra  Level:  No lab results found.       Xr Chest 2 Views    Result Date: 10/17/2018  XR CHEST 2 VW   10/17/2018 5:38 PM HISTORY: Altered mental status. COMPARISON: Film dated 9/29/2018 FINDINGS: Left cardiac device is in place. Cardiac silhouette is at the upper limits of normal. There is been an improvement in the left pleural effusion when compared to 9/29/2018. No significant pleural fluid is seen on today's film. Mild right basilar opacity consistent with a small area of infiltrate or atelectasis.  There is also a small area of infiltrate or fibrosis in the right midlung zone. The pulmonary vasculature is normal.  The bones and soft tissues are unremarkable.     IMPRESSION: Improved infiltrate and pleural effusions.    ARUN RODRIGUEZ MD    Ct Abdomen Pelvis W Contrast    Result Date: 10/17/2018  CT ABDOMEN AND PELVIS WITH CONTRAST 10/17/2018 5:34 PM HISTORY: History of ureteral stent, flank pain. CONTRAST DOSE: 78mL Isovue-370 Radiation dose for this scan was reduced using automated exposure control, adjustment of the mA and/or kV according to patient size, or  iterative reconstruction technique. FINDINGS: Left ureteral stent is in place, new since 9/27/2018. There is no hydronephrosis. Proximal ureteral stone adjacent to the stent is noted, probably unchanged in position from 9/27/2018. Bilateral renal cysts and left renal stones are again noted. Bowel-containing right inguinal hernia is again noted without evidence of incarceration or bowel obstruction. No free peritoneal fluid or air. The remainder of the CT also appears unchanged. Christianson catheter is in place. Left adrenal 1.9 cm nodule is noted.     IMPRESSION: Left ureteral stent in place without left hydronephrosis. Proximal left ureteral stone is probably unchanged in position from 9/27/2018. This scan is otherwise unchanged in appearance compared to 9/27/2018 and 8/28/2018. VLADIMIR GUILLAUME MD      Ct Head W Contrast    Result Date: 11/10/2018  CT HEAD W CONTRAST   11/10/2018 5:18 PM HISTORY: R leg weakness; TECHNIQUE:  Axial images of the head without 70 mLIV contrast material. Radiation dose for this scan was reduced using automated exposure control, adjustment of the mA and/or kV according to patient size, or iterative reconstruction technique. COMPARISON: CT dated 11/8/2018 FINDINGS: The ventricles are normal in size, shape and configuration. The brain parenchyma and subarachnoid spaces are normal. There is no evidence of intracranial hemorrhage, mass, acute infarct or anomaly. The visualized portions of the sinuses and mastoids appear normal. There is no evidence of trauma.     IMPRESSION:  No acute pathology, no bleed, mass, or infarcts are seen. No enhancing lesions are identified.  ARUN RODRIGUEZ MD    No results found for this or any previous visit (from the past 24 hour(s)).      ASSESSMENT     1. Right body seizures, with generalization, cause unknown. His underlying infection is exacerbating his seizures, but not the cause. He initially showed improvement in the seizures after starting Keppra 1000 mg IV bid,  but this morning he has had an increase in seizure activity. Imaging has shown no focal pathology to account for his focal onset seizures. Given his recent history of infections, including positive blood cultures for yeast and recent positive blood cultures for gram negative rods, a CNS infection should remain in the differential. I would have a relatively low threshold for having him undergo an LP to make certain there is no CNS infection underlying his focal onset seizures.    RECOMMENDATIONS   1. I recommended to his nurse that his Keppra dosing be changed to 1000 mg IV 7 am/7 pm, rather than 11/11.  2. If he does not respond to the Keppra later today, addition of another antiepileptic may be required (Dilantin IV, for example).  3. If he continues to have frequent seizures this morning, I recommend he undergo fluoroscopically guided LP to rue out CNS infection, including fungal/yeast stains on the CSF.  4. Keppra level should be checked tomorrow morning.        Adam Villegas M.D., Ph.D.    [facility]    [phone]  10/19/1924

## 2018-11-12 NOTE — PLAN OF CARE
"Problem: Patient Care Overview  Goal: Plan of Care/Patient Progress Review  Outcome: Declining  Neuro: Lethargic. Calling out in pain. Unable to answer questions appropriately. Seizure like activity / spasms to right side of body. Pain with spasms.   VS:  /73 all other VSS   Tele: A - Paced.   Respiratory: Lung sounds diminished.   GI: WDL  : Christianson catheter in place draining clear yellow urine. Per family report pt has frequent bladder spasms.   Skin: blanchable redness to coccyx. Turned and repositioned every 2 hours.   Pain: Significant pain during seizure activity. Given PO percocet, IV valium with relief. MD notified. PRN ativan ordered.   IV: PIV WDL   Transfer: A2 - Lift. Turn and reposition q 2 hours.   Plan: TBD. Hospitalist, Neuro following. Continues on IV Keppra q 12 hours. Ativan for seizure like activity given x 2.  Lumbar puncture preformed today - results pending.     0800: MD paged, \"Pt having pain, calling out. difficulty taking po pain medication. Continues to have seizure activity in leg, arm. Can we have order for something IV?  IV valium?\"     0804 - New orders received for IV valium.       "

## 2018-11-12 NOTE — PLAN OF CARE
Problem: Pain, Acute (Adult)  Goal: Acceptable Pain Control/Comfort Level  Patient will demonstrate the desired outcomes by discharge/transition of care.   Outcome: No Change  Problem: Pain, Acute (Adult)  Goal: Identify Related Risk Factors and Signs and Symptoms  Related risk factors and signs and symptoms are identified upon initiation of Human Response Clinical Practice Guideline (CPG).  PRIMARY DIAGNOSIS: Seizure,Tremors     GOALS TO BE MET BEFORE DISCHARGE  1. Orthostatic performed: N/A     2. Tolerating PO medications: Yes. Medications given with applesauce this shift w/o difficulty. No coughing post eating &/or Oral medications.     3. Return to near baseline physical activity: No. Intermittent Spasms & tremors continue     4. Cleared for discharge by consultants (if involved): No. Neurology/Wound/PT/OT consults     Discharge Planner Nurse   Safe discharge environment identified: No. Intermittent spasms & tremors continue.  Barriers to discharge: Yes  Patient alert to self. Confused. Chronic tolliver has a leak that occurs around the base of meatus/penile area. (+) CSM BUE/BLE. No respiratory or cardiac distress voiced or noted. 2020 Telemetry show 100% atrial paced

## 2018-11-12 NOTE — PROGRESS NOTES
CLINICAL NUTRITION SERVICES  -  ASSESSMENT NOTE      Recommendations Ordered by Registered Dietitian (RD):   Oral nutrition supplements   Malnutrition:   Unable to determine due to incomplete nutrition focused physical exam      REASON FOR ASSESSMENT  Edison Boss is a 89 year old male seen by the dietitian for positive nutrition risk screen - Unintentional weight loss of 10# or more in past 2 months    History of prostate cancer, CVA, nephrolithiasis with a complicated UTI who was actually hospitalized on 9/24/18 with notable candidal UTI and candidal fungemia with 2 subsequent hospitalizations since then who presents here with uncontrollable right sided leg heaviness and involuntary jerking concerning for partial complex seizure.  This will be his fourth readmission since 9/24/18.  Other past medical history includes paroxysmal atrial fibrillation, sick sinus syndrome status post pacemaker, hypertension, dyslipidemia, chronic pain, GERD, and some degree of cognitive impairment.    NUTRITION HISTORY  - Familiar with patient from October 2018 admit, severe malnutrition criteria met. Family at bedside and report patient was attending meals in the dining room TID prior to admit while at TCU + receiving shakes to supplement intake as well with good tolerance.     Diet history from October 2018 admit:  - Information obtained from patient  - Food allergies/intolerances: NKFA  - Patient is on a regular diet at home.  - Typical food/fluid intake PTA:    Supplements at home: family recently bought Boost, not consistently drinking    Issues chewing or swallowing: denies    Lives in senior housing and receives one meal per day    second meal is a bowl of cereal or hotdog - convenient, easy to prepare meal    Chronic poor appetite - may eat better when family is around.     as of May    Pacemaker as of Jan 2018    CURRENT NUTRITION ORDERS  - Diet: Regular  - Supplement: none  Current Intake/Tolerance:  - Per  "flow sheet review, 25-75% intake for documented meals. No PO today - lumbar puncture, seizures this AM  - Factors affecting nutrition intake include: procedures, lethargy    PHYSICAL FINDINGS  Observed  Deferred - patient sleeping. Moderate fat and muscle loss documented during Oct 2018 admit.  Obtained from Chart/Interdisciplinary Team  Pepe nutrition score: 3; total score: 15  BM: 11/9  [place]  Lethargic  Skin per WOCN 11/12: Pressure Injury to left buttocks Stage 1 slow to non blanchable intact skin present on admission.   PI on coccyx and upper buttocks not found; skin intact with very superficial erosion suspect due to moisture and friction     ANTHROPOMETRICS  Height: 5'9\"  Weight: 70.1 kg   Body mass index is 22.83 kg/(m^2).  Weight Status:  Overweight BMI 25-29.9  Ideal body weight: 72.7 kg +/- 10%, 96% of IBW   Weight History:  10% weight loss in ~6 months, as noted below, consistent with frequent hospitalizations   Wt Readings from Last 20 Encounters:   11/10/18 70.1 kg (154 lb 9.6 oz)   11/09/18 70 kg (154 lb 6.4 oz)   11/08/18 71.7 kg (158 lb)   11/08/18 70 kg (154 lb 4.8 oz)   11/06/18 70.8 kg (156 lb)   11/02/18 70.8 kg (156 lb)   10/29/18 70.8 kg (156 lb)   10/25/18 70.8 kg (156 lb)   10/24/18 70.1 kg (154 lb 9.6 oz)   10/17/18 70.3 kg (155 lb)   10/16/18 70.3 kg (155 lb)   10/15/18 70.3 kg (155 lb)   10/09/18 71.4 kg (157 lb 6.4 oz)   10/04/18 76.2 kg (168 lb 1.6 oz)   09/17/18 80.1 kg (176 lb 9.6 oz)   09/12/18 76.1 kg (167 lb 11.2 oz)   09/04/18 76.7 kg (169 lb)   04/05/18 78 kg (172 lb)   02/08/18 78 kg (172 lb)   01/02/18 78.2 kg (172 lb 8 oz)     ASSESSED NUTRITION NEEDS  (PER APPROVED PRACTICE GUIDELINES, Dosing weight: 70 kg):  Estimated Energy Needs: 8399-9823+ kcals (25-30 Kcal/Kg)  Justification: maintenance  Estimated Protein Needs: + grams protein (1.2-1.5+ g pro/Kg)  Justification: preservation of lean body mass + wound healing  Estimated Fluid Needs: >1 " mL/Kcal  Justification: maintenance    LABS  Labs reviewed    Recent Labs   Lab Test  11/11/18   0706  11/10/18   1043  11/08/18   1557  10/26/18   0630  10/22/18   0644   POTASSIUM  4.2  4.6  4.9  3.7  3.7     No results for input(s): PHOS in the last 65515 hours.  Recent Labs   Lab Test  11/10/18   1043  09/26/18   0707   MAG  2.3  2.6*     Recent Labs   Lab Test  11/11/18   0706  11/10/18   1043  11/08/18   1557  10/26/18   0630  10/22/18   0644   NA  142  140  138  143  140     Recent Labs   Lab Test  11/11/18   0706  11/10/18   1043  11/08/18   1557  10/26/18   0630  10/24/18   0651   CR  1.03  1.03  1.14  0.98  1.02       Recent Labs  Lab 11/11/18  0706 11/10/18  1043 11/08/18  1557   GLC 98 115* 96     Lab Results   Component Value Date    A1C 5.5 11/30/2012       MEDICATIONS  Medications reviewed  D5 IVF at 75 mL per hour provides 306 kcal, 90 gm CHO    MALNUTRITION:  % Weight Loss: up to 10% in 6 months (non-severe malnutrition)  % Intake: Unable to determine  Subcutaneous Fat Loss:Unable to determine  Muscle Loss: Unable to determine but suspect moderate loss based on last hospitalization, sarcopenia  Fluid Retention:None noted    Malnutrition Diagnosis: Unable to determine due to incomplete nutrition focused physical exam     NUTRITION DIAGNOSIS:  Unintended weight loss related to physical decline and frequent hospitalizations as evidenced by 10% weight loss in six months, stage 1 pressure injury and severe malnutrition criteria met during Oct 2018 admit    INTERVENTIONS  Recommendations / Nutrition Prescription  Continue diet as ordered + Diet appropriate oral nutrition supplements to increase calorie and protein intake    MVI+M per pressure injury protocol when appropriate    Implementation  Nutrition education: Not appropriate at this time due to patient condition  Medical food supplement: Boost prn    Goals  Patient to consume >/= 50% of meals TID and >/=1 high protein supplements per  day      MONITORING AND EVALUATION:  Progress towards goals will be monitored and evaluated per protocol and Practice Guidelines      Sobeida Trimble RDN, KIRAN, CNSC  Pager - 3rd floor/ICU: [phone]  Pager - All other floors: [phone]  Pager - Weekend/holiday: [phone]  Office: [phone]

## 2018-11-12 NOTE — PLAN OF CARE
Problem: Patient Care Overview  Goal: Plan of Care/Patient Progress Review  PT/OT: Per discussion with RN, pt is not appropriate for therapies this date. Will follow chart and check appropriateness tomorrow.

## 2018-11-12 NOTE — PROCEDURES
RADIOLOGY POST PROCEDURE NOTE    Patient name: Edison Boss  MRN: [id number]  : 10/19/1924    Pre-procedure diagnosis: Seizure, altered mental status  Post-procedure diagnosis: Same    Procedure Date/Time: 2018  12:32 PM  Procedure: Fluoro guided LP at L3-L4.  9-10 ml of clear CSF removed and sent to lab for evaluation.  No complication.  Estimated blood loss: < 5 ml  Specimen(s) collected with description: CSF    The patient tolerated the procedure well with no immediate complications.  Significant findings:  Please see above.    See imaging dictation for procedural details.    Provider name: Pillo Shaw  Assistant(s):None

## 2018-11-12 NOTE — PROGRESS NOTES
Neurology Follow Up Note  [facility]       [November 12, 2018]           Edison Boss        89 year old yo male   Admission Date: 11/10/2018  Hospital Day: 3  Code Status: DNR/DNI    S:  More obtunded. His seizures seem less frequent than yesterday, but lasting longer.    O:  His recent LP is benign, showing no evidence of infection or inflammation. Protein and glucose are normal, and he has no white cells or red cells.        ASSESSMENT     1. Epilepsy,with seizures arising from his left cerebrum, with secondary generalization. One of his family members reports a prior left cerebral stroke with transient right sided weakness. This could underlie his present seizures. His underlying infection(s) may be causing the prior traumatized brain to manifest these seizures. I know of no other likely underlying cause at this time.    RECOMMENDATIONS   1. Continue the Keppra 1000 mg IV bid.  2. Add Vimpat 100 mg IV bid, first dose stat.  3. Check Keppra level in the morning.  4. If he does not show a clear improvement in his seizures after having the Vimpat infusion, I recommend low dose valium (2.5 mg) IV x1 to abort a prolonged seizure.    Because of his DNR/DNI status, I recommend caution with medications that can cause sedation/respiratory depression. Phenobarb, for example, would likely cause respiratory sedation requiring intubation. As his infection is brought under control, and with the addition of a second antiepileptic medication, his seizures should be brought under better control. I would use low dose Valium cautiously as well to abort prolonged seizures.      Adam Villegas M.D., Ph.D.    [facility]    [phone]  10/19/1924

## 2018-11-13 NOTE — PROGRESS NOTES
Neurology Follow Up Note  [facility]       [November 13, 2018]           Edison Boss        89 year old yo male with UTI and seizures, difficult to control.   Admission Date: 11/10/2018  Hospital Day: 4  Code Status: DNR/DNI    S:  According to his nurse he is continuing to have frequent seizures despite the addition of Vimpat IV and recent lorazepam, IV. His nurse reports that his O2 sats remain in the 90's despite his seizure activity, and despite the Valium/lorazepam infusions.      RECOMMENDATIONS   1. I have written an order for another 100 mg dose of Vimpat IV, and to continue the Vimpat 200 mg IV bid after that.  2. He should continue the Keppra 1000 mg IV bid for now. His Keppra level from this morning is pending.  3. His best interests might be best served by transfer to the ICU so that his seizure frequency can be closely monitored, and the effects/side effects of treatments can be better observed. He is DNI, so respiratory suppression must be prevented while increased medication is given to suppress his seizures.         Adam Villegas M.D., Ph.D.    [facility]    [phone]  10/19/1924

## 2018-11-13 NOTE — PLAN OF CARE
Problem: Patient Care Overview  Goal: Plan of Care/Patient Progress Review  PT: Per discussion with RN. Frequent seizure activity continues. Will complete PT order at this time. Please re-consult if status changes.

## 2018-11-13 NOTE — PLAN OF CARE
Problem: Patient Care Overview  Goal: Plan of Care/Patient Progress Review  Outcome: No Change  Pt frequently having myotonic seizures with tremors. From 0465-3323, pt seized q4-5 min for around 20-45 seconds. At times pt would groan, at these times family notified staff and IV dilaudid was given. Per neurology ativan should be given to decrease intensity of seizures to allow keppra and VIMPAT to control seizure activity. Hold ativan if SpO2 decreases. VIMPAT  Amt increased. keppra amt to stay the same. Unsure of pathway that led pt to start having seizures. Family at bedside entire shift, helping staff monitor frequency and duration of seizures. NPO. Christianson patent and draining. Powder ordered for groin area. IVF. Midline PICC to be placed this afternoon. Repositioned frequently, oral cares done when pt tolerated. Seizure precautions in place. Will continue to monitor.

## 2018-11-13 NOTE — PLAN OF CARE
Problem: Pain, Acute (Adult)  Goal: Acceptable Pain Control/Comfort Level  Patient will demonstrate the desired outcomes by discharge/transition of care.   Outcome: No Change  PRIMARY DIAGNOSIS: Seizures, Tremors  GOALS TO BE MET BEFORE DISCHARGE:  1. ADLs back to baseline: No. Seizures/Tremors continue with Increase in frequency & longer duration noted between each episode/activity.    2. Activity and level of assistance: Reposition every 2 hour for comfort & maintainance of skin Integrity. Requires x2 assistance for repositioning     3. Pain status: Improved but still requiring IV narcotics per patient's comfort level.    4. Return to near baseline physical activity: No. Patient bedrest this shift 2*  Intermittent Tremors/Seizures persisting.     Discharge Planner Nurse   Safe discharge environment identified: No. Neurology consulting with patient/family about treatment plans & follow-up for evaluations.   Barriers to discharge: Yes. Tremors/Seizures persisting with longer duration & frequency.  Patient lethargic, but he will respond to touch/voice. Family at bedside.  Patient continues on bedrest d/t Intermittent Tremors/Seizures persisting & more frequent with longer duration. (>/= 9 minutes). Neurology & Dr. Gomez aware of the activity  (Tremors/Seizures) that is occurring with the patient. Neurology & Dr. Gomez placed New orders see MAR.

## 2018-11-13 NOTE — PROGRESS NOTES
Neurology  He is doing better. I saw two very brief right-sided seizures, possibly focal motor, in the twenty minutes I spent in his room. This is a notable improvement. His family reports that he intermittently alerts to people speaking in his room.    I recommend he reman on the Vimpat 200 mg IV bid.  I am recommending increasing the Keppra to 1500 mg IV bid. I recommend he receive an extra 500 mg dose IV now.  Continue the lorazepam 0.5 mg IV q2 hours as necessary to suppress the seizures.  Continue monitoring O2 sats to make certain his is not developing respiratory suppression. His laureano were 100% while I was in his room (on room air).    I will continue to follow closely.    Adam Villegas MD, PhD  [facility]

## 2018-11-13 NOTE — PLAN OF CARE
Problem: Patient Care Overview  Goal: Plan of Care/Patient Progress Review  Discharge Planner OT   Patient plan for discharge: unknown  Current status: OT orders received, per discussion with RN. Frequent seizure activity continues, not appropriate for OT services. Will complete OT order at this time. Please re-consult if status changes  Barriers to return to prior living situation: defer to care team  Recommendations for discharge: defer to care team  Rationale for recommendations: will discontinue OT orders as patient is not appropriate for rehab services.        Entered by: Viridiana Coleman 11/13/2018 12:22 PM   '

## 2018-11-13 NOTE — PROGRESS NOTES
"Neurology Note  [facility]       [2018]           Edison Boss        89 year old yo male, with severe UTI, positive blood cultures for gram neg rods, and left cerebral seizures, likely originating in the region of a prior stroke that he suffered 10-15 years ago. No evidence of CNS infection.   Admission Date: 11/10/2018  Hospital Day: 4  Code Status: DNR/DNI    S:  Still having seizures. His nurse reports that she estimates they occur every two hours or so. He was given Ativan ~3 am, with apparent suppression of seizures for several hours. His second dose of Vimpat is infusing presently. Blood cultures positive for gram negative rods.    O:    BLOOD PRESSURE:   BP Readings from Last 3 Encounters:   18 151/65   18 163/77   18 150/73     PULSE: 60    Height:  5' 9\"       Weight:  154 lbs 9.6 oz    Temperature: 96.4  Tmax 24 : Temp (24hrs), Av.1  F (35.6  C), Min:95.7  F (35.4  C), Max:96.5  F (35.8  C)    Vitals: Ranges  Temp:  [95.7  F (35.4  C)-96.5  F (35.8  C)] 96.4  F (35.8  C)  Heart Rate:  [59-67] 61  Resp:  [16-18] 18  BP: (113-168)/(55-73) 151/65  SpO2:  [93 %-97 %] 94 %    Medications:    sodium chloride 0.9%  30 mL Intravenous Once     ciprofloxacin  400 mg Intravenous Q12H     lacosamide (VIMPAT) intermittent infusion  100 mg Intravenous BID     levETIRAcetam  1,000 mg Intravenous Q12H     lidocaine  10 mL Intradermal Once     metoprolol  5 mg Intravenous Q6H     pantoprazole (PROTONIX) IV  40 mg Intravenous Daily with breakfast     PARoxetine  15 mg Oral Daily         LABORATORY RESULTS       SMA-7:    Recent Labs  Lab 18  0706 11/10/18  1043 18  1557    140 138   POTASSIUM 4.2 4.6 4.9   CHLORIDE 111* 105 104   CO2 25 29 30   GLC 98 115* 96   BUN 24 24 24   CR 1.03 1.03 1.14     CMP:    Recent Labs  Lab 18  0706 11/10/18  1043 18  1557   RAYAN 8.7 9.7 9.4   MAG  --  2.3  --      CBC:      Recent " Labs  Lab 11/12/18  0630 11/11/18  0706 11/10/18  1043 11/08/18  1557   WBC 7.6 9.9 10.6 11.0   RBC 3.91* 4.02* 4.48 4.00*   HGB 11.4* 11.6* 13.1* 11.6*   HCT 36.7* 37.6* 40.8 36.8*   MCV 94 94 91 92    360 406 298         Blood Cultures:   Recent Labs  Lab 11/12/18  1220 11/11/18  1724 11/11/18  1713 11/10/18  2050   CULT PENDING  PENDING No growth after 2 days No growth after 2 days 50,000 to 100,000 colonies/mLNon lactose fermenting gram negative rodsSusceptibility testing in progress*  10,000 to 50,000 colonies/mLStrain 2Non lactose fermenting gram negative rodsSusceptibility testing in progress*     Keppra  Level:  No lab results found.        ASSESSMENT     1. Persisting seizures. Presently on Keppra 1000 mg IV bid, and Vimpat 100 mg IV bid, and receiving Ativan now instead of Valium for intermittent seizure treatment. Apparently fewer seizures overnight, but clearly still occurring intermittently.    RECOMMENDATIONS   1. If his seizures continue at the present frequency I recommend increasing the Vimpat to 200 mg IV bid for improved control.        Adam Villegas M.D., Ph.D.    [facility]    [phone]  10/19/1924

## 2018-11-13 NOTE — PROGRESS NOTES
[facility]  Hospitalist Progress Note  Name: Edison Boss    MRN: [id number]  YOB: 1924    Age: 89 year old  Date of admission: 11/10/2018  Primary care provider: Porfirio Terrell      Reason for Stay (Diagnosis): Right-sided involuntary myoclonic jerking         Assessment and Plan:      Summary of Stay: Patient is a 89-year-old male with a complex history of prostate cancer, CVA, nephrolithiasis with a complicated UTI who was actually hospitalized on 9/24/18 with notable candidal UTI and candidal fungemia with 2 subsequent hospitalizations since then who presents here with uncontrollable right sided leg heaviness and involuntary jerking concerning for partial complex seizure.  This will be his fourth readmission since 9/24/18.  Other past medical history includes paroxysmal atrial fibrillation, sick sinus syndrome status post pacemaker, hypertension, dyslipidemia, chronic pain, GERD, and some degree of cognitive impairment.    Problem List/Plan:  1. Right-sided involuntary myoclonic jerking: neurology input appreciated.  Do agree that this is suspicious for a partial complex seizure.  Neurology input appreciated.  Did recommend an LP to rule out possible infection given recent serious from fungemia.  LP performed and results reviewed.  No significant inflammatory cells to suggest a meningoencephalitis process.  Cultures were sent including fungal CSF cultures.  Also appreciate neurology input.  Additional dose of Vimpat will be given and dose will be increased per neurology recommendations.  Dilaudid and Ativan seems to help best for allowing him to rest and not having so much myoclonic jerking.  Note neurology's recommendation for possible transfer to the ICU although he does not meet ICU criteria at this time and family is at the bedside for the most part and nursing is quite diligent in checking up on patient. We will plan on discontinuing catheter tomorrow  "after 1 day of IV Zosyn.  Also touch base with patient's primary urologist as patient's daughter reports that he was due for it to be removed with stent removal.  2. UTI: Started on ciprofloxacin but initial urine cultures positive for E. coli resistant to Cipro.  Will transition to Zosyn as two other bacterial strains are positive for non-lactose fermenting bacteria; await final ID and sensitivities.  Notable history of complicated UTI secondary to nephrolithiasis, now status post lithotripsy and double-J stent exchange on 10/30/18 by urology.  Also note relative recent history of complicated candidal UTI back in September with candidal fungemia requiring IV micafungin followed by fluconazole.  Blood cultures negative to date. Await final urine cultures.  CSF cultures for fungus is also ordered.  3. History of CVA: We will resume rectal aspirin  4. Hypertension: As patient is unable to swallow safely, will put patient on scheduled IV metoprolol.  5. Depressive disorder: Continue Paxil if able to swallow safely but could be held      DVT Prophylaxis: Pneumatic Compression Devices  Code Status: DNR / DNI  Discharge Dispo: Patient was at [facility] but now daughters have given a bed hold. To be determined.  Estimated Disch Date / # of Days until Disch: To be determined    Time spent: Greater than 40 minutes  Discussed with 2 daughters at the bedside        Interval History (Subjective):      Unable secondary to lethargy         Physical Exam:      Vital signs:  Temp: 97.9  F (36.6  C) Temp src: Axillary BP: 120/58 Pulse: 60 Heart Rate: 61 Resp: 18 SpO2: 93 % O2 Device: None (Room air) Oxygen Delivery: 2 LPM Height: 175.3 cm (5' 9\") Weight: 70.1 kg (154 lb 9.6 oz)  Estimated body mass index is 22.83 kg/(m^2) as calculated from the following:    Height as of this encounter: 1.753 m (5' 9\").    Weight as of this encounter: 70.1 kg (154 lb 9.6 oz).    I/O last 3 completed shifts:  In: 814 [P.O.:50; I.V.:764]  Out: 1250 " [Urine:1250]  Vitals:    11/10/18 1301   Weight: 70.1 kg (154 lb 9.6 oz)       Constitutional:  Lethargic but appears comfortable.  Otherwise, no apparent distress   Respiratory: Nl work of breathing.  Diminished breath sounds at the bases   Cardiovascular: Regular rate and rhythm, normal S1 and S2, and no murmur noted       Skin: No rashes, no cyanosis, dry to touch   Neuro:  Do note that patient still has involuntary jerking of the right leg and upper extremity but to less extreme than yesterday.  Unable to assess fully as patient is lethargict   Extremities: No edema, normal range of motion   HEENT Normocephalic, atraumatic, normal nasal turbinates; oropharynx clear   Neck Supple; nl inspection; trachea midline; no thryomegaly   Psychiatric:  Unable to assess secondary to lethargy          Medications:      All current medications were reviewed with changes reflected in problem list.         Data:      All new lab and imaging data was reviewed.   Labs:    Recent Labs  Lab 11/12/18  1220 11/11/18  1724 11/11/18  1713 11/10/18  2050   CULT Culture negative monitoring continues  PENDING No growth after 2 days No growth after 2 days 50,000 to 100,000 colonies/mLEscherichia coli*  10,000 to 50,000 colonies/mLStrain 2Escherichia coliSusceptibility testing in progress*       Recent Labs  Lab 11/13/18  0741 11/12/18  0630 11/11/18  0706   WBC 8.9 7.6 9.9   HGB 11.2* 11.4* 11.6*   HCT 36.3* 36.7* 37.6*   MCV 93 94 94    336 360       Recent Labs  Lab 11/13/18  0741 11/11/18  0706 11/10/18  1043   * 142 140   POTASSIUM 4.4 4.2 4.6   CHLORIDE 114* 111* 105   CO2 26 25 29   ANIONGAP 5 6 6   * 98 115*   BUN 13 24 24   CR 0.95 1.03 1.03   GFRESTIMATED 74 67 67   GFRESTBLACK 90 81 81   ARYAN 8.7 8.7 9.7   MAG  --   --  2.3       Recent Labs  Lab 11/10/18  2050   COLOR Yellow   APPEARANCE Cloudy   URINEGLC Negative   URINEBILI Negative   URINEKETONE Negative   SG 1.015   UBLD Negative   URINEPH 5.0   PROTEIN  100*   NITRITE Negative   LEUKEST Large*   RBCU 86*   WBCU >182*      Imaging:   No results found for this or any previous visit (from the past 24 hour(s)).    Marti Gomez [phone]

## 2018-11-13 NOTE — PLAN OF CARE
Problem: Patient Care Overview  Goal: Plan of Care/Patient Progress Review  Outcome: No Change  Vitals monitors. Afebrile. Lethargic. Tremors at times.  On IV medication for pain.  Receiving  IV Cipro. Assisted with repositioning and weight shift. Christianson catheter in place, patent.

## 2018-11-14 NOTE — PROGRESS NOTES
[facility]  Hospitalist Progress Note  Name: Edison Boss    MRN: [id number]  YOB: 1924    Age: 89 year old  Date of admission: 11/10/2018  Primary care provider: Porfirio Terrell      Reason for Stay (Diagnosis): Right-sided involuntary myoclonic jerking         Assessment and Plan:      Summary of Stay: Patient is a 89-year-old male with a complex history of prostate cancer, CVA, nephrolithiasis with a complicated UTI who was actually hospitalized on 9/24/18 with notable candidal UTI and candidal fungemia with 2 subsequent hospitalizations since then who presents here with uncontrollable right sided leg heaviness and involuntary jerking concerning for partial complex seizure.  This will be his fourth readmission since 9/24/18.  Other past medical history includes paroxysmal atrial fibrillation, sick sinus syndrome status post pacemaker, hypertension, dyslipidemia, chronic pain, GERD, and some degree of cognitive impairment.    Problem List/Plan:  1. Right-sided involuntary myoclonic jerking: neurology input appreciated.  Secondary to partial complex seizure.  Neurology input appreciated.  Did recommend an LP to rule out possible infection given recent serious from fungemia.  LP performed and results reviewed.  No significant inflammatory cells to suggest a meningoencephalitis process.  Cultures were sent including fungal CSF cultures.  Also appreciate neurology input.  Additional dose of Vimpat will be given and dose will be increased per neurology recommendations.  Dilaudid and Ativan seems to help best for allowing him to rest and not having so much myoclonic jerking.  Discussed with urology.  Will formally consult to discuss exchange of catheter and possible double-J stent removal as this was due to be removed yesterday.  2. UTI: Started on ciprofloxacin but initial urine cultures positive for E. coli resistant to Cipro.  Continue Zosyn for now.  Await  "final cultures.  Notable history of complicated UTI secondary to nephrolithiasis, status post lithotripsy and double-J stent exchange on 10/30/18 by urology.  Also note relative recent history of complicated candidal UTI back in September with candidal fungemia requiring IV micafungin followed by fluconazole.  Blood cultures negative to date. Await final urine cultures.    3. History of CVA: We will resume rectal aspirin  4. Hypertension: As patient is unable to swallow safely, will put patient on scheduled IV metoprolol.  5. Depressive disorder: Continue Paxil if able to swallow safely but could be held      DVT Prophylaxis: Pneumatic Compression Devices  Code Status: DNR / DNI  Discharge Dispo: Patient was at [facility] but now daughters have given a bed hold. To be determined.  Estimated Disch Date / # of Days until Disch: To be determined    Time spent: Greater than 35 minutes  Discussed with daughter at bedside        Interval History (Subjective):      Unable secondary to lethargy         Physical Exam:      Vital signs:  Temp: 98.2  F (36.8  C) Temp src: Axillary BP: 145/62 Pulse: 60 Heart Rate: 60 Resp: 16 SpO2: 98 % O2 Device: Oxymask Oxygen Delivery: 1 LPM Height: 175.3 cm (5' 9\") Weight: 70.1 kg (154 lb 9.6 oz)  Estimated body mass index is 22.83 kg/(m^2) as calculated from the following:    Height as of this encounter: 1.753 m (5' 9\").    Weight as of this encounter: 70.1 kg (154 lb 9.6 oz).    I/O last 3 completed shifts:  In: 964 [I.V.:964]  Out: 1000 [Urine:1000]  Vitals:    11/10/18 1301   Weight: 70.1 kg (154 lb 9.6 oz)       Constitutional:  Lethargic but appears comfortable.  Otherwise, no apparent distress   Respiratory: Nl work of breathing.  Diminished breath sounds at the bases   Cardiovascular: Regular rate and rhythm, normal S1 and S2, and no murmur noted       Skin: No rashes, no cyanosis, dry to touch   Neuro:  Do note that patient still has involuntary jerking of the right leg and upper " extremity but to less extreme than yesterday.  Unable to assess fully as patient is lethargict   Extremities: No edema, normal range of motion   HEENT Normocephalic, atraumatic, normal nasal turbinates; oropharynx clear   Neck Supple; nl inspection; trachea midline; no thryomegaly   Psychiatric:  Unable to assess secondary to lethargy          Medications:      All current medications were reviewed with changes reflected in problem list.         Data:      All new lab and imaging data was reviewed.   Labs:    Recent Labs  Lab 11/12/18  1220 11/11/18  1724 11/11/18  1713 11/10/18  2050   CULT Culture negative monitoring continues  PENDING No growth after 3 days No growth after 3 days 50,000 to 100,000 colonies/mLEscherichia coli*  10,000 to 50,000 colonies/mLStrain 2Escherichia coli*       Recent Labs  Lab 11/13/18  0741 11/12/18  0630 11/11/18  0706   WBC 8.9 7.6 9.9   HGB 11.2* 11.4* 11.6*   HCT 36.3* 36.7* 37.6*   MCV 93 94 94    336 360       Recent Labs  Lab 11/14/18  0450 11/13/18  0741 11/11/18  0706 11/10/18  1043    145* 142 140   POTASSIUM 4.6 4.4 4.2 4.6   CHLORIDE 112* 114* 111* 105   CO2 26 26 25 29   ANIONGAP 5 5 6 6   * 101* 98 115*   BUN 9 13 24 24   CR 0.97 0.95 1.03 1.03   GFRESTIMATED 72 74 67 67   GFRESTBLACK 87 90 81 81   ARYAN 8.2* 8.7 8.7 9.7   MAG  --   --   --  2.3       Recent Labs  Lab 11/10/18  2050   COLOR Yellow   APPEARANCE Cloudy   URINEGLC Negative   URINEBILI Negative   URINEKETONE Negative   SG 1.015   UBLD Negative   URINEPH 5.0   PROTEIN 100*   NITRITE Negative   LEUKEST Large*   RBCU 86*   WBCU >182*      Imaging:   No results found for this or any previous visit (from the past 24 hour(s)).    Marti Gomez [phone]

## 2018-11-14 NOTE — PROGRESS NOTES
10 fr nasal feeding tube placed to correct position under x-ray guidance by Dr. Diaz.  IR placed bridle security device.  Pt tolerated well.  No seizure activty noted during procedure.  Daughter updated.  Pt taken to Xray for KUB per cart. Tube flushed and ready for immediate use.  Report called to care nurse.

## 2018-11-14 NOTE — PROGRESS NOTES
[facility] Urology Progress Note          Assessment and Plan:   Active Problems:    Seizure (H), UTI    Assessment: Chronic Christianson, spastic neurogenic bladder, hx of prostate cancer, recurrent UTIs (2 strains of e coli).    Plan: Check KUB.  Dr. Lilly will review and finalize plan for stent tomorrow.   DO NOT EXCHANGE OR REMOVE Christianson. Discussed with RN.   On Zosyn.    Kacey Fernando PA-C  [facility] Urology  [phone]               Interval History:   Pt followed by Dr. Lilly with a history of seed implants for prostate cancer and a spastic neurogenic bladder that is managed with indwelling Christianson and periodic Botox injections.  The patient has been troubled by urinary tract infections and urosepsis with both bacterial and fungal elements.  He was admitted recently and had a large stone in the proximal left ureter.  This was stented by Dr. Richards.  He was treated for both bacterial and fungal urinary tract infection and underwent cysto, exchange of left JJ-stent, left renal lithotripsy and placement of new Christianson catheter on 10/30/18. The plan following this procedure was to follow up with Dr. Lilly in 3 weeks for a KUB and possible stent removal.     Admitted in the interim for seizure activity.  Daughter at bedside.               Review of Systems:   The 5 point Review of Systems is negative other than noted in the HPI             Medications:     Current Facility-Administered Medications Ordered in Epic   Medication Dose Route Frequency Last Rate Last Dose     0.9% sodium chloride BOLUS  30 mL Intravenous Once         acetaminophen (TYLENOL) Suppository 650 mg  650 mg Rectal Q4H PRN   650 mg at 11/11/18 2116     acetaminophen (TYLENOL) tablet 650 mg  650 mg Oral Q4H PRN   650 mg at 11/10/18 1810     albuterol (PROAIR HFA/PROVENTIL HFA/VENTOLIN HFA) 108 (90 Base) MCG/ACT inhaler 2 puff  2 puff Inhalation Q6H PRN         aspirin Suppository 300 mg  300 mg Rectal Daily   300 mg at 11/14/18 0818      dextrose 5% and 0.45% NaCl + KCl 20 mEq/L infusion   Intravenous Continuous 100 mL/hr at 11/14/18 0703       HYDROmorphone (PF) (DILAUDID) injection 0.3-0.5 mg  0.3-0.5 mg Intravenous Q2H PRN   0.3 mg at 11/14/18 0522     lacosamide (VIMPAT) 200 mg in sodium chloride 0.9 % 100 mL intermittent infusion  200 mg Intravenous BID   200 mg at 11/14/18 0833     levETIRAcetam (KEPPRA) intermittent infusion 1,500 mg  1,500 mg Intravenous Q12H   1,500 mg at 11/14/18 0819     lidocaine (LMX4) kit   Topical Q1H PRN         lidocaine (viscous) (XYLOCAINE) 2 % solution 5 mL  5 mL Topical Once         lidocaine 1 % 0.5-5 mL  0.5-5 mL Other Once PRN         lidocaine 1 % 0.5-5 mL  0.5-5 mL Other Q1H PRN         lidocaine 1 % 10 mL  10 mL Intradermal Once         LORazepam (ATIVAN) injection 0.5 mg  0.5 mg Intravenous Q2H PRN   0.5 mg at 11/14/18 0403     melatonin tablet 3 mg  3 mg Oral At Bedtime PRN   3 mg at 11/11/18 2116     metoprolol (LOPRESSOR) injection 5 mg  5 mg Intravenous Q6H   5 mg at 11/14/18 0624     miconazole (MICATIN; MICRO GUARD) 2 % powder   Topical Q1H PRN         naloxone (NARCAN) injection 0.1-0.4 mg  0.1-0.4 mg Intravenous Q2 Min PRN         ondansetron (ZOFRAN-ODT) ODT tab 4 mg  4 mg Oral Q6H PRN        Or     ondansetron (ZOFRAN) injection 4 mg  4 mg Intravenous Q6H PRN         opium-belladonna (B&O SUPPRETTES) 30-16.2 MG per suppository 1 suppository  30 mg Rectal BID PRN   1 suppository at 11/12/18 1953     oxyCODONE-acetaminophen (PERCOCET) 5-325 MG per tablet 1 tablet  1 tablet Oral Q4H PRN   1 tablet at 11/12/18 0746     pantoprazole (PROTONIX) 40 mg IV push injection  40 mg Intravenous Daily with breakfast   40 mg at 11/14/18 0820     PARoxetine (PAXIL) 5 mg, PARoxetine (PAXIL) 10 mg  15 mg Oral Daily   15 mg at 11/11/18 0801     piperacillin-tazobactam (ZOSYN) infusion 3.375 g  3.375 g Intravenous Q8H NAKIA 100 mL/hr at 11/14/18 0629 3.375 g at 11/14/18 0629     sodium chloride (PF) 0.9% PF flush 10  mL  10 mL Intracatheter Q8H   10 mL at 11/14/18 0838     sodium chloride (PF) 0.9% PF flush 10-20 mL  10-20 mL Intracatheter Q1H PRN         sodium chloride (PF) 0.9% PF flush 5-50 mL  5-50 mL Intracatheter Once PRN         No current Epic-ordered outpatient prescriptions on file.                  Physical Exam:   Vitals were reviewed  Patient Vitals for the past 8 hrs:   BP Temp Temp src Pulse Heart Rate Resp SpO2   11/14/18 0739 145/62 98.2  F (36.8  C) Axillary 60 - 16 98 %   11/14/18 0705 - - - - - 16 98 %   11/14/18 0629 - - - - - 16 -   11/14/18 0620 151/59 - - - 60 16 99 %   11/14/18 0537 - - - - - 16 -   11/14/18 0525 - - - - - 16 97 %   11/14/18 0355 - - - - - 16 99 %     GEN: NAD, lying in bed, attempting NG for tube feeds  MOUTH: MMM  NECK: Supple  RESP: Unlabored breathing  SKIN: Warm  ABD: soft  : Christianson draining and clear           Data:     Lab Results   Component Value Date    NTBNPI 7974 (H) 09/24/2018    NTBNPI 137 11/22/2008     Lab Results   Component Value Date    WBC 8.9 11/13/2018    WBC 7.6 11/12/2018    WBC 9.9 11/11/2018    HGB 11.2 (L) 11/13/2018    HGB 11.4 (L) 11/12/2018    HGB 11.6 (L) 11/11/2018    HCT 36.3 (L) 11/13/2018    HCT 36.7 (L) 11/12/2018    HCT 37.6 (L) 11/11/2018    MCV 93 11/13/2018    MCV 94 11/12/2018    MCV 94 11/11/2018     11/13/2018     11/12/2018     11/11/2018     Lab Results   Component Value Date    INR 1.09 08/28/2018    INR 1.8 (A) 09/24/2015    INR 2.2 (A) 09/03/2015

## 2018-11-14 NOTE — CONSULTS
CLINICAL NUTRITION SERVICES - REASSESSMENT NOTE + Registered Dietitian to Assess and Order TF per Medical Nutrition Therapy Recommendations    Recommendations Ordered by Registered Dietitian (RD):   Once FT successfully placed in IR -   Recommend TF as follows:     Type of Feeding Tube: ?NG (11/14 - IR placement pending)    Enteral Frequency:  Continuous    Enteral Regimen: Isosource 1.5 at 55 mL/hr    Total Enteral Provisions: 1320 mL provides 1980 kcal (28 kcal per kg), 90 gm protein (1.3 gm per kg), 232 gm CHO, 20 gm fiber and 1003 mL H2O    Meets > 100% of DRI's.    Free Water Flush: standard 60 mL q4 hours, increase to 160 mL q4hrs once IVF off      Daily electrolyte check, Mg and P04 add on    Daily weights    Start at 20 mL/hr, increase by 20 mL Q6Hrs until goal rate reached   Malnutrition:   % Weight Loss:  Up to 10% in 6 months (non-severe malnutrition)  % Intake:  Decreased intake does not meet criteria for malnutrition - per Daughter's report. <25% x 2 days  Subcutaneous Fat Loss:  Orbital region moderate depletion and Upper arm region at least mild depletion  Muscle Loss:  Temporal region moderate depletion, Clavicle bone region moderate depletion and Dorsal hand region mild-moderate depletion  Fluid Retention:  None noted    Malnutrition Diagnosis: Non-Severe malnutrition (or greater)   In Context of:  Acute illness or injury  Chronic illness or disease     EVALUATION OF PROGRESS TOWARD GOALS   Diet: regular  Intake:  Minimal PO over the past 2 days due to Dilauded and Ativan to prevent myocolonic jerking. Patient had been eating 25-75% earlier in admission when more alert and safe to eat.     Patient had been attending 1 meal daily while at [facility], and eating Wheaties in his room for an early afternoon meal. Per Daughter in room, Miguel Ángel had been doing very well at home. He liked to sleep late, so only eats 2 meals/day.     Meds:   - D5 + NaCl + KCl IVF @ 100 mL/hr --> 408 kcal daily from dextrose   -  Ativan/Dilauded PRN  Labs:   - Na 143, K 4.6 - NL    Recent Labs  Lab 11/14/18  0450 11/13/18  0741 11/11/18  0706 11/10/18  1043 11/08/18  1557   * 101* 98 115* 96     Lab Results   Component Value Date    A1C 5.5 11/30/2012     Stooling:   - Last BM 11/13  Weight Trending:   Vitals:    11/10/18 1301   Weight: 70.1 kg (154 lb 9.6 oz)       ASSESSED NUTRITION NEEDS  (PER APPROVED PRACTICE GUIDELINES, Dosing weight: 70 kg):  Estimated Energy Needs: 6533-4522+ kcals (25-30 Kcal/Kg)  Justification: maintenance  Estimated Protein Needs: + grams protein (1.2-1.5+ g pro/Kg)  Justification: preservation of lean body mass + wound healing  Estimated Fluid Needs: >1 mL/Kcal  Justification: maintenance    NEW FINDINGS:   - FT placement at bedside attempted this afternoon, unable to pass into stomach. Plan for IR placement this afternoon.   - ?J-Stent removal per Urology   - Neuro following for seizures     Previous Goals:   Patient to consume >/= 50% of meals TID and >/=1 high protein supplements per day  Evaluation: Not met --> EN start today    Previous Nutrition Diagnosis:   Unintended weight loss related to physical decline and frequent hospitalizations as evidenced by 10% weight loss in six months, stage 1 pressure injury and severe malnutrition criteria met during Oct 2018 admit  Evaluation: Declining with ?acute change in mentation    MALNUTRITION  % Weight Loss:  Up to 10% in 6 months (non-severe malnutrition)  % Intake:  Decreased intake does not meet criteria for malnutrition - per Daughter's report. <25% x 2 days  Subcutaneous Fat Loss:  Orbital region moderate depletion and Upper arm region at least mild depletion  Muscle Loss:  Temporal region moderate depletion, Clavicle bone region moderate depletion and Dorsal hand region mild-moderate depletion  Fluid Retention:  None noted    Malnutrition Diagnosis: Non-Severe malnutrition (or greater)   In Context of:  Acute illness or injury  Chronic illness or  disease    CURRENT NUTRITION DIAGNOSIS  Inadequate protein-energy intake related to decline in PO 2/2 medicine-induced lethargy for seizure management as evidenced by at least 2 days PO <25%, overall weight loss of up to 10% in 6 months, e/o fat and muscle wasting.     INTERVENTIONS  Recommendations / Nutrition Prescription  Once FT successfully placed in IR -   Recommend TF as follows:     Type of Feeding Tube: ?NG (11/14 - IR placement pending)    Enteral Frequency:  Continuous    Enteral Regimen: Isosource 1.5 at 55 mL/hr    Total Enteral Provisions: 1320 mL provides 1980 kcal (28 kcal per kg), 90 gm protein (1.3 gm per kg), 232 gm CHO, 20 gm fiber and 1003 mL H2O    Meets > 100% of DRI's.    Free Water Flush: standard 60 mL q4 hours, increase to 160 mL q4hrs once IVF off      Daily electrolyte check, Mg and P04 add on    Daily weights    Start at 20 mL/hr, increase by 20 mL Q6Hrs until goal rate reached      Implementation  EN Composition, EN Schedule, Feeding Tube Flush: as above  Collaboration and Referral of Nutrition care: discussed nutrition/FT placement POC with bedside RN    Goals  EN to provide % estimated needs once at goal.   EN to reach goal in the next 1-2 days.      MONITORING AND EVALUATION:  Progress towards goals will be monitored and evaluated per protocol and Practice Guidelines      Mimi Davies RD, LD  3rd floor/ICU: [phone]  All other floors: [phone]  Weekend/holiday: [phone]  Office: [phone]

## 2018-11-14 NOTE — PROCEDURES
SMALL BOWEL FEEDING TUBE PLACEMENT ASSESSMENT    Reason for Feeding Tube Placement:  Enteral nutrition initiation     Chart reviewed for contraindications or high risk placements: Yes     Medicine Delivered During Procedure: Lidocaine  Procedure Complications:  Unable to obtain even gastric tube tip    Placement Successful:  Unable to obtain even gastric tube tip --> IR placement, discussed with IR RNs, ok to leave FT (tip in esophagus per tracing) and bridle in place  Bridle secured: Bridle in place ready for securement when access obtained    Face to Face Time With Patient: 40 minutes      Peyton Cullen RD, LD  Clinical Dietitian  3rd floor/ICU: [phone]  All other floors: [phone]  Weekend/holiday: [phone]

## 2018-11-14 NOTE — PLAN OF CARE
Problem: Patient Care Overview  Goal: Plan of Care/Patient Progress Review  Outcome: Improving  VSS. 2L O2 per oxymask. Patient has been sleepy most of the night. Monitored frequently for tremors. Noted at 0400. Has received Ativan per neurology. Pain managed with dilaudid. On IV Zosyn. Christianson catheter in place. Frequent repositioning. Mouth care provided.

## 2018-11-14 NOTE — PROGRESS NOTES
"Neurology Follow Up Note  The [facility]       [2018]           Edison Boss        89 year old yo male with UTI and seizures, likely arising from a prior left cerebral stroke   Admission Date: 11/10/2018  Hospital Day: 5  Code Status: DNR/DNI    S:  I saw patient while he was on a gurney for transport to radiology.    O:  He remains very drowsy. According to a family member and his nurse he has not had a seizure since 9 am this morning, brief. He reportedly did well overnight as well. He remains on Keppra 1500 mg IV bid and Vimpat 200 mg IV bid.    BLOOD PRESSURE:   BP Readings from Last 3 Encounters:   18 182/78   18 163/77   18 150/73     PULSE: 60    Height:  5' 9\"       Weight:  154 lbs 9.6 oz    Temperature: 98.2  Tmax 24 : Temp (24hrs), Av.9  F (36.1  C), Min:96.3  F (35.7  C), Max:98.2  F (36.8  C)    Vitals: Ranges  Temp:  [96.3  F (35.7  C)-98.2  F (36.8  C)] 98.2  F (36.8  C)  Pulse:  [60] 60  Heart Rate:  [58-62] 60  Resp:  [16] 16  BP: (143-182)/(59-78) 182/78  SpO2:  [87 %-100 %] 98 %    Medications:    sodium chloride 0.9%  30 mL Intravenous Once     aspirin  300 mg Rectal Daily     lacosamide (VIMPAT) intermittent infusion  200 mg Intravenous BID     levETIRAcetam  1,500 mg Intravenous Q12H     lidocaine  10 mL Intradermal Once     metoprolol  5 mg Intravenous Q6H     pantoprazole (PROTONIX) IV  40 mg Intravenous Daily with breakfast     [START ON 11/15/2018] PARoxetine  15 mg Oral or Feeding Tube Daily     piperacillin-tazobactam  3.375 g Intravenous Q8H NAKIA     sodium chloride (PF)  10 mL Intracatheter Q8H         LABORATORY RESULTS       SMA-7:    Recent Labs  Lab 18  0450 18  0741 18  0706    145* 142   POTASSIUM 4.6 4.4 4.2   CHLORIDE 112* 114* 111*   CO2 26 26 25   * 101* 98   BUN 9 13 24   CR 0.97 0.95 1.03   CMP:    Recent Labs  Lab 18  0450 18  0741 18  0706 " 11/10/18  1043   ARYAN 8.2* 8.7 8.7 9.7   MAG 2.0  --   --  2.3   PHOS 2.9  --   --   --    CBC:      Recent Labs  Lab 11/13/18  0741 11/12/18  0630 11/11/18  0706 11/10/18  1043   WBC 8.9 7.6 9.9 10.6   RBC 3.89* 3.91* 4.02* 4.48   HGB 11.2* 11.4* 11.6* 13.1*   HCT 36.3* 36.7* 37.6* 40.8   MCV 93 94 94 91    336 360 406     U/A:    Recent Labs   Lab Test  11/10/18   2050  10/19/16   1129   COLOR  Yellow  Yellow   APPEARANCE  Cloudy  Clear   URINEGLC  Negative  Negative   URINEBILI  Negative  Small  This is an unconfirmed screening test result. A positive result may be false.  *   URINEKETONE  Negative  Trace*   SG  1.015  >1.030   UBLD  Negative  Large*   URINEPH  5.0  5.5   PROTEIN  100*  >=300*   UROBILINOGEN   --   1.0   NITRITE  Negative  Positive*   LEUKEST  Large*  Large*   RBCU  86*   --    WBCU  >182*   --     < > = values in this interval not displayed.     HgA1c:   Hemoglobin A1C   Date Value Ref Range Status   11/30/2012 5.5 4.3 - 6.0 % Final   Vitamin B12:   Recent Labs   Lab Test  10/20/16   1516  09/12/12   1326   B12  367  339     ESR:   Recent Labs   Lab Test  10/16/18   1050  05/08/15   1510   SED  47*  7     CRP:   Lab Results   Component Value Date    .0 09/24/2018    CRP <2.9 05/08/2015     Blood Cultures:   Recent Labs  Lab 11/12/18  1220 11/11/18  1724 11/11/18  1713 11/10/18  2050   CULT Culture negative after 2 days  Culture negative monitoring continues No growth after 3 days No growth after 3 days 50,000 to 100,000 colonies/mLEscherichia coli*  10,000 to 50,000 colonies/mLStrain 2Escherichia coli*     Cholesterol Panel: Lab Results   Component Value Date    CHOL 96 09/25/2018    HDL 19 (L) 09/25/2018    LDL 54 09/25/2018    TRIG 113 09/25/2018    CHOLHDLRATIO 3.6 11/23/2008    VLDL 21 11/23/2008     Keppra  Level:  No lab results found.    Xr Chest 2 Views    Result Date: 10/17/2018  XR CHEST 2 VW   10/17/2018 5:38 PM HISTORY: Altered mental status. COMPARISON: Film dated  9/29/2018 FINDINGS: Left cardiac device is in place. Cardiac silhouette is at the upper limits of normal. There is been an improvement in the left pleural effusion when compared to 9/29/2018. No significant pleural fluid is seen on today's film. Mild right basilar opacity consistent with a small area of infiltrate or atelectasis.  There is also a small area of infiltrate or fibrosis in the right midlung zone. The pulmonary vasculature is normal.  The bones and soft tissues are unremarkable.     IMPRESSION: Improved infiltrate and pleural effusions.    ARUN RODRIGUEZ MD    Ct Abdomen Pelvis W Contrast    Result Date: 10/17/2018  CT ABDOMEN AND PELVIS WITH CONTRAST 10/17/2018 5:34 PM HISTORY: History of ureteral stent, flank pain. CONTRAST DOSE: 78mL Isovue-370 Radiation dose for this scan was reduced using automated exposure control, adjustment of the mA and/or kV according to patient size, or iterative reconstruction technique. FINDINGS: Left ureteral stent is in place, new since 9/27/2018. There is no hydronephrosis. Proximal ureteral stone adjacent to the stent is noted, probably unchanged in position from 9/27/2018. Bilateral renal cysts and left renal stones are again noted. Bowel-containing right inguinal hernia is again noted without evidence of incarceration or bowel obstruction. No free peritoneal fluid or air. The remainder of the CT also appears unchanged. Christianson catheter is in place. Left adrenal 1.9 cm nodule is noted.     IMPRESSION: Left ureteral stent in place without left hydronephrosis. Proximal left ureteral stone is probably unchanged in position from 9/27/2018. This scan is otherwise unchanged in appearance compared to 9/27/2018 and 8/28/2018. VLADIMIR GUILLAUME MD    Ct Cervical Spine W Contrast    Result Date: 11/10/2018  CT CERVICAL SPINE WITH CONTRAST 11/10/2018 5:18 PM HISTORY:  Right-sided movement disorder.   TECHNIQUE:  Axial images of the cervical spine were obtained without intravenous contrast.  Coronal and sagittal reformations were performed.  Radiation dose for this scan was reduced using automated exposure control, adjustment of the mA and/or kV according to patient size, or iterative reconstruction technique. COMPARISON: Scan dated 9/17/2018. FINDINGS: There are 7  cervical type vertebrae used for the purpose of this dictation. There is reversal of the cervical lordosis.   There is ankylosis or fusion of the C5 and C6 vertebral bodies. C1-C2:  Calcified pannus is seen posterior to the odontoid. There is loss of joint space between the odontoid and anterior arch of C1. These findings can be due to pseudogout. C2-C3:  Mild annular disc bulge. Severe degenerative change in the left facet joint. C3-C4:  Severe degenerative changes in the facet joints. Central canal lower limits of normal. Moderate-severe bilateral foraminal stenosis. C4-C5:  Mild bulge. Severe degenerative change in the facet joints. Central canal mildly narrowed. Moderate right foraminal stenosis and mild left foraminal stenosis. C5-C6:  Ankylosis of the vertebral bodies. Central canal and neural foramen are patent. C6-C7:  Severe degenerative change with complete loss of disc space. Central canal and neural foramen are patent. C7-T1:  Central canal and neural foramen are patent.     IMPRESSION: 1. No acute pathology. No fractures are identified. 2. Multilevel degenerative change. When compared to 9/17/2018, there has been no significant change. ARUN RODRIGUEZ MD    Ct Head W Contrast    Result Date: 11/10/2018  CT HEAD W CONTRAST   11/10/2018 5:18 PM HISTORY: R leg weakness; TECHNIQUE:  Axial images of the head without 70 mLIV contrast material. Radiation dose for this scan was reduced using automated exposure control, adjustment of the mA and/or kV according to patient size, or iterative reconstruction technique. COMPARISON: CT dated 11/8/2018 FINDINGS: The ventricles are normal in size, shape and configuration. The brain parenchyma and  subarachnoid spaces are normal. There is no evidence of intracranial hemorrhage, mass, acute infarct or anomaly. The visualized portions of the sinuses and mastoids appear normal. There is no evidence of trauma.     IMPRESSION:  No acute pathology, no bleed, mass, or infarcts are seen. No enhancing lesions are identified.  ARUN RODRIGUEZ MD    Ct Head W/o Contrast    Result Date: 11/8/2018  CT OF THE HEAD WITHOUT CONTRAST 11/8/2018 4:23 PM COMPARISON: Head CT 9/24/2018 HISTORY: Right leg weakness with intermittent rhythmic tremor, has pacemaker. TECHNIQUE: 5 mm thick axial CT images of the head were acquired without IV contrast material. FINDINGS: There is moderate diffuse cerebral volume loss. There are subtle patchy areas of decreased density in the cerebral white matter bilaterally that are consistent with sequela of chronic small vessel ischemic disease. The ventricles and basal cisterns are within normal limits in configuration given the degree of cerebral volume loss. There is no midline shift. There are no extra-axial fluid collections. No intracranial hemorrhage, mass or recent infarct. The visualized paranasal sinuses are well-aerated. There is no mastoiditis. There are no fractures of the visualized bones.     IMPRESSION: Diffuse cerebral volume loss and cerebral white matter changes consistent with chronic small vessel ischemic disease. No evidence for acute intracranial pathology. Radiation dose for this scan was reduced using automated exposure control, adjustment of the mA and/or kV according to patient size, or iterative reconstruction technique. ISSAC SINGLETON MD    Lumbar Spine Ct W/o Contrast    Result Date: 11/8/2018  CT LUMBAR SPINE WITHOUT CONTRAST  11/8/2018 4:24 PM HISTORY: Right leg weakness with intermittent rhythmic tremor, has pacemaker.  TECHNIQUE: Axial images of the lumbar spine were obtained without intravenous contrast. Multiplanar reformations were performed. Radiation dose for this scan  was reduced using automated exposure control, adjustment of the mA and/or kV according to patient size, or iterative reconstruction technique. COMPARISON: None. FINDINGS: Reconstructed images demonstrate old superior endplate compression fracture of L2. No evidence of acute compression. Diffuse osteopenia. Mild right convex lumbar scoliosis. Axial scans were performed from T12 to sacrum. Vertebra are numbered assuming 5 lumbar-type vertebra. T12-L1:  No disc herniation or stenosis. Facet joints are unremarkable. L1-L2: No disc herniation or stenosis. Anterior osteophyte formation at this level. L2-L3:  Degenerative disc disease with vacuum disc phenomenon. Mild disc bulge. Mild facet and ligamentum flavum hypertrophy. Mild central stenosis. Mild to moderate bilateral foraminal stenosis. L3-L4:  Grade 1 degenerative spondylolisthesis with disc bulge and osteophytic ridging. Severe left and moderate to severe right foraminal stenosis. Moderate central stenosis. L4-L5:  Degenerative disc disease with vacuum disc phenomenon. Mild disc bulge and facet and ligamentum flavum hypertrophy. Mild to moderate central stenosis. Severe right and moderate left foraminal stenosis. L5-S1:  Advanced degenerative disc disease with vacuum disc phenomenon. Moderate right and mild left facet degenerative changes. Mild disc bulge. Mild central stenosis. Severe right foraminal stenosis. Mild to moderate left foraminal stenosis.  There is a large left renal cortical cyst. Atherosclerotic changes of the aorta without evidence of aneurysm.     IMPRESSION:  1. Chronic/old superior endplate compression fracture of L2. No evidence of acute fracture. 2. At L2-L3 there is mild central stenosis. Mild to moderate bilateral foraminal stenosis. 3. At L3-L4 there is grade 1 degenerative spondylolisthesis. Moderate central stenosis. Severe left and moderate to severe right foraminal stenosis. 4. At L4-L5 there is mild to moderate central stenosis.  Severe right and moderate left foraminal stenosis. 5. At L5-S1 mild central stenosis. Severe right and mild to moderate left foraminal stenosis.     Xr Kub    Result Date: 11/14/2018  XR KUB 11/14/2018 2:04 PM HISTORY: s/p left renal litotripsy and stent, eval stent position and residual stone burden.; COMPARISON: 10/30/2018     IMPRESSION: Feeding tube in place with tip at the projection of the duodenal jejunal junction. Double-J left ureteral stent. There is a 4 mm calcification in the projection of the left kidney. Additional calcifications that were seen on the prior radiograph are no longer seen. There is radiopaque contrast material in the bowel. Radiation therapy seeds in the prostate. IRVING COPE MD    Xr Kub    Result Date: 10/30/2018  XR KUB 10/30/2018 1:26 PM HISTORY: Kidney stone. COMPARISON: Abdomen and pelvis CT, 10/17/2018 FINDINGS: Left nephroureteral stent appears appropriately positioned. The stone which was previously seen in the proximal left ureter appears to have migrated back into the lower pole of the left kidney. There is no specific evidence of a currently obstructive calculus.     IMPRESSION: Previously seen proximal left ureteral stone appears to have migrated back into the lower pole of the left kidney. OLIVA FERREIRA MD    Xr Lumbar Puncture Spinal Tap Diag    Result Date: 11/13/2018  LUMBAR PUNCTURE WITH FLUOROSCOPIC GUIDANCE November 12, 2018 12:40 PM HISTORY: 89-year-old patient with lethargy and myoclonic jerking, request made for diagnostic lumbar puncture. TECHNIQUE: Patient was brought to the radiology department and informed consent was obtained with a family member. Patient was placed in a prone position. Skin overlying lower back was prepped and draped in standard sterile fashion. 1% lidocaine was used for local anesthesia for a total of 5 mL. 22-gauge needle was advanced into the L4 level of the spinal canal. Total of 9 mL of clear CSF was removed. Sample sent to the lab  for evaluation. Patient tolerated the procedure relatively well. Total fluoroscopic time: 3 seconds Total fluoroscopic dose: 5 mGy. FINDINGS: AP and lateral spot fluoroscopic images demonstrate needle at the L4 level. Lateral radiograph demonstrates the needle tip at the posterior margin of the thecal sac. The needle was subsequently advanced 1 cm to get adequate drainage.     IMPRESSION: Uncomplicated fluoroscopically guided lumbar puncture. 9 mL of clear cerebral spinal fluid sent to the lab for evaluation. TOM MOSS MD    Recent Results (from the past 24 hour(s))   XR KUB    Narrative    XR KUB 11/14/2018 2:04 PM     HISTORY: s/p left renal litotripsy and stent, eval stent position and  residual stone burden.;     COMPARISON: 10/30/2018      Impression    IMPRESSION: Feeding tube in place with tip at the projection of the  duodenal jejunal junction. Double-J left ureteral stent. There is a 4  mm calcification in the projection of the left kidney. Additional  calcifications that were seen on the prior radiograph are no longer  seen. There is radiopaque contrast material in the bowel. Radiation  therapy seeds in the prostate.    IRVING COPE MD         ASSESSMENT     1. Left cerebral seizure focus with right predominant arm and leg tonic and clonic seizure activity, with apparent partial awareness, under much improved control.    RECOMMENDATIONS   1. Continue the Keppra 1500 mg IV bid and Vimpat 200 mg IV bid for now. I would hold the ativan unless he has unusual seizure activity. It will likely take days for him to become more alert dur to the prolonged effects of the ativan.  2. Once his seizures have resolved completely, slow taper of the Vimpat and Keppra can be started, with goal of reducing his medication to one seizure medication for long-term epilepsy control.  3. Continue supportive care for his infection.        Adam Villegas M.D., Ph.D.    [facility]     [phone]  10/19/1924

## 2018-11-14 NOTE — PLAN OF CARE
Problem: Patient Care Overview  Goal: Plan of Care/Patient Progress Review  Pt lethargic, non verbal. VSS. Mid line placed. Seen by neurologist. Keppra dose increased. Frequency and duration of seizures decreased. Ativan held when O2 saturation decreased. Now on 4L Oxymask. Dilaudid given for restlessness, and moaning. NPO.  Christianson patent and draining. Q2 turns. Bed alarm on. Continue with POC.

## 2018-11-14 NOTE — PLAN OF CARE
Problem: Pain, Acute (Adult)  Goal: Identify Related Risk Factors and Signs and Symptoms  Related risk factors and signs and symptoms are identified upon initiation of Human Response Clinical Practice Guideline (CPG).   Disoriented x4. Soloment most of shift. Resting comfortably in bed this shift. No moaning/groaning noted. Per daughter she would like him more awake and wants to try not to use IV dilaudid or ativan unless pt very restless. Christianson in place and per uroogy to remain in place s/p stent. VSS ex high BP. 02 doing ok on ra. Tube feeding started 20ml/hr. Lift with A2. IV abx. 2 <30 sec tremors noted early in shift, none since. Discharge back to TCU when ready.

## 2018-11-14 NOTE — PROGRESS NOTES
"Neurology Follow Up Note  [facility]       [2018]           Edison Boss        89 year old yo male with UTI, and difficulty to control seizures arising from his left cerebrum, likely related to a prior left cerebral stroke.   Admission Date: 11/10/2018  Hospital Day: 5  Code Status: DNR/DNI    S:  He has had very few seizures through the night according to his nurse. His O2 levels decline during the night, and he is now on supplemental O2, presently 2 L/min. He is now taking Keppra 1500 mg IV bid, and Vimpat 200 mg IV bid.    O:    BLOOD PRESSURE:   BP Readings from Last 3 Encounters:   18 151/59   18 163/77   18 150/73     PULSE: 60    Height:  5' 9\"       Weight:  154 lbs 9.6 oz    Temperature: 96.6  Tmax 24 : Temp (24hrs), Av.9  F (36.1  C), Min:96.3  F (35.7  C), Max:97.9  F (36.6  C)    Vitals: Ranges  Temp:  [96.3  F (35.7  C)-97.9  F (36.6  C)] 96.6  F (35.9  C)  Pulse:  [58-62] 60  Heart Rate:  [58-62] 60  Resp:  [16-18] 16  BP: (120-164)/(58-75) 151/59  SpO2:  [87 %-100 %] 99 %    Medications:    sodium chloride 0.9%  30 mL Intravenous Once     aspirin  300 mg Rectal Daily     lacosamide (VIMPAT) intermittent infusion  200 mg Intravenous BID     levETIRAcetam  1,500 mg Intravenous Q12H     lidocaine  10 mL Intradermal Once     metoprolol  5 mg Intravenous Q6H     pantoprazole (PROTONIX) IV  40 mg Intravenous Daily with breakfast     PARoxetine  15 mg Oral Daily     piperacillin-tazobactam  3.375 g Intravenous Q8H NAKIA     sodium chloride (PF)  10 mL Intracatheter Q8H     Keppra  Level:  No lab results found.      ASSESSMENT     1. He appears improved with respect to the seizures.    RECOMMENDATIONS   1. Cnntinue the Keppra and Vimpat doses unchanged for now.  2. Awaiting Keppra levels from yesterday.  3. Continue supportive care for UTI/positive blood cultures.        Adam Villegas M.D., Ph.D.    [facility]    [phone]  10/19/1924

## 2018-11-15 NOTE — PROVIDER NOTIFICATION
"Dr Gomez paged \"pt phos 1.5, TF started last night. no electrolyte protocols ordered. Thanks!\" Will pass along to day RN  "

## 2018-11-15 NOTE — PLAN OF CARE
Problem: Pain, Acute (Adult)  Goal: Identify Related Risk Factors and Signs and Symptoms  Related risk factors and signs and symptoms are identified upon initiation of Human Response Clinical Practice Guideline (CPG).   Outcome: No Change  Lethargic, moving BUE to rub face & head  Redirecting pt to not pull on NGT  Repo q2h, HOB >30 deg, freq oral cares  LDA: Midline infusing, PIV SL, IV zosyn for UTI  Vitals: stable, IV metoprolol q6h, RA  Pain: Moaning w/movement, PRN tylenol & IV dilaudid  NGT patent, TF inc to goal rate of 55ml/h @ 0400, no residual  Skin: Mepilexes in place, coccyx janice  GI/: Chronic tolliver patent, hypoBS  Followed by Neurology, Urology, WOCN, SW, PT, OT  Plan: Poss stent removal, DC TCU when able  Son & dtr at bedside  Will continue to monitor    0530: Pt restless & moaning, positive response when asked pt if having pain. Dtr wanted pain meds for pt, but per dtr request she wanted to hold on giving sedating medications. This writer explained that PRN tylenol is not due until 0630. 0.3mg IV dilaudid given to pt per dtr request for pain, pt now resting comfortably.

## 2018-11-15 NOTE — PLAN OF CARE
Problem: Patient Care Overview  Goal: Plan of Care/Patient Progress Review  Outcome: Improving  Pt bedrest. Repositioned frequently, at least every 2 hours. occassional moaning/groaning, agitation. Using IV dilaudid and ativan- please ask family if they are ok with that before administration. Family continually at bedside making sure pt is not pullling out NG or midline. Phosphorous currently being replaced. On IV zoysn, VIMPAT, keppra. No seizure activity this shift. Pt still unable to verbally respond, opens eyes spontaneously. foely patent and draining. Pt seems to be irritated with NG tube so choloraseptic spray ordered. Will continue to monitor.

## 2018-11-15 NOTE — PLAN OF CARE
Problem: Patient Care Overview  Goal: Plan of Care/Patient Progress Review  Outcome: No Change  Pt somnolent, open eyes only to stimulation.  Lungs clear, diminished in bases,Ra SATS 90'S.  Vital signs stable, elevated bp, on metoprolol iv.  Mepilex dressing intact to coccyx and right elbow,  Pt resting comfortably, daughter requested that pt not be medicated with ativan unless he is restless.Would like for pt  To be more awake and alert.  Tube feedings started this evening,Head of bed elevated 45 degrees.Pt tolerating tube feedings.rate increase to 40 mls per hr at 10 pm, goal rate 55 mls.  Turned, repositioned q 2 hrly, oral and ezio, catheter cares done.  Seizure pads in place.  Falls risk and seizure precautions.  Son staying overnight.  Care plan reviewed with family.

## 2018-11-15 NOTE — PROGRESS NOTES
[facility]  Hospitalist Progress Note  Name: Edison Boss    MRN: [id number]  YOB: 1924    Age: 89 year old  Date of admission: 11/10/2018  Primary care provider: Porfirio Terrell      Reason for Stay (Diagnosis): Right-sided involuntary myoclonic jerking         Assessment and Plan:      Summary of Stay: Patient is a 89-year-old male with a complex history of prostate cancer, CVA, nephrolithiasis with a complicated UTI who was actually hospitalized on 9/24/18 with notable candidal UTI and candidal fungemia with 2 subsequent hospitalizations since then who presents here with uncontrollable right sided leg heaviness and involuntary jerking concerning for partial complex seizure.  This will be his fourth readmission since 9/24/18.  Other past medical history includes paroxysmal atrial fibrillation, sick sinus syndrome status post pacemaker, hypertension, dyslipidemia, chronic pain, GERD, and some degree of cognitive impairment.    Problem List/Plan:  1. Right-sided involuntary myoclonic jerking: neurology input appreciated.  Secondary to partial complex seizure.  Neurology assistance appreciated.  Did recommend an LP to rule out possible infection given recent serious from fungemia on 11/12/18.  LP performed and results reviewed.  No significant inflammatory cells to suggest a meningoencephalitis process.  Cultures were sent including fungal CSF cultures and remains negative to date.  Continue Keppra and Vimpat. Dilaudid and Ativan seems to help best for allowing him to rest and not having so much myoclonic jerking.   2. UTI: Started on ciprofloxacin but initial urine cultures positive for E. coli resistant to Cipro. Patient was then transitioned to IV Zosyn as there were reported strains of non-lactose fermenting gram-negative rods and given recent hospitalization stay, wanted to cover Pseudomonas with history of recent frequent hospitalizations. However, final  "cultures only grew 2 strains of E. coli which. Unfortunately, patient is allergic to ceftriaxone. Given limited antibiotic selection with allergies, will continue IV Zosyn for now.  Could consider infectious disease consultation although may just need a short course of antibiotics here is necessary.  Consider repeating urinalysis.  Notable recent history of complicated UTI secondary to nephrolithiasis, status post lithotripsy and double-J stent exchange on 10/30/18 by urology.  Also note relative recent history of complicated candidal UTI back in September with candidal fungemia requiring IV micafungin followed by fluconazole.  Blood cultures negative to date. Await final urine cultures.  Discussed with urology. Did formally consult urology on 11/14/18 and personally spoke with the PA to discuss exchange of catheter and possible double-J stent removal as this was due to be removed on 11/13/18.  Consultation still pending.  3. History of CVA: We will resume rectal aspirin  4. Hypertension: As patient is unable to swallow safely, we will continue patient on scheduled IV metoprolol.  5. Depressive disorder: Continue Paxil if able to swallow safely but could be held      DVT Prophylaxis: Pneumatic Compression Devices  Code Status: DNR / DNI  Discharge Dispo: Patient was at [facility] but now daughters have given a bed hold. To be determined.  Estimated Disch Date / # of Days until Disch: To be determined    Time spent: Greater than 35 minutes  Discussed with daughter at bedside        Interval History (Subjective):      Unable secondary to lethargy         Physical Exam:      Vital signs:  Temp: 97.5  F (36.4  C) Temp src: Axillary BP: 135/75   Heart Rate: 61 Resp: 16 SpO2: 92 % O2 Device: None (Room air) Oxygen Delivery: 1 LPM Height: 175.3 cm (5' 9\") Weight: 75.8 kg (167 lb 3.2 oz)  Estimated body mass index is 24.69 kg/(m^2) as calculated from the following:    Height as of this encounter: 1.753 m (5' 9\").    Weight as " of this encounter: 75.8 kg (167 lb 3.2 oz).    I/O last 3 completed shifts:  In: 2708 [I.V.:1878; NG/GT:360]  Out: 1950 [Urine:1950]  Vitals:    11/10/18 1301 11/15/18 0610 11/15/18 0800   Weight: 70.1 kg (154 lb 9.6 oz) 73.9 kg (163 lb) 75.8 kg (167 lb 3.2 oz)       Constitutional:  Lethargic but appears comfortable.  Otherwise, no apparent distress   Respiratory: Nl work of breathing.  Diminished breath sounds at the bases   Cardiovascular: Regular rate and rhythm, normal S1 and S2, and no murmur noted       Skin: No rashes, no cyanosis, dry to touch   Neuro:  Do note that patient still has involuntary jerking of the right leg and upper extremity but to less extreme than yesterday.  Unable to assess fully as patient is lethargict   Extremities: No edema, normal range of motion   HEENT Normocephalic, atraumatic, normal nasal turbinates; oropharynx clear   Neck Supple; nl inspection; trachea midline; no thryomegaly   Psychiatric:  Unable to assess secondary to lethargy          Medications:      All current medications were reviewed with changes reflected in problem list.         Data:      All new lab and imaging data was reviewed.   Labs:    Recent Labs  Lab 11/12/18  1220 11/11/18  1724 11/11/18  1713 11/10/18  2050   CULT Culture negative after 3 days  Culture negative monitoring continues No growth after 4 days No growth after 4 days 50,000 to 100,000 colonies/mLEscherichia coli*  10,000 to 50,000 colonies/mLStrain 2Escherichia coli*       Recent Labs  Lab 11/15/18  0630 11/13/18  0741 11/12/18  0630   WBC 10.4 8.9 7.6   HGB 11.1* 11.2* 11.4*   HCT 35.6* 36.3* 36.7*   MCV 93 93 94    362 336       Recent Labs  Lab 11/15/18  0630 11/14/18  0450 11/13/18  0741  11/10/18  1043    143 145*  < > 140   POTASSIUM 4.4 4.6 4.4  < > 4.6   CHLORIDE 111* 112* 114*  < > 105   CO2 28 26 26  < > 29   ANIONGAP 2* 5 5  < > 6   * 111* 101*  < > 115*   BUN 12 9 13  < > 24   CR 0.89 0.97 0.95  < > 1.03    GFRESTIMATED 80 72 74  < > 67   GFRESTBLACK >90 87 90  < > 81   ARYAN 8.2* 8.2* 8.7  < > 9.7   MAG 1.9 2.0  --   --  2.3   PHOS 1.5* 2.9  --   --   --    < > = values in this interval not displayed.    Recent Labs  Lab 11/10/18  2050   COLOR Yellow   APPEARANCE Cloudy   URINEGLC Negative   URINEBILI Negative   URINEKETONE Negative   SG 1.015   UBLD Negative   URINEPH 5.0   PROTEIN 100*   NITRITE Negative   LEUKEST Large*   RBCU 86*   WBCU >182*      Imaging:   No results found for this or any previous visit (from the past 24 hour(s)).    Marti Gomez -[phone]

## 2018-11-16 NOTE — CONSULTS
.Care Transition Initial Assessment - SW  Reason For Consult: Request received to see regarding discharge planning, chart reviewed noting status including plan of care. Noted pt with tube feeding at this time. Specifics of discharge needs yet to be determined.   Met with: Family  Active Problems:    Seizure (H)       DATA  Lives With:  (lives at [facility], admitted from [facility])     Description of Support System: Supportive, Involved  Who is your support system?: Children    Quality Of Family Relationships: supportive, involved    INTERVENTION   Met this morning with pt's daughters Nguyen (HCPOA) and Bea.. discussed with them SW availability during pt's continued hospitalization for support and discharge planning. They have informed that they are hopeful that pt would improve for consideration of his return to his residence at [facility].. they have noted that since mid-September they have been seeing changes in his health and has had stays at [facility]. To note, pt's wife  in March of this year.      Daughters were provided with SW number, asking that they contact SW for support and questions regarding discharge planning moving forward.     ASSESSMENT  Cognitive Status: asleep during visit with daughters today  Good family support.       PLAN     SW following, will continue to provide support and discharge planning per MD determination of specifics of discharge needs, and identification of discharge date.

## 2018-11-16 NOTE — PROGRESS NOTES
[facility] Nurse Inpatient Adult Pressure Injury (PI) Wound Assessment     Assessment of PI(s) on pt's:   Gluteal Cleft/ Coccyx    Data:   Patient History:      per MD note(s):  89-year-old male with a complex history of prostate cancer, CVA, nephrolithiasis with a complicated UTI who was actually hospitalized on 18 with notable candidal UTI and candidal fungemia with 2 subsequent hospitalizations since then who presents here with uncontrollable right sided leg heaviness and involuntary jerking concerning for partial complex seizure.  This will be his fourth readmission since 18.  Other past medical history includes paroxysmal atrial fibrillation, sick sinus syndrome status post pacemaker, hypertension, dyslipidemia, chronic pain, GERD, and some degree of cognitive impairment.        Mattress:  Standard , Atmos Air mattress  Current pressure relieving devices:  Mepilex dressing and Pillows    Moisture Management:  Diaper and Urinary Catheter    Catheter secured? Yes    Current Diet / Nutrition:       Active Diet Order      Regular Diet Adult    Pepe Assessment and sub scores:   Pepe Score  Av.9  Min: 15  Max: 23   Labs:   Recent Labs   Lab Test  11/15/18   0630   10/17/18   1609   18   1204   18   0946   12   1311   ALBUMIN   --    --   2.7*   < >   --    < >   --    < >  3.4   HGB  11.1*   < >  11.6*   < >  12.2*   < >  14.9   < >  14.4   INR   --    --    --    --    --    --   1.09   < >  2.81*   WBC  10.4   < >  16.6*   < >  24.6*   < >  8.7   < >  7.0   A1C   --    --    --    --    --    --    --    --   5.5   CRP   --    --    --    --   168.0*   --    --    < >   --     < > = values in this interval not displayed.                                                                                                                      Pressure Injury Assessment  (location #1):   Gluteal cleft/ coccyx      Wound History:   See above; pt has a tolliver with  residual overflow leakage around cuff occasionally;     Specific Dimensions (length x width x depth, in cm) :   Skin intact with slow to non blanchable erythema after directly turning pt over to side to left buttocks/ IT, very superficial erosion of epidermis to coccyx with blanchable erythema; present on admission.    Periwound Skin: intact,      Color: normal and consistent with surrounding tissue    Temperature  normal     Drainage:  none     Pain:  unable to assess , lethargic         Intervention:     Patient's chart evaluated.      Pepe Interventions:  Current Pepe Interventions and Care Plan reviewed and updated, appropriate at this time.    Wound was assessed.    Wound Care: was done: Removal of existing dressing    Visual inspection, re-application of clean dressing; repositioned to side,    Orders  Written    Supplies  floor stock and discussed with RN    Discussed plan of care with Family and Nurse           Assessment:     Pressure Injury to left buttocks Stage 1 slow to non blanchable intact skin present on admission.     PI on coccyx and upper buttocks not found; skin intact with very superficial erosion suspect due to moisture and friction      Status: wound stable, Asymptomatic;          Plan:     Nursing to notify the Provider(s) and re-consult the WO Nurse if wound(s) deteriorate(s)or if the wound care plan needs reevaluation.    Plan for wound care to wound located on coccyx: Every 3-5days    1. Wipe skin clean; apply Mepilex 4x4 to flat dry skin, date    2. If Incontinence does not allow dressing to adhere for > 1 day: change POC: stop Mepilex and begin to use Critic aid paste BID and PRN    3. Pressure Injury Prevention: turn pt side to side Q 2hrs, float heels and pad elbows, Sween to intact dry skin PRN    WOC Nurse will return: weekly

## 2018-11-16 NOTE — PLAN OF CARE
Problem: Patient Care Overview  Goal: Plan of Care/Patient Progress Review  Outcome: Improving  Pt still lethargic, responds to stimulation when turned and repositioned, moves arms and legs, occasional moaning.No seizure activity noted,Seizure precautions in place.Pt's family stays in room so that he does not pull at tubes and iv tubing,.  On scheduled tylenol.  Vital signs stable, sats on room air 90's.  Turned , repositioned q 2 hrly, oral , skin and ezio cares done.  Head of bed elevated 45 degrees, pt tolerating tube feedings at 55 mls per hr.Had 1 small soft bm.  Falls risk precautions.Plan of care reviewed with family.

## 2018-11-16 NOTE — PROGRESS NOTES
[facility]  Hospitalist Progress Note  Bakari Irwin MD 11/16/2018    Reason for Stay (Diagnosis): partial seizure, encephalopathy         Assessment and Plan:      Summary of Stay: Edison Boss is a 89-year-old male with a complex history of prostate cancer, CVA, nephrolithiasis with a complicated UTI who was actually hospitalized on 9/24/18 with notable candidal UTI and candidal fungemia with 2 subsequent hospitalizations since then who presents here with uncontrollable right sided leg heaviness and involuntary jerking concerning for partial complex seizure.  This will be his fourth readmission since 9/24/18.  Other past medical history includes paroxysmal atrial fibrillation, sick sinus syndrome status post pacemaker, hypertension, dyslipidemia, chronic pain, GERD, and some degree of cognitive impairment.    Course has been complicated by encephalopathy, felt primarily related to meds.  Ongoing encephalopathy is reason for continued hospitalization     Problem List/Plan:  1. Right-sided involuntary myoclonic jerking: neurology input appreciated.  Felt secondary to partial complex seizure.  LP done and shows no e/o infection.  Cultures were sent including fungal CSF cultures and remains negative to date.  Continue Keppra and Vimpat.  Seizures much improved/resolved with addition of AEDs.  No seizure hx in past   2. UTI: Started on ciprofloxacin but initial urine cultures positive for E. coli resistant to Cipro. Patient was then transitioned to IV Zosyn.  Notable recent history of complicated UTI secondary to nephrolithiasis, status post lithotripsy and double-J stent exchange on 10/30/18 by urology.  Also note relative recent history of complicated candidal UTI back in September with candidal fungemia requiring IV micafungin followed by fluconazole.  Blood cultures negative to date. Urology consult appreciated and plan to do stent removal and chronic cath change in clinic on  "11/27.    3. History of CVA: We will resume rectal aspirin  4. Hypertension: change IV metoprolol to PO metoprolol   5.  Depressive disorder: Continue Paxil    6.  Nutrition:  Receiving NG tube feeds as pt unable to swallow safely currently b/c of ongoing encephalopathy   7.  Encephalopathy:  Suspect related to meds, primarily dilaudid and ativan.  Will stop ativan (being used for anxiety) and replace with prn seroquel and haldol.  Will reduce IV dilaudid dose; minimize as able    I saw and met with 2 daughters at bedside today.  Went over plan of care and answered all questions.  They remain concerned about his ongoing encephalopathy.  We also discussed the patient's wishes regarding his health care in the past as well.  For now family interested in continuing current cares but if he fails to show improvement soon may need to further discuss goals of care       ADDENDUM:  Given results of urine cx and duration of therapy with IV Zosyn, will change antibiotic to Augmentin suspension via FT     DVT Prophylaxis: Pneumatic Compression Devices  Code Status: DNR / DNI  Discharge Dispo: Patient was at [facility] [place].  Will need TCU on discharge  Estimated Disch Date / # of Days until Disch: unclear; encephalopathy needs to clear prior to discharge from the hospital        Interval History (Subjective):      Unable.                    Physical Exam:      Last Vital Signs:  /59 (BP Location: Right arm)  Pulse 60  Temp 96.8  F (36  C) (Temporal)  Resp 8  Ht 1.753 m (5' 9\")  Wt 77.1 kg (170 lb)  SpO2 91%  BMI 25.1 kg/m2      Intake/Output Summary (Last 24 hours) at 11/16/18 1444  Last data filed at 11/16/18 1400   Gross per 24 hour   Intake             4513 ml   Output              875 ml   Net             3638 ml       Constitutional:  Eyes closed.  Does not respond to voice.  Pulling at NG tube periodically.  2 daughters present at bedside.   Respiratory: Clear to auscultation bilaterally, no crackles or wheezing "   Cardiovascular: Regular rate and rhythm, normal S1 and S2, and no murmur noted   Abdomen: Normal bowel sounds, soft, non-distended, non-tender   Skin: No rashes, no cyanosis, dry to touch   Neuro:  As above.  Not following commands.   Extremities: No edema, moves all extremities spontaneously.   Other(s):        All other systems: Negative          Medications:      All current medications were reviewed with changes reflected in problem list.         Data:      All new lab and imaging data was reviewed.   Labs:    Recent Labs  Lab 11/15/18  0630   WBC 10.4   HGB 11.1*   HCT 35.6*   MCV 93         Imaging:   No results found for this or any previous visit (from the past 24 hour(s)).

## 2018-11-16 NOTE — PLAN OF CARE
Problem: Patient Care Overview  Goal: Plan of Care/Patient Progress Review  Outcome: No Change  Pt bedrest. A2, reposition q2h and prn. Tolliver patent and draining. IVF discountinued, and free water for NG increased. Pt at 30 deg or higher at all times d/t tube feeding. Pt agitated at times trying to pull tube out- family at bedside at all times they declined use of mitts. IV ativan dc'd, Iv dilaudid decreased d/t pt lethargy. Prn haldol, seroquel  Added. Keppra, metoporol, VIMPAT changed to solution for NG. Still IV zoysn. RUE swollen d/t infiltrated PIV. Midline PICC- good blood return, cap changed. phosph replacement completed and new lab level drawn and sent. Heels off loaded. mepilex CDI to sacrum. Pt likes lotion to head per family. Pt dtr Red in touch directly with urology regarding tolliver, staff to not change.  only to do perineal cleansing. Oral cares done frequently.Dr. Villegas to round around 1500, family aware. Will continue to monitor.

## 2018-11-16 NOTE — PLAN OF CARE
Problem: Pain, Acute (Adult)  Goal: Identify Related Risk Factors and Signs and Symptoms  Related risk factors and signs and symptoms are identified upon initiation of Human [employer] Clinical Practice Guideline (CPG).   Outcome: No Change  Lethargic most of shift, turn q2 Ax2, starting to open eyes at end of shift  Restless episode @ 0200, pulling at tubes, PRN IV dilaudid x1 per family request  LDA: Midline infusing, PIV SL, IV zosyn  Vitals: stable, IV metoprolol q6h  Pain: controlled, pt resting soundly after IV dilaudid  Skin: coccyx w/mepilex in place, janice red  NGT patent, TF @ 55ml/h  GI/: Christianson patent, smear soft BM  Followed by Neuro, Urology, SW, WOCN  Plan: DC TCU when able  Will continue to monitor

## 2018-11-17 NOTE — PROGRESS NOTES
[facility]  Hospitalist Progress Note  Bakari Irwin MD 11/17/2018    Reason for Stay (Diagnosis): seizure, encephalopathy         Assessment and Plan:      Summary of Stay: Edison Boss is a 89-year-old male with a complex history of prostate cancer, CVA, nephrolithiasis with a complicated UTI who was actually hospitalized on 9/24/18 with notable candidal UTI and candidal fungemia with 2 subsequent hospitalizations since then who presents here with uncontrollable right sided leg heaviness and involuntary jerking concerning for partial complex seizure.  This will be his fourth readmission since 9/24/18.  Other past medical history includes paroxysmal atrial fibrillation, sick sinus syndrome status post pacemaker, hypertension, dyslipidemia, chronic pain, GERD, and some degree of cognitive impairment.     Course has been complicated by persistent encephalopathy, with a concern that this may be medication induced.  Other possibility is ongoing seizure activity.  Ongoing encephalopathy is reason for continued hospitalization      Problem List/Plan:  1. Right-sided involuntary myoclonic jerking: neurology input appreciated.  Felt secondary to partial complex seizure.  LP done and shows no e/o infection.  Cultures were sent including fungal CSF cultures and remains negative to date.  Continue Keppra and Vimpat.  Seizures much improved/resolved with addition of AEDs.  No seizure hx in past   2. UTI: Started on ciprofloxacin but initial urine cultures positive for E. coli resistant to Cipro. Patient was then transitioned to IV Zosyn, now transitioned to oral Augmentin.  Anticipate several more days of treatment, then stop antibiotics..  Notable recent history of complicated UTI secondary to nephrolithiasis, status post lithotripsy and double-J stent exchange on 10/30/18 by urology.  Also note relative recent history of complicated candidal UTI back in September with candidal fungemia  requiring IV micafungin followed by fluconazole.  Blood cultures negative to date. Urology consult appreciated and plan to do stent removal and chronic cath change in clinic on 11/27.    3. History of CVA: On aspirin  4. Hypertension: On oral metoprolol                         5.  Depressive disorder: Continue Paxil                          6.  Nutrition:  Receiving NG tube feeds as pt unable to swallow safely currently b/c of ongoing encephalopathy                         7.  Encephalopathy:   As above.  Concern for possible medication induced encephalopathy.  Will stop IV Dilaudid.  Stop Haldol and stop Seroquel.  If agitation becomes significant, may need to resume as needed Haldol but ideally would like to avoid any neuro altering medications to see if his encephalopathy improves.  Ongoing seizure activity is also a possibility here, and EEG has been ordered by neurology to further assess for this     I met with family at bedside today.  Discussed plan of care above.  I also talked with Dr. Tinoco of neurology as well.  Also spoke to bedside nurse separately.  Plan is to minimize neuro altering medications as above.  Dr. Tinoco is requesting an EEG today to rule out ongoing seizure activity as a cause for his encephalopathy.  For now Dr. Tinoco would like to keep his epileptic medications at the current doses.        DVT Prophylaxis: Pneumatic Compression Devices  Code Status: DNR / DNI  Discharge Dispo: Patient was at [facility].  Will need TCU on discharge  Estimated Disch Date / # of Days until Disch: unclear; encephalopathy needs to clear prior to discharge from the hospital.  For now family would like to continue current cares, but if patient fails to improve more of a comfort care approach may need to be considered        Interval History (Subjective):      Patient remains very encephalopathic.  Unable to obtain any history from the patient                  Physical Exam:      Last Vital Signs:  BP  "155/54 (BP Location: Right arm)  Pulse 60  Temp 99.5  F (37.5  C) (Temporal)  Resp 18  Ht 1.753 m (5' 9\")  Wt 82.1 kg (180 lb 14.4 oz)  SpO2 92%  BMI 26.71 kg/m2      Intake/Output Summary (Last 24 hours) at 11/17/18 1511  Last data filed at 11/17/18 1457   Gross per 24 hour   Intake             2513 ml   Output             1700 ml   Net              813 ml       Constitutional: Unresponsive to voice.  Eyes closed.  Family present   Respiratory: Clear to auscultation bilaterally, no crackles or wheezing   Cardiovascular: Regular rate and rhythm, normal S1 and S2, and no murmur noted   Abdomen: Normal bowel sounds, soft, non-distended, non-tender   Skin: No rashes, no cyanosis, dry to touch   Neuro:  Unresponsive to voice, challenging neurologic exam the patient does not respond   Extremities: No edema   Other(s):        All other systems: Negative          Medications:      All current medications were reviewed with changes reflected in problem list.         Data:      All new lab and imaging data was reviewed.   Labs:    Recent Labs  Lab 11/15/18  0630 11/13/18  0741 11/12/18  0630   WBC 10.4 8.9 7.6   HGB 11.1* 11.2* 11.4*   HCT 35.6* 36.3* 36.7*   MCV 93 93 94    362 336      Imaging:   No results found for this or any previous visit (from the past 24 hour(s)).   "

## 2018-11-17 NOTE — PROGRESS NOTES
[facility]    Neurology Progress Note    Date of Service (when I saw the patient): 11/17/2018     Today's developments:    Assessment & Plan   Edison Boss is a 89 year old male who was admitted on 11/10/2018.   He is somnolent today.  Withdraws from pain with exception of right arm, which reveals reduced tone and no spontaneous or provokable movement.    No witnessed seizure activity.  No focal motor activity noted since Wednesday per family.    Currently on several medications that may be affecting his mental status.  Plan will be to reduce narcotic dosing, with the idea that this may be causing him to be more somnolent.  That being said, the fact that he is not moving his right arm is concerning, given his history, and I want to do some further evaluation including an eeg and I'd like to get an MRI of the brain with and without contrast.  That being said, he has a pacemaker so we will not be able to get an MRI.  Depending on the results of this testing, I can make some further recommendations regarding treatment going forward.    No change in AEDs at this time.    I discussed the above diagnosis, assessment, testing, and results with the patient.  Total time: 45 Minutes, with more than 50% of the time counseling the patient or coordination of care.       Malachi Tinoco MD    Physical Exam   Temp: 99.5  F (37.5  C) Temp src: Temporal BP: 155/54 Pulse: 60 Heart Rate: 61 Resp: 18 SpO2: 92 % O2 Device: None (Room air)    Vitals:    11/15/18 0800 11/16/18 0619 11/17/18 0600   Weight: 75.8 kg (167 lb 3.2 oz) 77.1 kg (170 lb) 82.1 kg (180 lb 14.4 oz)     Vital Signs with Ranges  Temp:  [95.7  F (35.4  C)-99.5  F (37.5  C)] 99.5  F (37.5  C)  Pulse:  [60] 60  Heart Rate:  [61-62] 61  Resp:  [8-20] 18  BP: (132-163)/(52-68) 155/54  SpO2:  [91 %-95 %] 92 %  I/O last 3 completed shifts:  In: 2297 [NG/GT:1950]  Out: 1650 [Urine:1650]      General Appearance:  No acute distress  Neuro:        Mental Status Exam:   Sleepy, does not follow commands or open eyes to noxious stimuli       Cranial Nerves:   Pupils small, reactive           Motor:   Moves all extremities with exception of right arm which does not move or withdraw to noxious stimuli           Reflexes:  2+, symmetric       Sensory:  Intact including right arm based on grimacing with noxious stimuli of right arm                   Coordination:   Unable to evaluate       Gait:  Deferred   CV: RRR    Medications     IV fluid REPLACEMENT ONLY         sodium chloride 0.9%  30 mL Intravenous Once     acetaminophen  975 mg Oral or Feeding Tube TID     amoxicillin-clavulanate  875 mg Oral or Feeding Tube BID     [START ON 11/18/2018] aspirin  81 mg Oral or Feeding Tube Daily     lacosamide  200 mg Oral or Feeding Tube BID     levETIRAcetam  1,500 mg Oral or Feeding Tube BID     lidocaine  10 mL Intradermal Once     metoprolol  12.5 mg Oral or Feeding Tube BID     [START ON 11/18/2018] omeprazole  20 mg Oral or Feeding Tube QAM AC     PARoxetine  15 mg Oral or Feeding Tube Daily     sodium chloride (PF)  10 mL Intracatheter Q8H       Data   Results for orders placed or performed during the hospital encounter of 11/10/18 (from the past 24 hour(s))   Phosphorus   Result Value Ref Range    Phosphorus 2.7 2.5 - 4.5 mg/dL   Phosphorus   Result Value Ref Range    Phosphorus 2.1 (L) 2.5 - 4.5 mg/dL     *Note: Due to a large number of results and/or encounters for the requested time period, some results have not been displayed. A complete set of results can be found in Results Review.         Images and Procedures:  I personally reviewed the images of the following studies:

## 2018-11-17 NOTE — PLAN OF CARE
Problem: Patient Care Overview  Goal: Plan of Care/Patient Progress Review  Outcome: No Change  Pt bedrest. A2, reposition q2h and prn. Tolliver patent and draining. SL. Pt at 30 deg or higher at all times d/t tube feeding. Pt agitated at times trying to pull tube out- family at bedside at all times, using mitts. Family may leave this evening and in that case pt will have a sitter bc all pain and agitation medication discontinued to have pt wake up to see pts orientation level. EEG done this afternoon.  Keppra, metoporol, VIMPAT, tylenol per NG. zoysn switched to augmentin. RUE swollen d/t infiltrated PIV. Midline PICC- good blood return, cap changed. phosph replacement completed and new lab level drawn and sent. Heels off loaded. mepilex CDI to sacrum. Pt likes lotion to head per family. Pt dtr Red in touch directly with urology regarding tolliver, staff to not change.  only to do perineal cleansing. Oral cares done frequently. Will continue to monitor.

## 2018-11-17 NOTE — PLAN OF CARE
Problem: Patient Care Overview  Goal: Plan of Care/Patient Progress Review  Outcome: No Change  Pt vitals stable. Rested comfortably most of the night, 1 dose prn dilaudid. Son at bedside, stated if pt looked comfortable to let him rest instead of reposition him. Oral care x2. Pt continues to be nonverbal, does not follow commands, and we are unable to  his orientation. Christianson catheter (chronic) in place, adequate output. 1 BM overnight. Continues on tube feeding through NG @55ml/hr, 160ml free flush q4hour. Midline in left arm patent, good blood return, SL.

## 2018-11-17 NOTE — PROGRESS NOTES
"Neurology Follow Up Note  [facility]       [2018]           Edison Boss        89 year old yo male with UTI, E. Coli sepsis, and seizures arising from the left cerebrum.   Admission Date: 11/10/2018  Hospital Day: 7  Code Status: DNR/DNI    S:  Very sleepy/lethargic. No observed seizure activity since yesterday morning. Moving purposefully with both arms and left leg, moving less with right leg. He must be monitored to prevent him from pulling out his NG tube. He appear to be breathing easily.  O:    BLOOD PRESSURE:   BP Readings from Last 3 Encounters:   18 163/68   18 163/77   18 150/73     PULSE: 60    Height:  5' 9\"       Weight:  170 lbs 0 oz    Temperature: 95.7  Tmax 24 : Temp (24hrs), Av.5  F (36.4  C), Min:95.7  F (35.4  C), Max:99.2  F (37.3  C)    Vitals: Ranges  Temp:  [95.7  F (35.4  C)-99.2  F (37.3  C)] 95.7  F (35.4  C)  Pulse:  [60] 60  Heart Rate:  [60-62] 62  Resp:  [8-20] 20  BP: (125-163)/(45-68) 163/68  SpO2:  [91 %-97 %] 92 %    Medications:    sodium chloride 0.9%  30 mL Intravenous Once     acetaminophen  975 mg Oral TID     amoxicillin-clavulanate  875 mg Oral BID     aspirin  81 mg Oral Daily     lacosamide  200 mg Oral or Feeding Tube BID     levETIRAcetam  1,500 mg Oral or Feeding Tube BID     lidocaine  10 mL Intradermal Once     metoprolol  12.5 mg Oral BID     [START ON 2018] omeprazole  20 mg Oral QAM AC     PARoxetine  15 mg Oral or Feeding Tube Daily     sodium chloride (PF)  10 mL Intracatheter Q8H         LABORATORY RESULTS       SMA-7:    Recent Labs  Lab 18  0505 11/15/18  0630 18  0450    141 143   POTASSIUM 4.8 4.4 4.6   CHLORIDE 108 111* 112*   CO2 27 28 26   * 136* 111*   BUN 15 12 9   CR 1.02 0.89 0.97     CMP:    Recent Labs  Lab 18  1432 18  0505 11/15/18  0630 18  0450 18  0741  11/10/18  1043   ARYAN  --  8.1* 8.2* 8.2* 8.7  < > 9.7   MAG  " --  1.7 1.9 2.0  --   --  2.3   PHOS 2.7 1.4* 1.5* 2.9  --   --   --    < > = values in this interval not displayed.  CBC:      Recent Labs  Lab 11/15/18  0630 11/13/18  0741 11/12/18  0630 11/11/18  0706   WBC 10.4 8.9 7.6 9.9   RBC 3.83* 3.89* 3.91* 4.02*   HGB 11.1* 11.2* 11.4* 11.6*   HCT 35.6* 36.3* 36.7* 37.6*   MCV 93 93 94 94    362 336 360   Invalid input(s): CK  U/A:    Recent Labs   Lab Test  11/10/18   2050   10/19/16   1129   COLOR  Yellow   < >  Yellow   APPEARANCE  Cloudy   < >  Clear   URINEGLC  Negative   < >  Negative   URINEBILI  Negative   < >  Small  This is an unconfirmed screening test result. A positive result may be false.  *   URINEKETONE  Negative   < >  Trace*   SG  1.015   < >  >1.030   UBLD  Negative   < >  Large*   URINEPH  5.0   < >  5.5   PROTEIN  100*   < >  >=300*   UROBILINOGEN   --    --   1.0   NITRITE  Negative   < >  Positive*   LEUKEST  Large*   < >  Large*   RBCU  86*   < >   --    WBCU  >182*   < >   --     < > = values in this interval not displayed.     HgA1c:   Hemoglobin A1C   Date Value Ref Range Status   11/30/2012 5.5 4.3 - 6.0 % Final     ESR:   Recent Labs   Lab Test  10/16/18   1050  05/08/15   1510   SED  47*  7     CRP:   Lab Results   Component Value Date    .0 09/24/2018    CRP <2.9 05/08/2015   Blood Cultures:   Recent Labs  Lab 11/12/18  1220 11/11/18  1724 11/11/18  1713 11/10/18  2050   CULT Culture negative after 4 days  Culture negative monitoring continues No growth after 5 days No growth after 5 days 50,000 to 100,000 colonies/mLEscherichia coli*  10,000 to 50,000 colonies/mLStrain 2Escherichia coli*       Cholesterol Panel: Lab Results   Component Value Date    CHOL 96 09/25/2018    HDL 19 (L) 09/25/2018    LDL 54 09/25/2018    TRIG 113 09/25/2018    CHOLHDLRATIO 3.6 11/23/2008    VLDL 21 11/23/2008     Keppra  Level:  No lab results found.      ASSESSMENT     1. Seizures now fully under control  2. He is sleepy, but making purposeful  movements.    RECOMMENDATIONS   1. Continue supportive care.  2. Continue Vimpat 200 mg pNGT bid.  3. I recommend reducing the Keppra to 1000 mg pNGT bid starting tomorrow if he remains seizure free.   4. If any procedures can be performed to reduce his pain, he would not require narcotics, which are sedating him.    My partner, Dr. Malachi Tinoco, will follow over the weekend.        Adam Villegas M.D., Ph.D.    [facility], [facility].    [phone]  10/19/1924

## 2018-11-18 NOTE — PROGRESS NOTES
Interim notes reviewed.     Patient continues to be somnolent.  He is however moving his right hand and arm more which is encouraging.  EEG shows diffuse significant slowing, predominantly in the delta range with some admixed theta.  These findings would be most consistent with an encephalopathy, moderate to severe.  No focal findings to suggest seizure activity.    Lab work shows an increased WBC count today at 18.7.  No fevers, but would recommend further evaluation for any potential infectious causes, could be contributing to his AMS.  LP done at admission was unremarkable, do not think we need to repeat this at this time, but could consider if AMS continues despite tapering of sedating medications.

## 2018-11-18 NOTE — PLAN OF CARE
Problem: Patient Care Overview  Goal: Plan of Care/Patient Progress Review  Outcome: No Change  Orientation: Unable to access orientation due to patient being nonverbal.   VSS. 93% on RA. Afebrile.   LS: Clear, equal bilaterally.  GI: Tube feedings through nasojejunal tube running at 55ml/hr. Passing gas. 4 loose/watery BMs overnight. Incontinent. ZOE nausea.   : Chronic tolliver in place. Adequate urine output.   Skin: Warm, pale, gurmeet, bruises, and blanchable redness on coccyx. Mepilex was applied to coccyx for barrier. Scrotum is very red and tender. Pt moans and grimaces with every pad change. Powder and barrier cream have been applied. Meconizole powder was ordered and is now in pts room for use.  Activity: Bedfast. Pt slept comfortably throughout most of shift.   Pain: Pt unable to verbally state pain. rFLACC of 2. Pt was repositioned. Will continue to monitor.  Plan: Will continue to monitor for seizures. Continue with current cares.

## 2018-11-18 NOTE — PROGRESS NOTES
[facility]  Hospitalist Progress Note  Bakari Irwin MD 11/18/2018    Reason for Stay (Diagnosis): seizure, encephalopathy         Assessment and Plan:      Summary of Stay: Edison Boss is a 89-year-old male with a complex history of prostate cancer, CVA, nephrolithiasis with a complicated UTI who was actually hospitalized on 9/24/18 with notable candidal UTI and candidal fungemia with 2 subsequent hospitalizations since then who presents here with uncontrollable right sided leg heaviness and involuntary jerking concerning for partial complex seizure.  This will be his fourth readmission since 9/24/18.  Other past medical history includes paroxysmal atrial fibrillation, sick sinus syndrome status post pacemaker, hypertension, dyslipidemia, chronic pain, GERD, and some degree of cognitive impairment.      Course has been complicated by persistent encephalopathy, with a concern that this may be medication induced.    Repeat EEG was done showing no ongoing seizure activity    Course is now further complicated by development of low-grade fever and rising white blood cell count concerning for infection.  This is despite treatment for UTI and currently being on Augmentin.  I had a long discussion with family today.  At this point they are interested in doing a urinasponselysis to evaluate for UTI, but want no further infection workup.  Family is discussing comfort care route.  For now however they are interested in avoiding neuro-altering medications (incluing comfort medications) to see if the encephalopathy will clear with the hope that the patient will become more responsive      Problem List/Plan:  1. Right-sided involuntary myoclonic jerking: neurology input appreciated.  Felt secondary to partial complex seizure.  LP done and shows no e/o infection.  Cultures were sent including fungal CSF cultures and remains negative to date.  Continue Keppra and Vimpat.  Seizures much  improved/resolved with addition of AEDs.  No seizure hx in past.  Repeat EEG done on 11/17 shows no ongoing seizure activity  2. UTI: Started on ciprofloxacin but initial urine cultures positive for E. coli resistant to Cipro. Patient was then transitioned to IV Zosyn, now transitioned to oral Augmentin.  Anticipate several more days of treatment, then stop antibiotics..  Notable recent history of complicated UTI secondary to nephrolithiasis, status post lithotripsy and double-J stent exchange on 10/30/18 by urology.  Also note relative recent history of complicated candidal UTI back in September with candidal fungemia requiring IV micafungin followed by fluconazole.  Blood cultures negative to date. Urology consult appreciated and plan to do stent removal and chronic cath change in clinic on 11/27.    3. Recurrent fever and leukocytosis: Symptoms develop despite treatment for UTI above.  Remains on Augmentin currently.  I discussed with family today.  They would like a urinalysis performed, and if UTI is present would be okay with treatment.  Beyond this they do not want any further infection workup.  I discussed with them role of chest x-ray and blood cultures to rule out other infection sources but they have declined this.  4. History of CVA: On aspirin  5. Hypertension: On oral metoprolol                         5.  Depressive disorder: Continue Paxil                          6.  Nutrition:  Receiving NG tube feeds as pt unable to swallow safely currently b/c of ongoing encephalopathy                         7.  Encephalopathy:   As above.    She remains nonverbal.  Is a bit more active today, but not following commands.  concern for possible medication induced encephalopathy.    I have stopped all potentially neuro altering medications..  If agitation becomes significant, may need to resume as needed Haldol but ideally would like to avoid to see if his encephalopathy improves.    No evidence of ongoing seizure  "activity on repeat EEG.  Given new fevers and rising white blood cell count, obvious concern for possible infection here as well.  See discussion above.  Limited infection workup per family wishes      On discussion with family today, they are considering hospice care.  I discussed with them the options going forward.  Given the fever and white blood cell count elevation, they simply want to do urinalysis and if positive to treat this.  Beyond that they are not interested in any further workup.  They are interested in a palliative care consultation and a hospice consult.  I have ordered these consultations.  There are still hoping that the patient will show improvement of his mental status and want to continue to avoid neuro-altering medications for now including comfort medications.  Family is having ongoing internal discussions and this decision may change    Addendum: Urinalysis shows approximately 30 white blood cells.  Will add on urine culture and for now treat with ceftazidime in the setting of fever and leukocytosis         DVT Prophylaxis: Pneumatic Compression Devices  Code Status: DNR / DNI  Discharge Dispo:  Unclear.  Hospice is now being considered   Estimated Disch Date / # of Days until Disch: unclear        Interval History (Subjective):      Unable to obtain history from patient.  I talked with multiple family members at bedside today                  Physical Exam:      Last Vital Signs:  /43 (BP Location: Right arm)  Pulse 60  Temp 98  F (36.7  C) (Axillary)  Resp 20  Ht 1.753 m (5' 9\")  Wt 82.1 kg (180 lb 14.4 oz)  SpO2 92%  BMI 26.71 kg/m2      Intake/Output Summary (Last 24 hours) at 11/18/18 1455  Last data filed at 11/18/18 1411   Gross per 24 hour   Intake             3144 ml   Output             2250 ml   Net              894 ml       Constitutional:  Eyes closed.  Moving around in bed.  Does not respond to commands.  Multiple family members at bedside   Respiratory: Clear to " auscultation bilaterally, no crackles or wheezing   Cardiovascular: Regular rate and rhythm, normal S1 and S2, and no murmur noted   Abdomen: Normal bowel sounds, soft, non-distended, non-tender   Skin: No rashes, no cyanosis, dry to touch   Neuro:  As above.  Appears to be moving all extremities   Extremities: No edema, normal range of motion   Other(s):        All other systems: Negative          Medications:      All current medications were reviewed with changes reflected in problem list.         Data:      All new lab and imaging data was reviewed.   Labs:    Recent Labs  Lab 11/18/18  0533 11/17/18  1446 11/17/18  0605  11/16/18  0505 11/15/18  0630 11/14/18  0450     --   --   --  140 141 143   POTASSIUM 5.3  --   --   --  4.8 4.4 4.6   CHLORIDE 107  --   --   --  108 111* 112*   CO2 28  --   --   --  27 28 26   ANIONGAP 4  --   --   --  5 2* 5   *  --   --   --  117* 136* 111*   BUN 21  --   --   --  15 12 9   CR 0.89  --   --   --  1.02 0.89 0.97   GFRESTIMATED 79  --   --   --  68 80 72   GFRESTBLACK >90  --   --   --  82 >90 87   ARYAN 8.8  --   --   --  8.1* 8.2* 8.2*   MAG  --   --   --   --  1.7 1.9 2.0   PHOS 2.5 3.1 2.1*  < > 1.4* 1.5* 2.9   < > = values in this interval not displayed.   Imaging:   No results found for this or any previous visit (from the past 24 hour(s)).

## 2018-11-18 NOTE — PLAN OF CARE
"Problem: Patient Care Overview  Goal: Plan of Care/Patient Progress Review    Sitter at bedside. Pt's fam went home this evening.  [place]: Pt lethargic, somnolent. Opened eyes when asked. Groans and moans.  VS: /66 (BP Location: Right arm)  Pulse 60  Temp 97.4  F (36.3  C) (Axillary)  Resp 20  Ht 1.753 m (5' 9\")  Wt 82.1 kg (180 lb 14.4 oz)  SpO2 93%  BMI 26.71 kg/m2  LS: clear on RA.  GI: active, incontinent x2  : tolliver, output 750  Skin: coccyx blanchabe, mepelix placed today  Activity: bedrest   Diet: keofeed, 55 mL/hr, lines changed today  Pain: pt moans and groans. Given sched tylenol.   Plan: augmentin, vimpat, keppra. Continue to monitor pt, per MD note, hold neuro altering meds.               "

## 2018-11-18 NOTE — PLAN OF CARE
Problem: Patient Care Overview  Goal: Plan of Care/Patient Progress Review  Outcome: No Change  Pt somnolent, slept majority of shift. Did not respond to commands but opened eyes spontaneously and moved arms at times. Max temp of 102.4 this AM, came down after Tylenol. Receiving TF at 55 mL/hr. HOB >30 degrees at all times. Repositioned in bed with Ax2 and mechanical lift. Mepilex to bottom CDI. Redness to scrotum and bottom, powder applied. Chronic Christianson putting out adequate amounts. 2 BMs this shift. Pain managed with scheduled Tylenol. Midline patent. Antibiotics changed to IV Fortaz. Plans for palliative consult tomorrow per family request.

## 2018-11-19 NOTE — PLAN OF CARE
Problem: Patient Care Overview  Goal: Plan of Care/Patient Progress Review  Outcome: Improving  Pt answered yes when asked if he was feeling ok, continues with TF at 55cc/hr. Loose stools x2. Assist of 2-3 bed mobility.  Spoke with Red abdiel this am, returned call, updated Red on conditions and change/ more alert. Daughter requests that [facility] nurse.  Assess and change med to scrotum if possible. Will continue to monitor.

## 2018-11-19 NOTE — PROGRESS NOTES
SWS     D: Received request to arrange hospice consult, discussed with palliative care specialist who will update SW after meeting with family today,  SW will then proceed with hospice consult as family wishes for continued consideration.

## 2018-11-19 NOTE — PROGRESS NOTES
"SPIRITUAL HEALTH SERVICES Progress Note  [facility] visit per palliative team consult and family deciding now for comfort cares.  Briefly met with family as they were preparing to go to lunch.   They shared that pt, \"Miguel Ángel\", was raised Sabianist but \"hasn't been to Religious or considered himself Sabianist for years.\"   Family requested info about the dying process and were given pamphlet, \"Journey Through The Dying Process\" per their request.   Encouraged self-care and informed of availability of chaplains for support.     Brief visit as family getting ready to leave for lunch. Will follow up for ongoing assessment of spiritual/emotional distress.   I and the other chaplains remain available per pt/family/staff need or request.     RIKKI Velásquez.  Staff    Pager #[phone]     "

## 2018-11-19 NOTE — CONSULTS
"[facility]    Palliative Care Consultation   Text Page    Date of Admission:  11/10/2018    Assessment & Plan   Edison Boss is a 89 year old male who was admitted on 11/10/2018. I was asked to see the patient for Goals of Care and family discussion surrounding hospice. .    Recommendations:  1.Decisional Capacity -  Unreliable. Patient has an advance directive dated 6/23/2015.  Consents and decision making should be done by  Red Saldana, his primary Health Care Agent.  Alternate is Bea Boss.  2. Delirium (mixed)- Sedation clearing, now with some agitation and confusion. Likely multifactorial, family is hopeful for improvement and would like to hold off on any potentially sedation medications, even for symptom management.  He currently has a sitter and plan to remove NG which may help.  Per Discussion with Dr. Irwin, they are aware, he may need a trial of low dose haldol or other antipsychotic.  3. Dysphagia- Patient had not been alert enough trial swallow.  Family now agrees to comfort eating and requests NG out.  We discussed at length using SL or CO routes for medications if he is not able to take PO well.  Family has requested a Speech Therapy consult for instruction about \"least hazardous\" diet with safety tips and technique.   Biggest concern is Anti-seizure medications which are new this admission. We are not able to give Keppra or Vimpat SL or CO.  At this point, will trial PO doses, but family wishes for IV option as back-up.  Given #2 they are not keen on the plan to use lorazepam on hospice when and if patient is unable to take PO.  I discussed this with Jeremy Mckinnon MD Hospice Director.  He will continue plan of care initiated by Neurology with eventual wean off of Vimpat.  Hospice would use lorazepam if breakthrough seizure or unable to take PO.  I also discussed with NORIS Levy.    3. Spiritual Care- Oriented to Spiritual Health  as part of Palliative Care team. " " Appreciate care of RIKKI GeorgesDiv  4. Care Planning- NORIS Levy, following. Discussed hospice services, vs Palliative Care.  Initial discussion of places hospice care can be provided.  Family appears to be leaning toward a LTC facility at this time.     Goal of Care:DNR/DNI, Comfort.  Continue course of Abx, while awaiting urine culture. Avoid sedating medications for symptom management, goal to improve delirium if possible.    Disease Process/es & Symptoms:  Edison Boss is a 89 year old patient admitted with symptoms of Right sided \"heaviness\" and \"tremors.\" He has been  Seen by neurology and treated for partial complex seizure, without evidence of infection after LP.  Now on Keppra and Vimpat, Sz's controlled, no lorazepam since 11/16.  While initiall treated for UTI, he is also now found with low grade fevers and increasing WBC, now on ceftriaxone.  Family has decided, no further work-up or treatment other than above for UTI.  Miguel Ángel has symptoms of somnolence and restlessness.  Delirium thought r/t opioids and benzodiazepines in addition to infection. Avoiding further medications that could alter mental status.      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 While he lives independently at baseline he has been noted needed more assist from adult children checking in after his wife passed away in May.  He is noted to have declined in functional status quickly over the past 2 months including increased weakness and falls, as well as change in mentation.  This is his fifth hospitalization over the past 3 months, he has gone back and forth from rehab.  Dietician has noted a 10% weight loss over the past 6 months and [facility] nurse notes Stage 1 pressure ulcer on L buttocks.     Findings & plan of care discussed with: Dr. Irwin, Gi Levy, TODD, and " " Baldev Carney ans well as bedside nurses today.   Follow-up plan from palliative team: WIll continue to follow in support of comfort plan of care and eventual transition to hospice.   Thank you for involving us in the patient's care.     Viridiana Anaya APRN, CNS, CNP  Pain Management and Palliative Care  [facility]  Pgr: [phone]    Time Spent on this Encounter   I spent  125 minutes total, >50%  in assessment of the patient, counseling and discussion with the patient and family as documented in sections below as well as coordination of care and discussion with the health care team.    Reason for Consult   Reason for consult: I was asked by Dr. Irwin to evaluate this patient for Symptom management  Goals of care  Patient and family support.    Primary Care Physician   Porfirio Terrell MD    Chief Complaint   Unable    History is obtained from the electronic health record, patient's daughters and patient's sons    History of Present Illness   Edison Boss is a 89 year old male who presents with  symptoms of Right sided \"heaviness\" and \"tremors.\" He has been  Seen by neurology and treated for partial complex seizure, without evidence of infection after LP.  Now on Keppra and Vimpat, Sz's controlled, no lorazepam since 11/16.  While initiall treated for UTI, he is also now found with low grade fevers and increasing WBC, now on ceftriaxone.  Family has decided, no further work-up or treatment other than above for UTI.  Miguel Ángel has symptoms of somnolence and restlessness.  Delirium thought r/t opioids and benzodiazepines in addition to infection. Avoiding further medications that could alter mental status.      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 While he lives independently at baseline he has been noted needed more assist from adult " "children checking in after his wife passed away in May.  He is noted to have declined in functional status quickly over the past 2 months including increased weakness and falls, as well as change in mentation.  This is his fifth hospitalization over the past 3 months, he has gone back and forth from rehab.  Dietician has noted a 10% weight loss over the past 6 months and [facility] nurse notes Stage 1 pressure ulcer on L buttocks.     The following symptoms are noted by patient as concerning to his quality of life.  Per family , somnolence and confusion.    Decision-Making & Goals of Care:  Discussed on November 19, 2018 with Viridiana Anaya APRN, CNS, CNP:   Met at patient's bedside with family as patient is unable to participate.  Daughters Red and Bea as well as 2 other daughters and 3 sons are present.  They reviewed his course of illness from when I met him in October and described it as he keeps getting hit down.  One daughter noted he has had 3 good days in the last 2 months.  They are grateful the kidney stones are no longer causing pain.  They reviewed current hospitalization, grateful seizures have been managed and hopeful patient will improve his mental status as they continue treating UTI and avoid medications that could cause delirium.  They asked what the difference between Palliative Care and Hospice is and we discussed it.  They had discussed hospice care with Dr. Irwin. They feel as if Feeding tube is distressing to patient and ask it be removed.  We discussed comfort eating and likely risk of aspiration.  They express understanding and wish to consult with speech therapist for \"least hazardous diet\" and tips for feeding for comofrt in this setting.   We discussed needing to get symptoms controlled and plan for hospice , transition anti-seizure medications to oral.  They are hesitant as the oral route has not been reliable, and request the IV route be an option, hopeful he can continue IV as an " outpatient if needed.  I did discuss with Hospice director who noted ND or SL ativan would be utilized for breakthough seizure or if unable to swallow on hospice.  This concerns family as above goal had been to avoid ativan and medications that may have impact on MS. We discussed where hospice care can take place.  Marissa asks if patient can just stay in the hospital. We discussed plan to manage symptoms and make a hospice plan, but ability to hold off on discontinue if it appears patient is in significant decline and would not make or benefit from trip and also some uncertainty along the way with this.  We discussed dying process, signs that death is drawing near, symptoms we may expect and how we could manage them.       Patient has decision-making capacity Unreliable  Patient has a Health Care Agent named in a legal Advance Directive document dated  6/23/2015. See name(s) and contact information in Code/ACP/Advance Care Planning Activity Tab.  Physician orders for life-sustaining treatment (POLST) form is on file, will need to be updated to reflect comfort care upon discharge.  Code Status: Do not resusitate / Do not intubate     Past Medical History   I have reviewed this patient's medical history and updated it with pertinent information if needed.   Past Medical History:   Diagnosis Date     Chronic indwelling Christianson catheter      Chronic infection     UTI     Chronic pain     lower pain, perineum     Esophageal reflux      Fungemia 09/2018    candida-from UTI and infected left ureteral stone     Hyperlipidaemia      Hypertension      Malignant neoplasm of prostate (H) 8/14/2002    Brachytherapy, then external radiation. chronic indwelling catheter     Mild persistent asthma     with URI     Paroxysmal A-fib (H)     not on AC     Prostate cancer (H)     treated with brachytherapy and xrt     Sinus node dysfunction (H)     sp DDD PM     Sleep apnea     ?   snores     Unspecified cerebral artery occlusion with  cerebral infarction      Ureterolithiasis 09/2018    with hydronephrosis, sepsis 2/2 candidal UTI       Past Surgical History   I have reviewed this patient's surgical history and updated it with pertinent information if needed.  Past Surgical History:   Procedure Laterality Date     C NONSPECIFIC PROCEDURE  1980's    Kidney stone surgery     C NONSPECIFIC PROCEDURE  1985, 5/2005    TURP x 2     C NONSPECIFIC PROCEDURE  1/2002    Brachytherapy     C NONSPECIFIC PROCEDURE  ~1999    Left rotator cuff repair     C NONSPECIFIC PROCEDURE      Bilateral cataract surgery     C NONSPECIFIC PROCEDURE      EGD/dilation twice     CYSTOSCOPY       CYSTOSCOPY, INJECT COLLAGEN, COMBINED  6/6/2012    Procedure:COMBINED CYSTOSCOPY, INJECT BULKING AGENT; VIDEO CYSTOSCOPY WITH BOTOX INJECTIONS  ; Surgeon:BRIAN ADAN; Location: OR     CYSTOSCOPY, INJECT COLLAGEN, COMBINED  3/22/2013    Procedure: COMBINED CYSTOSCOPY, INJECT BULKING AGENT;  VIDEO CYSTOSCOPY, BOTOX INJECTION;  Surgeon: Brian Adan MD;  Location:  OR     CYSTOSCOPY, INJECT COLLAGEN, COMBINED  8/8/2014    Procedure: COMBINED CYSTOSCOPY, INJECT BULKING AGENT;  Surgeon: Brian Adan MD;  Location:  OR     CYSTOSCOPY, INJECT COLLAGEN, COMBINED N/A 8/12/2015    Procedure: COMBINED CYSTOSCOPY, INJECT BULKING AGENT;  Surgeon: Brian Adan MD;  Location: [facility] OR     CYSTOSCOPY, INJECT COLLAGEN, COMBINED N/A 12/8/2016    Procedure: COMBINED CYSTOSCOPY, INJECT BULKING AGENT;  Surgeon: Brian Adan MD;  Location:  OR     CYSTOSCOPY, RETROGRADES, INSERT STENT URETER(S), COMBINED Left 9/27/2018    Procedure: COMBINED CYSTOSCOPY, RETROGRADES, INSERT STENT URETER(S);  1. Cystourethroscopy  2. Left retrograde pyelography with interpretation of intraoperative fluoroscopic imaging  3. Left ureteral stent placement;  Surgeon: Froylan Richards MD;  Location: [facility] OR     EXTRACORPOREAL SHOCK WAVE LITHOTRIPSY, CYSTOSCOPY, INSERT STENT URETER(S),  COMBINED Left 10/30/2018    Procedure: Left extracorporeal shock wave lithotripsy, video cystoscopy with double J stent exchange;  Surgeon: Michael Lilly MD;  Location: [facility] OR     HC REMOVE TONSILS/ADENOIDS,<13 Y/O  ~1928    T & A <12y.o.     IMPLANT PACEMAKER       PENIS SURGERY       PROSTATE SURGERY         Prior to Admission Medications   Prior to Admission Medications   Prescriptions Last Dose Informant Patient Reported? Taking?   ACETAMINOPHEN PO 11/10/2018 at 0630  Yes Yes   Sig: Take 1,000 mg by mouth 3 times daily as needed for pain   ASPIRIN PO 11/10/2018 at 08  Yes Yes   Sig: Take 81 mg by mouth daily   Colloidal Oatmeal (AVEENO ECZEMA THERAPY) 1 % CREA Past Week at Unknown time  Yes Yes   Sig: Externally apply topically once a week On Wednesday after shower And as needed for dry skin   METOPROLOL TARTRATE PO 11/10/2018 at am  Yes Yes   Sig: Take 50 mg by mouth 2 times daily   Multiple Vitamins-Minerals (PRESERVISION AREDS) CAPS 11/10/2018 at am  No Yes   Sig: Take 1 capsule by mouth 2 times daily   PARoxetine (PAXIL) 30 MG tablet 11/9/2018 at 2000  No Yes   Sig: Take 0.5 tablets (15 mg) by mouth At Bedtime Hold until fluconazole completed   albuterol (PROVENTIL HFA: VENTOLIN HFA) 108 (90 BASE) MCG/ACT inhaler Unknown at Unknown time  No No   Sig: Inhale 2 puffs into the lungs every 6 hours as needed for shortness of breath / dyspnea.   amLODIPine (NORVASC) 5 MG tablet 11/10/2018 at 08  No Yes   Sig: TAKE 1 TABLET(5 MG) BY MOUTH DAILY   clobetasol (TEMOVATE) 0.05 % ointment Unknown at Unknown time  Yes No   Sig: Apply topically daily as needed    lidocaine (LMX4) 4 % CREA 4% topical cream Past Week at Unknown time  Yes Yes   Sig: Apply topically daily as needed   methylPREDNISolone (MEDROL DOSEPAK) 4 MG tablet Started 11/8/18 at T 4mg @08  No Yes   Sig: Follow package instructions   omeprazole (PRILOSEC) 20 MG CR capsule 11/10/2018 at am  Yes Yes   Sig: Take 20 mg by mouth 2 times daily     opium-belladonna (B&O SUPPRETTES) 30-16.2 MG per suppository Unknown at Unknown time  Yes No   Sig: Place 30 mg rectally 2 times daily as needed for moderate to severe pain   senna-docusate (SENOKOT-S;PERICOLACE) 8.6-50 MG per tablet Unknown at Unknown time  Yes No   Sig: Take 1 tablet by mouth 2 times daily as needed for constipation      Facility-Administered Medications: None     Allergies   Allergies   Allergen Reactions     Ceftriaxone Itching     Bactrim Hives     Zithromax [Azithromycin]      Levaquin Rash     Oral only, can tolerate IV  Tolerated IV Cipro per 11/10/18 admission [facility]     Macrodantin [Nitrofuran Derivatives] Rash     Took recently with no problems       Social History   I have updated and reviewed the following Social History Narrative:   Social History     Social History Narrative    .Living situation (location, living with others?):Lives with wife in [facility]    Activities of daily living (e.g., dressing, eating, walking):Independent        Instrumental activities of daily living (e.g., finance management, housekeeping, meal planning/ prep): Wife and kids help with preparing meals, shopping, driving, climbing stairs, complex decisions                 Meaningful Activities (e.g., hobbies, work): loves music, uses the computer, likes crossword         Support:Wife and daughter        Community Resources Used/ Interested in: Referred to Herb     Living situation: [address] Apart[address], TCU off and on x 2 months  Family system:  in Spring 2018, 8 children, 5 live local, Daughter Red is nurse and main contact/caregover.  Functional status (needs help with ADLs or IADLs): needs assist with most ADL's  Employment/education: retired   Use of community resources: unknown  Activities/interests: unknown  History of substance use/abuse: unknown  Denominational affiliation: unknown  Involvement in khang community: unknown    Family History   I have reviewed this patient's family history and  updated it with pertinent information if needed.   Family History   Problem Relation Age of Onset     Family History Negative Father       age 91     HEART DISEASE Mother      pacemaker     Family History Negative Mother       in her sleep 89     Cancer Brother      oldest brother - lung cancer,  age 74     Cancer Brother      3rd brother - lymphoma,  age 76     Cancer Brother      2nd brother (Carrington)- prostate cancer, born 1920.      Cerebrovascular Disease Brother      Brother (Carrington), born in 1920       Review of Systems   Review of systems not obtained due to patient factors - mental status    Palliative Symptom Review (0=no symptom/no concern, 1=mild, 2=moderate, 3=severe):      Pain: 0-none      Fatigue:       Nausea:       Constipation: multiple BM's a day.      Diarrhea:       Depressive Symptoms:       Anxiety:       Drowsiness: yes, sleeping more and more and for much of the day over the past several months per family      Poor Appetite: yes per family      Shortness of Breath: 0-none    Physical Exam   Temp:  [96  F (35.6  C)-100.1  F (37.8  C)] 96  F (35.6  C)  Pulse:  [60-61] 60  Heart Rate:  [61-62] 61  Resp:  [16-20] 16  BP: (105-184)/(40-64) 105/40  SpO2:  [95 %-99 %] 97 %  181 lbs 0 oz  GEN:  Somnolent, wakes a little to voice some times,  appears comfortable, NAD.  HEENT:  Normocephalic/atraumatic, no scleral icterus, no nasal discharge, mouth dry.  CV:  RRR, S1, S2; no murmurs or other irregularities noted.  +3 DP/PT pulses bilatererally; no edema BLE.  RESP:  Clear to auscultation bilaterally without rales/rhonchi/wheezing/retractions.  Symmetric chest rise on inhalation noted.  Normal respiratory effort.  ABD:  Rounded, soft, non-tender/non-distended.  +BS  EXT:  Edema & pulses as noted above.  CMS intact x 4.       SKIN:  Dry to touch, no exanthems noted in the visualized areas.    NEURO: FERNÁNDEZ< no noted focal deficts other than change in MS.  Psych: Somnolent, Calm,  cooperative, becoming more restless throughout the day.    Delirium Screen/CAM:  Delirium = (#1 and #2 = YES) + (#3 and/or #4)   1) Acute onset and fluctuating course:   YES   (acute change in mental status from baseline over last 24 hours)  2) Inattention:   YES   (difficulty focusing, distractible, can't follow conversation)  3) Disorganized thinking:   YES   (score only if #1 and #2 are YES)  (rambling/irrelevant conversation, unclear/illogical thoughts, inconsistency)  4) Altered level of consciousness:   YES   (score only if #1 and #2 are YES)  (other than alert, calm, cooperative)    Delirium/CAM score: 4/4  Interpretation:  1)  Delirium:  Present  2)  Type:  mixed  3)  Severity:  severe    Data   Results for orders placed or performed during the hospital encounter of 11/10/18 (from the past 24 hour(s))   Basic metabolic panel   Result Value Ref Range    Sodium 140 133 - 144 mmol/L    Potassium 4.6 3.4 - 5.3 mmol/L    Chloride 107 94 - 109 mmol/L    Carbon Dioxide 30 20 - 32 mmol/L    Anion Gap 3 3 - 14 mmol/L    Glucose 113 (H) 70 - 99 mg/dL    Urea Nitrogen 23 7 - 30 mg/dL    Creatinine 0.86 0.66 - 1.25 mg/dL    GFR Estimate 83 >60 mL/min/1.7m2    GFR Estimate If Black >90 >60 mL/min/1.7m2    Calcium 8.8 8.5 - 10.1 mg/dL   Magnesium   Result Value Ref Range    Magnesium 2.2 1.6 - 2.3 mg/dL   Phosphorus   Result Value Ref Range    Phosphorus 2.3 (L) 2.5 - 4.5 mg/dL   CBC with platelets differential   Result Value Ref Range    WBC 13.7 (H) 4.0 - 11.0 10e9/L    RBC Count 3.73 (L) 4.4 - 5.9 10e12/L    Hemoglobin 10.7 (L) 13.3 - 17.7 g/dL    Hematocrit 34.5 (L) 40.0 - 53.0 %    MCV 93 78 - 100 fl    MCH 28.7 26.5 - 33.0 pg    MCHC 31.0 (L) 31.5 - 36.5 g/dL    RDW 18.6 (H) 10.0 - 15.0 %    Platelet Count 326 150 - 450 10e9/L    Diff Method Automated Method     % Neutrophils 76.6 %    % Lymphocytes 5.8 %    % Monocytes 11.7 %    % Eosinophils 4.5 %    % Basophils 0.4 %    % Immature Granulocytes 1.0 %    Nucleated  RBCs 0 0 /100    Absolute Neutrophil 10.5 (H) 1.6 - 8.3 10e9/L    Absolute Lymphocytes 0.8 0.8 - 5.3 10e9/L    Absolute Monocytes 1.6 (H) 0.0 - 1.3 10e9/L    Absolute Eosinophils 0.6 0.0 - 0.7 10e9/L    Absolute Basophils 0.1 0.0 - 0.2 10e9/L    Abs Immature Granulocytes 0.1 0 - 0.4 10e9/L    Absolute Nucleated RBC 0.0      *Note: Due to a large number of results and/or encounters for the requested time period, some results have not been displayed. A complete set of results can be found in Results Review.

## 2018-11-19 NOTE — PROGRESS NOTES
Patient opening eyes intermittently this shift, unable to assess orientation due to patients inability to communicate verbally. TF running at 55mL/hour, +BS/gas, incontinent of stool x3 this shift. Palliative care conference this afternoon, see specialties note regarding this. Christianson catheter patent and draining in adequate amounts. Scrotum very red and swollen, miconazole powder applied during ezio cares, moisture wicking clothes placed to help with moisture to the area as well as protect scrotum from the stool incontinence. LS diminished. Stool sample ordered for C-diff check due to frequent loose stools, enteric isolation precautions placed, sample still needs to be collected. Sitter at bedside when family not present. Repositions q2h, Ax2 w/ lift. Will continue to monitor.

## 2018-11-19 NOTE — PROGRESS NOTES
[facility]  Hospitalist Progress Note  Bakari Irwin MD 11/19/2018    Reason for Stay (Diagnosis): seizure, encephalopathy         Assessment and Plan:      Summary of Stay: Edison Boss is a 89-year-old male with a complex history of prostate cancer, CVA, nephrolithiasis with a complicated UTI who was actually hospitalized on 9/24/18 with notable candidal UTI and candidal fungemia with 2 subsequent hospitalizations since then who presents here with uncontrollable right sided leg heaviness and involuntary jerking concerning for partial complex seizure.  This will be his fourth readmission since 9/24/18.  Other past medical history includes paroxysmal atrial fibrillation, sick sinus syndrome status post pacemaker, hypertension, dyslipidemia, chronic pain, GERD, and some degree of cognitive impairment.      Course has been complicated by persistent encephalopathy, with a concern that this may be medication induced.    Repeat EEG was done showing no ongoing seizure activity     Course is now further complicated by development of low-grade fever and rising white blood cell count concerning for infection.  This is despite treatment for UTI.  Family is discussing comfort care route.  For now however they are interested in avoiding neuro-altering medications (incluing comfort medications) to see if the encephalopathy will clear with the hope that the patient will become more responsive.  Plan is for family to meet with hospice care and palliative care today to come up with goals of care going forward      Problem List/Plan:  1. Right-sided involuntary myoclonic jerking: neurology input appreciated.  Felt secondary to partial complex seizure.  LP done and shows no e/o infection.  Cultures were sent including fungal CSF cultures and remains negative to date.  Continue Keppra and Vimpat.  Seizures much improved/resolved with addition of AEDs.  No seizure hx in past.  Repeat EEG done on 11/17  shows no ongoing seizure activity  2. UTI: Started on ciprofloxacin but initial urine cultures positive for E. coli resistant to Cipro. Patient was then transitioned to IV Zosyn, then to Augmentin.  Despite this, he had recurrent fever and rising white blood cell count.  Repeat urinalysis showed 30 white blood cells, and ceftazidime was started..  Notable recent history of complicated UTI secondary to nephrolithiasis, status post lithotripsy and double-J stent exchange on 10/30/18 by urology.  Also note relative recent history of complicated candidal UTI back in September with candidal fungemia requiring IV micafungin followed by fluconazole.  Blood cultures negative to date. Urology consult appreciated and they had planned to do stent removal and chronic cath change in clinic on 11/27.    3. Recurrent fever and leukocytosis: Symptoms develop despite treatment for UTI above.  Remains on ceftazidime currently.  I discussed with family today. Beyond treatment of a potential UTI, they do not want any further infection workup.  I discussed with them role of chest x-ray and blood cultures to rule out other infection sources but they have declined this.  4. History of CVA: On aspirin  5. Hypertension: On oral metoprolol                         5.  Depressive disorder: Continue Paxil                          6.  Nutrition:  Receiving NG tube feeds as pt unable to swallow safely currently b/c of ongoing encephalopathy                         7.  Encephalopathy:   As above.      He is more verbal today, but still unable to have anything close to a conversation.    Suspect medication induced encephalopathy, and I think the improvements we are seeing are due to stoppage of neuro altering medications.    If agitation becomes recurs, may need to resume as needed Haldol but ideally would like to avoid to minimize chance of encephalopathy worsening.    No evidence of ongoing seizure activity on repeat EEG.  Given new fevers and  "rising white blood cell count, obvious concern for possible infection here as well.  See discussion above.  Limited infection workup per family wishes   8.  Loose stool: We will check C. difficile, but suspect likely from tube feeds.  Start Imodium, but would discontinue this medication if C. difficile comes back positive.  He has some skin sores related to his diarrhea, wound care nurses assisting      On discussion with family today, they are considering hospice care.  I discussed with them the options going forward.  They are interested in a palliative care consultation and a hospice consult.  I have ordered these consultations and the meeting is being planned today.  There are still hoping that the patient will show improvement of his mental status and want to continue to avoid neuro-altering medications for now including comfort medications, at least for now.  Family is having ongoing internal discussions and this decision may change               DVT Prophylaxis: Pneumatic Compression Devices  Code Status: DNR / DNI  Discharge Dispo:  Unclear.  Hospice is being considered with meeting planned today  Estimated Disch Date / # of Days until Disch: unclear           Interval History (Subjective):      Unable to obtain accurately from patient given ongoing encephalopathy                  Physical Exam:      Last Vital Signs:  /40  Pulse 60  Temp 98.2  F (36.8  C) (Axillary)  Resp 16  Ht 1.753 m (5' 9\")  Wt 82.1 kg (181 lb)  SpO2 97%  BMI 26.73 kg/m2      Intake/Output Summary (Last 24 hours) at 11/19/18 1340  Last data filed at 11/19/18 1108   Gross per 24 hour   Intake             1745 ml   Output             1625 ml   Net              120 ml       Constitutional: Awake, cooperative, no apparent distress.  Mental status is improved today.  He is able to open his eyes, and answer simple yes and no questions.  Multiple family members at bedside   Respiratory: Clear to auscultation bilaterally, no " crackles or wheezing   Cardiovascular: Regular rate and rhythm, normal S1 and S2, and no murmur noted   Abdomen: Normal bowel sounds, soft, non-distended, non-tender   Skin: No rashes, no cyanosis, dry to touch   Neuro:  Awake, no weakness, numbness, memory loss   Extremities: No edema, normal range of motion   Other(s):        All other systems: Negative          Medications:      All current medications were reviewed with changes reflected in problem list.         Data:      All new lab and imaging data was reviewed.   Labs:    Recent Labs  Lab 11/19/18  0600   WBC 13.7*   HGB 10.7*   HCT 34.5*   MCV 93         Imaging:   No results found for this or any previous visit (from the past 24 hour(s)).

## 2018-11-19 NOTE — PROGRESS NOTES
CLINICAL NUTRITION SERVICES - REASSESSMENT NOTE    Recommendations Ordered by Registered Dietitian (RD):   - Add Certavite for wound healing   - Add 3 pkts Nutrisouce Fiber daily for additional 12g fiber   Malnutrition:   % Weight Loss:  Up to 10% in 6 months (non-severe malnutrition)  % Intake:  Decreased intake does not meet criteria for malnutrition - now enteral reliant  Subcutaneous Fat Loss:  Orbital region moderate depletion and Upper arm region at least mild depletion --> 11/14  Muscle Loss:  Temporal region moderate depletion, Clavicle bone region moderate depletion and Dorsal hand region mild-moderate depletion --> 11/14  Fluid Retention:  At least mild    Malnutrition Diagnosis: Non-Severe malnutrition  In Context of:  Acute illness or injury  Chronic illness or disease     EVALUATION OF PROGRESS TOWARD GOALS   Diet: Regular  Intake: No significant oral intake since prior to admission. Patient had been getting meals ordered through 11/14, though PO tapered off as patient required more sedation w/ ongoing seizures.     Nutrition Support: continues on EN at goal since 11/15  Nutrition Support Enteral:  Type of Feeding Tube: Nasojejunal   Enteral Frequency:  Continuous  Enteral Regimen: Isosource 1.5 @ 55 mL/hr   Total Enteral Provisions: 1320 mL provides 1980 kcal (28 kcal per kg), 90 gm protein (1.3 gm per kg), 232 gm CHO, 20 gm fiber and 1003 mL H2O  Meets > 100% of DRI's.  Free Water Flush: 160 mL q4Hrs.     Intake/tolerance: No documented interruptions to enteral feeding schedule. Expect pt meeting at least 90% goal volume over the past 5 days.   - Labs: Phos 2.3 (L). Na 140, Mg 2.2, K 4.6 -- NL    Recent Labs  Lab 11/19/18  0600 11/18/18  0533 11/16/18  0505 11/15/18  0630 11/14/18  1559 11/14/18  0450 11/13/18  0741   * 104* 117* 136*  --  111* 101*   BGM  --   --   --   --  112*  --   --      - Stooling: daily, yesterday 9x documented. CDiff vs tube feed. Imodium started per MD (to be  discontinued should CDiff come back  +)  - Weight trending: up ~12 kg since admission  Vitals:    11/10/18 1301 11/15/18 0610 11/15/18 0800 11/16/18 0619   Weight: 70.1 kg (154 lb 9.6 oz) 73.9 kg (163 lb) 75.8 kg (167 lb 3.2 oz) 77.1 kg (170 lb)    11/17/18 0600 11/19/18 0547   Weight: 82.1 kg (180 lb 14.4 oz) 82.1 kg (181 lb)       ASSESSED NUTRITION NEEDS  (PER APPROVED PRACTICE GUIDELINES, Dosing weight: 70 kg):  Estimated Energy Needs: 5935-8126+ kcals (25-30 Kcal/Kg)  Justification: maintenance  Estimated Protein Needs: + grams protein (1.2-1.5+ g pro/Kg)  Justification: preservation of lean body mass + wound healing  Estimated Fluid Needs: >1 mL/Kcal  Justification: maintenance      NEW FINDINGS:   11/19 Consideration of comfort cares per family. Palliative care consulted.  11/16 WOCN: Pressure Injury to left buttocks Stage 1 slow to non blanchable intact skin present on admission.   - PI on coccyx and upper buttocks not found; skin intact with very superficial erosion suspect due to moisture and friction    - Status: wound stable, Asymptomatic  - Scrotum red, swollen    Previous Goals:   EN to provide % estimated needs once at goal.   Evaluation: Met (suspected)   EN to reach goal in the next 1-2 days.  Evaluation: Met    Previous Nutrition Diagnosis:   Inadequate protein-energy intake related to decline in PO 2/2 medicine-induced lethargy for seizure management as evidenced by at least 2 days PO <25%, overall weight loss of up to 10% in 6 months, e/o fat and muscle wasting.   Evaluation: Completed see update below     MALNUTRITION  % Weight Loss:  Up to 10% in 6 months (non-severe malnutrition)  % Intake:  Decreased intake does not meet criteria for malnutrition - now enteral reliant  Subcutaneous Fat Loss:  Orbital region moderate depletion and Upper arm region at least mild depletion --> 11/14  Muscle Loss:  Temporal region moderate depletion, Clavicle bone region moderate depletion and Dorsal  hand region mild-moderate depletion --> 11/14  Fluid Retention:  At least mild    Malnutrition Diagnosis: Non-Severe malnutrition  In Context of:  Acute illness or injury  Chronic illness or disease    CURRENT NUTRITION DIAGNOSIS  Predicted inadequate nutrient intake related to potential for interruptions to EN given enteral reliance and pt not taking PO.     INTERVENTIONS  Recommendations / Nutrition Prescription  Continue EN as ordered and outlined above  - Add 1 pkt Nutrisource Fiber TID  - Add Certavite daily for wound healing     Diet as appropriate per care team    Implementation  EN Composition: as above - fiber added  Multivitamin/Mineral: as above    Goals  EN to provide % estimated needs while pt not tolerating PO.       MONITORING AND EVALUATION:  Progress towards goals will be monitored and evaluated per protocol and Practice Guidelines    Mimi Davies RD, LD  3rd floor/ICU: [phone]  All other floors: [phone]  Weekend/holiday: [phone]  Office: [phone]

## 2018-11-19 NOTE — PLAN OF CARE
"Problem: Patient Care Overview  Goal: Plan of Care/Patient Progress Review    [facility]: Pt somnolent, lethargic. Spoke some words.   VS: /55 (BP Location: Right arm)  Pulse 61  Temp 97  F (36.1  C) (Oral)  Resp 18  Ht 1.753 m (5' 9\")  Wt 82.1 kg (180 lb 14.4 oz)  SpO2 95%  BMI 26.71 kg/m2  LS: dim on RA, infrequent cough. Oral cares performed. Biotene given x1.  GI: watery, incontinent x3  : tolliver, output 800  Skin: scrotum red, cleansed, powder applied. Coccyx red, new mepelix placed.  Activity: bedrest, repo q 2 hours   Diet: keofeed in place, 55 mL   Pain: on sched tylenol.  Plan: IV fortaz, hospice/palliative consult tmrrw      PSC at bedside.        "

## 2018-11-20 NOTE — PROGRESS NOTES
[facility].  Met with daughters, Bea Moon Kathy and sons, Abdiaziz and Edward for Hospice informational meeting. Discussed Hospice philosophy and reviewed Hospice Medicare benefit. All in agreement to proceed with Hospice care once discharge plan is developed and facility is selected. Seizure medications are being adjusted for discharge. Pt had been living at the [facility] and staff may be planning to do an onsite assessment to determine if they are able to meet pt's need. Family is also touring other facilities. Will follow along for final discharge plan.

## 2018-11-20 NOTE — PROGRESS NOTES
Infection Prevention:    Patient placed on Enteric precautions because of possible Cdiff. Please contact Infection Prevention with any questions/concerns at *[phone].    Haylee Guerrier, ICP

## 2018-11-20 NOTE — PROGRESS NOTES
"SPIRITUAL HEALTH SERVICES Progress Note  [facility]  Ortho/Spine Unit    Saw pt Miguel Ángel's family per  Rommel' request to assess emotional/spiritual resources and needs.  Miguel Ángel presented as somnolent.  His daughters Red and Concepcion and son-in-law Marito (Red's ) were present.  Briefly oriented them to  services.  Red acknowledged the availability of  support but reported \"Miguel Ángel is in a good place\" and they had no needs at this time.  Red shared that Miguel Ángel has eight children (four daughters and four sons), five of whom live in the area.  Informed them how they can request further  support.    No further plans; I and other chaplains remain available per pt/family request.    Jorge Vergara M.Div., [facility]  Staff   Pager [phone]    "

## 2018-11-20 NOTE — PLAN OF CARE
Problem: Pain, Acute (Adult)  Goal: Identify Related Risk Factors and Signs and Symptoms  Related risk factors and signs and symptoms are identified upon initiation of Human [employer] Clinical Practice Guideline (CPG).   Asleep almost all of this shift, not alert for oral intake for meds or food. A2 with lift. Christianson in place at baseline. No BM's. Repositioning. IV 20ml/hr. Comfort cares at this time with family deciding on hospice for discharge planning.

## 2018-11-20 NOTE — PLAN OF CARE
Problem: Patient Care Overview  Goal: Plan of Care/Patient Progress Review  Outcome: No Change  VS-none taken--made comfort care,   Lung Sounds-clear anteriorly, diminished bases. On room air, unable to use IS.   O2-on room air  GI-+bs, +flatus, lbm was very small at the start of shift. Stool sample still needed for r/o c diff. In enteric isolation. His NG for tube feeding dc'd at 1700. Pt will see speech eval tomorrow, had 3 small bites of pudding via spoon per family request--swallowed and tolerated with no difficulty. At 2200 for meds pt had some pudding with liquid keppra-pt coughing for 15 min. Used younker suction at bedside. Pt spitting out spit., will use all meds in iv form tonight. RT at bedside, 95%, airway protected. Head elevated.   -chronic tolliver, adequate.   IVF-midline catheter in L arm. Sl in between meds.   Dressings-scrotum red and swollen. Using barrier cream, moisture wicking cloths in perineal area. Changing as needed. Miconazole given,   CMS-karis   Drain-none  Activity-bedrest, turned freq in bed side to side,   Pain-nothing given for pain  D/C Plan-family here and supportive. Comfort care. Pt coughed over one hour. younkering at bedside

## 2018-11-20 NOTE — PROGRESS NOTES
"[facility]     D: Discharge planning continuing.. Spoke with palliative care specialist who affirms that family wishes to have hospice consult for pt.      I: Met today with pt's daughters, Red and Bea, support provided. They have discussed that ideally it would \"be nice\" if pt could return to his residence at [facility] but there would not be support available as he would currently need in his apartment. Family has spoken to Reena ( RN at [facility], [phone]) regarding the possibility of a care suite at the facility, with permission of family NORIS has spoken today to Reena, arrangements are in planning for a representative from [facility] to come to [facility] tomorrow for an on-site assessment to determine if the care suite could be an option for pt. Red (hospitals) has given  authorization to fax needed clinical information to Reena ( [phone])       Additional conversation with Red and Bea for other possible facilities for pt upon discharge.. They identified [facility], [place], and [facility] as facilities that they would wish to consider for pt on discharge with the focus on comfort care, also discussed with them the opportunity for consideration of [facility] ( [place] [facility]) which they were interested in, referral made there also. Finally, discussed hospice specialized facilities, they would like to have pt in the [place] area, discussed [facility] in which they were interested. NORIS has called [facility], they currently have opening, family was provided with facility information and contact number, encourage them to arrange tour. With permission of Red initial clinical information also faxed today to Bea at [facility]. Discussed with daughters that the cost at this time of the \"residenece\" where pt would discharge to would be private pay, which they acknowledge understanding of.       In discussing the Hospice services it was requested that arrangements be made with " [facility] as pt has been receiving primary care through [facility]. Hospice referral has been made, at this time the Hospice consult is arranged for 1300 tomorrow ( 11/21).     A/P: Good family support, coping as anticipated at this time given the decisions they are facing in regard pt's failing health, also continuing to work through the loss of their mother this year on Good Friday as they work through the first Holiday season without her. Will await facility availability and will present options to family, continue planning per MD and family decisions for residential care setting for pt.     Addendum:     D: [facility] has assessed and would be able to accept pt, they have a bed in TCU currently, would then be able to move pt into LTC area when bed becomes available. [facility] unable to accept pt, no appropriate bed at this time. Family has been updated.       Addendum:      D: Call received from Duke from [facility], he will be here in the morning between 9-10:00 am for on site assessment. NORIS has left VM message for daughter regarding this information.

## 2018-11-20 NOTE — PROGRESS NOTES
"[facility]  Palliative Care Progress Note  Text Page     Assessment & Plan   Recommendations:  1.Decisional Capacity -  Unreliable. Patient has an advance directive dated 6/23/2015.  Consents and decision making should be done by  Red Saldana, his primary Health Care Agent.  Alternate is Bea Boss.  2. Delirium (mixed)- Sedation clearing, now with some agitation and confusion. Likely multifactorial, family is hopeful for improvement and would like to hold off on any potentially sedation medications, even for symptom management.  He currently has a sitter and plan to remove NG which may help.  Per Discussion with Dr. Irwin, they are aware, he may need a trial of low dose haldol or other antipsychotic after discussion with family tonight will add haldol PRN comfort.  3. Dysphagia- Patient had not been alert enough trial swallow.  Family now agrees to comfort eating,  NG out. Speech Therapy consult consult attempted but unable due to sedation. Alert enough to eat hot dog this afternoon.  Biggest concern is Anti-seizure medications which are new this admission. We are not able to give Keppra or Vimpat SL or NH.  At this point, will continue PO doses as able,  IV option as back-up overnight and rectal valium for breakthrough to arrive in the morning from compounding with plan to then hold IV anti seizure meds in prep for hospice care.    3. Spiritual Care- Oriented to Spiritual Health  as part of Palliative Care team.  Appreciate care of Baldev Carney M.Div  4. Care Planning- Hospice here to give information on services tonight.  Family looking into locations.    Goal of Care:DNR/DNI, Comfort.  Continue course of Abx, while inpatient. Avoid sedating medications for symptom management, goal to improve delirium if possible, family OK with haldol with increasing aggitation.     Disease Process/es & Symptoms:  Edison Boss is a 89 year old patient admitted with symptoms of Right sided \"heaviness\" " "and \"tremors.\" He has been  Seen by neurology and treated for partial complex seizure, without evidence of infection after LP.  Now on Keppra and Vimpat, Sz's controlled, no lorazepam since 11/16.  While initiall treated for UTI, he is also now found with low grade fevers and increasing WBC, now on ceftriaxone.  Family has decided, no further work-up or treatment other than above for UTI.  Miguel Ángel has symptoms of somnolence and restlessness.  Delirium thought r/t opioids and benzodiazepines in addition to infection. Avoiding further medications that could alter mental status.       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 While he lives independently at baseline he has been noted needed more assist from adult children checking in after his wife passed away in May.  He is noted to have declined in functional status quickly over the past 2 months including increased weakness and falls, as well as change in mentation.  This is his fifth hospitalization over the past 3 months, he has gone back and forth from rehab.  Dietician has noted a 10% weight loss over the past 6 months and [facility] nurse notes Stage 1 pressure ulcer on L buttocks.      Findings & plan of care discussed with: Dr. Irwin, Gi Levy, TODD, and  Baldev Carney ans well as bedside nurses today.   Follow-up plan from palliative team: WIll continue to follow in support of comfort plan of care and eventual transition to hospice.   Thank you for involving us in the patient's care.     Viridiana Anaya APRN, CNS, CNP  Pain Management and Palliative Care  [facility]  Pgr: [phone]    Time Spent on this Encounter   I spent  75 minutes total, on several different visits, >50%  in assessment of the patient, counseling and discussion with the patient and family as documented in this note as well as "  coordination of care and discussion with the health care team including pharmacy and hospcie.    Interval History   More alert this afternoon, unable to attempt trials of PO anti-seizure meds given coughing or mental status overnight.    Course of Hospitalization Discussions Data   Discussed on November 20, 2018 with Viridiana MONTES, CNS, CNP:  Met with patient and daughter Lou Moon and Bea this AM.  They are coming closer to a decision about stopping IV anti-sz in prep for hospice, using rectal valium for breakthrough.  They need to speak with all family members one more time, Patient is asking about his wife and finances, somewhat confused and somnolent but semi-alert at times.  They have spoken with Dr. Irwin and Speech Therapy today.  Returned later upon request when meeting with hospice, explained need to wait until tomorrow for rectal valium.  Patient more alert and tells me he enjoyed hot dog.  Family has multiple questions about seizure management and delirium management, reviewed care plan at length as well as hospice management of symptoms.    Discussed on November 19, 2018 with Viridiana MONTES, CNS, CNP:   Met at patient's bedside with family as patient is unable to participate.  Daughters Elton as well as 2 other daughters and 3 sons are present.  They reviewed his course of illness from when I met him in October and described it as he keeps getting hit down.  One daughter noted he has had 3 good days in the last 2 months.  They are grateful the kidney stones are no longer causing pain.  They reviewed current hospitalization, grateful seizures have been managed and hopeful patient will improve his mental status as they continue treating UTI and avoid medications that could cause delirium.  They asked what the difference between Palliative Care and Hospice is and we discussed it.  They had discussed hospice care with Dr. Irwin. They feel as if Feeding tube is distressing  "to patient and ask it be removed.  We discussed comfort eating and likely risk of aspiration.  They express understanding and wish to consult with speech therapist for \"least hazardous diet\" and tips for feeding for comofrt in this setting.   We discussed needing to get symptoms controlled and plan for hospice , transition anti-seizure medications to oral.  They are hesitant as the oral route has not been reliable, and request the IV route be an option, hopeful he can continue IV as an outpatient if needed.  I did discuss with Hospice director who noted NY or SL ativan would be utilized for breakthough seizure or if unable to swallow on hospice.  This concerns family as above goal had been to avoid ativan and medications that may have impact on MS. We discussed where hospice care can take place.  Marissa asks if patient can just stay in the hospital. We discussed plan to manage symptoms and make a hospice plan, but ability to hold off on discontinue if it appears patient is in significant decline and would not make or benefit from trip and also some uncertainty along the way with this.  We discussed dying process, signs that death is drawing near, symptoms we may expect and how we could manage them.          Review of Systems   Unable    Physical Exam   SpO2:  [95 %] 95 %  181 lbs 0 oz  GEN:  Waxes and wanes semi-alert to somnolent, disoriented and confused speech at times, appears comfortable, NAD.  HEENT:  Normocephalic/atraumatic, no scleral icterus, no nasal discharge, mouth dry.  CV:  RRR, S1, S2; no murmurs or other irregularities noted.  +3 DP/PT pulses bilatererally; no edema BLE.  RESP:  Clear to auscultation bilaterally without rales/rhonchi/wheezing/retractions.  Symmetric chest rise on inhalation noted.  Normal respiratory effort.  ABD:  Rounded, soft, non-tender/non-distended.  +BS  EXT:  Edema & pulses as noted above.  CMS intact x 4.     SKIN:  Dry to touch, no exanthems noted in the visualized areas.  "   NEURO: FERNÁNDEZ, no tremor noted.    PAIN BEHAVIOR: Cooperative  Psych:  Mild agitation.  Confused and somnolent at times.     Medications     - MEDICATION INSTRUCTIONS -       IV fluid REPLACEMENT ONLY         sodium chloride 0.9%  30 mL Intravenous Once     acetaminophen  1,000 mg Oral or Feeding Tube Q8H NAKIA    Or     acetaminophen  975 mg Rectal Q8H NAKIA     aspirin  81 mg Oral or Feeding Tube Daily     lacosamide (VIMPAT) intermittent infusion  200 mg Intravenous BID     lacosamide  200 mg Oral or Feeding Tube BID     levETIRAcetam  1,500 mg Intravenous Q12H     levETIRAcetam  1,500 mg Oral or Feeding Tube BID     lidocaine  10 mL Intradermal Once     metoprolol  12.5 mg Oral or Feeding Tube BID     omeprazole  20 mg Oral or Feeding Tube QAM AC     PARoxetine  15 mg Oral or Feeding Tube Daily     sodium chloride (PF)  10 mL Intracatheter Q8H       Data   No results found. However, due to the size of the patient record, not all encounters were searched. Please check Results Review for a complete set of results.

## 2018-11-20 NOTE — PROGRESS NOTES
[facility]  Hospitalist Progress Note  Bakari Irwin MD 11/20/2018    Reason for Stay (Diagnosis): Seizures, encephalopathy         Assessment and Plan:      Summary of Stay: Edison Boss is a 89-year-old male with a complex history of prostate cancer, CVA, nephrolithiasis with a complicated UTI who was actually hospitalized on 9/24/18 with notable candidal UTI and candidal fungemia with 2 subsequent hospitalizations since then who presents here with uncontrollable right sided leg heaviness and involuntary jerking concerning for partial complex seizure.  This will be his fourth readmission since 9/24/18.  Other past medical history includes paroxysmal atrial fibrillation, sick sinus syndrome status post pacemaker, hypertension, dyslipidemia, chronic pain, GERD, and some degree of cognitive impairment.      Course has been complicated by persistent encephalopathy, with a concern that this may be medication induced.    His encephalopathy has improved over the past several days after stopping Ativan and Dilaudid and holding other neuro altering medications.  Repeat EEG was done showing no ongoing seizure activity      Course was further complicated by development of low-grade fever and rising white blood cell count concerning for infection.  This is despite treatment for UTI earlier in hospital stay.  Beyond the urinalysis, family declined further workup for this.      Family has now decided to pursue comfort care.  For now however they remain interested in avoiding neuro-altering medications (incluing comfort medications) to see if the encephalopathy will continue to improve with the hope that the patient will become more responsive and interactive with family.  Plan is for family to meet with hospice care tomorrow to come up with goals of care going forward    On my discussion with family today, they were asking if IV antiepileptics could be continued at hospice as they are concerned  that the patient may not be able to swallow.  I informed them that I do not think this would be a possibility, and if he is unable to swallow his current antiepileptics then we would need to treat with sublingual Ativan and or Valium for any recurrent seizures.  I did speak with neurology, Dr. Villegas, and he did not think there were any other alternative suitable antiepileptic medications that could be given sublingually or per rectum as an alternative to the ones he is receiving currently if he is unable to swallow.  He recommended using Ativan and Valium if necessary for breakthrough seizures going forward      Problem List/Plan:  1. Right-sided involuntary myoclonic jerking: neurology input appreciated.  Felt secondary to partial complex seizure.  LP done and shows no e/o infection.  Cultures were sent including fungal CSF cultures and remains negative to date.  Continue Keppra and Vimpat.  Seizures much improved/resolved with addition of AEDs.  No seizure hx in past.  Repeat EEG done on 11/17 shows no ongoing seizure activity  2. UTI: Started on ciprofloxacin but initial urine cultures positive for E. coli resistant to Cipro. Patient was then transitioned to IV Zosyn, then to Augmentin.  Despite this, he had recurrent fever and rising white blood cell count.  Repeat urinalysis and culture was negative.  Antibiotics have now been discontinued.  notable recent history of complicated UTI secondary to nephrolithiasis, status post lithotripsy and double-J stent exchange on 10/30/18 by urology.  Also note relative recent history of complicated candidal UTI back in September with candidal fungemia requiring IV micafungin followed by fluconazole.  Blood cultures negative to date. Urology consult appreciated earlier this admission and they had planned to do stent removal and chronic cath change in clinic on 11/27, though these plans were made prior to comfort care plans being made.    3. Recurrent fever and leukocytosis:  "Symptoms develop despite treatment for UTI above.  Repeat urine culture negative.  I discussed with family, and they do not want any further infection workup or antibiotic treatment  4. History of CVA: On aspirin  5. Hypertension: On oral metoprolol                         5.  Depressive disorder: Continue Paxil                          6.  Nutrition: Moderate (protein-calorie) Malnutrition.  Was receiving NG tube feeds due to swallowing concerns in the setting of encephalopathy.  Given comfort care plans, NG tube was removed                         7.  Encephalopathy:   As above.      He is more verbal today, but still unable to have a conversation.    Suspect medication induced encephalopathy, and I think the improvements we are seeing are due to stoppage of neuro altering medications.    If agitation becomes recurs, may need to resume as needed Haldol but ideally would like to avoid to minimize chance of encephalopathy worsening.    No evidence of ongoing seizure activity on repeat EEG.                            8.  Loose stool: We will check C. difficile, but suspect likely from tube feeds.  Start Imodium, but would discontinue this medication if C. difficile comes back positive.  He has some skin sores related to his diarrhea, wound care nurses assisting            DVT Prophylaxis: Pneumatic Compression Devices  Code Status: DNR / DNI  Discharge Dispo:  Unclear.  Hospice is being considered with meeting planned today  Estimated Disch Date / # of Days until Disch: when facility for hospice found        Interval History (Subjective):      More awake today.  He is answering some simple questions for family.  Denies pain to me                  Physical Exam:      Last Vital Signs:  /40  Pulse 60  Temp 96  F (35.6  C) (Axillary)  Resp 16  Ht 1.753 m (5' 9\")  Wt 82.1 kg (181 lb)  SpO2 95%  BMI 26.73 kg/m2      Intake/Output Summary (Last 24 hours) at 11/20/18 7538  Last data filed at 11/20/18 5705   " Gross per 24 hour   Intake              620 ml   Output             1525 ml   Net             -905 ml       Constitutional:  More awake, eyes open, multiple family present, cooperative, no apparent distress   Respiratory: Clear to auscultation bilaterally, no crackles or wheezing   Cardiovascular: Regular rate and rhythm, normal S1 and S2, and no murmur noted   Abdomen: Normal bowel sounds, soft, non-distended, non-tender   Skin: No rashes, no cyanosis, dry to touch   Neuro: awake, no weakness, numbness, memory loss   Extremities: No edema, normal range of motion   Other(s):        All other systems: Negative          Medications:      All current medications were reviewed with changes reflected in problem list.         Data:      All new lab and imaging data was reviewed.   Labs:    Recent Labs  Lab 11/19/18  0600   WBC 13.7*   HGB 10.7*   HCT 34.5*   MCV 93         Imaging:   No results found for this or any previous visit (from the past 24 hour(s)).

## 2018-11-20 NOTE — PLAN OF CARE
Problem: Patient Care Overview  Goal: Plan of Care/Patient Progress Review  Pt able to communicate with simple commands,on vpm monitor, midline iv. On comfort care, Seizure precautions.during shift change pt was coughing frequently after he was given medication with pudding, continued to subside as the night went by. On isolation precautions. At the moment pt is sleeping quietly no coughs for the last two hours.Pt is incontinent of bowel was changedx1 with smears. Urine catheter is patent and draining. Continue to reposition every two hours, applied barrier cream to the bottoms and miconazole powder to the reddened area of the scrotum.

## 2018-11-20 NOTE — PLAN OF CARE
"Problem: Patient Care Overview  Goal: Plan of Care/Patient Progress Review  SLP-  Attempted to see pt for dysphagia evaluation.  He was very lethargic, unable to open eyes or follow any commands.  Pt unable to participate in swallow evaluation at this time.  Spoke with nursing about risk of aspiration with changing medical status, no real \"safe\" type of food as pt may not be alert enough to eat.  Pt is on comfort cares with hospice consult.  Will attempt evaluation again tomorrow.      "

## 2018-11-20 NOTE — PROGRESS NOTES
BRIEF NUTRITION NOTE      REASON FOR NOTE:  Edison Boss is a 89 year old male seen by Registered Dietitian for Provider Order - Registered Dietitian to Assess and Order TF per Medical Nutrition protocol    Pt has transferred to [facility], nutrition will no longer be following. Please re-consult if a Registered Dietitian is needed in the future.       Ava Simeon RD, LD

## 2018-11-21 NOTE — PLAN OF CARE
Problem: Patient Care Overview  Goal: Plan of Care/Patient Progress Review  Outcome: Improving  Sleeping intermittently throughout night. When awake is restless at times but seems soothed by sitter's presence. Alert and very talkative when awake but is confused and speaks non-sense. Rare coughing noted tonight. Scant mucousy rectal discharge. Rectum excoriated. Scrotum reddened. Barrier cream applied to both areas. Meplex in place on coccyx. Oral care done. Denies pain. Repositioned.

## 2018-11-21 NOTE — PROGRESS NOTES
[facility]  Hospitalist Progress Note  Seven Nuno MD 11/21/2018    Reason for Stay (Diagnosis): Seizures, encephalopathy         Assessment and Plan:      Summary of Stay: Edison Boss is a 89-year-old male with a complex history of prostate cancer, CVA, nephrolithiasis with a complicated UTI who was actually hospitalized on 9/24/18 with notable candidal UTI and candidal fungemia with 2 subsequent hospitalizations since then who presents here with uncontrollable right sided leg heaviness and involuntary jerking concerning for partial complex seizure.  This will be his fourth readmission since 9/24/18.  Other past medical history includes paroxysmal atrial fibrillation, sick sinus syndrome status post pacemaker, hypertension, dyslipidemia, chronic pain, GERD, and some degree of cognitive impairment.      Course has been complicated by persistent encephalopathy, with a concern that this may be medication induced.    His encephalopathy has improved over the past several days after stopping Ativan and Dilaudid and holding other neuro altering medications.  Repeat EEG was done showing no ongoing seizure activity      Course was further complicated by development of low-grade fever and rising white blood cell count concerning for infection.  This is despite treatment for UTI earlier in hospital stay.  Beyond the urinalysis, family declined further workup for this.     Family has now decided to pursue comfort care.  For now however they remain interested in avoiding neuro-altering medications (incluing comfort medications) to see if the encephalopathy will continue to improve with the hope that the patient will become more responsive and interactive with family.  Plan is for family to meet with hospice care tomorrow to come up with goals of care going forward    .  Family still requests to continue IV pain medications and antiepileptics while the patient is in the hospital and  continue with oral, sublingual rectal medications on discharge.  I discussed with family to try oral and sublingual options and see how the patient does, stop IV medications to see if patient is stable and comfortable prior to discharge.  Patient will use oral/sublingual Ativan or Valium for seizure prevention    Problem List/Plan:  1.   Right-sided involuntary myoclonic jerking: neurology input appreciated.  Felt secondary to partial complex seizure.  LP done and shows no e/o infection.  Cultures were sent including fungal CSF cultures and remains negative to date.  Continue Keppra and Vimpat.  Seizures much improved/resolved with addition of AEDs.  No seizure hx in past.  Repeat EEG done on 11/17 shows no ongoing seizure activity    2.  UTI: Started on ciprofloxacin but initial urine cultures positive for E. coli resistant to Cipro. Patient was then transitioned to IV Zosyn, then to Augmentin.  Despite this, he had recurrent fever and rising white blood cell count.  Repeat urinalysis and culture was negative.  Antibiotics have now been discontinued.  notable recent history of complicated UTI secondary to nephrolithiasis, status post lithotripsy and double-J stent exchange on 10/30/18 by urology.  Also note relative recent history of complicated candidal UTI back in September with candidal fungemia requiring IV micafungin followed by fluconazole.  Blood cultures negative to date. Urology consult appreciated earlier this admission and they had planned to do stent removal and chronic cath change in clinic on 11/27, though these plans were made prior to comfort care plans being made.      3.  Recurrent fever and leukocytosis: Symptoms develop despite treatment for UTI above.  Repeat urine culture negative.  I discussed with family, and they do not want any further infection workup or antibiotic treatmen.    4.  History of CVA: On aspirin    5.  Hypertension: On oral metoprolol     6.  Depressive disorder: Continue  "Paxil   7.  Nutrition: Was receiving NG tube feeds due to swallowing concerns in the setting of encephalopathy.  Given comfort care plans, NG tube was remove  8.  Encephalopathy:   As above.      He is verbal today, but still unable to have a conversation and trying to move from side-to-side and change as he was not comfortable. If he is agitated, consider restarting Haldol but ideally would like to avoid to minimize chance of encephalopathy worsening.    No evidence of ongoing seizure activity on repeat EEG.     9.  Loose stool: We will check C. difficile, but suspect likely from tube feeds.  Start Imodium, but would discontinue this medication if C. difficile comes back positive.  He has some skin sores related to his diarrhea, wound care nurses assisting         DVT Prophylaxis: Pneumatic Compression Devices.    Code Status: DNR / DNI.    Discharge Dispo: Patient still on IV medications which needs to be stopped and transition to oral medications, again needs further discussion with patient's family.  I discussed with patient's family 3 children at bedside all their questions and concerns addressed, also discussed with him regarding transitioning him to oral sublingual medications prior to discharging this patient.     Estimated Disch Date / # of Days until Disch: when facility for hospice found and medication transition to only oral/sublingual and rectal route.        Interval History (Subjective):      Patient seen and examined, family at bedside, awake on chair, verbal.  He is answering some simple questions for family.                  Physical Exam:      Last Vital Signs:  /40  Pulse 60  Temp 96  F (35.6  C) (Axillary)  Resp 18  Ht 1.753 m (5' 9\")  Wt 82.1 kg (181 lb)  SpO2 95%  BMI 26.73 kg/m2    Constitutional: Awake, eyes open, multiple family present, cooperative, no apparent distress   Respiratory: Clear to auscultation bilaterally, no crackles or wheezing   Cardiovascular: Regular rate and " rhythm, normal S1 and S2, and no murmur noted   Abdomen: Normal bowel sounds, soft, non-distended, non-tender   Skin: No rashes, no cyanosis, dry to touch   Neuro: awake, no weakness, numbness, memory loss   Extremities: No edema, normal range of motion   Other(s):        All other systems: Negative          Medications:      All current medications were reviewed with changes reflected in problem list.         Data:      All new lab and imaging data was reviewed.   Labs:    Recent Labs  Lab 11/19/18  0600   WBC 13.7*   HGB 10.7*   HCT 34.5*   MCV 93         Imaging:   No results found for this or any previous visit (from the past 24 hour(s)).

## 2018-11-21 NOTE — PROGRESS NOTES
"[facility]  Palliative Care Progress Note  Text Page     Assessment & Plan   Recommendations:  1.Decisional Capacity -  Unreliable. Patient has an advance directive dated 6/23/2015.  Consents and decision making should be done by  Red Saldana, his primary Health Care Agent.  Alternate is Bea Boss.  2. Delirium (mixed)- Sedation  with some agitation, restlessness and confusion.  Family open to adding haldol PRN overnight, patient did not need.  Likely multifactorial, family is hopeful for improvement and would like to hold off on any potentially sedation medications, even for symptom management.    3. Dysphagia- Patient has been able to trial swallow when alert with success.  Family  agrees to comfort eating,  NG out. Speech Therapy consult consult appreciated.  Biggest concern is Anti-seizure medications which are new this admission. We are not able to give Keppra or Vimpat SL or MN.  At this point, will continue PO doses as able,  IV option as back-up until rectal valium for breakthrough to arrive from compounding pharmacy with plan to then hold IV anti seizure meds in prep for hospice care.    4. Palliative Seizure management- Continue Vimpat and Keppra as ordered PO BID, as patient's swallow is unreliable and he may not be able to continue his doses, will add valium MN for expected breakthrough seizure as would be plan on hospice.   #20 compounded valium suppositories ordered for here, can be relabeled ans sent with patient for use in hospice care.   5. Spiritual Care-  Appreciate care of Baldev Carney M.Div  6. Care Planning- Hospice has had informational session on services.  Family looking into locations. Appreciate work of TODD Perea.  For Hospice Reviewed current medications with family, continue as currently ordered + use \"[]MED Hospice Discharge Medications ADULT\" order set at discharge for [facility] hospice preferred medications.  Medications should be filled at [facility] and sent with patient " "if he chooses to go with  hospice.    Goal of Care:DNR/DNI, Comfort.  Continue course of Abx, while inpatient. Avoid sedating medications for symptom management, goal to improve delirium if possible, family OK with haldol with increasing aggitation.  If symptoms remain controlled, should be Ok to transition to hospice once  Plan for disposition is clear.  POLST completed to reflect comfort care, paper copy in chart to accompany Miguel Ángel with transportation.     Disease Process/es & Symptoms:  Edison Boss is a 89 year old patient admitted with symptoms of Right sided \"heaviness\" and \"tremors.\" He has been  Seen by neurology and treated for partial complex seizure, without evidence of infection after LP.  Now on Keppra and Vimpat, Sz's controlled, no lorazepam since 11/16.  While initiall treated for UTI, he is also now found with low grade fevers and increasing WBC, now on ceftriaxone.  Family has decided, no further work-up or treatment other than above for UTI.  Miguel Ángel has symptoms of somnolence and restlessness.  Delirium thought r/t opioids and benzodiazepines in addition to infection. Avoiding further medications that could alter mental status.       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 While he lives independently at baseline he has been noted needed more assist from adult children checking in after his wife passed away in May.  He is noted to have declined in functional status quickly over the past 2 months including increased weakness and falls, as well as change in mentation.  This is his fifth hospitalization over the past 3 months, he has gone back and forth from rehab.  Dietician has noted a 10% weight loss over the past 6 months and [facility] nurse notes Stage 1 pressure ulcer on L buttocks.      Findings & plan of care discussed with: Gi Lees " TODD Levy, and  Baldev Carney ans well as bedside nurses today.   Follow-up plan from palliative team: WIll continue to follow in support of comfort plan of care and eventual transition to hospice.   Thank you for involving us in the patient's care.     Viridiana MONTES, CNS, CNP  Pain Management and Palliative Care  [facility]  Pgr: [phone]    Time Spent on this Encounter   I spent  75 minutes total, on several different visits, >50%  in assessment of the patient, counseling and discussion with the patient and family as documented in this note as well as  coordination of care and discussion with the health care team including pharmacy and hospcie.    Interval History   Reported alert this this morning, sleeping soundly this afternoon. Able to tolerate some food and medications in pudding this morning.   Family out looking at facilities.     Course of Hospitalization Discussions Data   Discussed on November 20, 2018 with Viridiana MONTES, CNS, CNP:  Met with patient and daughter Lou Moon and Bea this AM.  They are coming closer to a decision about stopping IV anti-sz in prep for hospice, using rectal valium for breakthrough.  They need to speak with all family members one more time, Patient is asking about his wife and finances, somewhat confused and somnolent but semi-alert at times.  They have spoken with Dr. Irwin and Speech Therapy today.  Returned later upon request when meeting with hospice, explained need to wait until tomorrow for rectal valium.  Patient more alert and tells me he enjoyed hot dog.  Family has multiple questions about seizure management and delirium management, reviewed care plan at length as well as hospice management of symptoms.    Discussed on November 19, 2018 with ALEXANDRA Mcallister, CNP:   Met at patient's bedside with family as patient is unable to participate.  Daughters Red and Bea as well as 2 other daughters and 3 sons  "are present.  They reviewed his course of illness from when I met him in October and described it as he keeps getting hit down.  One daughter noted he has had 3 good days in the last 2 months.  They are grateful the kidney stones are no longer causing pain.  They reviewed current hospitalization, grateful seizures have been managed and hopeful patient will improve his mental status as they continue treating UTI and avoid medications that could cause delirium.  They asked what the difference between Palliative Care and Hospice is and we discussed it.  They had discussed hospice care with Dr. Irwin. They feel as if Feeding tube is distressing to patient and ask it be removed.  We discussed comfort eating and likely risk of aspiration.  They express understanding and wish to consult with speech therapist for \"least hazardous diet\" and tips for feeding for comofrt in this setting.   We discussed needing to get symptoms controlled and plan for hospice , transition anti-seizure medications to oral.  They are hesitant as the oral route has not been reliable, and request the IV route be an option, hopeful he can continue IV as an outpatient if needed.  I did discuss with Hospice director who noted NC or SL ativan would be utilized for breakthough seizure or if unable to swallow on hospice.  This concerns family as above goal had been to avoid ativan and medications that may have impact on MS. We discussed where hospice care can take place.  Marissa asks if patient can just stay in the hospital. We discussed plan to manage symptoms and make a hospice plan, but ability to hold off on discontinue if it appears patient is in significant decline and would not make or benefit from trip and also some uncertainty along the way with this.  We discussed dying process, signs that death is drawing near, symptoms we may expect and how we could manage them.          Review of Systems   Unable    Physical Exam   Resp:  [16-18] 18  181 lbs " 0 oz  GEN:  Waxes and wanes semi-alert to somnolent, disoriented and confused speech at times, appears comfortable, NAD.  HEENT:  Normocephalic/atraumatic, no scleral icterus, no nasal discharge, mouth dry.  CV:  RRR, S1, S2; no murmurs or other irregularities noted.  +3 DP/PT pulses bilatererally; no edema BLE.  RESP:  Clear to auscultation bilaterally without rales/rhonchi/wheezing/retractions.  Symmetric chest rise on inhalation noted.  Normal respiratory effort.  ABD:  Rounded, soft, non-tender/non-distended.  +BS  EXT:  Edema & pulses as noted above.  CMS intact x 4.     SKIN:  Dry to touch, no exanthems noted in the visualized areas.    NEURO: FERNÁNDEZ, no tremor noted.    PAIN BEHAVIOR: Cooperative  Psych:  Mild agitation.  Confused and somnolent at times.     Medications     - MEDICATION INSTRUCTIONS -       IV fluid REPLACEMENT ONLY         sodium chloride 0.9%  30 mL Intravenous Once     acetaminophen  975 mg Rectal Q8H NAKIA     aspirin  81 mg Oral or Feeding Tube Daily     lacosamide (VIMPAT) intermittent infusion  200 mg Intravenous BID     lacosamide  200 mg Oral or Feeding Tube BID     levETIRAcetam  1,500 mg Intravenous Q12H     levETIRAcetam  1,500 mg Oral or Feeding Tube BID     lidocaine  10 mL Intradermal Once     metoprolol  12.5 mg Oral or Feeding Tube BID     omeprazole  20 mg Oral or Feeding Tube QAM AC     PARoxetine  15 mg Oral or Feeding Tube Daily     sodium chloride (PF)  10 mL Intracatheter Q8H       Data   No results found. However, due to the size of the patient record, not all encounters were searched. Please check Results Review for a complete set of results.

## 2018-11-21 NOTE — PLAN OF CARE
Problem: Patient Care Overview  Goal: Plan of Care/Patient Progress Review  Outcome: No Change  Confused, restless at times, easily reoriented/relaxed.  Comfort cares, total cares.  Denies pain, moans out with repositioning.  Chronic tolliver.  Coccyx mirapex.  Repositioned w/A2.

## 2018-11-21 NOTE — PLAN OF CARE
"Problem: Patient Care Overview  Goal: Plan of Care/Patient Progress Review  Speech therapy orders received from Viridiana Anaya (palliative care) to assess swallow function as \"family wanting guidance of least hazardous diet for comfort eating\" as pt currently on comfort care plan. Pt well known to speech services during multiple admission in the past, family recalling writer as well. Re-educated on role of SLP and evaluation that could be completed. When asking both pt and family, they do not wish to make any diet modifications at this time, stating that if he wants a hot dog they want him to eat a hot dog (as he did last night). They do report eating appears more effortful with slow mastication, but continue to not want to modify diet. Educated on risks of dysphagia/aspriation and all verbalize understanding, pt at one point stating \"I know i'm going to die.\" Did educate on safe swallow strategies: slow pace, only eat when alert/upright, cue to chew thoroughly, liquid washes. Family does also wish to pursue 1:1 supervision if they are note present and staff to cue for the above strategies as needed. RN notified re: all. Will \"complete\" orders at this time, however if any further concerns arise and/or if pt/family wishing to eventually puruse diet modifications please do not hesitate to re-consult. x1 IP care planning unit billed.       "

## 2018-11-21 NOTE — PLAN OF CARE
Patient awake at beginning of shift, attempted to get out of bed, transported patient to recliner Ax2 w/ lift. Family present at bedside until about 1200, at which point patient stated he wished to return to bed, fell asleep immediately afterwards. PO tylenol suspension given x1 mixed with vanilla pudding which patient tolerated, no coughing. Comfort cares. Christianson patent and draining in adequate amounts, no BMs this shift, Turn q2h. Will continue to monitor.

## 2018-11-21 NOTE — PROGRESS NOTES
[facility] Nurse Inpatient Adult Pressure Injury (PI) Wound Assessment     Assessment of PI(s) on pt's:   Gluteal Cleft/ Coccyx    Data:   Patient History:      per MD note(s):  89-year-old male with a complex history of prostate cancer, CVA, nephrolithiasis with a complicated UTI who was actually hospitalized on 18 with notable candidal UTI and candidal fungemia with 2 subsequent hospitalizations since then who presents here with uncontrollable right sided leg heaviness and involuntary jerking concerning for partial complex seizure.  This will be his fourth readmission since 18.  Other past medical history includes paroxysmal atrial fibrillation, sick sinus syndrome status post pacemaker, hypertension, dyslipidemia, chronic pain, GERD, and some degree of cognitive impairment.  Pt is now on comfort cares; discussion with RN today about wound care.         Mattress:  Standard , Atmos Air mattress  Current pressure relieving devices:  Mepilex dressing and Pillows    Moisture Management:  Diaper and Urinary Catheter    Catheter secured? Yes    Current Diet / Nutrition:       Active Diet Order      Regular Diet Adult (1:1 supervision with intake (family ok to provide if present))    Pepe Assessment and sub scores:   Pepe Score  Av.9  Min: 15  Max: 23                                                                                                                      Pressure Injury Assessment  (location #1):   Left Buttocks    Wound History:   See above; pt has a tolliver with residual overflow leakage around cuff occasionally;     Specific Dimensions (length x width x depth, in cm) :   Stage 1 present to Left buttocks and the surrounding skin intact and blanchable         Intervention:     Patient's chart evaluated.      Pepe Interventions:  Current Pepe Interventions and Care Plan reviewed and updated, appropriate at this time.    Wound Care: was done by staff    Orders   updated    Supplies floor stock     Discussed plan of care with RN           Assessment:     Pressure Injury to left buttocks Stage 1 slow to non blanchable intact skin present on admission.     PI on coccyx and upper buttocks not found; skin intact with very superficial erosion suspect due to moisture and friction      Status: wound stable, Asymptomatic;     Pt now on comfort cares; please continue with Mepilex dressing and change every 5 days as prevention with daily skin assessment as able         Plan:     Nursing to notify the Provider(s) and re-consult the WO Nurse if wound(s) deteriorate(s)or if the wound care plan needs reevaluation.    Plan for wound care to wound located on coccyx: Every 5days    1. Wipe skin clean; apply Mepilex 4x4 to flat dry skin, date    2. If Incontinence does not allow dressing to adhere for > 1 day: change POC: stop Mepilex and begin to use Critic aid paste BID and PRN    3. Pressure Injury Prevention: turn pt side to side Q 2hrs, float heels and pad elbows, Sween to intact dry skin PRN    WOC Nurse consult completed

## 2018-11-21 NOTE — PROGRESS NOTES
SWS     D: Discharge planning continuing... [facility] has left voicemail informed that they will have [place] [facility] bed available there, also spoke this morning to Duke from [facility] who will be coming at 1000 to meet with family to discuss opportunity there for pt in their Care Suite. NORIS has met with family also this morning, provided information as noted above.. they have indicated that they wish to tour [facility] after speaking with Duke so they are keeping all options in consideration at this time.      Addendum:     D: Wall has met with family, based on pt's status and care needs he does not feel that they would be able to provide needed support in Care Suite at [facility], family aware.       Addendum:     D:  [facility] has informed SW that they would be able to accept pt in their [place] [facility] room, pt  could have up to 5 days Medicare coverage consideration for assessment of pt, after which staff representative at [facility] would discuss private pay costs with them, family has been updated. This opportunity for Medicare coverage would be offered only if pt is not enrolled in Hospice at the time. NORIS has asked admissions coordinator at [facility] to discuss specifics of this when meeting with family as they tour.         Daughter has informed NORIS per their contact with [facility], they no longer have bed available.   A/P: Awaiting MD determination of discharge date, family determination of acceptance of bed available at [facility].       Addendum:     D: Call was received from [facility], they do not feel that pt meets the criteria for their [place] [facility] opening based on location of the room in conjunction with RN station.. she further noted that based on information received from family that the best monitoring for him would be in their cognitive care area, however they have no openings at this time on that unit. NORIS has spoken to daughterRed who indicated that they were  hopeful of pt's ability to discharge to the [place] [facility] opening at [facility], she has asked that SW call admissions coordinator at [facility] to ask that she contact her to further discuss. NORIS has spoken to admissions coordinator, Marion, who will call daughter. Daughter then phoned SW after speaking with Marion at [facility], there will not be a reconsideration of pt's admission there. Daughter has informed that they have toured [facility], would like to have pt discharge there, when bed becomes available.            NORIS has called [facility] ( [phone]), spoke to Elpidio who affirms no openings at this time but encouraged calling on a daily basis to determine openings.     A: Planning as noted above with [facility], if no openings in the immediate future, NORIS will need to explore other facility options with family.

## 2018-11-22 NOTE — PLAN OF CARE
Problem: Patient Care Overview  Goal: Plan of Care/Patient Progress Review  Outcome: No Change    RN Shift [id number]:  Pleasantly confused.  Comfort cares, total cares.  Denies pain, no s/s discomforted noted as well.  Chronic tolliver.  Up with A2 + lift.

## 2018-11-22 NOTE — PLAN OF CARE
Problem: Patient Care Overview  Goal: Plan of Care/Patient Progress Review  Outcome: No Change  Pt up w/ lift.  Up in chair x1. Turned q2hr. Only oriented to self, confused. Slept most of the afternoon. VSS. FLACC scale 0. Tylenol given per family request. Reg diet, fair appetite (consumed most of breakfast). Coccyx mepliex CDI. Christianson in place, some bypass. SW following.

## 2018-11-22 NOTE — PROGRESS NOTES
Social Work Note:    D: NORIS called [facility] ( [phone]) this morning and spoke to Cory who confirmed that they would have a bed available tomorrow (11/23/18).    I/A: NORIS spoke to Charge RN who stated that pt has not been transitioned to comfort care at this time. [facility] stated that pt needs to have hospice in place. NORIS is awaiting to here if MD feels that pt can discharge tomorrow. Per chart review, [facility] ([place]) indicated that they cannot met pt's needs at their facility.     P: NORIS is following up on potential placement at [facility].    SW: JENNA Fontenot, LICSW       Addendum 1:    NORIS spoke to Charge RN who stated that pt would likely not be ready to discharge until tomorrow (11/23/18) to allow services to be coordinated. NORIS updated Cory that SW colleague would continue to follow-up.

## 2018-11-23 NOTE — PROGRESS NOTES
[facility]  Hospitalist Progress Note  Seven Nuno MD 11/23/2018    Reason for Stay (Diagnosis): Seizures, encephalopathy         Assessment and Plan:      Summary of Stay: Edison Boss is a 89-year-old male with a complex history of prostate cancer, CVA, nephrolithiasis with a complicated UTI who was actually hospitalized on 9/24/18 with notable candidal UTI and candidal fungemia with 2 subsequent hospitalizations since then who presents here with uncontrollable right sided leg heaviness and involuntary jerking concerning for partial complex seizure.  This will be his fourth readmission since 9/24/18.  Other past medical history includes paroxysmal atrial fibrillation, sick sinus syndrome status post pacemaker, hypertension, dyslipidemia, chronic pain, GERD, and some degree of cognitive impairment.      Course has been complicated by persistent encephalopathy, with a concern that this may be medication induced.    His encephalopathy has improved over the past several days after stopping Ativan and Dilaudid and holding other neuro altering medications.  Repeat EEG was done showing no ongoing seizure activity      Course was further complicated by development of low-grade fever and rising white blood cell count concerning for infection.  This is despite treatment for UTI earlier in hospital stay.  Beyond the urinalysis, family declined further workup for this.     Family has now decided to pursue comfort care.  For now however they remain interested in avoiding neuro-altering medications (incluing comfort medications) to see if the encephalopathy will continue to improve with the hope that the patient will become more responsive and interactive with family.  Plan is for family to meet with hospice care tomorrow to come up with goals of care going forward    .  Family still requests to continue IV pain medications and antiepileptics while the patient is in the hospital and  continue with oral, sublingual rectal medications on discharge.  I discussed with family to try oral and sublingual options and see how the patient does, stop IV medications to see if patient is stable and comfortable prior to discharge.  Patient will use oral/sublingual Ativan or Valium for seizure prevention    Problem List/Plan:  1.   Right-sided involuntary myoclonic jerking: neurology input appreciated.  Felt secondary to partial complex seizure.  LP done and shows no e/o infection.  Cultures were sent including fungal CSF cultures and remains negative to date.  Continue Keppra and Vimpat.  Seizures much improved/resolved with addition of AEDs.  No seizure hx in past.  Repeat EEG done on 11/17 shows no ongoing seizure activity, patient is on oral anti seizure medications.    2.  UTI: he was on ciprofloxacin but initial urine cultures positive for E. coli resistant to Cipro. Patient was then transitioned to IV Zosyn, then to Augmentin.  Despite this, he had recurrent fever and rising white blood cell count.  Repeat urinalysis and culture was negative.  Antibiotics then discontinued.  notable recent history of complicated UTI secondary to nephrolithiasis, status post lithotripsy and double-J stent exchange on 10/30/18 by urology.  Also note relative recent history of complicated candidal UTI back in September with candidal fungemia requiring IV micafungin followed by fluconazole.  Blood cultures negative to date. Urology consult appreciated earlier this admission and they had planned to do stent removal and chronic cath change in clinic on 11/27, but  Plan changed to comfort care.   -Treatment completed  -Currently patient is on comfort    3.  Recurrent fever and leukocytosis: Symptoms develop despite treatment for UTI above.  Repeat urine culture negative. No further work up.    4.  History of CVA: On aspirin    5.  Hypertension: On oral metoprolol     6.  Depressive disorder: Continue Paxil   7.  Nutrition: He  "was on NG tube feeds due to swallowing concerns in the setting of encephalopathy, TF stopped, now on comfort eating.    8.  Encephalopathy: This multifactorial, likely metabolic and combinations of medications/toxic.  He is off sedating medications, will only use some of those if there is any seizure or agitation.  He is still confused but verbal, but still unable to have a conversation and trying, his hands are restless trying to take his clothes off without any reason. If he is agitated, consider restarting Haldol but ideally would like to avoid to minimize chance of encephalopathy worsening.    No evidence of ongoing seizure activity on repeat EEG.     9.  Loose stool: Resolved.    DVT Prophylaxis: Pneumatic Compression Devices.    Code Status: DNR / DNI /comfort care    Discharge Dispo: Patient is on comfort medication and all other medications were changed to oral route.  I discussed with patient and his son-in-law today.     Estimated Disch Date / # of Days until Disch: Patient can be discharged on hospice and comfort when placement is available.  There is no other medical reason to keep him in the hospital as he is on comfort. Patient is able to swallow his medication at this point, and even if he does not, we have sublingual and rectal routes for comfort medication administration and also for seizure medications.        Interval History (Subjective):      Patient seen and examined, his son-in-law at bedside, sitting on the chair awake but confused, trying to take off his clothes.                  Physical Exam:      Last Vital Signs:  /69  Pulse 62  Temp 96  F (35.6  C) (Axillary)  Resp 18  Ht 1.753 m (5' 9\")  Wt 82.1 kg (181 lb)  SpO2 95%  BMI 26.73 kg/m2    Constitutional: Awake, eyes open, moves his hands without any purpose, verbal but does not follow instructions, cooperative, no apparent distress   Respiratory:  Good air entry bilaterally, no crackles or wheezing   Cardiovascular: Regular " rate and rhythm, normal S1 and S2, and no murmur noted   Abdomen: Normal bowel sounds, soft, non-distended, non-tender   Skin: No rashes, no cyanosis, dry to touch   Neuro: awake, no weakness, numbness, memory loss   Extremities: No edema, normal range of motion   Other(s):        All other systems: Negative          Medications:      All current medications were reviewed with changes reflected in problem list.         Data:      All new lab and imaging data was reviewed.   Labs:    Recent Labs  Lab 11/19/18  0600   WBC 13.7*   HGB 10.7*   HCT 34.5*   MCV 93         Imaging:   No results found for this or any previous visit (from the past 24 hour(s)).

## 2018-11-23 NOTE — PLAN OF CARE
Problem: Patient Care Overview  Goal: Plan of Care/Patient Progress Review  Alert to self only.   On comfort cares. Midline patent. A2 with repositioning. Foam dressing in place to coccyx. VPM in place. Denies pain and appears comfortable.  Has tolliver in place and voiding good amounts. Tolliver bypassing cares done.  Midline in place and flushed. Attempted to get out of bed and undressed x 3 . VPM monitor in place. SW working with  [facility] for possible discharge. Hospice meeting will be today. Will continue to monitor.

## 2018-11-23 NOTE — PROGRESS NOTES
[facility]  Palliative Care Progress Note  Text Page     Assessment & Plan   Recommendations:  1.Decisional Capacity -  Unreliable. Patient has an advance directive dated 6/23/2015.  Consents and decision making should be done by  Red Saldana, his primary Health Care Agent.  Alternate is Bea Boss.  2. Delirium (mixed)- Sedation  with rare agitation, at time restlessness and confusion.  Family open to adding haldol PRN overnight, patient has not needed.  Likely multifactorial, family is hopeful for improvement and would like to hold off on any potentially sedation medications, even for symptom management.    3. Dysphagia- Patient has been able to trial swallow when alert with success.  Family  agrees to comfort eating,  NG out. Speech Therapy consult consult appreciated.  Biggest concern is Anti-seizure medications which are new this admission. We are not able to give Keppra or Vimpat SL or CO.  At this point, will continue PO doses as able with rectal valium for breakthrough.  4. Palliative Seizure management- Continue Vimpat and Keppra as ordered PO BID, as patient's swallow is unreliable and he may not be able to continue his doses, will add valium CO for expected breakthrough seizure as would be plan on hospice.   #20 compounded valium suppositories ordered for here, can be relabeled ans sent with patient for use in hospice care.   5. Spiritual Care-  Appreciate care of Baldev Carney M.Div  6. Care Planning- Hospice has had informational session on services.  Planning of discontinue with hospice to [facility] this afternoon. Hospice medications ordered.  Spoke with Dr. Lilly as patient should not need follow-up for ureteral stent on hospice, he also thinks tolliver should not need to be changed.  If patient should improve enough to have some longevity and is able he can always follow- up in urology clinic as appropriate.   Goal of Care:DNR/DNI, Comfort.  Continue course of Abx, while inpatient. Avoid  "sedating medications for symptom management, goal to improve delirium if possible, family OK with haldol with increasing aggitation.  As symptoms have been controlled with minimal intervention, transition to hospice today.  POLST completed to reflect comfort care, paper copy in chart to accompany Miguel Ángel with transportation.     Disease Process/es & Symptoms:  Edison Boss is a 89 year old patient admitted with symptoms of Right sided \"heaviness\" and \"tremors.\" He has been  Seen by neurology and treated for partial complex seizure, without evidence of infection after LP.  Now on Keppra and Vimpat, Sz's controlled, no lorazepam since 11/16.  While initiall treated for UTI, he is also now found with low grade fevers and increasing WBC, now on ceftriaxone.  Family has decided, no further work-up or treatment other than above for UTI.  Miguel Ángel has symptoms of somnolence and restlessness.  Delirium thought r/t opioids and benzodiazepines in addition to infection. Avoiding further medications that could alter mental status.       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 While he lives independently at baseline he has been noted needed more assist from adult children checking in after his wife passed away in May.  He is noted to have declined in functional status quickly over the past 2 months including increased weakness and falls, as well as change in mentation.  This is his fifth hospitalization over the past 3 months, he has gone back and forth from rehab.  Dietician has noted a 10% weight loss over the past 6 months and [facility] nurse notes Stage 1 pressure ulcer on L buttocks.      Findings & plan of care discussed with: Marina BROOKS, Dr. Martinez  Follow-up plan from palliative team: plan to discontinue to hospice today.   Thank you for involving us in the patient's care. "     ALEXANDRA Mcallister, CNP  Pain Management and Palliative Care  [facility]  Pgr: [phone]    Time Spent on this Encounter   I spent  25 minutes total, on several different visits, >50%  in assessment of the patient, counseling and discussion with the patient and family as documented in this note as well as  coordination of care and discussion with the health care team including pharmacy and hospcie.    Interval History   Patient alert and reported eating well able to take oral medications. Remains confused.Per daughter Mari in some distress about not being spike to follow the planning for his care.    Has not used haldol or other medications for comfort.    Planning for discontinue to hospice care today.    Course of Hospitalization Discussions Data   Discussed on November 21, 2018 with ALEXANDRA Mcallister, CNP:  Spoke with Dtr Red via the phone.  She notes she and family feel patient is ready for hospice transition and in fact doing well, better than they expected?  We discussed unknown of future but SW services available with hospice should patient be well enough to benefit from LTC.  Red noted finances would be served better if he ended up with some longevity. Overall [facility] fits his current needs.  She has spoken with urology clinic and ok with no further urology intervention.  As hospice team will not have urology available to change catheter, will check in with urology and see if they would find value in change out in catheter prior to discharge at her request.     Discussed on November 20, 2018 with ALEXANDRA Mcallister, CNP:  Met with patient and daughter Red Lou and Bea this AM.  They are coming closer to a decision about stopping IV anti-sz in prep for hospice, using rectal valium for breakthrough.  They need to speak with all family members one more time, Patient is asking about his wife and finances, somewhat confused and somnolent but semi-alert  "at times.  They have spoken with Dr. Irwin and Speech Therapy today.  Returned later upon request when meeting with hospice, explained need to wait until tomorrow for rectal valium.  Patient more alert and tells me he enjoyed hot dog.  Family has multiple questions about seizure management and delirium management, reviewed care plan at length as well as hospice management of symptoms.    Discussed on November 19, 2018 with Viridiana Anaya APRN, CNS, CNP:   Met at patient's bedside with family as patient is unable to participate.  Daughters Red and Bea as well as 2 other daughters and 3 sons are present.  They reviewed his course of illness from when I met him in October and described it as he keeps getting hit down.  One daughter noted he has had 3 good days in the last 2 months.  They are grateful the kidney stones are no longer causing pain.  They reviewed current hospitalization, grateful seizures have been managed and hopeful patient will improve his mental status as they continue treating UTI and avoid medications that could cause delirium.  They asked what the difference between Palliative Care and Hospice is and we discussed it.  They had discussed hospice care with Dr. Irwin. They feel as if Feeding tube is distressing to patient and ask it be removed.  We discussed comfort eating and likely risk of aspiration.  They express understanding and wish to consult with speech therapist for \"least hazardous diet\" and tips for feeding for comofrt in this setting.   We discussed needing to get symptoms controlled and plan for hospice , transition anti-seizure medications to oral.  They are hesitant as the oral route has not been reliable, and request the IV route be an option, hopeful he can continue IV as an outpatient if needed.  I did discuss with Hospice director who noted AK or SL ativan would be utilized for breakthough seizure or if unable to swallow on hospice.  This concerns family as above goal " had been to avoid ativan and medications that may have impact on MS. We discussed where hospice care can take place.  Marissa asks if patient can just stay in the hospital. We discussed plan to manage symptoms and make a hospice plan, but ability to hold off on discontinue if it appears patient is in significant decline and would not make or benefit from trip and also some uncertainty along the way with this.  We discussed dying process, signs that death is drawing near, symptoms we may expect and how we could manage them.          Review of Systems   Unable    Physical Exam   Temp:  [96.1  F (35.6  C)] 96.1  F (35.6  C)  Pulse:  [60-62] 60  BP: (173-186)/(60-69) 173/60  SpO2:  [95 %] 95 %  181 lbs 0 oz  GEN:  Waxes and wanes semi-alert to somnolent, disoriented and confused speech at times, appears comfortable, NAD.  HEENT:  Normocephalic/atraumatic, no scleral icterus, no nasal discharge, mouth dry.  CV:  RRR, S1, S2; no murmurs or other irregularities noted.  +3 DP/PT pulses bilatererally; no edema BLE.  RESP:  Clear to auscultation bilaterally without rales/rhonchi/wheezing/retractions.  Symmetric chest rise on inhalation noted.  Normal respiratory effort.  ABD:  Rounded, soft, non-tender/non-distended.  +BS  EXT:  Edema & pulses as noted above.  CMS intact x 4.     SKIN:  Dry to touch, no exanthems noted in the visualized areas.    NEURO: FERNÁNDEZ, no tremor noted.    PAIN BEHAVIOR: Cooperative  Psych:  Calm  Confused and somnolent at times.     Medications     - MEDICATION INSTRUCTIONS -       IV fluid REPLACEMENT ONLY         aspirin  81 mg Oral or Feeding Tube Daily     lacosamide  200 mg Oral BID     levETIRAcetam  1,000 mg Oral BID     metoprolol tartrate  12.5 mg Oral BID     omeprazole  20 mg Oral QAM AC     PARoxetine  15 mg Oral or Feeding Tube Daily     sodium chloride (PF)  10 mL Intracatheter Q8H       Data   No results found. However, due to the size of the patient record, not all encounters were searched.  Please check Results Review for a complete set of results.

## 2018-11-23 NOTE — CONSULTS
NORIS met with the family (daughters Concepcion Moon and Bea). The original plan was for patient to discharge to [facility] this afternoon. The family feels unprepared to make the decision on where to discharge.  The family is now considering him discharging home to Bea's home.  NORIS called [facility] and they will meet with the family at 1.     Family met with hospice NORIS. The plan is for patient to discharge Saturday with Hospice at his daughter Nathaniel scruggs. Bea's address is [address] [place], [place] [id number] and her number is [phone].     It was recommended that patient discharge using a stretcher but the family refused due to costs. They have requested to ride in the back of the rig with [facility]. [facility] will make that decision when they  the patient. The  time is noon.     Hospice will be delivering equipment between 9 and noon. The hospice mtg will happen at 1 at [place].     NORIS called [facility] and canceled the bed.     TODD Hoyos  [phone]

## 2018-11-23 NOTE — PROGRESS NOTES
[facility]:  S/Writer met with children Concepcion Moon, Bea, Edward, Abdiaziz. Provided education on hospice services/philosohy. Long discussion occurred today about caring for patient at [facility] [place] home versus [facility]. Very clear on what hospice can/cannot do. Reiterated intermittent care.   B/Patient is 89 year old  male with acute encephalopathy, delirium currently at [facility]. Initial plan was to go to [facility] and r Bea determined goal is to take her to her home in [place]. Completed paperwork with health care Agent Red Saldana. Emailed to Dana Figueroa, office Navigator. Writer arranged equipment to be delivered by 12:00pm 11/24/18 hospital bed with siderail, LASHAE, obt, gil/booming. Hospital  to arrange transport by 1:00pm. Family verbalized understanding of plan.   A/Patient in bed, resting comfortably, met with children in conference room. All appear to be coping well.   R/Family changed discharge plan for patient to discharge home with hospice care.   11/24/18 equipment to be delivered by 12:00pm by [facility].   RN and SW hospice team to be to Bea's home at 1:00pm on 11/24/18.   Medications to be sent with patient.   Please call  for questions or change in plan.   Thank you for this referral.

## 2018-11-23 NOTE — PLAN OF CARE
Problem: Patient Care Overview  Goal: Plan of Care/Patient Progress Review  Outcome: No Change  Pt alert and oriented to self. VPM in place. Pt is often very fidgety. Pulls at tolliver and covers.   Pt tolerated small amounts of breakfast. Total feed.   Pt has tolliver that will stay. Small amount urine bypassed tolliver.   Small pressure wound on coccyx. Covered with foam.   Sat up in chair for most of the day shift.   Midline in place.   Waiting for family to decide on where pt is to go from here.   Comfort Cares    MD Paged    Pt's family has decided to take him home instead of to hospice facility. They can tomorrow. Can you also put order in about midline. Remove?  Thank you

## 2018-11-23 NOTE — PLAN OF CARE
Problem: Patient Care Overview  Goal: Plan of Care/Patient Progress Review  Outcome: Improving  Problem: Patient Care Overview  Goal: Plan of Care/Patient Progress Review  Outcome: No Change  Alert and oriented to self, q 2 hour turn/repo, VSS. Christianson is patent, draining straw color urine, bypass noted. Pt has a poor appetite, only had bites at supper. Mepliex on coccyx is CDI. Will continue to monitor and encourage fluids.

## 2018-11-24 NOTE — DISCHARGE SUMMARY
Admit Date:     11/10/2018   Discharge Date:     11/24/2018      PRIMARY CARE PHYSICIAN:  Dr. Porfirio Terrell.      DATE OF ADMISSION:  11/10/2018.      DATE OF DISCHARGE:  11/24/2018.      DISCHARGE DIAGNOSES:   1.  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   2.  Urinary tract infection.   3.  Encephalopathy, multifactorial, metabolic and possible toxic due to medication.   4.  Hypertension.   5.  Recurrent fever and leukocytosis after complete treatment for urinary tract infection.   6.  History of cerebrovascular accident.   7.  Depression.      DISPOSITION:  Home on hospice and comfort care.      DISCHARGE MEDICATIONS:  Reviewed and reconciled as in electronic medical record.      DISCHARGE CONDITION:  Fair, risk for deterioration.      CONSULTATIONS DURING THIS ADMISSION:  Palliative care and Neurologist.      DISCHARGE DIET:  Regular as tolerated for comfort.      DISCHARGE ACTIVITY:  Bed rest and transfer with help.      BRIEF HISTORY AND HOSPITAL COURSE:  Edison Boss is a 89-year-old gentleman with past medical history related to including prostate cancer, CVA, nephrolithiasis, recurrent complicated urinary tract infection with a recent admission to the hospital in September with UTI candiduria and candidemia followed by 2 subsequent hospitalizations since then.  This time presented with right side leg heaviness and involuntary jerky movement concerning for partial complex seizure.  This was his fourth admission to the hospital since 9/24 and had underlying atrial fibrillation with sick sinus syndrome, status post permanent pacemaker, hypertension, dyslipidemia, among others.  His hospital course was complicated by persistent encephalopathy initially suspected to be medication induced using to follow up at the [facility] for several days after stopping Ativan and Dilaudid and holding other medication.  EEG repeated showed no ongoing seizure activity.   Course was further complicated by low-grade fever and rising white blood cell count concerning for infection.  Despite treatment and complication for UTI.  After further discussion with family they chose to go with comfort directed goals of care, but wanted the patient to take anti-seizure medications.  Initially patient was unable to take oral medications and he was on I.V., currently changed to p.o. and the patient so far is tolerating.  If patient unable to take his oral medications.  There was an order for sublingual medications as needed for seizure and also for pain.  At this time there was long discussion with patient's family and multiple of them present.  The initial plan was to send the patient to hospice care facility and yesterday family changed their mind and wanted to take the patient home.  Arrangements done today and he was transferred home on hospice and comfort.  I discussed with his family.  His daughter is at bedside.  All their questions and concerns were addressed.  She showed understanding and agreed with the plan of discharge.      Total time spent coordinating his discharge face-to-face with this patient was over 40 minutes.         SAURABH LUX MD             D: 2018   T: 2018   MT: NICOLE      Name:     JOSELIN VAN   MRN:      [id number]        Account:        [id number]   :      10/19/1924           Admit Date:     11/10/2018                                  Discharge Date: 2018      Document: [id number]

## 2018-11-24 NOTE — PLAN OF CARE
Problem: Patient Care Overview  Goal: Plan of Care/Patient Progress Review  Outcome: No Change  Comfort cares. Assisted with oral care and turns. Took meds crushed in vanilla pudding. States he likes chocolate pudding better. Sleeping between cares. No family present this shift. Sammy álvarez.

## 2018-11-24 NOTE — PLAN OF CARE
Problem: Patient Care Overview  Goal: Plan of Care/Patient Progress Review  On comfort cares, sleeping comfortably between cares. Denies having pain. Repositioned x3, mouth cares done. Christianson patent. Nursing continue to monitor.

## 2018-11-24 NOTE — PLAN OF CARE
Problem: Patient Care Overview  Goal: Plan of Care/Patient Progress Review  Outcome: No Change  Pt alert and oriented to self only. Sleeping comfortably.   Comfort cares only.   No signs of pain.   Christianson in place. Does bypass some.  Wound on coccyx is pink intact, covered.   Oral cares done.   Discharge to families home today at noon.     Prescriptions sent with family. Hospice to meet with family at 1300 at [facility] transported pt.

## 2018-11-26 NOTE — PROGRESS NOTES
Clinic Care Coordination Contact  Care Coordination Communication    Referral Source: IP Handoff    Clinical Data: Patient was hospitalized at [facility] from 11.10.2018 to 11.24.2018 with diagnoses of:  1.  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   2.  Urinary tract infection.   3.  Encephalopathy, multifactorial, metabolic and possible toxic due to medication.   4.  Hypertension.   5.  Recurrent fever and leukocytosis after complete treatment for urinary tract infection.   6.  History of cerebrovascular accident.   7.  Depression.     Home Care Contact:              [facility]              Contact name () and phone number: Essie Steel RN ([phone]), and NORIS Hernández ([phone]).              Care Coordination contacted home care: Yes              Anticipated start of care date: 11.24.2018    Per chart review, Patient has multiple planned home SW visits. Email sent to  and SW to notify of CC's involvement.    Plan: SW Care Coordinator will await notification from home care staff informing SW Care Coordinator of patients discharge plans/needs. SW Care Coordinator will review chart and outreach to home care every 4 weeks and as needed.      Vincent Steel, [facility]  Clinic Care Coordinator  Ph. [phone]  [email or web address]

## 2018-11-26 NOTE — PROGRESS NOTES
Transition Communication Hand-off for Care Transitions to Next Level of Care Provider    Name: Edison Boss  : 10/19/1924  MRN #: [id number]  Primary Care Provider: Porfirio Terrell MD     Primary Clinic: [facility] [address]  [facility] [address]     Reason for Hospitalization:  Partial seizure, 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]  Right leg weakness [R29.898]  Admit Date/Time: 11/10/2018  9:53 AM  Discharge Date: 18  Payor Source: Payor: MEDICARE / Plan: MEDICARE / Product Type: Medicare /     Readmission Assessment Measure (UGO) Risk Score/category: High         Reason for Communication Hand-off Referral: Fragility    Discharge Plan:  Discharge Plan:       Most Recent Value    Disposition Comments Discharge plan to be determined pending MD determination of discharge needs           Concern for non-adherence with plan of care:   NO  Discharge Needs Assessment:  Needs       Most Recent Value    # of Referrals Placed by [facility] [[facility]]          Already enrolled in Tele-monitoring program and name of program:  NA  Follow-up specialty is recommended: No    Follow-up plan:  Future Appointments  Date Time Provider Department Center   2018 1:15 PM SANDRA TECH1 [facility]       Any outstanding tests or procedures:              Key Recommendations:  CTS identifies pt as high risk due to High UGO. Patient has had 3 ER visits within the last 6 months. Patient had 3 hospital admissions during September-October for recurrent UTI, sepsis, fungemia, and ureteral stone.   [facility], with a recent change/ decline in mobility. Per chart review patient was requiring a wheelchair for mobility prior to admission.   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   Patient had several consults during hospitalization. Pt and family elected to have hospice care. Pt  discharged to his dtr Bea's home w/ [facility].     Jen Wills/Mame Leslie RN CTS    AVS/Discharge Summary is the source of truth; this is a helpful guide for improved communication of patient story

## 2018-11-27 NOTE — TELEPHONE ENCOUNTER
Daughter Red called to report her dad is on hospice and actively dying and wanted device check cancelled.  TREASURE Smith

## 2018-11-29 NOTE — TELEPHONE ENCOUNTER
Reason for Call:  Other [facility]    Detailed comments: Pt is actively dying and they have reduced medications to comfort meds. If there are any questions pls call.    Phone Number Patient can be reached at: Other phone number:  [phone]-May AMANDA    Best Time: n/a    Can we leave a detailed message on this number? Not Applicable    Call taken on 11/29/2018 at 10:15 AM by Camille Wiggins

## 2018-12-03 ENCOUNTER — TELEPHONE (OUTPATIENT)
Dept: INTERNAL MEDICINE | Facility: CLINIC | Age: 83
End: 2018-12-03

## 2018-12-04 ENCOUNTER — TELEPHONE (OUTPATIENT)
Dept: INTERNAL MEDICINE | Facility: CLINIC | Age: 83
End: 2018-12-04

## 2018-12-04 NOTE — PROCEDURES
Procedure Date: 2018      ELECTROENCEPHALOGRAM      EEG #.       DATE OF SERVICE:  2018 at time 13:46.      HISTORY:  Prostate cancer, stroke, nephrolithiasis with UTI, paroxysmal AFib, sick sinus syndrome, status post pacemaker, hypertension, dyslipidemia, chronic pain, GERD and cognitive impairment.      REASON FOR TESTING:  Right-sided involuntary jerking with concern for possible seizure activity.      MEDICATIONS:  Include Tylenol, Augmentin, aspirin, Haldol, Dilaudid, Vimpat, Keppra, Ativan, metoprolol, omeprazole, Protonix, Paxil, Zosyn, potassium phosphate and Seroquel.      IMPRESSION:  This is a grossly symmetric recording.  There is note made of diffuse significant slowing predominantly in the delta range with some admixed theta throughout the recording.  There are no focal features to suggest focal seizure activity during this recording.  There are no epileptiform discharges or electrographic seizures.      INTERPRETATION:  This is an abnormal EEG due to diffuse prominent slowing.  These findings would be most consistent with an encephalopathy of moderate to severe nature.  There are no focal findings to suggest seizure activity.  Please note that absence of these focal findings does not preclude an underlying seizure disorder and further clinical correlation is recommended.         SUSANA LOUIS MD             D: 2018   T: 2018   MT: ARNIE      Name:     JOSELIN VAN   MRN:      [id number]        Account:        [id number]   :      10/19/1924           Procedure Date: 2018      Document: [id number]

## 2018-12-04 NOTE — TELEPHONE ENCOUNTER
Received fax from [facility] reporting patient death on 11/30/18 at 10:17pm.

## 2018-12-04 NOTE — PROGRESS NOTES
This encounter was opened in error. Please disregard.   UROLOGY CLINIC VISIT PATIENT INSTRUCTIONS    The catheterized urine specimen today was completely negative (no white blood cells or red blood cells). We will send a urine culture to make absolutely sure there is no infection.    We will write a prescription for vaginal estrogen cream for you which will be sent to [facility].   -The pharmacy will call you to confirm your shipping address and the cream will be sent to your home.  -When the cream arrives, squeeze a pea-size amount onto your finger and rub into the vaginal area.  -Do this every night for 2 weeks, then twice weekly thereafter as maintenance.     Follow up as needed.    If you have any issues, questions or concerns in the meantime, do not hesitate to contact us at [phone] or via [employer].     It was a pleasure meeting with you today.  Thank you for allowing me and my team the privilege of caring for you today.  YOU are the reason we are here, and I truly hope we provided you with the excellent service you deserve.  Please let us know if there is anything else we can do for you so that we can be sure you are leaving completely satisfied with your care experience.

## 2018-12-10 LAB
FUNGUS SPEC CULT: NORMAL
Lab: NORMAL
SPECIMEN SOURCE: NORMAL

## 2018-12-18 ENCOUNTER — MEDICAL CORRESPONDENCE (OUTPATIENT)
Dept: HEALTH INFORMATION MANAGEMENT | Facility: CLINIC | Age: 83
End: 2018-12-18

## 2018-12-27 NOTE — PROGRESS NOTES
Clinic Care Coordination Contact    Situation: Patient chart reviewed by care coordinator.    Background: Per chart review, Patient is .    Assessment: No identified appropriate Care Coordination interventions at this time.    Plan/Recommendations: No further outreaches will be made.    Vincent Steel [facility]  Clinic Care Coordinator  Ph. [phone]  [email or web address]

## 2019-01-03 ENCOUNTER — MEDICAL CORRESPONDENCE (OUTPATIENT)
Dept: HEALTH INFORMATION MANAGEMENT | Facility: CLINIC | Age: 84
End: 2019-01-03

## 2019-08-28 NOTE — PLAN OF CARE
PA at bedside   Problem: Patient Care Overview  Goal: Plan of Care/Patient Progress Review  Outcome: No Change  Alert to self with intermittent periods of knowing he is in the hospital and that something is wrong. BP elevated, given scheduled metoprolol. On comfort cares. Midline patent. Eating in small amounts, encouraging fluids and oral intake. up with A2 and lift. Repositioned. Foam dressing in place to coccyx. VPM in place. Denies pain and appears comfortable. voiding in good amts via chronic tolliver catheter. Plans to discharge to [facility] when bed is available for hospice care. Will continue to monitor.

## 2020-04-02 NOTE — PATIENT INSTRUCTIONS
Called patient with pre-procedure instructions for device implant:     Anticoagulation: N/A   Oral diabetes meds: N/A  Insulin: N/A  Diuretic: N/A  Contrast allergy: N/A  Pt informed to be NPO at midnight  (if procedure scheduled 1pm or later, may have clear liquid breakfast before 8am)    Pt has post-procedure transportation and 24 hours monitoring set up.   Pt aware of no driving for 24 hours post procedure due to sedation.     Pt aware of arrival time (11:00) and location. Pt verbalized understanding of instructions.     *Called patient's daughter at the request of patient's wife - she verbalized understanding. They will be in clinic tomorrow for the H&P. Linus       DC instructions

## 2020-06-13 NOTE — H&P (VIEW-ONLY)
Addended by: ABDIAS GUDINO on: 6/13/2020 08:40 AM     Modules accepted: Orders     [facility]  PRIMARY CARE PROVIDER AND CLINIC:  Porfirio Terrell MEMESUNIL [place] [address] / [facility] [id number]  Chief Complaint   Patient presents with     Hospital F/U     [facility] Medical Record Number:  [id number]  Place of Service where encounter took place:  [facility]  ([facility]) [[id number]]    HPI:    Edison Boss is a 89 year old  (10/19/1924),admitted to the above facility from  [facility].  Hospital stay 10/17/2018 through 10/24/2018.  Admitted to this facility for  rehab, medical management and nursing care.  HPI information obtained from: facility chart records.  Current issues are:         Sepsis, due to unspecified organism (H)  UTI (urinary tract infection)  Ureteral stone  MADELYN (acute kidney injury) (H)  Benign essential hypertension  Depression, unspecified depression type  Paroxysmal atrial fibrillation (H)  SOB (shortness of breath)  Hypoxia  Physical deconditioning         Hospital Course:  Edison Boss is a 89 year old male with a history of prior prostate cancer treated with brachy therapy and XRT, chronic indwelling tolliver catheter, paroxysmal AF (not anticoagulated) and SSS s/p ablation and dual chamber PPM, htn, and hlp.       He has been hospitalized 3 times, now, in the last month.  He was hospitalized from 9/16/18 through 9/19/2018 and treated for pneumonia and a candidal UTI with Levaquin and Fluconazole. He ultimately discharged home.       He returned and was hospitalized from 9/24/18 through 10/4/18 with candida fungemia with sepsis in setting of an obstructing left ureteral stone.  He required urgent cystoscopy with stent placement and stone was pushed back so as not to obstruct the ureter.  He was treated initially with broad spectrum abx (pip-tazo/vanco) but when fungemia was diagnosed he was placed on IV micafungin.  He discharged to transitional care with 2 weeks of fluconazole 400 mg daily until stone extraction followed  "by 100 mg daily for 7 days after stone removed.       He was admitted 10/17/18 with 2 days of increasing flank pain, fever, weakness, and confusion and was found to have recurrent complicated UTI, and concern for infected stone.  After admission he was seen by ID and Urology.  He was initially treated with Zosyn and Fluconazole.  Zosyn was ultimately changed to Cipro. ID is recommending Cipro 500 mg PO BID and Fluconazole 100 mg PO daily until 3 days after stone removed. Patient is scheduled for lithotripsy on 10/30/18.  He will discharge to TCU today.      1.  Acute sepsis secondary to complicated UTI: Patient is afebrile, symptoms minimally better.  Per ID, continue oral Cipro and fluconazole until 3 days after stone removed.  Infectious disease service input appreciated.  Urology will perform repeat cystoscopy for stone removal on 10/30/18.       2.  UTI: UCx on 10/17 grew klebsiella (quinolone susceptible).  UCx on 10/21 showed no growth.    As above he will remain on Cipro and fluconazole for 3 days after his stone is removed which is scheduled on 10/30/18.      3.  Left ureteral stone with stent in place.  See discussion above.        4.  MADELYN:  Suspect due to dehydration and ATN from UTI and sepsis.  Resolved.       5.  HTN:  Cont home regimen of amlodipine 5 mg every day and metoprolol 25 mg bid.      6.  Depression:  Continue Paroxetine as prior to admission.      7.  PAF/SSS-pacer in place, Not on anticoagulation      8. SOB and hypoxia - suspect multifactorial etiology due to sepsis and fluid overload. Improved with lasix.           Alert, calm, NAD. Report some pain to RLE which he states has been going on for  \"a long time\" Usually just uses acetaminophen at home and finds it somewhat helpful. Reports chronic indwelling catheter. Denies SOB, chest pain, fever or chills. Reports appetite okay. States did not sleep very well last night and relates this to the transition. States normally has not trouble " with sleep. VS reviewed- SBP elevated. Patient denies associated s/s.     CODE STATUS/ADVANCE DIRECTIVES DISCUSSION:   DNR only  Patient's living condition: lives alone    ALLERGIES:Ceftriaxone; Bactrim; Zithromax [azithromycin]; Levaquin; and Macrodantin [nitrofuran derivatives]  PAST MEDICAL HISTORY:  has a past medical history of Chronic indwelling Christianson catheter; Chronic infection; Chronic pain; Esophageal reflux; Fungemia (09/2018); Hyperlipidaemia; Hypertension; Malignant neoplasm of prostate (H) (8/14/2002); Mild persistent asthma; Paroxysmal A-fib (H); Prostate cancer (H); Sinus node dysfunction (H); Sleep apnea; Unspecified cerebral artery occlusion with cerebral infarction; and Ureterolithiasis (09/2018). He also has no past medical history of Asymptomatic human immunodeficiency virus (HIV) infection status (H); Blood in semen; Cerebral palsy (H); Complication of anesthesia; Congenital renal agenesis and dysgenesis; Difficult intubation; Epididymitis, bilateral; Epididymitis, left; Epididymitis, right; Goiter; Gout; Hernia, abdominal; History of blood transfusion; History of spinal cord injury; History of thrombophlebitis; Horseshoe kidney; Hydrocephalus; Malignant hyperthermia; Mumps; Orchitis, epididymitis, and epididymo-orchitis, with abscess; Palpitations; Parkinsons disease (H); Penile discharge; PONV (postoperative nausea and vomiting); Prostate infection; Spider veins; Spina bifida (H); Spinal headache; STD (sexually transmitted disease); Swelling of testicle; Tethered cord (H); or Tuberculosis.  PAST SURGICAL HISTORY:  has a past surgical history that includes NONSPECIFIC PROCEDURE (1980's); NONSPECIFIC PROCEDURE (1985, 5/2005); NONSPECIFIC PROCEDURE (1/2002); REMOVE TONSILS/ADENOIDS,<13 Y/O (~1928); NONSPECIFIC PROCEDURE (~1999); NONSPECIFIC PROCEDURE; NONSPECIFIC PROCEDURE; Cystoscopy, inject collagen, combined (6/6/2012); Cystoscopy, inject collagen, combined (3/22/2013); Implant pacemaker;  Cystoscopy, inject collagen, combined (8/8/2014); Cystoscopy, inject collagen, combined (N/A, 8/12/2015); Cystoscopy, inject collagen, combined (N/A, 12/8/2016); Cystoscopy; Penis surgery; Prostate surgery; and Cystoscopy, retrogrades, insert stent ureter(s), combined (Left, 9/27/2018).  FAMILY HISTORY: family history includes Cancer in his brother, brother, and brother; Cerebrovascular Disease in his brother; Family History Negative in his father and mother; HEART DISEASE in his mother.  SOCIAL HISTORY:  reports that he has quit smoking. He has a 40.00 pack-year smoking history. He has never used smokeless tobacco. He reports that he does not drink alcohol or use illicit drugs.    Post Discharge Medication Reconciliation Status: discharge medications reconciled and changed, per note/orders (see AVS).  Current Outpatient Prescriptions   Medication Sig Dispense Refill     ACETAMINOPHEN PO Take 1,000 mg by mouth 3 times daily as needed for pain       albuterol (PROVENTIL HFA: VENTOLIN HFA) 108 (90 BASE) MCG/ACT inhaler Inhale 2 puffs into the lungs every 6 hours as needed for shortness of breath / dyspnea. 1 Inhaler 1     amLODIPine (NORVASC) 5 MG tablet TAKE 1 TABLET(5 MG) BY MOUTH DAILY 90 tablet 3     ciprofloxacin (CIPRO) 500 MG tablet Take 1 tablet (500 mg) by mouth every 12 hours 20 tablet 0     clobetasol (TEMOVATE) 0.05 % ointment Apply topically daily as needed   2     Colloidal Oatmeal (AVEENO ECZEMA THERAPY) 1 % CREA Externally apply topically once a week And as needed for dry skin       fluconazole (DIFLUCAN) 100 MG tablet Take 1 tablet (100 mg) by mouth daily 10 tablet 0     lidocaine (LMX4) 4 % CREA 4% topical cream Apply topically daily as needed       metoprolol tartrate (LOPRESSOR) 25 MG tablet Take 1 tablet (25 mg) by mouth 2 times daily 60 tablet      Multiple Vitamins-Minerals (PRESERVISION AREDS) CAPS Take 1 capsule by mouth 2 times daily 180 capsule 3     omeprazole (PRILOSEC) 20 MG CR capsule  "Take 20 mg by mouth 2 times daily        oxyCODONE IR (ROXICODONE) 5 MG tablet Take 1 tablet (5 mg) by mouth every 8 hours as needed for severe pain 6 tablet 0     PARoxetine (PAXIL) 30 MG tablet Take 0.5 tablets (15 mg) by mouth At Bedtime Hold until fluconazole completed 30 tablet        ROS:  10 point ROS of systems including Constitutional, Eyes, Respiratory, Cardiovascular, Gastroenterology, Genitourinary, Integumentary, Musculoskeletal, Psychiatric were all negative except for pertinent positives noted in my HPI.    Exam:  BP (!) 168/92  Pulse 60  Temp 98.7  F (37.1  C)  Resp 18  Ht 5' 6\" (1.676 m)  Wt 156 lb (70.8 kg)  SpO2 94%  BMI 25.18 kg/m2    GENERAL APPEARANCE: Alert, in no distress   ENT: Mouth and posterior oropharynx normal, moist mucous membranes   EYES: EOM, conjunctivae, lids, pupils and irises normal   NECK: No adenopathy,masses or thyromegaly   RESP: respiratory effort and palpation of chest normal, Lungs clear to auscultation  CV: Palpation and auscultation of heart done , regular rate and rhythm, no murmur, rub, or gallop, peripheral edema - none noted  ABDOMEN: normal bowel sounds, soft, nontender, no hepatosplenomegaly or other masses   : palpation of bladder WNL - ARAMBULA catheter draining clear yellow urine  M/S: Gait and station normal   Digits and nails normal   SKIN: Inspection of skin and subcutaneous tissue baseline, Palpation of skin and subcutaneous tissue baseline-   NEURO: Cranial nerves 2-12 are normal tested and grossly at patient's baseline, Examination of sensation by touch normal   PSYCH: oriented X 3, affect and mood normal             BP 10/24-10/25: 168-191/70-77 mmHg    Lab/Diagnostic data:    CBC RESULTS:   Recent Labs   Lab Test  10/21/18   1134  10/19/18   0713   WBC  6.7  7.3   RBC  3.91*  3.69*   HGB  11.4*  10.7*   HCT  36.0*  34.2*   MCV  92  93   MCH  29.2  29.0   MCHC  31.7  31.3*   RDW  17.5*  17.4*   PLT  365  245       Last Basic Metabolic " Panel:  Recent Labs   Lab Test  10/24/18   0651  10/23/18   0811  10/22/18   0644   10/19/18   0713   NA   --    --   140   --   139   POTASSIUM   --    --   3.7   --   4.1   CHLORIDE   --    --   109   --   106   ARYAN   --    --   8.7   --   8.2*   CO2   --    --   26   --   27   BUN   --    --   9   --   15   CR  1.02  0.94  0.98   < >  1.06   GLC   --    --   82   --   83    < > = values in this interval not displayed.       Liver Function Studies -   Recent Labs   Lab Test  10/17/18   1609  10/03/18   0624   PROTTOTAL  7.9  6.8   ALBUMIN  2.7*  2.2*   BILITOTAL  0.9  0.4   ALKPHOS  122  112   AST  45  28   ALT  36  29       ASSESSMENT/PLAN:  Sepsis, due to unspecified organism (H)  UTI (urinary tract infection)  Ureteral stone  (UTI- klebsiella and candida- last UC 10/21 no growth)  S/p left urteral stent placement  Inpatient.   - complete Cipro and Flucanozole course  - f/w urology for lithotripsy 10/30  - Christianson cath cares/monitoring per nsg. (chronic indwelling)      MADELYN (acute kidney injury) (H)  - 2/2 above  - resolved    Benign essential hypertension  - SBP elevated  - will increase metoprolol - add hold parameters  - continu jane amlodipine    Depression, unspecified depression type  - situational stressors in play  - monitor mood and behaviors  - ACP prn  - continue on Paxil (on hold until abx completed)    Paroxysmal atrial fibrillation (H)  - h/o sliding scale- has dual chamber pacer    SOB (shortness of breath)  Hypoxia  - though 2/2 above and is resolved  - observe resp status, VS    Physical deconditioning  - 2/2 above  - lives at [facility]  - PT/OT  - ongoing discharge planning, SW follow and care conferences per unit protocol               Electronically signed by:  JYOTI Wade CNP

## 2021-01-01 NOTE — PROGRESS NOTES
[facility]  Hospitalist Progress Note  Seven Nuno MD 11/22/2018    Reason for Stay (Diagnosis): Seizures, encephalopathy         Assessment and Plan:      Summary of Stay: Edison Boss is a 89-year-old male with a complex history of prostate cancer, CVA, nephrolithiasis with a complicated UTI who was actually hospitalized on 9/24/18 with notable candidal UTI and candidal fungemia with 2 subsequent hospitalizations since then who presents here with uncontrollable right sided leg heaviness and involuntary jerking concerning for partial complex seizure.  This will be his fourth readmission since 9/24/18.  Other past medical history includes paroxysmal atrial fibrillation, sick sinus syndrome status post pacemaker, hypertension, dyslipidemia, chronic pain, GERD, and some degree of cognitive impairment.      Course has been complicated by persistent encephalopathy, with a concern that this may be medication induced.    His encephalopathy has improved over the past several days after stopping Ativan and Dilaudid and holding other neuro altering medications.  Repeat EEG was done showing no ongoing seizure activity      Course was further complicated by development of low-grade fever and rising white blood cell count concerning for infection.  This is despite treatment for UTI earlier in hospital stay.  Beyond the urinalysis, family declined further workup for this.     Family has now decided to pursue comfort care.  For now however they remain interested in avoiding neuro-altering medications (incluing comfort medications) to see if the encephalopathy will continue to improve with the hope that the patient will become more responsive and interactive with family.  Plan is for family to meet with hospice care tomorrow to come up with goals of care going forward    .  Family still requests to continue IV pain medications and antiepileptics while the patient is in the hospital and  continue with oral, sublingual rectal medications on discharge.  I discussed with family to try oral and sublingual options and see how the patient does, stop IV medications to see if patient is stable and comfortable prior to discharge.  Patient will use oral/sublingual Ativan or Valium for seizure prevention    Problem List/Plan:  1.   Right-sided involuntary myoclonic jerking: neurology input appreciated.  Felt secondary to partial complex seizure.  LP done and shows no e/o infection.  Cultures were sent including fungal CSF cultures and remains negative to date.  Continue Keppra and Vimpat.  Seizures much improved/resolved with addition of AEDs.  No seizure hx in past.  Repeat EEG done on 11/17 shows no ongoing seizure activity    2.  UTI: Started on ciprofloxacin but initial urine cultures positive for E. coli resistant to Cipro. Patient was then transitioned to IV Zosyn, then to Augmentin.  Despite this, he had recurrent fever and rising white blood cell count.  Repeat urinalysis and culture was negative.  Antibiotics have now been discontinued.  notable recent history of complicated UTI secondary to nephrolithiasis, status post lithotripsy and double-J stent exchange on 10/30/18 by urology.  Also note relative recent history of complicated candidal UTI back in September with candidal fungemia requiring IV micafungin followed by fluconazole.  Blood cultures negative to date. Urology consult appreciated earlier this admission and they had planned to do stent removal and chronic cath change in clinic on 11/27, though these plans were made prior to comfort care plans being made.    -Treated completed.  -Currently patient is on comfort.    3.  Recurrent fever and leukocytosis: Symptoms develop despite treatment for UTI above.  Repeat urine culture negative.  I discussed with family, and they do not want any further infection workup or antibiotic treatmen.    4.  History of CVA: On aspirin    5.  Hypertension: On  oral metoprolol     6.  Depressive disorder: Continue Paxil   7.  Nutrition: Was receiving NG tube feeds due to swallowing concerns in the setting of encephalopathy.  Given comfort care plans, NG tube was remove    8.  Encephalopathy: This multifactorial, likely metabolic and combinations of medications/toxic.  He is off sedating medications, will only use some of those if there is any seizure or agitation.  He is still confused but verbal, but still unable to have a conversation and trying, his hands are restless trying to take his clothes off without any reason. If he is agitated, consider restarting Haldol but ideally would like to avoid to minimize chance of encephalopathy worsening.    No evidence of ongoing seizure activity on repeat EEG.  Agent was on Keppra 1500 mg twice daily, changed to 1000 mg p.o. twice daily.  We will continue the same dose of lacosamide as ordered.         DVT Prophylaxis: Pneumatic Compression Devices.    Code Status: DNR / DNI /comfort care    Discharge Dispo: Patient is on comfort medication and all other medications were changed to oral route.  I discussed with patient and his son-in-law today.     Estimated Disch Date / # of Days until Disch: Patient can be discharged on hospice and comfort when placement is available.  There is no other medical reason to keep him in the hospital as he is on comfort. Patient is able to swallow his medication at this point, and even if he does not, we have sublingual and rectal routes for comfort medication administration and also for seizure medications.    Discharge medications reconciled.  Patient had a placement for today and all arrangements done for the patient to be discharged.  However, family changed their mind and wanted to take patient home tomorrow.           Interval History (Subjective):      Patient seen and examined, no new overnight issues, sitting in a chair, confused, is trying to remove pillows from under himself, no  "agitation.                  Physical Exam:      Last Vital Signs:  /56  Pulse 60  Temp 96  F (35.6  C) (Axillary)  Resp 18  Ht 1.753 m (5' 9\")  Wt 82.1 kg (181 lb)  SpO2 95%  BMI 26.73 kg/m2    Constitutional: Awake, eyes open, moves his hands without any purpose, verbal but does not follow instructions, cooperative, no apparent distress   Respiratory:  Good air entry bilaterally, no crackles or wheezing   Cardiovascular: Regular rate and rhythm, normal S1 and S2, and no murmur noted   Abdomen: Normal bowel sounds, soft, non-distended, non-tender   Skin: No rashes, no cyanosis, dry to touch   Neuro: awake, no weakness, numbness, memory loss   Extremities: No edema, normal range of motion   Other(s):        All other systems: Negative          Medications:        Current Facility-Administered Medications   Medication     - MEDICATION INSTRUCTIONS -     acetaminophen (TYLENOL) solution 1,000 mg     acetaminophen (TYLENOL) Suppository 975 mg     albuterol (PROAIR HFA/PROVENTIL HFA/VENTOLIN HFA) 108 (90 Base) MCG/ACT inhaler 2 puff     artificial saliva (BIOTENE MT) solution 1-2 spray     aspirin chewable tablet 81 mg     dextrose 10 % 1,000 mL infusion     diazepam suppository 5-10 mg     haloperidol lactate (HALDOL) injection 0.5-1 mg     lacosamide (VIMPAT) tablet 200 mg     levETIRAcetam (KEPPRA) tablet 1,000 mg     lidocaine (LMX4) kit     lidocaine 1 % 0.5-5 mL     loperamide (IMODIUM) capsule 2 mg     metoprolol tartrate (LOPRESSOR) half-tab 12.5 mg     miconazole (MICATIN; MICRO GUARD) 2 % powder     naloxone (NARCAN) injection 0.1-0.4 mg     omeprazole (priLOSEC) CR capsule 20 mg     ondansetron (ZOFRAN-ODT) ODT tab 4 mg    Or     ondansetron (ZOFRAN) injection 4 mg     opium-belladonna (B&O SUPPRETTES) 30-16.2 MG per suppository 1 suppository     PARoxetine (PAXIL) half-tab 15 mg     phenol (CHLORASEPTIC) 1.4 % spray 1 mL     sodium chloride (PF) 0.9% PF flush 10 mL     sodium chloride (PF) 0.9% " PF flush 10-20 mL            Data:      All new lab and imaging data was reviewed.   Labs:    Recent Labs  Lab 11/19/18  0600   WBC 13.7*   HGB 10.7*   HCT 34.5*   MCV 93         Imaging:   No results found for this or any previous visit (from the past 24 hour(s)).    Yuliet/Shelby

## 2021-02-25 NOTE — PLAN OF CARE
Problem: Patient Care Overview  Goal: Plan of Care/Patient Progress Review  Outcome: No Change  Patient alert and disoriented to time and situation  Reports of pain r/t bladder spasms that come and go, given PRN tylenol x1- pain controlled upon reassess   Peripheral IV saline locked, receiving IV zosyn and vanco  Tele- SR with HR of 71  VSS, on 4L of O2 via nasal cannula- SOB reported on exertion   Assist x2 with joel steady/walke and gait belt  Ate 25% of dinner, reported some nausea some time after dinner, given PRN sublingual zofran x1  Christianson catheter patent, 1 BM this shift  UC results pending  Discharge to TCU once medically stable, Continue to monitor       Complex Repair And O-T Advancement Flap Text: The defect edges were debeveled with a #15 scalpel blade.  The primary defect was closed partially with a complex linear closure.  Given the location of the remaining defect, shape of the defect and the proximity to free margins an O-T advancement flap was deemed most appropriate for complete closure of the defect.  Using a sterile surgical marker, an appropriate advancement flap was drawn incorporating the defect and placing the expected incisions within the relaxed skin tension lines where possible.    The area thus outlined was incised deep to adipose tissue with a #15 scalpel blade.  The skin margins were undermined to an appropriate distance in all directions utilizing iris scissors.

## 2022-01-20 NOTE — PHARMACY-VANCOMYCIN DOSING SERVICE
Pharmacy Vancomycin Initial Note  Date of Service 2018  Patient's  10/19/1924  89 year old, male    Indication: Sepsis    Current estimated CrCl = Estimated Creatinine Clearance: 35.6 mL/min (based on Cr of 1.4).    Creatinine for last 3 days  10/16/2018: 10:50 AM Creatinine 1.15 mg/dL  10/17/2018:  4:09 PM Creatinine 1.40 mg/dL    Recent Vancomycin Level(s) for last 3 days  No results found for requested labs within last 72 hours.      Vancomycin IV Administrations (past 72 hours)      No vancomycin orders with administrations in past 72 hours.                Nephrotoxins and other renal medications (Future)    Start     Dose/Rate Route Frequency Ordered Stop    10/18/18 0100  piperacillin-tazobactam (ZOSYN) infusion 3.375 g     Comments:  Pharmacy can adjust dose based on renal function.    3.375 g  100 mL/hr over 30 Minutes Intravenous EVERY 6 HOURS 10/17/18 2159      10/17/18 2230  vancomycin (VANCOCIN) 1,500 mg in sodium chloride 0.9 % 250 mL intermittent infusion      1,500 mg  over 90 Minutes Intravenous EVERY 24 HOURS 10/17/18 2216            Contrast Orders - past 72 hours (72h ago through future)    Start     Dose/Rate Route Frequency Ordered Stop    10/17/18 1718  iopamidol (ISOVUE-370) solution 500 mL      500 mL Intravenous ONCE 10/17/18 1717 10/17/18 1724                Plan:  1.  Start vancomycin  1500 mg IV q24h.   2.  Goal Trough Level: 15-20 mg/L   3.  Pharmacy will check trough levels as appropriate in 1-3 Days.    4. Serum creatinine levels will be ordered daily for the first week of therapy and at least twice weekly for subsequent weeks.    5. [place] method utilized to dose vancomycin therapy: Method 1    Kanu Anderson     Adult

## (undated) DEVICE — LINEN HALF SHEET 5512

## (undated) DEVICE — SOL NACL 0.9% IRRIG 3000ML BAG 2B7477

## (undated) DEVICE — TUBING IRRIG CYSTO/BLADDER SET 81" LF 2C4040

## (undated) DEVICE — CATH FOLEY 18FR 5ML LUBRICATH LATEX 0165L18

## (undated) DEVICE — Device

## (undated) DEVICE — RAD RX ISOVUE 300 (50ML) 61% IOPAMIDOL CHARGE PER ML

## (undated) DEVICE — PREP TECHNI-CARE CHLOROXYLENOL 3% 4OZ BOTTLE C222-4ZWO

## (undated) DEVICE — DRAPE GYN/UROLOGY FLUID POUCH TUR 29455

## (undated) DEVICE — GLOVE PROTEXIS POWDER FREE SMT 7.5  2D72PT75X

## (undated) DEVICE — TUBING SUCTION MEDI-VAC SOFT 3/16"X20' N520A

## (undated) DEVICE — CATH URETERAL FLEX TIP TIGERTAIL 06FRX70CM 139006

## (undated) DEVICE — TUBING IRRIG TUR Y TYPE 96" LF 6543-01

## (undated) DEVICE — SOL WATER IRRIG 1000ML BOTTLE 2F7114

## (undated) DEVICE — DRSG KERLIX FLUFFS X5

## (undated) DEVICE — PREP SKIN SCRUB TRAY 4461A

## (undated) DEVICE — BAG CLEAR TRASH 1.3M 39X33" P4040C

## (undated) DEVICE — PACK CYSTO CUSTOM RIDGES

## (undated) DEVICE — SUCTION CANISTER MEDIVAC LINER 3000ML W/LID 65651-530

## (undated) DEVICE — LINEN FULL SHEET 5511

## (undated) DEVICE — SOL WATER IRRIG 3000ML BAG 2B7117

## (undated) DEVICE — PACK SET-UP STD 9102

## (undated) DEVICE — DRAPE LEGGINGS 8421

## (undated) DEVICE — CATH FOLEY COUDE TIEMAN 16FR 5ML LUBRICATH LATEX 0102L16

## (undated) DEVICE — LINEN TOWEL PACK X10 5473

## (undated) DEVICE — GOWN IMPERVIOUS ZONED XLG 9041

## (undated) RX ORDER — GLYCOPYRROLATE 0.2 MG/ML
INJECTION INTRAMUSCULAR; INTRAVENOUS
Status: DISPENSED
Start: 2018-01-01

## (undated) RX ORDER — EPHEDRINE SULFATE 50 MG/ML
INJECTION, SOLUTION INTRAMUSCULAR; INTRAVENOUS; SUBCUTANEOUS
Status: DISPENSED
Start: 2018-01-01

## (undated) RX ORDER — PROPOFOL 10 MG/ML
INJECTION, EMULSION INTRAVENOUS
Status: DISPENSED
Start: 2018-01-01

## (undated) RX ORDER — FENTANYL CITRATE 50 UG/ML
INJECTION, SOLUTION INTRAMUSCULAR; INTRAVENOUS
Status: DISPENSED
Start: 2018-01-01

## (undated) RX ORDER — LIDOCAINE HYDROCHLORIDE 10 MG/ML
INJECTION, SOLUTION EPIDURAL; INFILTRATION; INTRACAUDAL; PERINEURAL
Status: DISPENSED
Start: 2018-01-01

## (undated) RX ORDER — BUPIVACAINE HYDROCHLORIDE 2.5 MG/ML
INJECTION, SOLUTION EPIDURAL; INFILTRATION; INTRACAUDAL
Status: DISPENSED
Start: 2018-01-01

## (undated) RX ORDER — DEXAMETHASONE SODIUM PHOSPHATE 4 MG/ML
INJECTION, SOLUTION INTRA-ARTICULAR; INTRALESIONAL; INTRAMUSCULAR; INTRAVENOUS; SOFT TISSUE
Status: DISPENSED
Start: 2018-01-01

## (undated) RX ORDER — CLINDAMYCIN PHOSPHATE 900 MG/50ML
INJECTION, SOLUTION INTRAVENOUS
Status: DISPENSED
Start: 2018-01-01

## (undated) RX ORDER — PHENYLEPHRINE HCL IN 0.9% NACL 1 MG/10 ML
SYRINGE (ML) INTRAVENOUS
Status: DISPENSED
Start: 2018-01-01